# Patient Record
Sex: MALE | Race: BLACK OR AFRICAN AMERICAN | NOT HISPANIC OR LATINO | URBAN - METROPOLITAN AREA
[De-identification: names, ages, dates, MRNs, and addresses within clinical notes are randomized per-mention and may not be internally consistent; named-entity substitution may affect disease eponyms.]

---

## 2017-01-22 ENCOUNTER — EMERGENCY (EMERGENCY)
Facility: HOSPITAL | Age: 77
LOS: 1 days | Discharge: HOME | End: 2017-01-22
Attending: EMERGENCY MEDICINE | Admitting: EMERGENCY MEDICINE
Payer: MEDICARE

## 2017-01-22 VITALS
SYSTOLIC BLOOD PRESSURE: 150 MMHG | DIASTOLIC BLOOD PRESSURE: 70 MMHG | HEART RATE: 68 BPM | RESPIRATION RATE: 16 BRPM | OXYGEN SATURATION: 99 %

## 2017-01-22 VITALS
RESPIRATION RATE: 18 BRPM | DIASTOLIC BLOOD PRESSURE: 67 MMHG | WEIGHT: 217.82 LBS | TEMPERATURE: 98 F | OXYGEN SATURATION: 97 % | HEART RATE: 64 BPM | SYSTOLIC BLOOD PRESSURE: 146 MMHG

## 2017-01-22 DIAGNOSIS — I10 ESSENTIAL (PRIMARY) HYPERTENSION: ICD-10-CM

## 2017-01-22 DIAGNOSIS — E78.5 HYPERLIPIDEMIA, UNSPECIFIED: ICD-10-CM

## 2017-01-22 DIAGNOSIS — R07.89 OTHER CHEST PAIN: ICD-10-CM

## 2017-01-22 DIAGNOSIS — Z79.899 OTHER LONG TERM (CURRENT) DRUG THERAPY: ICD-10-CM

## 2017-01-22 DIAGNOSIS — R05 COUGH: ICD-10-CM

## 2017-01-22 DIAGNOSIS — E11.9 TYPE 2 DIABETES MELLITUS WITHOUT COMPLICATIONS: ICD-10-CM

## 2017-01-22 DIAGNOSIS — N39.0 URINARY TRACT INFECTION, SITE NOT SPECIFIED: ICD-10-CM

## 2017-01-22 DIAGNOSIS — Z79.2 LONG TERM (CURRENT) USE OF ANTIBIOTICS: ICD-10-CM

## 2017-01-22 LAB
ALBUMIN SERPL ELPH-MCNC: 3.2 G/DL — LOW (ref 3.4–5)
ALP SERPL-CCNC: 50 U/L — SIGNIFICANT CHANGE UP (ref 40–120)
ALT FLD-CCNC: 42 U/L — SIGNIFICANT CHANGE UP (ref 12–42)
ANION GAP SERPL CALC-SCNC: 8 MMOL/L — LOW (ref 9–16)
APPEARANCE UR: CLEAR — SIGNIFICANT CHANGE UP
APTT BLD: 36.3 SEC — SIGNIFICANT CHANGE UP (ref 27.5–37.4)
AST SERPL-CCNC: 43 U/L — HIGH (ref 15–37)
BASOPHILS NFR BLD AUTO: 0.2 % — SIGNIFICANT CHANGE UP (ref 0–2)
BILIRUB SERPL-MCNC: 0.4 MG/DL — SIGNIFICANT CHANGE UP (ref 0.2–1.2)
BILIRUB UR-MCNC: NEGATIVE — SIGNIFICANT CHANGE UP
BUN SERPL-MCNC: 13 MG/DL — SIGNIFICANT CHANGE UP (ref 7–23)
CALCIUM SERPL-MCNC: 9.3 MG/DL — SIGNIFICANT CHANGE UP (ref 8.5–10.5)
CHLORIDE SERPL-SCNC: 108 MMOL/L — SIGNIFICANT CHANGE UP (ref 96–108)
CK MB CFR SERPL CALC: <1 NG/ML — SIGNIFICANT CHANGE UP (ref 0.5–3.6)
CK SERPL-CCNC: 89 U/L — SIGNIFICANT CHANGE UP (ref 39–308)
CO2 SERPL-SCNC: 26 MMOL/L — SIGNIFICANT CHANGE UP (ref 22–31)
COLOR SPEC: YELLOW — SIGNIFICANT CHANGE UP
CREAT SERPL-MCNC: 1.02 MG/DL — SIGNIFICANT CHANGE UP (ref 0.5–1.3)
DIFF PNL FLD: NEGATIVE — SIGNIFICANT CHANGE UP
EOSINOPHIL NFR BLD AUTO: 2 % — SIGNIFICANT CHANGE UP (ref 0–6)
GLUCOSE SERPL-MCNC: 112 MG/DL — HIGH (ref 70–99)
GLUCOSE UR QL: NEGATIVE — SIGNIFICANT CHANGE UP
HCT VFR BLD CALC: 32.8 % — LOW (ref 39–50)
HGB BLD-MCNC: 10.9 G/DL — LOW (ref 13–17)
INR BLD: 1.13 — SIGNIFICANT CHANGE UP (ref 0.88–1.16)
KETONES UR-MCNC: (no result) MG/DL
LEUKOCYTE ESTERASE UR-ACNC: (no result)
LYMPHOCYTES # BLD AUTO: 12.1 % — LOW (ref 13–44)
MCHC RBC-ENTMCNC: 29.9 PG — SIGNIFICANT CHANGE UP (ref 27–34)
MCHC RBC-ENTMCNC: 33.2 G/DL — SIGNIFICANT CHANGE UP (ref 32–36)
MCV RBC AUTO: 90.1 FL — SIGNIFICANT CHANGE UP (ref 80–100)
MONOCYTES NFR BLD AUTO: 9.5 % — SIGNIFICANT CHANGE UP (ref 2–14)
NEUTROPHILS NFR BLD AUTO: 76.2 % — SIGNIFICANT CHANGE UP (ref 43–77)
NITRITE UR-MCNC: NEGATIVE — SIGNIFICANT CHANGE UP
PH UR: 6 — SIGNIFICANT CHANGE UP (ref 4–8)
PLATELET # BLD AUTO: 293 K/UL — SIGNIFICANT CHANGE UP (ref 150–400)
POTASSIUM SERPL-MCNC: 4.3 MMOL/L — SIGNIFICANT CHANGE UP (ref 3.5–5.3)
POTASSIUM SERPL-SCNC: 4.3 MMOL/L — SIGNIFICANT CHANGE UP (ref 3.5–5.3)
PROT SERPL-MCNC: 7 G/DL — SIGNIFICANT CHANGE UP (ref 6.4–8.2)
PROT UR-MCNC: 30 MG/DL
PROTHROM AB SERPL-ACNC: 12.6 SEC — SIGNIFICANT CHANGE UP (ref 10–13.1)
RBC # BLD: 3.64 M/UL — LOW (ref 4.2–5.8)
RBC # FLD: 14.1 % — SIGNIFICANT CHANGE UP (ref 10.3–16.9)
SODIUM SERPL-SCNC: 142 MMOL/L — SIGNIFICANT CHANGE UP (ref 135–145)
SP GR SPEC: 1.02 — SIGNIFICANT CHANGE UP (ref 1–1.03)
TROPONIN I SERPL-MCNC: <0.015 NG/ML — SIGNIFICANT CHANGE UP (ref 0.01–0.04)
UROBILINOGEN FLD QL: 0.2 E.U./DL — SIGNIFICANT CHANGE UP
WBC # BLD: 9.5 K/UL — SIGNIFICANT CHANGE UP (ref 3.8–10.5)
WBC # FLD AUTO: 9.5 K/UL — SIGNIFICANT CHANGE UP (ref 3.8–10.5)

## 2017-01-22 PROCEDURE — 81003 URINALYSIS AUTO W/O SCOPE: CPT

## 2017-01-22 PROCEDURE — 71020: CPT | Mod: 26

## 2017-01-22 PROCEDURE — 93010 ELECTROCARDIOGRAM REPORT: CPT

## 2017-01-22 PROCEDURE — 80053 COMPREHEN METABOLIC PANEL: CPT

## 2017-01-22 PROCEDURE — 93005 ELECTROCARDIOGRAM TRACING: CPT

## 2017-01-22 PROCEDURE — 82550 ASSAY OF CK (CPK): CPT

## 2017-01-22 PROCEDURE — 71046 X-RAY EXAM CHEST 2 VIEWS: CPT

## 2017-01-22 PROCEDURE — 85025 COMPLETE CBC W/AUTO DIFF WBC: CPT

## 2017-01-22 PROCEDURE — 81001 URINALYSIS AUTO W/SCOPE: CPT

## 2017-01-22 PROCEDURE — 99283 EMERGENCY DEPT VISIT LOW MDM: CPT | Mod: 25

## 2017-01-22 PROCEDURE — 84484 ASSAY OF TROPONIN QUANT: CPT

## 2017-01-22 PROCEDURE — 82553 CREATINE MB FRACTION: CPT

## 2017-01-22 PROCEDURE — 85730 THROMBOPLASTIN TIME PARTIAL: CPT

## 2017-01-22 PROCEDURE — 85610 PROTHROMBIN TIME: CPT

## 2017-01-22 PROCEDURE — 99285 EMERGENCY DEPT VISIT HI MDM: CPT | Mod: 25

## 2017-01-22 RX ORDER — CEPHALEXIN 500 MG
1 CAPSULE ORAL
Qty: 14 | Refills: 0
Start: 2017-01-22 | End: 2017-01-29

## 2017-01-22 RX ORDER — CEPHALEXIN 500 MG
500 CAPSULE ORAL ONCE
Qty: 0 | Refills: 0 | Status: COMPLETED | OUTPATIENT
Start: 2017-01-22 | End: 2017-01-22

## 2017-01-22 RX ADMIN — Medication 500 MILLIGRAM(S): at 17:10

## 2017-01-22 NOTE — ED PROVIDER NOTE - OBJECTIVE STATEMENT
history of htn, dm, prostate cancer, here with generalized weakness, discomfort (gas pain) in chest, chills today.  Had something similar a week ago but went away on own so he didn't see the doctor.  Also with dry cough for a few months.  No fever, shortness of breath, abdominal pain, vomiting, diarrhea.

## 2017-01-22 NOTE — ED ADULT NURSE NOTE - PMH
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

## 2017-01-22 NOTE — ED ADULT NURSE NOTE - OBJECTIVE STATEMENT
pt is a 75 y/o male c/o midsternal chest pain since this morning as well as increasing weakness and dizziness. PMH HTN, prostate CA. denies any prior tx, n/v/d, urinary symptoms, falls, LOC, headache. pt is a 77 y/o male c/o midsternal chest pain since this morning as well as increasing weakness and dizziness. PMH HTN, prostate CA, type II DM, hyperlipidemia. denies any prior tx, n/v/d, urinary symptoms, falls, LOC, headache, jaw pain, left arm pain. EKG done and MD reviewed, pt placed on cardiac monitor. no acute distress, pt speaking in full sentences, VS stable, will continue to monitor.

## 2017-01-22 NOTE — ED PROVIDER NOTE - MEDICAL DECISION MAKING DETAILS
concern for pneumonia, acs, viral process, uti.  labs suggestive of uti.  unlikely acs given mild intermittent discomfort for more than 6 hours and normal troponin.  cxr neg for infiltrate.  plan home with keflex, pmd followup

## 2017-01-24 ENCOUNTER — APPOINTMENT (OUTPATIENT)
Dept: UROLOGY | Facility: CLINIC | Age: 77
End: 2017-01-24

## 2017-01-24 VITALS
DIASTOLIC BLOOD PRESSURE: 65 MMHG | HEART RATE: 62 BPM | WEIGHT: 208 LBS | TEMPERATURE: 97.3 F | BODY MASS INDEX: 30.81 KG/M2 | HEIGHT: 69 IN | OXYGEN SATURATION: 97 % | SYSTOLIC BLOOD PRESSURE: 145 MMHG

## 2017-01-24 RX ORDER — LEUPROLIDE ACETATE 22.5 MG
22.5 KIT INTRAMUSCULAR
Qty: 1 | Refills: 0 | Status: COMPLETED | OUTPATIENT
Start: 2017-01-24

## 2017-01-24 RX ADMIN — LEUPROLIDE ACETATE 1 MG: KIT at 00:00

## 2017-01-30 LAB
APPEARANCE: CLEAR
BACTERIA UR CULT: NORMAL
BACTERIA: NEGATIVE
BILIRUBIN URINE: NEGATIVE
BLOOD URINE: NEGATIVE
COLOR: YELLOW
GLUCOSE QUALITATIVE U: NORMAL MG/DL
HYALINE CASTS: 3 /LPF
KETONES URINE: ABNORMAL
LEUKOCYTE ESTERASE URINE: NEGATIVE
MICROSCOPIC-UA: NORMAL
NITRITE URINE: NEGATIVE
PH URINE: 6
PROTEIN URINE: 30 MG/DL
PSA SERPL-MCNC: 4.6 NG/ML
RED BLOOD CELLS URINE: 2 /HPF
SPECIFIC GRAVITY URINE: 1.02
SQUAMOUS EPITHELIAL CELLS: 1 /HPF
UROBILINOGEN URINE: NORMAL MG/DL
WHITE BLOOD CELLS URINE: 2 /HPF

## 2017-04-04 ENCOUNTER — OUTPATIENT (OUTPATIENT)
Dept: OUTPATIENT SERVICES | Facility: HOSPITAL | Age: 77
LOS: 1 days | End: 2017-04-04
Payer: MEDICARE

## 2017-04-04 PROCEDURE — 74177 CT ABD & PELVIS W/CONTRAST: CPT | Mod: 26

## 2017-04-04 PROCEDURE — 71260 CT THORAX DX C+: CPT | Mod: 26

## 2017-04-04 PROCEDURE — 74177 CT ABD & PELVIS W/CONTRAST: CPT

## 2017-04-04 PROCEDURE — 71260 CT THORAX DX C+: CPT

## 2017-04-25 ENCOUNTER — APPOINTMENT (OUTPATIENT)
Dept: UROLOGY | Facility: CLINIC | Age: 77
End: 2017-04-25

## 2017-04-25 VITALS
BODY MASS INDEX: 30.81 KG/M2 | TEMPERATURE: 98.2 F | DIASTOLIC BLOOD PRESSURE: 74 MMHG | WEIGHT: 208 LBS | HEIGHT: 69 IN | SYSTOLIC BLOOD PRESSURE: 164 MMHG | HEART RATE: 58 BPM

## 2017-04-25 RX ORDER — LEUPROLIDE ACETATE 22.5 MG
22.5 KIT INTRAMUSCULAR
Qty: 1 | Refills: 0 | Status: COMPLETED | OUTPATIENT
Start: 2017-04-25

## 2017-04-25 RX ADMIN — LEUPROLIDE ACETATE 1 MG: KIT at 00:00

## 2017-07-25 ENCOUNTER — APPOINTMENT (OUTPATIENT)
Dept: UROLOGY | Facility: CLINIC | Age: 77
End: 2017-07-25

## 2017-07-25 VITALS
DIASTOLIC BLOOD PRESSURE: 75 MMHG | WEIGHT: 208 LBS | SYSTOLIC BLOOD PRESSURE: 157 MMHG | BODY MASS INDEX: 30.81 KG/M2 | HEIGHT: 69 IN | HEART RATE: 61 BPM | TEMPERATURE: 98.5 F

## 2017-07-25 RX ADMIN — LEUPROLIDE ACETATE 1 MG: KIT at 00:00

## 2017-09-23 ENCOUNTER — EMERGENCY (EMERGENCY)
Facility: HOSPITAL | Age: 77
LOS: 1 days | Discharge: HOME | End: 2017-09-23
Attending: EMERGENCY MEDICINE | Admitting: EMERGENCY MEDICINE
Payer: MEDICARE

## 2017-09-23 VITALS
TEMPERATURE: 99 F | OXYGEN SATURATION: 100 % | SYSTOLIC BLOOD PRESSURE: 118 MMHG | HEART RATE: 60 BPM | DIASTOLIC BLOOD PRESSURE: 66 MMHG | RESPIRATION RATE: 18 BRPM | WEIGHT: 222.45 LBS

## 2017-09-23 VITALS
TEMPERATURE: 99 F | HEART RATE: 60 BPM | DIASTOLIC BLOOD PRESSURE: 69 MMHG | SYSTOLIC BLOOD PRESSURE: 123 MMHG | OXYGEN SATURATION: 97 % | RESPIRATION RATE: 18 BRPM

## 2017-09-23 DIAGNOSIS — Z79.84 LONG TERM (CURRENT) USE OF ORAL HYPOGLYCEMIC DRUGS: ICD-10-CM

## 2017-09-23 DIAGNOSIS — I10 ESSENTIAL (PRIMARY) HYPERTENSION: ICD-10-CM

## 2017-09-23 DIAGNOSIS — Z79.2 LONG TERM (CURRENT) USE OF ANTIBIOTICS: ICD-10-CM

## 2017-09-23 DIAGNOSIS — E78.5 HYPERLIPIDEMIA, UNSPECIFIED: ICD-10-CM

## 2017-09-23 DIAGNOSIS — E11.9 TYPE 2 DIABETES MELLITUS WITHOUT COMPLICATIONS: ICD-10-CM

## 2017-09-23 DIAGNOSIS — Z79.899 OTHER LONG TERM (CURRENT) DRUG THERAPY: ICD-10-CM

## 2017-09-23 DIAGNOSIS — R06.02 SHORTNESS OF BREATH: ICD-10-CM

## 2017-09-23 DIAGNOSIS — J45.909 UNSPECIFIED ASTHMA, UNCOMPLICATED: ICD-10-CM

## 2017-09-23 DIAGNOSIS — Z79.52 LONG TERM (CURRENT) USE OF SYSTEMIC STEROIDS: ICD-10-CM

## 2017-09-23 DIAGNOSIS — R06.00 DYSPNEA, UNSPECIFIED: ICD-10-CM

## 2017-09-23 LAB
ALBUMIN SERPL ELPH-MCNC: 4 G/DL — SIGNIFICANT CHANGE UP (ref 3.3–5)
ALP SERPL-CCNC: 42 U/L — SIGNIFICANT CHANGE UP (ref 40–120)
ALT FLD-CCNC: 237 U/L — HIGH (ref 10–45)
ANION GAP SERPL CALC-SCNC: 17 MMOL/L — SIGNIFICANT CHANGE UP (ref 5–17)
APTT BLD: 33.5 SEC — SIGNIFICANT CHANGE UP (ref 27.5–37.4)
AST SERPL-CCNC: 181 U/L — HIGH (ref 10–40)
BASOPHILS NFR BLD AUTO: 0.3 % — SIGNIFICANT CHANGE UP (ref 0–2)
BILIRUB SERPL-MCNC: 0.2 MG/DL — SIGNIFICANT CHANGE UP (ref 0.2–1.2)
BUN SERPL-MCNC: 11 MG/DL — SIGNIFICANT CHANGE UP (ref 7–23)
CALCIUM SERPL-MCNC: 8.8 MG/DL — SIGNIFICANT CHANGE UP (ref 8.4–10.5)
CHLORIDE SERPL-SCNC: 97 MMOL/L — SIGNIFICANT CHANGE UP (ref 96–108)
CK MB CFR SERPL CALC: 2.8 NG/ML — SIGNIFICANT CHANGE UP (ref 0–6.7)
CK SERPL-CCNC: 185 U/L — SIGNIFICANT CHANGE UP (ref 30–200)
CO2 SERPL-SCNC: 22 MMOL/L — SIGNIFICANT CHANGE UP (ref 22–31)
CREAT SERPL-MCNC: 1.11 MG/DL — SIGNIFICANT CHANGE UP (ref 0.5–1.3)
EOSINOPHIL NFR BLD AUTO: 5 % — SIGNIFICANT CHANGE UP (ref 0–6)
GLUCOSE SERPL-MCNC: 98 MG/DL — SIGNIFICANT CHANGE UP (ref 70–99)
HCT VFR BLD CALC: 33.7 % — LOW (ref 39–50)
HGB BLD-MCNC: 11.5 G/DL — LOW (ref 13–17)
INR BLD: 1.08 — SIGNIFICANT CHANGE UP (ref 0.88–1.16)
LYMPHOCYTES # BLD AUTO: 22.1 % — SIGNIFICANT CHANGE UP (ref 13–44)
MCHC RBC-ENTMCNC: 30 PG — SIGNIFICANT CHANGE UP (ref 27–34)
MCHC RBC-ENTMCNC: 34.1 G/DL — SIGNIFICANT CHANGE UP (ref 32–36)
MCV RBC AUTO: 88 FL — SIGNIFICANT CHANGE UP (ref 80–100)
MONOCYTES NFR BLD AUTO: 12.2 % — SIGNIFICANT CHANGE UP (ref 2–14)
NEUTROPHILS NFR BLD AUTO: 60.4 % — SIGNIFICANT CHANGE UP (ref 43–77)
PLATELET # BLD AUTO: 253 K/UL — SIGNIFICANT CHANGE UP (ref 150–400)
POTASSIUM SERPL-MCNC: 3.7 MMOL/L — SIGNIFICANT CHANGE UP (ref 3.5–5.3)
POTASSIUM SERPL-SCNC: 3.7 MMOL/L — SIGNIFICANT CHANGE UP (ref 3.5–5.3)
PROT SERPL-MCNC: 6.8 G/DL — SIGNIFICANT CHANGE UP (ref 6–8.3)
PROTHROM AB SERPL-ACNC: 12 SEC — SIGNIFICANT CHANGE UP (ref 9.8–12.7)
RBC # BLD: 3.83 M/UL — LOW (ref 4.2–5.8)
RBC # FLD: 14.1 % — SIGNIFICANT CHANGE UP (ref 10.3–16.9)
SODIUM SERPL-SCNC: 136 MMOL/L — SIGNIFICANT CHANGE UP (ref 135–145)
TROPONIN T SERPL-MCNC: <0.01 NG/ML — SIGNIFICANT CHANGE UP (ref 0–0.01)
TROPONIN T SERPL-MCNC: <0.01 NG/ML — SIGNIFICANT CHANGE UP (ref 0–0.01)
WBC # BLD: 6.5 K/UL — SIGNIFICANT CHANGE UP (ref 3.8–10.5)
WBC # FLD AUTO: 6.5 K/UL — SIGNIFICANT CHANGE UP (ref 3.8–10.5)

## 2017-09-23 PROCEDURE — 93005 ELECTROCARDIOGRAM TRACING: CPT

## 2017-09-23 PROCEDURE — 84484 ASSAY OF TROPONIN QUANT: CPT

## 2017-09-23 PROCEDURE — 93010 ELECTROCARDIOGRAM REPORT: CPT

## 2017-09-23 PROCEDURE — 71045 X-RAY EXAM CHEST 1 VIEW: CPT

## 2017-09-23 PROCEDURE — 85610 PROTHROMBIN TIME: CPT

## 2017-09-23 PROCEDURE — 71275 CT ANGIOGRAPHY CHEST: CPT | Mod: 26

## 2017-09-23 PROCEDURE — 82550 ASSAY OF CK (CPK): CPT

## 2017-09-23 PROCEDURE — 80053 COMPREHEN METABOLIC PANEL: CPT

## 2017-09-23 PROCEDURE — 94640 AIRWAY INHALATION TREATMENT: CPT

## 2017-09-23 PROCEDURE — 85379 FIBRIN DEGRADATION QUANT: CPT

## 2017-09-23 PROCEDURE — 99285 EMERGENCY DEPT VISIT HI MDM: CPT | Mod: 25

## 2017-09-23 PROCEDURE — 82553 CREATINE MB FRACTION: CPT

## 2017-09-23 PROCEDURE — 85730 THROMBOPLASTIN TIME PARTIAL: CPT

## 2017-09-23 PROCEDURE — 83880 ASSAY OF NATRIURETIC PEPTIDE: CPT

## 2017-09-23 PROCEDURE — 71275 CT ANGIOGRAPHY CHEST: CPT

## 2017-09-23 PROCEDURE — 85025 COMPLETE CBC W/AUTO DIFF WBC: CPT

## 2017-09-23 PROCEDURE — 71010: CPT | Mod: 26

## 2017-09-23 RX ORDER — ALBUTEROL 90 UG/1
2 AEROSOL, METERED ORAL
Qty: 1 | Refills: 0
Start: 2017-09-23

## 2017-09-23 RX ORDER — IPRATROPIUM/ALBUTEROL SULFATE 18-103MCG
3 AEROSOL WITH ADAPTER (GRAM) INHALATION
Qty: 0 | Refills: 0 | Status: COMPLETED | OUTPATIENT
Start: 2017-09-23 | End: 2017-09-23

## 2017-09-23 RX ADMIN — Medication 40 MILLIGRAM(S): at 20:05

## 2017-09-23 RX ADMIN — Medication 3 MILLILITER(S): at 20:51

## 2017-09-23 RX ADMIN — Medication 3 MILLILITER(S): at 20:20

## 2017-09-23 RX ADMIN — Medication 3 MILLILITER(S): at 20:06

## 2017-09-23 NOTE — ED PROVIDER NOTE - MEDICAL DECISION MAKING DETAILS
Neg for main PE, rpt trop neg, less likely ACS given presentation- normal bnp/ekg no acute ischemic process. stopped prednisone- chronic on it for 3 yrs, 6 days ago, ever since symptoms started Ddx: PE, ACS, asthma exacerbation, new onset heart failure. Plan for labs, cxr and reeval.     Elev d-dimer slightly but CTA neg for main PE, rpt trop neg.  normal bnp/ekg no acute ischemic process. Pt ambulated in ED without dyspnea. Given albuterol w improved symptoms per pt. Symptoms possibly from worsening obstructive airway disease  after stoppage of prednisone use. Will give short course pred burst to treat as exacerbation, albuterol q4 hours, pt states he has upcoming pcp apt this week. strict return prec given.     Discussed with pt and family results of work up, strict return precautions, and need for follow up.  Pt expressed understanding and agrees with plan.

## 2017-09-23 NOTE — ED ADULT NURSE NOTE - OBJECTIVE STATEMENT
77 y/o male c/o intermittent midsternal chest pressure that started a few days ago, associated with SOB on exertion. NAD, VSS, EKG done, pt placed on continuous cardiac monitor. denies headache, dizziness, numbness/tingling, fever/chills, n/v/d, urinary symptoms.

## 2017-09-23 NOTE — ED PROVIDER NOTE - PHYSICAL EXAMINATION
CONSTITUTIONAL: Well appearing, well nourished, awake, alert and in no apparent distress.  HEENT: Head is atraumatic. Eyes clear bilaterally, normal EOMI. Airway patent.  CARDIAC: Normal rate, regular rhythm.  Heart sounds S1, S2.   RESPIRATORY: Breath sounds clear and equal bilaterally.  GASTROINTESTINAL: Abdomen soft, non-tender, no guarding.  MUSCULOSKELETAL: Spine appears normal, range of motion is not limited, no muscle or joint tenderness. no pedal edema, no JVD  NEUROLOGICAL: Alert and oriented, no focal deficits, no motor or sensory deficits.  SKIN: Skin normal color for race, warm, dry and intact. No evidence of rash.  PSYCHIATRIC: Alert and oriented to person, place, time/situation. normal mood and affect. no apparent risk to self or others. CONSTITUTIONAL: Well appearing, well nourished, awake, alert and in no apparent distress.  HEENT: Head is atraumatic. Eyes clear bilaterally, normal EOMI. Airway patent.   CARDIAC: Normal rate, regular rhythm.  Heart sounds S1, S2.   RESPIRATORY: very mild exp wheezing, no tachypnea, no labored breathing.  GASTROINTESTINAL: Abdomen soft, non-tender, no guarding.  MUSCULOSKELETAL: Spine appears normal, range of motion is not limited, no muscle or joint tenderness. no pedal edema, no JVD  NEUROLOGICAL: Alert and oriented, no focal deficits, no motor or sensory deficits.  SKIN: Skin normal color for race, warm, dry and intact. No evidence of rash.  PSYCHIATRIC: Alert and oriented to person, place, time/situation. normal mood and affect. no apparent risk to self or others.

## 2017-09-23 NOTE — ED PROVIDER NOTE - OBJECTIVE STATEMENT
77 yo PMHx prostate cancer s/p radical prostatectomy (on abiraterone and prednisone), HTN, DM2, glaucoma w complaints of shortness of breath for 5-6 worse with exertion, no orthopnea, cough, fever, pedal edema. No recent stress/echo. 75 yo PMHx prostate cancer s/p radical prostatectomy (on abiraterone and prednisone), HTN, DM2, glaucoma, asthma w complaints of shortness of breath for 5-6 worse with exertion, no orthopnea, cough, fever, pedal edema. No recent stress/echo. attributes it to stopping chronic prednisone use 6 days, had been on it for 3 years.

## 2017-09-23 NOTE — ED ADULT NURSE NOTE - CHPI ED SYMPTOMS NEG
no diaphoresis/no fever/no chills/no vomiting/no back pain/no cough/no syncope/no dizziness/no nausea

## 2017-09-23 NOTE — ED PROVIDER NOTE - CHPI ED SYMPTOMS NEG
no edema/no shortness of breath/no chest pain/no wheezing/no body aches/no headache/no cough/no chills/no fever/no hemoptysis/no diaphoresis

## 2017-09-23 NOTE — ED ADULT NURSE REASSESSMENT NOTE - NS ED NURSE REASSESS COMMENT FT1
lab results reviewed, pt ambulating to bathroom independently with steady gait, NAD. denies chest pain at this time.

## 2017-10-24 ENCOUNTER — APPOINTMENT (OUTPATIENT)
Dept: UROLOGY | Facility: CLINIC | Age: 77
End: 2017-10-24
Payer: MEDICARE

## 2017-10-24 VITALS
BODY MASS INDEX: 30.81 KG/M2 | HEART RATE: 65 BPM | DIASTOLIC BLOOD PRESSURE: 72 MMHG | SYSTOLIC BLOOD PRESSURE: 143 MMHG | WEIGHT: 208 LBS | HEIGHT: 69 IN

## 2017-10-24 PROCEDURE — 96402 CHEMO HORMON ANTINEOPL SQ/IM: CPT

## 2017-10-24 PROCEDURE — 99213 OFFICE O/P EST LOW 20 MIN: CPT | Mod: 25

## 2017-10-24 RX ORDER — LEUPROLIDE ACETATE 22.5 MG
22.5 KIT INTRAMUSCULAR
Qty: 1 | Refills: 0 | Status: COMPLETED | OUTPATIENT
Start: 2017-07-25

## 2017-10-30 LAB
APPEARANCE: ABNORMAL
BACTERIA UR CULT: ABNORMAL
BACTERIA: ABNORMAL
BILIRUBIN URINE: ABNORMAL
BLOOD URINE: ABNORMAL
COLOR: ABNORMAL
GLUCOSE QUALITATIVE U: NEGATIVE MG/DL
HYALINE CASTS: 3 /LPF
KETONES URINE: ABNORMAL
LEUKOCYTE ESTERASE URINE: ABNORMAL
MICROSCOPIC-UA: NORMAL
NITRITE URINE: NEGATIVE
PH URINE: 5
PROTEIN URINE: 100 MG/DL
RED BLOOD CELLS URINE: 7 /HPF
SPECIFIC GRAVITY URINE: 1.02
SQUAMOUS EPITHELIAL CELLS: 1 /HPF
UROBILINOGEN URINE: NEGATIVE MG/DL
WHITE BLOOD CELLS URINE: >720 /HPF

## 2017-12-07 ENCOUNTER — OUTPATIENT (OUTPATIENT)
Dept: OUTPATIENT SERVICES | Facility: HOSPITAL | Age: 77
LOS: 1 days | End: 2017-12-07
Payer: MEDICARE

## 2017-12-07 PROCEDURE — 82962 GLUCOSE BLOOD TEST: CPT

## 2017-12-07 PROCEDURE — 78815 PET IMAGE W/CT SKULL-THIGH: CPT | Mod: 26

## 2017-12-07 PROCEDURE — 78815 PET IMAGE W/CT SKULL-THIGH: CPT

## 2017-12-07 PROCEDURE — A9552: CPT

## 2017-12-13 ENCOUNTER — APPOINTMENT (OUTPATIENT)
Dept: PULMONOLOGY | Facility: CLINIC | Age: 77
End: 2017-12-13
Payer: MEDICARE

## 2017-12-13 PROCEDURE — 99214 OFFICE O/P EST MOD 30 MIN: CPT | Mod: 25

## 2017-12-15 ENCOUNTER — APPOINTMENT (OUTPATIENT)
Dept: PULMONOLOGY | Facility: CLINIC | Age: 77
End: 2017-12-15
Payer: MEDICARE

## 2017-12-15 PROCEDURE — 94727 GAS DIL/WSHOT DETER LNG VOL: CPT

## 2017-12-15 PROCEDURE — 94729 DIFFUSING CAPACITY: CPT

## 2017-12-15 PROCEDURE — 94060 EVALUATION OF WHEEZING: CPT

## 2017-12-29 ENCOUNTER — APPOINTMENT (OUTPATIENT)
Dept: RADIATION ONCOLOGY | Facility: CLINIC | Age: 77
End: 2017-12-29
Payer: MEDICARE

## 2017-12-29 VITALS
SYSTOLIC BLOOD PRESSURE: 140 MMHG | DIASTOLIC BLOOD PRESSURE: 80 MMHG | RESPIRATION RATE: 18 BRPM | WEIGHT: 203.3 LBS | BODY MASS INDEX: 30.02 KG/M2 | HEART RATE: 59 BPM

## 2017-12-29 PROCEDURE — 77263 THER RADIOLOGY TX PLNG CPLX: CPT

## 2017-12-29 PROCEDURE — 99205 OFFICE O/P NEW HI 60 MIN: CPT | Mod: 25

## 2017-12-29 RX ORDER — CIPROFLOXACIN HYDROCHLORIDE 500 MG/1
500 TABLET, FILM COATED ORAL
Qty: 14 | Refills: 0 | Status: DISCONTINUED | COMMUNITY
Start: 2017-10-30 | End: 2017-12-29

## 2017-12-29 RX ORDER — ACLIDINIUM BROMIDE 400 UG/1
400 POWDER, METERED RESPIRATORY (INHALATION)
Qty: 1 | Refills: 0 | Status: DISCONTINUED | COMMUNITY
Start: 2017-12-14

## 2017-12-29 RX ORDER — ALBUTEROL SULFATE 90 UG/1
108 (90 BASE) AEROSOL, METERED RESPIRATORY (INHALATION)
Qty: 8 | Refills: 0 | Status: ACTIVE | COMMUNITY
Start: 2017-09-23

## 2018-01-23 VITALS
SYSTOLIC BLOOD PRESSURE: 164 MMHG | OXYGEN SATURATION: 98 % | BODY MASS INDEX: 29.9 KG/M2 | WEIGHT: 202.44 LBS | HEART RATE: 58 BPM | DIASTOLIC BLOOD PRESSURE: 71 MMHG

## 2018-01-23 RX ORDER — ENZALUTAMIDE 40 MG/1
40 CAPSULE ORAL
Qty: 120 | Refills: 0 | Status: COMPLETED | COMMUNITY
Start: 2017-09-18 | End: 2018-01-23

## 2018-01-23 RX ORDER — PREDNISONE 5 MG/1
5 TABLET ORAL
Qty: 60 | Refills: 0 | Status: COMPLETED | COMMUNITY
Start: 2017-08-09 | End: 2018-01-23

## 2018-02-02 ENCOUNTER — APPOINTMENT (OUTPATIENT)
Dept: UROLOGY | Facility: CLINIC | Age: 78
End: 2018-02-02
Payer: MEDICARE

## 2018-02-02 VITALS
SYSTOLIC BLOOD PRESSURE: 179 MMHG | HEART RATE: 57 BPM | WEIGHT: 202 LBS | OXYGEN SATURATION: 96 % | TEMPERATURE: 97.5 F | DIASTOLIC BLOOD PRESSURE: 79 MMHG | BODY MASS INDEX: 29.92 KG/M2 | HEIGHT: 69 IN

## 2018-02-02 PROCEDURE — 99213 OFFICE O/P EST LOW 20 MIN: CPT | Mod: 25

## 2018-02-02 PROCEDURE — 96402 CHEMO HORMON ANTINEOPL SQ/IM: CPT

## 2018-02-02 RX ADMIN — LEUPROLIDE ACETATE 1 MG: KIT at 00:00

## 2018-02-20 ENCOUNTER — APPOINTMENT (OUTPATIENT)
Dept: RADIATION ONCOLOGY | Facility: CLINIC | Age: 78
End: 2018-02-20
Payer: MEDICARE

## 2018-02-20 VITALS
OXYGEN SATURATION: 98 % | BODY MASS INDEX: 28.16 KG/M2 | HEART RATE: 58 BPM | RESPIRATION RATE: 18 BRPM | WEIGHT: 190.7 LBS

## 2018-02-20 VITALS — DIASTOLIC BLOOD PRESSURE: 75 MMHG | SYSTOLIC BLOOD PRESSURE: 145 MMHG

## 2018-02-20 DIAGNOSIS — Z78.9 OTHER SPECIFIED HEALTH STATUS: ICD-10-CM

## 2018-02-20 PROCEDURE — 99024 POSTOP FOLLOW-UP VISIT: CPT

## 2018-02-20 RX ORDER — SIMVASTATIN 20 MG/1
20 TABLET, FILM COATED ORAL
Qty: 30 | Refills: 0 | Status: DISCONTINUED | COMMUNITY
Start: 2017-10-05 | End: 2018-02-20

## 2018-02-20 RX ORDER — INATAL ULTRA 2700; 120; 200; 90; 400; 30; 3; 3.4; 20; 20; 1; 12; 150; 25; 2; 5 [IU]/1; MG/1; MG/1; MG/1; [IU]/1; [IU]/1; MG/1; MG/1; MG/1; MG/1; MG/1; UG/1; UG/1; MG/1; MG/1; MG/1
TABLET, COATED ORAL
Refills: 0 | Status: DISCONTINUED | COMMUNITY
Start: 2017-12-29 | End: 2018-02-20

## 2018-05-03 ENCOUNTER — APPOINTMENT (OUTPATIENT)
Dept: RADIATION ONCOLOGY | Facility: CLINIC | Age: 78
End: 2018-05-03
Payer: MEDICARE

## 2018-05-03 VITALS — WEIGHT: 183.3 LBS | BODY MASS INDEX: 27.07 KG/M2

## 2018-05-03 PROCEDURE — 99214 OFFICE O/P EST MOD 30 MIN: CPT

## 2018-05-03 RX ORDER — CHOLECALCIFEROL (VITAMIN D3) 125 MCG
50 MCG TABLET ORAL
Refills: 0 | Status: DISCONTINUED | COMMUNITY
Start: 2017-12-29 | End: 2018-05-03

## 2018-05-08 ENCOUNTER — APPOINTMENT (OUTPATIENT)
Dept: UROLOGY | Facility: CLINIC | Age: 78
End: 2018-05-08
Payer: MEDICARE

## 2018-05-08 VITALS — SYSTOLIC BLOOD PRESSURE: 156 MMHG | HEART RATE: 53 BPM | DIASTOLIC BLOOD PRESSURE: 65 MMHG | TEMPERATURE: 98.4 F

## 2018-05-08 PROCEDURE — 99213 OFFICE O/P EST LOW 20 MIN: CPT | Mod: 25

## 2018-05-08 PROCEDURE — 96402 CHEMO HORMON ANTINEOPL SQ/IM: CPT

## 2018-05-08 RX ORDER — LEUPROLIDE ACETATE 22.5 MG
22.5 KIT INTRAMUSCULAR
Qty: 1 | Refills: 0 | Status: COMPLETED | OUTPATIENT
Start: 2018-05-08

## 2018-05-08 RX ORDER — LEUPROLIDE ACETATE 22.5 MG
22.5 KIT INTRAMUSCULAR
Qty: 1 | Refills: 0 | Status: COMPLETED | OUTPATIENT
Start: 2018-02-02

## 2018-05-08 RX ADMIN — LEUPROLIDE ACETATE 1 MG: KIT at 00:00

## 2018-08-14 ENCOUNTER — APPOINTMENT (OUTPATIENT)
Dept: UROLOGY | Facility: CLINIC | Age: 78
End: 2018-08-14
Payer: MEDICARE

## 2018-08-14 VITALS
SYSTOLIC BLOOD PRESSURE: 146 MMHG | HEIGHT: 69 IN | OXYGEN SATURATION: 98 % | BODY MASS INDEX: 26.51 KG/M2 | TEMPERATURE: 99.1 F | HEART RATE: 60 BPM | WEIGHT: 179 LBS | DIASTOLIC BLOOD PRESSURE: 71 MMHG

## 2018-08-14 PROCEDURE — 96402 CHEMO HORMON ANTINEOPL SQ/IM: CPT

## 2018-08-14 PROCEDURE — 99213 OFFICE O/P EST LOW 20 MIN: CPT | Mod: 25

## 2018-11-13 ENCOUNTER — APPOINTMENT (OUTPATIENT)
Dept: UROLOGY | Facility: CLINIC | Age: 78
End: 2018-11-13
Payer: MEDICARE

## 2018-11-13 ENCOUNTER — APPOINTMENT (OUTPATIENT)
Dept: RADIATION ONCOLOGY | Facility: CLINIC | Age: 78
End: 2018-11-13
Payer: MEDICARE

## 2018-11-13 VITALS
OXYGEN SATURATION: 97 % | SYSTOLIC BLOOD PRESSURE: 157 MMHG | HEIGHT: 69 IN | BODY MASS INDEX: 26.51 KG/M2 | WEIGHT: 179 LBS | HEART RATE: 56 BPM | TEMPERATURE: 97.7 F | DIASTOLIC BLOOD PRESSURE: 69 MMHG

## 2018-11-13 VITALS
HEART RATE: 52 BPM | BODY MASS INDEX: 28.06 KG/M2 | WEIGHT: 190 LBS | DIASTOLIC BLOOD PRESSURE: 64 MMHG | RESPIRATION RATE: 16 BRPM | SYSTOLIC BLOOD PRESSURE: 160 MMHG

## 2018-11-13 PROCEDURE — 99213 OFFICE O/P EST LOW 20 MIN: CPT | Mod: 25

## 2018-11-13 PROCEDURE — 99214 OFFICE O/P EST MOD 30 MIN: CPT

## 2018-11-13 PROCEDURE — 96402 CHEMO HORMON ANTINEOPL SQ/IM: CPT

## 2018-11-13 RX ORDER — LEUPROLIDE ACETATE 22.5 MG
22.5 KIT INTRAMUSCULAR
Qty: 1 | Refills: 0 | Status: COMPLETED | OUTPATIENT
Start: 2018-11-13

## 2018-11-13 RX ADMIN — LEUPROLIDE ACETATE 1 MG: KIT at 00:00

## 2018-11-13 NOTE — PHYSICAL EXAM
[General Appearance - Well Developed] : well developed [General Appearance - Alert] : alert [General Appearance - In No Acute Distress] : in no acute distress [Abdomen Soft] : soft [Skin Color & Pigmentation] : normal skin color and pigmentation [Oriented To Time, Place, And Person] : oriented to person, place, and time [Normal] : no focal deficits

## 2018-11-13 NOTE — REVIEW OF SYSTEMS
[Chest Pain] : no chest pain [Palpitations] : no palpitations [Cough] : no cough [Constipation] : no constipation [Skin Rash] : no skin rash [FreeTextEntry2] : decreased appetite, weight loss, fatigue [Proctitis: Grade 0] : Proctitis: Grade 0 [Rectal Pain: Grade 0] : Rectal Pain: Grade 0 [Fatigue: Grade 0] : Fatigue: Grade 0 [Urinary Incontinence: Grade 1 - Occasional (e.g., with coughing, sneezing, etc.), pads not indicated] : Urinary Incontinence: Grade 1 - Occasional (e.g., with coughing, sneezing, etc.), pads not indicated [Urinary Tract Pain: Grade 0] : Urinary Tract Pain: Grade 0 [Urinary Urgency: Grade 1 - Present] : Urinary Urgency: Grade 1 - Present [Urinary Frequency: Grade 1 - Present] : Urinary Frequency: Grade 1 - Present [IPSS Score (0-40): ___] : IPSS score: [unfilled] [EPIC-CP Score (0-60): ___] : EPIC-CP score: [unfilled] [Negative] : Constitutional [Abdominal Pain] : no abdominal pain

## 2018-11-13 NOTE — HISTORY OF PRESENT ILLNESS
[FreeTextEntry1] : Mr. Rossi completed SBRT radiation therapy for treatment of a para-aortic node to a total of 2700 cGy in 3 fractions from 1/23/18-1/30/18  heavily pretreated castrate resistant prostate cancer.\par \par 11/12/18- Follow up\par At his last visit he endorsed nocturia and urinary frequency as well as poor appetite and weight loss.\par His PET scan 4/27/18 indicated a partial response. He was seeing Dr. Fuller for PSA levels and was advised to follow up with Dr. Roa.\par \par PET/CT 8/31/18\par IMPRESSION: \par 1.Interval decrease in the size and intensity of the previously noted hypermetabolic retroperitoneal lymphadenopathy.\par 2.Mildly enlarged retroperitoneal lymph node located just above the level of the aortic bifurcation, which has increased in size but is not significantly hypermetabolic on the corresponding PET images. Attention to this lymph node on follow-up examinations would be helpful in further evaluation.\par 3.Otherwise, no abnormal hypermetabolic activity to suggest malignancy at this time.\par 4.Status post prostatectomy without significant interval change in the post-therapeutic appearance of the pelvis.\par 5.Liver cyst.\par 6.Probable bilateral renal cysts.\par \par PSA level 10/30/18-11.5 ng/ml. Dr. Fuller plans to order a new PET ASAP due to rising PSA.\par \par Today he states that he feels well with good energy level. He continues to urinary frequency and nocturia. Denies pain, hematuria or blood in stool.  According to patient and his daughter, he saw Dr. Roa this am and he recommended radium injections. Patient has not had bone scan. He also saw Dr. Fuller 2 weeks ago and a PET/CT was recommended.  AUA 11/ Epic 21.\par \par Follow up\par 5/3/18- Mr. Rossi presents today for follow up. He completed radiation therapy for treatment of a para-aortic node to a total of 2700 cGy in 3 fractions, from 1/23/18-1/30/18. He tolerated treatment well. Pre-radiation PSA was 9.2ng/ml.\par \par His last hormone injection was on 2/2/18. He is due for next lupron on 5/8/18. Most recent PSA was lower, patient and daughter do not recall level, we are awaiting report from Dr Fuller's office.\par \par He saw Dr. Fuller in March, and will see him again this month. He has been off of Xtandi and prednisone since January. \par He denies abdominal pain, bowel complaints. Notes stools three times daily. Still using one pad daily for urinary incontinence. Nocturia 3 times, urinating 7-8 times daily, which is not a change. He is fatigued. Has lost 7 pounds since his last visit with us. Does not have a good appetite but also thinks weight loss is due to prednisone. His main complaint is proximal muscle weakness of shoulders and thighs. He has started physical therapy.\par \par He had a PET scan on 4/27/18 which was done at [facility] and compared to 2014 imaging. The scan was not compared to Dec 2017 PET/CT which was done in the hospital. They noted the centrally necrotic ramy mass in the left retroperitoneum measuring 3.6 x 2.8cm with max SUV 15. On my review, the node is more cystic and necrotic indicating treatment response. \par \par Oncologic History\par Mr. Rossi presented initially for consideration for palliative radiation therapy for recurrent, metastatic prostate cancer on 12/29/17.  He is referred by Dr. Mae Fuller. \par \par In February 2016, the patient established care with Dr. Roa here at [facility]. At that time he had a diagnosis of stage IV prostate cancer, castrate resistant. \par \par Initial history is as follows: He underwent radical retropubic prostatectomy in 1994 with Dr. Aroldo Carvalho at [facility]. Post op course was complicated by strictures causing urinary retention, requiring daly catheter, and suprapubic catheter, as well as dilations of strictures. He does not have records regarding surgical pathology and Curlew Score. His PSA began to rise postoperatively, and was started on Lupron shortly after surgery.\par \par His PSA began to rise again in 2006. He underwent external beam radiation for pelvic lymphadenopathy in 2007 (records being obtained). Since then he has received multiple systemic therapies including taxotere, provenge, Jevtana, as well as zytiga. He has remained on Lupron therapy consistently from 1994 until now. 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 His PSA has continued to trend up throughout the last two years. \par \par At his visit on 10/24/17, it was noted that Mr. Rossi had an increased amount of bone pain, in addition to decreased energy. He had gross hematuria and diffuse body pain. Zytiga was discontinued and xtandi was started. \par \par He underwent PET scan on 12/7/17 which revealed a 4.3 cm left para-aortic mass representing a conglomeration of lymph nodes which has been increasing slowly since 2014. This most likely represents a ramy recurrence of the patient's prostate cancer, especially in the context of rising PSA.\par \par At the time of initial consult on 12/29/17, he denied pain, denies palpable masses in his abdomen. He noted fatigue. He had urinary incontinence, for which he wears depends. Denied urinary retention. Noted he intermittently passed clots through his urine. He had ongoing diarrhea related to Xtandi treatment.\par \par 2/20/18\par Mr. Rossi presents today for post treatment evaluation. He completed radiation therapy with Dr. Garcia for treatment of a para-aortic node for a total of 2700 cGy from 1/23/18-1/30/18. He tolerated treatment well. \par He saw Dr. Redmond for lupron injection on 2/2/18. He will see Dr. Fuller for follow up tomorrow. \par He notes joint pain today, ongoing since prior to radiation, 9/10 at worst, to bilateral knees and back. His appetite is decreased, and he has lost 12 pounds since he finished radiation. He is fatigued, worse over the last 6 weeks. Most of his symptoms coincide with prednisone and xtandi use.

## 2018-12-31 ENCOUNTER — CLINICAL ADVICE (OUTPATIENT)
Age: 78
End: 2018-12-31

## 2019-01-13 ENCOUNTER — FORM ENCOUNTER (OUTPATIENT)
Age: 79
End: 2019-01-13

## 2019-01-14 ENCOUNTER — OUTPATIENT (OUTPATIENT)
Dept: OUTPATIENT SERVICES | Facility: HOSPITAL | Age: 79
LOS: 1 days | End: 2019-01-14
Payer: MEDICARE

## 2019-01-14 DIAGNOSIS — C61 MALIGNANT NEOPLASM OF PROSTATE: ICD-10-CM

## 2019-01-14 DIAGNOSIS — J43.9 EMPHYSEMA, UNSPECIFIED: ICD-10-CM

## 2019-01-14 DIAGNOSIS — I51.7 CARDIOMEGALY: ICD-10-CM

## 2019-01-14 PROCEDURE — A9552: CPT

## 2019-01-14 PROCEDURE — 78815 PET IMAGE W/CT SKULL-THIGH: CPT | Mod: 26

## 2019-01-14 PROCEDURE — 78815 PET IMAGE W/CT SKULL-THIGH: CPT

## 2019-01-22 ENCOUNTER — RESULT REVIEW (OUTPATIENT)
Age: 79
End: 2019-01-22

## 2019-02-05 ENCOUNTER — APPOINTMENT (OUTPATIENT)
Dept: UROLOGY | Facility: CLINIC | Age: 79
End: 2019-02-05
Payer: MEDICARE

## 2019-02-05 VITALS — DIASTOLIC BLOOD PRESSURE: 78 MMHG | SYSTOLIC BLOOD PRESSURE: 154 MMHG | HEART RATE: 60 BPM | TEMPERATURE: 98.5 F

## 2019-02-05 PROCEDURE — 99214 OFFICE O/P EST MOD 30 MIN: CPT | Mod: 25

## 2019-02-05 PROCEDURE — 96402 CHEMO HORMON ANTINEOPL SQ/IM: CPT

## 2019-02-05 RX ORDER — LEUPROLIDE ACETATE 22.5 MG
22.5 KIT INTRAMUSCULAR
Qty: 1 | Refills: 0 | Status: COMPLETED | OUTPATIENT
Start: 2019-02-05

## 2019-02-05 RX ADMIN — LEUPROLIDE ACETATE 1 MG: KIT at 00:00

## 2019-02-06 NOTE — HISTORY OF PRESENT ILLNESS
[None] : None [FreeTextEntry1] : 78M hx metastatic CaP\par Rising PSA\par PSA now 18.4\par PSA 3 MON= 11.5\par PSA 4 MON= 9.2\par has now gone through:\par docetaxel, cabazitaxel, zytiga, xtandi, provenge\par following with Dr Fuller and Dr. Garcia\par no pain\par good energy\par no bone pain  \par PET/CT large retropreritoneal adneopathy

## 2019-02-06 NOTE — ASSESSMENT
[FreeTextEntry1] : Prostate cancer\par \par Overall feels well\par Lupron today\par discussed additonal options including limited role immunotherapy\par defer to med onc

## 2019-02-26 ENCOUNTER — OUTPATIENT (OUTPATIENT)
Dept: OUTPATIENT SERVICES | Facility: HOSPITAL | Age: 79
LOS: 1 days | End: 2019-02-26
Payer: MEDICARE

## 2019-02-26 ENCOUNTER — RESULT REVIEW (OUTPATIENT)
Age: 79
End: 2019-02-26

## 2019-02-26 ENCOUNTER — APPOINTMENT (OUTPATIENT)
Dept: INTERVENTIONAL RADIOLOGY/VASCULAR | Facility: HOSPITAL | Age: 79
End: 2019-02-26

## 2019-02-26 ENCOUNTER — APPOINTMENT (OUTPATIENT)
Dept: CT IMAGING | Facility: HOSPITAL | Age: 79
End: 2019-02-26

## 2019-02-26 PROCEDURE — 88173 CYTOPATH EVAL FNA REPORT: CPT

## 2019-02-26 PROCEDURE — 77012 CT SCAN FOR NEEDLE BIOPSY: CPT

## 2019-02-26 PROCEDURE — C1769: CPT

## 2019-02-26 PROCEDURE — 88305 TISSUE EXAM BY PATHOLOGIST: CPT

## 2019-02-26 PROCEDURE — 88341 IMHCHEM/IMCYTCHM EA ADD ANTB: CPT

## 2019-02-26 PROCEDURE — 49180 BIOPSY ABDOMINAL MASS: CPT

## 2019-02-26 PROCEDURE — 88342 IMHCHEM/IMCYTCHM 1ST ANTB: CPT

## 2019-02-26 PROCEDURE — 77012 CT SCAN FOR NEEDLE BIOPSY: CPT | Mod: 26

## 2019-02-27 LAB — NON-GYNECOLOGICAL CYTOLOGY STUDY: SIGNIFICANT CHANGE UP

## 2019-02-28 DIAGNOSIS — C61 MALIGNANT NEOPLASM OF PROSTATE: ICD-10-CM

## 2019-02-28 DIAGNOSIS — C77.2 SECONDARY AND UNSPECIFIED MALIGNANT NEOPLASM OF INTRA-ABDOMINAL LYMPH NODES: ICD-10-CM

## 2019-02-28 LAB — SURGICAL PATHOLOGY STUDY: SIGNIFICANT CHANGE UP

## 2019-05-09 ENCOUNTER — APPOINTMENT (OUTPATIENT)
Dept: UROLOGY | Facility: CLINIC | Age: 79
End: 2019-05-09
Payer: MEDICARE

## 2019-05-09 VITALS — SYSTOLIC BLOOD PRESSURE: 174 MMHG | DIASTOLIC BLOOD PRESSURE: 75 MMHG | HEART RATE: 50 BPM | TEMPERATURE: 97.5 F

## 2019-05-09 PROCEDURE — 99213 OFFICE O/P EST LOW 20 MIN: CPT | Mod: 25

## 2019-05-09 PROCEDURE — 96402 CHEMO HORMON ANTINEOPL SQ/IM: CPT

## 2019-05-09 NOTE — ASSESSMENT
[FreeTextEntry1] : Prostate cancer \par \par Overall feeling well\par PSA improvoing on xtandi\par Lupron today

## 2019-05-09 NOTE — HISTORY OF PRESENT ILLNESS
[None] : no symptoms [FreeTextEntry1] : 79 yo male with hx of secondary malignant neoplasm of para-aortic lymph node\par \par PSA MAR= 12.8\par PSA FEB= 18.4\par \par Lupron therapy + reattempt xtandi \par improved PSA ntoed\par feels well\par off of zytiga\par following with Dr Fuller and Dr. Garcia\par previously received chemo (taxotere 2006)\par s/p cabazitaxel 2017\par s/p provenge\par no pain\par good energy\par no bone pain

## 2019-07-18 ENCOUNTER — EMERGENCY (EMERGENCY)
Facility: HOSPITAL | Age: 79
LOS: 1 days | Discharge: HOME | End: 2019-07-18
Attending: EMERGENCY MEDICINE | Admitting: EMERGENCY MEDICINE
Payer: MEDICARE

## 2019-07-18 VITALS
TEMPERATURE: 99 F | OXYGEN SATURATION: 99 % | DIASTOLIC BLOOD PRESSURE: 84 MMHG | WEIGHT: 182.98 LBS | HEART RATE: 48 BPM | SYSTOLIC BLOOD PRESSURE: 178 MMHG | RESPIRATION RATE: 16 BRPM | HEIGHT: 69 IN

## 2019-07-18 VITALS
TEMPERATURE: 98 F | DIASTOLIC BLOOD PRESSURE: 82 MMHG | SYSTOLIC BLOOD PRESSURE: 168 MMHG | RESPIRATION RATE: 16 BRPM | HEART RATE: 60 BPM | OXYGEN SATURATION: 98 %

## 2019-07-18 LAB
ALBUMIN SERPL ELPH-MCNC: 4 G/DL — SIGNIFICANT CHANGE UP (ref 3.3–5)
ALP SERPL-CCNC: 52 U/L — SIGNIFICANT CHANGE UP (ref 40–120)
ALT FLD-CCNC: 15 U/L — SIGNIFICANT CHANGE UP (ref 10–45)
ANION GAP SERPL CALC-SCNC: 12 MMOL/L — SIGNIFICANT CHANGE UP (ref 5–17)
AST SERPL-CCNC: 22 U/L — SIGNIFICANT CHANGE UP (ref 10–40)
BASOPHILS # BLD AUTO: 0.04 K/UL — SIGNIFICANT CHANGE UP (ref 0–0.2)
BASOPHILS NFR BLD AUTO: 0.7 % — SIGNIFICANT CHANGE UP (ref 0–2)
BILIRUB SERPL-MCNC: 0.3 MG/DL — SIGNIFICANT CHANGE UP (ref 0.2–1.2)
BUN SERPL-MCNC: 20 MG/DL — SIGNIFICANT CHANGE UP (ref 7–23)
CALCIUM SERPL-MCNC: 9.8 MG/DL — SIGNIFICANT CHANGE UP (ref 8.4–10.5)
CHLORIDE SERPL-SCNC: 101 MMOL/L — SIGNIFICANT CHANGE UP (ref 96–108)
CO2 SERPL-SCNC: 27 MMOL/L — SIGNIFICANT CHANGE UP (ref 22–31)
CREAT SERPL-MCNC: 1.05 MG/DL — SIGNIFICANT CHANGE UP (ref 0.5–1.3)
EOSINOPHIL # BLD AUTO: 0.28 K/UL — SIGNIFICANT CHANGE UP (ref 0–0.5)
EOSINOPHIL NFR BLD AUTO: 4.6 % — SIGNIFICANT CHANGE UP (ref 0–6)
GLUCOSE SERPL-MCNC: 98 MG/DL — SIGNIFICANT CHANGE UP (ref 70–99)
HCT VFR BLD CALC: 35.7 % — LOW (ref 39–50)
HGB BLD-MCNC: 12.1 G/DL — LOW (ref 13–17)
IMM GRANULOCYTES NFR BLD AUTO: 0.7 % — SIGNIFICANT CHANGE UP (ref 0–1.5)
LYMPHOCYTES # BLD AUTO: 1.54 K/UL — SIGNIFICANT CHANGE UP (ref 1–3.3)
LYMPHOCYTES # BLD AUTO: 25.3 % — SIGNIFICANT CHANGE UP (ref 13–44)
MCHC RBC-ENTMCNC: 30.7 PG — SIGNIFICANT CHANGE UP (ref 27–34)
MCHC RBC-ENTMCNC: 33.9 GM/DL — SIGNIFICANT CHANGE UP (ref 32–36)
MCV RBC AUTO: 90.6 FL — SIGNIFICANT CHANGE UP (ref 80–100)
MONOCYTES # BLD AUTO: 0.79 K/UL — SIGNIFICANT CHANGE UP (ref 0–0.9)
MONOCYTES NFR BLD AUTO: 13 % — SIGNIFICANT CHANGE UP (ref 2–14)
NEUTROPHILS # BLD AUTO: 3.39 K/UL — SIGNIFICANT CHANGE UP (ref 1.8–7.4)
NEUTROPHILS NFR BLD AUTO: 55.7 % — SIGNIFICANT CHANGE UP (ref 43–77)
NRBC # BLD: 0 /100 WBCS — SIGNIFICANT CHANGE UP (ref 0–0)
PLATELET # BLD AUTO: 208 K/UL — SIGNIFICANT CHANGE UP (ref 150–400)
POTASSIUM SERPL-MCNC: 3.6 MMOL/L — SIGNIFICANT CHANGE UP (ref 3.5–5.3)
POTASSIUM SERPL-SCNC: 3.6 MMOL/L — SIGNIFICANT CHANGE UP (ref 3.5–5.3)
PROT SERPL-MCNC: 7.1 G/DL — SIGNIFICANT CHANGE UP (ref 6–8.3)
RBC # BLD: 3.94 M/UL — LOW (ref 4.2–5.8)
RBC # FLD: 13.9 % — SIGNIFICANT CHANGE UP (ref 10.3–14.5)
SODIUM SERPL-SCNC: 140 MMOL/L — SIGNIFICANT CHANGE UP (ref 135–145)
WBC # BLD: 6.08 K/UL — SIGNIFICANT CHANGE UP (ref 3.8–10.5)
WBC # FLD AUTO: 6.08 K/UL — SIGNIFICANT CHANGE UP (ref 3.8–10.5)

## 2019-07-18 PROCEDURE — 93005 ELECTROCARDIOGRAM TRACING: CPT

## 2019-07-18 PROCEDURE — 96374 THER/PROPH/DIAG INJ IV PUSH: CPT

## 2019-07-18 PROCEDURE — 81001 URINALYSIS AUTO W/SCOPE: CPT

## 2019-07-18 PROCEDURE — 87086 URINE CULTURE/COLONY COUNT: CPT

## 2019-07-18 PROCEDURE — 80053 COMPREHEN METABOLIC PANEL: CPT

## 2019-07-18 PROCEDURE — 87186 SC STD MICRODIL/AGAR DIL: CPT

## 2019-07-18 PROCEDURE — 99284 EMERGENCY DEPT VISIT MOD MDM: CPT

## 2019-07-18 PROCEDURE — 85025 COMPLETE CBC W/AUTO DIFF WBC: CPT

## 2019-07-18 PROCEDURE — 93010 ELECTROCARDIOGRAM REPORT: CPT

## 2019-07-18 PROCEDURE — 36415 COLL VENOUS BLD VENIPUNCTURE: CPT

## 2019-07-18 PROCEDURE — 99284 EMERGENCY DEPT VISIT MOD MDM: CPT | Mod: 25

## 2019-07-18 RX ORDER — CEFTRIAXONE 500 MG/1
1000 INJECTION, POWDER, FOR SOLUTION INTRAMUSCULAR; INTRAVENOUS ONCE
Refills: 0 | Status: COMPLETED | OUTPATIENT
Start: 2019-07-18 | End: 2019-07-18

## 2019-07-18 RX ORDER — CEFUROXIME AXETIL 250 MG
1 TABLET ORAL
Qty: 14 | Refills: 0
Start: 2019-07-18 | End: 2019-07-24

## 2019-07-18 RX ADMIN — CEFTRIAXONE 100 MILLIGRAM(S): 500 INJECTION, POWDER, FOR SOLUTION INTRAMUSCULAR; INTRAVENOUS at 15:49

## 2019-07-18 NOTE — ED ADULT TRIAGE NOTE - CHIEF COMPLAINT QUOTE
pt c/o urinary frequency & dysuria x several days, pt also c/o intermittent dizziness for several days.

## 2019-07-18 NOTE — ED ADULT NURSE REASSESSMENT NOTE - NS ED NURSE REASSESS COMMENT FT1
Patient a/oX 3, vital signs stable, no new symptom complaint .  Ceftriaxone IVPB completed, no adverse rxn.  Discharged to home in stable condition.

## 2019-07-18 NOTE — ED ADULT NURSE NOTE - OBJECTIVE STATEMENT
Patient c/o of dysuria, urinary frequency, no hematuria, states symptoms X days, no fevers, no nausea/vomitting.  Patient w/ Hx. of Prostate CA , HTN, DM .

## 2019-07-18 NOTE — ED PROVIDER NOTE - OBJECTIVE STATEMENT
78 year old male with history of prostate CA, HTN, HLD, DM presents to ED with concern for possible UTI.  Patient notes increased frequency of urination over the past several days and states "that's usually how my UTIs start."  He also notes symptoms correlate to initiation of new "water pill" started the other week.  He denies associated fever, chills, chest pain, shortness of breath, abdominal pain, nausea, emesis, back pain, changes to bowel movements, loss of bowel function, lower extremity pain/weakness or any additional acute complaints or concerns at this time.  Of note, patient was asked re dizziness in triage and noted he has intermittent dizziness, however states he is not currently feeling dizzy and he is not in ED for dizziness today.

## 2019-07-18 NOTE — ED ADULT NURSE REASSESSMENT NOTE - NS ED NURSE REASSESS COMMENT FT1
patient a/o X 3, c/o of dysuria and urinary frequency, no abdominal pain or nausea/vomitting, no hematuria, no  fevers.  Vital signs stable.  Left AC PIV #20 in place, all labs sent, no complications.  UA Urine culture sent to lab.  Ceftriaxone 1 gm IVPB ongoing, no advese rxn.  REsults and disposition pending.  Stable and comfortable.

## 2019-07-18 NOTE — ED PROVIDER NOTE - NSFOLLOWUPINSTRUCTIONS_ED_ALL_ED_FT
Please follow up with your urologist and primary physician in 1-2 days for re evaluation.  Please return to ER immediately should your symptoms worsen or if you have any concern prior to this recommended follow up.      URINARY TRACT INFECTION IN OLDER ADULTS - AfterCare(R) Instructions(ER/ED)     Urinary Tract Infection in Older Adults    WHAT YOU NEED TO KNOW:    A urinary tract infection (UTI) is caused by bacteria that get inside your urinary tract. Your urinary tract includes your kidneys, ureters, bladder, and urethra. Urine is made in your kidneys, and it flows from the ureters to the bladder. Urine leaves the bladder through the urethra. A UTI is more common in your lower urinary tract, which includes your bladder and urethra.     DISCHARGE INSTRUCTIONS:    Return to the emergency department if:     You are urinating very little or not at all.      You are vomiting.      You have a high fever with shaking chills.       You have side or back pain that gets worse.    Contact your healthcare provider if:     You have a fever.      You are a woman and you have increased white or yellow discharge from your vagina.      You do not feel better after 2 days of taking antibiotics.      You have questions or concerns about your condition or care.    Medicines:     Medicines help treat the bacterial infection or decrease pain and burning when you urinate. You may also need medicines to decrease the urge to urinate often. Your healthcare provider may recommend cranberry juice or cranberry supplements to help decrease your symptoms.      Take your medicine as directed. Contact your healthcare provider if you think your medicine is not helping or if you have side effects. Tell him or her if you are allergic to any medicine. Keep a list of the medicines, vitamins, and herbs you take. Include the amounts, and when and why you take them. Bring the list or the pill bottles to follow-up visits. Carry your medicine list with you in case of an emergency.    Self-care:     Urinate when you feel the urge. Do not hold your urine because bacteria can grow in the bladder if urine stays in the bladder too long. It may be helpful to urinate at least every 3 to 4 hours.       Drink liquids as directed. Liquids can help flush bacteria from your urinary tract. Ask how much liquid to drink each day and which liquids are best for you. You may need to drink more liquids than usual to help flush out the bacteria. Do not drink alcohol, caffeine, and citrus juices. These can irritate your bladder and increase your symptoms.      Apply heat on your abdomen for 20 to 30 minutes every 2 hours for as many days as directed. Heat helps decrease discomfort and pressure in your bladder.    Prevent a UTI:     Women should wipe front to back after urinating or having a bowel movement. This may prevent germs from getting into the urinary tract.       Urinate after you have sex to flush away bacteria that can enter your urinary tract during sex.       Wear cotton underwear and clothes that fit loose. Tight pants and nylon underwear can trap moisture and cause bacteria to grow.     Follow up with your healthcare provider as directed: Write down your questions so you remember to ask them during your visits.        © Copyright [employer] 2019 All illustrations and images included in CareNotes are the copyrighted property of [employer] or [employer]      back to top                      © Copyright [employer] 2019

## 2019-07-18 NOTE — ED PROVIDER NOTE - CLINICAL SUMMARY MEDICAL DECISION MAKING FREE TEXT BOX
Patient in ED w concern for UTI.  UA with evidence of infection, and patient given initial dose of abx via IV in ED.  Labs reviewed.  Patient well appearing without any additional acute complaints or concerns.  Will send oral abx to pharmacy and advise patient follow up with his PCP and urologist in 2-3 days for re evaluation to ensure symptoms are improving.  Patient is instructed to return to ED immediately should his symptoms worsen or if he has any concern prior to this recommended follow up.  Patient is aware of plan and verbalizes his understanding.  Will discharge home at this time.

## 2019-07-18 NOTE — ED PROVIDER NOTE - CARE PLAN
Principal Discharge DX:	Increased frequency of urination Principal Discharge DX:	Increased frequency of urination  Secondary Diagnosis:	Urinary tract infection without hematuria, site unspecified

## 2019-07-22 DIAGNOSIS — N39.0 URINARY TRACT INFECTION, SITE NOT SPECIFIED: ICD-10-CM

## 2019-07-22 DIAGNOSIS — Z79.899 OTHER LONG TERM (CURRENT) DRUG THERAPY: ICD-10-CM

## 2019-07-22 DIAGNOSIS — E11.9 TYPE 2 DIABETES MELLITUS WITHOUT COMPLICATIONS: ICD-10-CM

## 2019-07-22 DIAGNOSIS — E78.5 HYPERLIPIDEMIA, UNSPECIFIED: ICD-10-CM

## 2019-07-22 DIAGNOSIS — R35.0 FREQUENCY OF MICTURITION: ICD-10-CM

## 2019-07-22 DIAGNOSIS — Z79.84 LONG TERM (CURRENT) USE OF ORAL HYPOGLYCEMIC DRUGS: ICD-10-CM

## 2019-07-22 DIAGNOSIS — I10 ESSENTIAL (PRIMARY) HYPERTENSION: ICD-10-CM

## 2019-08-13 ENCOUNTER — APPOINTMENT (OUTPATIENT)
Dept: UROLOGY | Facility: CLINIC | Age: 79
End: 2019-08-13
Payer: MEDICARE

## 2019-08-13 ENCOUNTER — MED ADMIN CHARGE (OUTPATIENT)
Age: 79
End: 2019-08-13

## 2019-08-13 VITALS — HEART RATE: 62 BPM | SYSTOLIC BLOOD PRESSURE: 165 MMHG | DIASTOLIC BLOOD PRESSURE: 71 MMHG | TEMPERATURE: 98.5 F

## 2019-08-13 PROCEDURE — 99213 OFFICE O/P EST LOW 20 MIN: CPT | Mod: 25

## 2019-08-13 PROCEDURE — 96402 CHEMO HORMON ANTINEOPL SQ/IM: CPT

## 2019-08-13 RX ORDER — LEUPROLIDE ACETATE 22.5 MG
22.5 KIT INTRAMUSCULAR
Qty: 1 | Refills: 0 | Status: COMPLETED | OUTPATIENT
Start: 2019-08-13

## 2019-08-13 RX ADMIN — LEUPROLIDE ACETATE 1 MG: KIT at 00:00

## 2019-08-13 NOTE — ASSESSMENT
[FreeTextEntry1] : Prostate cancer\par Slow elevation of PSA\par Lupron today \par F/u Dr. Fuller\par \par Urinary urgency\par Radical retropubic prostatectomy in 1994\par Trial Myrbetriq\par \par F/u 3 months for Lupron

## 2019-08-13 NOTE — HISTORY OF PRESENT ILLNESS
[FreeTextEntry1] : 78 M with prostate cancer\par PSA JULY= 15.9\par PSA MAR= 12.8\par PSA FEB= 18.4\par \par Lupron + xtandi\par Past 2 UTI's  resolved\par Nocturia 4-5 x's \par + urgency \par Feels well \par Follwing with Dr. Fuller\par PET scan results pending. Will f/u with Dr. Garcia \par No bone pain\par No hot flashes  \par previously received chemo (taxotere 2006)\par s/p cabazitaxel 2017\par s/p provenge\par \par

## 2019-11-12 ENCOUNTER — APPOINTMENT (OUTPATIENT)
Dept: UROLOGY | Facility: CLINIC | Age: 79
End: 2019-11-12
Payer: MEDICARE

## 2019-11-12 ENCOUNTER — MED ADMIN CHARGE (OUTPATIENT)
Age: 79
End: 2019-11-12

## 2019-11-12 VITALS — HEART RATE: 71 BPM | DIASTOLIC BLOOD PRESSURE: 75 MMHG | SYSTOLIC BLOOD PRESSURE: 175 MMHG | TEMPERATURE: 97.8 F

## 2019-11-12 PROCEDURE — 96402 CHEMO HORMON ANTINEOPL SQ/IM: CPT

## 2019-11-12 PROCEDURE — 99213 OFFICE O/P EST LOW 20 MIN: CPT | Mod: 25

## 2019-11-12 RX ORDER — LEUPROLIDE ACETATE 22.5 MG
22.5 KIT INTRAMUSCULAR
Qty: 1 | Refills: 0 | Status: COMPLETED | OUTPATIENT
Start: 2019-11-12

## 2019-11-12 RX ADMIN — LEUPROLIDE ACETATE 0 MG: KIT at 00:00

## 2019-11-12 RX ADMIN — LEUPROLIDE ACETATE 1 MG: KIT at 00:00

## 2019-11-12 NOTE — PHYSICAL EXAM
[General Appearance - Well Nourished] : well nourished [General Appearance - In No Acute Distress] : no acute distress

## 2019-11-13 NOTE — HISTORY OF PRESENT ILLNESS
[FreeTextEntry1] : 78 M with prostate cancer\par PSA OCT= 14.7\par PSA JULY= 15.9\par PSA MAR= 12.8\par PSA FEB= 18.4\par \par Lupron only. Xtandi stopped. Restarted Chemo on Sept 17. (Every three weeks) \par reports back/spine pain\par due for med onc appt later today\par No current UTI\par Nocturia 4-5 x's \par Urgency decreased \par Feels well \par Follwing with Dr. Fuller\par PET scan results pending.\par No longer following Dr. Garcia. \par No hot flashes  \par previously received chemo (taxotere 2006)\par s/p cabazitaxel 2017\par s/p provenge\par \par  [Fever] : no fever [Weight Loss] : no recent ~M [unfilled] weight loss [Fatigue] : no fatigue

## 2019-11-13 NOTE — ASSESSMENT
[FreeTextEntry1] : 79 yo male with Hx of secondary malignant neoplasm of para-aortic lymph node on Lupron therapy.\par No longer on xtandi\par Started chemotherapy in September 2019\par PSA stable but increasing back pain\par will defer to med onc\par F/u 3 months for Lupron

## 2019-12-17 ENCOUNTER — EMERGENCY (EMERGENCY)
Facility: HOSPITAL | Age: 79
LOS: 1 days | Discharge: HOME | End: 2019-12-17
Attending: EMERGENCY MEDICINE | Admitting: EMERGENCY MEDICINE
Payer: MEDICARE

## 2019-12-17 VITALS
DIASTOLIC BLOOD PRESSURE: 76 MMHG | HEIGHT: 69 IN | SYSTOLIC BLOOD PRESSURE: 178 MMHG | OXYGEN SATURATION: 100 % | WEIGHT: 188.94 LBS | RESPIRATION RATE: 18 BRPM | TEMPERATURE: 97 F | HEART RATE: 77 BPM

## 2019-12-17 LAB
ALBUMIN SERPL ELPH-MCNC: 3.9 G/DL — SIGNIFICANT CHANGE UP (ref 3.3–5)
ALP SERPL-CCNC: 43 U/L — SIGNIFICANT CHANGE UP (ref 40–120)
ALT FLD-CCNC: 23 U/L — SIGNIFICANT CHANGE UP (ref 10–45)
ANION GAP SERPL CALC-SCNC: 11 MMOL/L — SIGNIFICANT CHANGE UP (ref 5–17)
ANISOCYTOSIS BLD QL: SLIGHT — SIGNIFICANT CHANGE UP
APPEARANCE UR: ABNORMAL
AST SERPL-CCNC: 27 U/L — SIGNIFICANT CHANGE UP (ref 10–40)
BACTERIA # UR AUTO: PRESENT /HPF
BASOPHILS # BLD AUTO: 0.07 K/UL — SIGNIFICANT CHANGE UP (ref 0–0.2)
BASOPHILS NFR BLD AUTO: 1.7 % — SIGNIFICANT CHANGE UP (ref 0–2)
BILIRUB SERPL-MCNC: 0.3 MG/DL — SIGNIFICANT CHANGE UP (ref 0.2–1.2)
BILIRUB UR-MCNC: NEGATIVE — SIGNIFICANT CHANGE UP
BUN SERPL-MCNC: 20 MG/DL — SIGNIFICANT CHANGE UP (ref 7–23)
CALCIUM SERPL-MCNC: 9.9 MG/DL — SIGNIFICANT CHANGE UP (ref 8.4–10.5)
CHLORIDE SERPL-SCNC: 102 MMOL/L — SIGNIFICANT CHANGE UP (ref 96–108)
CO2 SERPL-SCNC: 27 MMOL/L — SIGNIFICANT CHANGE UP (ref 22–31)
COLOR SPEC: YELLOW — SIGNIFICANT CHANGE UP
COMMENT - URINE: SIGNIFICANT CHANGE UP
CREAT SERPL-MCNC: 0.99 MG/DL — SIGNIFICANT CHANGE UP (ref 0.5–1.3)
DIFF PNL FLD: ABNORMAL
EOSINOPHIL # BLD AUTO: 0 K/UL — SIGNIFICANT CHANGE UP (ref 0–0.5)
EOSINOPHIL NFR BLD AUTO: 0 % — SIGNIFICANT CHANGE UP (ref 0–6)
EPI CELLS # UR: SIGNIFICANT CHANGE UP /HPF (ref 0–5)
GIANT PLATELETS BLD QL SMEAR: PRESENT — SIGNIFICANT CHANGE UP
GLUCOSE SERPL-MCNC: 100 MG/DL — HIGH (ref 70–99)
GLUCOSE UR QL: NEGATIVE — SIGNIFICANT CHANGE UP
HCT VFR BLD CALC: 32 % — LOW (ref 39–50)
HGB BLD-MCNC: 10.4 G/DL — LOW (ref 13–17)
KETONES UR-MCNC: NEGATIVE — SIGNIFICANT CHANGE UP
LEUKOCYTE ESTERASE UR-ACNC: NEGATIVE — SIGNIFICANT CHANGE UP
LYMPHOCYTES # BLD AUTO: 1.21 K/UL — SIGNIFICANT CHANGE UP (ref 1–3.3)
LYMPHOCYTES # BLD AUTO: 31.3 % — SIGNIFICANT CHANGE UP (ref 13–44)
MANUAL SMEAR VERIFICATION: SIGNIFICANT CHANGE UP
MCHC RBC-ENTMCNC: 31 PG — SIGNIFICANT CHANGE UP (ref 27–34)
MCHC RBC-ENTMCNC: 32.5 GM/DL — SIGNIFICANT CHANGE UP (ref 32–36)
MCV RBC AUTO: 95.5 FL — SIGNIFICANT CHANGE UP (ref 80–100)
MONOCYTES # BLD AUTO: 0.17 K/UL — SIGNIFICANT CHANGE UP (ref 0–0.9)
MONOCYTES NFR BLD AUTO: 4.4 % — SIGNIFICANT CHANGE UP (ref 2–14)
NEUTROPHILS # BLD AUTO: 2.32 K/UL — SIGNIFICANT CHANGE UP (ref 1.8–7.4)
NEUTROPHILS NFR BLD AUTO: 51.3 % — SIGNIFICANT CHANGE UP (ref 43–77)
NEUTS BAND # BLD: 8.7 % — HIGH (ref 0–8)
NITRITE UR-MCNC: NEGATIVE — SIGNIFICANT CHANGE UP
PH UR: 7 — SIGNIFICANT CHANGE UP (ref 5–8)
PLAT MORPH BLD: ABNORMAL
PLATELET # BLD AUTO: 183 K/UL — SIGNIFICANT CHANGE UP (ref 150–400)
POIKILOCYTOSIS BLD QL AUTO: SLIGHT — SIGNIFICANT CHANGE UP
POTASSIUM SERPL-MCNC: 3.2 MMOL/L — LOW (ref 3.5–5.3)
POTASSIUM SERPL-SCNC: 3.2 MMOL/L — LOW (ref 3.5–5.3)
PROT SERPL-MCNC: 6.3 G/DL — SIGNIFICANT CHANGE UP (ref 6–8.3)
PROT UR-MCNC: 100 MG/DL
RBC # BLD: 3.35 M/UL — LOW (ref 4.2–5.8)
RBC # FLD: 18.9 % — HIGH (ref 10.3–14.5)
RBC BLD AUTO: ABNORMAL
RBC CASTS # UR COMP ASSIST: ABNORMAL /HPF
SMUDGE CELLS # BLD: PRESENT — SIGNIFICANT CHANGE UP
SODIUM SERPL-SCNC: 140 MMOL/L — SIGNIFICANT CHANGE UP (ref 135–145)
SP GR SPEC: 1.02 — SIGNIFICANT CHANGE UP (ref 1–1.03)
TROPONIN T SERPL-MCNC: <0.01 NG/ML — SIGNIFICANT CHANGE UP (ref 0–0.01)
UROBILINOGEN FLD QL: 1 E.U./DL — SIGNIFICANT CHANGE UP
VARIANT LYMPHS # BLD: 2.6 % — SIGNIFICANT CHANGE UP (ref 0–6)
WBC # BLD: 3.86 K/UL — SIGNIFICANT CHANGE UP (ref 3.8–10.5)
WBC # FLD AUTO: 3.86 K/UL — SIGNIFICANT CHANGE UP (ref 3.8–10.5)
WBC UR QL: ABNORMAL /HPF

## 2019-12-17 PROCEDURE — 71045 X-RAY EXAM CHEST 1 VIEW: CPT | Mod: 26

## 2019-12-17 PROCEDURE — 99285 EMERGENCY DEPT VISIT HI MDM: CPT

## 2019-12-17 PROCEDURE — 93005 ELECTROCARDIOGRAM TRACING: CPT

## 2019-12-17 PROCEDURE — 93010 ELECTROCARDIOGRAM REPORT: CPT

## 2019-12-17 PROCEDURE — 72149 MRI LUMBAR SPINE W/DYE: CPT | Mod: 26

## 2019-12-17 PROCEDURE — 72149 MRI LUMBAR SPINE W/DYE: CPT

## 2019-12-17 PROCEDURE — 99284 EMERGENCY DEPT VISIT MOD MDM: CPT | Mod: 25

## 2019-12-17 PROCEDURE — A9585: CPT

## 2019-12-17 PROCEDURE — 71045 X-RAY EXAM CHEST 1 VIEW: CPT

## 2019-12-17 PROCEDURE — 71275 CT ANGIOGRAPHY CHEST: CPT | Mod: 26

## 2019-12-17 PROCEDURE — 71275 CT ANGIOGRAPHY CHEST: CPT

## 2019-12-17 PROCEDURE — 81001 URINALYSIS AUTO W/SCOPE: CPT

## 2019-12-17 PROCEDURE — 85025 COMPLETE CBC W/AUTO DIFF WBC: CPT

## 2019-12-17 PROCEDURE — 87086 URINE CULTURE/COLONY COUNT: CPT

## 2019-12-17 PROCEDURE — 36415 COLL VENOUS BLD VENIPUNCTURE: CPT

## 2019-12-17 PROCEDURE — 80053 COMPREHEN METABOLIC PANEL: CPT

## 2019-12-17 PROCEDURE — 84484 ASSAY OF TROPONIN QUANT: CPT

## 2019-12-17 PROCEDURE — 87186 SC STD MICRODIL/AGAR DIL: CPT

## 2019-12-17 RX ORDER — POTASSIUM CHLORIDE 20 MEQ
40 PACKET (EA) ORAL ONCE
Refills: 0 | Status: COMPLETED | OUTPATIENT
Start: 2019-12-17 | End: 2019-12-17

## 2019-12-17 RX ORDER — ACETAMINOPHEN 500 MG
650 TABLET ORAL ONCE
Refills: 0 | Status: COMPLETED | OUTPATIENT
Start: 2019-12-17 | End: 2019-12-17

## 2019-12-17 RX ADMIN — Medication 40 MILLIEQUIVALENT(S): at 18:23

## 2019-12-17 RX ADMIN — Medication 650 MILLIGRAM(S): at 18:23

## 2019-12-17 NOTE — ED ADULT TRIAGE NOTE - CHIEF COMPLAINT QUOTE
pt c/o urinary frequency, burning, shortness of breath. denies chest pain. currently undergoing chemotherapy for prostate CA, last chemo was last tuesday. denies hematuria or abdominal pain.

## 2019-12-17 NOTE — ED ADULT NURSE NOTE - CHPI ED NUR SYMPTOMS NEG
no blood in stool/no nausea/no abdominal distension/no fever/no vomiting/no chills/no hematuria/no diarrhea/no dysuria

## 2019-12-17 NOTE — ED PROVIDER NOTE - NEURO NEGATIVE STATEMENT, MLM
no loss of consciousness, no gait abnormality, no headache. + intermittent numbness and tingling in UE

## 2019-12-17 NOTE — ED PROVIDER NOTE - CLINICAL SUMMARY MEDICAL DECISION MAKING FREE TEXT BOX
80 yo M with metastatic prostate CA on chemotherapy presents to the ED with a 1-2 wk hx of exertional SOB and 5 days of dysuria. Urinary sx consistent with urinary tract infection, does not know if he is on cyclophosphamide as a part of his chemotherapy regimen. Unlikely pyelo as the patient is nontoxic appearing, afebrile and hemodynamically stable. SOB could be 2/2 generalized malaise and fatigue due to his metastatic cancer and chemotherapy, but given malignancy 1 point on Wells criteria. Patient is in no respiratory distress at this time.   - f/u UA   - f/u tropinins  - f/u cxr 79M pmh htn, hld, dm, metastatic prostate CA on chemotherapy p/w 1-2 wk hx of exertional SOB and 5 days increased frequency and ?overflow incontinence. No weakness in legs, no numbness. Exam w/o acute findings, no focal deficit. Gait baseline, no difficulty standing, not c/t/L spine ttp. Given hx, r/o cauda equina w/ MRI, CTA neg for PE, trop negative & ekg not acutely ischemic. UA w/o LE or nitrites and no leukocytosis, urine culture sent. Consider abx coverage though no acute UTI found. Cards felt not aflutter and no sig changes in EKG. MRI read, rpt trop & rpt ekg pending. 79M pmh htn, hld, dm, metastatic prostate CA on chemotherapy p/w 1-2 wk hx of exertional SOB and 5 days increased frequency and ?overflow incontinence. No weakness in legs, no numbness. Exam w/o acute findings, no focal deficit. Gait baseline, no difficulty standing, not c/t/L spine ttp. Given risk factors will  r/o cauda equina w/ MRI though low suspicion for acute injury given hx and exam, CTA neg for PE, trop negative & ekg not acutely ischemic. UA w/o LE or nitrites and no leukocytosis, urine culture sent. Consider abx coverage though no acute UTI found. Cards felt not aflutter and no sig changes in EKG. MRI read, rpt trop & rpt ekg pending.

## 2019-12-17 NOTE — ED PROVIDER NOTE - NSFOLLOWUPINSTRUCTIONS_ED_ALL_ED_FT
Please follow up with your doctor in  1-2 days.  Follow up with spine surgeon this week.  Follow up with your urologist this week.  Return to the Emergency Department if you have any new or worsening symptoms, or if you have any concerns.  =================================    VERTEBRAL COMPRESSION FRACTURE - AfterCare(R) Instructions(ER/ED)     Vertebral Compression Fracture    WHAT YOU NEED TO KNOW:    A vertebral compression fracture (VCF) is a collapse or breakdown in a bone in your spine. Compression fractures happen when there is too much pressure on the vertebra. VCFs most often occur in the thoracic (middle) and lumbar (lower) areas of your spine. Fractures may be mild to severe.Compression Fracture of Spine         DISCHARGE INSTRUCTIONS:    Call your local emergency number (911 in the [place]) if:     You feel lightheaded, short of breath, and have chest pain.      You cough up blood.      You suddenly cannot feel your legs.      You suddenly have trouble moving your arms or legs.    Return to the emergency department if:     Your arm or leg feels warm, tender, and painful. It may look swollen and red.      You have new problems urinating or having bowel movements.      You have severe pain in your back after falling, bending forward, sneezing, or coughing strongly.    Call your doctor or orthopedist if:     You cannot sleep or rest because of back pain.      You have pain or swelling in your back that is getting worse, or does not go away.      You have questions or concerns about your condition or care.    Medicines: You may need any of the following:     NSAIDs, such as ibuprofen, help decrease swelling, pain, and fever. This medicine is available with or without a doctor's order. NSAIDs can cause stomach bleeding or kidney problems in certain people. If you take blood thinner medicine, always ask if NSAIDs are safe for you. Always read the medicine label and follow directions. Do not give these medicines to children under 6 months of age without direction from your child's healthcare provider.      Acetaminophen decreases pain and fever. It is available without a doctor's order. Ask how much to take and how often to take it. Follow directions. Read the labels of all other medicines you are using to see if they also contain acetaminophen, or ask your doctor or pharmacist. Acetaminophen can cause liver damage if not taken correctly. Do not use more than 4 grams (4,000 milligrams) total of acetaminophen in one day.       Prescription pain medicine may be given. Ask your healthcare provider how to take this medicine safely. Some prescription pain medicines contain acetaminophen. Do not take other medicines that contain acetaminophen without talking to your healthcare provider. Too much acetaminophen may cause liver damage. Prescription pain medicine may cause constipation. Ask your healthcare provider how to prevent or treat constipation.       Bisphosphonates and calcitonin may be recommended to help your bones get stronger. They can decrease the pain of a VCF caused by osteoporosis, and decrease your risk for another fracture.      Take your medicine as directed. Contact your healthcare provider if you think your medicine is not helping or if you have side effects. Tell him or her if you are allergic to any medicine. Keep a list of the medicines, vitamins, and herbs you take. Include the amounts, and when and why you take them. Bring the list or the pill bottles to follow-up visits. Carry your medicine list with you in case of an emergency.    Heat and ice:     Apply ice on your back for 15 to 20 minutes every hour or as directed. Use an ice pack, or put crushed ice in a plastic bag. Cover it with a towel before you apply it. Ice helps prevent tissue damage and decreases swelling and pain.      Apply heat on your back for 20 to 30 minutes every 2 hours for as many days as directed. Heat helps decrease pain and muscle spasms.    Activity:     Avoid activities that may make the pain worse, such as picking up heavy objects. When the pain decreases, begin normal, slow movements as directed by your healthcare provider. Your healthcare provider may have you do weight-bearing exercises such as walking. You may also do non-weight-bearing exercises such as swimming and bicycling.      You may need to use a walker or cane. Ask your healthcare provider for more information about how to use a cane or a walker.      When you  objects, bend at the hips and knees. Never bend from the waist only. Use bent knees and your leg muscles as you lift the object. While you lift the object, keep it close to your chest. Try not to twist or lift anything above your waist.    Physical and occupational therapy: Your healthcare provider may recommend you start or continue one or both types of therapy. A physical therapist teaches you exercises to help improve movement and strength, and to decrease pain. An occupational therapist teaches you skills to help with your daily activities.    Manage pain during sleep:     Do not sleep on a waterbed. Waterbeds do not provide good back support.      Sleep on a firm mattress. You may also put a ½ to 1-inch piece of plywood between the mattress and box spring.      Sleep on your back with a pillow under your knees. This will decrease pressure on your back. You may also sleep on your side with 1 or both of your knees bent and a pillow between them. It may also be helpful to sleep on your stomach with a pillow under you at waist level.    Follow up with your doctor or orthopedist as directed: You may need to return for x-rays or other tests. Write down your questions so you remember to ask them during your visits.    ===================================  SHORTNESS OF BREATH - AfterCare(R) Instructions(ER/ED)     Shortness of Breath    WHAT YOU NEED TO KNOW:    Shortness of breath is a feeling that you cannot get enough air when you breathe in. You may have this feeling only during activity, or all the time. Your symptoms can range from mild to severe. Shortness of breath may be a sign of a serious health condition that needs immediate care.    DISCHARGE INSTRUCTIONS:    Return to the emergency department if:     Your signs and symptoms are the same or worse within 24 hours of treatment.       The skin over your ribs or on your neck sinks in when you breathe.       You feel confused or dizzy.    Contact your healthcare provider if:     You have new or worsening symptoms.      You have questions or concerns about your condition or care.    Medicines:     Medicines may be used to treat the cause of your symptoms. You may need medicine to treat a bacterial infection or reduce anxiety. Other medicines may be used to open your airway, reduce swelling, or remove extra fluid. If you have a heart condition, you may need medicine to help your heart beat more strongly or regularly.      Take your medicine as directed. Contact your healthcare provider if you think your medicine is not helping or if you have side effects. Tell him or her if you are allergic to any medicine. Keep a list of the medicines, vitamins, and herbs you take. Include the amounts, and when and why you take them. Bring the list or the pill bottles to follow-up visits. Carry your medicine list with you in case of an emergency.    Manage shortness of breath:     Create an action plan. You and your healthcare provider can work together to create a plan for how to handle shortness of breath. The plan can include daily activities, treatment changes, and what to do if you have severe breathing problems.      Lean forward on your elbows when you sit. This helps your lungs expand and may make it easier to breathe.      Use pursed-lip breathing any time you feel short of breath. Breathe in through your nose and then slowly breathe out through your mouth with your lips slightly puckered. It should take you twice as long to breathe out as it did to breathe in.Breathe in Breathe out           Do not smoke. Nicotine and other chemicals in cigarettes and cigars can cause lung damage and make shortness of breath worse. Ask your healthcare provider for information if you currently smoke and need help to quit. E-cigarettes or smokeless tobacco still contain nicotine. Talk to your healthcare provider before you use these products.      Reach or maintain a healthy weight. Your healthcare provider can help you create a safe weight loss plan if you are overweight.      Exercise as directed. Exercise can help your lungs work more easily. Exercise can also help you lose weight if needed. Try to get at least 30 minutes of exercise most days of the week.    Follow up with your healthcare provider or specialist as directed: Write down your questions so you remember to ask them during your visits.

## 2019-12-17 NOTE — ED ADULT NURSE NOTE - NSIMPLEMENTINTERV_GEN_ALL_ED
Implemented All Fall with Harm Risk Interventions:  [place] to call system. Call bell, personal items and telephone within reach. Instruct patient to call for assistance. Room bathroom lighting operational. Non-slip footwear when patient is off stretcher. Physically safe environment: no spills, clutter or unnecessary equipment. Stretcher in lowest position, wheels locked, appropriate side rails in place. Provide visual cue, wrist band, yellow gown, etc. Monitor gait and stability. Monitor for mental status changes and reorient to person, place, and time. Review medications for side effects contributing to fall risk. Reinforce activity limits and safety measures with patient and family. Provide visual clues: red socks.

## 2019-12-17 NOTE — ED PROVIDER NOTE - PRIOR EKG STATUS
Discussed w/ cards fellow Peter- agreed nonspec non ischemic findings/the EKG is unchanged from prior EKG

## 2019-12-17 NOTE — ED PROVIDER NOTE - NEUROLOGICAL, MLM
Alert and oriented, no focal deficits, no motor or sensory deficits. Alert and oriented, no focal deficits, no motor or sensory deficits. 5/5 BUE/BLE, SILT, neurovascularly intact.

## 2019-12-17 NOTE — ED PROVIDER NOTE - CARE PROVIDER_API CALL
Tom Storey)  Orthopaedic Surgery Surgery  [address] [place] [address]  Phone: [phone]  Fax: [phone]  Follow Up Time: 1-3 Days

## 2019-12-17 NOTE — ED PROVIDER NOTE - FAMILY HISTORY
Father  Still living? Unknown  Family history of malignant neoplasm of prostate, Age at diagnosis: Age Unknown     Sibling  Still living? Unknown  Family history of malignant neoplasm of prostate, Age at diagnosis: Age Unknown

## 2019-12-17 NOTE — ED PROVIDER NOTE - OBJECTIVE STATEMENT
78 yo M with metastatic prostate CA last chemotherapy 1 week ago who presents with a 1-2 week hx of exertional SOB and 5 day hx of dysuria.   Per patient, realized that he was becoming short of breath with walking, can walk up to 4 blocks before needing to take a rest, is still able to climb to stairs in his home. During this period has been experiencing fatigue and generalized weakness. Denies weight loss, CP, palpitations, dizziness, lightheadedness, or lower extremity edema.     Also reports a 5 day hx of burning with urination, increased urgency and feeling of incomplete emptying. During this time he has also experienced incontinence with movement, for example when rising from a seated position. Has not noticed any hematuria. Has experienced R paraspinal back pain since starting chemotherapy.

## 2019-12-17 NOTE — ED PROVIDER NOTE - PATIENT PORTAL LINK FT
You can access the FollowMyHealth Patient Portal offered by [facility] by registering at the following website: [email or web address]. By joining [facility]’s FollowMyHealth portal, you will also be able to view your health information using other applications (apps) compatible with our system.

## 2019-12-17 NOTE — ED PROVIDER NOTE - PROGRESS NOTE DETAILS
Kyle (rec'd signout from Dr Go) - patient examined; no SOB currentlly.  CTA neg for PE.  Repeat EKG and trop negative.  Patient's incontinence may be due to hx of urethral stricture, UTI; MRI reviewed, but only lumbar reviewed.  Benign-appeariing lumbar compression fx noted.  Sacral n root contact from disc noted.  No saddle anesthesia, no LE weakness, normal perianal sensation and rectal tone noted on my exam.  Will have neurosurg eval. Kyle (rec'd signout from Dr Go) - patient examined; no SOB currentlly.  CTA neg for PE.  Repeat EKG and trop negative.  Patient's incontinence may be due to hx of urethral stricture, UTI; MRI reviewed, but only lumbar reviewed.  Benign-appeariing lumbar compression fx noted.  Sacral n root contact from disc noted.  No saddle anesthesia, no LE weakness, normal perianal sensation and rectal tone noted on my exam.  Will have neurosurg/spine eval. d/w Ortho/spine who reviewed MRI with radiology; they are not concerned for cauda equina synd; will consult. Cleared by ortho.  DC to follow up with PCP and spine surgeon.

## 2019-12-17 NOTE — ED PROVIDER NOTE - CARDIAC, MLM
Normal rate, regular rhythm.  Heart sounds S1, S2.  No murmurs, rubs or gallops. No JVD, no LE edema, 2+ radial and DP pulses

## 2019-12-17 NOTE — ED ADULT NURSE NOTE - OBJECTIVE STATEMENT
Pt reports urinary frequency, burning for 3 days, and sob for "a couple weeks." Pt denies chest pain, dizziness, n/v, abd pain, chills, fever. Pt on chemo for prostate CA, reports last chemo on Tuesday.

## 2019-12-18 VITALS
RESPIRATION RATE: 18 BRPM | TEMPERATURE: 98 F | SYSTOLIC BLOOD PRESSURE: 165 MMHG | HEART RATE: 68 BPM | DIASTOLIC BLOOD PRESSURE: 90 MMHG | OXYGEN SATURATION: 98 %

## 2019-12-18 NOTE — CONSULT NOTE ADULT - ASSESSMENT
A/P: 79yMale who presents with lumbar vertebral compression fracture and urinary incontinence    - Low concern for cauda equina at this time given imaging however physical exam not completed as patient was discharged as per ED  - Consider continued workup for other etiologies of incontinence -- likely related to prostate CA  - Encourage outpatient follow up with Dr. Storey  - Discussed with Dr. Storey    Ortho Pager [phone]

## 2019-12-18 NOTE — ED ADULT NURSE REASSESSMENT NOTE - NS ED NURSE REASSESS COMMENT FT1
Pt returned from MRI. Pt with episode of incontinence, daughter reports pt did not realize he needed to urinate and pt urinated soaking the bed. Pt able to ambulate, cleaned and returned to SANJUANITA gooden aware. MRI results reviewed and discussed with family by SANJUANITA Wright, pt waiting nsurg eval. VSS. Will continue to monitor.

## 2019-12-18 NOTE — CONSULT NOTE ADULT - SUBJECTIVE AND OBJECTIVE BOX
Orthopaedic Surgery Consult Note    For Surgeon: Cordelia    HPI:  79yMale presents for evaluation of urinary incontinence. Patient has known history of metastatic prostate CA but recently developed urinary incontinence over a few days. Denies significant traumatic event. Patient additionally has been complaining of mild lumbar back pain. Pt is able to ambulate well and denies any LE neurologic symptoms. Patient is able to bear weight at this time. Denies other injuries at this time. Denies head trauma/LOC. Denies other symptoms at this time.      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  Other postprocedural status: S/P prostatectomy      Physical Exam:  Vital Signs Last 24 Hrs  T(C): 36.8 (18 Dec 2019 00:00), Max: 37.1 (17 Dec 2019 18:52)  T(F): 98.3 (18 Dec 2019 00:00), Max: 98.8 (17 Dec 2019 18:52)  HR: 67 (18 Dec 2019 00:00) (65 - 77)  BP: 119/64 (18 Dec 2019 00:00) (119/64 - 178/76)  BP(mean): --  RR: 17 (18 Dec 2019 00:00) (17 - 18)  SpO2: 98% (18 Dec 2019 00:00) (98% - 100%)    VSS  General: Well-appearing adult Male; No apparent distress; A&Ox3  Back: No obvious deformity or signs of trauma; No skin breaks/open wounds present; Midline TTP over the lumbar region; Non-tender elsewhere along spinal column  Lower Extremity Exam: Hip Flex/Knee Ext/TA/EHL/GS 5/5 and symmetric bilaterally  Sensation intact L2-S1 dermatomes bilaterally  Patella reflexes intact bilaterally  Straight leg raise intact  Heel-to-shin testing negative  Downgoing Babinski bilaterally  Negative lower extremity clonus bilaterally  Rectal tone WNL  Negative saddle anesthesia  Feet WWP; Palpable DP pulse; Cap Refill <2 sec  General Musculoskeletal: Non-tender elsewhere over four extremities including bilateral clavicles                          10.4   3.86  )-----------( 183      ( 17 Dec 2019 14:44 )             32.0     12-    140  |  102  |  20  ----------------------------<  100<H>  3.2<L>   |  27  |  0.99    Ca    9.9      17 Dec 2019 14:44    TPro  6.3  /  Alb  3.9  /  TBili  0.3  /  DBili  x   /  AST  27  /  ALT  23  /  AlkPhos  43  12-17      Urinalysis Basic - ( 17 Dec 2019 14:44 )    Color: Yellow / Appearance: SL Cloudy / S.020 / pH: x  Gluc: x / Ketone: NEGATIVE  / Bili: Negative / Urobili: 1.0 E.U./dL   Blood: x / Protein: 100 mg/dL / Nitrite: NEGATIVE   Leuk Esterase: NEGATIVE / RBC: 5-10 /HPF / WBC 5-10 /HPF   Sq Epi: x / Non Sq Epi: 0-5 /HPF / Bacteria: Present /HPF        Imaging:   L spine MRI reveals no evidence of cauda equina, mild anterolisthesis at L5-S1 w possible lateral stenosis surrounding S1 roots    A/P: 79yMale who presents with lumbar vertebral compression fracture and urinary incontinence    - No acute surgical intervention at this time  - Consider continued workup for other etiologies of incontinence -- likely related to prostate CA  - Very low concern for cauda equina at this time given imaging and physical exam  - WBAT  - Pain control as per primary team  - Appreciate PT consultation  - Ambulate as tolerated  - Outpatient follow up with Dr. Storey  - Discussed with Dr. Storey    Ortho Pager [phone] Orthopaedic Surgery Consult Note    For Surgeon: Cordelia    **Patient history obtained from collateral over the phone with ED provider**    HPI:  79yMale presents for evaluation of urinary incontinence. Patient has known history of metastatic prostate CA but recently developed urinary incontinence over a few days. Denies significant traumatic event. Patient additionally has been complaining of mild lumbar back pain. Pt is able to ambulate well and denies any LE neurologic symptoms. Patient is able to bear weight at this time. Denies other injuries at this time. Denies head trauma/LOC. Denies other symptoms at this time.      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  Other postprocedural status: S/P prostatectomy      Physical Exam:  Vital Signs Last 24 Hrs  T(C): 36.8 (18 Dec 2019 00:00), Max: 37.1 (17 Dec 2019 18:52)  T(F): 98.3 (18 Dec 2019 00:00), Max: 98.8 (17 Dec 2019 18:52)  HR: 67 (18 Dec 2019 00:00) (65 - 77)  BP: 119/64 (18 Dec 2019 00:00) (119/64 - 178/76)  BP(mean): --  RR: 17 (18 Dec 2019 00:00) (17 - 18)  SpO2: 98% (18 Dec 2019 00:00) (98% - 100%)    VSS  Patient unable to be examined as patient was discharged prior to exam                          10.4   3.86  )-----------( 183      ( 17 Dec 2019 14:44 )             32.0     12    140  |  102  |  20  ----------------------------<  100<H>  3.2<L>   |  27  |  0.99    Ca    9.9      17 Dec 2019 14:44    TPro  6.3  /  Alb  3.9  /  TBili  0.3  /  DBili  x   /  AST  27  /  ALT  23  /  AlkPhos  43  12-17      Urinalysis Basic - ( 17 Dec 2019 14:44 )    Color: Yellow / Appearance: SL Cloudy / S.020 / pH: x  Gluc: x / Ketone: NEGATIVE  / Bili: Negative / Urobili: 1.0 E.U./dL   Blood: x / Protein: 100 mg/dL / Nitrite: NEGATIVE   Leuk Esterase: NEGATIVE / RBC: 5-10 /HPF / WBC 5-10 /HPF   Sq Epi: x / Non Sq Epi: 0-5 /HPF / Bacteria: Present /HPF        Imaging:   L spine MRI reveals no evidence of cauda equina, mild anterolisthesis at L5-S1 w possible lateral stenosis surrounding S1 roots    Phone conversation had with emergency department given imaging findings that there would be low concern for cauda equina based on history from ED provider and imaging findings but that physical exam would be performed in addition in order to further clinically correlate. Patient was discharged from ED prior to exam by orthopedic resident.

## 2019-12-19 LAB
CULTURE RESULTS: SIGNIFICANT CHANGE UP
SPECIMEN SOURCE: SIGNIFICANT CHANGE UP

## 2019-12-20 LAB — CULTURE RESULTS: SIGNIFICANT CHANGE UP

## 2019-12-20 RX ORDER — AMOXICILLIN 250 MG/5ML
1 SUSPENSION, RECONSTITUTED, ORAL (ML) ORAL
Qty: 21 | Refills: 0
Start: 2019-12-20 | End: 2019-12-26

## 2019-12-21 LAB
-  AMPICILLIN: SIGNIFICANT CHANGE UP
-  CIPROFLOXACIN: SIGNIFICANT CHANGE UP
-  LEVOFLOXACIN: SIGNIFICANT CHANGE UP
-  NITROFURANTOIN: SIGNIFICANT CHANGE UP
-  TETRACYCLINE: SIGNIFICANT CHANGE UP
-  VANCOMYCIN: SIGNIFICANT CHANGE UP
CULTURE RESULTS: SIGNIFICANT CHANGE UP
METHOD TYPE: SIGNIFICANT CHANGE UP
ORGANISM # SPEC MICROSCOPIC CNT: SIGNIFICANT CHANGE UP
ORGANISM # SPEC MICROSCOPIC CNT: SIGNIFICANT CHANGE UP

## 2019-12-23 DIAGNOSIS — Z87.440 PERSONAL HISTORY OF URINARY (TRACT) INFECTIONS: ICD-10-CM

## 2019-12-24 DIAGNOSIS — R30.0 DYSURIA: ICD-10-CM

## 2019-12-24 DIAGNOSIS — R06.02 SHORTNESS OF BREATH: ICD-10-CM

## 2019-12-24 DIAGNOSIS — R53.83 OTHER FATIGUE: ICD-10-CM

## 2019-12-24 DIAGNOSIS — R53.1 WEAKNESS: ICD-10-CM

## 2019-12-24 DIAGNOSIS — F17.210 NICOTINE DEPENDENCE, CIGARETTES, UNCOMPLICATED: ICD-10-CM

## 2020-01-02 ENCOUNTER — APPOINTMENT (OUTPATIENT)
Dept: UROLOGY | Facility: CLINIC | Age: 80
End: 2020-01-02
Payer: MEDICARE

## 2020-01-02 VITALS — HEART RATE: 98 BPM | TEMPERATURE: 98.4 F | SYSTOLIC BLOOD PRESSURE: 178 MMHG | DIASTOLIC BLOOD PRESSURE: 79 MMHG

## 2020-01-02 PROCEDURE — 99213 OFFICE O/P EST LOW 20 MIN: CPT

## 2020-01-02 NOTE — PHYSICAL EXAM
[General Appearance - Well Developed] : well developed [General Appearance - Well Nourished] : well nourished [Normal Appearance] : normal appearance [Well Groomed] : well groomed [General Appearance - In No Acute Distress] : no acute distress [Abdomen Soft] : soft [Abdomen Tenderness] : non-tender [Edema] : no peripheral edema [Costovertebral Angle Tenderness] : no ~M costovertebral angle tenderness [Respiration, Rhythm And Depth] : normal respiratory rhythm and effort [] : no respiratory distress [Oriented To Time, Place, And Person] : oriented to person, place, and time [Exaggerated Use Of Accessory Muscles For Inspiration] : no accessory muscle use [Affect] : the affect was normal [Mood] : the mood was normal [Not Anxious] : not anxious [No Focal Deficits] : no focal deficits [FreeTextEntry1] : u

## 2020-01-02 NOTE — ASSESSMENT
[FreeTextEntry1] : UTI\par UA/UCx for test of cure\par \par Urinary urgency \par PVR= 0\par Restart Mybetriq\par Hx of Radical retropubic prostatectomy in 1994 \par \par Return precautions reviewed. Will go to ER immediately for fever, difficulty urinating or any concerning symptoms. \par F/u next month for Lupron

## 2020-01-02 NOTE — HISTORY OF PRESENT ILLNESS
[None] : None [FreeTextEntry1] : 79M with history of prostate cancer\par ER culture 12/21 w/ 10,000 CFU enterococcus faecalis\par Completed Cipro therapy\par Has not taken Myrbetriq for 3 weeks \par Notes mild improvement of dysuria\par Occasional urinary leaking \par Nocturia 4-5 x's \par No fever\par No hematuria \par No flank pain \par No weight loss\par No N/v

## 2020-01-12 ENCOUNTER — INPATIENT (INPATIENT)
Facility: HOSPITAL | Age: 80
LOS: 3 days | Discharge: HOME | DRG: 175 | End: 2020-01-16
Attending: INTERNAL MEDICINE | Admitting: INTERNAL MEDICINE
Payer: MEDICARE

## 2020-01-12 VITALS
HEART RATE: 110 BPM | HEIGHT: 69 IN | OXYGEN SATURATION: 100 % | TEMPERATURE: 98 F | RESPIRATION RATE: 20 BRPM | SYSTOLIC BLOOD PRESSURE: 152 MMHG | DIASTOLIC BLOOD PRESSURE: 78 MMHG | WEIGHT: 182.1 LBS

## 2020-01-12 DIAGNOSIS — B37.0 CANDIDAL STOMATITIS: ICD-10-CM

## 2020-01-12 DIAGNOSIS — I82.409 ACUTE EMBOLISM AND THROMBOSIS OF UNSPECIFIED DEEP VEINS OF UNSPECIFIED LOWER EXTREMITY: ICD-10-CM

## 2020-01-12 DIAGNOSIS — I26.99 OTHER PULMONARY EMBOLISM WITHOUT ACUTE COR PULMONALE: ICD-10-CM

## 2020-01-12 DIAGNOSIS — C61 MALIGNANT NEOPLASM OF PROSTATE: ICD-10-CM

## 2020-01-12 DIAGNOSIS — I80.229 PHLEBITIS AND THROMBOPHLEBITIS OF UNSPECIFIED POPLITEAL VEIN: ICD-10-CM

## 2020-01-12 DIAGNOSIS — E11.9 TYPE 2 DIABETES MELLITUS WITHOUT COMPLICATIONS: ICD-10-CM

## 2020-01-12 DIAGNOSIS — A41.9 SEPSIS, UNSPECIFIED ORGANISM: ICD-10-CM

## 2020-01-12 DIAGNOSIS — I10 ESSENTIAL (PRIMARY) HYPERTENSION: ICD-10-CM

## 2020-01-12 DIAGNOSIS — E78.5 HYPERLIPIDEMIA, UNSPECIFIED: ICD-10-CM

## 2020-01-12 DIAGNOSIS — R63.8 OTHER SYMPTOMS AND SIGNS CONCERNING FOOD AND FLUID INTAKE: ICD-10-CM

## 2020-01-12 DIAGNOSIS — Z91.89 OTHER SPECIFIED PERSONAL RISK FACTORS, NOT ELSEWHERE CLASSIFIED: ICD-10-CM

## 2020-01-12 LAB
ALBUMIN SERPL ELPH-MCNC: 4 G/DL — SIGNIFICANT CHANGE UP (ref 3.3–5)
ALP SERPL-CCNC: 36 U/L — LOW (ref 40–120)
ALT FLD-CCNC: 20 U/L — SIGNIFICANT CHANGE UP (ref 10–45)
ANION GAP SERPL CALC-SCNC: 13 MMOL/L — SIGNIFICANT CHANGE UP (ref 5–17)
ANION GAP SERPL CALC-SCNC: 17 MMOL/L — SIGNIFICANT CHANGE UP (ref 5–17)
APPEARANCE UR: ABNORMAL
APTT BLD: 27.6 SEC — SIGNIFICANT CHANGE UP (ref 27.5–36.3)
APTT BLD: 32.2 SEC — SIGNIFICANT CHANGE UP (ref 27.5–36.3)
AST SERPL-CCNC: 24 U/L — SIGNIFICANT CHANGE UP (ref 10–40)
BASOPHILS # BLD AUTO: 0.03 K/UL — SIGNIFICANT CHANGE UP (ref 0–0.2)
BASOPHILS NFR BLD AUTO: 1.8 % — SIGNIFICANT CHANGE UP (ref 0–2)
BILIRUB SERPL-MCNC: 0.3 MG/DL — SIGNIFICANT CHANGE UP (ref 0.2–1.2)
BILIRUB UR-MCNC: NEGATIVE — SIGNIFICANT CHANGE UP
BUN SERPL-MCNC: 22 MG/DL — SIGNIFICANT CHANGE UP (ref 7–23)
BUN SERPL-MCNC: 26 MG/DL — HIGH (ref 7–23)
CALCIUM SERPL-MCNC: 8.8 MG/DL — SIGNIFICANT CHANGE UP (ref 8.4–10.5)
CALCIUM SERPL-MCNC: 9.3 MG/DL — SIGNIFICANT CHANGE UP (ref 8.4–10.5)
CHLORIDE SERPL-SCNC: 100 MMOL/L — SIGNIFICANT CHANGE UP (ref 96–108)
CHLORIDE SERPL-SCNC: 104 MMOL/L — SIGNIFICANT CHANGE UP (ref 96–108)
CO2 SERPL-SCNC: 21 MMOL/L — LOW (ref 22–31)
CO2 SERPL-SCNC: 21 MMOL/L — LOW (ref 22–31)
COLOR SPEC: YELLOW — SIGNIFICANT CHANGE UP
CREAT SERPL-MCNC: 1.3 MG/DL — SIGNIFICANT CHANGE UP (ref 0.5–1.3)
CREAT SERPL-MCNC: 1.43 MG/DL — HIGH (ref 0.5–1.3)
DIFF PNL FLD: ABNORMAL
EOSINOPHIL # BLD AUTO: 0 K/UL — SIGNIFICANT CHANGE UP (ref 0–0.5)
EOSINOPHIL NFR BLD AUTO: 0 % — SIGNIFICANT CHANGE UP (ref 0–6)
GLUCOSE BLDC GLUCOMTR-MCNC: 131 MG/DL — HIGH (ref 70–99)
GLUCOSE SERPL-MCNC: 111 MG/DL — HIGH (ref 70–99)
GLUCOSE SERPL-MCNC: 126 MG/DL — HIGH (ref 70–99)
GLUCOSE UR QL: NEGATIVE — SIGNIFICANT CHANGE UP
HCT VFR BLD CALC: 32 % — LOW (ref 39–50)
HGB BLD-MCNC: 10.2 G/DL — LOW (ref 13–17)
INR BLD: 1.08 — SIGNIFICANT CHANGE UP (ref 0.88–1.16)
KETONES UR-MCNC: NEGATIVE — SIGNIFICANT CHANGE UP
LACTATE SERPL-SCNC: 1.2 MMOL/L — SIGNIFICANT CHANGE UP (ref 0.5–2)
LEUKOCYTE ESTERASE UR-ACNC: NEGATIVE — SIGNIFICANT CHANGE UP
LYMPHOCYTES # BLD AUTO: 0.67 K/UL — LOW (ref 1–3.3)
LYMPHOCYTES # BLD AUTO: 40.4 % — SIGNIFICANT CHANGE UP (ref 13–44)
MCHC RBC-ENTMCNC: 31.3 PG — SIGNIFICANT CHANGE UP (ref 27–34)
MCHC RBC-ENTMCNC: 31.9 GM/DL — LOW (ref 32–36)
MCV RBC AUTO: 98.2 FL — SIGNIFICANT CHANGE UP (ref 80–100)
MONOCYTES # BLD AUTO: 0.31 K/UL — SIGNIFICANT CHANGE UP (ref 0–0.9)
MONOCYTES NFR BLD AUTO: 18.3 % — HIGH (ref 2–14)
NEUTROPHILS # BLD AUTO: 0.6 K/UL — LOW (ref 1.8–7.4)
NEUTROPHILS NFR BLD AUTO: 31.2 % — LOW (ref 43–77)
NITRITE UR-MCNC: NEGATIVE — SIGNIFICANT CHANGE UP
NRBC # BLD: 8 /100 WBCS — HIGH (ref 0–0)
PH UR: 7 — SIGNIFICANT CHANGE UP (ref 5–8)
PLATELET # BLD AUTO: 160 K/UL — SIGNIFICANT CHANGE UP (ref 150–400)
POTASSIUM SERPL-MCNC: 3.5 MMOL/L — SIGNIFICANT CHANGE UP (ref 3.5–5.3)
POTASSIUM SERPL-MCNC: 4.3 MMOL/L — SIGNIFICANT CHANGE UP (ref 3.5–5.3)
POTASSIUM SERPL-SCNC: 3.5 MMOL/L — SIGNIFICANT CHANGE UP (ref 3.5–5.3)
POTASSIUM SERPL-SCNC: 4.3 MMOL/L — SIGNIFICANT CHANGE UP (ref 3.5–5.3)
PROT SERPL-MCNC: 6.4 G/DL — SIGNIFICANT CHANGE UP (ref 6–8.3)
PROT UR-MCNC: 100 MG/DL
PROTHROM AB SERPL-ACNC: 12.4 SEC — SIGNIFICANT CHANGE UP (ref 10–12.9)
RBC # BLD: 3.26 M/UL — LOW (ref 4.2–5.8)
RBC # FLD: 17.1 % — HIGH (ref 10.3–14.5)
SODIUM SERPL-SCNC: 138 MMOL/L — SIGNIFICANT CHANGE UP (ref 135–145)
SODIUM SERPL-SCNC: 138 MMOL/L — SIGNIFICANT CHANGE UP (ref 135–145)
SP GR SPEC: 1.01 — SIGNIFICANT CHANGE UP (ref 1–1.03)
TROPONIN T SERPL-MCNC: <0.01 NG/ML — SIGNIFICANT CHANGE UP (ref 0–0.01)
UROBILINOGEN FLD QL: 0.2 E.U./DL — SIGNIFICANT CHANGE UP
WBC # BLD: 1.67 K/UL — LOW (ref 3.8–10.5)
WBC # FLD AUTO: 1.67 K/UL — LOW (ref 3.8–10.5)

## 2020-01-12 PROCEDURE — 93010 ELECTROCARDIOGRAM REPORT: CPT

## 2020-01-12 PROCEDURE — 99285 EMERGENCY DEPT VISIT HI MDM: CPT

## 2020-01-12 PROCEDURE — 71046 X-RAY EXAM CHEST 2 VIEWS: CPT | Mod: 26

## 2020-01-12 PROCEDURE — 99223 1ST HOSP IP/OBS HIGH 75: CPT | Mod: GC

## 2020-01-12 PROCEDURE — 71275 CT ANGIOGRAPHY CHEST: CPT | Mod: 26

## 2020-01-12 PROCEDURE — 93970 EXTREMITY STUDY: CPT | Mod: 26

## 2020-01-12 RX ORDER — GLUCAGON INJECTION, SOLUTION 0.5 MG/.1ML
1 INJECTION, SOLUTION SUBCUTANEOUS ONCE
Refills: 0 | Status: DISCONTINUED | OUTPATIENT
Start: 2020-01-12 | End: 2020-01-16

## 2020-01-12 RX ORDER — DEXTROSE 50 % IN WATER 50 %
25 SYRINGE (ML) INTRAVENOUS ONCE
Refills: 0 | Status: DISCONTINUED | OUTPATIENT
Start: 2020-01-12 | End: 2020-01-16

## 2020-01-12 RX ORDER — SODIUM CHLORIDE 9 MG/ML
1000 INJECTION INTRAMUSCULAR; INTRAVENOUS; SUBCUTANEOUS ONCE
Refills: 0 | Status: COMPLETED | OUTPATIENT
Start: 2020-01-12 | End: 2020-01-12

## 2020-01-12 RX ORDER — ENOXAPARIN SODIUM 100 MG/ML
80 INJECTION SUBCUTANEOUS EVERY 12 HOURS
Refills: 0 | Status: DISCONTINUED | OUTPATIENT
Start: 2020-01-13 | End: 2020-01-13

## 2020-01-12 RX ORDER — SODIUM CHLORIDE 9 MG/ML
1000 INJECTION, SOLUTION INTRAVENOUS
Refills: 0 | Status: DISCONTINUED | OUTPATIENT
Start: 2020-01-12 | End: 2020-01-16

## 2020-01-12 RX ORDER — ENOXAPARIN SODIUM 100 MG/ML
80 INJECTION SUBCUTANEOUS ONCE
Refills: 0 | Status: COMPLETED | OUTPATIENT
Start: 2020-01-12 | End: 2020-01-12

## 2020-01-12 RX ORDER — NYSTATIN 500MM UNIT
500000 POWDER (EA) MISCELLANEOUS EVERY 6 HOURS
Refills: 0 | Status: DISCONTINUED | OUTPATIENT
Start: 2020-01-12 | End: 2020-01-16

## 2020-01-12 RX ORDER — INSULIN LISPRO 100/ML
VIAL (ML) SUBCUTANEOUS
Refills: 0 | Status: DISCONTINUED | OUTPATIENT
Start: 2020-01-12 | End: 2020-01-16

## 2020-01-12 RX ORDER — DEXTROSE 50 % IN WATER 50 %
12.5 SYRINGE (ML) INTRAVENOUS ONCE
Refills: 0 | Status: DISCONTINUED | OUTPATIENT
Start: 2020-01-12 | End: 2020-01-16

## 2020-01-12 RX ORDER — HEPARIN SODIUM 5000 [USP'U]/ML
INJECTION INTRAVENOUS; SUBCUTANEOUS
Qty: 25000 | Refills: 0 | Status: DISCONTINUED | OUTPATIENT
Start: 2020-01-12 | End: 2020-01-12

## 2020-01-12 RX ORDER — ONDANSETRON 8 MG/1
4 TABLET, FILM COATED ORAL EVERY 6 HOURS
Refills: 0 | Status: DISCONTINUED | OUTPATIENT
Start: 2020-01-12 | End: 2020-01-16

## 2020-01-12 RX ORDER — CEFTRIAXONE 500 MG/1
1000 INJECTION, POWDER, FOR SOLUTION INTRAMUSCULAR; INTRAVENOUS EVERY 24 HOURS
Refills: 0 | Status: DISCONTINUED | OUTPATIENT
Start: 2020-01-12 | End: 2020-01-14

## 2020-01-12 RX ORDER — DEXTROSE 50 % IN WATER 50 %
15 SYRINGE (ML) INTRAVENOUS ONCE
Refills: 0 | Status: DISCONTINUED | OUTPATIENT
Start: 2020-01-12 | End: 2020-01-16

## 2020-01-12 RX ADMIN — Medication 500000 UNIT(S): at 22:47

## 2020-01-12 RX ADMIN — ONDANSETRON 4 MILLIGRAM(S): 8 TABLET, FILM COATED ORAL at 23:59

## 2020-01-12 RX ADMIN — CEFTRIAXONE 100 MILLIGRAM(S): 500 INJECTION, POWDER, FOR SOLUTION INTRAMUSCULAR; INTRAVENOUS at 22:46

## 2020-01-12 RX ADMIN — ENOXAPARIN SODIUM 80 MILLIGRAM(S): 100 INJECTION SUBCUTANEOUS at 16:32

## 2020-01-12 RX ADMIN — SODIUM CHLORIDE 500 MILLILITER(S): 9 INJECTION INTRAMUSCULAR; INTRAVENOUS; SUBCUTANEOUS at 11:43

## 2020-01-12 NOTE — PATIENT PROFILE ADULT - NSPROMUTINFOINDIVIDFT_GEN_A_NUR
Patient/Family given For Your Well-Being Teaching Sheet:     Care After Anesthesia/Sedation __xx____   Oral Contraceptive Pills may be ineffective for the next 8 days following anesthesia due to interactions with medications you may have received    Pain Management Tips              __xx____  About Surgical Site Infections  ___xx___  Smoking and Second hand Smoke Information for Everyone xx_____  Same Day Surgery  Card           __xx____    Supplies: Sitz Bath    To contact physician's office for post-operative appointment in 2-4 weeks, Dr. Fuchs [phone].  Keep area clean and dry.    May use bacitracin ointment to anal area 1-2 times per day for 1 week.    May use gauze or cotton ball in the anal area to absorb any drainage or moisture.    Avoid constipation.  If no BM for 36 hours take a gentle laxative like Miralax.  Take a stool softener and Metamucil (or Konsyl) daily to prevent constipation and decrease pain with BMs.  Sitz baths 2-3 times per day for comfort as needed.  No heavy lifting (greater than 20 lbs) or vigorous exercise for 2 weeks.  May take Tylenol 650 mg every 6 hours as needed for pain.  May take Ibuprofen 600-800 mg every 6 hours as needed for pain.      To call Physician for any signs of wound infection: Fever greater than 101 degrees F, bleeding, separation of incision, pus like drainage, or excessive swelling.  To call Physician for difficulty breathing, vomiting, inability to tolerate oral intake.  To call Physician for any pain that is not controlled by pain medication or anything worrisome.  To avoid driving while taking pain medications or experiencing pain.   Continued explanation and reinforcement.

## 2020-01-12 NOTE — H&P ADULT - PROBLEM SELECTOR PLAN 1
P/w worsening JAIME and SOB x1mo. Pt was able to walk "miles" prior to 1mo ago, now gets SOB after 7-10steps. Patient saturating well on admission, however P/w worsening JAIME and SOB x1mo. Pt was able to walk "miles" prior to 1mo ago, now gets SOB after 7-10steps. Pt saturating 100% on RA on admission, not tachypneic, no signs of respiratory distress. Found to have PE on CTA chest, likely due to DVT 2/2 malignancy (LLE DVT on US Dopplers).  -low suspicion for R heart strain as troponin neg, BNP neg, EKG w/o sinus tach, no RBBB. Will f/u ECHO to r/o R heart strain  -S/p lovenox 80mg (full AC, 1mg/kg) in ED. Will continue lovenox 80mg QD for treatment of PE.  -Pt not requiring supplemental oxygen, will continue to monitor respiratory status P/w worsening JAIME and SOB x1mo. Pt was able to walk "miles" prior to 1mo ago, now gets SOB after 7-10steps. Pt saturating 100% on RA on admission, not tachypneic, no signs of respiratory distress. Found to have PE on CTA chest, likely due to DVT 2/2 malignancy (LLE DVT on US Dopplers).  -low suspicion for R heart strain as troponin neg, BNP neg, EKG w/o sinus tach, no RBBB. Will f/u ECHO to r/o R heart strain  -S/p lovenox 80mg (full AC, 1mg/kg) in ED. Will continue lovenox 80mg BID for treatment of PE.  -Pt not requiring supplemental oxygen, will continue to monitor respiratory status

## 2020-01-12 NOTE — ED ADULT NURSE NOTE - NSIMPLEMENTINTERV_GEN_ALL_ED
Implemented All Fall with Harm Risk Interventions:  [place] to call system. Call bell, personal items and telephone within reach. Instruct patient to call for assistance. Room bathroom lighting operational. Non-slip footwear when patient is off stretcher. Physically safe environment: no spills, clutter or unnecessary equipment. Stretcher in lowest position, wheels locked, appropriate side rails in place. Provide visual cue, wrist band, yellow gown, etc. Monitor gait and stability. Monitor for mental status changes and reorient to person, place, and time. Review medications for side effects contributing to fall risk. Reinforce activity limits and safety measures with patient and family. Provide visual clues: red socks.

## 2020-01-12 NOTE — H&P ADULT - NSHPPHYSICALEXAM_GEN_ALL_CORE
VITAL SIGNS:  T(C): 37 (01-12-20 @ 15:29), Max: 37 (01-12-20 @ 15:29)  HR: 93 (01-12-20 @ 15:29) (93 - 110)  BP: 138/66 (01-12-20 @ 15:29) (130/70 - 152/78)  RR: 18 (01-12-20 @ 15:29) (18 - 20)  SpO2: 100% (01-12-20 @ 15:29) (100% - 100%)    PHYSICAL EXAM:  Constitutional: WDWN, comfortable in bed; NAD  HEENT: NC/AT; arcus selinis; no nasal discharge; sparse white exudates in oropharynx, MMM, dentures, sutures on lower gum line with small amount of white exudates, 2cm laceration at inside of lower lip with white purulence that flushed with saline (clean based)  Neck: wide neck; no JVD, no cervical, post-auricular or supraclavicular lymphadenopathy  Respiratory: CTA B/L, no wheezes/rales/rhonchi, no diminished breath sounds, no increased work of breathing or accessory muscle use  Cardiac: +S1/S2, no murmurs/rubs/gallops, regular rhythm with 1-2 premature beats  Gastrointestinal: obese abdomen, soft, NT/ND; no rebound or guarding, no hepatosplenomegaly; +BSx4  Genitourinary: no suprapubic tenderness  Extremities: WWP, no clubbing or cyanosis; no peripheral edema b/l  Vascular: 2+ radial, femoral, DP/PT pulses B/L  Dermatologic: skin warm, dry and intact; no rashes, wounds, or scars  Lymphatic: no submandibular or cervical LAD  Psychiatric: affect and characteristics of appearance, verbalizations, behaviors are appropriate  Neurologic:     -Mental Status: AAOx3. Speech is fluent, no dysarthria. Follows commands. Attention/concentration intact.     -Cranial Nerves:          II: Visual fields are full to confrontation.          III, IV, VI: EOMI; PERRL b/l          V:  Facial sensation V1-V3 equal and intact           VII: Face is symmetric with normal eye closure and smile          VIII: Hearing is bilaterally intact to finger rub          IX, X: Uvula is midline and soft palate rises symmetrically          XI: Head turning and shoulder shrug are intact.          XII: Tongue protrudes midline     -Motor: Normal bulk and tone. Strength bilateral upper extremity 5/5, bilateral lower extremities 3/5.      -Sensation: Intact to light touch bilaterally. VITAL SIGNS:  T(C): 37 (01-12-20 @ 15:29), Max: 37 (01-12-20 @ 15:29)  HR: 93 (01-12-20 @ 15:29) (93 - 110)  BP: 138/66 (01-12-20 @ 15:29) (130/70 - 152/78)  RR: 18 (01-12-20 @ 15:29) (18 - 20)  SpO2: 100% (01-12-20 @ 15:29) (100% - 100%)    PHYSICAL EXAM:  Constitutional: WDWN, comfortable in bed; NAD  HEENT: NC/AT; arcus selinis; no nasal discharge; sparse white exudates in oropharynx, MMM, dentures, sutures on lower gum line with small amount of white exudates, 2cm laceration at inside of lower lip with white purulence that flushed with saline (clean based)  Neck: wide neck; no JVD, no cervical, post-auricular or supraclavicular lymphadenopathy  Respiratory: CTA B/L, no wheezes/rales/rhonchi, no diminished breath sounds, no increased work of breathing or accessory muscle use  Cardiac: +S1/S2, no murmurs/rubs/gallops, regular rhythm with 1-2 ectopic beats  Gastrointestinal: obese abdomen, soft, NT/ND; no rebound or guarding, no hepatosplenomegaly; +BSx4  Genitourinary: no suprapubic tenderness  Extremities: WWP, no clubbing or cyanosis; no peripheral edema b/l  Vascular: 2+ radial, femoral, DP/PT pulses B/L  Dermatologic: skin warm, dry and intact; no rashes, wounds, or scars  Lymphatic: no submandibular or cervical LAD  Psychiatric: affect and characteristics of appearance, verbalizations, behaviors are appropriate  Neurologic:     -Mental Status: AAOx3. Speech is fluent, no dysarthria. Follows commands. Attention/concentration intact.     -Cranial Nerves:          II: Visual fields are full to confrontation.          III, IV, VI: EOMI; PERRL b/l          V:  Facial sensation V1-V3 equal and intact           VII: Face is symmetric with normal eye closure and smile          VIII: Hearing is bilaterally intact to finger rub          IX, X: Uvula is midline and soft palate rises symmetrically          XI: Head turning and shoulder shrug are intact.          XII: Tongue protrudes midline     -Motor: Normal bulk and tone. Strength bilateral upper extremity 5/5, bilateral lower extremities 3/5.      -Sensation: Intact to light touch bilaterally.

## 2020-01-12 NOTE — H&P ADULT - ATTENDING COMMENTS
Patient seen and examined. Labs, imaging and vitals reviewed. Agree with above with additions.     Patient with SOB and JAIME for one month. Denies CP.   -Found to have DVT/PE, likely 2/2 to malignancy  -Treat with Lovenox  -Low suspicion for RH strain (CT without RV dilation, Trop/BNP negative, EKG without signs of RV strain)  -+/- Echo    Sepsis likely due to UTI  Tachy and with leukopenia. May be due to chemo and DVT/PE, but also with dysuria and +UA  -Ceftriaxone for UTI treatment (Based on old culture data, should be sensitive).  -F/U UCx    Thrush: Likely 2/2 to chemo    HTN: Hold meds in setting of sepsis.  HLD: Statin

## 2020-01-12 NOTE — H&P ADULT - PROBLEM SELECTOR PLAN 2
Meets SIRS criteria (leukopenia, tachycardia), however leukopenia likely due to chemotherapy and tachycardia likely due to PE. lactate negative.  -Today pt c/o dysuria, increased urinary urgency, and hematuria. Urinalysis positive for WBC, bacteria, RBCs (neg leuk esterase, neg nitrates).      -Start ceftriaxone 1g QD for treatment of UTI     -F/u urine cultures. If negative, will d/c ceftriaxone    #r/o UTI  C/o dysuria, urinary urgency, and hematuria. Of note, recent presentation to [facility] ED where he was found to have enterococcus faecalis UTI.  -Plan as above

## 2020-01-12 NOTE — ED ADULT NURSE NOTE - OBJECTIVE STATEMENT
Patient alert and oriented x 3 came c/o sob worse with movement for 2 months , was seen in the ER dec 17 for the same reason .  Pt. also c/o jaw pain , had teeth pulled 10 days ago . Pt. is on chemo last was 8 weeks ago . Denies any chest pain , palpitations nor dizziness . In stable condition , not in distress . All labs sent. Being seen and examined by Dr. Morales . Will continue to monitor .

## 2020-01-12 NOTE — H&P ADULT - PROBLEM SELECTOR PLAN 7
Known hx of DM2, on _____ at home. During hospitalization will manage w/ SSI.  Plan:    -SSI, monitor FSG Known hx of HLD, on simvastatin 20mg at home.  Plan:    -C/w home medication (simvastatin 20mg QD)

## 2020-01-12 NOTE — ED ADULT TRIAGE NOTE - CHIEF COMPLAINT QUOTE
Pt CO SOB x several weeks.  EKG in progress.  Pt states "I was seen here in December but I'm not getting better."  PT denies CP, N/V/D, Fevers.

## 2020-01-12 NOTE — H&P ADULT - ASSESSMENT
79M w/ PMH s/f stage IV prostate CA (s/p prostatectomy, on chemotherapy), who p/w progressive SOB and JAIEM x1mo associated w/ generalized fatigue in the setting of decreased PO intake, found to have segmental PE of RLL on CTA Chest and LLE DVT on US Dopplers likely 2/2 malignancy. Patient admitted to [facility] for management of PE and management of his other medical conditions.

## 2020-01-12 NOTE — H&P ADULT - NSICDXPASTMEDICALHX_GEN_ALL_CORE_FT
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

## 2020-01-12 NOTE — H&P ADULT - NSICDXFAMILYHX_GEN_ALL_CORE_FT
FAMILY HISTORY:  Father  Still living? Unknown  Family history of malignant neoplasm of prostate, Age at diagnosis: Age Unknown    Sibling  Still living? Unknown  Family history of malignant neoplasm of prostate, Age at diagnosis: Age Unknown

## 2020-01-12 NOTE — H&P ADULT - PROBLEM SELECTOR PLAN 8
F: none needed, encourage PO intake  E: replete to K>4, Mg>2  N: mechanical soft, DASH/TLC + consistent carb  Ppx: lovenox full AC    Code Status: FULL CODE    Dispo: [facility] Known hx of DM2, on _____ at home. During hospitalization will manage w/ SSI.  Plan:    -SSI, monitor FSG Known hx of DM2, on ?metformin at home. During hospitalization will manage w/ SSI. Will confirm dose with pharmacy in AM.  Plan:    -SSI, monitor FSG

## 2020-01-12 NOTE — H&P ADULT - PROBLEM SELECTOR PLAN 5
Known hx of HTN, on amlodipine-valsartan 10-320mg QD and hydrochlorothiazide 25mg QD at home. Reports home SBP ~150s.  -Will hold home anti-hypertensives at this time in the setting of sepsis. Will consider restarting in the AM if indicated. White exudates visualized in oropharynx, likely candidiasis as pt is immunocompromised due to chemotherapy  -nystatin swish and swallow

## 2020-01-12 NOTE — H&P ADULT - PROBLEM SELECTOR PLAN 6
Known hx of HLD, on simvastatin 20mg at home.  Plan:    -C/w home medication (simvastatin 20mg QD) Known hx of HTN, on amlodipine-valsartan 10-320mg QD and hydrochlorothiazide 25mg QD at home. Reports home SBP ~150s.  -Will hold home anti-hypertensives at this time in the setting of sepsis. Will consider restarting in the AM if indicated.

## 2020-01-12 NOTE — ED ADULT NURSE NOTE - CHPI ED NUR SYMPTOMS NEG
no diaphoresis/no chest pain/no cough/no body aches/no edema/no headache/no fever/no chills/no wheezing/no hemoptysis

## 2020-01-12 NOTE — H&P ADULT - PROBLEM SELECTOR PLAN 9
1) PCP Contacted on Admission: (Y/N) --> Name & Phone #:  2) Date of Contact with PCP: TBD  3) PCP Contacted at Discharge: TBD  4) Summary of Handoff Given to PCP: TBD   5) Post-Discharge Appointment Date: TBD F: none needed, encourage PO intake  E: replete to K>4, Mg>2  N: mechanical soft, DASH/TLC + consistent carb  Ppx: lovenox full AC    Code Status: FULL CODE    Dispo: [facility]

## 2020-01-12 NOTE — H&P ADULT - NSHPLABSRESULTS_GEN_ALL_CORE
LABS:                        10.2   1.67  )-----------( 160      ( 2020 10:30 )             32.0         138  |  104  |  22  ----------------------------<  111<H>  4.3   |  21<L>  |  1.30    Ca    8.8      2020 14:44    TPro  6.4  /  Alb  4.0  /  TBili  0.3  /  DBili  x   /  AST  24  /  ALT  20  /  AlkPhos  36<L>  12      PT/INR - ( 2020 10:30 )   PT: 12.4 sec;   INR: 1.08     PTT - ( 2020 10:30 )  PTT:27.6 sec    CARDIAC MARKERS ( 2020 10:30 )  x     / <0.01 ng/mL / x     / x     / x          Urinalysis Basic - ( 2020 14:08 )  Color: Yellow / Appearance: SL Cloudy / S.010 / pH: x  Gluc: x / Ketone: NEGATIVE  / Bili: Negative / Urobili: 0.2 E.U./dL   Blood: x / Protein: 100 mg/dL / Nitrite: NEGATIVE   Leuk Esterase: NEGATIVE / RBC: Many /HPF / WBC 5-10 /HPF   Sq Epi: x / Non Sq Epi: 0-5 /HPF / Bacteria: Present /HPF    LIVER FUNCTIONS - ( 2020 10:30 )  Alb: 4.0 g/dL / Pro: 6.4 g/dL / ALK PHOS: 36 U/L / ALT: 20 U/L / AST: 24 U/L / GGT: x           RADIOLOGY, EKG AND ADDITIONAL TESTS: Reviewed.  < from: CT Angio Chest PE Protocol w/ IV Cont (20 @ 11:49) >  FINDINGS: CT angiography ofthe chest was performed with the administration of intravenous contrast. Multiplanar and maximum intensity projection 3D reconstructions were performed in the sagittal and coronal planes. Comparison is made to prior studies dated 2019 and 2017.    Evaluation of the chest demonstrates the visualized gland is normal in appearance. There is normal heart size. There is left ventricular hypertrophy. Negative for intraluminal thrombus within the left atrial appendage. Negative for enlargement of main pulmonary artery. Evaluation of the pulmonary vasculature demonstrates solitary segmental pulmonary embolus within a branch supplying the posteromedial basal segment of the right lower lobe, new since 2019. There is no endobronchial lesion. Minimal paraseptal emphysematous change. Biapical pleural-parenchymal scarring. No pulmonary consolidation or mass. Evaluation of the upper abdomen is unremarkable aside from partially visualized cyst in the upper pole of the left kidney. Evaluation of the osseous structures no destructive osseous lesion.    IMPRESSION: Solitary segmental pulmonary embolus within branches supplying the medial basilar segment of the right lower lobe.  < end of copied text >

## 2020-01-12 NOTE — H&P ADULT - PROBLEM SELECTOR PLAN 4
Known hx of stage IV prostate cancer (s/p prostatectomy, on chemotherapy). Leukopenia on admission likely 2/2 chemotherapy.  -Follows with Dr. Fuller, next chemotherapy planned for next week.

## 2020-01-12 NOTE — ED PROVIDER NOTE - CLINICAL SUMMARY MEDICAL DECISION MAKING FREE TEXT BOX
80 yo M with metastatic prostate CA last chemotherapy 1 week ago w ongoing sob on exertion for one month, weakness, dysuria. no assoc f/c, leg swelling, chest pain, headache +dec appetite.  - chemo related weakness, ACS/PE, dehydration, other,  reassess. 78 yo M with metastatic prostate CA last chemotherapy 1 week ago w ongoing sob on exertion for one month, weakness, dysuria. no assoc f/c, leg swelling, chest pain, headache +dec appetite.  - chemo related weakness, ACS/PE, dehydration, other,  reassess.  given repeated visits for similar issues, will admit for further eval, obtain dopplers for further assessment of DVT in light of PE

## 2020-01-12 NOTE — H&P ADULT - PROBLEM SELECTOR PLAN 3
P/w SOB and JAIME, found to have PE on CTA chest. LE dopplers w/ DVT in LLE intramuscular calf vein. Etiology likely 2/2 malignancy.  -Pt on lovenox 80mg QD for full AC  -NO SCDS P/w SOB and JAIME, found to have PE on CTA chest. LE dopplers w/ DVT in LLE intramuscular calf vein. Etiology likely 2/2 malignancy.  -Pt on lovenox 80mg BID for full AC  -NO SCDS

## 2020-01-12 NOTE — H&P ADULT - HISTORY OF PRESENT ILLNESS
Mr. Rossi is an 79M w/ PMH s/f prostate CA (s/p prostatectomy, on chemotherapy), HTN, HLD, who p/w progressively worsening SOB x1mo. Patient endorses being able to "walk miles" prior to 1-2mo ago, and can now becomes SOB after walking 7-10 steps. SOB is associated with generalized weakness and muscle fatigue. Last Sunday he was walking in his house and fell, saying he "just gave out", denies LoC or hitting his head. Denies associated flushing, lightheadedness or dizziness. Denies chest pain, recent travel or sick contacts.  Of note, endorses decreased PO intake after having 4 teeth pulled on 1/2. He has had persistent pain since the procedure and is unable to chew food.   Additional ROS significant for intermittent palpitations at rest (not associated with SOB), dysuria, increased urinary frequency, intermittent bloody urine (1-2mo, most recently today), 1 episode of diarrhea (today, watery, "orange color"). He denies fevers, chills, headaches, vision changes, abdominal pain.    Of note, patient presented to ED in 1mo ago (12/17/2019) w/ back pain and urinary incontinence that progressed over several days. In ED was found to have lumbar vertebral compression fracture w/o cauda equina. Today pt denies urinary or bowel incontinence. Denies focal neurologic deficits, endorsing his weakness and general fatigue.    In the ED: VS T97.9, , /78, RR20 O2sat 100% on RA.  Labs s/f WBC 1.67, Hb10.2 (baseline ~10), BUN 26, Cr1.43 (0.99 in 12/17), negative troponin, BNP wnl, lactate wnl.  EKG: NSR,   CT Angio Chest w/ "Solitary segmental pulmonary embolus within branches supplying the medial basilar segment of the right lower lobe."  US LE Dopplers preliminary read showing DVT of L intramuscular calf vein Mr. Rossi is an 79M w/ PMH s/f prostate CA (s/p prostatectomy, on chemotherapy), HTN, HLD, who p/w progressively worsening SOB x1mo. Patient endorses being able to "walk miles" prior to 1-2mo ago, and can now becomes SOB after walking 7-10 steps. SOB is associated with generalized weakness and muscle fatigue. Last Sunday he was walking in his house and fell, saying he "just gave out", denies LoC or hitting his head. Denies associated flushing, lightheadedness or dizziness. Denies chest pain, recent travel or sick contacts.  Of note, endorses decreased PO intake after having 4 teeth pulled on 1/2. He has had persistent pain since the procedure and is unable to chew food.   Additional ROS significant for intermittent palpitations at rest (not associated with SOB), dysuria, increased urinary frequency, intermittent bloody urine (1-2mo, most recently today), 1 episode of diarrhea (today, watery, "orange color"). He denies fevers, chills, headaches, vision changes, abdominal pain.    Of note, patient presented to ED in 1mo ago (12/17/2019) w/ back pain and urinary incontinence that progressed over several days. In ED was found to have lumbar vertebral compression fracture w/o cauda equina. Today pt denies urinary or bowel incontinence. Denies focal neurologic deficits, endorsing his weakness and general fatigue.    In the ED: VS T97.9, , /78, RR20 O2sat 100% on RA.  Labs s/f WBC 1.67, Hb10.2 (baseline ~10), BUN 26, Cr1.43 (0.99 in 12/17), negative troponin, BNP wnl, lactate wnl.  EKG: NSR (borderline tachycardic, HR98), T wave inversions in I and aVL, L axis deviation  CT Angio Chest w/ "Solitary segmental pulmonary embolus within branches supplying the medial basilar segment of the right lower lobe."  US LE Dopplers preliminary read showing DVT of L intramuscular calf vein.

## 2020-01-12 NOTE — ED PROVIDER NOTE - OBJECTIVE STATEMENT
78 yo M with metastatic prostate CA last chemotherapy 1 week ago w ongoing sob on exertion for one month, weakness, dysuria. no assoc f/c, leg swelling, chest pain, headache +dec appetite.

## 2020-01-13 ENCOUNTER — RESULT REVIEW (OUTPATIENT)
Age: 80
End: 2020-01-13

## 2020-01-13 LAB
ANION GAP SERPL CALC-SCNC: 16 MMOL/L — SIGNIFICANT CHANGE UP (ref 5–17)
APTT BLD: 151.3 SEC — CRITICAL HIGH (ref 27.5–36.3)
BASOPHILS # BLD AUTO: 0.02 K/UL — SIGNIFICANT CHANGE UP (ref 0–0.2)
BASOPHILS NFR BLD AUTO: 1 % — SIGNIFICANT CHANGE UP (ref 0–2)
BUN SERPL-MCNC: 21 MG/DL — SIGNIFICANT CHANGE UP (ref 7–23)
CALCIUM SERPL-MCNC: 9.3 MG/DL — SIGNIFICANT CHANGE UP (ref 8.4–10.5)
CHLORIDE SERPL-SCNC: 97 MMOL/L — SIGNIFICANT CHANGE UP (ref 96–108)
CK MB CFR SERPL CALC: 2 NG/ML — SIGNIFICANT CHANGE UP (ref 0–6.7)
CK SERPL-CCNC: 60 U/L — SIGNIFICANT CHANGE UP (ref 30–200)
CO2 SERPL-SCNC: 23 MMOL/L — SIGNIFICANT CHANGE UP (ref 22–31)
CREAT SERPL-MCNC: 1.33 MG/DL — HIGH (ref 0.5–1.3)
EOSINOPHIL # BLD AUTO: 0 K/UL — SIGNIFICANT CHANGE UP (ref 0–0.5)
EOSINOPHIL NFR BLD AUTO: 0 % — SIGNIFICANT CHANGE UP (ref 0–6)
GLUCOSE BLDC GLUCOMTR-MCNC: 116 MG/DL — HIGH (ref 70–99)
GLUCOSE BLDC GLUCOMTR-MCNC: 120 MG/DL — HIGH (ref 70–99)
GLUCOSE BLDC GLUCOMTR-MCNC: 130 MG/DL — HIGH (ref 70–99)
GLUCOSE BLDC GLUCOMTR-MCNC: 150 MG/DL — HIGH (ref 70–99)
GLUCOSE SERPL-MCNC: 145 MG/DL — HIGH (ref 70–99)
HBA1C BLD-MCNC: 6.1 % — HIGH (ref 4–5.6)
HCT VFR BLD CALC: 29.4 % — LOW (ref 39–50)
HGB BLD-MCNC: 9.7 G/DL — LOW (ref 13–17)
IMM GRANULOCYTES NFR BLD AUTO: 1.6 % — HIGH (ref 0–1.5)
LYMPHOCYTES # BLD AUTO: 1.12 K/UL — SIGNIFICANT CHANGE UP (ref 1–3.3)
LYMPHOCYTES # BLD AUTO: 58.6 % — HIGH (ref 13–44)
MAGNESIUM SERPL-MCNC: 1.7 MG/DL — SIGNIFICANT CHANGE UP (ref 1.6–2.6)
MCHC RBC-ENTMCNC: 31.7 PG — SIGNIFICANT CHANGE UP (ref 27–34)
MCHC RBC-ENTMCNC: 33 GM/DL — SIGNIFICANT CHANGE UP (ref 32–36)
MCV RBC AUTO: 96.1 FL — SIGNIFICANT CHANGE UP (ref 80–100)
MONOCYTES # BLD AUTO: 0.32 K/UL — SIGNIFICANT CHANGE UP (ref 0–0.9)
MONOCYTES NFR BLD AUTO: 16.8 % — HIGH (ref 2–14)
NEUTROPHILS # BLD AUTO: 0.42 K/UL — LOW (ref 1.8–7.4)
NEUTROPHILS NFR BLD AUTO: 22 % — LOW (ref 43–77)
NRBC # BLD: 4 /100 WBCS — HIGH (ref 0–0)
PLATELET # BLD AUTO: 162 K/UL — SIGNIFICANT CHANGE UP (ref 150–400)
POTASSIUM SERPL-MCNC: SIGNIFICANT CHANGE UP MMOL/L (ref 3.5–5.3)
POTASSIUM SERPL-SCNC: SIGNIFICANT CHANGE UP MMOL/L (ref 3.5–5.3)
RBC # BLD: 3.06 M/UL — LOW (ref 4.2–5.8)
RBC # FLD: 17 % — HIGH (ref 10.3–14.5)
SODIUM SERPL-SCNC: 136 MMOL/L — SIGNIFICANT CHANGE UP (ref 135–145)
TROPONIN T SERPL-MCNC: <0.01 NG/ML — SIGNIFICANT CHANGE UP (ref 0–0.01)
WBC # BLD: 1.89 K/UL — LOW (ref 3.8–10.5)
WBC # FLD AUTO: 1.89 K/UL — LOW (ref 3.8–10.5)

## 2020-01-13 PROCEDURE — 93010 ELECTROCARDIOGRAM REPORT: CPT

## 2020-01-13 PROCEDURE — 99222 1ST HOSP IP/OBS MODERATE 55: CPT | Mod: GC,25

## 2020-01-13 PROCEDURE — 43239 EGD BIOPSY SINGLE/MULTIPLE: CPT

## 2020-01-13 PROCEDURE — 99223 1ST HOSP IP/OBS HIGH 75: CPT | Mod: 25

## 2020-01-13 PROCEDURE — 88305 TISSUE EXAM BY PATHOLOGIST: CPT | Mod: 26

## 2020-01-13 PROCEDURE — 99232 SBSQ HOSP IP/OBS MODERATE 35: CPT | Mod: GC

## 2020-01-13 PROCEDURE — 99233 SBSQ HOSP IP/OBS HIGH 50: CPT | Mod: GC

## 2020-01-13 RX ORDER — FINASTERIDE 5 MG/1
5 TABLET, FILM COATED ORAL DAILY
Refills: 0 | Status: DISCONTINUED | OUTPATIENT
Start: 2020-01-13 | End: 2020-01-16

## 2020-01-13 RX ORDER — PANTOPRAZOLE SODIUM 20 MG/1
40 TABLET, DELAYED RELEASE ORAL EVERY 12 HOURS
Refills: 0 | Status: DISCONTINUED | OUTPATIENT
Start: 2020-01-13 | End: 2020-01-13

## 2020-01-13 RX ORDER — ACETAMINOPHEN 500 MG
650 TABLET ORAL EVERY 6 HOURS
Refills: 0 | Status: DISCONTINUED | OUTPATIENT
Start: 2020-01-13 | End: 2020-01-16

## 2020-01-13 RX ORDER — VALSARTAN 80 MG/1
320 TABLET ORAL DAILY
Refills: 0 | Status: DISCONTINUED | OUTPATIENT
Start: 2020-01-13 | End: 2020-01-13

## 2020-01-13 RX ORDER — HEPARIN SODIUM 5000 [USP'U]/ML
1200 INJECTION INTRAVENOUS; SUBCUTANEOUS
Qty: 25000 | Refills: 0 | Status: DISCONTINUED | OUTPATIENT
Start: 2020-01-13 | End: 2020-01-14

## 2020-01-13 RX ORDER — SODIUM CHLORIDE 9 MG/ML
500 INJECTION INTRAMUSCULAR; INTRAVENOUS; SUBCUTANEOUS ONCE
Refills: 0 | Status: COMPLETED | OUTPATIENT
Start: 2020-01-13 | End: 2020-01-13

## 2020-01-13 RX ORDER — AMLODIPINE BESYLATE 2.5 MG/1
10 TABLET ORAL DAILY
Refills: 0 | Status: DISCONTINUED | OUTPATIENT
Start: 2020-01-13 | End: 2020-01-14

## 2020-01-13 RX ORDER — PANTOPRAZOLE SODIUM 20 MG/1
40 TABLET, DELAYED RELEASE ORAL
Refills: 0 | Status: DISCONTINUED | OUTPATIENT
Start: 2020-01-13 | End: 2020-01-16

## 2020-01-13 RX ORDER — HEPARIN SODIUM 5000 [USP'U]/ML
INJECTION INTRAVENOUS; SUBCUTANEOUS
Qty: 25000 | Refills: 0 | Status: DISCONTINUED | OUTPATIENT
Start: 2020-01-13 | End: 2020-01-13

## 2020-01-13 RX ADMIN — PANTOPRAZOLE SODIUM 40 MILLIGRAM(S): 20 TABLET, DELAYED RELEASE ORAL at 18:41

## 2020-01-13 RX ADMIN — Medication 500000 UNIT(S): at 05:44

## 2020-01-13 RX ADMIN — SODIUM CHLORIDE 1000 MILLILITER(S): 9 INJECTION INTRAMUSCULAR; INTRAVENOUS; SUBCUTANEOUS at 05:44

## 2020-01-13 RX ADMIN — Medication 500000 UNIT(S): at 23:06

## 2020-01-13 RX ADMIN — Medication 500000 UNIT(S): at 12:13

## 2020-01-13 RX ADMIN — FINASTERIDE 5 MILLIGRAM(S): 5 TABLET, FILM COATED ORAL at 16:37

## 2020-01-13 RX ADMIN — HEPARIN SODIUM 1500 UNIT(S)/HR: 5000 INJECTION INTRAVENOUS; SUBCUTANEOUS at 16:41

## 2020-01-13 RX ADMIN — PANTOPRAZOLE SODIUM 40 MILLIGRAM(S): 20 TABLET, DELAYED RELEASE ORAL at 01:27

## 2020-01-13 RX ADMIN — Medication 500000 UNIT(S): at 18:41

## 2020-01-13 RX ADMIN — PANTOPRAZOLE SODIUM 40 MILLIGRAM(S): 20 TABLET, DELAYED RELEASE ORAL at 12:13

## 2020-01-13 RX ADMIN — CEFTRIAXONE 100 MILLIGRAM(S): 500 INJECTION, POWDER, FOR SOLUTION INTRAMUSCULAR; INTRAVENOUS at 22:26

## 2020-01-13 RX ADMIN — AMLODIPINE BESYLATE 10 MILLIGRAM(S): 2.5 TABLET ORAL at 16:37

## 2020-01-13 NOTE — PROGRESS NOTE ADULT - PROBLEM SELECTOR PLAN 10
1) PCP Contacted on Admission: (Y/N) --> Name & Phone #:  2) Date of Contact with PCP: TBD  3) PCP Contacted at Discharge: TBD  4) Summary of Handoff Given to PCP: TBD   5) Post-Discharge Appointment Date: TBD 1) PCP Brennan  2) Date of Contact with PCP: attempted call 1/13  3) PCP Contacted at Discharge: TBD  4) Summary of Handoff Given to PCP: TBD  5) Post-Discharge Appointment Date and Location: TBD

## 2020-01-13 NOTE — PROGRESS NOTE ADULT - PROBLEM SELECTOR PLAN 8
Known hx of DM  -SSI, monitor FSG    #Urinary Incontinence Known hx of DM  -SSI, monitor FSG    #Urinary Incontinence  -holding home Myrbetriq 50mg qd

## 2020-01-13 NOTE — PROGRESS NOTE ADULT - SUBJECTIVE AND OBJECTIVE BOX
OVERNIGHT EVENTS: No acute events overnight reported by patient or staff. O/N patient w/ 2 episodes CG emesis    SUBJECTIVE / INTERVAL HPI: Patient seen and examined at bedside. 8/10 sharp, intermittent epigastric pain. +Dysuria (chronic). Patient denies headache, fever, chills, chest pain, SOB, Nausea/Vomiting, or numbness/tingling.      .  VITAL SIGNS:  T(C): 36.8 (20 @ 12:30), Max: 37.1 (20 @ 18:22)  T(F): 98.3 (20 @ 12:30), Max: 98.8 (20 @ 18:22)  HR: 88 (20 @ 12:30) (88 - 100)  BP: 131/72 (20 @ 12:30) (108/65 - 138/66)  BP(mean): --  RR: 20 (20 @ 12:30) (17 - 20)  SpO2: 95% (20 @ 12:30) (95% - 100%)  Wt(kg): --    PHYSICAL EXAM:    Constitutional: WDWN resting comfortably in bed; NAD  Head: NC/AT  Eyes: EOMI, anicteric sclera  ENT: no nasal discharge, poor dentition  Neck: supple  Respiratory: CTA b/l, intermittent abdominal muscle use  Cardiac: +S1/S2, regular rate  Gastrointestinal: soft, NT/ND; no rebound or guarding; +BS  Extremities: WWP, no peripheral edema, 2+ pulses Radial and DP  Musculoskeletal: NROM x4  Dermatologic: skin warm, dry  Neurologic: Alert and oriented, no focal deficits appreciated  Psychiatric: affect and characteristics of appearance, verbalizations, behaviors are appropriate        MEDICATIONS:  MEDICATIONS  (STANDING):  amLODIPine   Tablet 10 milliGRAM(s) Oral daily  cefTRIAXone   IVPB 1000 milliGRAM(s) IV Intermittent every 24 hours  dextrose 5%. 1000 milliLiter(s) (50 mL/Hr) IV Continuous <Continuous>  dextrose 50% Injectable 12.5 Gram(s) IV Push once  dextrose 50% Injectable 25 Gram(s) IV Push once  dextrose 50% Injectable 25 Gram(s) IV Push once  finasteride 5 milliGRAM(s) Oral daily  heparin  Infusion.  Unit(s)/Hr (15 mL/Hr) IV Continuous <Continuous>  insulin lispro (HumaLOG) corrective regimen sliding scale   SubCutaneous Before meals and at bedtime  nystatin    Suspension 024398 Unit(s) Oral every 6 hours  pantoprazole  Injectable 40 milliGRAM(s) IV Push every 12 hours    MEDICATIONS  (PRN):  acetaminophen   Tablet .. 650 milliGRAM(s) Oral every 6 hours PRN Mild Pain (1 - 3)  dextrose 40% Gel 15 Gram(s) Oral once PRN Blood Glucose LESS THAN 70 milliGRAM(s)/deciliter  glucagon  Injectable 1 milliGRAM(s) IntraMuscular once PRN Glucose LESS THAN 70 milligrams/deciliter  ondansetron    Tablet 4 milliGRAM(s) Oral every 6 hours PRN Nausea and/or Vomiting      ALLERGIES:  Allergies    No Known Allergies    Intolerances        Pertinent LABS, RADIOLOGY, MICROBIOLOGY, and ADDITIONAL TESTS reviewed:   .  LABS:                         9.7    1.89  )-----------( 162      ( 2020 01:08 )             29.4     0113    136  |  97  |  21  ----------------------------<  145<H>  SEE NOTE   |  23  |  1.33<H>    Ca    9.3      2020 01:56  Mg     1.7         TPro  6.4  /  Alb  4.0  /  TBili  0.3  /  DBili  x   /  AST  24  /  ALT  20  /  AlkPhos  36<L>  01-12    PT/INR - ( 2020 10:30 )   PT: 12.4 sec;   INR: 1.08          PTT - ( 2020 23:12 )  PTT:32.2 sec  Urinalysis Basic - ( 2020 14:08 )    Color: Yellow / Appearance: SL Cloudy / S.010 / pH: x  Gluc: x / Ketone: NEGATIVE  / Bili: Negative / Urobili: 0.2 E.U./dL   Blood: x / Protein: 100 mg/dL / Nitrite: NEGATIVE   Leuk Esterase: NEGATIVE / RBC: Many /HPF / WBC 5-10 /HPF   Sq Epi: x / Non Sq Epi: 0-5 /HPF / Bacteria: Present /HPF      CARDIAC MARKERS ( 2020 01:56 )  x     / <0.01 ng/mL / 60 U/L / x     / 2.0 ng/mL  CARDIAC MARKERS ( 2020 10:30 )  x     / <0.01 ng/mL / x     / x     / x                RADIOLOGY, EKG & ADDITIONAL TESTS:   No New Imaging

## 2020-01-13 NOTE — CONSULT NOTE ADULT - ASSESSMENT
79M w/ hx of HTN, HLD, DM, COPD, prostate CA s/p prostatectomy '94 and chemoradiation now on lupron p/w 1w of progressively worsening SOB found to have PE complicated by coffee-ground emesis after anticoagulation.    #Coffee Ground Emesis  Patient with reported coffee ground emesis in the evening after receiving therapeutic dose of lovenox for new PE at around 4:32 PM on 1/12/20.  Nausea improved with zofran.  No prior hx of NSAIDs use and denies prior GI bleeding event.  Hemodynamically stable with rectal exam unremarkable for overt GI bleeding.  Lab with minimal downtrend following 1.5L of NS by 0.5g/dL.  Patient now on PPI and off A/C with doppler notable for thrombus in left intramuscular vein.  Given temporal association and witnessed emesis, concern for UGIB and would plan for endoscopic evaluation.   -Keep NPO  -Trend CBC  -Maintain active T&S and large bore IV  -Transfusion goal for hgb >7g/dL  -Continue PPI 40 mg BID IV  -Tentative plan for EGD today    -Please notify GI team if patient develops overt GI bleeding or change in hemodynamics 79M w/ hx of HTN, HLD, DM, COPD, prostate CA s/p prostatectomy '94 and chemoradiation now on lupron p/w 1w of progressively worsening SOB found to have PE complicated by coffee-ground emesis after anticoagulation.    #Coffee Ground Emesis  Patient with reported coffee ground emesis in the evening after receiving therapeutic dose of lovenox for new PE at around 4:32 PM on 1/12/20.  Nausea improved with zofran.  No prior hx of NSAIDs use and denies prior GI bleeding event.  Hemodynamically stable with rectal exam unremarkable for overt GI bleeding.  Lab with minimal downtrend following 1.5L of NS by 0.5g/dL.  Patient now on PPI and off A/C with doppler notable for thrombus in left intramuscular vein.  Given temporal association and witnessed emesis, concern for UGIB and would plan for endoscopic evaluation.   -Keep NPO  -Trend CBC  -Maintain active T&S and large bore IV  -Transfusion goal for hgb >7g/dL  -Continue PPI 40 mg BID IV  -Tentative plan for EGD today    -Please notify GI team if patient develops overt GI bleeding or change in hemodynamics    Addendum:  Patient tolerated EGD.  EGD 1/13/20: Esophagitis, 2cm hiatal hernia, Gastritis, duodenitis, 2 small clean based ulcer in D2  -Continue PPI 40 mg BID PO for 6w or while on A/C  -Obtain serum H pylori Ab  -F/u pathology  -Would treat for h pylori infection if any of the above returns positive with triple therapy with consideration of drug interaction  -Moderate risk of bleeding on anticoagulation, no GI contraindication for AC    GI will sign off please reconsult as needed 79M w/ hx of HTN, HLD, DM, COPD, prostate CA s/p prostatectomy '94 and chemoradiation now on lupron p/w 1w of progressively worsening SOB found to have PE complicated by coffee-ground emesis after anticoagulation.    #Coffee Ground Emesis  Patient with reported coffee ground emesis in the evening after receiving therapeutic dose of lovenox for new PE at around 4:32 PM on 1/12/20.  Nausea improved with zofran.  No prior hx of NSAIDs use and denies prior GI bleeding event.  Hemodynamically stable with rectal exam unremarkable for overt GI bleeding.  Lab with minimal downtrend following 1.5L of NS by 0.5g/dL.  Patient now on PPI and off A/C with doppler notable for thrombus in left intramuscular vein.  Given temporal association and witnessed emesis, concern for UGIB and would plan for endoscopic evaluation.   -Keep NPO  -Trend CBC  -Maintain active T&S and large bore IV  -Transfusion goal for hgb >7g/dL  -Continue PPI 40 mg BID IV  -Tentative plan for EGD today    -Please notify GI team if patient develops overt GI bleeding or change in hemodynamics    Addendum:  Patient tolerated EGD.  EGD 1/13/20: Esophagitis, 2cm hiatal hernia, Gastritis, duodenitis, 2 small clean based ulcer in D2  -Continue PPI 40 mg BID PO for 8w and evaluate for role of continue PPI while on A/C  -Obtain serum H pylori Ab  -F/u pathology  -Would treat for h pylori infection if any of the above returns positive with triple therapy with consideration of drug interaction  -Moderate risk of bleeding on anticoagulation (extrapolating data from HAS-BLED, primarily validated for patients with Afib); however, no direct GI contraindication for AC    GI will sign off please reconsult as needed

## 2020-01-13 NOTE — PROGRESS NOTE ADULT - PROBLEM SELECTOR PLAN 3
2 episodes of coffee ground emesis. 2 episodes of coffee ground emesis. EGD 1/13/20: Esophagitis, 2cm hiatal hernia, Gastritis, duodenitis, 2 small clean based ulcer in D2  -Continue PPI 40 mg BID PO for 8w (End 3/9) and evaluate for role of continue PPI while on A/C  -f/u serum H pylori Ab-> Triple therapy pending biopsy or Ab results  -reconsult GI if reoccurs 2 episodes of coffee ground emesis. EGD 1/13/20: Esophagitis, 2cm hiatal hernia, Gastritis, duodenitis, 2 small clean based ulcer in D2  -Continue PPI 40 mg BID PO for 8w (End 3/9) and evaluate for role of continue PPI while on A/C  -f/u serum H pylori Ab-> Triple therapy pending biopsy or Ab results  -reconsult GI if reoccurs  -active t&S 1/13

## 2020-01-13 NOTE — PROGRESS NOTE ADULT - PROBLEM SELECTOR PLAN 4
Known hx of stage IV prostate cancer (s/p prostatectomy, on chemotherapy). Leukopenia on admission likely 2/2 chemotherapy. Last chemo 12/31, next Jevtana 1/17  -Follows with Dr. Fuller, next chemotherapy planned for next week  -f/u heme/onc rec  -c/w finasteride 5mg qd Known hx of stage IV prostate cancer (s/p prostatectomy, on chemotherapy). Leukopenia on admission likely 2/2 chemotherapy. Last chemo 12/31, next Jevtana 1/17  -Follows with Dr. Fuller, next chemotherapy planned for next week  -f/u heme/onc rec  -c/w finasteride 5mg qd  - (severe neutropenia)

## 2020-01-13 NOTE — PROGRESS NOTE ADULT - PROBLEM SELECTOR PLAN 6
-restart home amlodipine 10mg qd  -hold valsartan 320mg in setting of Cr  -HCTZ and Beta blocker d/c on previous admission

## 2020-01-13 NOTE — CONSULT NOTE ADULT - SUBJECTIVE AND OBJECTIVE BOX
ICU Consult Note    HPI: Patient is an 79 year old M w/ PMH prostate CA (on chemo, s/p prostatectomy), HTN, HLD, T2DM, who presented with SOB for the past month, was found to have segmental pulmonary embolus in the R lower lobe on CTA chest at [facility] ED. U/S LE demonstrated L intramuscular calf DVT. Labs notable on admission for leukopenia with neutropenia, Hb 10.2 (10.4 last month). Got 1L NS, started on ceftriaxone for UTI, was given Lovenox for PE treatment. In interim, patient noted to have two episodes of reported coffee-ground emesis, without melena or hematochezia. Of note, patient with EGD 3 years prior to admission, reported normal. He was started on protonix 40mg IV BID, and ICU team consulted in setting of PE with inability to anticoagulate. GI team consulted, plan currently for scope around noon. Patient also complaining of reflux-like substernal chest pain, EKG no acute ST/T changes and trops/CK/CKMB negative x2.       REVIEW OF SYSTEMS:    CONSTITUTIONAL: No weakness, fevers  EYES/ENT: No visual changes;  No vertigo  NECK: No pain or stiffness  RESPIRATORY: No cough, hemoptysis; No shortness of breath  CARDIOVASCULAR: Substernal chest pain as per HPI  GASTROINTESTINAL: Reported coffee ground emesis, no melena/hematochezia  GENITOURINARY: No dysuria, or hematuria  NEUROLOGICAL: No numbness or weakness  SKIN: No itching, rashes  All other review of systems is negative unless indicated above.    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  Other postprocedural status: S/P prostatectomy      FAMILY HISTORY:  Family history of malignant neoplasm of prostate (Father, Sibling): Father      SOCIAL HISTORY:      Home Medications:  abiraterone 250 mg oral tablet: 4 tab(s) orally once a day (23 Sep 2017 15:28)  bisoprolol 10 mg oral tablet: 1 tab(s) orally once a day (23 Sep 2017 15:28)  bisoprolol-hydrochlorothiazide 5 mg-6.25 mg oral tablet: 1 tab(s) orally once a day (23 Sep 2017 15:28)  Caltrate 600 + D oral tablet: 1 tab(s) orally once a day (23 Sep 2017 15:28)  Cipro 500 mg oral tablet: 1 tab(s) orally every 12 hours (23 Sep 2017 15:28)  dorzolamide 2% ophthalmic solution: 1 drop(s) to each affected eye 3 times a day (2016 06:06)  ferrous gluconate: 27 milligram(s) orally once a day (2016 06:06)  finasteride 5 mg oral tablet: 1 tab(s) orally once a day (2016 06:06)  Fish Oil: 100 milligram(s) orally once a day (2016 06:06)  Lumigan 0.01% ophthalmic solution: 1 drop(s) to each affected eye once a day (in the evening) (2016 06:06)  metFORMIN 500 mg oral tablet:  orally  (2016 06:06)  metoprolol tartrate 25 mg oral tablet: 1 tab(s) orally 2 times a day (2016 11:39)  multivitamin: 1 tab(s) orally once a day (2016 06:06)  predniSONE 5 mg oral tablet: 1 tab(s) orally 2 times a day (2016 06:06)  Prevacid 15 mg oral delayed release capsule: 1 cap(s) orally once a day (2016 06:06)  Vitamin B12 1000 mcg oral tablet: 1 tab(s) orally once a day (2016 06:06)  Vitamin D3 2000 intl units oral capsule: 1 cap(s) orally once a day (2016 06:06)      MEDICATIONS  (STANDING):  cefTRIAXone   IVPB 1000 milliGRAM(s) IV Intermittent every 24 hours  dextrose 5%. 1000 milliLiter(s) (50 mL/Hr) IV Continuous <Continuous>  dextrose 50% Injectable 12.5 Gram(s) IV Push once  dextrose 50% Injectable 25 Gram(s) IV Push once  dextrose 50% Injectable 25 Gram(s) IV Push once  insulin lispro (HumaLOG) corrective regimen sliding scale   SubCutaneous Before meals and at bedtime  nystatin    Suspension 310306 Unit(s) Oral every 6 hours  pantoprazole  Injectable 40 milliGRAM(s) IV Push every 12 hours    MEDICATIONS  (PRN):  dextrose 40% Gel 15 Gram(s) Oral once PRN Blood Glucose LESS THAN 70 milliGRAM(s)/deciliter  glucagon  Injectable 1 milliGRAM(s) IntraMuscular once PRN Glucose LESS THAN 70 milligrams/deciliter  ondansetron    Tablet 4 milliGRAM(s) Oral every 6 hours PRN Nausea and/or Vomiting      Allergies    No Known Allergies    Intolerances        PHYSICAL EXAM:    Constitutional: WDWN resting comfortably in bed; NAD  Head: NC/AT  Eyes: EOMI, anicteric sclera  ENT: no nasal discharge  Neck: supple; no JVD appreciated  Respiratory: CTAB; no W/R/R, no increased work of breathing  Cardiac: +S1/S2; regular rate  Gastrointestinal: soft, NT/ND; no rebound or guarding; +BSx4  Extremities: WWP, no cyanosis; no peripheral edema  Musculoskeletal: NROM x4  Vascular: 2+ radial pulses B/L  Dermatologic: skin warm, dry and intact  Neurologic: Alert and oriented, no focal deficits appreciated  Psychiatric: affect and characteristics of appearance, verbalizations, behaviors are appropriate          LABS:                        9.7    1.89  )-----------( 162      ( 2020 01:08 )             29.4         136  |  97  |  21  ----------------------------<  145<H>  SEE NOTE   |  23  |  1.33<H>    Ca    9.3      2020 01:56    TPro  6.4  /  Alb  4.0  /  TBili  0.3  /  DBili  x   /  AST  24  /  ALT  20  /  AlkPhos  36<L>  12    PT/INR - ( 2020 10:30 )   PT: 12.4 sec;   INR: 1.08          PTT - ( 2020 23:12 )  PTT:32.2 sec      Lactate, Blood: 1.2 mmol/L (20 @ 17:08)      CARDIAC MARKERS ( 2020 01:56 )  x     / <0.01 ng/mL / 60 U/L / x     / 2.0 ng/mL  CARDIAC MARKERS ( 2020 10:30 )  x     / <0.01 ng/mL / x     / x     / x            Urinalysis Basic - ( 2020 14:08 )    Color: Yellow / Appearance: SL Cloudy / S.010 / pH: x  Gluc: x / Ketone: NEGATIVE  / Bili: Negative / Urobili: 0.2 E.U./dL   Blood: x / Protein: 100 mg/dL / Nitrite: NEGATIVE   Leuk Esterase: NEGATIVE / RBC: Many /HPF / WBC 5-10 /HPF   Sq Epi: x / Non Sq Epi: 0-5 /HPF / Bacteria: Present /HPF            EKG: no acute ST/T changes, sinus with PAC    RADIOLOGY & ADDITIONAL TESTS:  CT chest as per above, PE (segmental)      Assessment: 79 year old M w/ PMH prostate CA (on chemo, s/p prostatectomy), HTN, HLD, T2DM admitted for PE, now with coffee ground emesis and likely GIB. GI team consulted, pending likely scope. ICU team consulted given inability to anticoagulate PE      Plan:  #PE  - Hold anticoagulation given likely GIB. Consider IVC filter in setting of known DVT  - Monitor cardiac and respiratory status  - Cardiac enzymes negative, BNP negative    #GIB  - Dual large bore IV access, active T&S, keep Hb > 7  - Monitor vital signs, check orthostatics  - Plan for scope per GI  - Hold anticoagulation as above    Dispo: ICU Consult Note    HPI: Patient is an 79 year old M w/ PMH prostate CA (on chemo, s/p prostatectomy), HTN, HLD, T2DM, who presented with SOB for the past month, was found to have segmental pulmonary embolus in the R lower lobe on CTA chest at [facility] ED. U/S LE demonstrated L intramuscular calf DVT. Labs notable on admission for leukopenia with neutropenia, Hb 10.2 (10.4 last month). Got 1L NS, started on ceftriaxone for UTI, was given Lovenox for PE treatment. In interim, patient noted to have two episodes of reported coffee-ground emesis, without melena or hematochezia. Of note, patient with EGD 3 years prior to admission, reported normal. He was started on protonix 40mg IV BID, and ICU team consulted in setting of PE with inability to anticoagulate. GI team consulted, plan currently for scope around noon. Patient also complaining of reflux-like substernal chest pain, EKG no acute ST/T changes and trops/CK/CKMB negative x2.       REVIEW OF SYSTEMS:    CONSTITUTIONAL: No weakness, fevers  EYES/ENT: No visual changes;  No vertigo  NECK: No pain or stiffness  RESPIRATORY: No cough, hemoptysis; No current shortness of breath  CARDIOVASCULAR: Substernal chest pain as per HPI  GASTROINTESTINAL: Reported coffee ground emesis, no melena/hematochezia  GENITOURINARY: No dysuria, or hematuria  NEUROLOGICAL: No numbness or weakness  SKIN: No itching, rashes  All other review of systems is negative unless indicated above.    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  Other postprocedural status: S/P prostatectomy      FAMILY HISTORY:  Family history of malignant neoplasm of prostate (Father, Sibling): Father      SOCIAL HISTORY:      Home Medications:  abiraterone 250 mg oral tablet: 4 tab(s) orally once a day (23 Sep 2017 15:28)  bisoprolol 10 mg oral tablet: 1 tab(s) orally once a day (23 Sep 2017 15:28)  bisoprolol-hydrochlorothiazide 5 mg-6.25 mg oral tablet: 1 tab(s) orally once a day (23 Sep 2017 15:28)  Caltrate 600 + D oral tablet: 1 tab(s) orally once a day (23 Sep 2017 15:28)  Cipro 500 mg oral tablet: 1 tab(s) orally every 12 hours (23 Sep 2017 15:28)  dorzolamide 2% ophthalmic solution: 1 drop(s) to each affected eye 3 times a day (2016 06:06)  ferrous gluconate: 27 milligram(s) orally once a day (2016 06:06)  finasteride 5 mg oral tablet: 1 tab(s) orally once a day (2016 06:06)  Fish Oil: 100 milligram(s) orally once a day (2016 06:06)  Lumigan 0.01% ophthalmic solution: 1 drop(s) to each affected eye once a day (in the evening) (2016 06:06)  metFORMIN 500 mg oral tablet:  orally  (2016 06:06)  metoprolol tartrate 25 mg oral tablet: 1 tab(s) orally 2 times a day (2016 11:39)  multivitamin: 1 tab(s) orally once a day (2016 06:06)  predniSONE 5 mg oral tablet: 1 tab(s) orally 2 times a day (2016 06:06)  Prevacid 15 mg oral delayed release capsule: 1 cap(s) orally once a day (2016 06:06)  Vitamin B12 1000 mcg oral tablet: 1 tab(s) orally once a day (2016 06:06)  Vitamin D3 2000 intl units oral capsule: 1 cap(s) orally once a day (2016 06:06)      MEDICATIONS  (STANDING):  cefTRIAXone   IVPB 1000 milliGRAM(s) IV Intermittent every 24 hours  dextrose 5%. 1000 milliLiter(s) (50 mL/Hr) IV Continuous <Continuous>  dextrose 50% Injectable 12.5 Gram(s) IV Push once  dextrose 50% Injectable 25 Gram(s) IV Push once  dextrose 50% Injectable 25 Gram(s) IV Push once  insulin lispro (HumaLOG) corrective regimen sliding scale   SubCutaneous Before meals and at bedtime  nystatin    Suspension 534718 Unit(s) Oral every 6 hours  pantoprazole  Injectable 40 milliGRAM(s) IV Push every 12 hours    MEDICATIONS  (PRN):  dextrose 40% Gel 15 Gram(s) Oral once PRN Blood Glucose LESS THAN 70 milliGRAM(s)/deciliter  glucagon  Injectable 1 milliGRAM(s) IntraMuscular once PRN Glucose LESS THAN 70 milligrams/deciliter  ondansetron    Tablet 4 milliGRAM(s) Oral every 6 hours PRN Nausea and/or Vomiting      Allergies    No Known Allergies    Intolerances        PHYSICAL EXAM:    Constitutional: WDWN M resting comfortably in bed; NAD  Head: NC/AT  Eyes: EOMI, anicteric sclera  ENT: no nasal discharge  Neck: supple; no JVD  Respiratory: CTAB; no W/R/R, no increased work of breathing  Cardiac: +S1/S2; regular rate  Gastrointestinal: soft, NT/ND; no rebound or guarding; +BS. Vertical incision, old  Extremities: WWP, no cyanosis  Musculoskeletal: NROM x4  Vascular: 2+ radial pulses B/L  Dermatologic: skin warm, dry and intact  Neurologic: Alert and oriented, no focal deficits appreciated  Psychiatric: affect and characteristics of appearance, verbalizations, behaviors are appropriate          LABS:                        9.7    1.89  )-----------( 162      ( 2020 01:08 )             29.4         136  |  97  |  21  ----------------------------<  145<H>  SEE NOTE   |  23  |  1.33<H>    Ca    9.3      2020 01:56    TPro  6.4  /  Alb  4.0  /  TBili  0.3  /  DBili  x   /  AST  24  /  ALT  20  /  AlkPhos  36<L>  12    PT/INR - ( 2020 10:30 )   PT: 12.4 sec;   INR: 1.08          PTT - ( 2020 23:12 )  PTT:32.2 sec      Lactate, Blood: 1.2 mmol/L (20 @ 17:08)      CARDIAC MARKERS ( 2020 01:56 )  x     / <0.01 ng/mL / 60 U/L / x     / 2.0 ng/mL  CARDIAC MARKERS ( 2020 10:30 )  x     / <0.01 ng/mL / x     / x     / x            Urinalysis Basic - ( 2020 14:08 )    Color: Yellow / Appearance: SL Cloudy / S.010 / pH: x  Gluc: x / Ketone: NEGATIVE  / Bili: Negative / Urobili: 0.2 E.U./dL   Blood: x / Protein: 100 mg/dL / Nitrite: NEGATIVE   Leuk Esterase: NEGATIVE / RBC: Many /HPF / WBC 5-10 /HPF   Sq Epi: x / Non Sq Epi: 0-5 /HPF / Bacteria: Present /HPF            EKG: no acute ST/T changes, sinus with PAC    RADIOLOGY & ADDITIONAL TESTS:  CT chest as per above, PE (segmental)      Assessment: 79 year old M w/ PMH prostate CA (on chemo, s/p prostatectomy), HTN, HLD, T2DM admitted for PE, now with coffee ground emesis and likely GIB. GI team consulted, pending likely scope. ICU team consulted given inability to anticoagulate PE      Plan:  #PE  - Hold anticoagulation given likely UGIB  - Monitor cardiac and respiratory status  - Cardiac enzymes negative, BNP negative  - Resume AC when cleared by GI    #GIB  - Dual large bore IV access, active T&S, keep Hb > 7. Currently stable  - Monitor vital signs, check orthostatics  - Plan for scope per GI  - Hold anticoagulation as above    Dispo: [facility]

## 2020-01-13 NOTE — PROGRESS NOTE ADULT - PROBLEM SELECTOR PLAN 1
Saturating 100% on RA. PE on CTA chest, likely due to DVT 2/2 malignancy (LLE DVT on US Dopplers).  -low suspicion for R heart strain as troponin neg, BNP neg, EKG w/o sinus tach, no RBBB  -f/u ECHO to r/o R heart strain  -S/p lovenox 80mg (full AC, 1mg/kg)-> hep ggt in setting of recent bleed (cleared by GI)  -scd on RLE    #DVT  P/w SOB and JAIME, found to have PE on CTA chest. LE dopplers w/ DVT in LLE intramuscular calf vein. Etiology likely 2/2 malignancy.  -Rx as per above Saturating 100% on RA. PE on CTA chest, likely due to DVT 2/2 malignancy (LLE DVT on US Dopplers).  -low suspicion for R heart strain as troponin neg, BNP neg, EKG w/o sinus tach, no RBBB  -f/u ECHO to r/o R heart strain  -S/p lovenox 80mg (full AC, 1mg/kg)-> hep ggt in setting of recent bleed (cleared by GI)  -f/u 10p PTT  -scd on RLE    #DVT  P/w SOB and JAIME, found to have PE on CTA chest. LE dopplers w/ DVT in LLE intramuscular calf vein. Etiology likely 2/2 malignancy.  -Rx as per above Saturating 100% on RA. PE on CTA chest, likely due to DVT 2/2 malignancy (LLE DVT on US Dopplers).  -low suspicion for R heart strain as troponin neg, BNP neg, EKG w/o sinus tach, no RBBB  -f/u ECHO to r/o R heart strain  -S/p lovenox 80mg (full AC, 1mg/kg)-> hep ggt in setting of recent bleed (cleared by GI)  -f/u 8p PTT  -scd on RLE    #DVT  P/w SOB and JAIME, found to have PE on CTA chest. LE dopplers w/ DVT in LLE intramuscular calf vein. Etiology likely 2/2 malignancy.  -Rx as per above

## 2020-01-13 NOTE — PROGRESS NOTE ADULT - PROBLEM SELECTOR PLAN 9
F: none  E: replete K <4, Mg <2  N: DASH diet  Last BM:  VTE: Hep ggt  GI PPx: Protonix 40mg IV BID  Sleep Aid:  Dispo:  Code: F: none  E: replete K <4, Mg <2  N: DASH diet  Last BM: 1/12  VTE: Hep ggt  GI PPx: Protonix 40mg IV BID  Sleep Aid: none  Dispo: [facility]  Code:

## 2020-01-13 NOTE — CHART NOTE - NSCHARTNOTEFT_GEN_A_CORE
Called to patient room at 12:45am following two episodes of cola-colored emesis. Patient noted to be nauseous, with feelings of indigestion and reflux immediately prior to onset of emesis. Immediate assessment of vitals: T 98.6F HR 92 /72 RR 18 SpO2 97% on RA. On exam, patient is AOx3, resting comfortably in bed, with abdominal exam benign; ROS neg for dizziness, lightheadedness, palpitations, SOB. Reporting BM minutes prior to emesis, normal according to patient and nurse at bedside; denying hx of melanotic or frankly bloody stools. Denying hx GIB, EGD; last colonoscopy 3 years prior to presentation, normal. Holding AM lovenox (last received 430pm 1/12), sent STAT CBC (results pending), ordered for protonix 40mg IV BID. Called to patient's room at 12:45am following two episodes of cola-colored emesis. Patient noted to be nauseous, with feelings of indigestion and reflux immediately prior to onset of emesis. Immediate assessment of vitals: T 98.6F HR 92 /72 RR 18 SpO2 97% on RA. On exam, patient is AOx3, resting comfortably in bed, with abdominal exam benign; ROS neg for dizziness, lightheadedness, palpitations, SOB. Reporting BM minutes prior to emesis, normal according to patient and nurse at bedside; denying hx of melanotic or frankly bloody stools, GIB, EGD; last colonoscopy 3 years prior to presentation, normal.     Holding AM lovenox (last received 430pm 1/12), STAT CBC with Hb 9.7 (previously 10.2), started protonix 40mg IV BID.     IV access as follows: 18g to LAC, 20g to R forearm.     Patient complaining of CP on re-evaluation at 130am; localized substernally, non-radiating, 9-10 in severity, likened to prior episodes of indigestion/gastric reflux but more severe. STAT EKG obtained, demonstrating sinus tach to 105 with new PACs as compared to prior; no evidence of ischemia. STAT T&S x 2, BMP, and cardiac enzymes pending. Called to patient's room at 12:45am, following two episodes of cola-colored emesis. Patient noted to be nauseous, with feelings of indigestion and reflux immediately prior to onset of emesis. Immediate assessment of vitals: T 98.6F HR 92 /72 RR 18 SpO2 97% on RA. On exam, patient is AOx3, resting comfortably in bed, with abdominal exam benign; ROS neg for dizziness, lightheadedness, palpitations, SOB. Reporting BM minutes prior to emesis, normal according to patient and nurse at bedside; denying hx of melanotic or frankly bloody stools, GIB, EGD; last colonoscopy 3 years prior to presentation, normal.     Holding AM lovenox (last received 4:30pm 1/12), STAT CBC with Hb 9.7 (previously 10.2), started protonix 40mg IV BID.     IV access as follows: 18g to LAC, 20g to R forearm.     Patient complaining of CP on re-evaluation at 130am; localized substernally, non-radiating, 9-10 in severity, likened to prior episodes of indigestion/gastric reflux but more severe. STAT EKG obtained, demonstrating sinus tach to 105 with new PACs as compared to prior; no evidence of ischemia. STAT T&S x 2, BMP, and cardiac enzymes pending. Called to patient's room at 12:45am, following two episodes of cola-colored emesis. Patient noted to be nauseous, with feelings of indigestion and reflux immediately prior to onset of emesis. Immediate assessment of vitals: T 98.6F HR 92 /72 RR 18 SpO2 97% on RA. On exam, patient is AOx3, resting comfortably in bed, with abdominal exam benign; ROS neg for dizziness, lightheadedness, palpitations, SOB. Reporting BM minutes prior to emesis, normal according to patient and nurse at bedside; denying hx of melanotic or frankly bloody stools, GIB, EGD; last colonoscopy 3 years prior to presentation, normal.     Holding AM lovenox (last received 4:30pm 1/12), STAT CBC with Hb 9.7 (previously 10.2), started protonix 40mg IV BID.     IV access as follows: 18g to LAC, 20g to R forearm.     Patient complaining of CP on re-evaluation at 130am; localized substernally, non-radiating, 9-10 in severity, likened to prior episodes of indigestion/gastric reflux but more severe. STAT EKG obtained, demonstrating sinus tach to 105 with new PACs as compared to prior; no evidence of ischemia. T&S x 2 ordered with AM labs, cardiac enzymes wnl. GI consulted; to possibly scope patient this afternoon; keeping NPO for now. ICU consulted, to evaluate patient this morning.

## 2020-01-13 NOTE — CONSULT NOTE ADULT - SUBJECTIVE AND OBJECTIVE BOX
HPI:  79M w/ hx of HTN, HLD, DM, COPD, prostate CA s/p prostatectomy '94 and chemoradiation now on lupron p/w 1w of progressively worsening SOB.  Patient with recent fall one week ago and was admitted to [facility] for 2d before discharge.  Since that admission, he has been experiencing worsening SOB with change in exercise tolerance w/ JAIME.  Given persistent symptoms, dtr brought patient to [facility] ED as he receives his chemotherapy here.  In the ED, he was found to have new PE and started on A/C when he developed acute chest pain with nausea and vomiting.  He described as a burning sensation of the chest with nausea and dark coffee ground emesis shortly after.  Patient denies hx of similar episode.  Denies fever, chill, abd pain, diarrhea, constipation, melena, or hematochezia.  Last colonoscopy around 2yr ago which he reported to be normal.  No hx of EGD.  Fam hx of prostate ca, no CRC.  Denies use of NSAIDS and only use tylenol occasionally.      Allergies    No Known Allergies    Intolerances      Home Medications:  abiraterone 250 mg oral tablet: 4 tab(s) orally once a day (23 Sep 2017 15:28)  bisoprolol 10 mg oral tablet: 1 tab(s) orally once a day (23 Sep 2017 15:28)  bisoprolol-hydrochlorothiazide 5 mg-6.25 mg oral tablet: 1 tab(s) orally once a day (23 Sep 2017 15:28)  Caltrate 600 + D oral tablet: 1 tab(s) orally once a day (23 Sep 2017 15:28)  Cipro 500 mg oral tablet: 1 tab(s) orally every 12 hours (23 Sep 2017 15:28)  dorzolamide 2% ophthalmic solution: 1 drop(s) to each affected eye 3 times a day (31 Jan 2016 06:06)  ferrous gluconate: 27 milligram(s) orally once a day (31 Jan 2016 06:06)  finasteride 5 mg oral tablet: 1 tab(s) orally once a day (31 Jan 2016 06:06)  Fish Oil: 100 milligram(s) orally once a day (31 Jan 2016 06:06)  Lumigan 0.01% ophthalmic solution: 1 drop(s) to each affected eye once a day (in the evening) (31 Jan 2016 06:06)  metFORMIN 500 mg oral tablet:  orally  (31 Jan 2016 06:06)  metoprolol tartrate 25 mg oral tablet: 1 tab(s) orally 2 times a day (01 Feb 2016 11:39)  multivitamin: 1 tab(s) orally once a day (31 Jan 2016 06:06)  predniSONE 5 mg oral tablet: 1 tab(s) orally 2 times a day (31 Jan 2016 06:06)  Prevacid 15 mg oral delayed release capsule: 1 cap(s) orally once a day (31 Jan 2016 06:06)  Vitamin B12 1000 mcg oral tablet: 1 tab(s) orally once a day (31 Jan 2016 06:06)  Vitamin D3 2000 intl units oral capsule: 1 cap(s) orally once a day (31 Jan 2016 06:06)    MEDICATIONS:  MEDICATIONS  (STANDING):  cefTRIAXone   IVPB 1000 milliGRAM(s) IV Intermittent every 24 hours  dextrose 5%. 1000 milliLiter(s) (50 mL/Hr) IV Continuous <Continuous>  dextrose 50% Injectable 12.5 Gram(s) IV Push once  dextrose 50% Injectable 25 Gram(s) IV Push once  dextrose 50% Injectable 25 Gram(s) IV Push once  insulin lispro (HumaLOG) corrective regimen sliding scale   SubCutaneous Before meals and at bedtime  nystatin    Suspension 243460 Unit(s) Oral every 6 hours  pantoprazole  Injectable 40 milliGRAM(s) IV Push every 12 hours    MEDICATIONS  (PRN):  dextrose 40% Gel 15 Gram(s) Oral once PRN Blood Glucose LESS THAN 70 milliGRAM(s)/deciliter  glucagon  Injectable 1 milliGRAM(s) IntraMuscular once PRN Glucose LESS THAN 70 milligrams/deciliter  ondansetron    Tablet 4 milliGRAM(s) Oral every 6 hours PRN Nausea and/or Vomiting    PAST MEDICAL & SURGICAL HISTORY:  Chemotherapy session for neoplasm  Type 2 diabetes mellitus: Diabetes  Essential hypertension: Hypertension  Glaucoma: Glaucoma  Hyperlipidemia: Hyperlipidemia  Malignant neoplasm of prostate: s/p total prostatectomy in 1994 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  Other postprocedural status: S/P prostatectomy    FAMILY HISTORY:  Family history of malignant neoplasm of prostate (Father, Sibling): Father    SOCIAL HISTORY:  Tobacco: [ ] Current, [ ] Former, [ ] Never; Pack Years:  Alcohol:  Illicit Drugs:    REVIEW OF SYSTEMS:  All other 10 review of systems is negative except as indicated in HPI    Vital Signs Last 24 Hrs  T(C): 36.7 (13 Jan 2020 05:52), Max: 37.1 (12 Jan 2020 18:22)  T(F): 98 (13 Jan 2020 05:52), Max: 98.8 (12 Jan 2020 18:22)  HR: 100 (13 Jan 2020 05:52) (92 - 110)  BP: 123/64 (13 Jan 2020 05:52) (108/65 - 152/78)  BP(mean): --  RR: 18 (13 Jan 2020 05:52) (17 - 20)  SpO2: 97% (13 Jan 2020 05:52) (97% - 100%)      PHYSICAL EXAM:    General: Well developed; well nourished; in no acute distress  Eyes: moist conjunctivae, sclerae anicteric  HENT: Moist mucous membranes, tongue midline  Neck: Trachea midline, supple  Lungs: Normal respiratory effort and no intercostal retractions  Cardiovascular: RRR, S1S2  Abdomen: Soft, non-tender non-distended; Normal bowel sounds; No rebound or guarding  Rectal Exam: no external hemorrhoid, yellow-green stool in rectal vault  Extremities: warm to touch, No clubbing, cyanosis or edema  Neurological: Alert and oriented x3, nonfocal exam  Skin: Warm and dry. No obvious rash    LABS:                        9.7    1.89  )-----------( 162      ( 13 Jan 2020 01:08 )             29.4     01-13    136  |  97  |  21  ----------------------------<  145<H>  SEE NOTE   |  23  |  1.33<H>    Ca    9.3      13 Jan 2020 01:56  Mg     1.7     01-13    TPro  6.4  /  Alb  4.0  /  TBili  0.3  /  DBili  x   /  AST  24  /  ALT  20  /  AlkPhos  36<L>  01-12        PT/INR - ( 12 Jan 2020 10:30 )   PT: 12.4 sec;   INR: 1.08          PTT - ( 12 Jan 2020 23:12 )  PTT:32.2 sec    RADIOLOGY & ADDITIONAL STUDIES: HPI:  79M w/ hx of HTN, HLD, DM, COPD, prostate CA s/p prostatectomy '94 and chemoradiation now on lupron p/w 1w of progressively worsening SOB.  Patient with recent fall one week ago and was admitted to [facility] for 2d before discharge.  Since that admission, he has been experiencing worsening SOB with change in exercise tolerance w/ JAIME.  Given persistent symptoms, dtr brought patient to [facility] ED as he receives his chemotherapy here.  In the ED, he was found to have new PE and started on A/C when he developed acute chest pain with nausea and vomiting.  He described as a burning sensation of the chest with nausea and dark coffee ground emesis shortly after.  Patient denies hx of similar episode.  Denies fever, chill, abd pain, diarrhea, constipation, melena, or hematochezia.  Last colonoscopy around 2yr ago which he reported to be normal.  No hx of EGD.  Fam hx of prostate ca, no CRC.  Denies use of NSAIDS and only use tylenol occasionally.  No prior hx of h pylori per patient and dtr at bedside    Allergies    No Known Allergies    Intolerances      Home Medications:  abiraterone 250 mg oral tablet: 4 tab(s) orally once a day (23 Sep 2017 15:28)  bisoprolol 10 mg oral tablet: 1 tab(s) orally once a day (23 Sep 2017 15:28)  bisoprolol-hydrochlorothiazide 5 mg-6.25 mg oral tablet: 1 tab(s) orally once a day (23 Sep 2017 15:28)  Caltrate 600 + D oral tablet: 1 tab(s) orally once a day (23 Sep 2017 15:28)  Cipro 500 mg oral tablet: 1 tab(s) orally every 12 hours (23 Sep 2017 15:28)  dorzolamide 2% ophthalmic solution: 1 drop(s) to each affected eye 3 times a day (31 Jan 2016 06:06)  ferrous gluconate: 27 milligram(s) orally once a day (31 Jan 2016 06:06)  finasteride 5 mg oral tablet: 1 tab(s) orally once a day (31 Jan 2016 06:06)  Fish Oil: 100 milligram(s) orally once a day (31 Jan 2016 06:06)  Lumigan 0.01% ophthalmic solution: 1 drop(s) to each affected eye once a day (in the evening) (31 Jan 2016 06:06)  metFORMIN 500 mg oral tablet:  orally  (31 Jan 2016 06:06)  metoprolol tartrate 25 mg oral tablet: 1 tab(s) orally 2 times a day (01 Feb 2016 11:39)  multivitamin: 1 tab(s) orally once a day (31 Jan 2016 06:06)  predniSONE 5 mg oral tablet: 1 tab(s) orally 2 times a day (31 Jan 2016 06:06)  Prevacid 15 mg oral delayed release capsule: 1 cap(s) orally once a day (31 Jan 2016 06:06)  Vitamin B12 1000 mcg oral tablet: 1 tab(s) orally once a day (31 Jan 2016 06:06)  Vitamin D3 2000 intl units oral capsule: 1 cap(s) orally once a day (31 Jan 2016 06:06)    MEDICATIONS:  MEDICATIONS  (STANDING):  cefTRIAXone   IVPB 1000 milliGRAM(s) IV Intermittent every 24 hours  dextrose 5%. 1000 milliLiter(s) (50 mL/Hr) IV Continuous <Continuous>  dextrose 50% Injectable 12.5 Gram(s) IV Push once  dextrose 50% Injectable 25 Gram(s) IV Push once  dextrose 50% Injectable 25 Gram(s) IV Push once  insulin lispro (HumaLOG) corrective regimen sliding scale   SubCutaneous Before meals and at bedtime  nystatin    Suspension [id number] Unit(s) Oral every 6 hours  pantoprazole  Injectable 40 milliGRAM(s) IV Push every 12 hours    MEDICATIONS  (PRN):  dextrose 40% Gel 15 Gram(s) Oral once PRN Blood Glucose LESS THAN 70 milliGRAM(s)/deciliter  glucagon  Injectable 1 milliGRAM(s) IntraMuscular once PRN Glucose LESS THAN 70 milligrams/deciliter  ondansetron    Tablet 4 milliGRAM(s) Oral every 6 hours PRN Nausea and/or Vomiting    PAST MEDICAL & SURGICAL HISTORY:  Chemotherapy session for neoplasm  Type 2 diabetes mellitus: Diabetes  Essential hypertension: Hypertension  Glaucoma: Glaucoma  Hyperlipidemia: Hyperlipidemia  Malignant neoplasm of prostate: s/p total prostatectomy in 1994 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  Other postprocedural status: S/P prostatectomy    FAMILY HISTORY:  Family history of malignant neoplasm of prostate (Father, Sibling): Father    SOCIAL HISTORY:  Tobacco: [ ] Current, [ ] Former, [ ] Never; Pack Years:  Alcohol:  Illicit Drugs:    REVIEW OF SYSTEMS:  All other 10 review of systems is negative except as indicated in HPI    Vital Signs Last 24 Hrs  T(C): 36.7 (13 Jan 2020 05:52), Max: 37.1 (12 Jan 2020 18:22)  T(F): 98 (13 Jan 2020 05:52), Max: 98.8 (12 Jan 2020 18:22)  HR: 100 (13 Jan 2020 05:52) (92 - 110)  BP: 123/64 (13 Jan 2020 05:52) (108/65 - 152/78)  BP(mean): --  RR: 18 (13 Jan 2020 05:52) (17 - 20)  SpO2: 97% (13 Jan 2020 05:52) (97% - 100%)      PHYSICAL EXAM:    General: Well developed; well nourished; in no acute distress  Eyes: moist conjunctivae, sclerae anicteric  HENT: Moist mucous membranes, tongue midline  Neck: Trachea midline, supple  Lungs: Normal respiratory effort and no intercostal retractions  Cardiovascular: RRR, S1S2  Abdomen: Soft, non-tender non-distended; Normal bowel sounds; No rebound or guarding  Rectal Exam: no external hemorrhoid, yellow-green stool in rectal vault  Extremities: warm to touch, No clubbing, cyanosis or edema  Neurological: Alert and oriented x3, nonfocal exam  Skin: Warm and dry. No obvious rash    LABS:                        9.7    1.89  )-----------( 162      ( 13 Jan 2020 01:08 )             29.4     01-13    136  |  97  |  21  ----------------------------<  145<H>  SEE NOTE   |  23  |  1.33<H>    Ca    9.3      13 Jan 2020 01:56  Mg     1.7     01-13    TPro  6.4  /  Alb  4.0  /  TBili  0.3  /  DBili  x   /  AST  24  /  ALT  20  /  AlkPhos  36<L>  01-12        PT/INR - ( 12 Jan 2020 10:30 )   PT: 12.4 sec;   INR: 1.08          PTT - ( 12 Jan 2020 23:12 )  PTT:32.2 sec    RADIOLOGY & ADDITIONAL STUDIES:

## 2020-01-13 NOTE — PROGRESS NOTE ADULT - PROBLEM SELECTOR PLAN 2
Meets SIRS criteria (leukopenia, tachycardia) on admission, however leukopenia likely due to chemotherapy and tachycardia likely due to PE. lactate negative. w/ symptomatic dysuria and intermittent hematuria  -Today pt c/o dysuria, increased urinary urgency, and hematuria. Urinalysis positive for WBC, bacteria, RBCs (neg leuk esterase, neg nitrates).   -c/w ceftriaxone 1g QD for treatment of UTI (End Date 1/16)  -F/u urine cultures. If negative, will d/c ceftriaxone

## 2020-01-13 NOTE — PROGRESS NOTE ADULT - ASSESSMENT
79M w/ stage IV prostate CA (s/p prostatectomy, on chemotherapy), who p/w progressive SOB and JAMIE x1mo associated w/ generalized fatigue in the setting of decreased PO intake, found to have segmental PE of RLL on CTA Chest and LLE DVT on US Dopplers likely 2/2 malignancy

## 2020-01-14 DIAGNOSIS — I48.91 UNSPECIFIED ATRIAL FIBRILLATION: ICD-10-CM

## 2020-01-14 LAB
ANION GAP SERPL CALC-SCNC: 11 MMOL/L — SIGNIFICANT CHANGE UP (ref 5–17)
ANION GAP SERPL CALC-SCNC: 13 MMOL/L — SIGNIFICANT CHANGE UP (ref 5–17)
APPEARANCE UR: ABNORMAL
APTT BLD: 32.2 SEC — SIGNIFICANT CHANGE UP (ref 27.5–36.3)
BILIRUB UR-MCNC: NEGATIVE — SIGNIFICANT CHANGE UP
BUN SERPL-MCNC: 14 MG/DL — SIGNIFICANT CHANGE UP (ref 7–23)
BUN SERPL-MCNC: 16 MG/DL — SIGNIFICANT CHANGE UP (ref 7–23)
CALCIUM SERPL-MCNC: 8.2 MG/DL — LOW (ref 8.4–10.5)
CALCIUM SERPL-MCNC: 8.3 MG/DL — LOW (ref 8.4–10.5)
CHLORIDE SERPL-SCNC: 100 MMOL/L — SIGNIFICANT CHANGE UP (ref 96–108)
CHLORIDE SERPL-SCNC: 99 MMOL/L — SIGNIFICANT CHANGE UP (ref 96–108)
CO2 SERPL-SCNC: 19 MMOL/L — LOW (ref 22–31)
CO2 SERPL-SCNC: 22 MMOL/L — SIGNIFICANT CHANGE UP (ref 22–31)
COLOR SPEC: ABNORMAL
CREAT SERPL-MCNC: 1.27 MG/DL — SIGNIFICANT CHANGE UP (ref 0.5–1.3)
CREAT SERPL-MCNC: 1.3 MG/DL — SIGNIFICANT CHANGE UP (ref 0.5–1.3)
CULTURE RESULTS: SIGNIFICANT CHANGE UP
DIFF PNL FLD: ABNORMAL
GLUCOSE BLDC GLUCOMTR-MCNC: 104 MG/DL — HIGH (ref 70–99)
GLUCOSE BLDC GLUCOMTR-MCNC: 107 MG/DL — HIGH (ref 70–99)
GLUCOSE BLDC GLUCOMTR-MCNC: 119 MG/DL — HIGH (ref 70–99)
GLUCOSE BLDC GLUCOMTR-MCNC: 125 MG/DL — HIGH (ref 70–99)
GLUCOSE BLDC GLUCOMTR-MCNC: 144 MG/DL — HIGH (ref 70–99)
GLUCOSE SERPL-MCNC: 110 MG/DL — HIGH (ref 70–99)
GLUCOSE SERPL-MCNC: 118 MG/DL — HIGH (ref 70–99)
GLUCOSE UR QL: NEGATIVE — SIGNIFICANT CHANGE UP
H PYLORI AB SER-ACNC: <5 UNITS — SIGNIFICANT CHANGE UP
HCT VFR BLD CALC: 26.8 % — LOW (ref 39–50)
HCT VFR BLD CALC: 27.2 % — LOW (ref 39–50)
HGB BLD-MCNC: 8.6 G/DL — LOW (ref 13–17)
HGB BLD-MCNC: 9 G/DL — LOW (ref 13–17)
KETONES UR-MCNC: NEGATIVE — SIGNIFICANT CHANGE UP
LACTATE SERPL-SCNC: 0.8 MMOL/L — SIGNIFICANT CHANGE UP (ref 0.5–2)
LEUKOCYTE ESTERASE UR-ACNC: NEGATIVE — SIGNIFICANT CHANGE UP
MAGNESIUM SERPL-MCNC: 1.5 MG/DL — LOW (ref 1.6–2.6)
MAGNESIUM SERPL-MCNC: 2 MG/DL — SIGNIFICANT CHANGE UP (ref 1.6–2.6)
MCHC RBC-ENTMCNC: 31.2 PG — SIGNIFICANT CHANGE UP (ref 27–34)
MCHC RBC-ENTMCNC: 31.6 GM/DL — LOW (ref 32–36)
MCHC RBC-ENTMCNC: 31.8 PG — SIGNIFICANT CHANGE UP (ref 27–34)
MCHC RBC-ENTMCNC: 33.6 GM/DL — SIGNIFICANT CHANGE UP (ref 32–36)
MCV RBC AUTO: 94.7 FL — SIGNIFICANT CHANGE UP (ref 80–100)
MCV RBC AUTO: 98.6 FL — SIGNIFICANT CHANGE UP (ref 80–100)
NITRITE UR-MCNC: NEGATIVE — SIGNIFICANT CHANGE UP
NRBC # BLD: 3 /100 WBCS — HIGH (ref 0–0)
NRBC # BLD: 3 /100 WBCS — HIGH (ref 0–0)
PH UR: 6.5 — SIGNIFICANT CHANGE UP (ref 5–8)
PLATELET # BLD AUTO: 151 K/UL — SIGNIFICANT CHANGE UP (ref 150–400)
PLATELET # BLD AUTO: 159 K/UL — SIGNIFICANT CHANGE UP (ref 150–400)
POTASSIUM SERPL-MCNC: 3.3 MMOL/L — LOW (ref 3.5–5.3)
POTASSIUM SERPL-MCNC: 4 MMOL/L — SIGNIFICANT CHANGE UP (ref 3.5–5.3)
POTASSIUM SERPL-SCNC: 3.3 MMOL/L — LOW (ref 3.5–5.3)
POTASSIUM SERPL-SCNC: 4 MMOL/L — SIGNIFICANT CHANGE UP (ref 3.5–5.3)
PROT UR-MCNC: 100 MG/DL
RBC # BLD: 2.76 M/UL — LOW (ref 4.2–5.8)
RBC # BLD: 2.83 M/UL — LOW (ref 4.2–5.8)
RBC # FLD: 16.6 % — HIGH (ref 10.3–14.5)
RBC # FLD: 17 % — HIGH (ref 10.3–14.5)
SODIUM SERPL-SCNC: 131 MMOL/L — LOW (ref 135–145)
SODIUM SERPL-SCNC: 133 MMOL/L — LOW (ref 135–145)
SP GR SPEC: 1.02 — SIGNIFICANT CHANGE UP (ref 1–1.03)
SPECIMEN SOURCE: SIGNIFICANT CHANGE UP
SURGICAL PATHOLOGY STUDY: SIGNIFICANT CHANGE UP
TROPONIN T SERPL-MCNC: <0.01 NG/ML — SIGNIFICANT CHANGE UP (ref 0–0.01)
TSH SERPL-MCNC: 1.62 UIU/ML — SIGNIFICANT CHANGE UP (ref 0.35–4.94)
UROBILINOGEN FLD QL: 0.2 E.U./DL — SIGNIFICANT CHANGE UP
WBC # BLD: 2.3 K/UL — LOW (ref 3.8–10.5)
WBC # BLD: 2.44 K/UL — LOW (ref 3.8–10.5)
WBC # FLD AUTO: 2.3 K/UL — LOW (ref 3.8–10.5)
WBC # FLD AUTO: 2.44 K/UL — LOW (ref 3.8–10.5)

## 2020-01-14 PROCEDURE — 71045 X-RAY EXAM CHEST 1 VIEW: CPT | Mod: 26

## 2020-01-14 PROCEDURE — 99233 SBSQ HOSP IP/OBS HIGH 50: CPT | Mod: GC

## 2020-01-14 PROCEDURE — 93010 ELECTROCARDIOGRAM REPORT: CPT

## 2020-01-14 PROCEDURE — 93010 ELECTROCARDIOGRAM REPORT: CPT | Mod: 77

## 2020-01-14 PROCEDURE — 93010 ELECTROCARDIOGRAM REPORT: CPT | Mod: 76,77

## 2020-01-14 RX ORDER — ENOXAPARIN SODIUM 100 MG/ML
80 INJECTION SUBCUTANEOUS EVERY 12 HOURS
Refills: 0 | Status: DISCONTINUED | OUTPATIENT
Start: 2020-01-14 | End: 2020-01-14

## 2020-01-14 RX ORDER — LATANOPROST 0.05 MG/ML
1 SOLUTION/ DROPS OPHTHALMIC; TOPICAL AT BEDTIME
Refills: 0 | Status: DISCONTINUED | OUTPATIENT
Start: 2020-01-14 | End: 2020-01-16

## 2020-01-14 RX ORDER — METOPROLOL TARTRATE 50 MG
5 TABLET ORAL ONCE
Refills: 0 | Status: COMPLETED | OUTPATIENT
Start: 2020-01-14 | End: 2020-01-14

## 2020-01-14 RX ORDER — METOPROLOL TARTRATE 50 MG
12.5 TABLET ORAL EVERY 12 HOURS
Refills: 0 | Status: DISCONTINUED | OUTPATIENT
Start: 2020-01-14 | End: 2020-01-16

## 2020-01-14 RX ORDER — POTASSIUM CHLORIDE 20 MEQ
20 PACKET (EA) ORAL
Refills: 0 | Status: DISCONTINUED | OUTPATIENT
Start: 2020-01-14 | End: 2020-01-14

## 2020-01-14 RX ORDER — SODIUM CHLORIDE 9 MG/ML
1000 INJECTION, SOLUTION INTRAVENOUS ONCE
Refills: 0 | Status: COMPLETED | OUTPATIENT
Start: 2020-01-14 | End: 2020-01-14

## 2020-01-14 RX ORDER — ADENOSINE 3 MG/ML
6 INJECTION INTRAVENOUS ONCE
Refills: 0 | Status: COMPLETED | OUTPATIENT
Start: 2020-01-14 | End: 2020-01-14

## 2020-01-14 RX ORDER — DORZOLAMIDE HYDROCHLORIDE 20 MG/ML
1 SOLUTION/ DROPS OPHTHALMIC THREE TIMES A DAY
Refills: 0 | Status: DISCONTINUED | OUTPATIENT
Start: 2020-01-14 | End: 2020-01-16

## 2020-01-14 RX ORDER — DEXAMETHASONE 0.5 MG/5ML
2 ELIXIR ORAL EVERY 24 HOURS
Refills: 0 | Status: DISCONTINUED | OUTPATIENT
Start: 2020-01-14 | End: 2020-01-16

## 2020-01-14 RX ORDER — ENOXAPARIN SODIUM 100 MG/ML
80 INJECTION SUBCUTANEOUS EVERY 12 HOURS
Refills: 0 | Status: DISCONTINUED | OUTPATIENT
Start: 2020-01-14 | End: 2020-01-16

## 2020-01-14 RX ORDER — ADENOSINE 3 MG/ML
12 INJECTION INTRAVENOUS ONCE
Refills: 0 | Status: COMPLETED | OUTPATIENT
Start: 2020-01-14 | End: 2020-01-14

## 2020-01-14 RX ORDER — METOPROLOL TARTRATE 50 MG
5 TABLET ORAL EVERY 6 HOURS
Refills: 0 | Status: DISCONTINUED | OUTPATIENT
Start: 2020-01-14 | End: 2020-01-14

## 2020-01-14 RX ORDER — SODIUM CHLORIDE 9 MG/ML
1000 INJECTION INTRAMUSCULAR; INTRAVENOUS; SUBCUTANEOUS ONCE
Refills: 0 | Status: COMPLETED | OUTPATIENT
Start: 2020-01-14 | End: 2020-01-14

## 2020-01-14 RX ORDER — SODIUM CHLORIDE 9 MG/ML
1000 INJECTION INTRAMUSCULAR; INTRAVENOUS; SUBCUTANEOUS ONCE
Refills: 0 | Status: DISCONTINUED | OUTPATIENT
Start: 2020-01-14 | End: 2020-01-14

## 2020-01-14 RX ORDER — AMLODIPINE BESYLATE 2.5 MG/1
5 TABLET ORAL EVERY 24 HOURS
Refills: 0 | Status: DISCONTINUED | OUTPATIENT
Start: 2020-01-15 | End: 2020-01-16

## 2020-01-14 RX ORDER — ACETAMINOPHEN 500 MG
650 TABLET ORAL EVERY 6 HOURS
Refills: 0 | Status: DISCONTINUED | OUTPATIENT
Start: 2020-01-14 | End: 2020-01-16

## 2020-01-14 RX ORDER — MAGNESIUM SULFATE 500 MG/ML
2 VIAL (ML) INJECTION ONCE
Refills: 0 | Status: COMPLETED | OUTPATIENT
Start: 2020-01-14 | End: 2020-01-14

## 2020-01-14 RX ORDER — METOPROLOL TARTRATE 50 MG
12.5 TABLET ORAL ONCE
Refills: 0 | Status: COMPLETED | OUTPATIENT
Start: 2020-01-14 | End: 2020-01-14

## 2020-01-14 RX ORDER — POTASSIUM CHLORIDE 20 MEQ
40 PACKET (EA) ORAL EVERY 4 HOURS
Refills: 0 | Status: COMPLETED | OUTPATIENT
Start: 2020-01-14 | End: 2020-01-14

## 2020-01-14 RX ORDER — CEFTRIAXONE 500 MG/1
1000 INJECTION, POWDER, FOR SOLUTION INTRAMUSCULAR; INTRAVENOUS EVERY 24 HOURS
Refills: 0 | Status: DISCONTINUED | OUTPATIENT
Start: 2020-01-14 | End: 2020-01-14

## 2020-01-14 RX ADMIN — Medication 2 MILLIGRAM(S): at 15:21

## 2020-01-14 RX ADMIN — ADENOSINE 12 MILLIGRAM(S): 3 INJECTION INTRAVENOUS at 01:14

## 2020-01-14 RX ADMIN — DORZOLAMIDE HYDROCHLORIDE 1 DROP(S): 20 SOLUTION/ DROPS OPHTHALMIC at 21:32

## 2020-01-14 RX ADMIN — Medication 5 MILLIGRAM(S): at 01:15

## 2020-01-14 RX ADMIN — Medication 500000 UNIT(S): at 17:39

## 2020-01-14 RX ADMIN — Medication 500000 UNIT(S): at 23:59

## 2020-01-14 RX ADMIN — DORZOLAMIDE HYDROCHLORIDE 1 DROP(S): 20 SOLUTION/ DROPS OPHTHALMIC at 15:22

## 2020-01-14 RX ADMIN — PANTOPRAZOLE SODIUM 40 MILLIGRAM(S): 20 TABLET, DELAYED RELEASE ORAL at 17:53

## 2020-01-14 RX ADMIN — Medication 650 MILLIGRAM(S): at 03:13

## 2020-01-14 RX ADMIN — LATANOPROST 1 DROP(S): 0.05 SOLUTION/ DROPS OPHTHALMIC; TOPICAL at 21:32

## 2020-01-14 RX ADMIN — SODIUM CHLORIDE 1000 MILLILITER(S): 9 INJECTION, SOLUTION INTRAVENOUS at 00:50

## 2020-01-14 RX ADMIN — Medication 12.5 MILLIGRAM(S): at 21:31

## 2020-01-14 RX ADMIN — Medication 50 GRAM(S): at 03:24

## 2020-01-14 RX ADMIN — Medication 40 MILLIEQUIVALENT(S): at 03:23

## 2020-01-14 RX ADMIN — Medication 650 MILLIGRAM(S): at 02:00

## 2020-01-14 RX ADMIN — Medication 40 MILLIEQUIVALENT(S): at 06:42

## 2020-01-14 RX ADMIN — ADENOSINE 6 MILLIGRAM(S): 3 INJECTION INTRAVENOUS at 01:04

## 2020-01-14 RX ADMIN — PANTOPRAZOLE SODIUM 40 MILLIGRAM(S): 20 TABLET, DELAYED RELEASE ORAL at 06:42

## 2020-01-14 RX ADMIN — Medication 12.5 MILLIGRAM(S): at 10:06

## 2020-01-14 RX ADMIN — Medication 12.5 MILLIGRAM(S): at 01:23

## 2020-01-14 RX ADMIN — FINASTERIDE 5 MILLIGRAM(S): 5 TABLET, FILM COATED ORAL at 13:08

## 2020-01-14 RX ADMIN — ENOXAPARIN SODIUM 80 MILLIGRAM(S): 100 INJECTION SUBCUTANEOUS at 17:52

## 2020-01-14 RX ADMIN — Medication 500000 UNIT(S): at 06:41

## 2020-01-14 RX ADMIN — Medication 500000 UNIT(S): at 13:08

## 2020-01-14 NOTE — PROGRESS NOTE ADULT - SUBJECTIVE AND OBJECTIVE BOX
OVERNIGHT EVENTS:    SUBJECTIVE:    Vital Signs Last 12 Hrs  T(F): 97.9 (20 @ 09:58), Max: 98.8 (20 @ 04:00)  HR: 76 (20 @ 08:55) (70 - 94)  BP: 135/57 (20 @ 08:55) (95/58 - 135/57)  BP(mean): 82 (20 @ 08:55) (71 - 82)  RR: 20 (20 @ 08:55) (18 - 20)  SpO2: 100% (20 @ 08:55) (97% - 100%)  I&O's Summary    2020 07:01  -  2020 07:00  --------------------------------------------------------  IN: 0 mL / OUT: 200 mL / NET: -200 mL        PHYSICAL EXAM:  Constitutional: NAD, comfortable in bed.  HEENT: NC/AT, PERRLA, EOMI, no conjunctival pallor or scleral icterus, MMM  Neck: Supple, no JVD  Respiratory: Normal rate, rhythm, depth, effort. CTAB. No w/r/r.   Cardiovascular: RRR, normal S1 and S2, no m/r/g.   Gastrointestinal: +BS, soft NTND, no guarding or rebound tenderness, no palpable masses   Extremities: wwp; no cyanosis, clubbing or edema.   Vascular: Pulses equal and strong throughout.   Neurological: AAOx3, no CN deficits, strength and sensation intact throughout.   Skin: No gross skin abnormalities or rashes        LABS:                        8.6    2.30  )-----------( 151      ( 2020 07:20 )             27.2         133<L>  |  100  |  14  ----------------------------<  110<H>  4.0   |  22  |  1.30    Ca    8.2<L>      2020 07:20  Mg     2.0           PTT - ( 2020 07:20 )  PTT:32.2 sec  Urinalysis Basic - ( 2020 04:29 )    Color: Red / Appearance: SL Cloudy / S.020 / pH: x  Gluc: x / Ketone: NEGATIVE  / Bili: Negative / Urobili: 0.2 E.U./dL   Blood: x / Protein: 100 mg/dL / Nitrite: NEGATIVE   Leuk Esterase: NEGATIVE / RBC: Many /HPF / WBC 5-10 /HPF   Sq Epi: x / Non Sq Epi: 0-5 /HPF / Bacteria: Present /HPF        RADIOLOGY & ADDITIONAL TESTS:    MEDICATIONS  (STANDING):  amLODIPine   Tablet 10 milliGRAM(s) Oral daily  cefTRIAXone   IVPB 1000 milliGRAM(s) IV Intermittent every 24 hours  dextrose 5%. 1000 milliLiter(s) (50 mL/Hr) IV Continuous <Continuous>  dextrose 50% Injectable 12.5 Gram(s) IV Push once  dextrose 50% Injectable 25 Gram(s) IV Push once  dextrose 50% Injectable 25 Gram(s) IV Push once  finasteride 5 milliGRAM(s) Oral daily  insulin lispro (HumaLOG) corrective regimen sliding scale   SubCutaneous Before meals and at bedtime  metoprolol tartrate 12.5 milliGRAM(s) Oral every 12 hours  nystatin    Suspension 319400 Unit(s) Oral every 6 hours  pantoprazole    Tablet 40 milliGRAM(s) Oral two times a day    MEDICATIONS  (PRN):  acetaminophen   Tablet .. 650 milliGRAM(s) Oral every 6 hours PRN Temp greater or equal to 38C (100.4F)  acetaminophen   Tablet .. 650 milliGRAM(s) Oral every 6 hours PRN Mild Pain (1 - 3)  dextrose 40% Gel 15 Gram(s) Oral once PRN Blood Glucose LESS THAN 70 milliGRAM(s)/deciliter  glucagon  Injectable 1 milliGRAM(s) IntraMuscular once PRN Glucose LESS THAN 70 milligrams/deciliter  ondansetron    Tablet 4 milliGRAM(s) Oral every 6 hours PRN Nausea and/or Vomiting OVERNIGHT EVENTS: Rapid on floors, tachycardic to 170s, with 100.8F, /65. SVTs, given adenosine 6mg then 12mg; found to be in afib rvr. Given 5mg IV lopressor, started on 12.5mg lopressor BID.     Interval hx: pt endorses hx of "passing out" sometimes for up to one hour. Has had episode of hematuria in the past a long time ago, unsure how it resolved. Pt reports he was on steroids for 4 years while getting chemo, says he should be on 2mg/day of dexamethasone.    SUBJECTIVE:    Vital Signs Last 12 Hrs  T(F): 97.9 (20 @ 09:58), Max: 98.8 (20 @ 04:00)  HR: 76 (20 @ 08:55) (70 - 94)  BP: 135/57 (20 @ 08:55) (95/58 - 135/57)  BP(mean): 82 (20 @ 08:55) (71 - 82)  RR: 20 (20 @ 08:55) (18 - 20)  SpO2: 100% (20 @ 08:55) (97% - 100%)  I&O's Summary    2020 07:01  -  2020 07:00  --------------------------------------------------------  IN: 0 mL / OUT: 200 mL / NET: -200 mL        PHYSICAL EXAM:  Constitutional: NAD, comfortable in bed.  HEENT: NC/AT, PERRLA, EOMI, no conjunctival pallor or scleral icterus, MMM  Neck: Supple, no JVD  Respiratory: Normal rate, rhythm, depth, effort. CTAB. No w/r/r.   Cardiovascular: RRR, normal S1 and S2, no m/r/g.   Gastrointestinal: +BS, soft NTND, no guarding or rebound tenderness, no palpable masses   Extremities: wwp; no cyanosis, clubbing or edema.   Vascular: Pulses equal and strong throughout.   Neurological: AAOx3, no CN deficits, strength and sensation intact throughout.   Skin: No gross skin abnormalities or rashes        LABS:                        8.6    2.30  )-----------( 151      ( 2020 07:20 )             27.2     01-14    133<L>  |  100  |  14  ----------------------------<  110<H>  4.0   |  22  |  1.30    Ca    8.2<L>      2020 07:20  Mg     2.0     01-14      PTT - ( 2020 07:20 )  PTT:32.2 sec  Urinalysis Basic - ( 2020 04:29 )    Color: Red / Appearance: SL Cloudy / S.020 / pH: x  Gluc: x / Ketone: NEGATIVE  / Bili: Negative / Urobili: 0.2 E.U./dL   Blood: x / Protein: 100 mg/dL / Nitrite: NEGATIVE   Leuk Esterase: NEGATIVE / RBC: Many /HPF / WBC 5-10 /HPF   Sq Epi: x / Non Sq Epi: 0-5 /HPF / Bacteria: Present /HPF        RADIOLOGY & ADDITIONAL TESTS:    MEDICATIONS  (STANDING):  amLODIPine   Tablet 10 milliGRAM(s) Oral daily  cefTRIAXone   IVPB 1000 milliGRAM(s) IV Intermittent every 24 hours  dextrose 5%. 1000 milliLiter(s) (50 mL/Hr) IV Continuous <Continuous>  dextrose 50% Injectable 12.5 Gram(s) IV Push once  dextrose 50% Injectable 25 Gram(s) IV Push once  dextrose 50% Injectable 25 Gram(s) IV Push once  finasteride 5 milliGRAM(s) Oral daily  insulin lispro (HumaLOG) corrective regimen sliding scale   SubCutaneous Before meals and at bedtime  metoprolol tartrate 12.5 milliGRAM(s) Oral every 12 hours  nystatin    Suspension 903526 Unit(s) Oral every 6 hours  pantoprazole    Tablet 40 milliGRAM(s) Oral two times a day    MEDICATIONS  (PRN):  acetaminophen   Tablet .. 650 milliGRAM(s) Oral every 6 hours PRN Temp greater or equal to 38C (100.4F)  acetaminophen   Tablet .. 650 milliGRAM(s) Oral every 6 hours PRN Mild Pain (1 - 3)  dextrose 40% Gel 15 Gram(s) Oral once PRN Blood Glucose LESS THAN 70 milliGRAM(s)/deciliter  glucagon  Injectable 1 milliGRAM(s) IntraMuscular once PRN Glucose LESS THAN 70 milligrams/deciliter  ondansetron    Tablet 4 milliGRAM(s) Oral every 6 hours PRN Nausea and/or Vomiting OVERNIGHT EVENTS: Rapid on floors, tachycardic to 170s, with 100.8F, /65. SVTs, given adenosine 6mg then 12mg; found to be in afib rvr. Given 5mg IV lopressor, started on 12.5mg lopressor BID.     Interval hx: pt endorses hx of "passing out" sometimes for up to one hour. Has had episode of hematuria in the past a long time ago, unsure how it resolved. Also reported getting ampicillin and cipro for UTI in the past few weeks.    SUBJECTIVE: Pt seen and examined at bedside. Says he still feels occasionion burning in his bladder at the end of urination, says it feels "raw". Still having dark red urine.   Denies chest pain, pleuritic chest pain, sob, fevers, chills, nausea, vomiting. No dizziness, palpiations, or lightheadedness. No abdominal pain or diarrhea. No change in appetite    Vital Signs Last 12 Hrs  T(F): 97.9 (20 @ 09:58), Max: 98.8 (20 @ 04:00)  HR: 76 (20 @ 08:55) (70 - 94)  BP: 135/57 (20 @ 08:55) (95/58 - 135/57)  BP(mean): 82 (20 @ 08:55) (71 - 82)  RR: 20 (20 @ 08:55) (18 - 20)  SpO2: 100% (20 @ 08:55) (97% - 100%)  I&O's Summary    2020 07:01  -  2020 07:00  --------------------------------------------------------  IN: 0 mL / OUT: 200 mL / NET: -200 mL        PHYSICAL EXAM:  General: WDWN, resting in bed; NAD, on NC  HEENT: EOMI, anicteric sclera; MMM  Neck: supple, no JVD, trachea midline  Cardiovascular: +S1/S2, tachycardiac, irregular, no rmg  Respiratory: CTA B/L; no W/R/R  Gastrointestinal: soft, NT/ND; +BSx4  Extremities: WWP; no edema, clubbing or cyanosis  Vascular: 2+ radial, DP/PT pulses B/L  Neurological: AAOx3; no focal deficits      LABS:                        8.6    2.30  )-----------( 151      ( 2020 07:20 )             27.2     01-14    133<L>  |  100  |  14  ----------------------------<  110<H>  4.0   |  22  |  1.30    Ca    8.2<L>      2020 07:20  Mg     2.0     01-14      PTT - ( 2020 07:20 )  PTT:32.2 sec  Urinalysis Basic - ( 2020 04:29 )    Color: Red / Appearance: SL Cloudy / S.020 / pH: x  Gluc: x / Ketone: NEGATIVE  / Bili: Negative / Urobili: 0.2 E.U./dL   Blood: x / Protein: 100 mg/dL / Nitrite: NEGATIVE   Leuk Esterase: NEGATIVE / RBC: Many /HPF / WBC 5-10 /HPF   Sq Epi: x / Non Sq Epi: 0-5 /HPF / Bacteria: Present /HPF        RADIOLOGY & ADDITIONAL TESTS:    MEDICATIONS  (STANDING):  amLODIPine   Tablet 10 milliGRAM(s) Oral daily  cefTRIAXone   IVPB 1000 milliGRAM(s) IV Intermittent every 24 hours  dextrose 5%. 1000 milliLiter(s) (50 mL/Hr) IV Continuous <Continuous>  dextrose 50% Injectable 12.5 Gram(s) IV Push once  dextrose 50% Injectable 25 Gram(s) IV Push once  dextrose 50% Injectable 25 Gram(s) IV Push once  finasteride 5 milliGRAM(s) Oral daily  insulin lispro (HumaLOG) corrective regimen sliding scale   SubCutaneous Before meals and at bedtime  metoprolol tartrate 12.5 milliGRAM(s) Oral every 12 hours  nystatin    Suspension 438315 Unit(s) Oral every 6 hours  pantoprazole    Tablet 40 milliGRAM(s) Oral two times a day    MEDICATIONS  (PRN):  acetaminophen   Tablet .. 650 milliGRAM(s) Oral every 6 hours PRN Temp greater or equal to 38C (100.4F)  acetaminophen   Tablet .. 650 milliGRAM(s) Oral every 6 hours PRN Mild Pain (1 - 3)  dextrose 40% Gel 15 Gram(s) Oral once PRN Blood Glucose LESS THAN 70 milliGRAM(s)/deciliter  glucagon  Injectable 1 milliGRAM(s) IntraMuscular once PRN Glucose LESS THAN 70 milligrams/deciliter  ondansetron    Tablet 4 milliGRAM(s) Oral every 6 hours PRN Nausea and/or Vomiting

## 2020-01-14 NOTE — CONSULT NOTE ADULT - CONSULT REASON
new onset afib
Cardiac arrhythmia
Coffee Ground Emesis
Coffee ground emesis, PE
Prostate Ca  GIB/PE
Rehab evaluation
hematuria

## 2020-01-14 NOTE — PROGRESS NOTE ADULT - PROBLEM SELECTOR PLAN 2
- Pt complaining of chest pain and difficulty breathing w/ HR in 170s. EKG showing SVT vs AF w/ rvr - s/p adenosine and lopressor. Repeat ekg showing afib w/ rvr. Possibly 2/2 PE vs electrolyte derangement (K and Mg) vs beta blocker withdrawal (Home bisoprolol 2.5 mg was held on admission)  - c/w with lopressor 12.5 mg q12h  - f/u echo  - replete K and Mg  - daily EKGs    #Hematuria  - heparin gtt held in setting of new hematuria. Pt being treated with heparin for acute PE. Now with new onset atrial fibrillation. Hb stable at 9.0. Chadvasc score 5. Consider restarting heparin gtt depending on risks and benefits of a/c  -f/u AM PTT Rapid called last night (1/14), pt complaining of chest pain and difficulty breathing w/ HR in 170s. EKG showing SVT vs AF w/ rvr - s/p adenosine and lopressor. Repeat ekg showing afib w/ rvr. Possibly 2/2 PE  - c/w with lopressor 12.5 mg q12h  - f/u echo  - replete K and Mg prn  - daily EKGs  - Cardiology consulted, f/u recs  #Hematuria - pt still having dark red urine. Reports bladder burning occasionally although clean catch UA with negative LE and nitrites  - urology consulted, f/u recs  - heparin gtt held in setting of new hematuria. Restarting lovenox for PE treatment on 1/14. Hb stable at 9.0. Chadvasc score 5

## 2020-01-14 NOTE — CONSULT NOTE ADULT - SUBJECTIVE AND OBJECTIVE BOX
Date of Admission:    CHIEF COMPLAINT:    HISTORY OF PRESENT ILLNESS: 79M w/ PMH s/f prostate CA (s/p prostatectomy, on chemotherapy), HTN, HLD, who p/w progressively worsening SOB x1mo. Patient endorses being able to "walk miles" prior to 1-2mo ago, and can now becomes SOB after walking 7-10 steps. SOB is associated with generalized weakness and muscle fatigue. Last Sunday he was walking in his house and fell, saying he "just gave out", denies LoC or hitting his head. Denies associated flushing, lightheadedness or dizziness. Denies chest pain, recent travel or sick contacts.  Of note, endorses decreased PO intake after having 4 teeth pulled on 1/2. He has had persistent pain since the procedure and is unable to chew food.   Additional ROS significant for intermittent palpitations at rest (not associated with SOB), dysuria, increased urinary frequency, intermittent bloody urine (1-2mo, most recently today), 1 episode of diarrhea (today, watery, "orange color"). He denies fevers, chills, headaches, vision changes, abdominal pain.    Allergies    No Known Allergies    Intolerances    	    MEDICATIONS:  enoxaparin Injectable 80 milliGRAM(s) SubCutaneous every 12 hours  metoprolol tartrate 12.5 milliGRAM(s) Oral every 12 hours    nystatin    Suspension 824389 Unit(s) Oral every 6 hours      acetaminophen   Tablet .. 650 milliGRAM(s) Oral every 6 hours PRN  acetaminophen   Tablet .. 650 milliGRAM(s) Oral every 6 hours PRN  ondansetron    Tablet 4 milliGRAM(s) Oral every 6 hours PRN    pantoprazole    Tablet 40 milliGRAM(s) Oral two times a day    dexAMETHasone     Tablet 2 milliGRAM(s) Oral every 24 hours  dextrose 40% Gel 15 Gram(s) Oral once PRN  dextrose 50% Injectable 12.5 Gram(s) IV Push once  dextrose 50% Injectable 25 Gram(s) IV Push once  dextrose 50% Injectable 25 Gram(s) IV Push once  finasteride 5 milliGRAM(s) Oral daily  glucagon  Injectable 1 milliGRAM(s) IntraMuscular once PRN  insulin lispro (HumaLOG) corrective regimen sliding scale   SubCutaneous Before meals and at bedtime    dextrose 5%. 1000 milliLiter(s) IV Continuous <Continuous>  dorzolamide 2% Ophthalmic Solution 1 Drop(s) Both EYES three times a day  latanoprost 0.005% Ophthalmic Solution 1 Drop(s) Both EYES at bedtime      PAST MEDICAL & SURGICAL HISTORY:  Chemotherapy session for neoplasm  Type 2 diabetes mellitus: Diabetes  Essential hypertension: Hypertension  Glaucoma: Glaucoma  Hyperlipidemia: Hyperlipidemia  Malignant neoplasm of prostate: s/p total prostatectomy in 1994 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  Other postprocedural status: S/P prostatectomy      FAMILY HISTORY:  Family history of malignant neoplasm of prostate (Father, Sibling): Father      SOCIAL HISTORY:    [ ] Non-smoker  [ ] Smoker  [ ] Alcohol      REVIEW OF SYSTEMS:  General: no fatigue/malaise, weight loss/gain.  Skin: no rashes.  Ophthalmologic: no blurred vision, no loss of vision. 	  ENT: no sore throat, rhinorrhea, sinus congestion.  Respiratory: no SOB, cough or wheeze.  Gastrointestinal:  no N/V/D, no melena/hematemesis/hematochezia.  Genitourinary: no dysuria/hesitancy or hematuria.  Musculoskeletal: no myalgias or arthralgias.  Neurological: no changes in vision or hearing, no lightheadedness/dizziness, no syncope/near syncope	  Psychiatric: no unusual stress/anxiety.   Hematology/Lymphatics: no unusual bleeding, bruising and no lymphadenopathy.  Endocrine: no unusual thirst.   All others negative except as stated above and in HPI.    PHYSICAL EXAM:  T(C): 36.4 (01-14-20 @ 13:46), Max: 37.6 (01-13-20 @ 21:18)  HR: 76 (01-14-20 @ 08:55) (70 - 94)  BP: 135/57 (01-14-20 @ 08:55) (95/58 - 135/57)  RR: 20 (01-14-20 @ 08:55) (18 - 20)  SpO2: 100% (01-14-20 @ 08:55) (96% - 100%)  Wt(kg): --  I&O's Summary    13 Jan 2020 07:01  -  14 Jan 2020 07:00  --------------------------------------------------------  IN: 0 mL / OUT: 200 mL / NET: -200 mL        Appearance: Normal	  HEENT:   Normal oral mucosa, PERRL, EOMI	  Lymphatic: No lymphadenopathy  Cardiovascular: Normal S1 S2, No JVD, No murmurs, No edema  Respiratory: Lungs clear to auscultation	  Psychiatry: A & O x 3, Mood & affect appropriate  Gastrointestinal:  Soft, Non-tender, + BS	  Skin: No rashes, No ecchymoses, No cyanosis	  Neurologic: Non-focal  Extremities: Normal range of motion, No clubbing, cyanosis or edema  Vascular: Peripheral pulses palpable 2+ bilaterally        LABS:	 	    CBC Full  -  ( 14 Jan 2020 07:20 )  WBC Count : 2.30 K/uL  Hemoglobin : 8.6 g/dL  Hematocrit : 27.2 %  Platelet Count - Automated : 151 K/uL  Mean Cell Volume : 98.6 fl  Mean Cell Hemoglobin : 31.2 pg  Mean Cell Hemoglobin Concentration : 31.6 gm/dL  Auto Neutrophil # : x  Auto Lymphocyte # : x  Auto Monocyte # : x  Auto Eosinophil # : x  Auto Basophil # : x  Auto Neutrophil % : x  Auto Lymphocyte % : x  Auto Monocyte % : x  Auto Eosinophil % : x  Auto Basophil % : x    01-14    133<L>  |  100  |  14  ----------------------------<  110<H>  4.0   |  22  |  1.30  01-14    131<L>  |  99  |  16  ----------------------------<  118<H>  3.3<L>   |  19<L>  |  1.27    Ca    8.2<L>      14 Jan 2020 07:20  Ca    8.3<L>      14 Jan 2020 00:58  Mg     2.0     01-14  Mg     1.5     01-14        proBNP:   Lipid Profile:   HgA1c:   TSH: Thyroid Stimulating Hormone, Serum: 1.616 uIU/mL (01-14 @ 07:20)        CARDIAC MARKERS:            TELEMETRY: 	    ECG:  	  RADIOLOGY:  OTHER: 	    PREVIOUS DIAGNOSTIC TESTING:    [ ] Echocardiogram:  [ ]  Catheterization:  [ ] Stress Test:  	  	  ASSESSMENT/PLAN: Date of Admission: 1/12/20     CHIEF COMPLAINT: SOB    HISTORY OF PRESENT ILLNESS/HOSPITAL COURSE: 79M w/ PMH s/f stage IV prostate CA (s/p prostatectomy, on chemotherapy), who p/w progressive SOB and JAIME x1mo associated w/ generalized fatigue in the setting of decreased PO intake, found to have segmental PE of RLL on CTA Chest and LLE DVT on US Dopplers likely 2/2 malignancy. Pt was initially started on lovenox for treatment of PE. Pt had episode of coffee ground emesis x2. Lovenox was held with ICU and GI was consulted at that time. Pt scoped with EGD 1/13/20: Esophagitis, Gastritis, duodenitis, 2 small clean based ulcer in D2. Continued on protonix per GI recs and there was no direct contraindication for a/c. Pt was then started on heparin gtt which was held in setting of hematuria. Rapid response was called w/ vitals T 100.8 /65 HR 170s R 18 99% on 2L O2 with complaints of chest pain and difficulty breathing. Initial EKG was concerning for SVT vs new onset atrial fibrillation. Patient was given IV Adenosine 6mg >12mg revealing underlying atrial fibrillation. Patient subsequently given 1L LR bolus with lopressor 5mg IV and Lopressor 12.5mg PO x1 with improvement of HR to 110>120s. Tachycardia likely in the setting beta blocker withdrawal (Bisoprolol 2.5 mg at home held on admission) vs PE, now with new onset atrial fibrillation. ICU consult called and pt to be transferred to  for further management.     Allergies    No Known Allergies    Intolerances    	    MEDICATIONS:  enoxaparin Injectable 80 milliGRAM(s) SubCutaneous every 12 hours  metoprolol tartrate 12.5 milliGRAM(s) Oral every 12 hours    nystatin    Suspension [id number] Unit(s) Oral every 6 hours      acetaminophen   Tablet .. 650 milliGRAM(s) Oral every 6 hours PRN  acetaminophen   Tablet .. 650 milliGRAM(s) Oral every 6 hours PRN  ondansetron    Tablet 4 milliGRAM(s) Oral every 6 hours PRN    pantoprazole    Tablet 40 milliGRAM(s) Oral two times a day    dexAMETHasone     Tablet 2 milliGRAM(s) Oral every 24 hours  dextrose 40% Gel 15 Gram(s) Oral once PRN  dextrose 50% Injectable 12.5 Gram(s) IV Push once  dextrose 50% Injectable 25 Gram(s) IV Push once  dextrose 50% Injectable 25 Gram(s) IV Push once  finasteride 5 milliGRAM(s) Oral daily  glucagon  Injectable 1 milliGRAM(s) IntraMuscular once PRN  insulin lispro (HumaLOG) corrective regimen sliding scale   SubCutaneous Before meals and at bedtime    dextrose 5%. 1000 milliLiter(s) IV Continuous <Continuous>  dorzolamide 2% Ophthalmic Solution 1 Drop(s) Both EYES three times a day  latanoprost 0.005% Ophthalmic Solution 1 Drop(s) Both EYES at bedtime      PAST MEDICAL & SURGICAL HISTORY:  Chemotherapy session for neoplasm  Type 2 diabetes mellitus: Diabetes  Essential hypertension: Hypertension  Glaucoma: Glaucoma  Hyperlipidemia: Hyperlipidemia  Malignant neoplasm of prostate: s/p total prostatectomy in 1994 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  Other postprocedural status: S/P prostatectomy      FAMILY HISTORY:  Family history of malignant neoplasm of prostate (Father, Sibling): Father      SOCIAL HISTORY:    [x] Non-smoker: former smoker 1pack/week for many years; quit 1994   [ ] Smoker  [x] Alcohol: former excessive alcohol user, 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  Skin: no rashes.  Ophthalmologic: no blurred vision, no loss of vision. 	  ENT: no sore throat, rhinorrhea, sinus congestion.  Respiratory: no SOB, cough or wheeze.  Gastrointestinal:  no N/V/D, no melena/hematemesis/hematochezia.  Genitourinary: no dysuria/hesitancy or hematuria.  Musculoskeletal: no myalgias or arthralgias.  Neurological: no changes in vision or hearing, no lightheadedness/dizziness, no syncope/near syncope	  Psychiatric: no unusual stress/anxiety.   Hematology/Lymphatics: no unusual bleeding, bruising and no lymphadenopathy.  Endocrine: no unusual thirst.   All others negative except as stated above and in HPI.    PHYSICAL EXAM:  T(C): 36.4 (01-14-20 @ 13:46), Max: 37.6 (01-13-20 @ 21:18)  HR: 76 (01-14-20 @ 08:55) (70 - 94)  BP: 135/57 (01-14-20 @ 08:55) (95/58 - 135/57)  RR: 20 (01-14-20 @ 08:55) (18 - 20)  SpO2: 100% (01-14-20 @ 08:55) (96% - 100%)  Wt(kg): --  I&O's Summary    13 Jan 2020 07:01  -  14 Jan 2020 07:00  --------------------------------------------------------  IN: 0 mL / OUT: 200 mL / NET: -200 mL        Appearance: pleasant well appearing elderly male sitting up in chair, NAD 	  HEENT:   Normal oral mucosa, PERRL, EOMI; no cervical or submandibular lymphadenopathy; no JVD  Cardiovascular: Normal S1 S2, No JVD, No murmurs, No edema  Respiratory: Lungs clear to auscultation	  Gastrointestinal:  Soft, Non-tender, + BS		  Extremities: Normal range of motion, No clubbing, cyanosis or edema; cold to touch, normal capillary refill   Vascular: Peripheral pulses palpable 2+ bilaterally  Skin: No rashes, No ecchymoses, No cyanosis        LABS:	 	    CBC Full  -  ( 14 Jan 2020 07:20 )  WBC Count : 2.30 K/uL  Hemoglobin : 8.6 g/dL  Hematocrit : 27.2 %  Platelet Count - Automated : 151 K/uL  Mean Cell Volume : 98.6 fl  Mean Cell Hemoglobin : 31.2 pg  Mean Cell Hemoglobin Concentration : 31.6 gm/dL  Auto Neutrophil # : x  Auto Lymphocyte # : x  Auto Monocyte # : x  Auto Eosinophil # : x  Auto Basophil # : x  Auto Neutrophil % : x  Auto Lymphocyte % : x  Auto Monocyte % : x  Auto Eosinophil % : x  Auto Basophil % : x    01-14    133<L>  |  100  |  14  ----------------------------<  110<H>  4.0   |  22  |  1.30  01-14    131<L>  |  99  |  16  ----------------------------<  118<H>  3.3<L>   |  19<L>  |  1.27    Ca    8.2<L>      14 Jan 2020 07:20  Ca    8.3<L>      14 Jan 2020 00:58  Mg     2.0     01-14  Mg     1.5     01-14        proBNP:   Lipid Profile:   HgA1c:   TSH: Thyroid Stimulating Hormone, Serum: 1.616 uIU/mL (01-14 @ 07:20)        CARDIAC MARKERS:            TELEMETRY: 	    ECG:  	  RADIOLOGY:  OTHER: 	    PREVIOUS DIAGNOSTIC TESTING:    [ ] Echocardiogram:  [ ]  Catheterization:  [ ] Stress Test:  	  	  ASSESSMENT/PLAN: Date of Admission: 1/12/20     CHIEF COMPLAINT: SOB    HISTORY OF PRESENT ILLNESS/HOSPITAL COURSE: 79M w/ PMH s/f stage IV prostate CA (s/p prostatectomy, on chemotherapy), who p/w progressive SOB and JAIME x1mo associated w/ generalized fatigue in the setting of decreased PO intake, found to have segmental PE of RLL on CTA Chest and LLE DVT on US Dopplers likely 2/2 malignancy. Pt was initially started on lovenox for treatment of PE. Pt had episode of coffee ground emesis x2. Lovenox was held with ICU and GI was consulted at that time. Pt scoped with EGD 1/13/20: Esophagitis, Gastritis, duodenitis, 2 small clean based ulcer in D2. Continued on protonix per GI recs and there was no direct contraindication for a/c. Pt was then started on heparin gtt which was held in setting of hematuria. Rapid response was called w/ vitals T 100.8 /65 HR 170s R 18 99% on 2L O2 with complaints of chest pain and difficulty breathing. Initial EKG was concerning for SVT vs new onset atrial fibrillation. Patient was given IV Adenosine 6mg >12mg revealing underlying atrial fibrillation. Patient subsequently given 1L LR bolus with lopressor 5mg IV and Lopressor 12.5mg PO x1 with improvement of HR to 110>120s. Tachycardia likely in the setting beta blocker withdrawal (Bisoprolol 2.5 mg at home held on admission) vs PE, now with new onset atrial fibrillation. ICU consult called and pt to be transferred to  for further management.     SUBJECTIVE: Patient seen and examined at bedside. Daughter bedside providing history. Patient states he is doing well - denies chest pain, abdominal pain, nausea, vomiting. Only compliant is that he remains cold at all times. ROS otherwise negative.     Allergies: No Known Allergies    HOME MEDICATIONS:  Amlodipine-valsartan 10-320mg QD  Bisoprolol 5mg QD  Finasteride 5mg QD  Hydrochlorothiazide 25mg QD  Lansoprazole 15mg QD  Enzalutamide 40mg QD  Simvastatin 20mg QD    	  MEDICATIONS:  enoxaparin Injectable 80 milliGRAM(s) SubCutaneous every 12 hours  metoprolol tartrate 12.5 milliGRAM(s) Oral every 12 hours  nystatin    Suspension [id number] Unit(s) Oral every 6 hours  acetaminophen   Tablet .. 650 milliGRAM(s) Oral every 6 hours PRN  acetaminophen   Tablet .. 650 milliGRAM(s) Oral every 6 hours PRN  ondansetron    Tablet 4 milliGRAM(s) Oral every 6 hours PRN  pantoprazole    Tablet 40 milliGRAM(s) Oral two times a day  dexAMETHasone     Tablet 2 milliGRAM(s) Oral every 24 hours  dextrose 40% Gel 15 Gram(s) Oral once PRN  dextrose 50% Injectable 12.5 Gram(s) IV Push once  dextrose 50% Injectable 25 Gram(s) IV Push once  dextrose 50% Injectable 25 Gram(s) IV Push once  finasteride 5 milliGRAM(s) Oral daily  glucagon  Injectable 1 milliGRAM(s) IntraMuscular once PRN  insulin lispro (HumaLOG) corrective regimen sliding scale   SubCutaneous Before meals and at bedtime  dextrose 5%. 1000 milliLiter(s) IV Continuous <Continuous>  dorzolamide 2% Ophthalmic Solution 1 Drop(s) Both EYES three times a day  latanoprost 0.005% Ophthalmic Solution 1 Drop(s) Both EYES at bedtime      PAST MEDICAL & SURGICAL HISTORY:  Chemotherapy session for neoplasm  Type 2 diabetes mellitus: Diabetes  Essential hypertension: Hypertension  Glaucoma: Glaucoma  Hyperlipidemia: Hyperlipidemia  Malignant neoplasm of prostate: s/p total prostatectomy in 1994 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  Other postprocedural status: S/P prostatectomy      FAMILY HISTORY:  Family history of malignant neoplasm of prostate (Father, Sibling): Father      SOCIAL HISTORY:    [x] Non-smoker: former smoker 1pack/week for many years; quit 1994   [ ] Smoker  [x] Alcohol: former excessive alcohol user, 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  Skin: no rashes.  Ophthalmologic: no blurred vision, no loss of vision. 	  ENT: no sore throat, rhinorrhea, sinus congestion.  Respiratory: no SOB, cough or wheeze.  Gastrointestinal:  no N/V/D, no melena/hematemesis/hematochezia.  Genitourinary: no dysuria/hesitancy or hematuria.  Musculoskeletal: no myalgias or arthralgias.  Neurological: no changes in vision or hearing, no lightheadedness/dizziness, no syncope/near syncope	  Psychiatric: no unusual stress/anxiety.   Hematology/Lymphatics: no unusual bleeding, bruising and no lymphadenopathy.  Endocrine: no unusual thirst.   All others negative except as stated above and in HPI.    PHYSICAL EXAM:  T(C): 36.4 (01-14-20 @ 13:46), Max: 37.6 (01-13-20 @ 21:18)  HR: 76 (01-14-20 @ 08:55) (70 - 94)  BP: 135/57 (01-14-20 @ 08:55) (95/58 - 135/57)  RR: 20 (01-14-20 @ 08:55) (18 - 20)  SpO2: 100% (01-14-20 @ 08:55) (96% - 100%)  Wt(kg): --  I&O's Summary    13 Jan 2020 07:01  -  14 Jan 2020 07:00  --------------------------------------------------------  IN: 0 mL / OUT: 200 mL / NET: -200 mL        Appearance: pleasant well appearing elderly male sitting up in chair, NAD 	  HEENT:   Normal oral mucosa, PERRL, EOMI; no cervical or submandibular lymphadenopathy; no JVD  Cardiovascular: Normal S1 S2, No JVD, No murmurs, No edema  Respiratory: Lungs clear to auscultation bilaterally on 2L NC  Gastrointestinal:  Soft, Non-tender, + BS		  Extremities: Normal range of motion, No clubbing, cyanosis or edema; cold to touch, normal capillary refill   Vascular: Peripheral pulses palpable 2+ bilaterally  Skin: No rashes, No ecchymoses, No cyanosis        LABS:	 	    CBC Full  -  ( 14 Jan 2020 07:20 )  WBC Count : 2.30 K/uL  Hemoglobin : 8.6 g/dL  Hematocrit : 27.2 %  Platelet Count - Automated : 151 K/uL  Mean Cell Volume : 98.6 fl  Mean Cell Hemoglobin : 31.2 pg  Mean Cell Hemoglobin Concentration : 31.6 gm/dL  Auto Neutrophil # : x  Auto Lymphocyte # : x  Auto Monocyte # : x  Auto Eosinophil # : x  Auto Basophil # : x  Auto Neutrophil % : x  Auto Lymphocyte % : x  Auto Monocyte % : x  Auto Eosinophil % : x  Auto Basophil % : x    01-14    133<L>  |  100  |  14  ----------------------------<  110<H>  4.0   |  22  |  1.30  01-14    131<L>  |  99  |  16  ----------------------------<  118<H>  3.3<L>   |  19<L>  |  1.27    Ca    8.2<L>      14 Jan 2020 07:20  Ca    8.3<L>      14 Jan 2020 00:58  Mg     2.0     01-14  Mg     1.5     01-14        proBNP:   Lipid Profile:   HgA1c:   TSH: Thyroid Stimulating Hormone, Serum: 1.616 uIU/mL (01-14 @ 07:20)        CARDIAC MARKERS:            TELEMETRY: 	    ECG:  	  RADIOLOGY:  OTHER: 	    PREVIOUS DIAGNOSTIC TESTING:    [ ] Echocardiogram:  [ ]  Catheterization:  [ ] Stress Test:

## 2020-01-14 NOTE — PROGRESS NOTE ADULT - ASSESSMENT
79M w/ stage IV prostate CA (s/p prostatectomy, on chemotherapy), who p/w progressive SOB and JAIME x1mo associated w/ generalized fatigue in the setting of decreased PO intake, found to have segmental PE of RLL on CTA Chest and LLE DVT on US Dopplers likely 2/2 malignancy. Now with new onset atrial fibrillation. Transferred from [facility] to  for further management.

## 2020-01-14 NOTE — PROGRESS NOTE ADULT - PROBLEM SELECTOR PLAN 2
- Pt complaining of chest pain and difficulty breathing w/ HR in 170s. EKG showing SVT vs AF w/ rvr - s/p adenosine and lopressor. Repeat ekg showing afib w/ rvr. Possibly 2/2 PE vs electrolyte derangement (K and Mg) vs beta blocker withdrawal (Home bisoprolol 2.5 mg was held on admission)  - c/w with lopressor 12.5 mg q12h  - f/u echo  - replete K and Mg  - daily EKGs    #Hematuria  - heparin gtt held in setting of new hematuria. Pt being treated with heparin for acute PE. Now with new onset atrial fibrillation. Hb stable at 9.0. Chadvasc score 5. Consider restarting heparin gtt depending on risks and benefits of a/c  -f/u AM PTT

## 2020-01-14 NOTE — CONSULT NOTE ADULT - SUBJECTIVE AND OBJECTIVE BOX
Patient is a 79y old  Male who presents with a chief complaint of SOB (2020 04:54)       HPI:  Mr. Rossi is an 79M w/ PMH s/f prostate CA (s/p prostatectomy, on chemotherapy), HTN, HLD, who p/w progressively worsening SOB x1mo. Patient endorses being able to "walk miles" prior to 1-2mo ago, and can now becomes SOB after walking 7-10 steps. SOB is associated with generalized weakness and muscle fatigue. Last  he was walking in his house and fell, saying he "just gave out", denies LoC or hitting his head. Denies associated flushing, lightheadedness or dizziness. Denies chest pain, recent travel or sick contacts.  Of note, endorses decreased PO intake after having 4 teeth pulled on . He has had persistent pain since the procedure and is unable to chew food.   Additional ROS significant for intermittent palpitations at rest (not associated with SOB), dysuria, increased urinary frequency, intermittent bloody urine (1-2mo, most recently today), 1 episode of diarrhea (today, watery, "orange color"). He denies fevers, chills, headaches, vision changes, abdominal pain.    Of note, patient presented to ED in 1mo ago (2019) w/ back pain and urinary incontinence that progressed over several days. In ED was found to have lumbar vertebral compression fracture w/o cauda equina. Today pt denies urinary or bowel incontinence. Denies focal neurologic deficits, endorsing his weakness and general fatigue.    In the ED: VS T97.9, , /78, RR20 O2sat 100% on RA.  Labs s/f WBC 1.67, Hb10.2 (baseline ~10), BUN 26, Cr1.43 (0.99 in ), negative troponin, BNP wnl, lactate wnl.  EKG: NSR (borderline tachycardic, HR98), T wave inversions in I and aVL, L axis deviation  CT Angio Chest w/ "Solitary segmental pulmonary embolus within branches supplying the medial basilar segment of the right lower lobe."  US LE Dopplers preliminary read showing DVT of L intramuscular calf vein. (2020 17:21)      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  Other postprocedural status: S/P prostatectomy      MEDICATIONS  (STANDING):  amLODIPine   Tablet 10 milliGRAM(s) Oral daily  cefTRIAXone   IVPB 1000 milliGRAM(s) IV Intermittent every 24 hours  dextrose 5%. 1000 milliLiter(s) (50 mL/Hr) IV Continuous <Continuous>  dextrose 50% Injectable 12.5 Gram(s) IV Push once  dextrose 50% Injectable 25 Gram(s) IV Push once  dextrose 50% Injectable 25 Gram(s) IV Push once  finasteride 5 milliGRAM(s) Oral daily  insulin lispro (HumaLOG) corrective regimen sliding scale   SubCutaneous Before meals and at bedtime  metoprolol tartrate 12.5 milliGRAM(s) Oral every 12 hours  nystatin    Suspension 122360 Unit(s) Oral every 6 hours  pantoprazole    Tablet 40 milliGRAM(s) Oral two times a day    MEDICATIONS  (PRN):  acetaminophen   Tablet .. 650 milliGRAM(s) Oral every 6 hours PRN Temp greater or equal to 38C (100.4F)  acetaminophen   Tablet .. 650 milliGRAM(s) Oral every 6 hours PRN Mild Pain (1 - 3)  dextrose 40% Gel 15 Gram(s) Oral once PRN Blood Glucose LESS THAN 70 milliGRAM(s)/deciliter  glucagon  Injectable 1 milliGRAM(s) IntraMuscular once PRN Glucose LESS THAN 70 milligrams/deciliter  ondansetron    Tablet 4 milliGRAM(s) Oral every 6 hours PRN Nausea and/or Vomiting    FAMILY HISTORY:  Family history of malignant neoplasm of prostate (Father, Sibling): Father      CBC Full  -  ( 2020 07:20 )  WBC Count : 2.30 K/uL  RBC Count : 2.76 M/uL  Hemoglobin : 8.6 g/dL  Hematocrit : 27.2 %  Platelet Count - Automated : 151 K/uL  Mean Cell Volume : 98.6 fl  Mean Cell Hemoglobin : 31.2 pg  Mean Cell Hemoglobin Concentration : 31.6 gm/dL  Auto Neutrophil # : x  Auto Lymphocyte # : x  Auto Monocyte # : x  Auto Eosinophil # : x  Auto Basophil # : x  Auto Neutrophil % : x  Auto Lymphocyte % : x  Auto Monocyte % : x  Auto Eosinophil % : x  Auto Basophil % : x      01-14    133<L>  |  100  |  14  ----------------------------<  110<H>  4.0   |  22  |  1.30    Ca    8.2<L>      2020 07:20  Mg     2.0         TPro  6.4  /  Alb  4.0  /  TBili  0.3  /  DBili  x   /  AST  24  /  ALT  20  /  AlkPhos  36<L>        Urinalysis Basic - ( 2020 04:29 )    Color: Red / Appearance: SL Cloudy / S.020 / pH: x  Gluc: x / Ketone: NEGATIVE  / Bili: Negative / Urobili: 0.2 E.U./dL   Blood: x / Protein: 100 mg/dL / Nitrite: NEGATIVE   Leuk Esterase: NEGATIVE / RBC: Many /HPF / WBC 5-10 /HPF   Sq Epi: x / Non Sq Epi: 0-5 /HPF / Bacteria: Present /HPF          Radiology:    < from: CT Angio Chest PE Protocol w/ IV Cont (20 @ 11:49) >    EXAM:  CT ANGIO CHEST PE PROTOCOL IC                          PROCEDURE DATE:  2020          INTERPRETATION:  CLINICAL INDICATION: 79-year-old with shortness of breath; for detection of pulmonary embolism.          FINDINGS: CT angiography ofthe chest was performed with the administration of intravenous contrast. Multiplanar and maximum intensity projection 3D reconstructions were performed in the sagittal and coronal planes. Comparison is made to prior studies dated 2019 and 2017.    Evaluation of the chest demonstrates the visualized gland is normal in appearance. There is normal heart size. There is left ventricular hypertrophy. Negative for intraluminal thrombus within the left atrial appendage. Negative for enlargement of main pulmonary artery. Evaluation of the pulmonary vasculature demonstrates solitary segmental pulmonary embolus within a branch supplying the posteromedial basal segment of the right lower lobe, new since 2019. There is no endobronchial lesion. Minimal paraseptal emphysematous change. Biapical pleural-parenchymal scarring. No pulmonary consolidation or mass. Evaluation of the upper abdomen is unremarkable aside from partially visualized cyst in the upper pole of the left kidney. Evaluation of the osseous structures no destructive osseous lesion.          IMPRESSION:    Solitary segmental pulmonary embolus within branches supplying the medial basilar segment of the right lower lobe.            Vital Signs Last 24 Hrs  T(C): 36.2 (2020 04:19), Max: 37.6 (2020 21:18)  T(F): 97.2 (2020 04:19), Max: 99.6 (2020 21:18)  HR: 70 (2020 06:40) (70 - 94)  BP: 109/53 (2020 06:40) (95/58 - 131/72)  BP(mean): 74 (2020 06:40) (71 - 74)  RR: 20 (2020 06:40) (18 - 20)  SpO2: 100% (2020 06:40) (95% - 100%)    REVIEW OF SYSTEMS:    CONSTITUTIONAL: fatigue  EYES: No eye pain, visual disturbances, or discharge  ENMT:  No difficulty hearing, tinnitus, vertigo; No sinus or throat pain  NECK: No pain or stiffness  BREASTS: No pain, masses, or nipple discharge  RESPIRATORY: shortness of breath  CARDIOVASCULAR: No chest pain, palpitations, dizziness, or leg swelling  GASTROINTESTINAL: No abdominal or epigastric pain. No nausea, vomiting, or hematemesis; No diarrhea or constipation. No melena or hematochezia.  GENITOURINARY: No dysuria, frequency, hematuria, or incontinence  NEUROLOGICAL: No headaches, memory loss, loss of strength, numbness, or tremors  SKIN: No itching, burning, rashes, or lesions   LYMPH NODES: No enlarged glands  ENDOCRINE: No heat or cold intolerance; No hair loss  MUSCULOSKELETAL: No joint pain or swelling; No muscle, back, or extremity pain  PSYCHIATRIC: No depression, anxiety, mood swings, or difficulty sleeping  HEME/LYMPH: No easy bruising, or bleeding gums  ALLERGY AND IMMUNOLOGIC: No hives or eczema  VASCULAR: no swelling, erythema        Physical Exam: 80 yo AA gentleman lying in semi Becerra's position, c/o feeling tired    Head: normocephalic    Eyes: PERRLA, EOMI, no nystagmus, sclera anicteric    ENT: nasal discharge, uvula midline, no oropharyngeal erythema/exudate    Neck: supple, negative JVD, negative carotid bruits, no thyromegaly    Chest: CTA bilaterally, neg wheeze/ rhonchi/ rales/ crackles/ egophany    Cardiovascular: regular rate and rhythm, neg murmurs/rubs/gallops    Abdomen: soft, non distended, non tender, negative rebound/guarding, normal bowel sounds, neg hepatosplenomegaly    Extremities: WWP, neg cyanosis/clubbing/edema, negative calf tenderness to palpation, negative Bren's sign    :     Neurologic Exam:    Alert and oriented to person, place, date/year, speech fluent w/o dysarthria, recent and remote memory intact, repetition intact, comprehension intact    Cranial Nerves:     II:                       pupils equal, round and reactive to light, visual fields intact   III/ IV/VI:            extraocular movements intact, neg nystagmus, neg ptosis  V:                       facial sensation intact, V1-3 normal  VII:                     face symmetric, no droop, normal eye closure and smile  VIII:                    hearing intact to finger rub bilaterally  IX/ X:                 soft palate rise symmetrical  XI:                      head turning, shoulder shrug normal  XII:                     tongue midline    Motor Exam:    Upper Extremities:     RIght:   no focal weakness               negative drift    Left :   no focal weakness               negative drift    Lower Extremities:                 Right:  no focal weakness                 Left:     no focal weakness                   Sensory:    intact to LT/PP in all UE/LE dermatomes    DTR:            = biceps/     triceps/     brachioradialis                      = patella/   medial hamstring/ankle                      neg clonus                      neg Babinski                          Gait:  not tested        PM&R Impression:    1) deconditioned  2) no focal weakness        Recommendations:    1) Physical therapy focusing on therapeutic exercises, bed mobility/transfer out of bed evaluation, progressive ambulation with assistive devices prn.    2) Disposition Plan/Recs:  pending functional progress

## 2020-01-14 NOTE — PHYSICAL THERAPY INITIAL EVALUATION ADULT - PLANNED THERAPY INTERVENTIONS, PT EVAL
transfer training/bed mobility training/gait training/strengthening/neuromuscular re-education/postural re-education/balance training

## 2020-01-14 NOTE — CONSULT NOTE ADULT - SUBJECTIVE AND OBJECTIVE BOX
Hematology Oncology Consult Note (Dr. Fuller)    79M w/ PMH s/f prostate CA (s/p prostatectomy, on chemotherapy), HTN, HLD, who p/w progressively worsening SOB x1mo. Patient endorses being able to "walk miles" prior to 1-2mo ago, and can now becomes SOB after walking 7-10 steps. SOB is associated with generalized weakness and muscle fatigue. Last  he was walking in his house and fell, saying he "just gave out", denies LoC or hitting his head. Denies associated flushing, lightheadedness or dizziness. Denies chest pain, recent travel or sick contacts.  Of note, endorses decreased PO intake after having 4 teeth pulled on . He has had persistent pain since the procedure and is unable to chew food.     Patient noted to have two episodes of reported coffee-ground emesis, without melena or hematochezia. ICU team was consulted in setting of PE with inability to anticoagulate. GI team consulted, plan currently for scope around noon. In interim since previous evaluation, received EGD with GI team, demonstrating gastric ulcer and patient cleared for AC. This evening, noted to have HR 170s, rapid response called. Found to be febrile to 100.8, 111/65, R18 sat 99% RA. Cardiology fellow at bedside during rapid response, reports SVT and patient given 6mg adenosine, then 12mg adenosine demonstrating underlying AFib. 1L LR and 5mg IV lopressor, 12.5mg PO lopressor given, tachycardia ameliorated to 110s. ICU consult activated given tachycardia.       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  Other postprocedural status: S/P prostatectomy      Allergies: NKA       Medications:  enoxaparin Injectable 80 milliGRAM(s) SubCutaneous every 12 hours  MEDICATIONS  (STANDING):  dexAMETHasone     Tablet 2 milliGRAM(s) Oral every 24 hours  dextrose 5%. 1000 milliLiter(s) (50 mL/Hr) IV Continuous <Continuous>  dextrose 50% Injectable 12.5 Gram(s) IV Push once  dextrose 50% Injectable 25 Gram(s) IV Push once  dextrose 50% Injectable 25 Gram(s) IV Push once  dorzolamide 2% Ophthalmic Solution 1 Drop(s) Both EYES three times a day  enoxaparin Injectable 80 milliGRAM(s) SubCutaneous every 12 hours  finasteride 5 milliGRAM(s) Oral daily  insulin lispro (HumaLOG) corrective regimen sliding scale   SubCutaneous Before meals and at bedtime  latanoprost 0.005% Ophthalmic Solution 1 Drop(s) Both EYES at bedtime  metoprolol tartrate 12.5 milliGRAM(s) Oral every 12 hours  nystatin    Suspension 532030 Unit(s) Oral every 6 hours  pantoprazole    Tablet 40 milliGRAM(s) Oral two times a day    MEDICATIONS  (PRN):  acetaminophen   Tablet .. 650 milliGRAM(s) Oral every 6 hours PRN Temp greater or equal to 38C (100.4F)  acetaminophen   Tablet .. 650 milliGRAM(s) Oral every 6 hours PRN Mild Pain (1 - 3)  dextrose 40% Gel 15 Gram(s) Oral once PRN Blood Glucose LESS THAN 70 milliGRAM(s)/deciliter  glucagon  Injectable 1 milliGRAM(s) IntraMuscular once PRN Glucose LESS THAN 70 milligrams/deciliter  ondansetron    Tablet 4 milliGRAM(s) Oral every 6 hours PRN Nausea and/or Vomiting      FAMILY HISTORY:  Family history of malignant neoplasm of prostate (Father, Sibling): Father      PHYSICAL EXAM:    T(F): 97.5 (20 @ 13:46), Max: 99.6 (20 @ 21:18)  HR: 70 (20 @ 13:20) (70 - 94)  BP: 107/57 (20 @ 13:20) (95/58 - 135/57)  RR: 20 (20 @ 13:20) (18 - 20)  SpO2: 100% (20 @ 13:20) (96% - 100%)  Wt(kg): --    Daily     Daily     GEN: Resting   HEENT: AT/NC, EOMI  NECK: supple  CVS: +S1s2, irregularly irregular   RESP: CTA  GI: soft  EXT: no c/c/e  NEURO: aaox3   Labs:                          8.6    2.30  )-----------( 151      ( 2020 07:20 )             27.2     CBC Full  -  ( 2020 07:20 )  WBC Count : 2.30 K/uL  RBC Count : 2.76 M/uL  Hemoglobin : 8.6 g/dL  Hematocrit : 27.2 %  Platelet Count - Automated : 151 K/uL  Mean Cell Volume : 98.6 fl  Mean Cell Hemoglobin : 31.2 pg  Mean Cell Hemoglobin Concentration : 31.6 gm/dL  Auto Neutrophil # : x  Auto Lymphocyte # : x  Auto Monocyte # : x  Auto Eosinophil # : x  Auto Basophil # : x  Auto Neutrophil % : x  Auto Lymphocyte % : x  Auto Monocyte % : x  Auto Eosinophil % : x  Auto Basophil % : x    PTT - ( 2020 07:20 )  PTT:32.2 sec        133<L>  |  100  |  14  ----------------------------<  110<H>  4.0   |  22  |  1.30    Ca    8.2<L>      2020 07:20  Mg     2.0             Urinalysis Basic - ( 2020 04:29 )    Color: Red / Appearance: SL Cloudy / S.020 / pH: x  Gluc: x / Ketone: NEGATIVE  / Bili: Negative / Urobili: 0.2 E.U./dL   Blood: x / Protein: 100 mg/dL / Nitrite: NEGATIVE   Leuk Esterase: NEGATIVE / RBC: Many /HPF / WBC 5-10 /HPF   Sq Epi: x / Non Sq Epi: 0-5 /HPF / Bacteria: Present /HPF      < from: US Duplex Venous Lower Ext Complete, Bilateral (20 @ 16:18) >  PROCEDURE DATE:  2020          INTERPRETATION:    VENOUS DUPLEX DOPPLER OF BOTH LOWER EXTREMITIES dated 2020 4:18 PM    INDICATION: Pulmonary embolism seen on CT. Dyspnea. Evaluate for DVT. No other history.    TECHNIQUE: Duplex Doppler evaluation including gray-scale ultrasound imaging, color flow Doppler imaging, and Doppler spectral analysis of the veins of both lower extremities was performed.     COMPARISON: None    FINDINGS:    Thigh veins: The common femoral, femoral, popliteal, proximal greater saphenous, and proximal deep femoral veins are patent and free of thrombus bilaterally. The veins are normally compressible and have normal phasic flow.    Calf veins: The paired peroneal and posterior tibial calf veins are patent bilaterally. Thrombus is seen within the left intramuscular calf vein. The right intramuscular calf veins are patent and free of thrombus.      IMPRESSION:  Thrombus is noted within a leftintramuscular calf vein.    The findings above were discussed with Dr. Morejon on 2020 at 5:20 PM.    < end of copied text >    < from: CT Angio Chest PE Protocol w/ IV Cont (20 @ 11:49) >  INTERPRETATION:  CLINICAL INDICATION: 79-year-old with shortness of breath; for detection of pulmonary embolism.          FINDINGS: CT angiography ofthe chest was performed with the administration of intravenous contrast. Multiplanar and maximum intensity projection 3D reconstructions were performed in the sagittal and coronal planes. Comparison is made to prior studies dated 2019 and 2017.    Evaluation of the chest demonstrates the visualized gland is normal in appearance. There is normal heart size. There is left ventricular hypertrophy. Negative for intraluminal thrombus within the left atrial appendage. Negative for enlargement of main pulmonary artery. Evaluation of the pulmonary vasculature demonstrates solitary segmental pulmonary embolus within a branch supplying the posteromedial basal segment of the right lower lobe, new since 2019. There is no endobronchial lesion. Minimal paraseptal emphysematous change. Biapical pleural-parenchymal scarring. No pulmonary consolidation or mass. Evaluation of the upper abdomen is unremarkable aside from partially visualized cyst in the upper pole of the left kidney. Evaluation of the osseous structures no destructive osseous lesion.          IMPRESSION:    Solitary segmental pulmonary embolus within branches supplying the medial basilar segment of the right lower lobe.    < end of copied text >

## 2020-01-14 NOTE — CONSULT NOTE ADULT - SUBJECTIVE AND OBJECTIVE BOX
ICU Consult Note    HPI:  HPI: Patient is an 79 year old M w/ PMH prostate CA (on chemo, s/p prostatectomy), HTN, HLD, T2DM, who presented with SOB for the past month, was found to have segmental pulmonary embolus in the R lower lobe on CTA chest at [facility] ED. U/S LE demonstrated L intramuscular calf DVT. Labs notable on admission for leukopenia with neutropenia, Hb 10.2 (10.4 last month). Got 1L NS, started on ceftriaxone for UTI, was given Lovenox for PE treatment. Yesterday, patient noted to have two episodes of reported coffee-ground emesis, without melena or hematochezia. ICU team was consulted in setting of PE with inability to anticoagulate. GI team consulted, plan currently for scope around noon. In interim since previous evaluation, received EGD with GI team, demonstrating gastric ulcer and patient cleared for AC. This evening, noted to have HR 170s, rapid response called. Found to be febrile to 100.8, 111/65, R18 sat 99% RA. Cardiology fellow at bedside during rapid response, reports SVT and patient given 6mg adenosine, then 12mg adenosine demonstrating underlying AFib. 1L LR and 5mg IV lopressor, 12.5mg PO lopressor given, tachycardia ameliorated to 110s. ICU consult activated given tachycardia.       REVIEW OF SYSTEMS:  As per HPI. Denies current chest pain, SOB, abdominal pain, hematemesis, coffee ground emesis, melena, hematochezia, diarrhea. States that he had chest tightness during the episode of tachycardia with resolution following medication administration. States that this chest tightness was different to his usual GERD symptoms.     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  Other postprocedural status: S/P prostatectomy      FAMILY HISTORY:  Family history of malignant neoplasm of prostate (Father, Sibling): Father      SOCIAL HISTORY:      Home Medications:  abiraterone 250 mg oral tablet: 4 tab(s) orally once a day (23 Sep 2017 15:28)  bisoprolol 10 mg oral tablet: 1 tab(s) orally once a day (23 Sep 2017 15:28)  bisoprolol-hydrochlorothiazide 5 mg-6.25 mg oral tablet: 1 tab(s) orally once a day (23 Sep 2017 15:28)  Caltrate 600 + D oral tablet: 1 tab(s) orally once a day (23 Sep 2017 15:28)  Cipro 500 mg oral tablet: 1 tab(s) orally every 12 hours (23 Sep 2017 15:28)  dorzolamide 2% ophthalmic solution: 1 drop(s) to each affected eye 3 times a day (2016 06:06)  ferrous gluconate: 27 milligram(s) orally once a day (2016 06:06)  finasteride 5 mg oral tablet: 1 tab(s) orally once a day (2016 06:06)  Fish Oil: 100 milligram(s) orally once a day (2016 06:06)  Lumigan 0.01% ophthalmic solution: 1 drop(s) to each affected eye once a day (in the evening) (2016 06:06)  metFORMIN 500 mg oral tablet:  orally  (2016 06:06)  metoprolol tartrate 25 mg oral tablet: 1 tab(s) orally 2 times a day (2016 11:39)  multivitamin: 1 tab(s) orally once a day (2016 06:06)  predniSONE 5 mg oral tablet: 1 tab(s) orally 2 times a day (2016 06:06)  Prevacid 15 mg oral delayed release capsule: 1 cap(s) orally once a day (2016 06:06)  Vitamin B12 1000 mcg oral tablet: 1 tab(s) orally once a day (2016 06:06)  Vitamin D3 2000 intl units oral capsule: 1 cap(s) orally once a day (2016 06:06)      MEDICATIONS  (STANDING):  amLODIPine   Tablet 10 milliGRAM(s) Oral daily  cefTRIAXone   IVPB 1000 milliGRAM(s) IV Intermittent every 24 hours  dextrose 5%. 1000 milliLiter(s) (50 mL/Hr) IV Continuous <Continuous>  dextrose 50% Injectable 12.5 Gram(s) IV Push once  dextrose 50% Injectable 25 Gram(s) IV Push once  dextrose 50% Injectable 25 Gram(s) IV Push once  finasteride 5 milliGRAM(s) Oral daily  insulin lispro (HumaLOG) corrective regimen sliding scale   SubCutaneous Before meals and at bedtime  lactated ringers Bolus 1000 milliLiter(s) IV Bolus once  metoprolol tartrate Injectable 5 milliGRAM(s) IV Push every 6 hours  nystatin    Suspension 665332 Unit(s) Oral every 6 hours  pantoprazole    Tablet 40 milliGRAM(s) Oral two times a day  potassium chloride    Tablet ER 40 milliEquivalent(s) Oral every 4 hours  potassium chloride  20 mEq/100 mL IVPB 20 milliEquivalent(s) IV Intermittent every 2 hours    MEDICATIONS  (PRN):  acetaminophen   Tablet .. 650 milliGRAM(s) Oral every 6 hours PRN Temp greater or equal to 38C (100.4F)  acetaminophen   Tablet .. 650 milliGRAM(s) Oral every 6 hours PRN Mild Pain (1 - 3)  dextrose 40% Gel 15 Gram(s) Oral once PRN Blood Glucose LESS THAN 70 milliGRAM(s)/deciliter  glucagon  Injectable 1 milliGRAM(s) IntraMuscular once PRN Glucose LESS THAN 70 milligrams/deciliter  ondansetron    Tablet 4 milliGRAM(s) Oral every 6 hours PRN Nausea and/or Vomiting      Allergies    No Known Allergies    Intolerances        PHYSICAL EXAM:    Constitutional: WDWN M. NAD, resting comfortably in bed  Head: NC/AT  Eyes: EOMI, anicteric sclera  ENT: no nasal discharge  Neck: supple; no JVD  Respiratory: CTAB; no W/R/R,  Cardiac: +S1/S2; tachycardic, irregular rhythm  Gastrointestinal: soft, NT/ND; no rebound or guarding; +BS. Vertical incision, old  Extremities: WWP, no cyanosis  Musculoskeletal: NROM x4  Vascular: 2+ radial pulses B/L  Dermatologic: skin warm, dry and intact  Neurologic: Alert and oriented, no focal deficits appreciated  Psychiatric: affect and characteristics of appearance, verbalizations, behaviors are appropriate          LABS:                        9.0    2.44  )-----------( 159      ( 2020 00:58 )             26.8     01-14    131<L>  |  99  |  16  ----------------------------<  118<H>  3.3<L>   |  19<L>  |  1.27    Ca    8.3<L>      2020 00:58  Mg     1.5     01-14    TPro  6.4  /  Alb  4.0  /  TBili  0.3  /  DBili  x   /  AST  24  /  ALT  20  /  AlkPhos  36<L>  01-12    PT/INR - ( 2020 10:30 )   PT: 12.4 sec;   INR: 1.08          PTT - ( 2020 20:32 )  PTT:151.3 sec      Lactate, Blood: 0.8 mmol/L (20 @ 00:57)      CARDIAC MARKERS ( 2020 00:58 )  x     / <0.01 ng/mL / x     / x     / x      CARDIAC MARKERS ( 2020 01:56 )  x     / <0.01 ng/mL / 60 U/L / x     / 2.0 ng/mL  CARDIAC MARKERS ( 2020 10:30 )  x     / <0.01 ng/mL / x     / x     / x            Urinalysis Basic - ( 2020 14:08 )    Color: Yellow / Appearance: SL Cloudy / S.010 / pH: x  Gluc: x / Ketone: NEGATIVE  / Bili: Negative / Urobili: 0.2 E.U./dL   Blood: x / Protein: 100 mg/dL / Nitrite: NEGATIVE   Leuk Esterase: NEGATIVE / RBC: Many /HPF / WBC 5-10 /HPF   Sq Epi: x / Non Sq Epi: 0-5 /HPF / Bacteria: Present /HPF          Culture - Blood (collected 2020 03:10)  Source: .Blood Blood  Preliminary Report (2020 16:02):    No growth at 12 hours    Culture - Blood (collected 2020 03:10)  Source: .Blood Blood  Preliminary Report (2020 16:02):    No growth at 12 hours    Culture - Urine (collected 2020 19:27)  Source: .Urine None  Preliminary Report (2020 09:49):    Culture in progress        EKG: AFib RVR to 160s    RADIOLOGY & ADDITIONAL TESTS:  CXR pending      Assessment:  79 year old M w/ PMH prostate CA (on chemo, s/p prostatectomy), HTN, HLD, T2DM admitted for PE, now with coffee ground emesis and likely GIB. GI team consulted, found to have ulcer, resumed AC. Tonight with tachyarrhythmia, SVT--> AFib RVR. ICU consulted for tachyarrhythmia    Plan:  #AFib RVR  - C/w metoprolol, treat hyperthermia  - Obtain blood cultures, f/u result and fever workup UA  - Replete hypokalemia, hypoMg  - Check EKG should chest tightness return, and in AM  - C/w antibiotic coverage, CXR no acute infiltrate    #Duodenal ulcer  - C/w AC as per GI not contraindicated  - PPI as per GI, f/u pathology  - Hb stable at 9.0. F/u CBC if further change in vitals/hemodynamic status or clinical suspicion of bleed. Less likely at present given Hb stable and no signs of bleed    #PE  - AC as above  - Monitor heart rate, telemetry  - Respiratory status stable, continue to monitor    Disposition: 7 lachman for monitoring in setting of tachyarrhythmia

## 2020-01-14 NOTE — PROGRESS NOTE ADULT - PROBLEM SELECTOR PLAN 4
2 episodes of coffee ground emesis. EGD 1/13/20: Esophagitis, 2cm hiatal hernia, Gastritis, duodenitis, 2 small clean based ulcer in D2  -Continue PPI 40 mg BID PO for 8w (End 3/9) and evaluate for role of continue PPI while on A/C  -f/u serum H pylori Ab-> Triple therapy pending biopsy or Ab results  -reconsult GI if reoccurs  -active t&S 1/13  - no contraindication to a/c per GI

## 2020-01-14 NOTE — PROGRESS NOTE ADULT - PROBLEM SELECTOR PLAN 8
Known hx of HLD, on simvastatin 20mg at home.  -C/w home medication (simvastatin 20mg QD)    #Type 2 DM  - Known hx of DM  -SSI, monitor FSG    #Urinary Incontinence  -holding home Myrbetriq 50mg qd

## 2020-01-14 NOTE — PROGRESS NOTE ADULT - PROBLEM SELECTOR PLAN 7
-restart home amlodipine 10mg qd  -hold valsartan in setting of hypotension and elevated Cr  -HCTZ and Beta blocker d/c on previous admission

## 2020-01-14 NOTE — PROGRESS NOTE ADULT - PROBLEM SELECTOR PLAN 2
- Pt complaining of chest pain and difficulty breathing w/ HR in 170s. EKG showing SVT vs AF w/ rvr - s/p adenosine and lopressor. Repeat ekg showing afib w/ rvr. Possibly 2/2 PE vs electrolyte derangement (K and Mg) vs beta blocker withdrawal (Home bisoprolol 2.5 mg was held on admission)  - c/w with IV lopressor 5 mg q6h  - f/u echo  - replete K and Mg  - daily EKGs    #Hematuria  - heparin gtt held in setting of new hematuria. Pt being treated with heparin for acute PE. Now with new onset atrial fibrillation. Hb stable at 9.0. Chadvasc score 5. Consider restarting heparin gtt depending on risks and benefits of a/c

## 2020-01-14 NOTE — PHYSICAL THERAPY INITIAL EVALUATION ADULT - CRITERIA FOR SKILLED THERAPEUTIC INTERVENTIONS
anticipated equipment needs at discharge/anticipated discharge recommendation/risk reduction/prevention/therapy frequency/rehab potential/functional limitations in following categories/impairments found

## 2020-01-14 NOTE — PHYSICAL THERAPY INITIAL EVALUATION ADULT - PERTINENT HX OF CURRENT PROBLEM, REHAB EVAL
79M w/ stage IV prostate CA (s/p prostatectomy, on chemotherapy), who p/w progressive SOB and JAIME x1mo associated w/ generalized fatigue in the setting of decreased PO intake, found to have segmental PE of RLL on CTA Chest and LLE DVT on US Dopplers likely 2/2 malignancy. Now with new onset atrial fibrillation.

## 2020-01-14 NOTE — PROGRESS NOTE ADULT - PROBLEM SELECTOR PLAN 3
Met SIRS criteria (leukopenia, tachycardia) on admission, however leukopenia likely due to chemotherapy and tachycardia likely due to PE. lactate negative. Patient with symptomatic dysuria and intermittent hematuria  -initial urinalysis positive for WBC, bacteria, RBCs (neg leuk esterase, neg nitrates).   -c/w ceftriaxone 1g QD for treatment of UTI (End Date 1/16)  -pt with fever of 100.8 during rapid response  -f/u repeat U/A and Bcx  -F/u urine cultures. If negative, will d/c ceftriaxone Met SIRS criteria (leukopenia, tachycardia) on admission, however leukopenia likely due to chemotherapy and tachycardia likely due to PE. lactate negative. Patient with symptomatic dysuria and intermittent hematuria but UA with only 5-10 wbcs and negative LE and nitrites.   -dc ceftriaxone 1g QD  -f/u Bcx  -urine cultures collected on 1/12 contaminated

## 2020-01-14 NOTE — PROGRESS NOTE ADULT - PROBLEM SELECTOR PLAN 1
Saturating 100% on RA. PE on CTA chest, likely due to DVT 2/2 malignancy (LLE DVT on US Dopplers).  -low suspicion for R heart strain as troponin neg, BNP neg, EKG at the time w/o sinus tach, no RBBB  -f/u ECHO to r/o R heart strain  - hep ggt held due to hematuria, no contraindication for a/c per GI  - consider restarting heparin gtt after assessing risk and benefits of a/c  - f/u AM PTT  #DVT  P/w SOB and JAIME, found to have PE on CTA chest. LE dopplers w/ DVT in LLE intramuscular calf vein. Etiology likely 2/2 malignancy.  -Intramuscular calf vein thrombus does not require treatment PE on CTA chest, likely due to DVT 2/2 malignancy (LLE DVT on US Dopplers).  -low suspicion for R heart strain as troponin neg, BNP neg  -f/u ECHO to r/o R heart strain  - hep ggt held due to hematuria, no contraindication for a/c per GI, restarting lovenox 1mg/kg bid    #DVT  P/w SOB and JAIME, found to have PE on CTA chest. LE dopplers w/ DVT in LLE intramuscular calf vein. Etiology likely 2/2 malignancy.  -Intramuscular calf vein thrombus does not require treatment  - treat PE as above

## 2020-01-14 NOTE — PROGRESS NOTE ADULT - PROBLEM SELECTOR PLAN 3
Met SIRS criteria (leukopenia, tachycardia) on admission, however leukopenia likely due to chemotherapy and tachycardia likely due to PE. lactate negative. w/ symptomatic dysuria and intermittent hematuria  -initial urinalysis positive for WBC, bacteria, RBCs (neg leuk esterase, neg nitrates).   -c/w ceftriaxone 1g QD for treatment of UTI (End Date 1/16)  -pt with fever of 100.8 during rapid response  -f/u repeat U/A and Bcx  -F/u urine cultures. If negative, will d/c ceftriaxone Met SIRS criteria (leukopenia, tachycardia) on admission, however leukopenia likely due to chemotherapy and tachycardia likely due to PE. lactate negative. Patient with symptomatic dysuria and intermittent hematuria  -initial urinalysis positive for WBC, bacteria, RBCs (neg leuk esterase, neg nitrates).   -c/w ceftriaxone 1g QD for treatment of UTI (End Date 1/16)  -pt with fever of 100.8 during rapid response  -f/u repeat U/A and Bcx  -F/u urine cultures. If negative, will d/c ceftriaxone

## 2020-01-14 NOTE — PROGRESS NOTE ADULT - PROBLEM SELECTOR PLAN 10
1) PCP Brennan  2) Date of Contact with PCP: attempted call 1/13  3) PCP Contacted at Discharge: TBD  4) Summary of Handoff Given to PCP: TBD  5) Post-Discharge Appointment Date and Location: TBD

## 2020-01-14 NOTE — CHART NOTE - NSCHARTNOTEFT_GEN_A_CORE
~1230 am Rapid response called for HR 170s.    On bedside evaluation patient was noted to be mentating well AAO x3.   VS Rectal T 100.8 /65 P72 R18 99% on RA.  Physical exam:  CVS: tachycardic, S1/2 no murmurs  Resp: CTAB no wheezing  Abd: non tender BS+, no guarding  Ext: 2+ pulses without edema    #Tachycardia to 170's  Initial EKG: concerning for SVT vs new onset atrial fibrillation. Patient was given IV Adenosine 6mg >12mg revealing underlying atrial fibrillation. Patient subsequently given 1L LR bolus with lopressor 5mg IV and 12mg PO with improvement of HR to 110>120s with improvement in symptoms of SOB and chest pressure    - Cardiology fellow at bedside assisting with Rapid response  - Lopressor 5mg IV q6hrs with hold parameters HR<60 SBP <90  - Holding Heparin in the setting of hematuria  - Echocardiogram in AM  - Daily EKG  - ICU consulted to determine need for higher level of care.    Plan discussed with Cardio fellow and Attending Dr Georges at bedside. ~1230 am Rapid response called for HR 170s.    On bedside evaluation patient was noted to be mentating well AAO x3.   VS Rectal T 100.8 /65 P72 R18 99% on RA.  Physical exam:  CVS: tachycardic, S1/2 no murmurs  Resp: CTAB no wheezing  Abd: non tender BS+, no guarding  Ext: 2+ pulses without edema    #Tachycardia to 170's  Initial EKG: concerning for SVT vs new onset atrial fibrillation. Patient was given IV Adenosine 6mg >12mg revealing underlying atrial fibrillation. Patient subsequently given 1L LR bolus with lopressor 5mg IV and 12mg PO with improvement of HR to 110>120s with improvement in symptoms of SOB and chest pressure. Tachycardia likely in the setting of occult infection vs beta blocker withdrawal (Bisoprolol 2.5 mg at home held on admission)    - Cardiology fellow at bedside assisting with Rapid response  - Lopressor 5mg IV q6hrs with hold parameters HR<60 SBP <90  - Holding Heparin in the setting of hematuria  - Echocardiogram in AM  - Daily EKG  - ICU consulted to determine need for higher level of care.    #SIRS 2/4 (tachycardia and T 100.8)  - Patient given Tylenol PO x1  - f/u UA, CXR and Blood cultures  - Monitor Uout    Plan discussed with Cardio fellow and Attending Dr Georges at bedside. ~1230 am Rapid response called for HR 170s.    On bedside evaluation patient was noted to be mentating well AAO x3.   VS Rectal T 100.8 /65 P72 R18 99% on RA.  Physical exam:  CVS: tachycardic, S1/2 no murmurs  Resp: CTAB no wheezing  Abd: non tender BS+, no guarding  Ext: 2+ pulses without edema    #Tachycardia to 170's  Initial EKG: concerning for SVT vs new onset atrial fibrillation. Patient was given IV Adenosine 6mg >12mg revealing underlying atrial fibrillation. Patient subsequently given 1L LR bolus with lopressor 5mg IV and Lopressor 12.5mg PO x1 with improvement of HR to 110>120s. Tachycardia likely in the setting beta blocker withdrawal (Bisoprolol 2.5 mg at home held on admission), now with new onset atrial fibrillation     - Cardiology fellow at bedside assisting with Rapid response  - Lopressor 5mg IV q6hrs with hold parameters HR<60 SBP <90  - Holding Heparin in the setting of hematuria  - Echocardiogram  - Daily EKGs  - ICU consulted to determine need for higher level of care    #SIRS 3/4 (tachycardia and T 100.8 leukopenia) in the setting of underlying PE. On Ceftriaxone 1g QD without identifiable source.   - Patient given Tylenol PO x1  - f/u Repeat UA, CXR and Blood cultures  - Monitor Uout  - s/p 1L LR    Plan discussed with Cardio fellow and Attending Dr Georges at bedside. ~1230 am Rapid response called for HR 170s.    On bedside evaluation patient was noted to be mentating well AAO x3.   VS Rectal T 100.8 /65 P72 R18 99% on RA.  Physical exam:  CVS: tachycardic, S1/2 no murmurs  Resp: CTAB no wheezing  Abd: non tender BS+, no guarding  Ext: 2+ pulses without edema    #Tachycardia to 170's  Initial EKG: concerning for SVT vs new onset atrial fibrillation. Patient was given IV Adenosine 6mg >12mg revealing underlying atrial fibrillation. Patient subsequently given 1L LR bolus with lopressor 5mg IV and Lopressor 12.5mg PO x1 with improvement of HR to 110>120s. New onset atrial fibrillation w/ RVR likely in the setting beta blocker withdrawal (Bisoprolol 2.5 mg at home held on admission) vs electrolyte derangements (Mg 1.5. and K- 3.3), vs PE    - Cardiology fellow at bedside assisting with Rapid response  - Lopressor 5mg IV q6hrs with hold parameters HR<60 SBP <90  - Holding Heparin in the setting of hematuria (CHADsVASc 5)  - Echocardiogram  - Daily EKGs  - ICU consulted to determine need for higher level of care    #SIRS 3/4 (tachycardia and T 100.8 leukopenia) in the setting of underlying PE. On Ceftriaxone 1g QD without identifiable source. Considering PE as likely etiology.   - Patient given Tylenol PO x1  - f/u Repeat UA, CXR and Blood cultures  - Monitor Uout  - s/p 1L LR    Plan discussed with Cardio fellow and Attending Dr Georges at bedside.

## 2020-01-14 NOTE — PROGRESS NOTE ADULT - PROBLEM SELECTOR PLAN 8
Known hx of HLD, on simvastatin 20mg at home.  Plan:    -C/w home medication (simvastatin 20mg QD)    #Type 2 DM  - Known hx of DM  -SSI, monitor FSG    #Urinary Incontinence  -holding home Myrbetriq 50mg qd

## 2020-01-14 NOTE — PHYSICAL THERAPY INITIAL EVALUATION ADULT - GENERAL OBSERVATIONS, REHAB EVAL
Pt found semi supine in bed in NAD with daughter at bedside, +telemetry, +2LO2 removed per MD, +hep lock, agreeable to PT Eval and cleared by MD team and RN Marla.

## 2020-01-14 NOTE — CONSULT NOTE ADULT - ASSESSMENT
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       #Advanced CRPC (Heavily treated)   #PE/DVT  #GIB/Hematuria/Borderline Macrocytic Anemia/Leukopenia     -s/p cytotoxic chemotherapy on 12/31 (Cabazitaxel), along with Pred daily (remains on PPI), last PSA as outpatient 22.9 (2 weeks ago), consider repeat PSA and testosterone, hold any cytotoxic therapy as inpatient  -s/p EGD found to have esophagitis, gastritis, duodenitis, 2 small clean based ulcers in D2, path submitted f/u for h.pylori, moderate risk per extrapolated hasbled, however per Gi no direct contraindication for AC  -pt known to Dr. Roa, urology following, aware of hematuria recommend US to r/o clot in bladder   -per primary team patient rechallenged w/ Lovenox, also per cardiology recommend d/c on Eliquis as tolerated given Afib and thrombosis history/malignancy risk   -obtain retic count, B12, folate, iron panel w/ tibc + ferritin   -leukopenia uptrending (likely 2/2 systemic therapy), obtain CBC w/ diff to f/u ANC, no fevers at this time   -dvt ppx on AC       d/w Dr. Fuller  outpt follow up w/ [facility]. 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       #Advanced CRPC (Heavily treated)   #PE/DVT  #GIB/Hematuria/Borderline Macrocytic Anemia/Leukopenia     -s/p cytotoxic chemotherapy on 12/31 (Cabazitaxel), along with Pred daily (remains on PPI), last PSA as outpatient 22.9 (2 weeks ago), consider repeat PSA and testosterone, hold any cytotoxic therapy as inpatient  -s/p EGD found to have esophagitis, gastritis, duodenitis, 2 small clean based ulcers in D2, path submitted f/u for h.pylori, moderate risk per extrapolated hasbled, however per Gi no direct contraindication for AC  -pt known to Dr. Roa, urology following, aware of hematuria recommend US to r/o clot in bladder   -per primary team patient rechallenged w/ Lovenox, also per cardiology recommend d/c on Eliquis as tolerated given Afib and thrombosis history/malignancy risk, will need to consider risk/benefit of IR/Vascular IVC filter placement if recurrent bleed while inpatient   -obtain retic count, B12, folate, iron panel w/ tibc + ferritin   -leukopenia uptrending (likely 2/2 systemic therapy), obtain CBC w/ diff to f/u ANC, no fevers at this time   -dvt ppx on AC       d/w Dr. Fuller  outpt follow up w/ Dr. Fuller at [facility]

## 2020-01-14 NOTE — PROGRESS NOTE ADULT - ASSESSMENT
79M w/ stage IV prostate CA (s/p prostatectomy, on chemotherapy), who p/w progressive SOB and JAIME x1mo associated w/ generalized fatigue in the setting of decreased PO intake, found to have segmental PE of RLL on CTA Chest and LLE DVT on US Dopplers likely 2/2 malignancy. Now with new onset atrial fibrillation. Transferred from [facility] to  for further management.

## 2020-01-14 NOTE — PROGRESS NOTE ADULT - ASSESSMENT
79M w/ stage IV prostate CA (s/p prostatectomy, on chemotherapy), who p/w progressive SOB and JAIME x1mo associated w/ generalized fatigue in the setting of decreased PO intake, found to have segmental PE of RLL on CTA Chest and LLE DVT on US Dopplers likely 2/2 malignancy. Now with new onset atrial fibrillation. Transferred from [facility] to  for further management.

## 2020-01-14 NOTE — PROGRESS NOTE ADULT - PROBLEM SELECTOR PLAN 10
1) PCP Brennan Germain [phone]  2) Date of Contact with PCP: attempted call 1/13  3) PCP Contacted at Discharge: TBD  4) Summary of Handoff Given to PCP: TBD  5) Post-Discharge Appointment Date and Location: TBD

## 2020-01-14 NOTE — PROGRESS NOTE ADULT - PROBLEM SELECTOR PLAN 9
F: none  E: replete K <4, Mg <2  N: DASH diet  Last BM: 1/12  VTE: Hep ggt  GI PPx: Protonix 40mg IV BID  Sleep Aid: none  Dispo: [facility]  Code:

## 2020-01-14 NOTE — PROGRESS NOTE ADULT - PROBLEM SELECTOR PLAN 1
Saturating 100% on RA. PE on CTA chest, likely due to DVT 2/2 malignancy (LLE DVT on US Dopplers).  -low suspicion for R heart strain as troponin neg, BNP neg, EKG at the time w/o sinus tach, no RBBB  -f/u ECHO to r/o R heart strain  - hep ggt held due to hematuria, no contraindication for a/c per GI  - consider restarting heparin gtt    #DVT  P/w SOB and JAIEM, found to have PE on CTA chest. LE dopplers w/ DVT in LLE intramuscular calf vein. Etiology likely 2/2 malignancy.  -Rx as per above Saturating 100% on RA. PE on CTA chest, likely due to DVT 2/2 malignancy (LLE DVT on US Dopplers).  -low suspicion for R heart strain as troponin neg, BNP neg, EKG at the time w/o sinus tach, no RBBB  -f/u ECHO to r/o R heart strain  - hep ggt held due to hematuria, no contraindication for a/c per GI  - consider restarting heparin gtt after assessing risk and benefits of a/c  - f/u AM PTT  #DVT  P/w SOB and JAIME, found to have PE on CTA chest. LE dopplers w/ DVT in LLE intramuscular calf vein. Etiology likely 2/2 malignancy.  -Intramuscular calf vein thrombus does not require treatment

## 2020-01-14 NOTE — PROGRESS NOTE ADULT - SUBJECTIVE AND OBJECTIVE BOX
Transfer note from [facility] to     Hospital course:   79M w/ PMH s/f stage IV prostate CA (s/p prostatectomy, on chemotherapy), who p/w progressive SOB and JAIME x1mo associated w/ generalized fatigue in the setting of decreased PO intake, found to have segmental PE of RLL on CTA Chest and LLE DVT on US Dopplers likely 2/2 malignancy. Pt was initially started on lovenox for treatment of PE. Pt had episode of coffee ground emesis x2. Lovenox was held with ICU and GI was consulted at that time. Pt scoped with EGD 20: Esophagitis, Gastritis, duodenitis, 2 small clean based ulcer in D2. Continued on protonix per GI recs and there was no direct contraindication for a/c. Pt was then started on heparin gtt which was held in setting of hematuria. Rapid response was called w/ vitals T 100.8 /65 HR 170s R 18 99% on 2L O2 with complaints of chest pain and difficulty breathing. Initial EKG was concerning for SVT vs new onset atrial fibrillation. Patient was given IV Adenosine 6mg >12mg revealing underlying atrial fibrillation. Patient subsequently given 1L LR bolus with lopressor 5mg IV and Lopressor 12.5mg PO x1 with improvement of HR to 110>120s. Tachycardia likely in the setting beta blocker withdrawal (Bisoprolol 2.5 mg at home held on admission), now with new onset atrial fibrillation. ICU consult called and pt to be transferred to  for further management.     SUBJECTIVE / INTERVAL HPI: Patient seen and examined at bedside. Pt was complaining of chest pressure and difficulty breathing.     VITAL SIGNS:  Vital Signs Last 24 Hrs  T(C): 37.6 (2020 21:18), Max: 37.6 (2020 21:18)  T(F): 99.6 (2020 21:18), Max: 99.6 (2020 21:18)  HR: 73 (2020 21:18) (73 - 100)  BP: 104/53 (2020 21:18) (104/53 - 131/72)  BP(mean): --  RR: 18 (2020 21:18) (17 - 20)  SpO2: 96% (2020 21:18) (95% - 98%)    PHYSICAL EXAM:    General: WDWN, lying in bed appears anxious  HEENT: NC/AT; PERRL, anicteric sclera; MMM  Neck: supple  Cardiovascular: +S1/S2, tachycardiac, irregular  Respiratory: CTA B/L; no W/R/R  Gastrointestinal: soft, NT/ND; +BSx4  Extremities: WWP; no edema, clubbing or cyanosis  Vascular: 2+ radial, DP/PT pulses B/L  Neurological: AAOx3; no focal deficits    MEDICATIONS:  MEDICATIONS  (STANDING):  amLODIPine   Tablet 10 milliGRAM(s) Oral daily  cefTRIAXone   IVPB 1000 milliGRAM(s) IV Intermittent every 24 hours  dextrose 5%. 1000 milliLiter(s) (50 mL/Hr) IV Continuous <Continuous>  dextrose 50% Injectable 12.5 Gram(s) IV Push once  dextrose 50% Injectable 25 Gram(s) IV Push once  dextrose 50% Injectable 25 Gram(s) IV Push once  finasteride 5 milliGRAM(s) Oral daily  insulin lispro (HumaLOG) corrective regimen sliding scale   SubCutaneous Before meals and at bedtime  lactated ringers Bolus 1000 milliLiter(s) IV Bolus once  metoprolol tartrate Injectable 5 milliGRAM(s) IV Push every 6 hours  nystatin    Suspension 005377 Unit(s) Oral every 6 hours  pantoprazole    Tablet 40 milliGRAM(s) Oral two times a day  potassium chloride    Tablet ER 40 milliEquivalent(s) Oral every 4 hours  potassium chloride  20 mEq/100 mL IVPB 20 milliEquivalent(s) IV Intermittent every 2 hours    MEDICATIONS  (PRN):  acetaminophen   Tablet .. 650 milliGRAM(s) Oral every 6 hours PRN Temp greater or equal to 38C (100.4F)  acetaminophen   Tablet .. 650 milliGRAM(s) Oral every 6 hours PRN Mild Pain (1 - 3)  dextrose 40% Gel 15 Gram(s) Oral once PRN Blood Glucose LESS THAN 70 milliGRAM(s)/deciliter  glucagon  Injectable 1 milliGRAM(s) IntraMuscular once PRN Glucose LESS THAN 70 milligrams/deciliter  ondansetron    Tablet 4 milliGRAM(s) Oral every 6 hours PRN Nausea and/or Vomiting      ALLERGIES:  Allergies    No Known Allergies    Intolerances        LABS:                        9.0    2.44  )-----------( 159      ( 2020 00:58 )             26.8     01-14    131<L>  |  99  |  16  ----------------------------<  118<H>  3.3<L>   |  19<L>  |  1.27    Ca    8.3<L>      2020 00:58  Mg     1.5         TPro  6.4  /  Alb  4.0  /  TBili  0.3  /  DBili  x   /  AST  24  /  ALT  20  /  AlkPhos  36<L>      PT/INR - ( 2020 10:30 )   PT: 12.4 sec;   INR: 1.08          PTT - ( 2020 20:32 )  PTT:151.3 sec  Urinalysis Basic - ( 2020 14:08 )    Color: Yellow / Appearance: SL Cloudy / S.010 / pH: x  Gluc: x / Ketone: NEGATIVE  / Bili: Negative / Urobili: 0.2 E.U./dL   Blood: x / Protein: 100 mg/dL / Nitrite: NEGATIVE   Leuk Esterase: NEGATIVE / RBC: Many /HPF / WBC 5-10 /HPF   Sq Epi: x / Non Sq Epi: 0-5 /HPF / Bacteria: Present /HPF      CAPILLARY BLOOD GLUCOSE      POCT Blood Glucose.: 125 mg/dL (2020 00:42)      RADIOLOGY & ADDITIONAL TESTS: Reviewed. Transfer note from [facility] to     Hospital course:   79M w/ PMH s/f stage IV prostate CA (s/p prostatectomy, on chemotherapy), who p/w progressive SOB and JAIME x1mo associated w/ generalized fatigue in the setting of decreased PO intake, found to have segmental PE of RLL on CTA Chest and LLE DVT on US Dopplers likely 2/2 malignancy. Pt was initially started on lovenox for treatment of PE. Pt had episode of coffee ground emesis x2. Lovenox was held with ICU and GI was consulted at that time. Pt scoped with EGD 20: Esophagitis, Gastritis, duodenitis, 2 small clean based ulcer in D2. Continued on protonix per GI recs and there was no direct contraindication for a/c. Pt was then started on heparin gtt which was held in setting of hematuria. Rapid response was called w/ vitals T 100.8 /65 HR 170s R 18 99% on 2L O2 with complaints of chest pain and difficulty breathing. Initial EKG was concerning for SVT vs new onset atrial fibrillation. Patient was given IV Adenosine 6mg >12mg revealing underlying atrial fibrillation. Patient subsequently given 1L LR bolus with lopressor 5mg IV and Lopressor 12.5mg PO x1 with improvement of HR to 110>120s. Tachycardia likely in the setting beta blocker withdrawal (Bisoprolol 2.5 mg at home held on admission) vs PE, now with new onset atrial fibrillation. ICU consult called and pt to be transferred to  for further management.     SUBJECTIVE / INTERVAL HPI: Patient seen and examined at bedside. Pt was complaining of chest pressure and difficulty breathing.     VITAL SIGNS:  Vital Signs Last 24 Hrs  T(C): 37.6 (2020 21:18), Max: 37.6 (2020 21:18)  T(F): 99.6 (2020 21:18), Max: 99.6 (2020 21:18)  HR: 73 (2020 21:18) (73 - 100)  BP: 104/53 (2020 21:18) (104/53 - 131/72)  BP(mean): --  RR: 18 (2020 21:18) (17 - 20)  SpO2: 96% (2020 21:18) (95% - 98%)    PHYSICAL EXAM:    General: WDWN, lying in bed appears anxious  HEENT: NC/AT; PERRL, anicteric sclera; MMM  Neck: supple  Cardiovascular: +S1/S2, tachycardiac, irregular  Respiratory: CTA B/L; no W/R/R  Gastrointestinal: soft, NT/ND; +BSx4  Extremities: WWP; no edema, clubbing or cyanosis  Vascular: 2+ radial, DP/PT pulses B/L  Neurological: AAOx3; no focal deficits    MEDICATIONS:  MEDICATIONS  (STANDING):  amLODIPine   Tablet 10 milliGRAM(s) Oral daily  cefTRIAXone   IVPB 1000 milliGRAM(s) IV Intermittent every 24 hours  dextrose 5%. 1000 milliLiter(s) (50 mL/Hr) IV Continuous <Continuous>  dextrose 50% Injectable 12.5 Gram(s) IV Push once  dextrose 50% Injectable 25 Gram(s) IV Push once  dextrose 50% Injectable 25 Gram(s) IV Push once  finasteride 5 milliGRAM(s) Oral daily  insulin lispro (HumaLOG) corrective regimen sliding scale   SubCutaneous Before meals and at bedtime  lactated ringers Bolus 1000 milliLiter(s) IV Bolus once  metoprolol tartrate Injectable 5 milliGRAM(s) IV Push every 6 hours  nystatin    Suspension 777394 Unit(s) Oral every 6 hours  pantoprazole    Tablet 40 milliGRAM(s) Oral two times a day  potassium chloride    Tablet ER 40 milliEquivalent(s) Oral every 4 hours  potassium chloride  20 mEq/100 mL IVPB 20 milliEquivalent(s) IV Intermittent every 2 hours    MEDICATIONS  (PRN):  acetaminophen   Tablet .. 650 milliGRAM(s) Oral every 6 hours PRN Temp greater or equal to 38C (100.4F)  acetaminophen   Tablet .. 650 milliGRAM(s) Oral every 6 hours PRN Mild Pain (1 - 3)  dextrose 40% Gel 15 Gram(s) Oral once PRN Blood Glucose LESS THAN 70 milliGRAM(s)/deciliter  glucagon  Injectable 1 milliGRAM(s) IntraMuscular once PRN Glucose LESS THAN 70 milligrams/deciliter  ondansetron    Tablet 4 milliGRAM(s) Oral every 6 hours PRN Nausea and/or Vomiting      ALLERGIES:  Allergies    No Known Allergies    Intolerances        LABS:                        9.0    2.44  )-----------( 159      ( 2020 00:58 )             26.8     01-14    131<L>  |  99  |  16  ----------------------------<  118<H>  3.3<L>   |  19<L>  |  1.27    Ca    8.3<L>      2020 00:58  Mg     1.5         TPro  6.4  /  Alb  4.0  /  TBili  0.3  /  DBili  x   /  AST  24  /  ALT  20  /  AlkPhos  36<L>      PT/INR - ( 2020 10:30 )   PT: 12.4 sec;   INR: 1.08          PTT - ( 2020 20:32 )  PTT:151.3 sec  Urinalysis Basic - ( 2020 14:08 )    Color: Yellow / Appearance: SL Cloudy / S.010 / pH: x  Gluc: x / Ketone: NEGATIVE  / Bili: Negative / Urobili: 0.2 E.U./dL   Blood: x / Protein: 100 mg/dL / Nitrite: NEGATIVE   Leuk Esterase: NEGATIVE / RBC: Many /HPF / WBC 5-10 /HPF   Sq Epi: x / Non Sq Epi: 0-5 /HPF / Bacteria: Present /HPF      CAPILLARY BLOOD GLUCOSE      POCT Blood Glucose.: 125 mg/dL (2020 00:42)      RADIOLOGY & ADDITIONAL TESTS: Reviewed.

## 2020-01-14 NOTE — CONSULT NOTE ADULT - ASSESSMENT
per Internal Medicine    79M w/ stage IV prostate CA (s/p prostatectomy, on chemotherapy), who p/w progressive SOB and JAIME x1mo associated w/ generalized fatigue in the setting of decreased PO intake, found to have segmental PE of RLL on CTA Chest and LLE DVT on US Dopplers likely 2/2 malignancy. Now with new onset atrial fibrillation. Transferred from [facility] to  for further management.     Problem/Plan - 1:  ·  Problem: Pulmonary embolus.  Plan: Saturating 100% on RA. PE on CTA chest, likely due to DVT 2/2 malignancy (LLE DVT on US Dopplers).  -low suspicion for R heart strain as troponin neg, BNP neg, EKG at the time w/o sinus tach, no RBBB  -f/u ECHO to r/o R heart strain  - hep ggt held due to hematuria, no contraindication for a/c per GI  - consider restarting heparin gtt after assessing risk and benefits of a/c  - f/u AM PTT  #DVT  P/w SOB and JAIME, found to have PE on CTA chest. LE dopplers w/ DVT in LLE intramuscular calf vein. Etiology likely 2/2 malignancy.  -Intramuscular calf vein thrombus does not require treatment.     Problem/Plan - 2:  ·  Problem: New onset a-fib.  Plan: - Pt complaining of chest pain and difficulty breathing w/ HR in 170s. EKG showing SVT vs AF w/ rvr - s/p adenosine and lopressor. Repeat ekg showing afib w/ rvr. Possibly 2/2 PE vs electrolyte derangement (K and Mg) vs beta blocker withdrawal (Home bisoprolol 2.5 mg was held on admission)  - c/w with lopressor 12.5 mg q12h  - f/u echo  - replete K and Mg  - daily EKGs    #Hematuria  - heparin gtt held in setting of new hematuria. Pt being treated with heparin for acute PE. Now with new onset atrial fibrillation. Hb stable at 9.0. Chadvasc score 5. Consider restarting heparin gtt depending on risks and benefits of a/c  -f/u AM PTT.     Problem/Plan - 3:  ·  Problem: Sepsis without acute organ dysfunction, due to unspecified organism.  Plan: Met SIRS criteria (leukopenia, tachycardia) on admission, however leukopenia likely due to chemotherapy and tachycardia likely due to PE. lactate negative. Patient with symptomatic dysuria and intermittent hematuria  -initial urinalysis positive for WBC, bacteria, RBCs (neg leuk esterase, neg nitrates).   -c/w ceftriaxone 1g QD for treatment of UTI (End Date 1/16)  -pt with fever of 100.8 during rapid response  -f/u repeat U/A and Bcx  -F/u urine cultures. If negative, will d/c ceftriaxone.     Problem/Plan - 4:  ·  Problem: R/O H pylori ulcer.  Plan: 2 episodes of coffee ground emesis. EGD 1/13/20: Esophagitis, 2cm hiatal hernia, Gastritis, duodenitis, 2 small clean based ulcer in D2  -Continue PPI 40 mg BID PO for 8w (End 3/9) and evaluate for role of continue PPI while on A/C  -f/u serum H pylori Ab-> Triple therapy pending biopsy or Ab results  -reconsult GI if reoccurs  -active T&S 1/13  - no contraindication to a/c per GI.     Problem/Plan - 5:  ·  Problem: Prostate cancer.  Plan: Known hx of stage IV prostate cancer (s/p prostatectomy, on chemotherapy). Leukopenia on admission likely 2/2 chemotherapy. Last chemo 12/31, next Jevtana 1/17  -Follows with Dr. Fuller, next chemotherapy planned for next week  -f/u heme/onc rec  -c/w finasteride 5mg qd  - (severe neutropenia).     Problem/Plan - 6:  Problem: Oral candidiasis. Plan: White exudates visualized in oropharynx, likely candidiasis as pt is immunocompromised due to chemotherapy  -nystatin swish and swallow q6 for 2 weeks (End 1/25).    Problem/Plan - 7:  ·  Problem: Essential hypertension.  Plan: -restart home amlodipine 10mg qd  -hold valsartan in setting of hypotension and elevated Cr  -HCTZ and Beta blocker d/c on previous admission.     Problem/Plan - 8:  ·  Problem: Hyperlipidemia.  Plan: Known hx of HLD, on simvastatin 20mg at home.  -C/w home medication (simvastatin 20mg QD)    #Type 2 DM  - Known hx of DM  -SSI, monitor FSG    #Urinary Incontinence  -holding home Myrbetriq 50mg qd.     Problem/Plan - 9:  ·  Problem: Nutrition, metabolism, and development symptoms.  Plan: F: none  E: replete K <4, Mg <2  N: DASH diet  Last BM: 1/12  VTE: Hep ggt  GI PPx: Protonix 40mg IV BID  Sleep Aid: none  Dispo: [facility]  Code:

## 2020-01-14 NOTE — PROGRESS NOTE ADULT - PROBLEM SELECTOR PLAN 9
F: none  E: replete K <4, Mg <2  N: DASH diet  Last BM: 1/12  VTE: Hep ggt  GI PPx: Protonix 40mg IV BID  Sleep Aid: none  Dispo: [facility]  Code:

## 2020-01-14 NOTE — PHYSICAL THERAPY INITIAL EVALUATION ADULT - ADDITIONAL COMMENTS
Pt reports living alone in a  with ~12 [place] with b/l HRs and ~12 more steps to bed/bath on 2nd floor with 1 HR. Pt reports being completely independent with all ADL's and mobility without use of an assisitive device, however owns a cane. Pt reports he is currently still working. Pt's daughter reports children check up on pt regularly everyday.

## 2020-01-14 NOTE — PROGRESS NOTE ADULT - SUBJECTIVE AND OBJECTIVE BOX
Transfer Acceptance Note from [facility] to     Hospital Course:   79M w/ PMH s/f stage IV prostate CA (s/p prostatectomy, on chemotherapy), who p/w progressive SOB and JAIME x1mo associated w/ generalized fatigue in the setting of decreased PO intake, found to have segmental PE of RLL on CTA Chest and LLE DVT on US Dopplers likely 2/2 malignancy. Pt was initially started on lovenox for treatment of PE. Pt had episode of coffee ground emesis x2. Lovenox was held with ICU and GI was consulted at that time. Pt scoped with EGD 20: Esophagitis, Gastritis, duodenitis, 2 small clean based ulcer in D2. Continued on protonix per GI recs and there was no direct contraindication for a/c. Pt was then started on heparin gtt which was held in setting of hematuria. Rapid response was called w/ vitals T 100.8 /65 HR 170s R 18 99% on 2L O2 with complaints of chest pain and difficulty breathing. Initial EKG was concerning for SVT vs new onset atrial fibrillation. Patient was given IV Adenosine 6mg >12mg revealing underlying atrial fibrillation. Patient subsequently given 1L LR bolus with lopressor 5mg IV and Lopressor 12.5mg PO x1 with improvement of HR to 110>120s. Tachycardia likely in the setting beta blocker withdrawal (Bisoprolol 2.5 mg at home held on admission) vs PE, now with new onset atrial fibrillation. ICU consult called and pt to be transferred to  for further management.     SUBJECTIVE / INTERVAL HPI: Patient seen and examined at bedside this AM. Patient states that he has leg pain that he has been having for months but denies any other complaints. He denies chest pain, palpitations, shortness of breath.     VITAL SIGNS:  Vital Signs Last 24 Hrs  T(C): 36.2 (2020 04:19), Max: 37.6 (2020 21:18)  T(F): 97.2 (2020 04:19), Max: 99.6 (2020 21:18)  HR: 72 (2020 04:08) (72 - 100)  BP: 100/53 (2020 04:08) (95/58 - 131/72)  BP(mean): 71 (2020 04:08) (71 - 71)  RR: 20 (2020 04:08) (18 - 20)  SpO2: 100% (2020 04:08) (95% - 100%)    PHYSICAL EXAM:    General: WDWN, lying in bed appears anxious  HEENT: NC/AT; PERRL, anicteric sclera; MMM  Neck: supple  Cardiovascular: +S1/S2, tachycardiac, irregular  Respiratory: CTA B/L; no W/R/R  Gastrointestinal: soft, NT/ND; +BSx4  Extremities: WWP; no edema, clubbing or cyanosis  Vascular: 2+ radial, DP/PT pulses B/L  Neurological: AAOx3; no focal deficits    MEDICATIONS:  MEDICATIONS  (STANDING):  amLODIPine   Tablet 10 milliGRAM(s) Oral daily  cefTRIAXone   IVPB 1000 milliGRAM(s) IV Intermittent every 24 hours  dextrose 5%. 1000 milliLiter(s) (50 mL/Hr) IV Continuous <Continuous>  dextrose 50% Injectable 12.5 Gram(s) IV Push once  dextrose 50% Injectable 25 Gram(s) IV Push once  dextrose 50% Injectable 25 Gram(s) IV Push once  finasteride 5 milliGRAM(s) Oral daily  insulin lispro (HumaLOG) corrective regimen sliding scale   SubCutaneous Before meals and at bedtime  metoprolol tartrate 12.5 milliGRAM(s) Oral every 12 hours  nystatin    Suspension 357170 Unit(s) Oral every 6 hours  pantoprazole    Tablet 40 milliGRAM(s) Oral two times a day  potassium chloride    Tablet ER 40 milliEquivalent(s) Oral every 4 hours    MEDICATIONS  (PRN):  acetaminophen   Tablet .. 650 milliGRAM(s) Oral every 6 hours PRN Temp greater or equal to 38C (100.4F)  acetaminophen   Tablet .. 650 milliGRAM(s) Oral every 6 hours PRN Mild Pain (1 - 3)  dextrose 40% Gel 15 Gram(s) Oral once PRN Blood Glucose LESS THAN 70 milliGRAM(s)/deciliter  glucagon  Injectable 1 milliGRAM(s) IntraMuscular once PRN Glucose LESS THAN 70 milligrams/deciliter  ondansetron    Tablet 4 milliGRAM(s) Oral every 6 hours PRN Nausea and/or Vomiting        ALLERGIES:  Allergies    No Known Allergies    Intolerances        LABS:                        9.0    2.44  )-----------( 159      ( 2020 00:58 )             26.8     01-14    131<L>  |  99  |  16  ----------------------------<  118<H>  3.3<L>   |  19<L>  |  1.27    Ca    8.3<L>      2020 00:58  Mg     1.5         TPro  6.4  /  Alb  4.0  /  TBili  0.3  /  DBili  x   /  AST  24  /  ALT  20  /  AlkPhos  36<L>      PT/INR - ( 2020 10:30 )   PT: 12.4 sec;   INR: 1.08          PTT - ( 2020 20:32 )  PTT:151.3 sec  Urinalysis Basic - ( 2020 14:08 )    Color: Yellow / Appearance: SL Cloudy / S.010 / pH: x  Gluc: x / Ketone: NEGATIVE  / Bili: Negative / Urobili: 0.2 E.U./dL   Blood: x / Protein: 100 mg/dL / Nitrite: NEGATIVE   Leuk Esterase: NEGATIVE / RBC: Many /HPF / WBC 5-10 /HPF   Sq Epi: x / Non Sq Epi: 0-5 /HPF / Bacteria: Present /HPF      CAPILLARY BLOOD GLUCOSE      POCT Blood Glucose.: 125 mg/dL (2020 00:42)      RADIOLOGY & ADDITIONAL TESTS: Reviewed.  < from: CT Angio Chest PE Protocol w/ IV Cont (20 @ 11:49) >  IMPRESSION:  Solitary segmental pulmonary embolus within branches supplying the medial basilar segment of the right lower lobe.  < end of copied text >  < from: US Duplex Venous Lower Ext Complete, Bilateral (20 @ 16:18) >  IMPRESSION:  Thrombus is noted within a left intramuscular calf vein.    < end of copied text > Transfer Acceptance Note from [facility] to     Hospital Course:   79M w/ PMH s/f stage IV prostate CA (s/p prostatectomy, on chemotherapy), who p/w progressive SOB and JAIME x1mo associated w/ generalized fatigue in the setting of decreased PO intake, found to have segmental PE of RLL on CTA Chest and LLE DVT on US Dopplers likely 2/2 malignancy. Pt was initially started on lovenox for treatment of PE. Pt had episode of coffee ground emesis x2. Lovenox was held with ICU and GI was consulted at that time. Pt scoped with EGD 20: Esophagitis, Gastritis, duodenitis, 2 small clean based ulcer in D2. Continued on protonix per GI recs and there was no direct contraindication for a/c. Pt was then started on heparin gtt which was held in setting of hematuria. Rapid response was called w/ vitals T 100.8 /65 HR 170s R 18 99% on 2L O2 with complaints of chest pain and difficulty breathing. Initial EKG was concerning for SVT vs new onset atrial fibrillation. Patient was given IV Adenosine 6mg >12mg revealing underlying atrial fibrillation. Patient subsequently given 1L LR bolus with lopressor 5mg IV and Lopressor 12.5mg PO x1 with improvement of HR to 110>120s. Tachycardia likely in the setting beta blocker withdrawal (Bisoprolol 2.5 mg at home held on admission) vs PE, now with new onset atrial fibrillation. ICU consult called and pt to be transferred to  for further management.     SUBJECTIVE / INTERVAL HPI: Patient seen and examined at bedside this AM. Patient states that he has leg pain that he has been having for months but denies any other complaints. He denies chest pain, palpitations, shortness of breath.     VITAL SIGNS:  Vital Signs Last 24 Hrs  T(C): 36.2 (2020 04:19), Max: 37.6 (2020 21:18)  T(F): 97.2 (2020 04:19), Max: 99.6 (2020 21:18)  HR: 72 (2020 04:08) (72 - 100)  BP: 100/53 (2020 04:08) (95/58 - 131/72)  BP(mean): 71 (2020 04:08) (71 - 71)  RR: 20 (2020 04:08) (18 - 20)  SpO2: 100% (2020 04:08) (95% - 100%)    PHYSICAL EXAM:    General: WDWN, resting in bed; NAD  HEENT: PERRL, EOMI, anicteric sclera; MMM  Neck: supple  Cardiovascular: +S1/S2, tachycardiac, irregular  Respiratory: CTA B/L; no W/R/R  Gastrointestinal: soft, NT/ND; +BSx4  Extremities: WWP; no edema, clubbing or cyanosis  Vascular: 2+ radial, DP/PT pulses B/L  Neurological: AAOx3; no focal deficits    MEDICATIONS:  MEDICATIONS  (STANDING):  amLODIPine   Tablet 10 milliGRAM(s) Oral daily  cefTRIAXone   IVPB 1000 milliGRAM(s) IV Intermittent every 24 hours  dextrose 5%. 1000 milliLiter(s) (50 mL/Hr) IV Continuous <Continuous>  dextrose 50% Injectable 12.5 Gram(s) IV Push once  dextrose 50% Injectable 25 Gram(s) IV Push once  dextrose 50% Injectable 25 Gram(s) IV Push once  finasteride 5 milliGRAM(s) Oral daily  insulin lispro (HumaLOG) corrective regimen sliding scale   SubCutaneous Before meals and at bedtime  metoprolol tartrate 12.5 milliGRAM(s) Oral every 12 hours  nystatin    Suspension 455491 Unit(s) Oral every 6 hours  pantoprazole    Tablet 40 milliGRAM(s) Oral two times a day  potassium chloride    Tablet ER 40 milliEquivalent(s) Oral every 4 hours    MEDICATIONS  (PRN):  acetaminophen   Tablet .. 650 milliGRAM(s) Oral every 6 hours PRN Temp greater or equal to 38C (100.4F)  acetaminophen   Tablet .. 650 milliGRAM(s) Oral every 6 hours PRN Mild Pain (1 - 3)  dextrose 40% Gel 15 Gram(s) Oral once PRN Blood Glucose LESS THAN 70 milliGRAM(s)/deciliter  glucagon  Injectable 1 milliGRAM(s) IntraMuscular once PRN Glucose LESS THAN 70 milligrams/deciliter  ondansetron    Tablet 4 milliGRAM(s) Oral every 6 hours PRN Nausea and/or Vomiting        ALLERGIES:  Allergies    No Known Allergies    Intolerances        LABS:                        9.0    2.44  )-----------( 159      ( 2020 00:58 )             26.8     01-14    131<L>  |  99  |  16  ----------------------------<  118<H>  3.3<L>   |  19<L>  |  1.27    Ca    8.3<L>      2020 00:58  Mg     1.5         TPro  6.4  /  Alb  4.0  /  TBili  0.3  /  DBili  x   /  AST  24  /  ALT  20  /  AlkPhos  36<L>      PT/INR - ( 2020 10:30 )   PT: 12.4 sec;   INR: 1.08          PTT - ( 2020 20:32 )  PTT:151.3 sec  Urinalysis Basic - ( 2020 14:08 )    Color: Yellow / Appearance: SL Cloudy / S.010 / pH: x  Gluc: x / Ketone: NEGATIVE  / Bili: Negative / Urobili: 0.2 E.U./dL   Blood: x / Protein: 100 mg/dL / Nitrite: NEGATIVE   Leuk Esterase: NEGATIVE / RBC: Many /HPF / WBC 5-10 /HPF   Sq Epi: x / Non Sq Epi: 0-5 /HPF / Bacteria: Present /HPF      CAPILLARY BLOOD GLUCOSE      POCT Blood Glucose.: 125 mg/dL (2020 00:42)      RADIOLOGY & ADDITIONAL TESTS: Reviewed.  < from: CT Angio Chest PE Protocol w/ IV Cont (20 @ 11:49) >  IMPRESSION:  Solitary segmental pulmonary embolus within branches supplying the medial basilar segment of the right lower lobe.  < end of copied text >  < from: US Duplex Venous Lower Ext Complete, Bilateral (20 @ 16:18) >  IMPRESSION:  Thrombus is noted within a left intramuscular calf vein.    < end of copied text > Transfer Acceptance Note from [facility] to     Hospital Course:   79M w/ PMH s/f stage IV prostate CA (s/p prostatectomy, on chemotherapy), who p/w progressive SOB and JAIME x1mo associated w/ generalized fatigue in the setting of decreased PO intake, found to have segmental PE of RLL on CTA Chest and LLE DVT on US Dopplers likely 2/2 malignancy. Pt was initially started on lovenox for treatment of PE. Pt had episode of coffee ground emesis x2. Lovenox was held with ICU and GI was consulted at that time. Pt scoped with EGD 20: Esophagitis, Gastritis, duodenitis, 2 small clean based ulcer in D2. Continued on protonix per GI recs and there was no direct contraindication for a/c. Pt was then started on heparin gtt which was held in setting of hematuria. Rapid response was called w/ vitals T 100.8 /65 HR 170s R 18 99% on 2L O2 with complaints of chest pain and difficulty breathing. Initial EKG was concerning for SVT vs new onset atrial fibrillation. Patient was given IV Adenosine 6mg >12mg revealing underlying atrial fibrillation. Patient subsequently given 1L LR bolus with lopressor 5mg IV and Lopressor 12.5mg PO x1 with improvement of HR to 110>120s. Tachycardia likely in the setting beta blocker withdrawal (Bisoprolol 2.5 mg at home held on admission) vs PE, now with new onset atrial fibrillation. ICU consult called and pt to be transferred to  for further management.     SUBJECTIVE / INTERVAL HPI: Patient seen and examined at bedside this AM. Patient was complaining of chest pressure and difficulty breathing and was transferred to .    VITAL SIGNS:  Vital Signs Last 24 Hrs  T(C): 36.2 (2020 04:19), Max: 37.6 (2020 21:18)  T(F): 97.2 (2020 04:19), Max: 99.6 (2020 21:18)  HR: 72 (2020 04:08) (72 - 100)  BP: 100/53 (2020 04:08) (95/58 - 131/72)  BP(mean): 71 (2020 04:08) (71 - 71)  RR: 20 (2020 04:08) (18 - 20)  SpO2: 100% (2020 04:08) (95% - 100%)    PHYSICAL EXAM:    General: WDWN, resting in bed; NAD  HEENT: PERRL, EOMI, anicteric sclera; MMM  Neck: supple  Cardiovascular: +S1/S2, tachycardiac, irregular  Respiratory: CTA B/L; no W/R/R  Gastrointestinal: soft, NT/ND; +BSx4  Extremities: WWP; no edema, clubbing or cyanosis  Vascular: 2+ radial, DP/PT pulses B/L  Neurological: AAOx3; no focal deficits    MEDICATIONS:  MEDICATIONS  (STANDING):  amLODIPine   Tablet 10 milliGRAM(s) Oral daily  cefTRIAXone   IVPB 1000 milliGRAM(s) IV Intermittent every 24 hours  dextrose 5%. 1000 milliLiter(s) (50 mL/Hr) IV Continuous <Continuous>  dextrose 50% Injectable 12.5 Gram(s) IV Push once  dextrose 50% Injectable 25 Gram(s) IV Push once  dextrose 50% Injectable 25 Gram(s) IV Push once  finasteride 5 milliGRAM(s) Oral daily  insulin lispro (HumaLOG) corrective regimen sliding scale   SubCutaneous Before meals and at bedtime  metoprolol tartrate 12.5 milliGRAM(s) Oral every 12 hours  nystatin    Suspension 499151 Unit(s) Oral every 6 hours  pantoprazole    Tablet 40 milliGRAM(s) Oral two times a day  potassium chloride    Tablet ER 40 milliEquivalent(s) Oral every 4 hours    MEDICATIONS  (PRN):  acetaminophen   Tablet .. 650 milliGRAM(s) Oral every 6 hours PRN Temp greater or equal to 38C (100.4F)  acetaminophen   Tablet .. 650 milliGRAM(s) Oral every 6 hours PRN Mild Pain (1 - 3)  dextrose 40% Gel 15 Gram(s) Oral once PRN Blood Glucose LESS THAN 70 milliGRAM(s)/deciliter  glucagon  Injectable 1 milliGRAM(s) IntraMuscular once PRN Glucose LESS THAN 70 milligrams/deciliter  ondansetron    Tablet 4 milliGRAM(s) Oral every 6 hours PRN Nausea and/or Vomiting        ALLERGIES:  Allergies    No Known Allergies    Intolerances        LABS:                        9.0    2.44  )-----------( 159      ( 2020 00:58 )             26.8     01-14    131<L>  |  99  |  16  ----------------------------<  118<H>  3.3<L>   |  19<L>  |  1.27    Ca    8.3<L>      2020 00:58  Mg     1.5         TPro  6.4  /  Alb  4.0  /  TBili  0.3  /  DBili  x   /  AST  24  /  ALT  20  /  AlkPhos  36<L>      PT/INR - ( 2020 10:30 )   PT: 12.4 sec;   INR: 1.08          PTT - ( 2020 20:32 )  PTT:151.3 sec  Urinalysis Basic - ( 2020 14:08 )    Color: Yellow / Appearance: SL Cloudy / S.010 / pH: x  Gluc: x / Ketone: NEGATIVE  / Bili: Negative / Urobili: 0.2 E.U./dL   Blood: x / Protein: 100 mg/dL / Nitrite: NEGATIVE   Leuk Esterase: NEGATIVE / RBC: Many /HPF / WBC 5-10 /HPF   Sq Epi: x / Non Sq Epi: 0-5 /HPF / Bacteria: Present /HPF      CAPILLARY BLOOD GLUCOSE      POCT Blood Glucose.: 125 mg/dL (2020 00:42)      RADIOLOGY & ADDITIONAL TESTS: Reviewed.  < from: CT Angio Chest PE Protocol w/ IV Cont (20 @ 11:49) >  IMPRESSION:  Solitary segmental pulmonary embolus within branches supplying the medial basilar segment of the right lower lobe.  < end of copied text >  < from: US Duplex Venous Lower Ext Complete, Bilateral (20 @ 16:18) >  IMPRESSION:  Thrombus is noted within a left intramuscular calf vein.    < end of copied text > Transfer Acceptance Note from [facility] to     Hospital Course:   79M w/ PMH s/f stage IV prostate CA (s/p prostatectomy, on chemotherapy), who p/w progressive SOB and JAIME x1mo associated w/ generalized fatigue in the setting of decreased PO intake, found to have segmental PE of RLL on CTA Chest and LLE DVT on US Dopplers likely 2/2 malignancy. Pt was initially started on lovenox for treatment of PE. Pt had episode of coffee ground emesis x2. Lovenox was held with ICU and GI was consulted at that time. Pt scoped with EGD 20: Esophagitis, Gastritis, duodenitis, 2 small clean based ulcer in D2. Continued on protonix per GI recs and there was no direct contraindication for a/c. Pt was then started on heparin gtt which was held in setting of hematuria. Rapid response was called w/ vitals T 100.8 /65 HR 170s R 18 99% on 2L O2 with complaints of chest pain and difficulty breathing. Initial EKG was concerning for SVT vs new onset atrial fibrillation. Patient was given IV Adenosine 6mg >12mg revealing underlying atrial fibrillation. Patient subsequently given 1L LR bolus with lopressor 5mg IV and Lopressor 12.5mg PO x1 with improvement of HR to 110>120s. Tachycardia likely in the setting beta blocker withdrawal (Bisoprolol 2.5 mg at home held on admission) vs PE, now with new onset atrial fibrillation. ICU consult called and pt to be transferred to  for further management.     SUBJECTIVE / INTERVAL HPI: Patient seen and examined at bedside this AM. Patient was complaining of chest pressure and difficulty breathing and was transferred to , but is currently denying chest pain, palpitations, and shortness of breath on 2L NC.    VITAL SIGNS:  Vital Signs Last 24 Hrs  T(C): 36.2 (2020 04:19), Max: 37.6 (2020 21:18)  T(F): 97.2 (2020 04:19), Max: 99.6 (2020 21:18)  HR: 72 (2020 04:08) (72 - 100)  BP: 100/53 (2020 04:08) (95/58 - 131/72)  BP(mean): 71 (2020 04:08) (71 - 71)  RR: 20 (2020 04:08) (18 - 20)  SpO2: 100% (2020 04:08) (95% - 100%)    PHYSICAL EXAM:    General: WDWN, resting in bed; NAD  HEENT: PERRL, EOMI, anicteric sclera; MMM  Neck: supple  Cardiovascular: +S1/S2, tachycardiac, irregular  Respiratory: CTA B/L; no W/R/R  Gastrointestinal: soft, NT/ND; +BSx4  Extremities: WWP; no edema, clubbing or cyanosis  Vascular: 2+ radial, DP/PT pulses B/L  Neurological: AAOx3; no focal deficits    MEDICATIONS:  MEDICATIONS  (STANDING):  amLODIPine   Tablet 10 milliGRAM(s) Oral daily  cefTRIAXone   IVPB 1000 milliGRAM(s) IV Intermittent every 24 hours  dextrose 5%. 1000 milliLiter(s) (50 mL/Hr) IV Continuous <Continuous>  dextrose 50% Injectable 12.5 Gram(s) IV Push once  dextrose 50% Injectable 25 Gram(s) IV Push once  dextrose 50% Injectable 25 Gram(s) IV Push once  finasteride 5 milliGRAM(s) Oral daily  insulin lispro (HumaLOG) corrective regimen sliding scale   SubCutaneous Before meals and at bedtime  metoprolol tartrate 12.5 milliGRAM(s) Oral every 12 hours  nystatin    Suspension 377446 Unit(s) Oral every 6 hours  pantoprazole    Tablet 40 milliGRAM(s) Oral two times a day  potassium chloride    Tablet ER 40 milliEquivalent(s) Oral every 4 hours    MEDICATIONS  (PRN):  acetaminophen   Tablet .. 650 milliGRAM(s) Oral every 6 hours PRN Temp greater or equal to 38C (100.4F)  acetaminophen   Tablet .. 650 milliGRAM(s) Oral every 6 hours PRN Mild Pain (1 - 3)  dextrose 40% Gel 15 Gram(s) Oral once PRN Blood Glucose LESS THAN 70 milliGRAM(s)/deciliter  glucagon  Injectable 1 milliGRAM(s) IntraMuscular once PRN Glucose LESS THAN 70 milligrams/deciliter  ondansetron    Tablet 4 milliGRAM(s) Oral every 6 hours PRN Nausea and/or Vomiting        ALLERGIES:  Allergies    No Known Allergies    Intolerances        LABS:                        9.0    2.44  )-----------( 159      ( 2020 00:58 )             26.8     01-14    131<L>  |  99  |  16  ----------------------------<  118<H>  3.3<L>   |  19<L>  |  1.27    Ca    8.3<L>      2020 00:58  Mg     1.5         TPro  6.4  /  Alb  4.0  /  TBili  0.3  /  DBili  x   /  AST  24  /  ALT  20  /  AlkPhos  36<L>      PT/INR - ( 2020 10:30 )   PT: 12.4 sec;   INR: 1.08          PTT - ( 2020 20:32 )  PTT:151.3 sec  Urinalysis Basic - ( 2020 14:08 )    Color: Yellow / Appearance: SL Cloudy / S.010 / pH: x  Gluc: x / Ketone: NEGATIVE  / Bili: Negative / Urobili: 0.2 E.U./dL   Blood: x / Protein: 100 mg/dL / Nitrite: NEGATIVE   Leuk Esterase: NEGATIVE / RBC: Many /HPF / WBC 5-10 /HPF   Sq Epi: x / Non Sq Epi: 0-5 /HPF / Bacteria: Present /HPF      CAPILLARY BLOOD GLUCOSE      POCT Blood Glucose.: 125 mg/dL (2020 00:42)      RADIOLOGY & ADDITIONAL TESTS: Reviewed.  < from: CT Angio Chest PE Protocol w/ IV Cont (20 @ 11:49) >  IMPRESSION:  Solitary segmental pulmonary embolus within branches supplying the medial basilar segment of the right lower lobe.  < end of copied text >  < from: US Duplex Venous Lower Ext Complete, Bilateral (20 @ 16:18) >  IMPRESSION:  Thrombus is noted within a left intramuscular calf vein.    < end of copied text >

## 2020-01-14 NOTE — CONSULT NOTE ADULT - SUBJECTIVE AND OBJECTIVE BOX
CONSULT NOTE:      HPI:  79M w/ PMH HTN, HLD, and prostate CA (s/p prostatectomy/chemo 1994), patient developed strictures which he underwent multiple procedures to correct in 1995, patient maintained an uneventful urologic course until 2007 when he was admitted to Health system with gross hematuria with clot retention and was treated with jacek irrigation of clot and CBI. Patient is admitted to Medical service for PE. Called to see pateint fro hematuria, [facility] hematuria has been intermittent for about a month now, he was seen by his out patient urologist Dr Roa prior to this admission and treated with amox then cipro for UTI. She states he was having some dysuria, increased urinary frequency, but it resolved now. Patient states he belives he is emptying his bladder    Vital Signs Last 24 Hrs  T(C): 36.4 (14 Jan 2020 13:46), Max: 37.6 (13 Jan 2020 21:18)  T(F): 97.5 (14 Jan 2020 13:46), Max: 99.6 (13 Jan 2020 21:18)  HR: 70 (14 Jan 2020 13:20) (70 - 94)  BP: 107/57 (14 Jan 2020 13:20) (95/58 - 135/57)  BP(mean): 76 (14 Jan 2020 13:20) (71 - 82)  RR: 20 (14 Jan 2020 13:20) (18 - 20)  SpO2: 100% (14 Jan 2020 13:20) (96% - 100%)  I&O's Summary    13 Jan 2020 07:01  -  14 Jan 2020 07:00  --------------------------------------------------------  IN: 0 mL / OUT: 200 mL / NET: -200 mL        PE:  Gen: NAD  Abd: soft, nd, nt  : voiding clear red urine without clots      LABS:                        8.6    2.30  )-----------( 151      ( 14 Jan 2020 07:20 )             27.2     01-14    133<L>  |  100  |  14  ----------------------------<  110<H>  4.0   |  22  |  1.30    Ca    8.2<L>      14 Jan 2020 07:20  Mg     2.0     01-14      PTT - ( 14 Jan 2020 07:20 )  PTT:32.2 sec  Cultures  Culture Results:   No growth at 1 day. (01-13 @ 03:10)  Culture Results:   No growth at 1 day. (01-13 @ 03:10)  Culture Results:   Specimen appears CONTAMINATED. Lab suggests repeat clean catch specimen. (01-12 @ 19:27)      A/P 78 yo m hematuria  1) patient with known prostate recurrence  2) would rec bedside pvr rule out retention  3) would get official US rule out clot in the bladder  4) will cont to follow

## 2020-01-14 NOTE — PROGRESS NOTE ADULT - PROBLEM SELECTOR PLAN 10
1) PCP Brennan Germain [phone]  2) Date of Contact with PCP: attempted call 1/13  3) PCP Contacted at Discharge: TBD  4) Summary of Handoff Given to PCP: TBD  5) Post-Discharge Appointment Date and Location: TBD

## 2020-01-14 NOTE — PROGRESS NOTE ADULT - PROBLEM SELECTOR PLAN 6
White exudates visualized in oropharynx, likely candidiasis as pt is immunocompromised due to chemotherapy  -nystatin swish and swallow q6 for 2 weeks (End 1/25)

## 2020-01-14 NOTE — PROGRESS NOTE ADULT - PROBLEM SELECTOR PLAN 7
-restart home amlodipine 10mg qd  -hold valsartan in setting of hypotension and elevated Cr  -HCTZ and Beta blocker d/c on previous admission SBPs ~100 during day on 1/13-1/14  -decrease amlodipine to 5mg from 10.  -hold valsartan in setting of hypotension and elevated Cr  -HCTZ and Beta blocker d/c'd on previous admission

## 2020-01-14 NOTE — PROGRESS NOTE ADULT - PROBLEM SELECTOR PLAN 3
Met SIRS criteria (leukopenia, tachycardia) on admission, however leukopenia likely due to chemotherapy and tachycardia likely due to PE. lactate negative. w/ symptomatic dysuria and intermittent hematuria  -initial urinalysis positive for WBC, bacteria, RBCs (neg leuk esterase, neg nitrates).   -c/w ceftriaxone 1g QD for treatment of UTI (End Date 1/16)  -pt with fever of 100.8 during rapid response  -f/u repeat U/A and Bcx  -F/u urine cultures. If negative, will d/c ceftriaxone

## 2020-01-14 NOTE — CONSULT NOTE ADULT - ASSESSMENT
79M w/ stage IV prostate CA (s/p prostatectomy, on chemotherapy), who p/w progressive SOB and JAIME x1mo associated w/ generalized fatigue in the setting of decreased PO intake, found to have segmental PE of RLL on CTA Chest and LLE DVT on US Dopplers likely 2/2 malignancy, EGD demonstrating ulcer. Cardiology consulted new onset atrial fibrillation.     #Atrial Fibrillation  Patient s/p RR for afib w/RVR 170s managed with adenosine and lopressor. EKG now demonstrating persistent atrial fibrillation (HR 70s). CHADsVASc 5 (74, HTN, DM). Hemodynamically patient euvolemic, with cold extremities.  Previous echocardiogram (8/2019), daughter to obtain results.   - afib likely 2/2 PE  - well controlled with lopressor 12.5, c/w current management  - daily EKGs  - continue telemetry monitoring  - Pt would like to establish care with new cardiologist - please schedule outpatient follow up with Dr. Sánchez prior to d/c      Cardiology to follow. 79M w/ stage IV prostate CA (s/p prostatectomy, on chemotherapy), who p/w progressive SOB and JAIME x1mo associated w/ generalized fatigue in the setting of decreased PO intake, found to have segmental PE of RLL on CTA Chest and LLE DVT on US Dopplers likely 2/2 malignancy, EGD demonstrating ulcer. Cardiology consulted new onset atrial fibrillation.     #Atrial Fibrillation  Patient s/p RR for afib w/RVR 170s managed with adenosine and lopressor. EKG now demonstrating persistent atrial fibrillation (HR 70s). CHADsVASc 5 (74, HTN, DM). Hemodynamically patient euvolemic, with cold extremities.  Previous echocardiogram (8/2019), daughter to obtain results.   - afib likely 2/2 PE  - obtain echocardiogram   - currently hep gtt held in setting of possible hematuria, recommend restarting or transition to Eliquis in setting of afib and PE, patient high risk   - well controlled with lopressor 12.5, c/w current management  - daily EKGs  - continue telemetry monitoring  - Pt would like to establish care with new cardiologist - please schedule outpatient follow up with Dr. Sánchez prior to d/c      Cardiology to follow.   Recommendations are not final until attested by attending physician. 79M w/ stage IV prostate CA (s/p prostatectomy, on chemotherapy), who p/w progressive SOB and JAIME x1mo associated w/ generalized fatigue in the setting of decreased PO intake, found to have segmental PE of RLL on CTA Chest and LLE DVT on US Dopplers likely 2/2 malignancy. Hospital course c/b coffee ground emesis, now resolved with EGD demonstrating duodenal ulcers and gastritis. Cardiology consulted for new onset atrial fibrillation.     #Atrial Fibrillation  Patient s/p rapid response for afib w/RVR to 170s managed with adenosine (6mg, then 12mg) and lopressor (5mg IV push). EKG now demonstrating persistent atrial fibrillation (HR 70s). CHADsVASc 5 (74, HTN, DM). Previous echocardiogram (8/15/2019; Dr. Estrella Gloria), unable to obtain results. Per daughter, no notable findings. Hemodynamically patient euvolemic, with cold extremities. Atrial fibrillation likely 2/2 underlying malignancy vs PE vs electrolyte derangements. TSH WNL.    - obtain echocardiogram   - c/w lovenox 80mg BID for full anticoagulation in setting of PE and afib   - well controlled with lopressor 12.5mg BID, c/w current management  - replete electrolytes PRN for K>4, Mg >2   - daily EKGs  - continue telemetry monitoring  - Pt would like to establish care with new cardiologist - please schedule outpatient follow up with Dr. Sánchez within 1-2 weeks upon d/c    Cardiology to follow.   Recommendations are not final until attested by attending physician. 79M w/ stage IV prostate CA (s/p prostatectomy, on chemotherapy), who p/w progressive SOB and JAIME x1mo associated w/ generalized fatigue in the setting of decreased PO intake, found to have segmental PE of RLL on CTA Chest and LLE DVT on US Dopplers likely 2/2 malignancy. Hospital course c/b coffee ground emesis, now resolved with EGD demonstrating duodenal ulcers and gastritis. Cardiology consulted for new onset atrial fibrillation.     #Atrial Fibrillation  Patient s/p rapid response for afib w/RVR to 170s managed with adenosine (6mg, then 12mg) and lopressor (5mg IV push). EKG now demonstrating NSR with PVCs. CHADsVASc 4 (74, HTN). Previous echocardiogram (8/15/2019; Dr. Estrella Gloria), unable to obtain results. Per daughter, no notable findings. Hemodynamically patient euvolemic, with cold extremities. Atrial fibrillation likely 2/2 underlying malignancy vs PE vs electrolyte derangements. TSH WNL.   - obtain echocardiogram   - c/w lovenox 80mg BID for full anticoagulation in setting of PE and malignancy, can transition to Eliquis prior to discharge  - Please obtain HgA1c (per pt DM controlled by diet alone)  - well controlled with lopressor 12.5mg BID, c/w current management  - replete electrolytes PRN for K>4, Mg >2   - daily EKGs  - continue telemetry monitoring  - Pt would like to establish care with new cardiologist - please schedule outpatient follow up with Dr. Sánchez within 1-2 weeks upon d/c    Cardiology to follow.   Recommendations are not final until attested by attending physician. 79M w/ stage IV prostate CA (s/p prostatectomy, on chemotherapy), who p/w progressive SOB and JAIME x1mo associated w/ generalized fatigue in the setting of decreased PO intake, found to have segmental PE of RLL on CTA Chest and LLE DVT on US Dopplers likely 2/2 malignancy. Hospital course c/b coffee ground emesis, now resolved with EGD demonstrating duodenal ulcers and gastritis. Cardiology consulted for new onset atrial fibrillation.     #Atrial Fibrillation  Patient s/p rapid response for afib w/RVR to 170s managed with adenosine (6mg, then 12mg) and lopressor (5mg IV push). EKG now demonstrating NSR with PVCs. CHADsVASc 4 (74, HTN). Previous echocardiogram (8/15/2019; Dr. Estrella Gloria), unable to obtain results. Per daughter, no notable findings. Hemodynamically patient euvolemic, with cold extremities. Atrial fibrillation likely 2/2 underlying malignancy vs PE vs electrolyte derangements. TSH WNL.   - obtain echocardiogram   - c/w lovenox 80mg BID for full anticoagulation in setting of PE and malignancy, can transition to Eliquis from cardiac prospective prior to discharge vs. alternative anticoagulation given history of malignancy and DVT/PE   - Please obtain HgA1c (per pt DM controlled by diet alone)  - well controlled with lopressor 12.5mg BID, c/w current management  - replete electrolytes PRN for K>4, Mg >2   - daily EKGs  - continue telemetry monitoring  - Pt would like to establish care with new cardiologist - please schedule outpatient follow up with Dr. Sánchez within 1-2 weeks upon d/c    Cardiology to follow.   Recommendations are not final until attested by attending physician.

## 2020-01-14 NOTE — PROGRESS NOTE ADULT - PROBLEM SELECTOR PLAN 9
F: none  E: replete K <4, Mg <2  N: DASH diet  Last BM: 1/12  VTE: Hep ggt  GI PPx: Protonix 40mg IV BID  Sleep Aid: none  Dispo: [facility]  Code:

## 2020-01-15 LAB
ANION GAP SERPL CALC-SCNC: 12 MMOL/L — SIGNIFICANT CHANGE UP (ref 5–17)
BUN SERPL-MCNC: 12 MG/DL — SIGNIFICANT CHANGE UP (ref 7–23)
CALCIUM SERPL-MCNC: 8.9 MG/DL — SIGNIFICANT CHANGE UP (ref 8.4–10.5)
CHLORIDE SERPL-SCNC: 103 MMOL/L — SIGNIFICANT CHANGE UP (ref 96–108)
CO2 SERPL-SCNC: 19 MMOL/L — LOW (ref 22–31)
CREAT SERPL-MCNC: 0.98 MG/DL — SIGNIFICANT CHANGE UP (ref 0.5–1.3)
FERRITIN SERPL-MCNC: 3272 NG/ML — HIGH (ref 30–400)
FOLATE SERPL-MCNC: 18 NG/ML — SIGNIFICANT CHANGE UP
GLUCOSE BLDC GLUCOMTR-MCNC: 111 MG/DL — HIGH (ref 70–99)
GLUCOSE BLDC GLUCOMTR-MCNC: 114 MG/DL — HIGH (ref 70–99)
GLUCOSE BLDC GLUCOMTR-MCNC: 125 MG/DL — HIGH (ref 70–99)
GLUCOSE BLDC GLUCOMTR-MCNC: 157 MG/DL — HIGH (ref 70–99)
GLUCOSE SERPL-MCNC: 122 MG/DL — HIGH (ref 70–99)
HCT VFR BLD CALC: 27 % — LOW (ref 39–50)
HGB BLD-MCNC: 8.7 G/DL — LOW (ref 13–17)
IRON SATN MFR SERPL: 40 % — SIGNIFICANT CHANGE UP (ref 16–55)
IRON SATN MFR SERPL: 63 UG/DL — SIGNIFICANT CHANGE UP (ref 45–165)
MAGNESIUM SERPL-MCNC: 1.9 MG/DL — SIGNIFICANT CHANGE UP (ref 1.6–2.6)
MCHC RBC-ENTMCNC: 31.6 PG — SIGNIFICANT CHANGE UP (ref 27–34)
MCHC RBC-ENTMCNC: 32.2 GM/DL — SIGNIFICANT CHANGE UP (ref 32–36)
MCV RBC AUTO: 98.2 FL — SIGNIFICANT CHANGE UP (ref 80–100)
NRBC # BLD: 1 /100 WBCS — HIGH (ref 0–0)
PHOSPHATE SERPL-MCNC: 2.2 MG/DL — LOW (ref 2.5–4.5)
PLATELET # BLD AUTO: 161 K/UL — SIGNIFICANT CHANGE UP (ref 150–400)
POTASSIUM SERPL-MCNC: 4.9 MMOL/L — SIGNIFICANT CHANGE UP (ref 3.5–5.3)
POTASSIUM SERPL-SCNC: 4.9 MMOL/L — SIGNIFICANT CHANGE UP (ref 3.5–5.3)
RBC # BLD: 2.75 M/UL — LOW (ref 4.2–5.8)
RBC # BLD: 2.75 M/UL — LOW (ref 4.2–5.8)
RBC # FLD: 16.9 % — HIGH (ref 10.3–14.5)
RETICS #: 42.6 K/UL — SIGNIFICANT CHANGE UP (ref 25–125)
RETICS/RBC NFR: 1.6 % — SIGNIFICANT CHANGE UP (ref 0.5–2.5)
SODIUM SERPL-SCNC: 134 MMOL/L — LOW (ref 135–145)
TIBC SERPL-MCNC: 156 UG/DL — LOW (ref 220–430)
UIBC SERPL-MCNC: 93 UG/DL — LOW (ref 110–370)
VIT B12 SERPL-MCNC: 1451 PG/ML — HIGH (ref 232–1245)
WBC # BLD: 2.74 K/UL — LOW (ref 3.8–10.5)
WBC # FLD AUTO: 2.74 K/UL — LOW (ref 3.8–10.5)

## 2020-01-15 PROCEDURE — 99233 SBSQ HOSP IP/OBS HIGH 50: CPT | Mod: GC

## 2020-01-15 PROCEDURE — 93306 TTE W/DOPPLER COMPLETE: CPT | Mod: 26

## 2020-01-15 PROCEDURE — 93010 ELECTROCARDIOGRAM REPORT: CPT

## 2020-01-15 RX ORDER — BISOPROLOL FUMARATE 10 MG/1
1 TABLET, FILM COATED ORAL
Qty: 0 | Refills: 0 | DISCHARGE

## 2020-01-15 RX ADMIN — Medication 12.5 MILLIGRAM(S): at 10:25

## 2020-01-15 RX ADMIN — DORZOLAMIDE HYDROCHLORIDE 1 DROP(S): 20 SOLUTION/ DROPS OPHTHALMIC at 05:14

## 2020-01-15 RX ADMIN — DORZOLAMIDE HYDROCHLORIDE 1 DROP(S): 20 SOLUTION/ DROPS OPHTHALMIC at 14:40

## 2020-01-15 RX ADMIN — ENOXAPARIN SODIUM 80 MILLIGRAM(S): 100 INJECTION SUBCUTANEOUS at 17:31

## 2020-01-15 RX ADMIN — LATANOPROST 1 DROP(S): 0.05 SOLUTION/ DROPS OPHTHALMIC; TOPICAL at 21:22

## 2020-01-15 RX ADMIN — Medication 12.5 MILLIGRAM(S): at 21:21

## 2020-01-15 RX ADMIN — AMLODIPINE BESYLATE 5 MILLIGRAM(S): 2.5 TABLET ORAL at 10:25

## 2020-01-15 RX ADMIN — ENOXAPARIN SODIUM 80 MILLIGRAM(S): 100 INJECTION SUBCUTANEOUS at 05:14

## 2020-01-15 RX ADMIN — Medication 500000 UNIT(S): at 23:53

## 2020-01-15 RX ADMIN — Medication 500000 UNIT(S): at 12:51

## 2020-01-15 RX ADMIN — Medication 500000 UNIT(S): at 05:14

## 2020-01-15 RX ADMIN — PANTOPRAZOLE SODIUM 40 MILLIGRAM(S): 20 TABLET, DELAYED RELEASE ORAL at 05:14

## 2020-01-15 RX ADMIN — Medication 62.5 MILLIMOLE(S): at 12:51

## 2020-01-15 RX ADMIN — DORZOLAMIDE HYDROCHLORIDE 1 DROP(S): 20 SOLUTION/ DROPS OPHTHALMIC at 21:22

## 2020-01-15 RX ADMIN — PANTOPRAZOLE SODIUM 40 MILLIGRAM(S): 20 TABLET, DELAYED RELEASE ORAL at 17:30

## 2020-01-15 RX ADMIN — Medication 500000 UNIT(S): at 17:30

## 2020-01-15 RX ADMIN — Medication 2: at 22:36

## 2020-01-15 RX ADMIN — FINASTERIDE 5 MILLIGRAM(S): 5 TABLET, FILM COATED ORAL at 12:51

## 2020-01-15 RX ADMIN — Medication 2 MILLIGRAM(S): at 14:39

## 2020-01-15 NOTE — PROGRESS NOTE ADULT - PROBLEM SELECTOR PLAN 5
Known hx of stage IV prostate cancer (s/p prostatectomy, on chemotherapy). Leukopenia on admission likely 2/2 chemotherapy. Last chemo 12/31, next Jevtana 1/17  -Follows with Dr. Fuller, next chemotherapy planned for next week  -f/u heme/onc rec  -c/w finasteride 5mg qd  - (severe neutropenia)
Known hx of stage IV prostate cancer (s/p prostatectomy, on chemotherapy). Leukopenia on admission likely 2/2 chemotherapy. Last chemo 12/31, next Jevtana 1/17  -Follows with Dr. Fuller, next chemotherapy planned for next week  -f/u heme/onc rec  -c/w finasteride 5mg qd  - (severe neutropenia)
White exudates visualized in oropharynx, likely candidiasis as pt is immunocompromised due to chemotherapy  -nystatin swish and swallow q6 for 2 weeks (End 1/25)
Known hx of stage IV prostate cancer (s/p prostatectomy, on chemotherapy). Leukopenia on admission likely 2/2 chemotherapy. Last chemo 12/31, next Jevtana 1/17  -Follows with Dr. Fuller, next chemotherapy planned for next week  -f/u heme/onc rec  -c/w finasteride 5mg qd  - (severe neutropenia)

## 2020-01-15 NOTE — PROGRESS NOTE ADULT - PROBLEM SELECTOR PLAN 6
SBPs ~100 during day on 1/13-1/14. SBPs ~140s after amlodipine decreased to 5.  -cw amlodipine 5mg (decreased from 10).  - Possibly restart valsartan now that JERI resolved and BPs in 140s SBPs ~100 during day on 1/13-1/14. SBPs ~140s after amlodipine decreased to 5.  -cw amlodipine 5mg (decreased from 10).  - Hold losartan 320mg in setting of recent bleeding and large dose SBPs ~100 during day on 1/13-1/14. SBPs ~140s after amlodipine decreased to 5.  -cw amlodipine 5mg (decreased from 10).  - Hold losartan 320mg in setting of recent bleeding and stable BP without  - Uptitrate BP meds as tolerated SBPs ~100 during day on 1/13-1/14. SBPs ~140s after amlodipine decreased to 5.  -cw amlodipine 5mg (decreased from 10).  - Hold Valsartan 320mg in setting of recent bleeding and stable BP without  - Uptitrate BP meds as tolerated

## 2020-01-15 NOTE — PROGRESS NOTE ADULT - PROBLEM SELECTOR PLAN 6
SBPs ~100 during day on 1/13-1/14. SBPs ~140s after amlodipine decreased to 5.  -cw amlodipine 5mg (decreased from 10).  -hold valsartan in setting of hypotension and elevated Cr  -HCTZ and Beta blocker d/c'd on previous admission

## 2020-01-15 NOTE — PROGRESS NOTE ADULT - PROBLEM SELECTOR PLAN 8
F: none  E: replete K <4, Mg <2  N: DASH diet  Last BM: 1/12  VTE: Hep ggt  GI PPx: Protonix 40mg IV BID  Sleep Aid: none  Dispo: [facility]  Code:

## 2020-01-15 NOTE — PROGRESS NOTE ADULT - SUBJECTIVE AND OBJECTIVE BOX
CARDIOLOGY CONSULT FOLLOW UP NOTE     OVERNIGHT EVENTS: No acute events overnight. No notable events on telemetry. Afebrile. VSS. HD stable. UOP: Net negative 700cc.     SUBJECTIVE / INTERVAL HPI: Patient seen and examined at bedside this AM; daughter bedside. Patient with compliant of hematuria. Denies chest pain, SOB, palpations, or abdominal pain. ROS otherwise negative.      VITAL SIGNS:  Vital Signs Last 24 Hrs  T(C): 36.5 (15 Bridger 2020 10:19), Max: 36.8 (15 Rbidger 2020 01:26)  T(F): 97.7 (15 Bridger 2020 10:19), Max: 98.3 (15 Bridger 2020 01:26)  HR: 86 (15 Bridger 2020 08:59) (70 - 92)  BP: 147/65 (15 Bridger 2020 08:59) (107/57 - 154/67)  BP(mean): 94 (15 Bridger 2020 08:59) (76 - 97)  RR: 18 (15 Bridger 2020 08:59) (16 - 20)  SpO2: 97% (15 Bridger 2020 08:59) (97% - 100%)    PHYSICAL EXAM:    General: pleasant well appearing  male resting comfortably in chair, NAD  HEENT: NC/AT; PERRL, anicteric sclera; MMM  Neck: supple; no JVD; no cervical or submandibular lymphadenopathy    Cardiovascular: +S1/S2; RRR; no murmurs appreciated   Respiratory: CTA B/L; no W/R/R  Gastrointestinal: soft, NT/ND; +BSx4  Extremities: WWP; no edema, clubbing or cyanosis  Vascular: 2+ radial, DP/PT pulses B/L  Neurological: AAOx3; no focal deficits    MEDICATIONS:  MEDICATIONS  (STANDING):  amLODIPine   Tablet 5 milliGRAM(s) Oral every 24 hours  dexAMETHasone     Tablet 2 milliGRAM(s) Oral every 24 hours  dextrose 5%. 1000 milliLiter(s) (50 mL/Hr) IV Continuous <Continuous>  dextrose 50% Injectable 12.5 Gram(s) IV Push once  dextrose 50% Injectable 25 Gram(s) IV Push once  dextrose 50% Injectable 25 Gram(s) IV Push once  dorzolamide 2% Ophthalmic Solution 1 Drop(s) Both EYES three times a day  enoxaparin Injectable 80 milliGRAM(s) SubCutaneous every 12 hours  finasteride 5 milliGRAM(s) Oral daily  insulin lispro (HumaLOG) corrective regimen sliding scale   SubCutaneous Before meals and at bedtime  latanoprost 0.005% Ophthalmic Solution 1 Drop(s) Both EYES at bedtime  metoprolol tartrate 12.5 milliGRAM(s) Oral every 12 hours  nystatin    Suspension 258899 Unit(s) Oral every 6 hours  pantoprazole    Tablet 40 milliGRAM(s) Oral two times a day    MEDICATIONS  (PRN):  acetaminophen   Tablet .. 650 milliGRAM(s) Oral every 6 hours PRN Temp greater or equal to 38C (100.4F)  acetaminophen   Tablet .. 650 milliGRAM(s) Oral every 6 hours PRN Mild Pain (1 - 3)  dextrose 40% Gel 15 Gram(s) Oral once PRN Blood Glucose LESS THAN 70 milliGRAM(s)/deciliter  glucagon  Injectable 1 milliGRAM(s) IntraMuscular once PRN Glucose LESS THAN 70 milligrams/deciliter  ondansetron    Tablet 4 milliGRAM(s) Oral every 6 hours PRN Nausea and/or Vomiting      ALLERGIES:  Allergies    No Known Allergies    Intolerances        LABS:                        8.7    2.74  )-----------( 161      ( 15 Bridger 2020 08:21 )             27.0     01-15    134<L>  |  103  |  12  ----------------------------<  122<H>  4.9   |  19<L>  |  0.98    Ca    8.9      15 Bridger 2020 08:21  Phos  2.2     01-15  Mg     1.9     01-15      PTT - ( 2020 07:20 )  PTT:32.2 sec  Urinalysis Basic - ( 2020 04:29 )    Color: Red / Appearance: SL Cloudy / S.020 / pH: x  Gluc: x / Ketone: NEGATIVE  / Bili: Negative / Urobili: 0.2 E.U./dL   Blood: x / Protein: 100 mg/dL / Nitrite: NEGATIVE   Leuk Esterase: NEGATIVE / RBC: Many /HPF / WBC 5-10 /HPF   Sq Epi: x / Non Sq Epi: 0-5 /HPF / Bacteria: Present /HPF      CAPILLARY BLOOD GLUCOSE      POCT Blood Glucose.: 125 mg/dL (15 Bridger 2020 06:21)      RADIOLOGY & ADDITIONAL TESTS: Reviewed.    ASSESSMENT:    PLAN:

## 2020-01-15 NOTE — PROGRESS NOTE ADULT - ASSESSMENT
79M w/ stage IV prostate CA (s/p prostatectomy, on chemotherapy), who p/w progressive SOB and JAIME x1mo associated w/ generalized fatigue in the setting of decreased PO intake, found to have segmental PE of RLL on CTA Chest and LLE DVT on US Dopplers likely 2/2 malignancy. Now with new onset atrial fibrillation. Transferred from [facility] to  for further management.

## 2020-01-15 NOTE — PROGRESS NOTE ADULT - PROBLEM SELECTOR PLAN 1
PE on CTA chest, likely due to DVT 2/2 malignancy (LLE DVT on US Dopplers).  -low suspicion for R heart strain as troponin neg, BNP neg  -f/u ECHO to r/o R heart strain  - hep ggt held due to hematuria, no contraindication for a/c per GI, continuing with lovenox 1mg/kg bid    #DVT  P/w SOB and JAIME, found to have PE on CTA chest. LE dopplers w/ DVT in LLE intramuscular calf vein. Etiology likely 2/2 malignancy.  -Intramuscular calf vein thrombus does not require treatment  - treat PE as above

## 2020-01-15 NOTE — PROGRESS NOTE ADULT - PROBLEM SELECTOR PLAN 4
Known hx of stage IV prostate cancer (s/p prostatectomy, on chemotherapy). Leukopenia on admission likely 2/2 chemotherapy. Last chemo 12/31, next Jevtana 1/17  -Follows with Dr. Fuller, next chemotherapy planned for next week  -f/u heme/onc rec  -c/w finasteride 5mg qd  - 2mg dexamethasone daily, while on chemo  - (severe neutropenia) Known hx of stage IV prostate cancer (s/p prostatectomy, on chemotherapy). Leukopenia on admission likely 2/2 chemotherapy. Last chemo 12/31, next Jevtana 1/17  -Follows with Dr. Fuller, next chemotherapy planned for next week  -f/u heme/onc rec  -c/w finasteride 5mg qd  - 2mg dexamethasone daily, while on chemo  - (severe neutropenia)  - Uro recommending bladder U/S to evaluate for clot Known hx of stage IV prostate cancer (s/p prostatectomy, on chemotherapy). Leukopenia on admission likely 2/2 chemotherapy. Last chemo 12/31, next Jevtana 1/17  -Follows with Dr. Fuller, next chemotherapy planned for next week  -f/u heme/onc rec  -c/w finasteride 5mg qd

## 2020-01-15 NOTE — PROGRESS NOTE ADULT - PROBLEM SELECTOR PLAN 8
F: none  E: replete K <4, Mg <2  N: DASH diet  Last BM: 1/12  VTE: Hep ggt  GI PPx: Protonix 40mg IV BID  Sleep Aid: none  Dispo: [facility]  Code:

## 2020-01-15 NOTE — PROGRESS NOTE ADULT - PROBLEM SELECTOR PLAN 9
1) PCP Brennan Germain [phone]  2) Date of Contact with PCP: attempted call 1/13  3) PCP Contacted at Discharge: TBD  4) Summary of Handoff Given to PCP: TBD  5) Post-Discharge Appointment Date and Location: TBD

## 2020-01-15 NOTE — PROGRESS NOTE ADULT - SUBJECTIVE AND OBJECTIVE BOX
OVERNIGHT EVENTS: MICKEY, urine was yellow overnihgt. In AM, had a lightly colored red urine in toilet, likely few drops that were diluted in toilet bowl, with small normal BM.     SUBJECTIVE: Pt seen and examined at bedside, feeling well with no concerns or questions. Says burning sensation in bladder has resolved for now. Denies cp, sob, abd pain, palpitations, fcnv    Vital Signs Last 12 Hrs  T(F): 98.1 (01-15-20 @ 13:44), Max: 98.1 (01-15-20 @ 13:44)  HR: 72 (01-15-20 @ 14:43) (72 - 94)  BP: 141/63 (01-15-20 @ 14:43) (135/63 - 147/65)  BP(mean): 90 (01-15-20 @ 14:43) (90 - 99)  RR: 18 (01-15-20 @ 14:43) (16 - 18)  SpO2: 95% (01-15-20 @ 14:43) (95% - 100%)  I&O's Summary    2020 07:01  -  15 Bridger 2020 07:00  --------------------------------------------------------  IN: 0 mL / OUT: 500 mL / NET: -500 mL        PHYSICAL EXAM:  General: WDWN, resting in bed; NAD, on room air, speaking in full sentences, calm cooperative, pleasant  HEENT: EOMI, anicteric sclera; MMM  Neck: supple, no JVD, trachea midline  Cardiovascular: +S1/S2, tachycardiac, irregular, no rmg  Respiratory: CTA B/L; no W/R/R  Gastrointestinal: soft, NT/ND; +BSx4  Extremities: WWP; no edema, clubbing or cyanosis  Vascular: 2+ radial, DP/PT pulses B/L  Neurological: AAOx3; no focal deficits        LABS:                        8.7    2.74  )-----------( 161      ( 15 Bridger 2020 08:21 )             27.0     01-15    134<L>  |  103  |  12  ----------------------------<  122<H>  4.9   |  19<L>  |  0.98    Ca    8.9      15 Bridger 2020 08:21  Phos  2.2     01-15  Mg     1.9     01-15      PTT - ( 2020 07:20 )  PTT:32.2 sec  Urinalysis Basic - ( 2020 04:29 )    Color: Red / Appearance: SL Cloudy / S.020 / pH: x  Gluc: x / Ketone: NEGATIVE  / Bili: Negative / Urobili: 0.2 E.U./dL   Blood: x / Protein: 100 mg/dL / Nitrite: NEGATIVE   Leuk Esterase: NEGATIVE / RBC: Many /HPF / WBC 5-10 /HPF   Sq Epi: x / Non Sq Epi: 0-5 /HPF / Bacteria: Present /HPF        RADIOLOGY & ADDITIONAL TESTS:    MEDICATIONS  (STANDING):  amLODIPine   Tablet 5 milliGRAM(s) Oral every 24 hours  dexAMETHasone     Tablet 2 milliGRAM(s) Oral every 24 hours  dextrose 5%. 1000 milliLiter(s) (50 mL/Hr) IV Continuous <Continuous>  dextrose 50% Injectable 12.5 Gram(s) IV Push once  dextrose 50% Injectable 25 Gram(s) IV Push once  dextrose 50% Injectable 25 Gram(s) IV Push once  dorzolamide 2% Ophthalmic Solution 1 Drop(s) Both EYES three times a day  enoxaparin Injectable 80 milliGRAM(s) SubCutaneous every 12 hours  finasteride 5 milliGRAM(s) Oral daily  insulin lispro (HumaLOG) corrective regimen sliding scale   SubCutaneous Before meals and at bedtime  latanoprost 0.005% Ophthalmic Solution 1 Drop(s) Both EYES at bedtime  metoprolol tartrate 12.5 milliGRAM(s) Oral every 12 hours  nystatin    Suspension 855413 Unit(s) Oral every 6 hours  pantoprazole    Tablet 40 milliGRAM(s) Oral two times a day    MEDICATIONS  (PRN):  acetaminophen   Tablet .. 650 milliGRAM(s) Oral every 6 hours PRN Temp greater or equal to 38C (100.4F)  acetaminophen   Tablet .. 650 milliGRAM(s) Oral every 6 hours PRN Mild Pain (1 - 3)  dextrose 40% Gel 15 Gram(s) Oral once PRN Blood Glucose LESS THAN 70 milliGRAM(s)/deciliter  glucagon  Injectable 1 milliGRAM(s) IntraMuscular once PRN Glucose LESS THAN 70 milligrams/deciliter  ondansetron    Tablet 4 milliGRAM(s) Oral every 6 hours PRN Nausea and/or Vomiting HOSPITAL COURSE:  79M w/ PMH s/f stage IV prostate CA (s/p prostatectomy, on chemotherapy), who p/w progressive SOB and JAIME x1mo associated w/ generalized fatigue in the setting of decreased PO intake, found to have segmental PE of RLL on CTA Chest and LLE DVT on US Dopplers likely 2/2 malignancy. Pt was initially started on lovenox for treatment of PE. Pt had episode of coffee ground emesis x2. Lovenox was held in ICU and GI was consulted at that time. Pt scoped with EGD 20: Esophagitis, Gastritis, duodenitis, 2 small clean based ulcer in D2. Continued on protonix per GI recs and there was no direct contraindication for a/c. Pt was then started on heparin gtt which was held in setting of hematuria wiht PTTs ~150s. In early AM  rapid response was called w/ vitals T 100.8 /65 HR 170s R 18 99% on 2L O2 with complaints of chest pain and difficulty breathing. Initial EKG was concerning for SVT vs new onset atrial fibrillation. Patient was given IV Adenosine 6mg >12mg revealing underlying atrial fibrillation. Patient subsequently given 1L LR bolus with lopressor 5mg IV and Lopressor 12.5mg PO x1 with improvement of HR to 110>120s. Tachycardia and low grade fever possibly 2/2 worsening of PE. ICU consult called and pt transferred to  where lopressor was continued at 12.5mg BID and patient continued to be in sinus rhythm (with occasional PVCs) on daily ekgs. Pt's hematuria cleared and was making yellow urine until 1/15AM where he had light red urine in toilet. Urology consulted for hematuria and pt pending bladder scan to r/o clots in bladder given intermittent hematuria. Pt HD stable and being stepped down to [facility].    OVERNIGHT EVENTS: MICKEY, urine was yellow overnihgt. In AM, had a lightly colored red urine in toilet, likely few drops that were diluted in toilet bowl, with small normal BM.     SUBJECTIVE: Pt seen and examined at bedside, feeling well with no concerns or questions. Says burning sensation in bladder has resolved for now. Denies cp, sob, abd pain, palpitations, fcnv    Vital Signs Last 12 Hrs  T(F): 98.1 (01-15-20 @ 13:44), Max: 98.1 (01-15-20 @ 13:44)  HR: 72 (01-15-20 @ 14:43) (72 - 94)  BP: 141/63 (01-15-20 @ 14:43) (135/63 - 147/65)  BP(mean): 90 (01-15-20 @ 14:43) (90 - 99)  RR: 18 (01-15-20 @ 14:43) (16 - 18)  SpO2: 95% (01-15-20 @ 14:43) (95% - 100%)  I&O's Summary    2020 07:01  -  15 Bridger 2020 07:00  --------------------------------------------------------  IN: 0 mL / OUT: 500 mL / NET: -500 mL        PHYSICAL EXAM:  General: WDWN, resting in bed; NAD, on room air, speaking in full sentences, calm cooperative, pleasant  HEENT: EOMI, anicteric sclera; MMM  Neck: supple, no JVD, trachea midline  Cardiovascular: +S1/S2, tachycardiac, irregular, no rmg  Respiratory: CTA B/L; no W/R/R  Gastrointestinal: soft, NT/ND; +BSx4  Extremities: WWP; no edema, clubbing or cyanosis  Vascular: 2+ radial, DP/PT pulses B/L  Neurological: AAOx3; no focal deficits        LABS:                        8.7    2.74  )-----------( 161      ( 15 Bridger 2020 08:21 )             27.0     -15    134<L>  |  103  |  12  ----------------------------<  122<H>  4.9   |  19<L>  |  0.98    Ca    8.9      15 Bridger 2020 08:21  Phos  2.2     -15  Mg     1.9     -15      PTT - ( 2020 07:20 )  PTT:32.2 sec  Urinalysis Basic - ( 2020 04:29 )    Color: Red / Appearance: SL Cloudy / S.020 / pH: x  Gluc: x / Ketone: NEGATIVE  / Bili: Negative / Urobili: 0.2 E.U./dL   Blood: x / Protein: 100 mg/dL / Nitrite: NEGATIVE   Leuk Esterase: NEGATIVE / RBC: Many /HPF / WBC 5-10 /HPF   Sq Epi: x / Non Sq Epi: 0-5 /HPF / Bacteria: Present /HPF        RADIOLOGY & ADDITIONAL TESTS:    MEDICATIONS  (STANDING):  amLODIPine   Tablet 5 milliGRAM(s) Oral every 24 hours  dexAMETHasone     Tablet 2 milliGRAM(s) Oral every 24 hours  dextrose 5%. 1000 milliLiter(s) (50 mL/Hr) IV Continuous <Continuous>  dextrose 50% Injectable 12.5 Gram(s) IV Push once  dextrose 50% Injectable 25 Gram(s) IV Push once  dextrose 50% Injectable 25 Gram(s) IV Push once  dorzolamide 2% Ophthalmic Solution 1 Drop(s) Both EYES three times a day  enoxaparin Injectable 80 milliGRAM(s) SubCutaneous every 12 hours  finasteride 5 milliGRAM(s) Oral daily  insulin lispro (HumaLOG) corrective regimen sliding scale   SubCutaneous Before meals and at bedtime  latanoprost 0.005% Ophthalmic Solution 1 Drop(s) Both EYES at bedtime  metoprolol tartrate 12.5 milliGRAM(s) Oral every 12 hours  nystatin    Suspension 033533 Unit(s) Oral every 6 hours  pantoprazole    Tablet 40 milliGRAM(s) Oral two times a day    MEDICATIONS  (PRN):  acetaminophen   Tablet .. 650 milliGRAM(s) Oral every 6 hours PRN Temp greater or equal to 38C (100.4F)  acetaminophen   Tablet .. 650 milliGRAM(s) Oral every 6 hours PRN Mild Pain (1 - 3)  dextrose 40% Gel 15 Gram(s) Oral once PRN Blood Glucose LESS THAN 70 milliGRAM(s)/deciliter  glucagon  Injectable 1 milliGRAM(s) IntraMuscular once PRN Glucose LESS THAN 70 milligrams/deciliter  ondansetron    Tablet 4 milliGRAM(s) Oral every 6 hours PRN Nausea and/or Vomiting

## 2020-01-15 NOTE — PROGRESS NOTE ADULT - SUBJECTIVE AND OBJECTIVE BOX
Physical Medicine and Rehabilitation Progress Note:    Patient is a 79y old  Male who presents with a chief complaint of SOB (15 Bridger 2020 14:56)      HPI:  Mr. Rossi is an 79M w/ PMH s/f prostate CA (s/p prostatectomy, on chemotherapy), HTN, HLD, who p/w progressively worsening SOB x1mo. Patient endorses being able to "walk miles" prior to 1-2mo ago, and can now becomes SOB after walking 7-10 steps. SOB is associated with generalized weakness and muscle fatigue. Last Sunday he was walking in his house and fell, saying he "just gave out", denies LoC or hitting his head. Denies associated flushing, lightheadedness or dizziness. Denies chest pain, recent travel or sick contacts.  Of note, endorses decreased PO intake after having 4 teeth pulled on 1/2. He has had persistent pain since the procedure and is unable to chew food.   Additional ROS significant for intermittent palpitations at rest (not associated with SOB), dysuria, increased urinary frequency, intermittent bloody urine (1-2mo, most recently today), 1 episode of diarrhea (today, watery, "orange color"). He denies fevers, chills, headaches, vision changes, abdominal pain.    Of note, patient presented to ED in 1mo ago (12/17/2019) w/ back pain and urinary incontinence that progressed over several days. In ED was found to have lumbar vertebral compression fracture w/o cauda equina. Today pt denies urinary or bowel incontinence. Denies focal neurologic deficits, endorsing his weakness and general fatigue.    In the ED: VS T97.9, , /78, RR20 O2sat 100% on RA.  Labs s/f WBC 1.67, Hb10.2 (baseline ~10), BUN 26, Cr1.43 (0.99 in 12/17), negative troponin, BNP wnl, lactate wnl.  EKG: NSR (borderline tachycardic, HR98), T wave inversions in I and aVL, L axis deviation  CT Angio Chest w/ "Solitary segmental pulmonary embolus within branches supplying the medial basilar segment of the right lower lobe."  US LE Dopplers preliminary read showing DVT of L intramuscular calf vein. (12 Jan 2020 17:21)                            8.7    2.74  )-----------( 161      ( 15 Bridger 2020 08:21 )             27.0       01-15    134<L>  |  103  |  12  ----------------------------<  122<H>  4.9   |  19<L>  |  0.98    Ca    8.9      15 Bridger 2020 08:21  Phos  2.2     01-15  Mg     1.9     01-15      Vital Signs Last 24 Hrs  T(C): 36.7 (15 Bridger 2020 13:44), Max: 36.8 (15 Bridger 2020 01:26)  T(F): 98.1 (15 Bridger 2020 13:44), Max: 98.3 (15 Bridger 2020 01:26)  HR: 72 (15 Bridger 2020 14:43) (70 - 94)  BP: 141/63 (15 Bridger 2020 14:43) (116/59 - 154/67)  BP(mean): 90 (15 Bridger 2020 14:43) (81 - 99)  RR: 18 (15 Bridger 2020 14:43) (16 - 19)  SpO2: 95% (15 Bridger 2020 14:43) (95% - 100%)    MEDICATIONS  (STANDING):  amLODIPine   Tablet 5 milliGRAM(s) Oral every 24 hours  dexAMETHasone     Tablet 2 milliGRAM(s) Oral every 24 hours  dextrose 5%. 1000 milliLiter(s) (50 mL/Hr) IV Continuous <Continuous>  dextrose 50% Injectable 12.5 Gram(s) IV Push once  dextrose 50% Injectable 25 Gram(s) IV Push once  dextrose 50% Injectable 25 Gram(s) IV Push once  dorzolamide 2% Ophthalmic Solution 1 Drop(s) Both EYES three times a day  enoxaparin Injectable 80 milliGRAM(s) SubCutaneous every 12 hours  finasteride 5 milliGRAM(s) Oral daily  insulin lispro (HumaLOG) corrective regimen sliding scale   SubCutaneous Before meals and at bedtime  latanoprost 0.005% Ophthalmic Solution 1 Drop(s) Both EYES at bedtime  metoprolol tartrate 12.5 milliGRAM(s) Oral every 12 hours  nystatin    Suspension 850547 Unit(s) Oral every 6 hours  pantoprazole    Tablet 40 milliGRAM(s) Oral two times a day    MEDICATIONS  (PRN):  acetaminophen   Tablet .. 650 milliGRAM(s) Oral every 6 hours PRN Temp greater or equal to 38C (100.4F)  acetaminophen   Tablet .. 650 milliGRAM(s) Oral every 6 hours PRN Mild Pain (1 - 3)  dextrose 40% Gel 15 Gram(s) Oral once PRN Blood Glucose LESS THAN 70 milliGRAM(s)/deciliter  glucagon  Injectable 1 milliGRAM(s) IntraMuscular once PRN Glucose LESS THAN 70 milligrams/deciliter  ondansetron    Tablet 4 milliGRAM(s) Oral every 6 hours PRN Nausea and/or Vomiting    Currently Undergoing Physical Therapy at bedside.    Functional Status Assessment:    Previous Level of Function:     · Ambulation Skills	independent	  · Transfer Skills	independent	  · ADL Skills	independent	  · Work/Leisure Activity	independent	  · Additional Comments	Pt reports living alone in a PH with ~12 [place] with b/l HRs and ~12 more steps to bed/bath on 2nd floor with 1 HR. Pt reports being completely independent with all ADL's and mobility without use of an assisitive device, however owns a cane. Pt reports he is currently still working. Pt's daughter reports children check up on pt regularly everyday.	    Cognitive Status Examination:   · Orientation	oriented to person, place, time and situation	  · Level of Consciousness	alert	  · Follows Commands and Answers Questions	100% of the time	  · Personal Safety and Judgment	intact	    Range of Motion Exam:   · Active Range of Motion Examination	bilateral upper extremity Active ROM was WFL (within functional limits); bilateral  lower extremity Active ROM was WFL (within functional limits)	    Manual Muscle Testing:   · Manual Muscle Testing Results	grossly >3/5 t/o b/l UE's/LE's based on functional mobility	    Bed Mobility: Scooting/Bridging:     · Level of [place]	independent	    Bed Mobility: Sit to Supine:     · Level of [place]	independent	    Bed Mobility: Supine to Sit:     · Level of [place]	independent	    Transfer: Sit to Stand:     · Level of [place]	supervision	  · Physical Assist/Nonphysical Assist	nonverbal cues (demo/gestures); verbal cues; supervision	  · Weight-Bearing Restrictions	weight-bearing as tolerated	  · Assistive Device	none	    Transfer: Stand to Sit:     · Level of [place]	supervision	  · Physical Assist/Nonphysical Assist	nonverbal cues (demo/gestures); verbal cues; supervision	  · Weight-Bearing Restrictions	weight-bearing as tolerated	  · Assistive Device	none	    Sit/Stand Transfer Safety Analysis:     · Transfer Safety Concerns Noted	losing balance; decreased proprioception; decreased sequencing ability	  · Impairments Contributing to Impaired Transfers	impaired balance; impaired coordination; impaired postural control; decreased strength	    Gait Skills:     · Level of [place]	supervision; contact guard	  · Physical Assist/Nonphysical Assist	1 person assist; nonverbal cues (demo/gestures); verbal cues	  · Weight-Bearing Restrictions	weight-bearing as tolerated	  · Assistive Device	none	  · Gait Distance	150 feet	    Gait Analysis:     · Gait Pattern Used	2-point gait	  · Gait Deviations Noted	decreased step length; increased stride width	  · Impairments Contributing to Gait Deviations	impaired balance; decreased strength; impaired coordination	    Stair Negotiation:     · Level of [place]	supervision	  · Physical Assist/Nonphysical Assist	nonverbal cues (demo/gestures); verbal cues; supervision	  · Weight-Bearing Restrictions	weight-bearing as tolerated	  · Assistive Device	right rail up; left rail down	  · Stair Pattern	step to step	  · Number of Stairs	12	    Balance Skills Assessment:     · Sitting Balance: Static	normal balance	  · Sitting Balance: Dynamic	normal balance	  · Sit-to-Stand Balance	good balance	  · Standing Balance: Static	good balance	  · Standing Balance: Dynamic	good balance	  · Systems Impairment Contributing to Balance Disturbance	musculoskeletal	  · Identified Impairments Contributing to Balance Disturbance	decreased strength	    Clinical Impressions:   · Criteria for Skilled Therapeutic Interventions	impairments found; functional limitations in following categories; risk reduction/prevention; rehab potential; therapy frequency; anticipated discharge recommendation; anticipated equipment needs at discharge	  · Impairments Found (describe specific impairments)	aerobic capacity/endurance; gait, locomotion, and balance; ventilation and respiration/gas exchange; muscle strength	  · Functional Limitations in Following Categories (describe specific limitations)	self-care; home management; work; community/leisure	  · Risk Reduction/Prevention (Describe Specific Areas of risk reduction/prevention)	risk factors	  · Risk Areas	fall	  · Rehab Potential	good, to achieve stated therapy goals	  · Therapy Frequency	2-3x/week	  · Anticipated Equipment Needs at Discharge	pt instructed to use straight cane upon D/C to home for balance/safety	        PM&R Impression: as above    Current Disposition Plan Recommendations: s/c home with no post discharge rehab needs

## 2020-01-15 NOTE — PROGRESS NOTE ADULT - PROBLEM SELECTOR PLAN 3
2 episodes of coffee ground emesis. EGD 1/13/20: Esophagitis, 2cm hiatal hernia, Gastritis, duodenitis, 2 small clean based ulcer in D2  -Continue PPI 40 mg BID PO for 8w (End 3/9)   -f/u serum H pylori Ab-> Triple therapy pending biopsy or Ab results  -reconsult GI if reoccurs  -active T&S 1/13  - no contraindication to a/c per GI    #JERI  - Resolved, back at baseline of .99 2 episodes of coffee ground emesis. EGD 1/13/20: Esophagitis, 2cm hiatal hernia, Gastritis, duodenitis, 2 small clean based ulcer in D2. H.pylori negative   -Continue PPI 40 mg BID PO for 8w (End 3/9)   -reconsult GI if reoccurs  -active T&S 1/13  - no contraindication to a/c per GI    #JERI  - Resolved, back at baseline of .99 2 episodes of coffee ground emesis. EGD 1/13/20: Esophagitis, 2cm hiatal hernia, Gastritis, duodenitis, 2 small clean based ulcer in D2. H.pylori negative   -Continue PPI 40 mg BID PO for 8w (End 3/9) as pt on AC  -reconsult GI if reoccurs  -active T&S 1/13  - no contraindication to a/c per GI    # Hematuria, in setting of prostate cancer  - Urology on board, rec bladder US to look for clots  - PVRs to r/o retention

## 2020-01-15 NOTE — PROGRESS NOTE ADULT - ASSESSMENT
79M w/ stage IV prostate CA (s/p prostatectomy, on chemotherapy), who p/w progressive SOB and JAIME x1mo associated w/ generalized fatigue in the setting of decreased PO intake, found to have segmental PE of RLL on CTA Chest and LLE DVT on US Dopplers likely 2/2 malignancy. Hospital course c/b coffee ground emesis, now resolved with EGD demonstrating duodenal ulcers and gastritis. Cardiology consulted for new onset atrial fibrillation.     #Atrial Fibrillation  Patient s/p rapid response for afib w/RVR to 170s managed with adenosine (6mg, then 12mg) and lopressor (5mg IV push). EKG now demonstrating NSR with PVCs. CHADsVASc 5 (74, HTN, DM). Previous echocardiogram (8/15/2019; Dr. Estrella Gloria), unable to obtain results. Per daughter, no notable findings. Hemodynamically patient euvolemic, with cold extremities. Atrial fibrillation likely 2/2 underlying malignancy vs PE vs electrolyte derangements. TSH WNL. HgA1c 6.1.   - obtain echocardiogram   - c/w lovenox 80mg BID for full anticoagulation in setting of PE and malignancy, can transition to Eliquis from cardiac prospective prior to discharge vs. alternative anticoagulation given history of malignancy and DVT/PE   - well controlled with lopressor 12.5mg BID, c/w current management  - replete electrolytes PRN for K>4, Mg >2   - daily EKGs  - continue telemetry monitoring  - Pt would like to establish care with new cardiologist - please schedule outpatient follow up with Dr. Sánchez within 1-2 weeks upon d/c    Cardiology to follow.    Recommendations are not final until attested by attending physician. 79M w/ stage IV prostate CA (s/p prostatectomy, on chemotherapy), who p/w progressive SOB and JAIME x1mo associated w/ generalized fatigue in the setting of decreased PO intake, found to have segmental PE of RLL on CTA Chest and LLE DVT on US Dopplers likely 2/2 malignancy. Hospital course c/b coffee ground emesis, now resolved with EGD demonstrating duodenal ulcers and gastritis. Cardiology consulted for new onset atrial fibrillation.     #Atrial Fibrillation  Patient s/p rapid response for afib w/RVR to 170s managed with adenosine (6mg, then 12mg) and lopressor (5mg IV push). EKG now demonstrating NSR with PVCs. CHADsVASc 5 (74, HTN, DM). Previous echocardiogram (8/15/2019; Dr. Estrella Gloria), unable to obtain results. Per daughter, no notable findings. Hemodynamically patient euvolemic, with cold extremities. Atrial fibrillation likely 2/2 underlying malignancy vs PE vs electrolyte derangements. TSH WNL. HgA1c 6.1.   - obtain echocardiogram   - c/w lovenox 80mg BID for full anticoagulation in setting of PE and malignancy, can transition to Eliquis from cardiac perspective prior to discharge vs. alternative anticoagulation given history of malignancy and DVT/PE   - well controlled with lopressor 12.5mg BID, c/w current management  - replete electrolytes PRN for K>4, Mg >2   - daily EKGs  - continue telemetry monitoring  - Pt would like to establish care with new cardiologist - please schedule outpatient follow up with Dr. Charli Sánchez within 1-2 weeks upon d/c    Cardiology to follow.    Recommendations are not final until attested by attending physician.

## 2020-01-15 NOTE — PROGRESS NOTE ADULT - PROBLEM SELECTOR PROBLEM 6
Essential hypertension
Oral candidiasis

## 2020-01-15 NOTE — PROGRESS NOTE ADULT - SUBJECTIVE AND OBJECTIVE BOX
BROOKLYNN WOODS  79y  Male    Patient is a 79y old  Male who presents with a chief complaint of SOB (15 Bridger 2020 15:20)  HOSPITAL COURSE:  79M w/ PMH s/f stage IV prostate CA (s/p prostatectomy, on chemotherapy), who p/w progressive SOB and JAIME x1mo associated w/ generalized fatigue in the setting of decreased PO intake, found to have segmental PE of RLL on CTA Chest and LLE DVT on US Dopplers likely 2/2 malignancy. Pt was initially started on lovenox for treatment of PE. Pt had episode of coffee ground emesis x2. Lovenox was held in ICU and GI was consulted at that time. Pt scoped with EGD 20: Esophagitis, Gastritis, duodenitis, 2 small clean based ulcer in D2. Continued on protonix per GI recs and there was no direct contraindication for a/c. Pt was then started on heparin gtt which was held in setting of hematuria wiht PTTs ~150s. In early AM  rapid response was called w/ vitals T 100.8 /65 HR 170s R 18 99% on 2L O2 with complaints of chest pain and difficulty breathing. Initial EKG was concerning for SVT vs new onset atrial fibrillation. Patient was given IV Adenosine 6mg >12mg revealing underlying atrial fibrillation. Patient subsequently given 1L LR bolus with lopressor 5mg IV and Lopressor 12.5mg PO x1 with improvement of HR to 110>120s. Tachycardia and low grade fever possibly 2/2 worsening of PE. ICU consult called and pt transferred to 7L where lopressor was continued at 12.5mg BID and patient continued to be in sinus rhythm (with occasional PVCs) on daily ekgs. Pt's hematuria cleared and was making yellow urine until 1/15AM where he had light red urine in toilet. Urology consulted for hematuria and pt pending bladder scan to r/o clots in bladder given intermittent hematuria. Pt HD stable and being stepped down to [facility].      INTERVAL HPI/OVERNIGHT EVENTS: Patient is now not c/o any CP, sob, palpitations, abdominal pain, N/V, diarrhea, hematochezia, dysuria, urinary frequency, lightheadedness, or dizziness.       T(C): 37.1 (01-15-20 @ 16:35), Max: 37.1 (01-15-20 @ 16:35)  HR: 80 (01-15-20 @ 17:30) (72 - 94)  BP: 149/70 (01-15-20 @ 17:30) (130/68 - 149/70)  RR: 18 (01-15-20 @ 17:30) (16 - 19)  SpO2: 92% (01-15-20 @ 17:30) (92% - 100%)  Wt(kg): --Vital Signs Last 24 Hrs  T(C): 37.1 (15 Bridger 2020 16:35), Max: 37.1 (15 Bridger 2020 16:35)  T(F): 98.7 (15 Bridger 2020 16:35), Max: 98.7 (15 Bridger 2020 16:35)  HR: 80 (15 Bridger 2020 17:30) (72 - 94)  BP: 149/70 (15 Bridger 2020 17:30) (130/68 - 149/70)  BP(mean): 101 (15 Bridger 2020 17:30) (88 - 101)  RR: 18 (15 Bridger 2020 17:30) (16 - 19)  SpO2: 92% (15 Bridger 2020 17:30) (92% - 100%)    PHYSICAL EXAM:  GENERAL: NAD  HEAD:  Atraumatic, Normocephalic  EYES: Conjunctiva and sclera clear  ENMT: Moist mucous membranes  NECK: Supple, No JVD  NERVOUS SYSTEM:  Alert & Oriented X3, Good concentration  CHEST/LUNG: Clear to auscultation bilaterally; No rales, rhonchi, wheezing, or rubs  HEART: Regular rate and rhythm; No murmurs, rubs, or gallops  ABDOMEN: Soft, Nontender, Nondistended, surgical scar from radical prostatectomy; Bowel sounds present  EXTREMITIES: WWP, No clubbing, cyanosis, or edema  Vascular: 2+ peripheral pulses x4      Consultant(s) Notes Reviewed:  [x ] YES  [ ] NO  Care Discussed with Consultants/Other Providers [ x] YES  [ ] NO    LABS:                        8.7    2.74  )-----------( 161      ( 15 Bridger 2020 08:21 )             27.0     -15    134<L>  |  103  |  12  ----------------------------<  122<H>  4.9   |  19<L>  |  0.98    Ca    8.9      15 Bridger 2020 08:21  Phos  2.2     01-15  Mg     1.9     01-15      Iron 63, TIBC 156, %Sat 40, Ferritin 3272/ 01-15-20 @ 08:21    PTT - ( 2020 07:20 )  PTT:32.2 sec  Urinalysis Basic - ( 2020 04:29 )    Color: Red / Appearance: SL Cloudy / S.020 / pH: x  Gluc: x / Ketone: NEGATIVE  / Bili: Negative / Urobili: 0.2 E.U./dL   Blood: x / Protein: 100 mg/dL / Nitrite: NEGATIVE   Leuk Esterase: NEGATIVE / RBC: Many /HPF / WBC 5-10 /HPF   Sq Epi: x / Non Sq Epi: 0-5 /HPF / Bacteria: Present /HPF      CAPILLARY BLOOD GLUCOSE      POCT Blood Glucose.: 114 mg/dL (15 Bridger 2020 17:19)  POCT Blood Glucose.: 111 mg/dL (15 Bridger 2020 11:44)  POCT Blood Glucose.: 125 mg/dL (15 Bridger 2020 06:21)  POCT Blood Glucose.: 144 mg/dL (2020 21:40)        Urinalysis Basic - ( 2020 04:29 )    Color: Red / Appearance: SL Cloudy / S.020 / pH: x  Gluc: x / Ketone: NEGATIVE  / Bili: Negative / Urobili: 0.2 E.U./dL   Blood: x / Protein: 100 mg/dL / Nitrite: NEGATIVE   Leuk Esterase: NEGATIVE / RBC: Many /HPF / WBC 5-10 /HPF   Sq Epi: x / Non Sq Epi: 0-5 /HPF / Bacteria: Present /HPF        RADIOLOGY & ADDITIONAL TESTS:    Imaging Personally Reviewed:  [ ] YES  [ ] NO    HEALTH ISSUES - PROBLEM Dx:  New onset a-fib: New onset a-fib  Oral candidiasis: Oral candidiasis  Thrombophlebitis of popliteal vein  Pulmonary thromboembolism  Transition of care performed with sharing of clinical summary: Transition of care performed with sharing of clinical summary  Nutrition, metabolism, and development symptoms: Nutrition, metabolism, and development symptoms  Type 2 diabetes mellitus: Type 2 diabetes mellitus  Hyperlipidemia: Hyperlipidemia  Essential hypertension: Essential hypertension  Prostate cancer: Prostate cancer  DVT (deep venous thrombosis): DVT (deep venous thrombosis)  Sepsis without acute organ dysfunction, due to unspecified organism: Sepsis without acute organ dysfunction, due to unspecified organism  Pulmonary embolus: Pulmonary embolus

## 2020-01-15 NOTE — PROGRESS NOTE ADULT - PROBLEM SELECTOR PLAN 2
Rapid called (1/14), pt complaining of chest pain and difficulty breathing w/ HR in 170s. EKG showing SVT vs AF w/ rvr - s/p adenosine and lopressor. Repeat ekg showing afib w/ rvr. Possibly 2/2 PE. While on 7La pt has been in sinus and on lopressor 12.5mg bid. Pt likely has been going in and out of afib in past, reports hx of "passing out" at times.  - c/w with lopressor 12.5 mg q12h  - f/u echo  - replete K and Mg prn  - daily EKGs  - Cardiology consulted, f/u recs  - f/u with Cards o/p. If no cardiologist, can follow with Dr. Charli Sánchez within 1-2 weeks of d/c    #Hematuria - pt still having red urine occasionally. Reports bladder burning occasionally although clean catch UA with negative LE and nitrites  - urology consulted, f/u recs  - heparin gtt held in setting of new hematuria. Restarting lovenox for PE treatment on 1/14. Hb stable at 9.0. Chadvasc score 5

## 2020-01-15 NOTE — PROGRESS NOTE ADULT - PROBLEM SELECTOR PROBLEM 2
New onset a-fib
Sepsis without acute organ dysfunction, due to unspecified organism
New onset a-fib

## 2020-01-15 NOTE — PROGRESS NOTE ADULT - PROBLEM SELECTOR PLAN 2
Rapid called (1/14), pt complaining of chest pain and difficulty breathing w/ HR in 170s. EKG showing SVT vs AF w/ rvr - s/p adenosine and lopressor. Repeat ekg showing afib w/ rvr. Possibly 2/2 PE. While on 7La pt has been in sinus and on lopressor 12.5mg bid. Pt likely has been going in and out of afib in past, reports hx of "passing out" at times.  - c/w with lopressor 12.5 mg q12h  - replete K and Mg prn  - daily EKGs  - Cardiology consulted, f/u recs  - f/u with Cards o/p. If no cardiologist, can follow with Dr. Charli Sánchez within 1-2 weeks of d/c    #Hematuria - pt still having red urine occasionally. Reports bladder burning occasionally although clean catch UA with negative LE and nitrites  - urology consulted, f/u recs  - heparin gtt held in setting of new hematuria. Restarting lovenox for PE treatment on 1/14. Hb stable at 9.0. Chadvasc score 5 Rapid called (1/14), pt complaining of chest pain and difficulty breathing w/ HR in 170s. EKG showing SVT vs AF w/ rvr - s/p adenosine and lopressor. Repeat ekg showing afib w/ rvr. Possibly 2/2 PE. While on 7La pt has been in sinus and on lopressor 12.5mg bid. Pt likely has been going in and out of afib in past, reports hx of "passing out" at times.  - c/w with lopressor 12.5 mg q12h  - Keep K>4, Mg>2  - daily EKGs  - Cardiology consulted, f/u recs  - f/u with Cards o/p. If no cardiologist, can follow with Dr. Charli Sánchez within 1-2 weeks of d/c    #Hematuria - pt still having red urine occasionally. Reports bladder burning occasionally although clean catch UA with negative LE and nitrites  - urology consulted, f/u recs  - Restarting lovenox for PE treatment on 1/14. Hb stable at 9.0. Chadvasc score 5

## 2020-01-15 NOTE — PROGRESS NOTE ADULT - PROBLEM SELECTOR PLAN 4
Known hx of stage IV prostate cancer (s/p prostatectomy, on chemotherapy). Leukopenia on admission likely 2/2 chemotherapy. Last chemo 12/31, next Jevtana 1/17  -Follows with Dr. Fuller, next chemotherapy planned for next week  -f/u heme/onc rec  -c/w finasteride 5mg qd  - 2mg dexamethasone daily, while on chemo  - (severe neutropenia)

## 2020-01-15 NOTE — PROGRESS NOTE ADULT - ASSESSMENT
per Internal Medicine    79M w/ stage IV prostate CA (s/p prostatectomy, on chemotherapy), who p/w progressive SOB and JAIME x1mo associated w/ generalized fatigue in the setting of decreased PO intake, found to have segmental PE of RLL on CTA Chest and LLE DVT on US Dopplers likely 2/2 malignancy. Now with new onset atrial fibrillation. Transferred from [facility] to  for further management.     Problem/Plan - 1:  ·  Problem: Pulmonary embolus.  Plan: PE on CTA chest, likely due to DVT 2/2 malignancy (LLE DVT on US Dopplers).  -low suspicion for R heart strain as troponin neg, BNP neg  -f/u ECHO to r/o R heart strain  - hep ggt held due to hematuria, no contraindication for a/c per GI, continuing with lovenox 1mg/kg bid    #DVT  P/w SOB and JAIME, found to have PE on CTA chest. LE dopplers w/ DVT in LLE intramuscular calf vein. Etiology likely 2/2 malignancy.  -Intramuscular calf vein thrombus does not require treatment  - treat PE as above.     Problem/Plan - 2:  ·  Problem: New onset a-fib.  Plan: Rapid called (1/14), pt complaining of chest pain and difficulty breathing w/ HR in 170s. EKG showing SVT vs AF w/ rvr - s/p adenosine and lopressor. Repeat ekg showing afib w/ rvr. Possibly 2/2 PE. While on 7La pt has been in sinus and on lopressor 12.5mg bid. Pt likely has been going in and out of afib in past, reports hx of "passing out" at times.  - c/w with lopressor 12.5 mg q12h  - f/u echo  - replete K and Mg prn  - daily EKGs  - Cardiology consulted, f/u recs  - f/u with Cards o/p. If no cardiologist, can follow with Dr. Charli Sánchez within 1-2 weeks of d/c    #Hematuria - pt still having red urine occasionally. Reports bladder burning occasionally although clean catch UA with negative LE and nitrites  - urology consulted, f/u recs  - heparin gtt held in setting of new hematuria. Restarting lovenox for PE treatment on 1/14. Hb stable at 9.0. Chadvasc score 5.     Problem/Plan - 3:  ·  Problem: R/O H pylori ulcer.  Plan: 2 episodes of coffee ground emesis. EGD 1/13/20: Esophagitis, 2cm hiatal hernia, Gastritis, duodenitis, 2 small clean based ulcer in D2  -Continue PPI 40 mg BID PO for 8w (End 3/9) and evaluate for role of continue PPI while on A/C  -f/u serum H pylori Ab-> Triple therapy pending biopsy or Ab results  -reconsult GI if reoccurs  -active T&S 1/13  - no contraindication to a/c per GI.     Problem/Plan - 4:  ·  Problem: Prostate cancer.  Plan: Known hx of stage IV prostate cancer (s/p prostatectomy, on chemotherapy). Leukopenia on admission likely 2/2 chemotherapy. Last chemo 12/31, next Jevtana 1/17  -Follows with Dr. Fuller, next chemotherapy planned for next week  -f/u heme/onc rec  -c/w finasteride 5mg qd  - 2mg dexamethasone daily, while on chemo  - (severe neutropenia).     Problem/Plan - 5:  ·  Problem: Oral candidiasis.  Plan: White exudates visualized in oropharynx, likely candidiasis as pt is immunocompromised due to chemotherapy  -nystatin swish and swallow q6 for 2 weeks (End 1/25).     Problem/Plan - 6:  Problem: Essential hypertension. Plan: SBPs ~100 during day on 1/13-1/14. SBPs ~140s after amlodipine decreased to 5.  -cw amlodipine 5mg (decreased from 10).  -hold valsartan in setting of hypotension and elevated Cr  -HCTZ and Beta blocker d/c'd on previous admission.    Problem/Plan - 7:  ·  Problem: Hyperlipidemia.  Plan: Known hx of HLD, on simvastatin 20mg at home.  -C/w home medication (simvastatin 20mg QD)    #Type 2 DM  - Known hx of DM  -SSI, monitor FSG    #Urinary Incontinence  -holding home Myrbetriq 50mg qd.     Problem/Plan - 8:  ·  Problem: Nutrition, metabolism, and development symptoms.  Plan: F: none  E: replete K <4, Mg <2  N: DASH diet  Last BM: 1/12  VTE: Hep ggt  GI PPx: Protonix 40mg IV BID  Sleep Aid: none  Dispo: [facility]  Code:

## 2020-01-15 NOTE — PROGRESS NOTE ADULT - ASSESSMENT
79M w/ stage IV prostate CA (s/p prostatectomy, on chemotherapy), who p/w progressive SOB and JAIME x1mo associated w/ generalized fatigue in the setting of decreased PO intake, found to have segmental PE of RLL on CTA Chest and LLE DVT on US Dopplers likely 2/2 malignancy. With new onset atrial fibrillation.

## 2020-01-15 NOTE — PROGRESS NOTE ADULT - PROBLEM SELECTOR PROBLEM 7
Essential hypertension
Essential hypertension
Hyperlipidemia
Essential hypertension

## 2020-01-15 NOTE — PROGRESS NOTE ADULT - PROBLEM SELECTOR PLAN 9
1) PCP Brennan Germain [phone]  2) Date of Contact with PCP: attempted call 1/13  3) PCP Contacted at Discharge: TBD  4) Summary of Handoff Given to PCP: TBD  5) Post-Discharge Appointment Date and Location: TBD

## 2020-01-15 NOTE — PROGRESS NOTE ADULT - PROBLEM SELECTOR PROBLEM 4
Prostate cancer
R/O H pylori ulcer

## 2020-01-15 NOTE — PROGRESS NOTE ADULT - PROBLEM SELECTOR PROBLEM 9
Nutrition, metabolism, and development symptoms
Transition of care performed with sharing of clinical summary
Transition of care performed with sharing of clinical summary
Nutrition, metabolism, and development symptoms

## 2020-01-16 ENCOUNTER — TRANSCRIPTION ENCOUNTER (OUTPATIENT)
Age: 80
End: 2020-01-16

## 2020-01-16 VITALS
TEMPERATURE: 97 F | OXYGEN SATURATION: 100 % | DIASTOLIC BLOOD PRESSURE: 71 MMHG | RESPIRATION RATE: 18 BRPM | HEART RATE: 67 BPM | SYSTOLIC BLOOD PRESSURE: 154 MMHG

## 2020-01-16 DIAGNOSIS — K92.0 HEMATEMESIS: ICD-10-CM

## 2020-01-16 LAB
ANION GAP SERPL CALC-SCNC: 15 MMOL/L — SIGNIFICANT CHANGE UP (ref 5–17)
APPEARANCE UR: CLEAR — SIGNIFICANT CHANGE UP
BASOPHILS # BLD AUTO: 0 K/UL — SIGNIFICANT CHANGE UP (ref 0–0.2)
BASOPHILS NFR BLD AUTO: 0 % — SIGNIFICANT CHANGE UP (ref 0–2)
BILIRUB UR-MCNC: NEGATIVE — SIGNIFICANT CHANGE UP
BUN SERPL-MCNC: 12 MG/DL — SIGNIFICANT CHANGE UP (ref 7–23)
CALCIUM SERPL-MCNC: 9.5 MG/DL — SIGNIFICANT CHANGE UP (ref 8.4–10.5)
CHLORIDE SERPL-SCNC: 102 MMOL/L — SIGNIFICANT CHANGE UP (ref 96–108)
CO2 SERPL-SCNC: 19 MMOL/L — LOW (ref 22–31)
COLOR SPEC: SIGNIFICANT CHANGE UP
CREAT SERPL-MCNC: 1.1 MG/DL — SIGNIFICANT CHANGE UP (ref 0.5–1.3)
DIFF PNL FLD: ABNORMAL
EOSINOPHIL # BLD AUTO: 0 K/UL — SIGNIFICANT CHANGE UP (ref 0–0.5)
EOSINOPHIL NFR BLD AUTO: 0 % — SIGNIFICANT CHANGE UP (ref 0–6)
GLUCOSE BLDC GLUCOMTR-MCNC: 100 MG/DL — HIGH (ref 70–99)
GLUCOSE BLDC GLUCOMTR-MCNC: 101 MG/DL — HIGH (ref 70–99)
GLUCOSE SERPL-MCNC: 125 MG/DL — HIGH (ref 70–99)
GLUCOSE UR QL: NEGATIVE — SIGNIFICANT CHANGE UP
HCT VFR BLD CALC: 29 % — LOW (ref 39–50)
HGB BLD-MCNC: 9.3 G/DL — LOW (ref 13–17)
KETONES UR-MCNC: NEGATIVE — SIGNIFICANT CHANGE UP
LEUKOCYTE ESTERASE UR-ACNC: NEGATIVE — SIGNIFICANT CHANGE UP
LYMPHOCYTES # BLD AUTO: 1.26 K/UL — SIGNIFICANT CHANGE UP (ref 1–3.3)
LYMPHOCYTES # BLD AUTO: 21.1 % — SIGNIFICANT CHANGE UP (ref 13–44)
MAGNESIUM SERPL-MCNC: 1.8 MG/DL — SIGNIFICANT CHANGE UP (ref 1.6–2.6)
MCHC RBC-ENTMCNC: 31.2 PG — SIGNIFICANT CHANGE UP (ref 27–34)
MCHC RBC-ENTMCNC: 32.1 GM/DL — SIGNIFICANT CHANGE UP (ref 32–36)
MCV RBC AUTO: 97.3 FL — SIGNIFICANT CHANGE UP (ref 80–100)
MONOCYTES # BLD AUTO: 0.68 K/UL — SIGNIFICANT CHANGE UP (ref 0–0.9)
MONOCYTES NFR BLD AUTO: 11.4 % — SIGNIFICANT CHANGE UP (ref 2–14)
NEUTROPHILS # BLD AUTO: 3.87 K/UL — SIGNIFICANT CHANGE UP (ref 1.8–7.4)
NEUTROPHILS NFR BLD AUTO: 61.4 % — SIGNIFICANT CHANGE UP (ref 43–77)
NITRITE UR-MCNC: NEGATIVE — SIGNIFICANT CHANGE UP
NRBC # BLD: SIGNIFICANT CHANGE UP /100 WBCS (ref 0–0)
PH UR: 7 — SIGNIFICANT CHANGE UP (ref 5–8)
PLATELET # BLD AUTO: 254 K/UL — SIGNIFICANT CHANGE UP (ref 150–400)
POTASSIUM SERPL-MCNC: 4.8 MMOL/L — SIGNIFICANT CHANGE UP (ref 3.5–5.3)
POTASSIUM SERPL-SCNC: 4.8 MMOL/L — SIGNIFICANT CHANGE UP (ref 3.5–5.3)
PROT UR-MCNC: ABNORMAL MG/DL
RBC # BLD: 2.98 M/UL — LOW (ref 4.2–5.8)
RBC # FLD: 17 % — HIGH (ref 10.3–14.5)
SODIUM SERPL-SCNC: 136 MMOL/L — SIGNIFICANT CHANGE UP (ref 135–145)
SP GR SPEC: 1.01 — SIGNIFICANT CHANGE UP (ref 1–1.03)
UROBILINOGEN FLD QL: 0.2 E.U./DL — SIGNIFICANT CHANGE UP
WBC # BLD: 5.96 K/UL — SIGNIFICANT CHANGE UP (ref 3.8–10.5)
WBC # FLD AUTO: 5.96 K/UL — SIGNIFICANT CHANGE UP (ref 3.8–10.5)

## 2020-01-16 PROCEDURE — 83735 ASSAY OF MAGNESIUM: CPT

## 2020-01-16 PROCEDURE — 83605 ASSAY OF LACTIC ACID: CPT

## 2020-01-16 PROCEDURE — 86850 RBC ANTIBODY SCREEN: CPT

## 2020-01-16 PROCEDURE — 82962 GLUCOSE BLOOD TEST: CPT

## 2020-01-16 PROCEDURE — 84484 ASSAY OF TROPONIN QUANT: CPT

## 2020-01-16 PROCEDURE — 71045 X-RAY EXAM CHEST 1 VIEW: CPT

## 2020-01-16 PROCEDURE — 83550 IRON BINDING TEST: CPT

## 2020-01-16 PROCEDURE — 85025 COMPLETE CBC W/AUTO DIFF WBC: CPT

## 2020-01-16 PROCEDURE — 80048 BASIC METABOLIC PNL TOTAL CA: CPT

## 2020-01-16 PROCEDURE — 82728 ASSAY OF FERRITIN: CPT

## 2020-01-16 PROCEDURE — 83036 HEMOGLOBIN GLYCOSYLATED A1C: CPT

## 2020-01-16 PROCEDURE — 84100 ASSAY OF PHOSPHORUS: CPT

## 2020-01-16 PROCEDURE — 99285 EMERGENCY DEPT VISIT HI MDM: CPT | Mod: 25

## 2020-01-16 PROCEDURE — 88305 TISSUE EXAM BY PATHOLOGIST: CPT

## 2020-01-16 PROCEDURE — 82550 ASSAY OF CK (CPK): CPT

## 2020-01-16 PROCEDURE — 93970 EXTREMITY STUDY: CPT

## 2020-01-16 PROCEDURE — 82607 VITAMIN B-12: CPT

## 2020-01-16 PROCEDURE — 82553 CREATINE MB FRACTION: CPT

## 2020-01-16 PROCEDURE — 71046 X-RAY EXAM CHEST 2 VIEWS: CPT

## 2020-01-16 PROCEDURE — 86901 BLOOD TYPING SEROLOGIC RH(D): CPT

## 2020-01-16 PROCEDURE — 83540 ASSAY OF IRON: CPT

## 2020-01-16 PROCEDURE — 99239 HOSP IP/OBS DSCHRG MGMT >30: CPT

## 2020-01-16 PROCEDURE — 97161 PT EVAL LOW COMPLEX 20 MIN: CPT

## 2020-01-16 PROCEDURE — 83880 ASSAY OF NATRIURETIC PEPTIDE: CPT

## 2020-01-16 PROCEDURE — 99232 SBSQ HOSP IP/OBS MODERATE 35: CPT | Mod: GC

## 2020-01-16 PROCEDURE — 93005 ELECTROCARDIOGRAM TRACING: CPT

## 2020-01-16 PROCEDURE — 36415 COLL VENOUS BLD VENIPUNCTURE: CPT

## 2020-01-16 PROCEDURE — 84443 ASSAY THYROID STIM HORMONE: CPT

## 2020-01-16 PROCEDURE — 71275 CT ANGIOGRAPHY CHEST: CPT

## 2020-01-16 PROCEDURE — 80053 COMPREHEN METABOLIC PANEL: CPT

## 2020-01-16 PROCEDURE — 85730 THROMBOPLASTIN TIME PARTIAL: CPT

## 2020-01-16 PROCEDURE — 93306 TTE W/DOPPLER COMPLETE: CPT

## 2020-01-16 PROCEDURE — 87086 URINE CULTURE/COLONY COUNT: CPT

## 2020-01-16 PROCEDURE — 82746 ASSAY OF FOLIC ACID SERUM: CPT

## 2020-01-16 PROCEDURE — 86677 HELICOBACTER PYLORI ANTIBODY: CPT

## 2020-01-16 PROCEDURE — 81001 URINALYSIS AUTO W/SCOPE: CPT

## 2020-01-16 PROCEDURE — 85610 PROTHROMBIN TIME: CPT

## 2020-01-16 PROCEDURE — 85027 COMPLETE CBC AUTOMATED: CPT

## 2020-01-16 PROCEDURE — 76770 US EXAM ABDO BACK WALL COMP: CPT

## 2020-01-16 PROCEDURE — 76770 US EXAM ABDO BACK WALL COMP: CPT | Mod: 26

## 2020-01-16 PROCEDURE — 87040 BLOOD CULTURE FOR BACTERIA: CPT

## 2020-01-16 PROCEDURE — 85045 AUTOMATED RETICULOCYTE COUNT: CPT

## 2020-01-16 RX ORDER — MAGNESIUM SULFATE 500 MG/ML
2 VIAL (ML) INJECTION ONCE
Refills: 0 | Status: COMPLETED | OUTPATIENT
Start: 2020-01-16 | End: 2020-01-16

## 2020-01-16 RX ORDER — METOPROLOL TARTRATE 50 MG
1 TABLET ORAL
Qty: 30 | Refills: 0
Start: 2020-01-16 | End: 2020-02-14

## 2020-01-16 RX ORDER — BISOPROLOL FUMARATE 10 MG/1
1 TABLET, FILM COATED ORAL
Qty: 0 | Refills: 0 | DISCHARGE

## 2020-01-16 RX ORDER — AMLODIPINE BESYLATE 2.5 MG/1
1 TABLET ORAL
Qty: 0 | Refills: 0 | DISCHARGE
Start: 2020-01-16

## 2020-01-16 RX ORDER — PANTOPRAZOLE SODIUM 20 MG/1
1 TABLET, DELAYED RELEASE ORAL
Qty: 120 | Refills: 0
Start: 2020-01-16 | End: 2020-03-15

## 2020-01-16 RX ORDER — APIXABAN 2.5 MG/1
1 TABLET, FILM COATED ORAL
Qty: 60 | Refills: 0
Start: 2020-01-16 | End: 2020-02-14

## 2020-01-16 RX ADMIN — Medication 500000 UNIT(S): at 12:18

## 2020-01-16 RX ADMIN — Medication 2 MILLIGRAM(S): at 12:18

## 2020-01-16 RX ADMIN — AMLODIPINE BESYLATE 5 MILLIGRAM(S): 2.5 TABLET ORAL at 08:42

## 2020-01-16 RX ADMIN — Medication 50 GRAM(S): at 08:43

## 2020-01-16 RX ADMIN — FINASTERIDE 5 MILLIGRAM(S): 5 TABLET, FILM COATED ORAL at 12:18

## 2020-01-16 RX ADMIN — DORZOLAMIDE HYDROCHLORIDE 1 DROP(S): 20 SOLUTION/ DROPS OPHTHALMIC at 07:00

## 2020-01-16 RX ADMIN — PANTOPRAZOLE SODIUM 40 MILLIGRAM(S): 20 TABLET, DELAYED RELEASE ORAL at 07:00

## 2020-01-16 RX ADMIN — Medication 500000 UNIT(S): at 07:00

## 2020-01-16 RX ADMIN — Medication 12.5 MILLIGRAM(S): at 08:42

## 2020-01-16 RX ADMIN — DORZOLAMIDE HYDROCHLORIDE 1 DROP(S): 20 SOLUTION/ DROPS OPHTHALMIC at 12:18

## 2020-01-16 RX ADMIN — ENOXAPARIN SODIUM 80 MILLIGRAM(S): 100 INJECTION SUBCUTANEOUS at 07:00

## 2020-01-16 NOTE — DISCHARGE NOTE PROVIDER - CARE PROVIDERS DIRECT ADDRESSES
[email or web address]

## 2020-01-16 NOTE — DISCHARGE NOTE PROVIDER - NSDCFUADDAPPT_GEN_ALL_CORE_FT
Please follow-up with the Urology appointment that was made for you on 1/30/2020 at 140pm.    Please follow up with the cardiology office that was provided in this paperwork within 2 weeks of discharge.

## 2020-01-16 NOTE — PROGRESS NOTE ADULT - SUBJECTIVE AND OBJECTIVE BOX
Seen and examined bedside. No acute events overnight. Denies dysuria, frequency, urgency. Urine color improved, now clear pink/light red. No SPT. PVR <25cc. Would send repeat UCx as last was contaminated. Kidney/bladder sono today. Discussed with team.     Vital Signs Last 24 Hrs  T(C): 36.4 (16 Jan 2020 05:44), Max: 37.1 (15 Bridger 2020 16:35)  T(F): 97.6 (16 Jan 2020 05:44), Max: 98.7 (15 Bridger 2020 16:35)  HR: 76 (16 Jan 2020 05:44) (65 - 94)  BP: 152/87 (16 Jan 2020 05:44) (138/63 - 152/87)  BP(mean): 90 (15 Bridger 2020 20:15) (90 - 101)  RR: 18 (16 Jan 2020 05:44) (17 - 18)  SpO2: 98% (16 Jan 2020 05:44) (92% - 98%)                          9.3    5.96  )-----------( 254      ( 16 Jan 2020 06:03 )             29.0   16 Jan 2020 06:03    136    |  102    |  12     ----------------------------<  125    4.8     |  19     |  1.10     Ca    9.5        16 Jan 2020 06:03  Phos  2.2       15 Bridger 2020 08:21  Mg     1.8       16 Jan 2020 06:03

## 2020-01-16 NOTE — DISCHARGE NOTE NURSING/CASE MANAGEMENT/SOCIAL WORK - PATIENT PORTAL LINK FT
You can access the FollowMyHealth Patient Portal offered by [facility] by registering at the following website: [email or web address]. By joining [facility]’s FollowMyHealth portal, you will also be able to view your health information using other applications (apps) compatible with our system.

## 2020-01-16 NOTE — DISCHARGE NOTE PROVIDER - HOSPITAL COURSE
Patient is 80 yo M with past medical history of stage IV prostate CA (s/p prostatectomy, on chemotherapy)    Presented with shortness of breath and dyspnea on exertion, found to have a LLE DVT and PE likely in the setting of malignancy     Problem List/Main Diagnoses (system-based):     PULM    #PE     - Patient found to have a PE    - ECHO showed no right heart strain and trops and BMP were negative    - Patient started on anticoagulation that was c/b hematemesis and hematuria         VASC:    #DVT    - Treated w/ anti-coagulation as above         GI:    #Hematemesis     - Anti-coagulation was held and a endoscopy was done finding Esophagitis, Gastritis, duodenitis, 2 small clean based ulcer in D2    - Protonix 40mg BID started        Inpatient treatment course: 79M w/ PMH s/f stage IV prostate CA (s/p prostatectomy, on chemotherapy), who p/w progressive SOB and JAIME x1mo associated w/ generalized fatigue in the setting of decreased PO intake, found to have segmental PE of RLL on CTA Chest and LLE DVT on US Dopplers likely 2/2 malignancy. Pt was initially started on lovenox for treatment of PE. Pt had episode of coffee ground emesis x2. Lovenox was held in ICU and GI was consulted at that time. Pt scoped with EGD 1/13/20: Esophagitis, Gastritis, duodenitis, 2 small clean based ulcer in D2. Continued on protonix per GI recs and there was no direct contraindication for a/c. Pt was then started on heparin gtt which was held in setting of hematuria wiht PTTs ~150s. In early AM 1/14 rapid response was called w/ vitals T 100.8 /65 HR 170s R 18 99% on 2L O2 with complaints of chest pain and difficulty breathing. Initial EKG was concerning for SVT vs new onset atrial fibrillation. Patient was given IV Adenosine 6mg >12mg revealing underlying atrial fibrillation. Patient subsequently given 1L LR bolus with lopressor 5mg IV and Lopressor 12.5mg PO x1 with improvement of HR to 110>120s. Tachycardia and low grade fever possibly 2/2 worsening of PE. ICU consult called and pt transferred to  where lopressor was continued at 12.5mg BID and patient continued to be in sinus rhythm (with occasional PVCs) on daily ekgs. Pt's hematuria cleared and was making yellow urine until 1/15AM where he had light red urine in toilet. Urology consulted for hematuria and pt pending bladder scan to r/o clots in bladder given intermittent hematuria. Pt HD stable and being stepped down to RMF.    New medications:     Labs to be followed outpatient:     Exam to be followed outpatient: Patient is 80 yo M with past medical history of stage IV prostate CA (s/p prostatectomy, on chemotherapy)    Presented with shortness of breath and dyspnea on exertion, found to have a LLE DVT and PE likely in the setting of malignancy     Problem List/Main Diagnoses (system-based):     PULM    #PE     - Patient found to have a PE    - ECHO showed no right heart strain and trops and BMP were negative    - Patient started on anticoagulation that was c/b hematemesis and hematuria         VASC:    #DVT    - Treated w/ anti-coagulation as above         GI:    #Hematemesis     - Anti-coagulation was held and a endoscopy was done finding Esophagitis, Gastritis, duodenitis, 2 small clean based ulcer in D2    - Protonix 40mg BID started        Inpatient treatment course: 79M w/ PMH s/f stage IV prostate CA (s/p prostatectomy, on chemotherapy), who p/w progressive SOB and JAIME x1mo associated w/ generalized fatigue in the setting of decreased PO intake, found to have segmental PE of RLL on CTA Chest and LLE DVT on US Dopplers likely 2/2 malignancy. Pt was initially started on lovenox for treatment of PE. Pt had episode of coffee ground emesis x2. Lovenox was held in ICU and GI was consulted at that time. Pt scoped with EGD 1/13/20: Esophagitis, Gastritis, duodenitis, 2 small clean based ulcer in D2. Continued on protonix per GI recs and there was no direct contraindication for a/c. Pt was then started on heparin gtt which was held in setting of hematuria wiht PTTs ~150s. In early AM 1/14 rapid response was called w/ vitals T 100.8 /65 HR 170s R 18 99% on 2L O2 with complaints of chest pain and difficulty breathing. Initial EKG was concerning for SVT vs new onset atrial fibrillation. Patient was given IV Adenosine 6mg >12mg revealing underlying atrial fibrillation. Patient subsequently given 1L LR bolus with lopressor 5mg IV and Lopressor 12.5mg PO x1 with improvement of HR to 110>120s. Tachycardia and low grade fever possibly 2/2 worsening of PE. ICU consult called and pt transferred to  where lopressor was continued at 12.5mg BID and patient continued to be in sinus rhythm (with occasional PVCs) on daily ekgs. Pt's hematuria cleared and was making yellow urine until 1/15AM where he had light red urine in toilet. Urology consulted for hematuria and pt pending bladder scan to r/o clots in bladder given intermittent hematuria. Pt HD stable and being stepped down to RMF. The patient's heart rate stayed regular with a regular rhythm. Patient had a UA before discharge that was negative for infection. Patient will followup with cardiologists and urologists      New medications: ToprolXL 25mg QD, Eliquis 10mg twice a day (1/17-1/23) then 5mg twice a day from there on out. Protonix 40mg twice a day for 2 months    Labs to be followed outpatient: None	    Exam to be followed outpatient: None

## 2020-01-16 NOTE — DISCHARGE NOTE PROVIDER - PROVIDER TOKENS
PROVIDER:[TOKEN:[[id number]],SCHEDULEDAPPT:[01/30/2020],SCHEDULEDAPPTTIME:[01:40 PM]],PROVIDER:[TOKEN:[[id number]],FOLLOWUP:[2 weeks]] PROVIDER:[TOKEN:[[id number]],SCHEDULEDAPPT:[01/30/2020],SCHEDULEDAPPTTIME:[01:40 PM]],PROVIDER:[TOKEN:[[id number]],FOLLOWUP:[2 weeks]]

## 2020-01-16 NOTE — DISCHARGE NOTE PROVIDER - NSDCMRMEDTOKEN_GEN_ALL_CORE_FT
abiraterone 250 mg oral tablet: 4 tab(s) orally once a day  amlodipine-valsartan 10 mg-320 mg oral tablet: 1 tab(s) orally once a day  bisoprolol 5 mg oral tablet: 1 tab(s) orally once a day  Caltrate 600 + D oral tablet: 1 tab(s) orally once a day  dexamethasone 2 mg oral tablet: 1 tab(s) orally once a day  dorzolamide 2% ophthalmic solution: 1 drop(s) to each affected eye 3 times a day  ferrous gluconate: 27 milligram(s) orally once a day  finasteride 5 mg oral tablet: 1 tab(s) orally once a day  Fish Oil: 100 milligram(s) orally once a day  hydroCHLOROthiazide 25 mg oral tablet: 1 tab(s) orally once a day  Lumigan 0.01% ophthalmic solution: 1 drop(s) to each affected eye once a day (in the evening)  multivitamin: 1 tab(s) orally once a day  Prevacid 15 mg oral delayed release capsule: 1 cap(s) orally once a day  Proventil HFA 90 mcg/inh inhalation aerosol: 2 puff(s) inhaled every 4 hours   simvastatin 20 mg oral tablet: 1 tab(s) orally once a day (at bedtime)  Vitamin B12 1000 mcg oral tablet: 1 tab(s) orally once a day  Vitamin D3 2000 intl units oral capsule: 1 cap(s) orally once a day abiraterone 250 mg oral tablet: 4 tab(s) orally once a day  amLODIPine 5 mg oral tablet: 1 tab(s) orally every 24 hours  amlodipine-valsartan 10 mg-320 mg oral tablet: 1 tab(s) orally once a day  Caltrate 600 + D oral tablet: 1 tab(s) orally once a day  dexamethasone 2 mg oral tablet: 1 tab(s) orally once a day  dorzolamide 2% ophthalmic solution: 1 drop(s) to each affected eye 3 times a day  Eliquis Starter Pack for Treatment of DVT and PE 5 mg oral tablet: 1 tab(s) orally 2 times a day   ferrous gluconate: 27 milligram(s) orally once a day  finasteride 5 mg oral tablet: 1 tab(s) orally once a day  Fish Oil: 100 milligram(s) orally once a day  hydroCHLOROthiazide 25 mg oral tablet: 1 tab(s) orally once a day  Lumigan 0.01% ophthalmic solution: 1 drop(s) to each affected eye once a day (in the evening)  multivitamin: 1 tab(s) orally once a day  pantoprazole 40 mg oral delayed release tablet: 1 tab(s) orally 2 times a day  Proventil HFA 90 mcg/inh inhalation aerosol: 2 puff(s) inhaled every 4 hours   simvastatin 20 mg oral tablet: 1 tab(s) orally once a day (at bedtime)  Toprol-XL 25 mg oral tablet, extended release: 1 tab(s) orally once a day   Vitamin B12 1000 mcg oral tablet: 1 tab(s) orally once a day  Vitamin D3 2000 intl units oral capsule: 1 cap(s) orally once a day

## 2020-01-16 NOTE — PROGRESS NOTE ADULT - SUBJECTIVE AND OBJECTIVE BOX
Heme/Onc Progress Note (Dr. Fuller)    Interval History: Pt seen and examined. Remained afebrile overnight. Hematuria noted, Hg remains stable >9, remains on therapeutic AC w/ Lovenox.  Planned for US retroperitoneal .     Allergies    No Known Allergies    Intolerances    Medications:  MEDICATIONS  (STANDING):  amLODIPine   Tablet 5 milliGRAM(s) Oral every 24 hours  dexAMETHasone     Tablet 2 milliGRAM(s) Oral every 24 hours  dextrose 5%. 1000 milliLiter(s) (50 mL/Hr) IV Continuous <Continuous>  dextrose 50% Injectable 12.5 Gram(s) IV Push once  dextrose 50% Injectable 25 Gram(s) IV Push once  dextrose 50% Injectable 25 Gram(s) IV Push once  dorzolamide 2% Ophthalmic Solution 1 Drop(s) Both EYES three times a day  enoxaparin Injectable 80 milliGRAM(s) SubCutaneous every 12 hours  finasteride 5 milliGRAM(s) Oral daily  insulin lispro (HumaLOG) corrective regimen sliding scale   SubCutaneous Before meals and at bedtime  latanoprost 0.005% Ophthalmic Solution 1 Drop(s) Both EYES at bedtime  metoprolol tartrate 12.5 milliGRAM(s) Oral every 12 hours  nystatin    Suspension [id number] Unit(s) Oral every 6 hours  pantoprazole    Tablet 40 milliGRAM(s) Oral two times a day    MEDICATIONS  (PRN):  acetaminophen   Tablet .. 650 milliGRAM(s) Oral every 6 hours PRN Temp greater or equal to 38C (100.4F)  acetaminophen   Tablet .. 650 milliGRAM(s) Oral every 6 hours PRN Mild Pain (1 - 3)  dextrose 40% Gel 15 Gram(s) Oral once PRN Blood Glucose LESS THAN 70 milliGRAM(s)/deciliter  glucagon  Injectable 1 milliGRAM(s) IntraMuscular once PRN Glucose LESS THAN 70 milligrams/deciliter  ondansetron    Tablet 4 milliGRAM(s) Oral every 6 hours PRN Nausea and/or Vomiting    enoxaparin Injectable 80 milliGRAM(s) SubCutaneous every 12 hours      PHYSICAL EXAM:    T(F): 97.6 (01-16-20 @ 05:44), Max: 98.7 (01-15-20 @ 16:35)  HR: 76 (01-16-20 @ 05:44) (65 - 80)  BP: 152/87 (01-16-20 @ 05:44) (138/63 - 152/87)  RR: 18 (01-16-20 @ 05:44) (17 - 18)  SpO2: 98% (01-16-20 @ 05:44) (92% - 98%)  Wt(kg): --    Daily     Daily     GEN: Resting   HEENT: AT/NC, EOMI  NECK: supple  CVS: +S1s2, irregularly irregular   RESP: CTA  GI: soft  EXT: no c/c/e  NEURO: aaox3   Labs:      Labs:                          9.3    5.96  )-----------( 254      ( 16 Jan 2020 06:03 )             29.0     CBC Full  -  ( 16 Jan 2020 06:03 )  WBC Count : 5.96 K/uL  RBC Count : 2.98 M/uL  Hemoglobin : 9.3 g/dL  Hematocrit : 29.0 %  Platelet Count - Automated : 254 K/uL  Mean Cell Volume : 97.3 fl  Mean Cell Hemoglobin : 31.2 pg  Mean Cell Hemoglobin Concentration : 32.1 gm/dL  Auto Neutrophil # : 3.87 K/uL  Auto Lymphocyte # : 1.26 K/uL  Auto Monocyte # : 0.68 K/uL  Auto Eosinophil # : 0.00 K/uL  Auto Basophil # : 0.00 K/uL  Auto Neutrophil % : 61.4 %  Auto Lymphocyte % : 21.1 %  Auto Monocyte % : 11.4 %  Auto Eosinophil % : 0.0 %  Auto Basophil % : 0.0 %        01-16    136  |  102  |  12  ----------------------------<  125<H>  4.8   |  19<L>  |  1.10    Ca    9.5      16 Jan 2020 06:03  Phos  2.2     01-15  Mg     1.8     01-16          < from: US Duplex Venous Lower Ext Complete, Bilateral (01.12.20 @ 16:18) >  PROCEDURE DATE:  01/12/2020          INTERPRETATION:    VENOUS DUPLEX DOPPLER OF BOTH LOWER EXTREMITIES dated 1/12/2020 4:18 PM    INDICATION: Pulmonary embolism seen on CT. Dyspnea. Evaluate for DVT. No other history.    TECHNIQUE: Duplex Doppler evaluation including gray-scale ultrasound imaging, color flow Doppler imaging, and Doppler spectral analysis of the veins of both lower extremities was performed.     COMPARISON: None    FINDINGS:    Thigh veins: The common femoral, femoral, popliteal, proximal greater saphenous, and proximal deep femoral veins are patent and free of thrombus bilaterally. The veins are normally compressible and have normal phasic flow.    Calf veins: The paired peroneal and posterior tibial calf veins are patent bilaterally. Thrombus is seen within the left intramuscular calf vein. The right intramuscular calf veins are patent and free of thrombus.      IMPRESSION:  Thrombus is noted within a leftintramuscular calf vein.    The findings above were discussed with Dr. Morejon on 1/12/2020 at 5:20 PM.    < end of copied text >    < from: CT Angio Chest PE Protocol w/ IV Cont (01.12.20 @ 11:49) >  INTERPRETATION:  CLINICAL INDICATION: 79-year-old with shortness of breath; for detection of pulmonary embolism.          FINDINGS: CT angiography ofthe chest was performed with the administration of intravenous contrast. Multiplanar and maximum intensity projection 3D reconstructions were performed in the sagittal and coronal planes. Comparison is made to prior studies dated 12/17/2019 and 9/23/2017.    Evaluation of the chest demonstrates the visualized gland is normal in appearance. There is normal heart size. There is left ventricular hypertrophy. Negative for intraluminal thrombus within the left atrial appendage. Negative for enlargement of main pulmonary artery. Evaluation of the pulmonary vasculature demonstrates solitary segmental pulmonary embolus within a branch supplying the posteromedial basal segment of the right lower lobe, new since 12/2019. There is no endobronchial lesion. Minimal paraseptal emphysematous change. Biapical pleural-parenchymal scarring. No pulmonary consolidation or mass. Evaluation of the upper abdomen is unremarkable aside from partially visualized cyst in the upper pole of the left kidney. Evaluation of the osseous structures no destructive osseous lesion.    IMPRESSION:    Solitary segmental pulmonary embolus within branches supplying the medial basilar segment of the right lower lobe.    < end of copied text >

## 2020-01-16 NOTE — CHART NOTE - NSCHARTNOTEFT_GEN_A_CORE
Called patient to discuss pathology and H pylori ab, which were both unremarkable.  Discussed the significance of testing and result to patient.  All questions answered.  Patient doing well and will continue to follow up with his PMD.      Surgical Pathology Report (01.13.20 @ 13:00)    Surgical Pathology Report:   ACCESSION No:  [id number]    BROOKLYNN WOODS        Surgical Final Report          Final Diagnosis    1.  Stomach, biopsy:  - Gastric mucosa with no significant pathologic change.    Verified by: Long Cano MD  (Electronic Signature)  Reported on: 01/14/20 10:16 [facility], [address] [place] [address]  [id number]  _________________________________________________________________    Clinical History  Coffee ground emesis.    Specimen(s) Submitted    1 Stomach evaluate forh.pylori    Gross Description  The specimen is received in formalin, labeled with the patient's  identification and "Stomach evaluate for h.pylori." It consists  of two irregular fragments of tan-pink soft tissue measuring 0.2  cm and 0.3 cm in greatest dimension.  The specimen is entirely  submitted in one cassette, 1A.    DAKOTA Garcia([facility])01/13/20 17:48

## 2020-01-16 NOTE — DISCHARGE NOTE PROVIDER - NSDCFUSCHEDAPPT_GEN_ALL_CORE_FT
BROOKLYNN WOODS ; 02/11/2020 ; [facility] [address] [place] [address] BROOKLYNN WOODS ; 02/11/2020 ; [facility] [address] [place] [address] BROOKLYNN WOODS ; 01/30/2020 ; [facility] [address] [place] [address]

## 2020-01-16 NOTE — PROGRESS NOTE ADULT - ASSESSMENT
79M w/ stage IV prostate CA (s/p prostatectomy, on chemotherapy), who p/w progressive SOB and JAIME x1mo associated w/ generalized fatigue in the setting of decreased PO intake, found to have segmental PE of RLL on CTA Chest and LLE DVT on US Dopplers likely 2/2 malignancy. Hospital course c/b coffee ground emesis, now resolved with EGD demonstrating duodenal ulcers and gastritis. Cardiology consulted for new onset atrial fibrillation.     #Atrial Fibrillation  Patient s/p rapid response for afib w/RVR to 170s managed with adenosine (6mg, then 12mg) and lopressor (5mg IV push). EKG now demonstrating NSR with PVCs. CHADsVASc 5 (74, HTN, DM). Previous echocardiogram (8/15/2019; Dr. Estrella Gloria), unable to obtain results. Per daughter, no notable findings. Hemodynamically patient euvolemic, with cold extremities. Atrial fibrillation likely 2/2 underlying malignancy vs PE vs electrolyte derangements. TSH WNL. HgA1c 6.1.   Echo with normal EF 65-70%; notable for severely dilated left atrium and aortic sclerosis without significant stenosis.     - c/w lovenox 80mg BID for full anticoagulation in setting of PE and malignancy, can transition to Eliquis from cardiac perspective prior to discharge vs. alternative anticoagulation given history of malignancy and DVT/PE (pt would prefer to be on a NOAC)   - well controlled with lopressor 12.5mg BID, c/w current management  - Patient remains hypertensive - consider restarting home anti-hypertensive agents (Amlodipine-valsartan 10-320mg QD; Hydrochlorothiazide 25mg QD)   - replete electrolytes PRN for K>4, Mg >2   - daily EKGs  - continue telemetry monitoring  - Pt would like to establish care with new cardiologist - please schedule outpatient follow up with Dr. Charli Sánchez within 1-2 weeks upon d/c    Cardiology to sign off.    Recommendations are not final until attested by attending physician. 79M w/ stage IV prostate CA (s/p prostatectomy, on chemotherapy), who p/w progressive SOB and JAIME x1mo associated w/ generalized fatigue in the setting of decreased PO intake, found to have segmental PE of RLL on CTA Chest and LLE DVT on US Dopplers likely 2/2 malignancy. Hospital course c/b coffee ground emesis, now resolved with EGD demonstrating duodenal ulcers and gastritis. Cardiology consulted for new onset atrial fibrillation.     #Atrial Fibrillation  Patient s/p rapid response for afib w/RVR to 170s managed with adenosine (6mg, then 12mg) and lopressor (5mg IV push). EKG now demonstrating NSR with PVCs. CHADsVASc 5 (74, HTN, DM). Previous echocardiogram (8/15/2019; Dr. Estrella Gloria), unable to obtain results. Per daughter, no notable findings. Hemodynamically patient euvolemic, with cold extremities. Atrial fibrillation likely 2/2 underlying malignancy vs PE vs electrolyte derangements. TSH WNL. HgA1c 6.1.   Echo with normal EF 65-70%; notable for severely dilated left atrium and aortic sclerosis without significant stenosis.     - c/w lovenox 80mg BID for full anticoagulation in setting of PE and malignancy, can transition to Eliquis from cardiac perspective prior to discharge vs. alternative anticoagulation given history of malignancy and DVT/PE (pt would prefer to be on a NOAC)   - discharge on lopressor 25mg BID   - Patient remains hypertensive - consider restarting home anti-hypertensive agents (Amlodipine-valsartan 10-320mg QD; Hydrochlorothiazide 25mg QD)   - replete electrolytes PRN for K>4, Mg >2   - daily EKGs  - continue telemetry monitoring  - Pt would like to establish care with new cardiologist - please schedule outpatient follow up with Dr. Charli Sánchez within 1-2 weeks upon d/c    Cardiology to sign off.  Reconsult as needed.   Recommendations are not final until attested by attending physician. 79M w/ stage IV prostate CA (s/p prostatectomy, on chemotherapy), who p/w progressive SOB and JAIME x1mo associated w/ generalized fatigue in the setting of decreased PO intake, found to have segmental PE of RLL on CTA Chest and LLE DVT on US Dopplers likely 2/2 malignancy. Hospital course c/b coffee ground emesis, now resolved with EGD demonstrating duodenal ulcers and gastritis. Cardiology consulted for new onset atrial fibrillation.     #Atrial Fibrillation  Patient s/p rapid response for afib w/RVR to 170s managed with adenosine (6mg, then 12mg) and lopressor (5mg IV push). EKG now demonstrating NSR with PVCs. CHADsVASc 5 (74, HTN, DM). Previous echocardiogram (8/15/2019; Dr. Estrella Gloria), unable to obtain results. Per daughter, no notable findings. Hemodynamically patient euvolemic, with cold extremities. Atrial fibrillation likely 2/2 underlying malignancy vs PE vs electrolyte derangements. TSH WNL. HgA1c 6.1.   Echo with normal EF 65-70%; notable for severely dilated left atrium and aortic sclerosis without significant stenosis.     - c/w lovenox 80mg BID for full anticoagulation in setting of PE and malignancy, can transition to Eliquis from cardiac perspective prior to discharge vs. alternative anticoagulation given history of malignancy and DVT/PE (pt would prefer to be on a NOAC)   - discharge on lopressor 25mg BID   - Patient remains hypertensive - consider restarting home anti-hypertensive agents (Amlodipine-valsartan 10-320mg QD; Hydrochlorothiazide 25mg QD)   - replete electrolytes PRN for K>4, Mg >2   - daily EKGs  - Pt would like to establish care with new cardiologist - please schedule outpatient follow up with Dr. Charli Sánchez within 1-2 weeks upon d/c    Cardiology to sign off, please reconsult with any questions or concerns.   Recommendations are not final until attested by attending physician. 79M w/ stage IV prostate CA (s/p prostatectomy, on chemotherapy), who p/w progressive SOB and JAIME x1mo associated w/ generalized fatigue in the setting of decreased PO intake, found to have segmental PE of RLL on CTA Chest and LLE DVT on US Dopplers likely 2/2 malignancy. Hospital course c/b coffee ground emesis, now resolved with EGD demonstrating duodenal ulcers and gastritis. Cardiology consulted for new onset atrial fibrillation.     #Atrial Fibrillation  Patient s/p rapid response for afib w/RVR to 170s managed with adenosine (6mg, then 12mg) and lopressor (5mg IV push). EKG now demonstrating NSR with PVCs. CHADsVASc 5 (74, HTN, DM). Previous echocardiogram (8/15/2019; Dr. Estrella Gloria), unable to obtain results. Per daughter, no notable findings. Hemodynamically patient euvolemic, with cold extremities. Atrial fibrillation likely 2/2 underlying malignancy vs PE vs electrolyte derangements. TSH WNL. HgA1c 6.1.   Echo with normal EF 65-70%; notable for severely dilated left atrium and aortic sclerosis without significant stenosis.     - c/w lovenox 80mg BID for full anticoagulation in setting of PE and malignancy, can transition to Eliquis from cardiac perspective prior to discharge vs. alternative anticoagulation given history of malignancy and DVT/PE (pt would prefer to be on a NOAC)   - initiate Toprol XL 25mg BID (for d/c or 1/17 AM)   - Patient remains hypertensive - consider restarting home anti-hypertensive agents (Amlodipine-valsartan 10-320mg QD; Hydrochlorothiazide 25mg QD)   - replete electrolytes PRN for K>4, Mg >2   - daily EKGs  - Pt would like to establish care with new cardiologist - please schedule outpatient follow up with Dr. Charli Sánchez within 1-2 weeks upon d/c    Cardiology to sign off, please reconsult with any questions or concerns.   Recommendations are not final until attested by attending physician. 79M w/ stage IV prostate CA (s/p prostatectomy, on chemotherapy), who p/w progressive SOB and JAIME x1mo associated w/ generalized fatigue in the setting of decreased PO intake, found to have segmental PE of RLL on CTA Chest and LLE DVT on US Dopplers likely 2/2 malignancy. Hospital course c/b coffee ground emesis, now resolved with EGD demonstrating duodenal ulcers and gastritis. Cardiology consulted for new onset atrial fibrillation.     #Atrial Fibrillation  Patient s/p rapid response for afib w/RVR to 170s managed with adenosine (6mg, then 12mg) and lopressor (5mg IV push). EKG now demonstrating NSR with PVCs. CHADsVASc 5 (74, HTN, DM). Previous echocardiogram (8/15/2019; Dr. Estrella Gloria), unable to obtain results. Per daughter, no notable findings. Hemodynamically patient euvolemic, with cold extremities. Atrial fibrillation likely 2/2 underlying malignancy vs PE vs electrolyte derangements. TSH WNL. HgA1c 6.1.   Echo with normal EF 65-70%; notable for severely dilated left atrium and aortic sclerosis without significant stenosis.     - c/w lovenox 80mg BID for full anticoagulation in setting of PE and malignancy, can transition to Eliquis from cardiac perspective prior to discharge vs. alternative anticoagulation given history of malignancy and DVT/PE (pt would prefer to be on a NOAC)   - initiate Toprol XL 25mg QD (for d/c or 1/17 AM)   - Patient remains hypertensive - consider restarting home anti-hypertensive agents (Amlodipine-valsartan 10-320mg QD; Hydrochlorothiazide 25mg QD)   - replete electrolytes PRN for K>4, Mg >2   - daily EKGs  - Pt would like to establish care with new cardiologist - please schedule outpatient follow up with Dr. Charli Sánchez within 1-2 weeks upon d/c    Cardiology to sign off, please reconsult with any questions or concerns.   Recommendations are not final until attested by attending physician.

## 2020-01-16 NOTE — PROGRESS NOTE ADULT - ATTENDING COMMENTS
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
79M w/ Stage IV prostate CA a/ PE an DVT, newly dx'd Artrial fibrillation  - recs as above  - cont AC  - Echo w/ preserved EF and LA dimension  - now in NSR  - f/u with cardiology as outpt
Patient also with hematuria, have asked  to F/U, he has been followed by Dr. Gary in the past.
Events noted, EGD done today with: esophagitis, 2cm hiatal hernia, Gastritis, duodenitis, 2 small clean based ulcer, c/w PPI BID  No definite e/o GIB but coffee ground emesis likely due to GIB  Will resume AC-IV heparin w/out bolus and monitor H/H closely, if pt remains stable anticipate dc home tmrw on LMWH  Oncology to see patient while in-hx.  F/up TTE  C/w IV ceftriaxone for likely UTI, f/up UCx. Sepsis-POA possibly due to UTI.  C/w nystatin for oral thrush.  PT eval pending.  Anticipate dc home tomorrow, d/w daughter at bedside.  Rest as above

## 2020-01-16 NOTE — DISCHARGE NOTE PROVIDER - NSDCCPCAREPLAN_GEN_ALL_CORE_FT
PRINCIPAL DISCHARGE DIAGNOSIS  Diagnosis: Pulmonary embolus  Assessment and Plan of Treatment: Pulmonary embolism (or "PE") is a blockage in 1 or more of the blood vessels that supply blood to the lungs. Most often these blockages are caused by blood clots that form elsewhere and then travel to the lungs. In rare cases, blockages can also be caused by air bubbles, tiny globs of fat, or pieces of tumor that travel to the lungs.  Why are blood clots dangerous?  If a blood clot forms or gets stuck inside a blood vessel, it can clog the vessel and keep blood from getting where it needs to go. When that happens in the lungs, the lungs can get damaged. Having blocked arteries in the lung can also make it hard to breathe and can even lead to death. Most blood clots in the lungs actually form in the legs or pelvic area (where the legs connect to the body) and then travel to the lungs. Anyone who has had a blood clot in the lungs or who is at risk of a clot should know the symptoms of clots in these areas.   You were placed on a medication called Eliquis that can help prevent further clots. You will take 10mg twice a day for 7 days (1/17-1/24) then 5mg twice a day from there on out. If you experience severe shortness of breath at rest or exertion please come back into the hospital.      SECONDARY DISCHARGE DIAGNOSES  Diagnosis: Afib  Assessment and Plan of Treatment: Atrial fibrillation is the most common heart rhythm problem. The condition puts you at risk of stroke, heart attack, and other problems. Another term for atrial fibrillation is "A-fib."  In people with A-fib, the electrical signals that control the heartbeat are abnormal. The top 2 chambers of the heart stop pumping blood as strongly as normal. When this happens, the blood can start to form clots. These clots can travel to the brain through the blood vessels, and cause strokes.  In some people, A-fib never goes away. In others, A-fib can come and go, even with treatment. If you had A-fib in the past, but have a normal heart rhythm now, ask your doctor what you can do to keep A-fib from coming back. You were put on a medication called ToprolXL 25mg once a day that will help control your atria fibrillation.    Diagnosis: DVT, lower extremity  Assessment and Plan of Treatment: Deep vein thrombosis is the medical term for blood clots in the deep veins of the leg. Deep vein thrombosis, or "DVT" for short, can be dangerous.  If a blood clot forms inside a blood vessel, it can clog the vessel and keep blood from getting where it needs to go. When that happens to 1 of the veins deep within the leg, blood can back up and cause swelling and pain.  Another problem with blood clots in veins is that they can travel to other parts of the body and clog blood vessels there. Blood clots that form in the legs, for example, can end up blocking blood vessels in the lungs. This can make it hard to breathe and sometimes, when they are large, can lead to death. When blood clots travel to the lungs doctors call it "pulmonary embolism" or "PE." You will be treated for this the same way you are being treated for the pulmonary embolism.

## 2020-01-16 NOTE — PROGRESS NOTE ADULT - SUBJECTIVE AND OBJECTIVE BOX
CARDIOLOGY CONSULT FOLLOW UP NOTE    OVERNIGHT EVENTS: No acute events overnight. Afebrile. VSS. HD stable. UOP net neg 1100cc.     SUBJECTIVE / INTERVAL HPI: Patient seen and examined at bedside this AM. Patient states he is doing well. Denies CP, SOB, dizziness or palpitations. Still with compliant of hematuria.     VITAL SIGNS:  Vital Signs Last 24 Hrs  T(C): 36.4 (16 Jan 2020 05:44), Max: 37.1 (15 Bridger 2020 16:35)  T(F): 97.6 (16 Jan 2020 05:44), Max: 98.7 (15 Bridger 2020 16:35)  HR: 76 (16 Jan 2020 05:44) (65 - 80)  BP: 152/87 (16 Jan 2020 05:44) (138/63 - 152/87)  BP(mean): 90 (15 Bridger 2020 20:15) (90 - 101)  RR: 18 (16 Jan 2020 05:44) (17 - 18)  SpO2: 98% (16 Jan 2020 05:44) (92% - 98%)    PHYSICAL EXAM:    General: pleasant well appearing elderly male resting comfortably in bed; NAD   HEENT: NC/AT; PERRL, anicteric sclera; MMM  Neck: supple; no JVD; no cervical or submandibular lymphadenopathy    Cardiovascular: +S1/S2; RRR; no murmurs appreciated   Respiratory: CTA B/L; no W/R/R  Gastrointestinal: soft, NT/ND; +BSx4  Extremities: WWP; no edema  Vascular: 2+ radial, DP/PT pulses B/L        MEDICATIONS:  MEDICATIONS  (STANDING):  amLODIPine   Tablet 5 milliGRAM(s) Oral every 24 hours  dexAMETHasone     Tablet 2 milliGRAM(s) Oral every 24 hours  dextrose 5%. 1000 milliLiter(s) (50 mL/Hr) IV Continuous <Continuous>  dextrose 50% Injectable 12.5 Gram(s) IV Push once  dextrose 50% Injectable 25 Gram(s) IV Push once  dextrose 50% Injectable 25 Gram(s) IV Push once  dorzolamide 2% Ophthalmic Solution 1 Drop(s) Both EYES three times a day  enoxaparin Injectable 80 milliGRAM(s) SubCutaneous every 12 hours  finasteride 5 milliGRAM(s) Oral daily  insulin lispro (HumaLOG) corrective regimen sliding scale   SubCutaneous Before meals and at bedtime  latanoprost 0.005% Ophthalmic Solution 1 Drop(s) Both EYES at bedtime  metoprolol tartrate 12.5 milliGRAM(s) Oral every 12 hours  nystatin    Suspension 532455 Unit(s) Oral every 6 hours  pantoprazole    Tablet 40 milliGRAM(s) Oral two times a day    MEDICATIONS  (PRN):  acetaminophen   Tablet .. 650 milliGRAM(s) Oral every 6 hours PRN Temp greater or equal to 38C (100.4F)  acetaminophen   Tablet .. 650 milliGRAM(s) Oral every 6 hours PRN Mild Pain (1 - 3)  dextrose 40% Gel 15 Gram(s) Oral once PRN Blood Glucose LESS THAN 70 milliGRAM(s)/deciliter  glucagon  Injectable 1 milliGRAM(s) IntraMuscular once PRN Glucose LESS THAN 70 milligrams/deciliter  ondansetron    Tablet 4 milliGRAM(s) Oral every 6 hours PRN Nausea and/or Vomiting      ALLERGIES:  Allergies    No Known Allergies    Intolerances        LABS:                        9.3    5.96  )-----------( 254      ( 16 Jan 2020 06:03 )             29.0     01-16    136  |  102  |  12  ----------------------------<  125<H>  4.8   |  19<L>  |  1.10    Ca    9.5      16 Jan 2020 06:03  Phos  2.2     01-15  Mg     1.8     01-16          CAPILLARY BLOOD GLUCOSE      POCT Blood Glucose.: 101 mg/dL (16 Jan 2020 08:29)      RADIOLOGY & ADDITIONAL TESTS: Reviewed.    ASSESSMENT:    PLAN:

## 2020-01-16 NOTE — PROGRESS NOTE ADULT - ASSESSMENT
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       #Advanced CRPC (Heavily treated)   #PE/DVT  #GIB/Hematuria/Borderline Macrocytic Anemia    -s/p cytotoxic chemotherapy on 12/31 (Cabazitaxel), along with Pred daily (remains on PPI), last PSA as outpatient 22.9 (2 weeks ago), consider repeat PSA and testosterone, hold any cytotoxic therapy as inpatient  -s/p EGD found to have esophagitis, gastritis, duodenitis, 2 small clean based ulcers in D2, path submitted f/u for h.pylori, moderate risk per extrapolated hasbled, however per Gi no direct contraindication for AC  -pt known to Dr. Roa, urology following, aware of hematuria recommend US retroperitoneal as well as repeat UCx  -per primary team patient rechallenged w/ Lovenox, also per cardiology recommend d/c on Eliquis as tolerated given Afib and thrombosis history/malignancy risk, will need to consider risk/benefit of IR/Vascular IVC filter placement if recurrent bleed while inpatient, Hg remains stable at this time  -B12/folate supplemented, iron panel c/w anemia of chronic disease   -leukopenia - normalized likely secondary to cytotoxic chemotherapy   -dvt ppx on AC       d/w Dr. Fuller  outpt follow up w/ Dr. Fuller at [facility]

## 2020-01-16 NOTE — DISCHARGE NOTE PROVIDER - CARE PROVIDER_API CALL
Abbie Roa)  Urology  [address], [place] [address]  Phone: [phone]  Fax: [phone]  Scheduled Appointment: 01/30/2020 01:40 PM    Torito Sánchez (SALAS)  Cardiovascular Disease  [address] [address]  Phone: [phone]  Fax: [phone]  Follow Up Time: 2 weeks Abbie Roa)  Urology  [address], [place] [address]  Phone: [phone]  Fax: [phone]  Scheduled Appointment: 01/30/2020 01:40 PM    Charli Sánchez)  Cardiology  [address]   Phone: [phone]  Fax: [phone]  Follow Up Time: 2 weeks

## 2020-01-18 LAB
CULTURE RESULTS: SIGNIFICANT CHANGE UP
CULTURE RESULTS: SIGNIFICANT CHANGE UP
SPECIMEN SOURCE: SIGNIFICANT CHANGE UP
SPECIMEN SOURCE: SIGNIFICANT CHANGE UP

## 2020-01-19 LAB
CULTURE RESULTS: SIGNIFICANT CHANGE UP
SPECIMEN SOURCE: SIGNIFICANT CHANGE UP

## 2020-01-22 DIAGNOSIS — E78.5 HYPERLIPIDEMIA, UNSPECIFIED: ICD-10-CM

## 2020-01-22 DIAGNOSIS — I10 ESSENTIAL (PRIMARY) HYPERTENSION: ICD-10-CM

## 2020-01-22 DIAGNOSIS — Z80.42 FAMILY HISTORY OF MALIGNANT NEOPLASM OF PROSTATE: ICD-10-CM

## 2020-01-22 DIAGNOSIS — K29.71 GASTRITIS, UNSPECIFIED, WITH BLEEDING: ICD-10-CM

## 2020-01-22 DIAGNOSIS — R31.9 HEMATURIA, UNSPECIFIED: ICD-10-CM

## 2020-01-22 DIAGNOSIS — K26.4 CHRONIC OR UNSPECIFIED DUODENAL ULCER WITH HEMORRHAGE: ICD-10-CM

## 2020-01-22 DIAGNOSIS — R65.10 SYSTEMIC INFLAMMATORY RESPONSE SYNDROME (SIRS) OF NON-INFECTIOUS ORIGIN WITHOUT ACUTE ORGAN DYSFUNCTION: ICD-10-CM

## 2020-01-22 DIAGNOSIS — H40.9 UNSPECIFIED GLAUCOMA: ICD-10-CM

## 2020-01-22 DIAGNOSIS — Z79.84 LONG TERM (CURRENT) USE OF ORAL HYPOGLYCEMIC DRUGS: ICD-10-CM

## 2020-01-22 DIAGNOSIS — I48.0 PAROXYSMAL ATRIAL FIBRILLATION: ICD-10-CM

## 2020-01-22 DIAGNOSIS — Z79.52 LONG TERM (CURRENT) USE OF SYSTEMIC STEROIDS: ICD-10-CM

## 2020-01-22 DIAGNOSIS — K44.9 DIAPHRAGMATIC HERNIA WITHOUT OBSTRUCTION OR GANGRENE: ICD-10-CM

## 2020-01-22 DIAGNOSIS — R32 UNSPECIFIED URINARY INCONTINENCE: ICD-10-CM

## 2020-01-22 DIAGNOSIS — B37.0 CANDIDAL STOMATITIS: ICD-10-CM

## 2020-01-22 DIAGNOSIS — C77.2 SECONDARY AND UNSPECIFIED MALIGNANT NEOPLASM OF INTRA-ABDOMINAL LYMPH NODES: ICD-10-CM

## 2020-01-22 DIAGNOSIS — C77.1 SECONDARY AND UNSPECIFIED MALIGNANT NEOPLASM OF INTRATHORACIC LYMPH NODES: ICD-10-CM

## 2020-01-22 DIAGNOSIS — Z98.890 OTHER SPECIFIED POSTPROCEDURAL STATES: ICD-10-CM

## 2020-01-22 DIAGNOSIS — K20.9 ESOPHAGITIS, UNSPECIFIED: ICD-10-CM

## 2020-01-22 DIAGNOSIS — I26.99 OTHER PULMONARY EMBOLISM WITHOUT ACUTE COR PULMONALE: ICD-10-CM

## 2020-01-22 DIAGNOSIS — I82.462 ACUTE EMBOLISM AND THROMBOSIS OF LEFT CALF MUSCULAR VEIN: ICD-10-CM

## 2020-01-22 DIAGNOSIS — K29.81 DUODENITIS WITH BLEEDING: ICD-10-CM

## 2020-01-22 DIAGNOSIS — E66.9 OBESITY, UNSPECIFIED: ICD-10-CM

## 2020-01-22 DIAGNOSIS — E11.9 TYPE 2 DIABETES MELLITUS WITHOUT COMPLICATIONS: ICD-10-CM

## 2020-01-22 DIAGNOSIS — C61 MALIGNANT NEOPLASM OF PROSTATE: ICD-10-CM

## 2020-01-24 ENCOUNTER — APPOINTMENT (OUTPATIENT)
Dept: HEART AND VASCULAR | Facility: CLINIC | Age: 80
End: 2020-01-24
Payer: MEDICARE

## 2020-01-24 ENCOUNTER — NON-APPOINTMENT (OUTPATIENT)
Age: 80
End: 2020-01-24

## 2020-01-24 VITALS
HEIGHT: 69 IN | SYSTOLIC BLOOD PRESSURE: 110 MMHG | DIASTOLIC BLOOD PRESSURE: 76 MMHG | BODY MASS INDEX: 27.99 KG/M2 | HEART RATE: 98 BPM | WEIGHT: 189 LBS

## 2020-01-24 DIAGNOSIS — Z87.09 PERSONAL HISTORY OF OTHER DISEASES OF THE RESPIRATORY SYSTEM: ICD-10-CM

## 2020-01-24 DIAGNOSIS — Z87.891 PERSONAL HISTORY OF NICOTINE DEPENDENCE: ICD-10-CM

## 2020-01-24 DIAGNOSIS — Z80.42 FAMILY HISTORY OF MALIGNANT NEOPLASM OF PROSTATE: ICD-10-CM

## 2020-01-24 PROCEDURE — 99204 OFFICE O/P NEW MOD 45 MIN: CPT

## 2020-01-24 PROCEDURE — 93000 ELECTROCARDIOGRAM COMPLETE: CPT

## 2020-01-24 RX ORDER — AMLODIPINE AND VALSARTAN 10; 320 MG/1; MG/1
10-320 TABLET, FILM COATED ORAL
Qty: 90 | Refills: 0 | Status: DISCONTINUED | COMMUNITY
Start: 2017-04-04 | End: 2020-01-24

## 2020-01-24 RX ORDER — BISOPROLOL FUMARATE AND HYDROCHLOROTHIAZIDE 2.5; 6.25 MG/1; MG/1
2.5-6.25 TABLET, FILM COATED ORAL
Qty: 90 | Refills: 0 | Status: DISCONTINUED | COMMUNITY
Start: 2017-10-05 | End: 2020-01-24

## 2020-01-24 NOTE — ASSESSMENT
[FreeTextEntry1] : 1. PAF\par - now in NSR\par - CHADsVasc score 5\par - cont NOAC\par - increase Toprol to 50 qd for better rate control\par \par 2. HTN\par - now controlled, manually checked 3 times\par - inc BB as above, decrese ARB to 160 qd\par - continue current meds for now\par \par 3. PE/DVT\par - to f/u w/ Dr. Fuller\par - cont NOAC\par \par 4. HLD\par - cont statin\par - ed done\par \par RTC 3 months, earlier if sx

## 2020-01-24 NOTE — REASON FOR VISIT
[Follow-Up - From Hospitalization] : follow-up of a recent hospitalization for [Atrial Fibrillation] : atrial fibrillation [Admitted for Heart Failure] : patient was not admitted for heart failure

## 2020-01-24 NOTE — PHYSICAL EXAM
[General Appearance - Well Developed] : well developed [Normal Appearance] : normal appearance [Well Groomed] : well groomed [General Appearance - Well Nourished] : well nourished [No Deformities] : no deformities [General Appearance - In No Acute Distress] : no acute distress [Normal Conjunctiva] : the conjunctiva exhibited no abnormalities [Eyelids - No Xanthelasma] : the eyelids demonstrated no xanthelasmas [Normal Oral Mucosa] : normal oral mucosa [No Oral Pallor] : no oral pallor [No Oral Cyanosis] : no oral cyanosis [Normal Jugular Venous A Waves Present] : normal jugular venous A waves present [Normal Jugular Venous V Waves Present] : normal jugular venous V waves present [No Jugular Venous Darby A Waves] : no jugular venous darby A waves [Respiration, Rhythm And Depth] : normal respiratory rhythm and effort [Exaggerated Use Of Accessory Muscles For Inspiration] : no accessory muscle use [Auscultation Breath Sounds / Voice Sounds] : lungs were clear to auscultation bilaterally [Heart Rate And Rhythm] : heart rate and rhythm were normal [Heart Sounds] : normal S1 and S2 [Murmurs] : no murmurs present [Abdomen Soft] : soft [Abdomen Tenderness] : non-tender [Abdomen Mass (___ Cm)] : no abdominal mass palpated [Abnormal Walk] : normal gait [Gait - Sufficient For Exercise Testing] : the gait was sufficient for exercise testing [Nail Clubbing] : no clubbing of the fingernails [Cyanosis, Localized] : no localized cyanosis [Petechial Hemorrhages (___cm)] : no petechial hemorrhages [Skin Color & Pigmentation] : normal skin color and pigmentation [] : no rash [No Venous Stasis] : no venous stasis [Skin Lesions] : no skin lesions [No Skin Ulcers] : no skin ulcer [No Xanthoma] : no  xanthoma was observed [Oriented To Time, Place, And Person] : oriented to person, place, and time [Affect] : the affect was normal [No Anxiety] : not feeling anxious [Mood] : the mood was normal

## 2020-01-24 NOTE — HISTORY OF PRESENT ILLNESS
[FreeTextEntry1] : 79M w/ stage IV prostate CA (s/p prostatectomy, on chemotherapy), who presented to [facility] 1 week ago with progressive SOB and JAIME x1mo associated w/ generalized fatigue in the setting\par of decreased PO intake, found to have segmental PE of RLL on CTA Chest and LLE\par DVT on US Dopplers likely 2/2 malignancy. Hospital course c/b coffee ground\par emesis,  resolved with EGD demonstrating duodenal ulcers and gastritis. Cardiology consulted for new onset atrial fibrillation during hospitalization, started on BB and instructed to continue NOAC. Now presents for initial visit after hospitalization. Still with residual SOB and palpitations. No c/o increased bleeding on meds. \par In-patient TTE c/w preserved EF and LA volume. \par \par 1/24/20 ECG: NSR at 86 bpm, LAD, LAFB, PVCs, non-specific STTWC

## 2020-01-27 ENCOUNTER — MOBILE ON CALL (OUTPATIENT)
Age: 80
End: 2020-01-27

## 2020-01-30 ENCOUNTER — APPOINTMENT (OUTPATIENT)
Dept: UROLOGY | Facility: CLINIC | Age: 80
End: 2020-01-30
Payer: MEDICARE

## 2020-01-30 VITALS — HEART RATE: 105 BPM | TEMPERATURE: 97.7 F | DIASTOLIC BLOOD PRESSURE: 64 MMHG | SYSTOLIC BLOOD PRESSURE: 106 MMHG

## 2020-01-30 PROCEDURE — 96402 CHEMO HORMON ANTINEOPL SQ/IM: CPT

## 2020-01-30 PROCEDURE — 99214 OFFICE O/P EST MOD 30 MIN: CPT | Mod: 25

## 2020-01-30 PROCEDURE — 52000 CYSTOURETHROSCOPY: CPT

## 2020-01-30 NOTE — HISTORY OF PRESENT ILLNESS
[None] : None [FreeTextEntry1] : 79M with history of prostate cancer\par gross hematuria while in hospital\par now with new PE\par ER culture 12/21 w/ 10,000 CFU enterococcus faecalis\par Completed Cipro therapy\par Has not taken Myrbetriq for 3 weeks \par Notes mild improvement of dysuria\par Occasional urinary leaking \par Nocturia 4-5 x's \par No fever\par No hematuria \par No flank pain \par No weight loss\par No N/v\par \par January 30, 2020:\par Here today for Gross hematuria \par ER: 1/12:  Right pulmonary embolism, left  DVT. Was on lovenox on discharge. Now on eliquis due projectile emesis. Endoscopy w. esophagitis, gastritis, duodenitis and .  Ceftriaxone 1 Gm x's 2 days. Negative bld and Ucx. \par Reports gross hematuria \par No dysuria \par + Urgency + frequency + urge/stress incontinence. No changes with myrbetriq\par Stopped chemotherapy due to PE\par Lupron therapy only currently\par Last PSA= 22 \par Occasional low back pain\par Low energy levels\par No hot flashes \par JAIME \par Due to see oncology 2/4. Recommend cystoscopy.\par US: No hydronephrosis, no calculi, left renal cyst 6.4 cm \par \par

## 2020-01-30 NOTE — PHYSICAL EXAM
[General Appearance - Well Developed] : well developed [General Appearance - Well Nourished] : well nourished [Normal Appearance] : normal appearance [General Appearance - In No Acute Distress] : no acute distress [Well Groomed] : well groomed [Abdomen Soft] : soft [Abdomen Tenderness] : non-tender [Edema] : no peripheral edema [Costovertebral Angle Tenderness] : no ~M costovertebral angle tenderness [Respiration, Rhythm And Depth] : normal respiratory rhythm and effort [] : no respiratory distress [Oriented To Time, Place, And Person] : oriented to person, place, and time [Exaggerated Use Of Accessory Muscles For Inspiration] : no accessory muscle use [Affect] : the affect was normal [Mood] : the mood was normal [Not Anxious] : not anxious [No Focal Deficits] : no focal deficits [Normal Station and Gait] : the gait and station were normal for the patient's age

## 2020-01-30 NOTE — ASSESSMENT
[FreeTextEntry1] : Prostate Cancer\par -Lupron today\par -F/u medical Onc next week. \par \par Gross Hematuria\par - Cystoscopy today\par - Radiation changes noted but cannot definitively rule out CIS. appears benign. given anticoagulation, recent PE and overall medical condition will hold off on biopsy for now\par - Repeat Cystoscopy 6 weeks\par \par \par

## 2020-03-05 ENCOUNTER — APPOINTMENT (OUTPATIENT)
Dept: UROLOGY | Facility: CLINIC | Age: 80
End: 2020-03-05
Payer: MEDICARE

## 2020-03-05 PROCEDURE — 99213 OFFICE O/P EST LOW 20 MIN: CPT

## 2020-03-05 NOTE — ASSESSMENT
[FreeTextEntry1] : L2 fracture\par -F/u Dr. Garcia for radiation/chemotherapy\par \par Prostate cancer\par -Lupron in 8 weeks \par \par Gross Hematuria\par -Discussed with Dr. Roa. Will hold repeat cystoscopy at this time.

## 2020-03-05 NOTE — HISTORY OF PRESENT ILLNESS
[None] : None [FreeTextEntry1] : 79M with history of prostate cancer\par ER culture 12/21 w/ 10,000 CFU enterococcus faecalis\par Completed Cipro therapy\par Has not taken Myrbetriq for 3 weeks \par Notes mild improvement of dysuria\par Occasional urinary leaking \par Nocturia 4-5 x's \par No fever\par No hematuria \par No flank pain \par No weight loss\par No N/v\par \par January 30, 2020:\par Here today for Gross hematuria \par ER: 1/12:  Right pulmonary embolism, left  DVT. Was on lovenox on discharge. Now on eliquis due projectile emesis. Endoscopy w. esophagitis, gastritis, duodenitis and .  Ceftriaxone 1 Gm x's 2 days. Negative bld and Ucx. \par Reports gross hematuria \par No dysuria \par + Urgency + frequency + urge/stress incontinence. No changes with myrbetriq\par Stopped chemotherapy due to PE\par Lupron therapy only currently\par Last PSA= 22 \par Occasional low back pain\par Low energy levels\par No hot flashes \par JAIME \par Due to see oncology 2/4. Recommend cystoscopy.\par US: No hydronephrosis, no calculi, left renal cyst 6.4 cm \par \par 3/5/2020:\par \par 79M with history of prostate cancer\par Stopped Myrbetriq\par No dysuria\par Occasional urinary leaking \par Nocturia 4-5 x's \par No fever,No hematuria, No flank pain, No N/v\par + Frequency, occasional urgency. 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 No clear tumor.  \par MRI: moderate L2 fracture. Will f/u with Dr. Garcia for radiation/chemo. \par \par  [Dysuria] : no dysuria [Hematuria - Gross] : no gross hematuria [Bladder Spasm] : no bladder spasm [Flank Pain] : no flank pain [Edema] : ~T edema was not present [Weight Loss] : no recent ~M [unfilled] weight loss [Fever] : no fever [Fatigue] : no fatigue [Nausea] : no nausea [Anorexia] : no anorexia

## 2020-03-06 ENCOUNTER — MED ADMIN CHARGE (OUTPATIENT)
Age: 80
End: 2020-03-06

## 2020-03-30 ENCOUNTER — APPOINTMENT (OUTPATIENT)
Dept: RADIATION ONCOLOGY | Facility: CLINIC | Age: 80
End: 2020-03-30
Payer: MEDICARE

## 2020-03-30 VITALS
DIASTOLIC BLOOD PRESSURE: 69 MMHG | SYSTOLIC BLOOD PRESSURE: 109 MMHG | OXYGEN SATURATION: 99 % | RESPIRATION RATE: 16 BRPM | HEART RATE: 80 BPM

## 2020-03-30 PROCEDURE — 99214 OFFICE O/P EST MOD 30 MIN: CPT | Mod: 25

## 2020-03-30 RX ORDER — CIPROFLOXACIN HYDROCHLORIDE 500 MG/1
500 TABLET, FILM COATED ORAL TWICE DAILY
Qty: 14 | Refills: 0 | Status: DISCONTINUED | COMMUNITY
Start: 2019-12-23 | End: 2020-03-30

## 2020-03-30 RX ORDER — LANSOPRAZOLE 15 MG/1
15 CAPSULE, DELAYED RELEASE ORAL
Qty: 90 | Refills: 0 | Status: DISCONTINUED | COMMUNITY
Start: 2017-08-01 | End: 2020-03-30

## 2020-03-30 RX ORDER — MULTIVITAMIN WITH IRON
300 TABLET ORAL
Refills: 0 | Status: DISCONTINUED | COMMUNITY
Start: 2017-12-29 | End: 2020-03-30

## 2020-03-30 RX ORDER — BIMATOPROST 0.1 MG/ML
0.01 SOLUTION/ DROPS OPHTHALMIC
Qty: 5 | Refills: 0 | Status: DISCONTINUED | COMMUNITY
Start: 2017-05-02 | End: 2020-03-30

## 2020-03-30 RX ORDER — TIOTROPIUM BROMIDE 18 UG/1
18 CAPSULE ORAL; RESPIRATORY (INHALATION)
Refills: 0 | Status: DISCONTINUED | COMMUNITY
Start: 2017-12-29 | End: 2020-03-30

## 2020-03-30 NOTE — ASSESSMENT
[Work-up necessary to assess local, regional or metastatic recurrence] : Work-up necessary to assess local, regional or metastatic recurrence [FreeTextEntry1] : Rising PSA after initial decline.

## 2020-03-30 NOTE — VITALS
[Least Pain Intensity: 0/10] : 0/10 [70: Cares for self; unalbe to carry on normal activity or do active work.] : 70: Cares for self; unable to carry on normal activity or do active work. [ECOG Performance Status: 2 - Ambulatory and capable of all self care but unable to carry out any work activities] : Performance Status: 2 - Ambulatory and capable of all self care but unable to carry out any work activities. Up and about more than 50% of waking hours [Maximal Pain Intensity: 6/10] : 6/10 [Pain Location: ___] : Pain Location: [unfilled] [NoTreatment Scheduled] : no treatment scheduled

## 2020-03-30 NOTE — HISTORY OF PRESENT ILLNESS
[FreeTextEntry1] : Mr. Rossi is an 79 year-old male with a long history of heavily pretreated castrate resistant prostate cancer since 1993.  He presented in 2017 with an enlarging para-aortic lymph node in the setting of rising PSA.  He completed SBRT 27 Gy to the para-aortic node on 1/30/18.  He is currently on q 3 week Gevtana and Lupron.  He is followed by Dr. Fuller and Dr. Roa.  He underwent MRI of the lumbar spine in December 2019, which demonstrated an L2 compression fracture with enhancement of the entire vertebral body extending into the epidural space and right and left iliopsoas muscle.\par \par MRI lumbar spine with and without contrast 12/17/19 - IMPRESSION: Mild to moderate compression fracture of L2 vertebral body with generalized marrow edema. Postcontrast enhancement demonstrates enhancement of the entire vertebral body with enhancement extending into the epidural space and medial aspects of the right and left iliopsoas muscle. Findings or felt to be related to hyperemia from a benign compression fracture. Given that the patient has a history of prostate cancer a nuclear medicine bone scintigraphy can be obtained for confirmation. Other findings as above. \par \par Of note, he was admitted to [facility] in January for dyspnea and was found to have a PE, atrial fibrillation, duodenal ulcers and gastritis.  He is on Eliquis, followed by cardiologist Dr. Lee.    \par \par 2/26/2020 MRI Lumbar spine no contrast\par IMPRESSION:\par -Moderate fracture involving the L2 vertebral body with associated sclerosis and edema, most consistent with metastatic disease. No retropulsion of osseous material into the anterior epidural space and no additional destructive osseous mass lesions identified.\par -Multilevel degenerative lumbar changes, most severe at L4/L5, where there is encroachment on the L5 nerve roots, and at L5/S1, where there is encroachment on the S1 nerve roots as well as the exiting L5 nerve roots.\par -Paraaortic, retroperitoneal lymphadenopathy.\par \par 2/20/2020 Nuclear Medicine Whole Body Bone Scan\par IMPRESSION:\par 1. Suspicious of bone metastasis to the lumbar spine and sacrum. \par \par 2/20/2020 CT C/A/P \par IMPRESSION: \par 1. Status post radical prostatectomy. Stable right paraesophageal and retroperitoneal lymphadenopathy.\par 2. 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  He reports intermittent right lower back pain, rated 6/10, does not require pain medication.  He denies lower extremity weakness and paresthesias.  He notes stable exertional dyspnea, frequency and nocturia.  He denies other complaints to include fevers, chills, chest pain, palpitations.  \par __________________________________________________________\par Oncologic History\par Mr. Rossi presented initially for consideration for palliative radiation therapy for recurrent, metastatic prostate cancer on 12/29/17.  He is referred by Dr. Mae Fuller. \par \par In February 2016, the patient established care with Dr. Roa here at [facility]. At that time he had a diagnosis of stage IV prostate cancer, castrate resistant. \par \par Initial history is as follows: He underwent radical retropubic prostatectomy in 1994 with Dr. Aroldo Carvalho at [facility]. Post op course was complicated by strictures causing urinary retention, requiring daly catheter, and suprapubic catheter, as well as dilations of strictures. He does not have records regarding surgical pathology and Rene Score. His PSA began to rise postoperatively, and was started on Lupron shortly after surgery.\par \par His PSA began to rise again in 2006. He underwent external beam radiation for pelvic lymphadenopathy in 2007 (records being obtained). Since then he has received multiple systemic therapies including taxotere, provenge, Jevtana, as well as zytiga. He has remained on Lupron therapy consistently from 1994 until now. 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 His PSA has continued to trend up throughout the last two years. \par \par At his visit on 10/24/17, it was noted that Mr. Rossi had an increased amount of bone pain, in addition to decreased energy. He had gross hematuria and diffuse body pain. Zytiga was discontinued and xtandi was started. \par \par He underwent PET scan on 12/7/17 which revealed a 4.3 cm left para-aortic mass representing a conglomeration of lymph nodes which has been increasing slowly since 2014. This most likely represents a ramy recurrence of the patient's prostate cancer, especially in the context of rising PSA.\par \par At the time of initial consult on 12/29/17, he denied pain, denies palpable masses in his abdomen. He noted fatigue. He had urinary incontinence, for which he wears depends. Denied urinary retention. Noted he intermittently passed clots through his urine. He had ongoing diarrhea related to Xtandi treatment.\par \par 2/20/18\par Mr. Rossi presents today for post treatment evaluation. He completed radiation therapy with Dr. Garcia for treatment of a para-aortic node for a total of 2700 cGy from 1/23/18-1/30/18. He tolerated treatment well. \par He saw Dr. Redmond for lupron injection on 2/2/18. He will see Dr. Fuller for follow up tomorrow. \par He notes joint pain today, ongoing since prior to radiation, 9/10 at worst, to bilateral knees and back. His appetite is decreased, and he has lost 12 pounds since he finished radiation. He is fatigued, worse over the last 6 weeks. Most of his symptoms coincide with prednisone and xtandi use.

## 2020-03-30 NOTE — REASON FOR VISIT
[Re-evaluation] : re-evaluation for [Bone Metastasis] : bone metastasis [Prostate Cancer] : prostate cancer [Other: ___] : [unfilled] [Family Member] : family member

## 2020-03-30 NOTE — REVIEW OF SYSTEMS
[IPSS Score (0-40): ___] : IPSS score: [unfilled] [EPIC-CP Score (0-60): ___] : EPIC-CP score: [unfilled] [Proctitis: Grade 0] : Proctitis: Grade 0 [Rectal Pain: Grade 0] : Rectal Pain: Grade 0 [Fatigue: Grade 0] : Fatigue: Grade 0 [Urinary Incontinence: Grade 1 - Occasional (e.g., with coughing, sneezing, etc.), pads not indicated] : Urinary Incontinence: Grade 1 - Occasional (e.g., with coughing, sneezing, etc.), pads not indicated [Urinary Tract Pain: Grade 0] : Urinary Tract Pain: Grade 0 [Urinary Urgency: Grade 1 - Present] : Urinary Urgency: Grade 1 - Present [Urinary Frequency: Grade 1 - Present] : Urinary Frequency: Grade 1 - Present [SOB on Exertion] : shortness of breath during exertion [Nocturia] : nocturia [Urinary Frequency] : urinary frequency [Negative] : Psychiatric [FreeTextEntry9] : right low back pain

## 2020-03-30 NOTE — PHYSICAL EXAM
[General Appearance - Well Developed] : well developed [General Appearance - Alert] : alert [General Appearance - In No Acute Distress] : in no acute distress [Skin Color & Pigmentation] : normal skin color and pigmentation [Oriented To Time, Place, And Person] : oriented to person, place, and time [Heart Sounds] : normal S1 and S2 [Normal] : no joint swelling, no clubbing or cyanosis of the fingernails and muscle strength and tone were normal

## 2020-04-27 ENCOUNTER — RX RENEWAL (OUTPATIENT)
Age: 80
End: 2020-04-27

## 2020-05-26 ENCOUNTER — APPOINTMENT (OUTPATIENT)
Dept: UROLOGY | Facility: CLINIC | Age: 80
End: 2020-05-26

## 2020-05-26 NOTE — ED PROVIDER NOTE - NEUROLOGICAL, MLM
1.5 mg into the skin once a week Yes Idania Cárdenas MD   pramipexole (MIRAPEX) 0.5 MG tablet TAKE ONE TO TWO TABLETS BY MOUTH EVERY EVENING Yes Idania Cárdenas MD   MIRENA, 52 MG, IUD 52 mg 1 Dose by Intrauterine route once Yes Historical Provider, MD   Multiple Vitamins-Minerals (BARIATRIC FUSION) CHEW Take 4 tablets by mouth daily  Yes Historical Provider, MD   b complex vitamins capsule Take 1 capsule by mouth daily Yes Historical Provider, MD   fluticasone (FLONASE) 50 MCG/ACT nasal spray PLACE TWO SPRAYS IN EACH NOSTRIL ONCE DAILY Yes Idania Cárdenas MD   aspirin 81 MG EC tablet Take 81 mg by mouth daily. Yes Historical Provider, MD   Lisdexamfetamine Dimesylate (VYVANSE) 60 MG CAPS Take 60 mg by mouth every morning for 30 days. Idania Cárdenas MD   clonazePAM (KLONOPIN) 0.5 MG tablet Take 1 tablet by mouth nightly as needed for Anxiety for up to 30 days. Idania Cárdenas MD   Blood Glucose Monitoring Suppl [facility] BLOOD GLUCOSE METER) w/Device KIT 1 kit by Does not apply route daily  Idania Cárdenas MD   blood glucose test strips (ASCENSIA AUTODISC VI;ONE TOUCH ULTRA TEST VI) strip 1 each by Does not apply route daily  Idania Cárdenas MD   Lisdexamfetamine Dimesylate (VYVANSE) 60 MG CAPS Take by mouth. Historical Provider, MD   ELINEST 0.3-30 MG-MCG per tablet Take 1 tablet by mouth daily  Historical Provider, MD   tranexamic acid (LYSTEDA) 650 MG TABS tablet Take 2 tablets by mouth 3 times daily for 5 days  Idania Cárdenas MD   blood glucose test strips (ASCENSIA AUTODISC VI;ONE TOUCH ULTRA TEST VI) strip 1 each by In Vitro route daily As needed.   Idania Cárdenas MD   Blood Glucose Monitoring Suppl (ONETOUCH VERIO FLEX SYSTEM) w/Device KIT 1 Device by Does not apply route daily  Idania Cárdenas MD   SURE COMFORT LANCETS 30G MISC 1 Units by Does not apply route 3 times daily  Patient not taking: Reported on 3/12/2020  Idania Cárdenas MD   Insulin Pen Needle (B-D UF III MINI
Alert and oriented, no focal deficits, no motor or sensory deficits.

## 2020-05-28 ENCOUNTER — APPOINTMENT (OUTPATIENT)
Dept: UROLOGY | Facility: CLINIC | Age: 80
End: 2020-05-28
Payer: MEDICARE

## 2020-05-28 ENCOUNTER — MED ADMIN CHARGE (OUTPATIENT)
Age: 80
End: 2020-05-28

## 2020-05-28 VITALS — TEMPERATURE: 97.9 F | SYSTOLIC BLOOD PRESSURE: 145 MMHG | HEART RATE: 84 BPM | DIASTOLIC BLOOD PRESSURE: 81 MMHG

## 2020-05-28 PROCEDURE — 96402 CHEMO HORMON ANTINEOPL SQ/IM: CPT

## 2020-05-28 RX ORDER — LEUPROLIDE ACETATE 22.5 MG
22.5 KIT INTRAMUSCULAR
Qty: 1 | Refills: 0 | Status: COMPLETED | OUTPATIENT
Start: 2020-05-28

## 2020-05-28 RX ADMIN — LEUPROLIDE ACETATE 0 MG: KIT at 00:00

## 2020-05-29 ENCOUNTER — APPOINTMENT (OUTPATIENT)
Dept: INTERVENTIONAL RADIOLOGY/VASCULAR | Facility: HOSPITAL | Age: 80
End: 2020-05-29
Payer: MEDICARE

## 2020-05-29 PROCEDURE — 99214 OFFICE O/P EST MOD 30 MIN: CPT | Mod: 95

## 2020-05-29 NOTE — HISTORY OF PRESENT ILLNESS
[Home] : at home, [unfilled] , at the time of the visit. [Medical Office: ([facility] [place])___] : at the medical office located in  [Verbal consent obtained from patient] : the patient, [unfilled] [FreeTextEntry1] : 79 year old man with history of prostate cancer, who had an MRI lumbar spine in December 2019, that showed a compression fx with metastasis in the L2 vertebral body. Had repeat MRI on 2/26/20, showing moderate fx of the L2 VB with associated metastatic disease. Patient states he has vague back pain which comes and goes. At its worst, the pain is 8/10. He does not take any medications for the pain. The pain goes to the left and right sides of his back and fluctuates. Currently, the patient is not in any pain. Denies other associated symptoms.

## 2020-05-29 NOTE — ASSESSMENT
[FreeTextEntry1] : 79M with metastatic prostate cancer, who presents with bone metastasis and moderate compression fracture of the L2 vertebral body, causing occasional episodes of pain. Kyphoplasty and RFA of the L2 vertebral body was discussed with the patient in detail. Risks and benefits were also discussed. We will discuss the case further with referring radiation oncologist, Dr. Garcia. Additionally, the patient will call us back when he has the next episode of back pain so we can discuss the type and severity of pain he is having and try and localize it. All questions were answered in detail.

## 2020-05-29 NOTE — DATA REVIEWED
[FreeTextEntry1] : 2/26/2020 MRI lumbar spine without contrast\par IMPRESSION: \par -Moderate fracture involving the L2 vertebral body with associated sclerosis and edema, most consistent with metastatic disease. No retropulsion of osseous material into the anterior epidural space and no additional destructive osseous mass lesions identified. \par -Multilevel degenerative lumbar changes, most severe at L4/L5, where there is encroachment on the nerve roots, and at L5/S1, where there is encroachment on the S1 nerve roots as well as the exiting nerve roots. \par -Paraaortic, retroperitoneal lymphadenopathy.

## 2020-06-12 ENCOUNTER — TRANSCRIPTION ENCOUNTER (OUTPATIENT)
Age: 80
End: 2020-06-12

## 2020-06-12 ENCOUNTER — APPOINTMENT (OUTPATIENT)
Dept: HEART AND VASCULAR | Facility: CLINIC | Age: 80
End: 2020-06-12
Payer: MEDICARE

## 2020-06-12 VITALS
BODY MASS INDEX: 27.1 KG/M2 | WEIGHT: 182.98 LBS | OXYGEN SATURATION: 97 % | HEART RATE: 97 BPM | SYSTOLIC BLOOD PRESSURE: 130 MMHG | TEMPERATURE: 98.2 F | DIASTOLIC BLOOD PRESSURE: 80 MMHG | HEIGHT: 69 IN

## 2020-06-12 PROCEDURE — 93000 ELECTROCARDIOGRAM COMPLETE: CPT

## 2020-06-12 PROCEDURE — 99213 OFFICE O/P EST LOW 20 MIN: CPT

## 2020-06-12 NOTE — HISTORY OF PRESENT ILLNESS
[FreeTextEntry1] : 79M w/ stage IV prostate CA (s/p prostatectomy, on chemotherapy), who presented to [facility] 1/2020 with progressive SOB and JAIME x1mo associated w/ generalized fatigue in the setting\par of decreased PO intake, found to have segmental PE of RLL on CTA Chest and LLE\par DVT on US Dopplers likely 2/2 malignancy. Hospital course c/b coffee ground\par emesis,  resolved with EGD demonstrating duodenal ulcers and gastritis. Cardiology consulted for new onset atrial fibrillation during hospitalization, started on BB and instructed to continue NOAC. Now presents for 6 month f/u. Still with residual SOB. No c/o increased bleeding on meds. \par In-patient TTE 1/2020 c/w preserved EF and LA volume. \par \par \par 6/12/20 ECG: NSR at 79 bpm, LAD, IVCD, LAFB, non-specific STTWC \par 1/24/20 ECG: NSR at 86 bpm, LAD, LAFB, PVCs, non-specific STTWC

## 2020-06-12 NOTE — ASSESSMENT
[FreeTextEntry1] : 1. PAF\par - now in NSR\par - CHADsVasc score 5\par - cont NOAC\par -cont Toprol 50 qd for better rate control\par - repeat TTE\par \par 2. HTN\par - now controlled\par - cont BB and ARB\par - continue current meds for now\par \par 3. PE/DVT\par - cont to f/u w/ Dr. Fuller\par - cont NOAC\par - repeat TTE \par \par 4. HLD\par - cont statin\par - ed done\par \par RTC 6 months, earlier if sx or if abnl TTE

## 2020-06-12 NOTE — PHYSICAL EXAM
[General Appearance - Well Developed] : well developed [Normal Appearance] : normal appearance [Well Groomed] : well groomed [No Deformities] : no deformities [General Appearance - Well Nourished] : well nourished [General Appearance - In No Acute Distress] : no acute distress [Eyelids - No Xanthelasma] : the eyelids demonstrated no xanthelasmas [Normal Oral Mucosa] : normal oral mucosa [Normal Conjunctiva] : the conjunctiva exhibited no abnormalities [No Oral Pallor] : no oral pallor [No Oral Cyanosis] : no oral cyanosis [Normal Jugular Venous V Waves Present] : normal jugular venous V waves present [No Jugular Venous Adrby A Waves] : no jugular venous darby A waves [Normal Jugular Venous A Waves Present] : normal jugular venous A waves present [Respiration, Rhythm And Depth] : normal respiratory rhythm and effort [Exaggerated Use Of Accessory Muscles For Inspiration] : no accessory muscle use [Auscultation Breath Sounds / Voice Sounds] : lungs were clear to auscultation bilaterally [Heart Sounds] : normal S1 and S2 [Heart Rate And Rhythm] : heart rate and rhythm were normal [Murmurs] : no murmurs present [Abdomen Soft] : soft [Abdomen Tenderness] : non-tender [Abnormal Walk] : normal gait [Abdomen Mass (___ Cm)] : no abdominal mass palpated [Gait - Sufficient For Exercise Testing] : the gait was sufficient for exercise testing [Cyanosis, Localized] : no localized cyanosis [Nail Clubbing] : no clubbing of the fingernails [Skin Color & Pigmentation] : normal skin color and pigmentation [Petechial Hemorrhages (___cm)] : no petechial hemorrhages [] : no rash [No Venous Stasis] : no venous stasis [Skin Lesions] : no skin lesions [No Skin Ulcers] : no skin ulcer [Oriented To Time, Place, And Person] : oriented to person, place, and time [No Xanthoma] : no  xanthoma was observed [Mood] : the mood was normal [Affect] : the affect was normal [No Anxiety] : not feeling anxious

## 2020-06-30 ENCOUNTER — APPOINTMENT (OUTPATIENT)
Dept: HEART AND VASCULAR | Facility: CLINIC | Age: 80
End: 2020-06-30
Payer: MEDICARE

## 2020-06-30 PROCEDURE — 93306 TTE W/DOPPLER COMPLETE: CPT

## 2020-07-28 ENCOUNTER — APPOINTMENT (OUTPATIENT)
Dept: INTERVENTIONAL RADIOLOGY/VASCULAR | Facility: HOSPITAL | Age: 80
End: 2020-07-28
Payer: MEDICARE

## 2020-07-28 VITALS
HEART RATE: 86 BPM | OXYGEN SATURATION: 93 % | SYSTOLIC BLOOD PRESSURE: 108 MMHG | RESPIRATION RATE: 16 BRPM | DIASTOLIC BLOOD PRESSURE: 56 MMHG

## 2020-07-28 DIAGNOSIS — S32.020A WEDGE COMPRESSION FRACTURE OF SECOND LUMBAR VERTEBRA, INITIAL ENCOUNTER FOR CLOSED FRACTURE: ICD-10-CM

## 2020-07-28 PROCEDURE — 99215 OFFICE O/P EST HI 40 MIN: CPT

## 2020-07-28 NOTE — DATA REVIEWED
[FreeTextEntry1] : MRI L-spine (6/24/20): Severe fracture deformity involving the L2 vertebral body which has progressed when compared to the MRI of the lumbar spine performed on 2/26/2020 however there is no significant retropulsion of osseous material into the anterior epidural space. There are edematous changes as well as mild enhancement involving the fracture.

## 2020-07-28 NOTE — ASSESSMENT
[FreeTextEntry1] : 79 year old man with metastatic prostate cancer presents for repeat consultation regarding kyphoplasty for progressive compression fx of L2. However, due to lack of significant pain or other symptomatology, the procedure is not currently indicated. The risks and benefits of the procedure and the details of the procedure itself were explained to the patient and to his daughter. He was encouraged to reach out to us if any symptoms or significant pain develop and we can schedule him for the procedure at that time. All questions were answered.

## 2020-07-28 NOTE — PHYSICAL EXAM
[Alert] : alert [No Acute Distress] : no acute distress [No Neck Mass] : no neck mass was observed [No Respiratory Distress] : no respiratory distress [Normal Rate and Effort] : normal respiratory rhythm and effort [Normal Rate] : heart rate was normal  [Regular Rhythm] : with a regular rhythm [Not Tender] : non-tender [Oriented x3] : oriented to person, place, and time [de-identified] : no tenderness to palpation lumbar spine at multiple levels

## 2020-07-28 NOTE — HISTORY OF PRESENT ILLNESS
[FreeTextEntry1] : 79M with h/o metastatic prostate cancer with met to the L2 vertebral body, presents for repeat consultation regarding L2 kyphoplasty. In the interim since his last consultation, the patient had another MRI which showed progression of compression of the L2 vertebral body. He had also been admitted to an outside hospital after a fall earlier this month and treated for a hand fracture and injury to one of his kidneys. During the hospitalization he received blood transfusion for a hemoglobin of 6.8. In addition, the patient's chemotherapy was stopped due to his inability to tolerate it. When prompted, the patient denies significant pain in his lower back and states he has occasional discomfort in the lower back radiating to the left when he sits for a prolonged amount of time or does certain activities.

## 2020-08-06 ENCOUNTER — APPOINTMENT (OUTPATIENT)
Dept: ORTHOPEDIC SURGERY | Facility: CLINIC | Age: 80
End: 2020-08-06
Payer: MEDICARE

## 2020-08-06 ENCOUNTER — RESULT REVIEW (OUTPATIENT)
Age: 80
End: 2020-08-06

## 2020-08-06 ENCOUNTER — OUTPATIENT (OUTPATIENT)
Dept: OUTPATIENT SERVICES | Facility: HOSPITAL | Age: 80
LOS: 1 days | End: 2020-08-06
Payer: MEDICARE

## 2020-08-06 DIAGNOSIS — S62.308A UNSPECIFIED FRACTURE OF OTHER METACARPAL BONE, INITIAL ENCOUNTER FOR CLOSED FRACTURE: ICD-10-CM

## 2020-08-06 PROCEDURE — 73110 X-RAY EXAM OF WRIST: CPT

## 2020-08-06 PROCEDURE — 73110 X-RAY EXAM OF WRIST: CPT | Mod: 26,50

## 2020-08-06 PROCEDURE — 99202 OFFICE O/P NEW SF 15 MIN: CPT

## 2020-08-10 PROBLEM — S62.308A CLOSED FRACTURE OF FIFTH METACARPAL BONE: Status: ACTIVE | Noted: 2020-08-10

## 2020-08-10 NOTE — HISTORY OF PRESENT ILLNESS
[de-identified] : 79 year old male presents with bilateral wrist and hand pain.  His right hand pain is the worse.  Activity makes the pain worse. OTC meds help.

## 2020-08-10 NOTE — PHYSICAL EXAM
[de-identified] : MSK:\par Right hand\par skin intact\par TTP over 5th metacarpal neck\par SILT m/r/u\par +motor EPL/FPL/EDC/FDP/IO\par WWP distally, palpable radial pulse [de-identified] : Bilateral wrist radiographs: right 5th metacarpal neck fracture

## 2020-08-10 NOTE — ASSESSMENT
[FreeTextEntry1] : 79 year old male with right metacarpal neck fracture\par -splint\par -f/u in 4-6 weeks\par -OTC pain meds prn pain

## 2020-08-27 ENCOUNTER — APPOINTMENT (OUTPATIENT)
Dept: UROLOGY | Facility: CLINIC | Age: 80
End: 2020-08-27
Payer: MEDICARE

## 2020-08-27 VITALS
SYSTOLIC BLOOD PRESSURE: 120 MMHG | OXYGEN SATURATION: 98 % | DIASTOLIC BLOOD PRESSURE: 72 MMHG | TEMPERATURE: 97.2 F | HEART RATE: 86 BPM

## 2020-08-27 PROCEDURE — 96402 CHEMO HORMON ANTINEOPL SQ/IM: CPT

## 2020-08-27 PROCEDURE — 99212 OFFICE O/P EST SF 10 MIN: CPT | Mod: 25

## 2020-08-27 NOTE — ASSESSMENT
[FreeTextEntry1] : prostate cancer\par last PSA 4/2020 11\par following with Dr Fuller\par lupron 4 month today\par f/u 4 motnhs\par

## 2020-08-27 NOTE — HISTORY OF PRESENT ILLNESS
[FreeTextEntry1] : compelted chemo\par now with neuropathy and anxiety\par reports PSA well controlled\par no pain\par feels weak\par here for lupron\par

## 2020-08-31 ENCOUNTER — TRANSCRIPTION ENCOUNTER (OUTPATIENT)
Age: 80
End: 2020-08-31

## 2020-12-11 ENCOUNTER — APPOINTMENT (OUTPATIENT)
Dept: HEART AND VASCULAR | Facility: CLINIC | Age: 80
End: 2020-12-11
Payer: MEDICARE

## 2020-12-11 ENCOUNTER — NON-APPOINTMENT (OUTPATIENT)
Age: 80
End: 2020-12-11

## 2020-12-11 VITALS
BODY MASS INDEX: 26.96 KG/M2 | OXYGEN SATURATION: 96 % | SYSTOLIC BLOOD PRESSURE: 128 MMHG | HEART RATE: 88 BPM | HEIGHT: 69 IN | TEMPERATURE: 97.6 F | DIASTOLIC BLOOD PRESSURE: 68 MMHG | WEIGHT: 182 LBS

## 2020-12-11 DIAGNOSIS — Z86.39 PERSONAL HISTORY OF OTHER ENDOCRINE, NUTRITIONAL AND METABOLIC DISEASE: ICD-10-CM

## 2020-12-11 DIAGNOSIS — E78.00 PURE HYPERCHOLESTEROLEMIA, UNSPECIFIED: ICD-10-CM

## 2020-12-11 PROCEDURE — 99214 OFFICE O/P EST MOD 30 MIN: CPT

## 2020-12-11 PROCEDURE — 93000 ELECTROCARDIOGRAM COMPLETE: CPT

## 2020-12-11 PROCEDURE — 99072 ADDL SUPL MATRL&STAF TM PHE: CPT

## 2020-12-11 NOTE — ASSESSMENT
[FreeTextEntry1] : 1. PAF\par - now in NSR\par - CHADsVasc score 5\par - cont NOAC\par -cont Toprol 50 qd for rate control\par - 6/30/20 TTE c/w nl LV /RV EF. \par - repeat TTE 6/2021\par \par 2. HTN\par - now controlled\par - cont BB and ARB\par - continue current meds for now\par \par 3. PE/DVT\par - cont to f/u w/ Dr. Fuller\par - cont NOAC\par - 6/30/20 TTE c/w PASP of 39 mmHg, nl RV size and fxn\par \par 4. HLD\par - cont statin\par - repat labs\par - ed done\par \par RTC 6 months, earlier if sx

## 2020-12-11 NOTE — PHYSICAL EXAM
[General Appearance - Well Developed] : well developed [Normal Appearance] : normal appearance [Well Groomed] : well groomed [General Appearance - Well Nourished] : well nourished [No Deformities] : no deformities [General Appearance - In No Acute Distress] : no acute distress [Normal Conjunctiva] : the conjunctiva exhibited no abnormalities [Eyelids - No Xanthelasma] : the eyelids demonstrated no xanthelasmas [Normal Oral Mucosa] : normal oral mucosa [No Oral Pallor] : no oral pallor [No Oral Cyanosis] : no oral cyanosis [Normal Jugular Venous A Waves Present] : normal jugular venous A waves present [Normal Jugular Venous V Waves Present] : normal jugular venous V waves present [No Jugular Venous Darby A Waves] : no jugular venous darby A waves [Respiration, Rhythm And Depth] : normal respiratory rhythm and effort [Exaggerated Use Of Accessory Muscles For Inspiration] : no accessory muscle use [Auscultation Breath Sounds / Voice Sounds] : lungs were clear to auscultation bilaterally [Heart Rate And Rhythm] : heart rate and rhythm were normal [Heart Sounds] : normal S1 and S2 [Murmurs] : no murmurs present [Abdomen Soft] : soft [Abdomen Tenderness] : non-tender [Abdomen Mass (___ Cm)] : no abdominal mass palpated [Abnormal Walk] : normal gait [Gait - Sufficient For Exercise Testing] : the gait was sufficient for exercise testing [Nail Clubbing] : no clubbing of the fingernails [Cyanosis, Localized] : no localized cyanosis [Petechial Hemorrhages (___cm)] : no petechial hemorrhages [Skin Color & Pigmentation] : normal skin color and pigmentation [] : no rash [No Venous Stasis] : no venous stasis [Skin Lesions] : no skin lesions [No Skin Ulcers] : no skin ulcer [No Xanthoma] : no  xanthoma was observed [Oriented To Time, Place, And Person] : oriented to person, place, and time [Affect] : the affect was normal [Mood] : the mood was normal [No Anxiety] : not feeling anxious

## 2020-12-11 NOTE — HISTORY OF PRESENT ILLNESS
[FreeTextEntry1] : 79M w/ stage IV prostate CA (s/p prostatectomy, on chemotherapy), who presented to [facility] 1 week ago with progressive SOB and JAIME x1mo associated w/ generalized fatigue in the setting\par of decreased PO intake, found to have segmental PE of RLL on CTA Chest and LLE\par DVT on US Dopplers likely 2/2 malignancy. Hospital course c/b coffee ground\par emesis,  resolved with EGD demonstrating duodenal ulcers and gastritis. Cardiology consulted for new onset atrial fibrillation during hospitalization, started on BB and instructed to continue NOAC. Now returns for 6 month f/u. Denies residual SOB, + dyspnea on inclines. No c/o abnormal bleeding on meds. \par In-patient TTE c/w preserved EF and LA volume. 6/30/20 TTE in office a/w preserved EF as well. \par \par 12/11/20 ECG: NSR at 86 bpm, LAFB, NS STTWC (unchanged from previous)\par 1/24/20 ECG: NSR at 86 bpm, LAD, LAFB, PVCs, non-specific STTWC

## 2020-12-11 NOTE — REVIEW OF SYSTEMS
[Dyspnea on exertion] : dyspnea during exertion [Negative] : Heme/Lymph [Shortness Of Breath] : no shortness of breath

## 2020-12-21 PROBLEM — Z87.440 HISTORY OF URINARY TRACT INFECTION: Status: RESOLVED | Noted: 2019-12-23 | Resolved: 2020-12-21

## 2020-12-22 ENCOUNTER — APPOINTMENT (OUTPATIENT)
Dept: UROLOGY | Facility: CLINIC | Age: 80
End: 2020-12-22
Payer: MEDICARE

## 2020-12-22 VITALS — SYSTOLIC BLOOD PRESSURE: 134 MMHG | TEMPERATURE: 97.1 F | HEART RATE: 83 BPM | DIASTOLIC BLOOD PRESSURE: 78 MMHG

## 2020-12-22 PROCEDURE — 99072 ADDL SUPL MATRL&STAF TM PHE: CPT

## 2020-12-22 PROCEDURE — 99213 OFFICE O/P EST LOW 20 MIN: CPT | Mod: 25

## 2020-12-22 PROCEDURE — 96402 CHEMO HORMON ANTINEOPL SQ/IM: CPT

## 2020-12-22 RX ADMIN — LEUPROLIDE ACETATE MG: KIT SUBCUTANEOUS at 00:00

## 2020-12-22 NOTE — HISTORY OF PRESENT ILLNESS
[FreeTextEntry1] : 79M with history of prostate cancer\par Completed Cipro therapy\par Has not taken Myrbetriq for 3 weeks \par Notes mild improvement of dysuria\par Occasional urinary leaking \par Nocturia 4-5 x's \par No fever\par No hematuria \par No flank pain \par No weight loss\par No N/v\par \par January 30, 2020:\par Here today for Gross hematuria \par ER: 1/12:  Right pulmonary embolism, left  DVT. Was on lovenox on discharge. Now on eliquis due projectile emesis. Endoscopy w. esophagitis, gastritis, duodenitis and .  Ceftriaxone 1 Gm x's 2 days. Negative bld and Ucx. \par Reports gross hematuria \par No dysuria \par + Urgency + frequency + urge/stress incontinence. No changes with myrbetriq\par Stopped chemotherapy due to PE\par Lupron therapy only currently\par Last PSA= 22 \par Occasional low back pain\par Low energy levels\par No hot flashes \par JAIME \par Due to see oncology 2/4. Recommend cystoscopy.\par US: No hydronephrosis, no calculi, left renal cyst 6.4 cm \par \par 3/5/2020:\par \par 79M with history of prostate cancer\par Stopped Myrbetriq\par No dysuria\par Occasional urinary leaking \par Nocturia 4-5 x's \par No fever,No hematuria, No flank pain, No N/v\par + Frequency, occasional urgency. 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 No clear tumor.  \par MRI: moderate L2 fracture. Will f/u with Dr. Garcia for radiation/chemo. \par \par \par 12/22/2020: \par \par 80M with hx of prostate cancer\par Not on Myrbetriq\par Nocturia 4-5 x's, + frequency, occasional urgency, no hematuria, no dysuria. Comfortable with symptoms and does not wish to use medication. \par Feeling well. No bone pain. Fair energy levels \par Chemo stopped in July 2020  (1st chemo in 2006)\par OCT 2020 PSA= 19.1\par DEC 2020 PSA = 18.1\par  [Weight Loss] : no recent ~M [unfilled] weight loss [Fever] : no fever [Fatigue] : no fatigue [Nausea] : no nausea

## 2020-12-22 NOTE — ASSESSMENT
[FreeTextEntry1] : Prostate cancer\par -Eligard (22.5 mg)  today \par -Will f/u if symptoms become bothersome\par PSA stable\par generally happy\par will monitor\par F/U 3 months

## 2020-12-22 NOTE — PHYSICAL EXAM
[General Appearance - Well Developed] : well developed [General Appearance - Well Nourished] : well nourished [Normal Appearance] : normal appearance [Well Groomed] : well groomed [General Appearance - In No Acute Distress] : no acute distress [Abdomen Soft] : soft [Abdomen Tenderness] : non-tender [Costovertebral Angle Tenderness] : no ~M costovertebral angle tenderness [] : no respiratory distress [Respiration, Rhythm And Depth] : normal respiratory rhythm and effort [Exaggerated Use Of Accessory Muscles For Inspiration] : no accessory muscle use [Oriented To Time, Place, And Person] : oriented to person, place, and time [Affect] : the affect was normal [Mood] : the mood was normal [Not Anxious] : not anxious [Normal Station and Gait] : the gait and station were normal for the patient's age [No Focal Deficits] : no focal deficits [FreeTextEntry1] : generalized weakness

## 2021-01-01 NOTE — CONSULT NOTE ADULT - CONSULT REQUESTED DATE/TIME
-- DO NOT REPLY / DO NOT REPLY ALL --  -- Message is from the [facility]--    General Patient Message      Reason for Call: Pts father would like to rescheduled 1/14/22 Please call   [phone]       Alternative phone number:N/A      Did the caller agree that this message can wait until the office reopens on Monday (or after the holiday)? YES - The Message Can Wait      Send a message to the provider's clinical support pool.     Turnaround time given to caller:   \"This message will be sent to [state Provider's name]. The clinical team will fulfill your request as soon as they review your message when the office opens on Monday (or after the holiday).\"      
14-Jan-2020 14:03
13-Jan-2020 04:16
13-Jan-2020 08:15
14-Jan-2020
14-Jan-2020 03:26
14-Jan-2020 05:30
14-Jan-2020 15:28

## 2021-01-05 LAB
ALBUMIN SERPL ELPH-MCNC: 4.2 G/DL
ALP BLD-CCNC: 36 U/L
ALT SERPL-CCNC: 17 U/L
ANION GAP SERPL CALC-SCNC: 12 MMOL/L
AST SERPL-CCNC: 19 U/L
BASOPHILS # BLD AUTO: 0.03 K/UL
BASOPHILS NFR BLD AUTO: 0.3 %
BILIRUB SERPL-MCNC: 0.2 MG/DL
BUN SERPL-MCNC: 25 MG/DL
CALCIUM SERPL-MCNC: 9.1 MG/DL
CHLORIDE SERPL-SCNC: 104 MMOL/L
CHOLEST SERPL-MCNC: 213 MG/DL
CO2 SERPL-SCNC: 22 MMOL/L
CREAT SERPL-MCNC: 1.16 MG/DL
CRP SERPL HS-MCNC: 6.41 MG/L
DEPRECATED D DIMER PPP IA-ACNC: 174 NG/ML DDU
EOSINOPHIL # BLD AUTO: 0.02 K/UL
EOSINOPHIL NFR BLD AUTO: 0.2 %
ESTIMATED AVERAGE GLUCOSE: 140 MG/DL
GLUCOSE SERPL-MCNC: 135 MG/DL
HBA1C MFR BLD HPLC: 6.5 %
HCT VFR BLD CALC: 33.5 %
HDLC SERPL-MCNC: 60 MG/DL
HGB BLD-MCNC: 10.5 G/DL
IMM GRANULOCYTES NFR BLD AUTO: 1.1 %
LDLC SERPL CALC-MCNC: 128 MG/DL
LYMPHOCYTES # BLD AUTO: 0.93 K/UL
LYMPHOCYTES NFR BLD AUTO: 8.6 %
MAN DIFF?: NORMAL
MCHC RBC-ENTMCNC: 29.2 PG
MCHC RBC-ENTMCNC: 31.3 GM/DL
MCV RBC AUTO: 93.3 FL
MONOCYTES # BLD AUTO: 0.43 K/UL
MONOCYTES NFR BLD AUTO: 4 %
NEUTROPHILS # BLD AUTO: 9.34 K/UL
NEUTROPHILS NFR BLD AUTO: 85.8 %
NONHDLC SERPL-MCNC: 153 MG/DL
PLATELET # BLD AUTO: 227 K/UL
POTASSIUM SERPL-SCNC: 4.1 MMOL/L
PROT SERPL-MCNC: 6.2 G/DL
RBC # BLD: 3.59 M/UL
RBC # FLD: 17 %
SODIUM SERPL-SCNC: 138 MMOL/L
TRIGL SERPL-MCNC: 123 MG/DL
TSH SERPL-ACNC: 2.04 UIU/ML
WBC # FLD AUTO: 10.87 K/UL

## 2021-01-05 RX ORDER — SIMVASTATIN 20 MG/1
20 TABLET, FILM COATED ORAL
Refills: 0 | Status: DISCONTINUED | COMMUNITY
End: 2021-01-05

## 2021-03-06 ENCOUNTER — EMERGENCY (EMERGENCY)
Facility: HOSPITAL | Age: 81
LOS: 1 days | Discharge: HOME | End: 2021-03-06
Attending: EMERGENCY MEDICINE | Admitting: EMERGENCY MEDICINE
Payer: MEDICARE

## 2021-03-06 VITALS
DIASTOLIC BLOOD PRESSURE: 65 MMHG | HEART RATE: 95 BPM | HEIGHT: 69 IN | WEIGHT: 186.07 LBS | TEMPERATURE: 99 F | OXYGEN SATURATION: 98 % | RESPIRATION RATE: 18 BRPM | SYSTOLIC BLOOD PRESSURE: 119 MMHG

## 2021-03-06 VITALS
OXYGEN SATURATION: 98 % | SYSTOLIC BLOOD PRESSURE: 128 MMHG | TEMPERATURE: 98 F | HEART RATE: 60 BPM | DIASTOLIC BLOOD PRESSURE: 76 MMHG | RESPIRATION RATE: 18 BRPM

## 2021-03-06 DIAGNOSIS — I10 ESSENTIAL (PRIMARY) HYPERTENSION: ICD-10-CM

## 2021-03-06 DIAGNOSIS — R22.0 LOCALIZED SWELLING, MASS AND LUMP, HEAD: ICD-10-CM

## 2021-03-06 DIAGNOSIS — E78.5 HYPERLIPIDEMIA, UNSPECIFIED: ICD-10-CM

## 2021-03-06 DIAGNOSIS — E11.9 TYPE 2 DIABETES MELLITUS WITHOUT COMPLICATIONS: ICD-10-CM

## 2021-03-06 DIAGNOSIS — Z79.899 OTHER LONG TERM (CURRENT) DRUG THERAPY: ICD-10-CM

## 2021-03-06 DIAGNOSIS — Z79.51 LONG TERM (CURRENT) USE OF INHALED STEROIDS: ICD-10-CM

## 2021-03-06 DIAGNOSIS — H92.09 OTALGIA, UNSPECIFIED EAR: ICD-10-CM

## 2021-03-06 LAB
ALBUMIN SERPL ELPH-MCNC: 3.7 G/DL — SIGNIFICANT CHANGE UP (ref 3.3–5)
ALP SERPL-CCNC: 28 U/L — LOW (ref 40–120)
ALT FLD-CCNC: 13 U/L — SIGNIFICANT CHANGE UP (ref 10–45)
ANION GAP SERPL CALC-SCNC: 13 MMOL/L — SIGNIFICANT CHANGE UP (ref 5–17)
APTT BLD: 34.9 SEC — SIGNIFICANT CHANGE UP (ref 27.5–35.5)
AST SERPL-CCNC: 20 U/L — SIGNIFICANT CHANGE UP (ref 10–40)
BASOPHILS # BLD AUTO: 0.04 K/UL — SIGNIFICANT CHANGE UP (ref 0–0.2)
BASOPHILS NFR BLD AUTO: 0.3 % — SIGNIFICANT CHANGE UP (ref 0–2)
BILIRUB SERPL-MCNC: 0.2 MG/DL — SIGNIFICANT CHANGE UP (ref 0.2–1.2)
BUN SERPL-MCNC: 27 MG/DL — HIGH (ref 7–23)
CALCIUM SERPL-MCNC: 9.8 MG/DL — SIGNIFICANT CHANGE UP (ref 8.4–10.5)
CHLORIDE SERPL-SCNC: 101 MMOL/L — SIGNIFICANT CHANGE UP (ref 96–108)
CO2 SERPL-SCNC: 24 MMOL/L — SIGNIFICANT CHANGE UP (ref 22–31)
CREAT SERPL-MCNC: 1.55 MG/DL — HIGH (ref 0.5–1.3)
EOSINOPHIL # BLD AUTO: 0.08 K/UL — SIGNIFICANT CHANGE UP (ref 0–0.5)
EOSINOPHIL NFR BLD AUTO: 0.7 % — SIGNIFICANT CHANGE UP (ref 0–6)
GLUCOSE SERPL-MCNC: 144 MG/DL — HIGH (ref 70–99)
HCT VFR BLD CALC: 28.8 % — LOW (ref 39–50)
HGB BLD-MCNC: 9.1 G/DL — LOW (ref 13–17)
IMM GRANULOCYTES NFR BLD AUTO: 0.7 % — SIGNIFICANT CHANGE UP (ref 0–1.5)
INR BLD: 1.61 — HIGH (ref 0.88–1.16)
LYMPHOCYTES # BLD AUTO: 1.1 K/UL — SIGNIFICANT CHANGE UP (ref 1–3.3)
LYMPHOCYTES # BLD AUTO: 9 % — LOW (ref 13–44)
MCHC RBC-ENTMCNC: 28.8 PG — SIGNIFICANT CHANGE UP (ref 27–34)
MCHC RBC-ENTMCNC: 31.6 GM/DL — LOW (ref 32–36)
MCV RBC AUTO: 91.1 FL — SIGNIFICANT CHANGE UP (ref 80–100)
MONOCYTES # BLD AUTO: 0.85 K/UL — SIGNIFICANT CHANGE UP (ref 0–0.9)
MONOCYTES NFR BLD AUTO: 7 % — SIGNIFICANT CHANGE UP (ref 2–14)
NEUTROPHILS # BLD AUTO: 10.07 K/UL — HIGH (ref 1.8–7.4)
NEUTROPHILS NFR BLD AUTO: 82.3 % — HIGH (ref 43–77)
NRBC # BLD: 0 /100 WBCS — SIGNIFICANT CHANGE UP (ref 0–0)
PLATELET # BLD AUTO: 214 K/UL — SIGNIFICANT CHANGE UP (ref 150–400)
POTASSIUM SERPL-MCNC: 4.3 MMOL/L — SIGNIFICANT CHANGE UP (ref 3.5–5.3)
POTASSIUM SERPL-SCNC: 4.3 MMOL/L — SIGNIFICANT CHANGE UP (ref 3.5–5.3)
PROT SERPL-MCNC: 6.2 G/DL — SIGNIFICANT CHANGE UP (ref 6–8.3)
PROTHROM AB SERPL-ACNC: 18.9 SEC — HIGH (ref 10.6–13.6)
RBC # BLD: 3.16 M/UL — LOW (ref 4.2–5.8)
RBC # FLD: 16.3 % — HIGH (ref 10.3–14.5)
SODIUM SERPL-SCNC: 138 MMOL/L — SIGNIFICANT CHANGE UP (ref 135–145)
WBC # BLD: 12.22 K/UL — HIGH (ref 3.8–10.5)
WBC # FLD AUTO: 12.22 K/UL — HIGH (ref 3.8–10.5)

## 2021-03-06 PROCEDURE — 85025 COMPLETE CBC W/AUTO DIFF WBC: CPT

## 2021-03-06 PROCEDURE — 96374 THER/PROPH/DIAG INJ IV PUSH: CPT

## 2021-03-06 PROCEDURE — 99284 EMERGENCY DEPT VISIT MOD MDM: CPT | Mod: 25

## 2021-03-06 PROCEDURE — 85730 THROMBOPLASTIN TIME PARTIAL: CPT

## 2021-03-06 PROCEDURE — 80053 COMPREHEN METABOLIC PANEL: CPT

## 2021-03-06 PROCEDURE — 85610 PROTHROMBIN TIME: CPT

## 2021-03-06 PROCEDURE — 99285 EMERGENCY DEPT VISIT HI MDM: CPT

## 2021-03-06 PROCEDURE — 36415 COLL VENOUS BLD VENIPUNCTURE: CPT

## 2021-03-06 RX ORDER — ACETAMINOPHEN WITH CODEINE 300MG-30MG
1 TABLET ORAL
Qty: 12 | Refills: 0
Start: 2021-03-06 | End: 2021-03-08

## 2021-03-06 RX ORDER — ACETAMINOPHEN WITH CODEINE 300MG-30MG
1 TABLET ORAL ONCE
Refills: 0 | Status: DISCONTINUED | OUTPATIENT
Start: 2021-03-06 | End: 2021-03-06

## 2021-03-06 RX ORDER — AMPICILLIN SODIUM AND SULBACTAM SODIUM 250; 125 MG/ML; MG/ML
3 INJECTION, POWDER, FOR SUSPENSION INTRAMUSCULAR; INTRAVENOUS ONCE
Refills: 0 | Status: COMPLETED | OUTPATIENT
Start: 2021-03-06 | End: 2021-03-06

## 2021-03-06 RX ADMIN — AMPICILLIN SODIUM AND SULBACTAM SODIUM 200 GRAM(S): 250; 125 INJECTION, POWDER, FOR SUSPENSION INTRAMUSCULAR; INTRAVENOUS at 09:52

## 2021-03-06 RX ADMIN — Medication 1 TABLET(S): at 08:41

## 2021-03-06 NOTE — ED PROVIDER NOTE - OBJECTIVE STATEMENT
79 y/o male with hx of prostate ca, a fib, dvt/pe in past on eloquis, HTN, DM c/o right facial pain x 4 days. pt states constant sharp pain to right face and progressively worsening. no fever or chills. no toothache or recent dental work. no visual changes. no sore throat. + earache. no d/c or hearing. no neck or back pain. no ha. no further complaints.

## 2021-03-06 NOTE — ED PROVIDER NOTE - PATIENT PORTAL LINK FT
You can access the FollowMyHealth Patient Portal offered by [facility] by registering at the following website: [email or web address]. By joining [employer]’s FollowMyHealth portal, you will also be able to view your health information using other applications (apps) compatible with our system.

## 2021-03-06 NOTE — ED PROVIDER NOTE - ENMT, MLM
Airway patent, Nasal mucosa clear. Mouth with normal mucosa. Throat has no vesicles, no oropharyngeal exudates and uvula is midline. no tooth tenderness. + poor dentition. no gum swelling. mild right facial swelling. no erythema. no bruising. non tender. TM's pearly gray and intact b/l. no canal d/c or exudate. Airway patent, Nasal mucosa clear. Mouth with normal mucosa. Throat has no vesicles, no oropharyngeal exudates and uvula is midline. no tooth tenderness. + poor dentition. no gum swelling. mild redness to posterior right upper gum after denture removed. mild right facial swelling. no erythema. no bruising. non tender. TM's pearly gray and intact b/l. no canal d/c or exudate.

## 2021-03-06 NOTE — ED PROVIDER NOTE - ATTENDING CONTRIBUTION TO CARE
lower dentures removed, small area of tenderness at rt lower gumline but no fluctuance, small ecchymosis, possible pain from ecchymosis vs small underlying abscess  PLAN: initial course of antibiotics,  emergent return instructions were discussed with patient

## 2021-03-06 NOTE — ED ADULT NURSE NOTE - OBJECTIVE STATEMENT
pt to ED for R facial pain for 4-5 days. neuro is intact, denies vision changes, n/v/d, dizziness, numbness, tingling, fever/chills/cough, recent dental work, photophobia, sick contact.

## 2021-03-06 NOTE — ED PROVIDER NOTE - CONSTITUTIONAL NEGATIVE STATEMENT, MLM
This is a recent snapshot of the patient's [facility] medical record.  For current drug dose and complete information and questions, call [phone] or In Basket pool, [facility] ([id number])  CSN Number:  [id number]     no fever and no chills.

## 2021-03-06 NOTE — ED ADULT NURSE NOTE - CHIEF COMPLAINT QUOTE
Pt presents w/ right facial headache for 4 days. Mild swelling present to right cheek per daughter. Denies hx of injury, denies falls, denies dizziness/photophobia/nausea. On eliquis. Pain partially relieved by aleve last night.

## 2021-03-06 NOTE — ED ADULT TRIAGE NOTE - CHIEF COMPLAINT QUOTE
Pt presents w/ right facial headache for 4 days. Mild swelling present to right cheek per daughter. Denies hx of injury, denies falls, denies dizziness/photophobia/nausea. On eliquis. Pt presents w/ right facial headache for 4 days. Mild swelling present to right cheek per daughter. Denies hx of injury, denies falls, denies dizziness/photophobia/nausea. On eliquis. Pain partially relieved by aleve last night.

## 2021-03-06 NOTE — ED PROVIDER NOTE - NSFOLLOWUPINSTRUCTIONS_ED_ALL_ED_FT
Follow up with your dentist in 2-3 days. return immediately for fever, worsening swelling or any other concerning symptoms. Follow up with your physician for further evaluation of your kidney function.                  .      Dental Abscess    WHAT YOU NEED TO KNOW:    A dental abscess is a collection of pus in or around a tooth. A dental abscess is caused by bacteria. The bacteria can enter the tooth when the enamel (outer part of the tooth) is damaged by tooth decay. Bacteria can also enter the tooth through a chip in the tooth or a cut in the gum. Food particles that are stuck between the teeth for a long time may also lead to an abscess.     Dental Abscess         DISCHARGE INSTRUCTIONS:    Return to the emergency department if:   •You have severe pain in your tooth or jaw.      •You have trouble breathing because of pain or swelling.      Call your doctor if:   •Your symptoms get worse, even after treatment.      •Your mouth is bleeding.      •You cannot eat or drink because of pain or swelling.      •Your abscess returns.      •You have an injury that causes a crack in your tooth.      •You have questions or concerns about your condition or care.      Medicines: You may need any of the following:   •Antibiotics help treat a bacterial infection.       •NSAIDs, such as ibuprofen, help decrease swelling, pain, and fever. This medicine is available with or without a doctor's order. NSAIDs can cause stomach bleeding or kidney problems in certain people. If you take blood thinner medicine, always ask your healthcare provider if NSAIDs are safe for you. Always read the medicine label and follow directions.      •Acetaminophen decreases pain and fever. It is available without a doctor's order. Ask how much to take and how often to take it. Follow directions. Read the labels of all other medicines you are using to see if they also contain acetaminophen, or ask your doctor or pharmacist. Acetaminophen can cause liver damage if not taken correctly. Do not use more than 4 grams (4,000 milligrams) total of acetaminophen in one day.       •Prescription pain medicine may be given. Ask your healthcare provider how to take this medicine safely. Some prescription pain medicines contain acetaminophen. Do not take other medicines that contain acetaminophen without talking to your healthcare provider. Too much acetaminophen may cause liver damage. Prescription pain medicine may cause constipation. Ask your healthcare provider how to prevent or treat constipation.       •Take your medicine as directed. Contact your healthcare provider if you think your medicine is not helping or if you have side effects. Tell him of her if you are allergic to any medicine. Keep a list of the medicines, vitamins, and herbs you take. Include the amounts, and when and why you take them. Bring the list or the pill bottles to follow-up visits. Carry your medicine list with you in case of an emergency.      Self-care:   •Rinse your mouth every 2 hours with salt water. This will help keep the area clean.       •Gently brush your teeth twice a day with a soft tooth brush. This will help keep the area clean.       •Eat soft foods as directed. Soft foods may cause less pain. Examples include applesauce, yogurt, and cooked pasta. Ask your healthcare provider how long to follow this instruction.       •Apply a warm compress to your tooth or gum. Use a cotton ball or gauze soaked in warm water. Remove the compress in 10 minutes or when it becomes cool. Repeat 3 times a day.       Prevent another abscess:   •Brush your teeth at least 2 times a day with fluoride toothpaste.      •Use dental floss at least once a day to clean between your teeth.      •Rinse your mouth with water or mouthwash after meals and snacks. Chew sugarless gum.      •Avoid sugary and starchy food that can stick between your teeth. Limit drinks high in sugar, such as soda or fruit juice.      •See your dentist every 6 months for dental cleanings and oral exams.      Follow up with your doctor or dentist in 24 hours, or as directed: Your healthcare provider will need to check your teeth and gums. Write down your questions so you remember to ask them during your visits.        © Copyright [employer] 2021           back to top                          © Copyright [employer] 2021

## 2021-03-06 NOTE — ED PROVIDER NOTE - CLINICAL SUMMARY MEDICAL DECISION MAKING FREE TEXT BOX
facial swelling and redness to gum area.  a febrile. no evidence of abscess on exam. labs noted. creatinine elevated. risks and benefits of ct scan d/w pt and daughter. will hold on ct at this time and treat with antibiotics. f/u with dentist. return precautions d/w pt.

## 2021-03-25 ENCOUNTER — APPOINTMENT (OUTPATIENT)
Dept: UROLOGY | Facility: CLINIC | Age: 81
End: 2021-03-25
Payer: MEDICARE

## 2021-03-25 VITALS — HEART RATE: 86 BPM | SYSTOLIC BLOOD PRESSURE: 122 MMHG | TEMPERATURE: 98.6 F | DIASTOLIC BLOOD PRESSURE: 69 MMHG

## 2021-03-25 PROCEDURE — 99213 OFFICE O/P EST LOW 20 MIN: CPT | Mod: 25

## 2021-03-25 PROCEDURE — 99072 ADDL SUPL MATRL&STAF TM PHE: CPT

## 2021-03-25 PROCEDURE — 96402 CHEMO HORMON ANTINEOPL SQ/IM: CPT

## 2021-03-25 NOTE — HISTORY OF PRESENT ILLNESS
[FreeTextEntry1] : returns for follow up\par PSA now 33\par off of chemo\par on lupron and xtandi\par voiding complaints are stable\par no pain\par generally comfortable \par initial chemo 2006/2007 (taxotere)\par 2013 jevtana\par

## 2021-03-25 NOTE — ASSESSMENT
[FreeTextEntry1] : prostate cancer\par slow rise in PSA\par follows with futuri\par lupron today\par f/u 3 motnhs

## 2021-03-28 ENCOUNTER — INPATIENT (INPATIENT)
Facility: HOSPITAL | Age: 81
LOS: 2 days | Discharge: HOME | DRG: 247 | End: 2021-03-31
Attending: INTERNAL MEDICINE | Admitting: INTERNAL MEDICINE
Payer: MEDICARE

## 2021-03-28 VITALS
HEART RATE: 80 BPM | HEIGHT: 69 IN | SYSTOLIC BLOOD PRESSURE: 125 MMHG | DIASTOLIC BLOOD PRESSURE: 60 MMHG | TEMPERATURE: 98 F | WEIGHT: 182.1 LBS | OXYGEN SATURATION: 98 % | RESPIRATION RATE: 18 BRPM

## 2021-03-28 DIAGNOSIS — I26.99 OTHER PULMONARY EMBOLISM WITHOUT ACUTE COR PULMONALE: ICD-10-CM

## 2021-03-28 LAB
ALBUMIN SERPL ELPH-MCNC: 3.6 G/DL — SIGNIFICANT CHANGE UP (ref 3.3–5)
ALP SERPL-CCNC: 32 U/L — LOW (ref 40–120)
ALT FLD-CCNC: 26 U/L — SIGNIFICANT CHANGE UP (ref 10–45)
ANION GAP SERPL CALC-SCNC: 15 MMOL/L — SIGNIFICANT CHANGE UP (ref 5–17)
APTT BLD: 33.2 SEC — SIGNIFICANT CHANGE UP (ref 27.5–35.5)
AST SERPL-CCNC: 28 U/L — SIGNIFICANT CHANGE UP (ref 10–40)
BASOPHILS # BLD AUTO: 0.01 K/UL — SIGNIFICANT CHANGE UP (ref 0–0.2)
BASOPHILS NFR BLD AUTO: 0.1 % — SIGNIFICANT CHANGE UP (ref 0–2)
BILIRUB SERPL-MCNC: 0.2 MG/DL — SIGNIFICANT CHANGE UP (ref 0.2–1.2)
BUN SERPL-MCNC: 24 MG/DL — HIGH (ref 7–23)
CALCIUM SERPL-MCNC: 9 MG/DL — SIGNIFICANT CHANGE UP (ref 8.4–10.5)
CHLORIDE SERPL-SCNC: 99 MMOL/L — SIGNIFICANT CHANGE UP (ref 96–108)
CO2 SERPL-SCNC: 22 MMOL/L — SIGNIFICANT CHANGE UP (ref 22–31)
CREAT SERPL-MCNC: 1.57 MG/DL — HIGH (ref 0.5–1.3)
EOSINOPHIL # BLD AUTO: 0.09 K/UL — SIGNIFICANT CHANGE UP (ref 0–0.5)
EOSINOPHIL NFR BLD AUTO: 1 % — SIGNIFICANT CHANGE UP (ref 0–6)
GLUCOSE BLDC GLUCOMTR-MCNC: 167 MG/DL — HIGH (ref 70–99)
GLUCOSE SERPL-MCNC: 130 MG/DL — HIGH (ref 70–99)
HCT VFR BLD CALC: 25.1 % — LOW (ref 39–50)
HGB BLD-MCNC: 8.1 G/DL — LOW (ref 13–17)
IMM GRANULOCYTES NFR BLD AUTO: 0.7 % — SIGNIFICANT CHANGE UP (ref 0–1.5)
INR BLD: 1.38 — HIGH (ref 0.88–1.16)
LYMPHOCYTES # BLD AUTO: 1.38 K/UL — SIGNIFICANT CHANGE UP (ref 1–3.3)
LYMPHOCYTES # BLD AUTO: 15.8 % — SIGNIFICANT CHANGE UP (ref 13–44)
MAGNESIUM SERPL-MCNC: 1.5 MG/DL — LOW (ref 1.6–2.6)
MCHC RBC-ENTMCNC: 28.5 PG — SIGNIFICANT CHANGE UP (ref 27–34)
MCHC RBC-ENTMCNC: 32.3 GM/DL — SIGNIFICANT CHANGE UP (ref 32–36)
MCV RBC AUTO: 88.4 FL — SIGNIFICANT CHANGE UP (ref 80–100)
MONOCYTES # BLD AUTO: 0.91 K/UL — HIGH (ref 0–0.9)
MONOCYTES NFR BLD AUTO: 10.4 % — SIGNIFICANT CHANGE UP (ref 2–14)
NEUTROPHILS # BLD AUTO: 6.31 K/UL — SIGNIFICANT CHANGE UP (ref 1.8–7.4)
NEUTROPHILS NFR BLD AUTO: 72 % — SIGNIFICANT CHANGE UP (ref 43–77)
NRBC # BLD: 0 /100 WBCS — SIGNIFICANT CHANGE UP (ref 0–0)
NT-PROBNP SERPL-SCNC: 563 PG/ML — HIGH (ref 0–300)
PLATELET # BLD AUTO: 224 K/UL — SIGNIFICANT CHANGE UP (ref 150–400)
POTASSIUM SERPL-MCNC: 3.8 MMOL/L — SIGNIFICANT CHANGE UP (ref 3.5–5.3)
POTASSIUM SERPL-SCNC: 3.8 MMOL/L — SIGNIFICANT CHANGE UP (ref 3.5–5.3)
PROT SERPL-MCNC: 5.8 G/DL — LOW (ref 6–8.3)
PROTHROM AB SERPL-ACNC: 16.3 SEC — HIGH (ref 10.6–13.6)
RBC # BLD: 2.84 M/UL — LOW (ref 4.2–5.8)
RBC # FLD: 16.1 % — HIGH (ref 10.3–14.5)
SARS-COV-2 RNA SPEC QL NAA+PROBE: SIGNIFICANT CHANGE UP
SODIUM SERPL-SCNC: 136 MMOL/L — SIGNIFICANT CHANGE UP (ref 135–145)
TROPONIN T SERPL-MCNC: 0.01 NG/ML — SIGNIFICANT CHANGE UP (ref 0–0.01)
WBC # BLD: 8.76 K/UL — SIGNIFICANT CHANGE UP (ref 3.8–10.5)
WBC # FLD AUTO: 8.76 K/UL — SIGNIFICANT CHANGE UP (ref 3.8–10.5)

## 2021-03-28 PROCEDURE — 99223 1ST HOSP IP/OBS HIGH 75: CPT

## 2021-03-28 PROCEDURE — 71275 CT ANGIOGRAPHY CHEST: CPT | Mod: 26,ME

## 2021-03-28 PROCEDURE — 70491 CT SOFT TISSUE NECK W/DYE: CPT | Mod: 26,ME

## 2021-03-28 PROCEDURE — 99285 EMERGENCY DEPT VISIT HI MDM: CPT | Mod: 25

## 2021-03-28 PROCEDURE — 93010 ELECTROCARDIOGRAM REPORT: CPT

## 2021-03-28 PROCEDURE — 71045 X-RAY EXAM CHEST 1 VIEW: CPT | Mod: 26

## 2021-03-28 PROCEDURE — G1004: CPT

## 2021-03-28 RX ORDER — AMLODIPINE BESYLATE 2.5 MG/1
10 TABLET ORAL DAILY
Refills: 0 | Status: DISCONTINUED | OUTPATIENT
Start: 2021-03-28 | End: 2021-03-31

## 2021-03-28 RX ORDER — HEPARIN SODIUM 5000 [USP'U]/ML
3000 INJECTION INTRAVENOUS; SUBCUTANEOUS EVERY 6 HOURS
Refills: 0 | Status: DISCONTINUED | OUTPATIENT
Start: 2021-03-28 | End: 2021-03-30

## 2021-03-28 RX ORDER — HEPARIN SODIUM 5000 [USP'U]/ML
6500 INJECTION INTRAVENOUS; SUBCUTANEOUS EVERY 6 HOURS
Refills: 0 | Status: DISCONTINUED | OUTPATIENT
Start: 2021-03-28 | End: 2021-03-30

## 2021-03-28 RX ORDER — GLUCAGON INJECTION, SOLUTION 0.5 MG/.1ML
1 INJECTION, SOLUTION SUBCUTANEOUS ONCE
Refills: 0 | Status: DISCONTINUED | OUTPATIENT
Start: 2021-03-28 | End: 2021-03-31

## 2021-03-28 RX ORDER — DEXTROSE 50 % IN WATER 50 %
15 SYRINGE (ML) INTRAVENOUS ONCE
Refills: 0 | Status: DISCONTINUED | OUTPATIENT
Start: 2021-03-28 | End: 2021-03-31

## 2021-03-28 RX ORDER — SIMVASTATIN 20 MG/1
20 TABLET, FILM COATED ORAL AT BEDTIME
Refills: 0 | Status: DISCONTINUED | OUTPATIENT
Start: 2021-03-28 | End: 2021-03-31

## 2021-03-28 RX ORDER — IPRATROPIUM BROMIDE 0.2 MG/ML
500 SOLUTION, NON-ORAL INHALATION EVERY 6 HOURS
Refills: 0 | Status: DISCONTINUED | OUTPATIENT
Start: 2021-03-28 | End: 2021-03-31

## 2021-03-28 RX ORDER — DEXTROSE 50 % IN WATER 50 %
25 SYRINGE (ML) INTRAVENOUS ONCE
Refills: 0 | Status: DISCONTINUED | OUTPATIENT
Start: 2021-03-28 | End: 2021-03-31

## 2021-03-28 RX ORDER — HEPARIN SODIUM 5000 [USP'U]/ML
INJECTION INTRAVENOUS; SUBCUTANEOUS
Qty: 25000 | Refills: 0 | Status: DISCONTINUED | OUTPATIENT
Start: 2021-03-28 | End: 2021-03-30

## 2021-03-28 RX ORDER — SODIUM CHLORIDE 9 MG/ML
1000 INJECTION, SOLUTION INTRAVENOUS
Refills: 0 | Status: DISCONTINUED | OUTPATIENT
Start: 2021-03-28 | End: 2021-03-31

## 2021-03-28 RX ORDER — DEXAMETHASONE 0.5 MG/5ML
2 ELIXIR ORAL
Refills: 0 | Status: DISCONTINUED | OUTPATIENT
Start: 2021-03-28 | End: 2021-03-31

## 2021-03-28 RX ORDER — ASPIRIN/CALCIUM CARB/MAGNESIUM 324 MG
325 TABLET ORAL ONCE
Refills: 0 | Status: COMPLETED | OUTPATIENT
Start: 2021-03-28 | End: 2021-03-28

## 2021-03-28 RX ORDER — PREGABALIN 225 MG/1
1000 CAPSULE ORAL DAILY
Refills: 0 | Status: DISCONTINUED | OUTPATIENT
Start: 2021-03-28 | End: 2021-03-31

## 2021-03-28 RX ORDER — INSULIN LISPRO 100/ML
VIAL (ML) SUBCUTANEOUS
Refills: 0 | Status: DISCONTINUED | OUTPATIENT
Start: 2021-03-28 | End: 2021-03-31

## 2021-03-28 RX ORDER — FERROUS SULFATE 325(65) MG
325 TABLET ORAL DAILY
Refills: 0 | Status: DISCONTINUED | OUTPATIENT
Start: 2021-03-28 | End: 2021-03-31

## 2021-03-28 RX ORDER — LATANOPROST 0.05 MG/ML
1 SOLUTION/ DROPS OPHTHALMIC; TOPICAL AT BEDTIME
Refills: 0 | Status: DISCONTINUED | OUTPATIENT
Start: 2021-03-28 | End: 2021-03-31

## 2021-03-28 RX ORDER — FINASTERIDE 5 MG/1
5 TABLET, FILM COATED ORAL DAILY
Refills: 0 | Status: DISCONTINUED | OUTPATIENT
Start: 2021-03-28 | End: 2021-03-31

## 2021-03-28 RX ORDER — METOPROLOL TARTRATE 50 MG
50 TABLET ORAL DAILY
Refills: 0 | Status: DISCONTINUED | OUTPATIENT
Start: 2021-03-28 | End: 2021-03-31

## 2021-03-28 RX ORDER — MAGNESIUM SULFATE 500 MG/ML
2 VIAL (ML) INJECTION ONCE
Refills: 0 | Status: COMPLETED | OUTPATIENT
Start: 2021-03-28 | End: 2021-03-28

## 2021-03-28 RX ORDER — DEXTROSE 50 % IN WATER 50 %
12.5 SYRINGE (ML) INTRAVENOUS ONCE
Refills: 0 | Status: DISCONTINUED | OUTPATIENT
Start: 2021-03-28 | End: 2021-03-31

## 2021-03-28 RX ORDER — DORZOLAMIDE HYDROCHLORIDE 20 MG/ML
1 SOLUTION/ DROPS OPHTHALMIC THREE TIMES A DAY
Refills: 0 | Status: DISCONTINUED | OUTPATIENT
Start: 2021-03-28 | End: 2021-03-31

## 2021-03-28 RX ADMIN — Medication 2: at 22:21

## 2021-03-28 RX ADMIN — Medication 50 GRAM(S): at 21:48

## 2021-03-28 NOTE — ED ADULT NURSE REASSESSMENT NOTE - NS ED NURSE REASSESS COMMENT FT1
Pt A&O x 4, voices no complaints. FS noted 167, 2u Insulin coverage given as per SS. Report called to 5 [facility], given to BRITTON Burrows. Pt waiting transport to floor with transporter and RN, on monitor.

## 2021-03-28 NOTE — PATIENT PROFILE ADULT - NSPROMEDSADMININFO_GEN_A_NUR
no concerns Epidermal Autograft Text: The defect edges were debeveled with a #15 scalpel blade.  Given the location of the defect, shape of the defect and the proximity to free margins an epidermal autograft was deemed most appropriate.  Using a sterile surgical marker, the primary defect shape was transferred to the donor site. The epidermal graft was then harvested.  The skin graft was then placed in the primary defect and oriented appropriately.

## 2021-03-28 NOTE — H&P ADULT - PROBLEM SELECTOR PLAN 2
Hx of PE/DVT 1/2020 was placed on Eliquis 5mg PO bid; however, changed by pt.'s Oncologist 2/2 to falls.  Changed to Eliquis 2.5mg PO bid.  CTA of chest performed 3/28/21 reveals Chronic PE RLL segmental with respiratory motion artifacts limits evaluation of b/l LL segmental and subsegmental of pulmonary arteries.  Pt. now on Heparin gtt no bolus.  Pt. last took Eliquis 2.5mg 3/28/21 AM

## 2021-03-28 NOTE — H&P ADULT - NSICDXPASTMEDICALHX_GEN_ALL_CORE_FT
PAST MEDICAL HISTORY:  Atrial fibrillation on Eliquis    Chemotherapy session for neoplasm Prostate Cancer    DVT (deep venous thrombosis) 1/2020 on Eliquis    Essential hypertension Hypertension    Glaucoma Glaucoma    Hyperlipidemia Hyperlipidemia    Malignant neoplasm of prostate s/p total prostatectomy in 1994 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    Pulmonary embolism 1/2020    Type 2 diabetes mellitus Diabetes

## 2021-03-28 NOTE — H&P ADULT - NSHPOUTPATIENTPROVIDERS_GEN_ALL_CORE
Cardiologist, Dr. Charli Sánchez [phone]; Oncologist, Dr. Fuller [phone]; [phone]; Urologist, Dr. Roa [phone]  [facility] Proxy Daughter Aziza [phone]

## 2021-03-28 NOTE — ED ADULT NURSE NOTE - PMH
Chemotherapy session for neoplasm    DVT (deep venous thrombosis)    Essential hypertension  Hypertension  Glaucoma  Glaucoma  Hyperlipidemia  Hyperlipidemia  Malignant neoplasm of prostate  s/p total prostatectomy in 1994 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  Pulmonary embolism    Type 2 diabetes mellitus  Diabetes

## 2021-03-28 NOTE — ED PROVIDER NOTE - OBJECTIVE STATEMENT
history of prostate ca, a fib, dvt/pe on eliquis, HTN, DM, here with chest pain and shortness of breath for past week. Notes mostly with exertion, better with rest. Assocaited with generalized weakness/fatigue, decreased appetite. Reports taking meds as prescribed including blood thinner. No fever/chills, cough, abdominal pain. Has dark stool but unchanged from baseline and says he is on iron. Covid vaccine x2 done. Notes he can only walk less than a block without having to stop due to symptoms, and even light things like brushing teeth make him tired. Reports chest pain feels like tightness in middle of chest, not currently present. history of prostate ca, a fib, dvt/pe on eliquis, HTN, DM, here with chest pain and shortness of breath for past week. Notes mostly with exertion, better with rest. Associated with generalized weakness/fatigue, decreased appetite. Reports taking meds as prescribed including blood thinner. No fever/chills, cough, abdominal pain. Has dark stool but unchanged from baseline and says he is on iron. Covid vaccine x2 done. Notes he can only walk less than a block without having to stop due to symptoms, and even light things like brushing teeth make him tired. Reports chest pain feels like tightness in middle of chest, not currently present. Also feels something funny in his throat for the past 3 weeks. Reports it feels swollen/ some difficulty swallowing.

## 2021-03-28 NOTE — H&P ADULT - PROBLEM SELECTOR PLAN 6
Monitor FS; not on medication   Pt. is on Steroid taper Dexamethasone 2mg PO every 3 days. Placed pt. on med dose Insulin Lispro Sliding scale.

## 2021-03-28 NOTE — H&P ADULT - NSHPLABSRESULTS_GEN_ALL_CORE
8.1    8.76  )-----------( 224      ( 28 Mar 2021 17:21 )             25.1       03-28    136  |  99  |  24<H>  ----------------------------<  130<H>  3.8   |  22  |  1.57<H>    Ca    9.0      28 Mar 2021 17:21  Mg     1.5     03-28    TPro  5.8<L>  /  Alb  3.6  /  TBili  0.2  /  DBili  x   /  AST  28  /  ALT  26  /  AlkPhos  32<L>  03-28      PT/INR - ( 28 Mar 2021 17:21 )   PT: 16.3 sec;   INR: 1.38          PTT - ( 28 Mar 2021 17:21 )  PTT:33.2 sec    CARDIAC MARKERS ( 28 Mar 2021 17:21 )  x     / 0.01 ng/mL / x     / x     / x                EKG: NSR@72bpm with PVC.

## 2021-03-28 NOTE — H&P ADULT - HISTORY OF PRESENT ILLNESS
A&OX3 Full Code    81 y/o male with PMHx of HTN, HLD, DM2, Glaucoma, hx Prostate Cancer s/p Prostatectomy 94', Atrial Fibrillation (on Eliquis; currently SR), and  PE/DVT 1/20 (on Eliquis) presents to [facility] ER this evening, 3/28/21, complaining of generalized weakness, decrease appetite, intermittent, exertional chest tightness assocated with JAIME (when ambulating one block) X 1 week.  Pt. reports symptoms mostly with exertion and better at rest.     In speaking with patient, pt. states A&OX3 Full Code    79 y/o male with PMHx of HTN, HLD, DM2, Glaucoma, hx Prostate Cancer s/p Prostatectomy 94', Atrial Fibrillation (on Eliquis 2.5 mg PO bid last dose 3/28/21 AM; currently SR), JERI (Creatinine 1.5; no prior hx of CKD) and  PE/DVT 1/20 (on Eliquis 2.5mg; pt was switched from 5mg 2/2 to falls) presents to [facility] ER this evening, 3/28/21, complaining of generalized weakness, decrease appetite, intermittent, exertional chest tightness assocated with JAIME (when ambulating one block) X 1 week.  Pt. reports symptoms mostly with exertion and better at rest.     In speaking with patient, pt. states he has been experiencing worsening generalized weakness, intermittent, exertional midsternal chest pressure with JAIME when ambulating one block for one week which resolves with rest.  He also is complaining of neck "tightness" without difficulty swallowing  Pt. admits to generalized weakness for about a year in which he has fallen X2 with out LOC because his legs were weak.  His Oncologist changed his Eliquis 5mg PO bid (tx for PE in RLL) to Eliquis 2.5mg PO bid 2/2 to falls.  Pt. was admitted to Medicine floor 1/2020 with similar symptoms in which he was diagnosed with PE/DVT and started on Eliquis.  He denies cardiac hx or recent cardiac workup.  Pt. denies sick contact, fever, chills, HA, dizziness, diaphoresis, abdominal discomfort, and n/v/d.    In ER ECG reveals NSR@72bpm with PVC, Troponin neg X1, , H/H 8.1/25.1, WBC normal, BUN/Creatinine 24/1.57 (no hx of CKD prior 0.98), Mg 1.5 (replenished with Magnesium Sulfate 2g IV X1), CTA of chest with contrast reveals respiratory motion artifacts limits evaluation of b/l LL segmental and subsegmental pulmonary arteries; Chronic RLL segmental PE seen.   CT of neck soft tissues (pt. complaining of neck tighness) reveals no acute pathology.    In light of patient's risk factors and Angina (Class lll) symptoms pt. is admitted to [facility] for telemetry, ECG, serial CE, Echocardiogram and NPO for further cardiac workup to rule out ACS.    F/U b/l LE Venous US, CTA of chest reveals Chronic PE RLL, pt. started on Heparin gtt for further workup. A&OX3 Full Code    81 y/o male with PMHx of HTN, HLD, DM2, Glaucoma, hx Prostate Cancer s/p Prostatectomy 94 ( currently on Chemotherapy, Lupron [id number] and Xgeva once a month.)', Atrial Fibrillation (on Eliquis 2.5 mg PO bid last dose 3/28/21 AM; currently SR), JERI (Creatinine 1.5; no prior hx of CKD) and  PE/DVT 1/20 (on Eliquis 2.5mg; pt was switched from 5mg 2/2 to falls) presents to [facility] ER this evening, 3/28/21, complaining of generalized weakness, decrease appetite, intermittent, exertional chest tightness assocated with JAIME (when ambulating one block) X 1 week.  Pt. reports symptoms mostly with exertion and better at rest.     In speaking with patient, pt. states he has been experiencing worsening generalized weakness, intermittent, exertional midsternal chest pressure with JAIME when ambulating one block for one week which resolves with rest.  He also is complaining of neck "tightness" without difficulty swallowing  Pt. admits to generalized weakness for about a year in which he has fallen X2 with out LOC because his legs were weak.  His Oncologist changed his Eliquis 5mg PO bid (tx for PE in RLL) to Eliquis 2.5mg PO bid 2/2 to falls.  Pt. was admitted to Medicine floor 1/2020 with similar symptoms in which he was diagnosed with PE/DVT and started on Eliquis.  He denies cardiac hx or recent cardiac workup.  Pt. denies sick contact, fever, chills, HA, dizziness, diaphoresis, abdominal discomfort, and n/v/d.    In ER ECG reveals NSR@72bpm with PVC, Troponin neg X1, , H/H 8.1/25.1, WBC normal, BUN/Creatinine 24/1.57 (no hx of CKD prior 0.98), Mg 1.5 (replenished with Magnesium Sulfate 2g IV X1), CTA of chest with contrast reveals respiratory motion artifacts limits evaluation of b/l LL segmental and subsegmental pulmonary arteries; Chronic RLL segmental PE seen.   CT of neck soft tissues (pt. complaining of neck tighness) reveals no acute pathology.    In light of patient's risk factors and Angina (Class lll) symptoms pt. is admitted to [facility] for telemetry, ECG, serial CE, Echocardiogram and NPO for further cardiac workup to rule out ACS.    F/U b/l LE Venous US, CTA of chest reveals Chronic PE RLL, pt. started on Heparin gtt for further workup.

## 2021-03-28 NOTE — H&P ADULT - NSHPSOCIALHISTORY_GEN_ALL_CORE
Pt. lives alone, daughter involved in father's care.  Pt. denies Tobacco, EtOH and illicit drug use.

## 2021-03-28 NOTE — ED PROVIDER NOTE - ENMT, MLM
Airway patent, Nasal mucosa clear. Mouth with normal mucosa. posterior pharynx wnl without edema/ erythema.

## 2021-03-28 NOTE — H&P ADULT - PROBLEM SELECTOR PLAN 4
Monitor BP  Continue Metoprolol XL 50mg PO daily and Amlodipine 10mg PO daily.  Holding pt.'s HCTZ 25mg PO daily and Valsartan 160mg PO daily 2/2 to JERI Creatinine 1.5. Pt. does not have hx of CKD

## 2021-03-28 NOTE — H&P ADULT - NSHPREVIEWOFSYSTEMS_GEN_ALL_CORE
GENERAL, CONSTITUTIONAL : Generalized fatigue loss of appetite,denies  fever, chills  EYES, VISION: denies changes in vision   EARS, NOSE, THROAT: denies hearing loss  HEART, CARDIOVASCULAR: admits to chest tightness , hx Afib denies palpitations,   RESPIRATORY: Denies cough, admits to JAIME, denies wheezing, PND, orthopnea  GASTROINTESTINAL: Denies abdominal pain, heartburn, bloody stool, admits to dark tarry stool 2/2 to possible Iron pills.  GENITOURINARY: Denies frequent urination, urgency  MUSCULOSKELETAL admits to some musculoskeletal pain, knee joint pain denies swelling, restricted motion, .   SKIN & INTEGUMENTARY Denies rashes, sores, blisters, blisters, growths.  NEUROLOGICAL: Denies numbness or tingling sensations, sensation loss, burning.   PSYCHIATRIC: Denies nervousness, anxiety, depression  ENDOCRINE Denies heat or cold intolerance, excessive thirst  HEMATOLOGIC/LYMPHATIC: Hx Anemia;  Denies abnormal bleeding, bleeding of any kind GENERAL, CONSTITUTIONAL : Generalized fatigue loss of appetite,denies  fever, chills  EYES, VISION: denies changes in vision   EARS, NOSE, THROAT: denies hearing loss  HEART, CARDIOVASCULAR: admits to chest tightness , hx Afib denies palpitations,   RESPIRATORY: Denies cough, admits to JAIME when ambulating one block, denies wheezing, PND, orthopnea  GASTROINTESTINAL: Denies abdominal pain, heartburn, bloody stool, admits to dark tarry stool 2/2 to possible Iron pills.  GENITOURINARY: Admits to frequent urination, urgency  MUSCULOSKELETAL admits to some musculoskeletal pain, knee joint pain denies swelling, restricted motion, .   SKIN & INTEGUMENTARY Denies rashes, sores, blisters, blisters, growths.  NEUROLOGICAL: Denies numbness or tingling sensations, sensation loss, burning.   PSYCHIATRIC: Denies nervousness, anxiety, depression  ENDOCRINE Denies heat or cold intolerance, excessive thirst  HEMATOLOGIC/LYMPHATIC: Hx Anemia;  Denies abnormal bleeding, bleeding of any kind

## 2021-03-28 NOTE — ED ADULT NURSE NOTE - OBJECTIVE STATEMENT
Patient is an 81yo M, arrived to ED via walk-in, AAOx3, in NAD, VSS, complaining of increasing JAIME for one week.  Patient denies any CP, SOB at rest, dizziness, N/V/D, fevers, chills, cough or any other complaints at this time.  PIV placed, labs drawn and sent, EKG done. Patient is an 81yo M, arrived to ED via walk-in, AAOx3, in NAD, VSS, complaining of increasing JAIME for one week.  Patient denies any CP, SOB at rest, ROSA ELENA, dizziness, N/V/D, fevers, chills, cough or any other complaints at this time.  PIV placed, labs drawn and sent, EKG done.

## 2021-03-28 NOTE — ED ADULT TRIAGE NOTE - CHIEF COMPLAINT QUOTE
x 1 week of SOB. + dyspnea with exertion. speaking in clear appropriate, sentences airway patent.  denies fevers, chills, cough, diarrhea, CP, n/v, abdominal pain.

## 2021-03-28 NOTE — ED PROVIDER NOTE - CLINICAL SUMMARY MEDICAL DECISION MAKING FREE TEXT BOX
unstable angina symptoms x 1 week with chest tightness/sob with exertion. history of pe but reports compliant with anticoagulation. has been vaccinated against covid19. labs/ecg/cxr/cta chest ordered. cxr neg for pneumonia, pneumothorax, widened mediastinum to suggest dissection.  initial troponin neg. cta chest with reported chronic, no acute pe. ct neck done given reported fullness in neck, difficulty swallowing to eval mass and neg. asa given. mg low, replaced. admit to cardiology for further management

## 2021-03-28 NOTE — H&P ADULT - PROBLEM SELECTOR PROBLEM 5
Patient is currently out of [facility].  She just recently moved to [place] and has not established care.  Mom is wondering of Dr. Esqueda will authorize a refill until she is able to establish care with a provider in [place].    Hyperlipidemia

## 2021-03-28 NOTE — H&P ADULT - ASSESSMENT
79 y/o male with PMHx of HTN, HLD, DM2, Glaucoma, hx Prostate Cancer s/p Prostatectomy 94', Atrial Fibrillation (on Eliquis 2.5 mg PO bid last dose 3/28/21 AM; currently SR), JERI (Creatinine 1.5; no prior hx of CKD) and  PE/DVT 1/20 (on Eliquis 2.5mg; pt was switched from 5mg 2/2 to falls) presents to [facility] ER this evening, 3/28/21, complaining of generalized weakness, decrease appetite, intermittent, exertional chest tightness assocated with JAIME (when ambulating one block) X 1 week.  Pt. reports symptoms mostly with exertion and better at rest.    79 y/o male with PMHx of HTN, HLD, DM2, Glaucoma, hx Prostate Cancer s/p Prostatectomy 94'(currently on Chemo, Lupron [id number] and Xgeva once a month), Atrial Fibrillation (on Eliquis 2.5 mg PO bid last dose 3/28/21 AM; currently SR), JERI (Creatinine 1.5; no prior hx of CKD) and  PE/DVT 1/20 (on Eliquis 2.5mg; pt was switched from 5mg 2/2 to falls) presents to [facility] ER this evening, 3/28/21, complaining of generalized weakness, decrease appetite, intermittent, exertional chest tightness assocated with JAIME (when ambulating one block) X 1 week.  Pt. reports symptoms mostly with exertion and better at rest.

## 2021-03-28 NOTE — H&P ADULT - PROBLEM SELECTOR PLAN 7
hx Prostate Cancer s/p Prostatectomy 1994; currently on Chemo and followed by Oncologist affiliated with [facility], Dr. Fuller.  Lupron  Depot 11.5mg Inj every 3 months; and Xgeva 120mg/1.7 mL Sol. inject every 30 days.  On Dexamethasone 2mg PO every three days taper.

## 2021-03-28 NOTE — H&P ADULT - REASON FOR ADMISSION
Generalized weakness, intermittent, exertional chest tightness associated with SOB X1 week. Generalized weakness, intermittent, exertional midsternal chest tightness associated with JAIME (ambulating one block) X1 week.

## 2021-03-28 NOTE — H&P ADULT - PROBLEM SELECTOR PLAN 1
Telemetry, ECG, serial CE, Echocardiogram and NPO for further cardiac workup.  Pt. denies cardiac hx or recent workup.  Trop neg X1.

## 2021-03-28 NOTE — H&P ADULT - NSHPPHYSICALEXAM_GEN_ALL_CORE
T(C): 36.4 (03-28-21 @ 20:00), Max: 36.6 (03-28-21 @ 16:36)  HR: 75 (03-28-21 @ 20:00) (75 - 80)  BP: 123/70 (03-28-21 @ 20:00) (123/70 - 125/60)  RR: 18 (03-28-21 @ 20:00) (18 - 18)  SpO2: 98% (03-28-21 @ 20:00) (98% - 98%)  Wt(kg): --    Appearance: Normal	  HEENT:   Normal oral mucosa, PERRL, EOMI	  Neck: Supple, - JVD; No Carotid Bruit and 2+ pulses B/L  Cardiovascular: Normal S1 S2, No JVD, No murmurs  Respiratory: Lungs clear to auscultation//No Rales, Rhonchi, Wheezing	  Gastrointestinal:  Soft, Non-tender, + BS	  Skin: No rashes, No ecchymoses, No cyanosis  Extremities: Normal range of motion, No clubbing, cyanosis or edema  Vascular: Femoral pulses 2+ b/l without bruit, DP 1+ b/l, PT 1+ b/l  Neurologic: Non-focal  Psychiatry: A & O x 3, Mood & affect appropriate T(C): 36.4 (03-28-21 @ 20:00), Max: 36.6 (03-28-21 @ 16:36)  HR: 75 (03-28-21 @ 20:00) (75 - 80)  BP: 123/70 (03-28-21 @ 20:00) (123/70 - 125/60)  RR: 18 (03-28-21 @ 20:00) (18 - 18)  SpO2: 98% (03-28-21 @ 20:00) (98% - 98%)  Wt(kg): --    Appearance: Normal	  HEENT:   Normal oral mucosa, PERRL, EOMI	  Neck: Supple, - JVD; No Carotid Bruit and 2+ pulses B/L  Cardiovascular: Normal S1 S2, No JVD, No murmurs  Respiratory: Lungs clear to auscultation//No Rales, Rhonchi, Wheezing	  Gastrointestinal:  Soft, Non-tender, + BS	  Skin: No rashes, No ecchymoses, No cyanosis  Extremities: Normal range of motion, No clubbing, cyanosis or edema  Vascular: Femoral pulses 2+ b/l without bruit, R DP 1+/ L DP +2 b/l, PT 1+ b/l  Neurologic: Non-focal  Psychiatry: A & O x 3, Mood & affect appropriate

## 2021-03-29 DIAGNOSIS — C61 MALIGNANT NEOPLASM OF PROSTATE: ICD-10-CM

## 2021-03-29 DIAGNOSIS — E11.9 TYPE 2 DIABETES MELLITUS WITHOUT COMPLICATIONS: ICD-10-CM

## 2021-03-29 DIAGNOSIS — I48.91 UNSPECIFIED ATRIAL FIBRILLATION: ICD-10-CM

## 2021-03-29 DIAGNOSIS — I82.409 ACUTE EMBOLISM AND THROMBOSIS OF UNSPECIFIED DEEP VEINS OF UNSPECIFIED LOWER EXTREMITY: ICD-10-CM

## 2021-03-29 DIAGNOSIS — E78.5 HYPERLIPIDEMIA, UNSPECIFIED: ICD-10-CM

## 2021-03-29 DIAGNOSIS — Z29.9 ENCOUNTER FOR PROPHYLACTIC MEASURES, UNSPECIFIED: ICD-10-CM

## 2021-03-29 DIAGNOSIS — I26.99 OTHER PULMONARY EMBOLISM WITHOUT ACUTE COR PULMONALE: ICD-10-CM

## 2021-03-29 DIAGNOSIS — I10 ESSENTIAL (PRIMARY) HYPERTENSION: ICD-10-CM

## 2021-03-29 DIAGNOSIS — I20.9 ANGINA PECTORIS, UNSPECIFIED: ICD-10-CM

## 2021-03-29 LAB
A1C WITH ESTIMATED AVERAGE GLUCOSE RESULT: 6.1 % — HIGH (ref 4–5.6)
ALBUMIN SERPL ELPH-MCNC: 3.2 G/DL — LOW (ref 3.3–5)
ALP SERPL-CCNC: 30 U/L — LOW (ref 40–120)
ALT FLD-CCNC: 23 U/L — SIGNIFICANT CHANGE UP (ref 10–45)
ANION GAP SERPL CALC-SCNC: 15 MMOL/L — SIGNIFICANT CHANGE UP (ref 5–17)
APTT BLD: >200 SEC — CRITICAL HIGH (ref 27.5–35.5)
APTT BLD: >200 SEC — CRITICAL HIGH (ref 27.5–35.5)
AST SERPL-CCNC: 24 U/L — SIGNIFICANT CHANGE UP (ref 10–40)
BASOPHILS # BLD AUTO: 0.02 K/UL — SIGNIFICANT CHANGE UP (ref 0–0.2)
BASOPHILS NFR BLD AUTO: 0.2 % — SIGNIFICANT CHANGE UP (ref 0–2)
BILIRUB DIRECT SERPL-MCNC: 0.2 MG/DL — SIGNIFICANT CHANGE UP (ref 0–0.2)
BILIRUB INDIRECT FLD-MCNC: SIGNIFICANT CHANGE UP (ref 0.2–1)
BILIRUB SERPL-MCNC: <0.2 MG/DL — SIGNIFICANT CHANGE UP (ref 0.2–1.2)
BUN SERPL-MCNC: 25 MG/DL — HIGH (ref 7–23)
CALCIUM SERPL-MCNC: 8.9 MG/DL — SIGNIFICANT CHANGE UP (ref 8.4–10.5)
CHLORIDE SERPL-SCNC: 102 MMOL/L — SIGNIFICANT CHANGE UP (ref 96–108)
CHOLEST SERPL-MCNC: 120 MG/DL — SIGNIFICANT CHANGE UP
CK MB CFR SERPL CALC: 1.5 NG/ML — SIGNIFICANT CHANGE UP (ref 0–6.7)
CK MB CFR SERPL CALC: 1.7 NG/ML — SIGNIFICANT CHANGE UP (ref 0–6.7)
CK SERPL-CCNC: 38 U/L — SIGNIFICANT CHANGE UP (ref 30–200)
CK SERPL-CCNC: 43 U/L — SIGNIFICANT CHANGE UP (ref 30–200)
CO2 SERPL-SCNC: 22 MMOL/L — SIGNIFICANT CHANGE UP (ref 22–31)
COVID-19 SPIKE DOMAIN AB INTERP: POSITIVE
COVID-19 SPIKE DOMAIN ANTIBODY RESULT: 1.58 U/ML — HIGH
CREAT SERPL-MCNC: 1.34 MG/DL — HIGH (ref 0.5–1.3)
EOSINOPHIL # BLD AUTO: 0.14 K/UL — SIGNIFICANT CHANGE UP (ref 0–0.5)
EOSINOPHIL NFR BLD AUTO: 1.5 % — SIGNIFICANT CHANGE UP (ref 0–6)
ESTIMATED AVERAGE GLUCOSE: 128 MG/DL — HIGH (ref 68–114)
GLUCOSE BLDC GLUCOMTR-MCNC: 107 MG/DL — HIGH (ref 70–99)
GLUCOSE BLDC GLUCOMTR-MCNC: 113 MG/DL — HIGH (ref 70–99)
GLUCOSE BLDC GLUCOMTR-MCNC: 116 MG/DL — HIGH (ref 70–99)
GLUCOSE BLDC GLUCOMTR-MCNC: 148 MG/DL — HIGH (ref 70–99)
GLUCOSE SERPL-MCNC: 102 MG/DL — HIGH (ref 70–99)
HCT VFR BLD CALC: 25.2 % — LOW (ref 39–50)
HDLC SERPL-MCNC: 43 MG/DL — SIGNIFICANT CHANGE UP
HGB BLD-MCNC: 8.2 G/DL — LOW (ref 13–17)
IMM GRANULOCYTES NFR BLD AUTO: 0.9 % — SIGNIFICANT CHANGE UP (ref 0–1.5)
INR BLD: 1.18 — HIGH (ref 0.88–1.16)
LIPID PNL WITH DIRECT LDL SERPL: 54 MG/DL — SIGNIFICANT CHANGE UP
LYMPHOCYTES # BLD AUTO: 1.71 K/UL — SIGNIFICANT CHANGE UP (ref 1–3.3)
LYMPHOCYTES # BLD AUTO: 18.5 % — SIGNIFICANT CHANGE UP (ref 13–44)
MAGNESIUM SERPL-MCNC: 1.9 MG/DL — SIGNIFICANT CHANGE UP (ref 1.6–2.6)
MCHC RBC-ENTMCNC: 28.6 PG — SIGNIFICANT CHANGE UP (ref 27–34)
MCHC RBC-ENTMCNC: 32.5 GM/DL — SIGNIFICANT CHANGE UP (ref 32–36)
MCV RBC AUTO: 87.8 FL — SIGNIFICANT CHANGE UP (ref 80–100)
MONOCYTES # BLD AUTO: 1 K/UL — HIGH (ref 0–0.9)
MONOCYTES NFR BLD AUTO: 10.8 % — SIGNIFICANT CHANGE UP (ref 2–14)
NEUTROPHILS # BLD AUTO: 6.28 K/UL — SIGNIFICANT CHANGE UP (ref 1.8–7.4)
NEUTROPHILS NFR BLD AUTO: 68.1 % — SIGNIFICANT CHANGE UP (ref 43–77)
NON HDL CHOLESTEROL: 77 MG/DL — SIGNIFICANT CHANGE UP
NRBC # BLD: 0 /100 WBCS — SIGNIFICANT CHANGE UP (ref 0–0)
PLATELET # BLD AUTO: 228 K/UL — SIGNIFICANT CHANGE UP (ref 150–400)
POTASSIUM SERPL-MCNC: 3.1 MMOL/L — LOW (ref 3.5–5.3)
POTASSIUM SERPL-SCNC: 3.1 MMOL/L — LOW (ref 3.5–5.3)
PROT SERPL-MCNC: 5.5 G/DL — LOW (ref 6–8.3)
PROTHROM AB SERPL-ACNC: 14.1 SEC — HIGH (ref 10.6–13.6)
RBC # BLD: 2.87 M/UL — LOW (ref 4.2–5.8)
RBC # FLD: 16 % — HIGH (ref 10.3–14.5)
SARS-COV-2 IGG+IGM SERPL QL IA: 1.58 U/ML — HIGH
SARS-COV-2 IGG+IGM SERPL QL IA: POSITIVE
SODIUM SERPL-SCNC: 139 MMOL/L — SIGNIFICANT CHANGE UP (ref 135–145)
TRIGL SERPL-MCNC: 116 MG/DL — SIGNIFICANT CHANGE UP
TROPONIN T SERPL-MCNC: 0.01 NG/ML — SIGNIFICANT CHANGE UP (ref 0–0.01)
TROPONIN T SERPL-MCNC: 0.02 NG/ML — HIGH (ref 0–0.01)
TSH SERPL-MCNC: 1.62 UIU/ML — SIGNIFICANT CHANGE UP (ref 0.35–4.94)
WBC # BLD: 9.23 K/UL — SIGNIFICANT CHANGE UP (ref 3.8–10.5)
WBC # FLD AUTO: 9.23 K/UL — SIGNIFICANT CHANGE UP (ref 3.8–10.5)

## 2021-03-29 PROCEDURE — 99232 SBSQ HOSP IP/OBS MODERATE 35: CPT

## 2021-03-29 PROCEDURE — 93306 TTE W/DOPPLER COMPLETE: CPT | Mod: 26

## 2021-03-29 RX ORDER — MAGNESIUM OXIDE 400 MG ORAL TABLET 241.3 MG
400 TABLET ORAL ONCE
Refills: 0 | Status: COMPLETED | OUTPATIENT
Start: 2021-03-29 | End: 2021-03-29

## 2021-03-29 RX ORDER — SIMVASTATIN 20 MG/1
1 TABLET, FILM COATED ORAL
Qty: 0 | Refills: 0 | DISCHARGE

## 2021-03-29 RX ORDER — POTASSIUM CHLORIDE 20 MEQ
40 PACKET (EA) ORAL EVERY 4 HOURS
Refills: 0 | Status: COMPLETED | OUTPATIENT
Start: 2021-03-29 | End: 2021-03-29

## 2021-03-29 RX ORDER — DEXAMETHASONE 0.5 MG/5ML
1 ELIXIR ORAL
Qty: 0 | Refills: 0 | DISCHARGE

## 2021-03-29 RX ORDER — AMLODIPINE AND VALSARTAN 5; 320 MG/1; MG/1
1 TABLET, FILM COATED ORAL
Qty: 0 | Refills: 0 | DISCHARGE

## 2021-03-29 RX ADMIN — LATANOPROST 1 DROP(S): 0.05 SOLUTION/ DROPS OPHTHALMIC; TOPICAL at 22:28

## 2021-03-29 RX ADMIN — AMLODIPINE BESYLATE 10 MILLIGRAM(S): 2.5 TABLET ORAL at 06:43

## 2021-03-29 RX ADMIN — PREGABALIN 1000 MICROGRAM(S): 225 CAPSULE ORAL at 14:43

## 2021-03-29 RX ADMIN — Medication 40 MILLIEQUIVALENT(S): at 19:50

## 2021-03-29 RX ADMIN — DORZOLAMIDE HYDROCHLORIDE 1 DROP(S): 20 SOLUTION/ DROPS OPHTHALMIC at 14:45

## 2021-03-29 RX ADMIN — DORZOLAMIDE HYDROCHLORIDE 1 DROP(S): 20 SOLUTION/ DROPS OPHTHALMIC at 22:28

## 2021-03-29 RX ADMIN — SIMVASTATIN 20 MILLIGRAM(S): 20 TABLET, FILM COATED ORAL at 22:28

## 2021-03-29 RX ADMIN — FINASTERIDE 5 MILLIGRAM(S): 5 TABLET, FILM COATED ORAL at 14:43

## 2021-03-29 RX ADMIN — Medication 325 MILLIGRAM(S): at 14:43

## 2021-03-29 RX ADMIN — HEPARIN SODIUM 1500 UNIT(S)/HR: 5000 INJECTION INTRAVENOUS; SUBCUTANEOUS at 00:20

## 2021-03-29 RX ADMIN — HEPARIN SODIUM 0 UNIT(S)/HR: 5000 INJECTION INTRAVENOUS; SUBCUTANEOUS at 06:55

## 2021-03-29 RX ADMIN — Medication 50 MILLIGRAM(S): at 06:43

## 2021-03-29 RX ADMIN — MAGNESIUM OXIDE 400 MG ORAL TABLET 400 MILLIGRAM(S): 241.3 TABLET ORAL at 08:14

## 2021-03-29 RX ADMIN — HEPARIN SODIUM 1200 UNIT(S)/HR: 5000 INJECTION INTRAVENOUS; SUBCUTANEOUS at 08:04

## 2021-03-29 RX ADMIN — HEPARIN SODIUM 0 UNIT(S)/HR: 5000 INJECTION INTRAVENOUS; SUBCUTANEOUS at 15:32

## 2021-03-29 RX ADMIN — Medication 40 MILLIEQUIVALENT(S): at 14:45

## 2021-03-29 RX ADMIN — DORZOLAMIDE HYDROCHLORIDE 1 DROP(S): 20 SOLUTION/ DROPS OPHTHALMIC at 06:43

## 2021-03-29 NOTE — PROGRESS NOTE ADULT - SUBJECTIVE AND OBJECTIVE BOX
Interventional Cardiology PA Adult Progress Note    C.C.: Chest tightness     Subjective Assessment: Patient seen and examined at bedside this morning and denied any complaints on exam; however, patient stated he has been laying bed and only ever experienced chest tightness and SOB w/ exertion.     ROS Negative except as per Subjective and HPI  	  MEDICATIONS:  amLODIPine   Tablet 10 milliGRAM(s) Oral daily  metoprolol succinate ER 50 milliGRAM(s) Oral daily  ipratropium    for Nebulization 500 MICROGram(s) Nebulizer every 6 hours PRN  dexAMETHasone     Tablet 2 milliGRAM(s) Oral <User Schedule>  dextrose 40% Gel 15 Gram(s) Oral once  dextrose 50% Injectable 25 Gram(s) IV Push once  dextrose 50% Injectable 12.5 Gram(s) IV Push once  dextrose 50% Injectable 25 Gram(s) IV Push once  finasteride 5 milliGRAM(s) Oral daily  glucagon  Injectable 1 milliGRAM(s) IntraMuscular once  insulin lispro (ADMELOG) corrective regimen sliding scale   SubCutaneous Before meals and at bedtime  simvastatin 20 milliGRAM(s) Oral at bedtime  cyanocobalamin 1000 MICROGram(s) Oral daily  dextrose 5%. 1000 milliLiter(s) IV Continuous <Continuous>  dextrose 5%. 1000 milliLiter(s) IV Continuous <Continuous>  dorzolamide 2% Ophthalmic Solution 1 Drop(s) Both EYES three times a day  ferrous    sulfate 325 milliGRAM(s) Oral daily  heparin   Injectable 6500 Unit(s) IV Push every 6 hours PRN  heparin   Injectable 3000 Unit(s) IV Push every 6 hours PRN  heparin  Infusion.  Unit(s)/Hr IV Continuous <Continuous>  latanoprost 0.005% Ophthalmic Solution 1 Drop(s) Both EYES at bedtime  potassium chloride    Tablet ER 40 milliEquivalent(s) Oral every 4 hours      PHYSICAL EXAM:  TELEMETRY: No overnight events.   T(C): 36.7 (03-29-21 @ 13:39), Max: 37.1 (03-29-21 @ 05:10)  HR: 65 (03-29-21 @ 13:35) (63 - 80)  BP: 132/64 (03-29-21 @ 13:35) (117/57 - 139/67)  RR: 15 (03-29-21 @ 13:35) (15 - 24)  SpO2: 100% (03-29-21 @ 13:35) (97% - 100%)  Wt(kg): --  I&O's Summary    28 Mar 2021 07:01  -  29 Mar 2021 07:00  --------------------------------------------------------  IN: 0 mL / OUT: 820 mL / NET: -820 mL    29 Mar 2021 07:01  -  29 Mar 2021 15:45  --------------------------------------------------------  IN: 75 mL / OUT: 650 mL / NET: -575 mL      Height (cm): 154.7 (03-28 @ 23:20)  Weight (kg): 83.7 (03-28 @ 23:20)  BMI (kg/m2): 35 (03-28 @ 23:20)  BSA (m2): 1.82 (03-28 @ 23:20)                                         Appearance: NAD 	  HEENT: Normal oral mucosa, PERRL, EOMI	  Neck: Supple, - JVD  Cardiovascular: Normal S1/S2, No JVD, No murmurs   Respiratory: Lungs clear to auscultation, No Rales, Rhonchi, Wheezing	  Gastrointestinal:  Soft, Non-tender  Skin: No rashes, No ecchymoses, No cyanosis  Extremities: Normal range of motion, No clubbing, cyanosis or edema  Vascular: Peripheral pulses palpable 2+ bilaterally  Neurologic: Non-focal  Psychiatry: A & O x 3, Mood & affect appropriate    	    ECG:  	  RADIOLOGY:   DIAGNOSTIC TESTING:  [X] Echocardiogram: PENDING   [ ] Catheterization:  [ ] Stress Test:    [ ] KALLIE   	    LABS:	 	               8.2    9.23  )-----------( 228      ( 29 Mar 2021 06:11 )             25.2     03-29    139  |  102  |  25<H>  ----------------------------<  102<H>  3.1<L>   |  22  |  1.34<H>    Ca    8.9      29 Mar 2021 06:11  Mg     1.9     03-29    TPro  5.5<L>  /  Alb  3.2<L>  /  TBili  <0.2  /  DBili  0.2  /  AST  24  /  ALT  23  /  AlkPhos  30<L>  03-29    proBNP: Serum Pro-Brain Natriuretic Peptide: 563 pg/mL (03-28 @ 17:21)      TSH: Thyroid Stimulating Hormone, Serum: 1.620 uIU/mL (03-29 @ 06:11)    PT/INR - ( 29 Mar 2021 06:11 )   PT: 14.1 sec;   INR: 1.18          PTT - ( 29 Mar 2021 13:55 )  PTT:>200.0 sec     Interventional Cardiology PA Adult Progress Note    C.C.: Chest tightness     Subjective Assessment: Patient seen and examined at bedside this morning and denied any complaints on exam; however, patient stated he has been laying bed and only ever experienced chest tightness and SOB w/ exertion.     ROS Negative except as per Subjective and HPI  	  MEDICATIONS:  amLODIPine   Tablet 10 milliGRAM(s) Oral daily  metoprolol succinate ER 50 milliGRAM(s) Oral daily  ipratropium    for Nebulization 500 MICROGram(s) Nebulizer every 6 hours PRN  dexAMETHasone     Tablet 2 milliGRAM(s) Oral <User Schedule>  dextrose 40% Gel 15 Gram(s) Oral once  dextrose 50% Injectable 25 Gram(s) IV Push once  dextrose 50% Injectable 12.5 Gram(s) IV Push once  dextrose 50% Injectable 25 Gram(s) IV Push once  finasteride 5 milliGRAM(s) Oral daily  glucagon  Injectable 1 milliGRAM(s) IntraMuscular once  insulin lispro (ADMELOG) corrective regimen sliding scale   SubCutaneous Before meals and at bedtime  simvastatin 20 milliGRAM(s) Oral at bedtime  cyanocobalamin 1000 MICROGram(s) Oral daily  dextrose 5%. 1000 milliLiter(s) IV Continuous <Continuous>  dextrose 5%. 1000 milliLiter(s) IV Continuous <Continuous>  dorzolamide 2% Ophthalmic Solution 1 Drop(s) Both EYES three times a day  ferrous    sulfate 325 milliGRAM(s) Oral daily  heparin   Injectable 6500 Unit(s) IV Push every 6 hours PRN  heparin   Injectable 3000 Unit(s) IV Push every 6 hours PRN  heparin  Infusion.  Unit(s)/Hr IV Continuous <Continuous>  latanoprost 0.005% Ophthalmic Solution 1 Drop(s) Both EYES at bedtime  potassium chloride    Tablet ER 40 milliEquivalent(s) Oral every 4 hours      PHYSICAL EXAM:  TELEMETRY: No overnight events.   T(C): 36.7 (03-29-21 @ 13:39), Max: 37.1 (03-29-21 @ 05:10)  HR: 65 (03-29-21 @ 13:35) (63 - 80)  BP: 132/64 (03-29-21 @ 13:35) (117/57 - 139/67)  RR: 15 (03-29-21 @ 13:35) (15 - 24)  SpO2: 100% (03-29-21 @ 13:35) (97% - 100%)  Wt(kg): --  I&O's Summary    28 Mar 2021 07:01  -  29 Mar 2021 07:00  --------------------------------------------------------  IN: 0 mL / OUT: 820 mL / NET: -820 mL    29 Mar 2021 07:01  -  29 Mar 2021 15:45  --------------------------------------------------------  IN: 75 mL / OUT: 650 mL / NET: -575 mL      Height (cm): 154.7 (03-28 @ 23:20)  Weight (kg): 83.7 (03-28 @ 23:20)  BMI (kg/m2): 35 (03-28 @ 23:20)  BSA (m2): 1.82 (03-28 @ 23:20)                                         Appearance: NAD 	  HEENT: Normal oral mucosa, PERRL, EOMI	  Neck: Supple, - JVD  Cardiovascular: Normal S1/S2, No JVD, No murmurs   Respiratory: Lungs clear to auscultation, No Rales, Rhonchi, Wheezing	  Gastrointestinal:  Soft, Non-tender  Skin: No rashes, No ecchymoses, No cyanosis  Extremities: Normal range of motion, No clubbing, cyanosis or edema  Vascular: Peripheral pulses palpable 2+ bilaterally  Neurologic: Non-focal  Psychiatry: A & O x 3, Mood & affect appropriate    	    ECG:  	  RADIOLOGY:   DIAGNOSTIC TESTING:  [X] Echocardiogram: normal LV/RV size and function w/ EF 70-75%, mild TR, no valvular disease, no pericardial effusion (no change since 01/2020)  [ ] Catheterization:  [ ] Stress Test:    [ ] KALLIE   	    LABS:	 	               8.2    9.23  )-----------( 228      ( 29 Mar 2021 06:11 )             25.2     03-29    139  |  102  |  25<H>  ----------------------------<  102<H>  3.1<L>   |  22  |  1.34<H>    Ca    8.9      29 Mar 2021 06:11  Mg     1.9     03-29    TPro  5.5<L>  /  Alb  3.2<L>  /  TBili  <0.2  /  DBili  0.2  /  AST  24  /  ALT  23  /  AlkPhos  30<L>  03-29    proBNP: Serum Pro-Brain Natriuretic Peptide: 563 pg/mL (03-28 @ 17:21)      TSH: Thyroid Stimulating Hormone, Serum: 1.620 uIU/mL (03-29 @ 06:11)    PT/INR - ( 29 Mar 2021 06:11 )   PT: 14.1 sec;   INR: 1.18          PTT - ( 29 Mar 2021 13:55 )  PTT:>200.0 sec

## 2021-03-29 NOTE — PROGRESS NOTE ADULT - PROBLEM SELECTOR PLAN 1
Reported intermittent, exertional chest tightness w/ associated JAIME when ambulating one block x 1 week  - EKG NSR w/ PVC's, no acute ischemic changes   - trops 0.01 > 0.02 > 0.01  - Echo (03/29): _______  - decision to be made about CCTA vs cardiac cath pending conversation w/ outpatient Oncologist Reported intermittent, exertional chest tightness w/ associated JAIME when ambulating one block x 1 week  - EKG NSR w/ PVC's, no acute ischemic changes   - trops 0.01 > 0.02 > 0.01  - Echo (03/29): normal LV/RV size and function w/ EF 70-75%, mild TR, no valvular disease, no pericardial effusion (no change since 01/2020)  - decision to be made about CCTA vs cardiac cath, outpatient Oncologist stated no contraindication to cardiac cath procedure from a prostate cancer perspective

## 2021-03-29 NOTE — PROGRESS NOTE ADULT - PROBLEM SELECTOR PLAN 7
S/p prostatectomy (1994) and currently on chemo as an outpatient, followed by Dr. Fuller  - awaiting call back to discuss ischemic eval charisman S/p prostatectomy (1994) and currently on Lupron/Xgeva as an outpatient, followed by Dr. Fuller  - stated no contraindication to cardiac cath from prostate cancer perspective

## 2021-03-29 NOTE — PROGRESS NOTE ADULT - ASSESSMENT
79 y/o male w/ PMHx HTN, HLD. type II DM, glaucoma, prostate cancer (s/p prostatectomy 1994, currently on Lupron q 3 months and Xgeva once a month), A-Fib (on Eliquis 2.5 BID at home due to fall risk), and history of PE/DVT (reportedly in 01/2020, on Eliquis 2.5 mg BID at home due to falls) who was admitted to cardiology/telemetry w/ complaints of generalized weakness, decreased appetite, and intermitent, exertional chest tightness associated w/ JAIME w/ ambulation of one block for one week. Echocardiogram showed ______. Decision for CCTA vs cardiac cath to be made pending conversation w/ patient's outpatient oncology provider (calls placed to office twice and awaiting call back at this time).      79 y/o male w/ PMHx HTN, HLD. type II DM, glaucoma, prostate cancer (s/p prostatectomy 1994, currently on Lupron q 3 months and Xgeva once a month), A-Fib (on Eliquis 2.5 BID at home due to fall risk), and history of PE/DVT (reportedly in 01/2020, on Eliquis 2.5 mg BID at home due to falls) who was admitted to cardiology/telemetry w/ complaints of generalized weakness, decreased appetite, and intermitent, exertional chest tightness associated w/ JAIME w/ ambulation of one block for one week. Echocardiogram showed normal LV/RV size and function w/ EF 70-75%, mild TR, no valvular disease, no pericardial effusion (no change since 01/2020). Decision for CCTA vs cardiac cath to be made pending conversation w/ patient's outpatient oncology provider (calls placed to office twice and awaiting call back at this time).      81 y/o male w/ PMHx HTN, HLD. type II DM, glaucoma, prostate cancer (s/p prostatectomy 1994, currently on Lupron q 3 months and Xgeva once a month), A-Fib (on Eliquis 2.5 BID at home due to fall risk), and history of PE/DVT (reportedly in 01/2020, on Eliquis 2.5 mg BID at home due to falls) who was admitted to cardiology/telemetry w/ complaints of generalized weakness, decreased appetite, and intermitent, exertional chest tightness associated w/ JAIME w/ ambulation of one block for one week. Echocardiogram showed normal LV/RV size and function w/ EF 70-75%, mild TR, no valvular disease, no pericardial effusion (no change since 01/2020). Decision for CCTA vs cardiac cath to be made, outpatient Oncologist stated no contraindication to cardiac cath from a prostate cancer perspective.

## 2021-03-29 NOTE — PROGRESS NOTE ADULT - ATTENDING COMMENTS
Discussed with pt's onc, on no active chemo, just maintenance therapy.   Echo normal.  CCTA cor tomorrow.

## 2021-03-30 LAB
ANION GAP SERPL CALC-SCNC: 10 MMOL/L — SIGNIFICANT CHANGE UP (ref 5–17)
APTT BLD: 130.2 SEC — CRITICAL HIGH (ref 27.5–35.5)
APTT BLD: 181.6 SEC — CRITICAL HIGH (ref 27.5–35.5)
APTT BLD: 187.3 SEC — CRITICAL HIGH (ref 27.5–35.5)
BUN SERPL-MCNC: 17 MG/DL — SIGNIFICANT CHANGE UP (ref 7–23)
CALCIUM SERPL-MCNC: 8.9 MG/DL — SIGNIFICANT CHANGE UP (ref 8.4–10.5)
CHLORIDE SERPL-SCNC: 106 MMOL/L — SIGNIFICANT CHANGE UP (ref 96–108)
CO2 SERPL-SCNC: 25 MMOL/L — SIGNIFICANT CHANGE UP (ref 22–31)
CREAT SERPL-MCNC: 1.27 MG/DL — SIGNIFICANT CHANGE UP (ref 0.5–1.3)
GLUCOSE BLDC GLUCOMTR-MCNC: 101 MG/DL — HIGH (ref 70–99)
GLUCOSE BLDC GLUCOMTR-MCNC: 110 MG/DL — HIGH (ref 70–99)
GLUCOSE BLDC GLUCOMTR-MCNC: 139 MG/DL — HIGH (ref 70–99)
GLUCOSE BLDC GLUCOMTR-MCNC: 95 MG/DL — SIGNIFICANT CHANGE UP (ref 70–99)
GLUCOSE SERPL-MCNC: 112 MG/DL — HIGH (ref 70–99)
HCT VFR BLD CALC: 28.3 % — LOW (ref 39–50)
HGB BLD-MCNC: 8.8 G/DL — LOW (ref 13–17)
INR BLD: 1.21 — HIGH (ref 0.88–1.16)
MAGNESIUM SERPL-MCNC: 1.5 MG/DL — LOW (ref 1.6–2.6)
MCHC RBC-ENTMCNC: 28 PG — SIGNIFICANT CHANGE UP (ref 27–34)
MCHC RBC-ENTMCNC: 31.1 GM/DL — LOW (ref 32–36)
MCV RBC AUTO: 90.1 FL — SIGNIFICANT CHANGE UP (ref 80–100)
NRBC # BLD: 0 /100 WBCS — SIGNIFICANT CHANGE UP (ref 0–0)
PLATELET # BLD AUTO: 222 K/UL — SIGNIFICANT CHANGE UP (ref 150–400)
POTASSIUM SERPL-MCNC: 4.3 MMOL/L — SIGNIFICANT CHANGE UP (ref 3.5–5.3)
POTASSIUM SERPL-SCNC: 4.3 MMOL/L — SIGNIFICANT CHANGE UP (ref 3.5–5.3)
PROTHROM AB SERPL-ACNC: 14.4 SEC — HIGH (ref 10.6–13.6)
RBC # BLD: 3.14 M/UL — LOW (ref 4.2–5.8)
RBC # FLD: 16 % — HIGH (ref 10.3–14.5)
SODIUM SERPL-SCNC: 141 MMOL/L — SIGNIFICANT CHANGE UP (ref 135–145)
WBC # BLD: 7.4 K/UL — SIGNIFICANT CHANGE UP (ref 3.8–10.5)
WBC # FLD AUTO: 7.4 K/UL — SIGNIFICANT CHANGE UP (ref 3.8–10.5)

## 2021-03-30 PROCEDURE — 93970 EXTREMITY STUDY: CPT | Mod: 26

## 2021-03-30 PROCEDURE — 93454 CORONARY ARTERY ANGIO S&I: CPT | Mod: 26,59

## 2021-03-30 PROCEDURE — 99232 SBSQ HOSP IP/OBS MODERATE 35: CPT

## 2021-03-30 PROCEDURE — 99152 MOD SED SAME PHYS/QHP 5/>YRS: CPT

## 2021-03-30 PROCEDURE — 92928 PRQ TCAT PLMT NTRAC ST 1 LES: CPT | Mod: LC

## 2021-03-30 RX ORDER — CLOPIDOGREL BISULFATE 75 MG/1
600 TABLET, FILM COATED ORAL ONCE
Refills: 0 | Status: COMPLETED | OUTPATIENT
Start: 2021-03-30 | End: 2021-03-30

## 2021-03-30 RX ORDER — SODIUM CHLORIDE 9 MG/ML
500 INJECTION INTRAMUSCULAR; INTRAVENOUS; SUBCUTANEOUS
Refills: 0 | Status: COMPLETED | OUTPATIENT
Start: 2021-03-30 | End: 2021-03-30

## 2021-03-30 RX ORDER — CLOPIDOGREL BISULFATE 75 MG/1
75 TABLET, FILM COATED ORAL DAILY
Refills: 0 | Status: DISCONTINUED | OUTPATIENT
Start: 2021-03-31 | End: 2021-03-31

## 2021-03-30 RX ORDER — ASPIRIN/CALCIUM CARB/MAGNESIUM 324 MG
325 TABLET ORAL ONCE
Refills: 0 | Status: COMPLETED | OUTPATIENT
Start: 2021-03-30 | End: 2021-03-30

## 2021-03-30 RX ORDER — HEPARIN SODIUM 5000 [USP'U]/ML
700 INJECTION INTRAVENOUS; SUBCUTANEOUS
Qty: 25000 | Refills: 0 | Status: DISCONTINUED | OUTPATIENT
Start: 2021-03-30 | End: 2021-03-31

## 2021-03-30 RX ORDER — ASPIRIN/CALCIUM CARB/MAGNESIUM 324 MG
81 TABLET ORAL DAILY
Refills: 0 | Status: DISCONTINUED | OUTPATIENT
Start: 2021-03-31 | End: 2021-03-31

## 2021-03-30 RX ORDER — SODIUM CHLORIDE 9 MG/ML
500 INJECTION INTRAMUSCULAR; INTRAVENOUS; SUBCUTANEOUS
Refills: 0 | Status: DISCONTINUED | OUTPATIENT
Start: 2021-03-30 | End: 2021-03-31

## 2021-03-30 RX ADMIN — SODIUM CHLORIDE 75 MILLILITER(S): 9 INJECTION INTRAMUSCULAR; INTRAVENOUS; SUBCUTANEOUS at 14:50

## 2021-03-30 RX ADMIN — HEPARIN SODIUM 700 UNIT(S)/HR: 5000 INJECTION INTRAVENOUS; SUBCUTANEOUS at 04:21

## 2021-03-30 RX ADMIN — LATANOPROST 1 DROP(S): 0.05 SOLUTION/ DROPS OPHTHALMIC; TOPICAL at 21:53

## 2021-03-30 RX ADMIN — DORZOLAMIDE HYDROCHLORIDE 1 DROP(S): 20 SOLUTION/ DROPS OPHTHALMIC at 14:49

## 2021-03-30 RX ADMIN — PREGABALIN 1000 MICROGRAM(S): 225 CAPSULE ORAL at 12:18

## 2021-03-30 RX ADMIN — Medication 50 MILLIGRAM(S): at 05:43

## 2021-03-30 RX ADMIN — SODIUM CHLORIDE 75 MILLILITER(S): 9 INJECTION INTRAMUSCULAR; INTRAVENOUS; SUBCUTANEOUS at 20:30

## 2021-03-30 RX ADMIN — SIMVASTATIN 20 MILLIGRAM(S): 20 TABLET, FILM COATED ORAL at 21:53

## 2021-03-30 RX ADMIN — DORZOLAMIDE HYDROCHLORIDE 1 DROP(S): 20 SOLUTION/ DROPS OPHTHALMIC at 21:53

## 2021-03-30 RX ADMIN — FINASTERIDE 5 MILLIGRAM(S): 5 TABLET, FILM COATED ORAL at 12:18

## 2021-03-30 RX ADMIN — AMLODIPINE BESYLATE 10 MILLIGRAM(S): 2.5 TABLET ORAL at 05:43

## 2021-03-30 RX ADMIN — Medication 325 MILLIGRAM(S): at 16:23

## 2021-03-30 RX ADMIN — HEPARIN SODIUM 0 UNIT(S)/HR: 5000 INJECTION INTRAVENOUS; SUBCUTANEOUS at 11:48

## 2021-03-30 RX ADMIN — DORZOLAMIDE HYDROCHLORIDE 1 DROP(S): 20 SOLUTION/ DROPS OPHTHALMIC at 05:43

## 2021-03-30 RX ADMIN — CLOPIDOGREL BISULFATE 600 MILLIGRAM(S): 75 TABLET, FILM COATED ORAL at 16:23

## 2021-03-30 RX ADMIN — Medication 325 MILLIGRAM(S): at 12:18

## 2021-03-30 NOTE — PROGRESS NOTE ADULT - PROBLEM SELECTOR PLAN 3
See plan as above   - LE Duplex negative for DVT but right paired peroneal veins not well visualized.
See plan as above   - LE Duplex ordered, f/u results

## 2021-03-30 NOTE — PROGRESS NOTE ADULT - PROBLEM SELECTOR PLAN 2
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

## 2021-03-30 NOTE — PROGRESS NOTE ADULT - PROBLEM SELECTOR PLAN 9
Cr 1.5 on admission, Cr 1.34 on 03/29, Cr 1.2 on 03/30, f/u AM Cr  - avoid nephrotoxic agents  - monitor daily Cr
Cr 1.5 on admission, Cr 1.34 on 03/29  - avoid nephrotoxic agents  - monitor daily Cr

## 2021-03-30 NOTE — PROGRESS NOTE ADULT - REASON FOR ADMISSION
Generalized weakness, intermittent, exertional midsternal chest tightness associated with JAIME (ambulating one block) X1 week.
Generalized weakness, intermittent, exertional midsternal chest tightness associated with JAIME (ambulating one block) X1 week.

## 2021-03-30 NOTE — PROGRESS NOTE ADULT - SUBJECTIVE AND OBJECTIVE BOX
Interventional Cardiology PA Adult Progress Note    Subjective Assessment:  	  MEDICATIONS:  amLODIPine   Tablet 10 milliGRAM(s) Oral daily  metoprolol succinate ER 50 milliGRAM(s) Oral daily      ipratropium    for Nebulization 500 MICROGram(s) Nebulizer every 6 hours PRN        dexAMETHasone     Tablet 2 milliGRAM(s) Oral <User Schedule>  dextrose 40% Gel 15 Gram(s) Oral once  dextrose 50% Injectable 25 Gram(s) IV Push once  dextrose 50% Injectable 12.5 Gram(s) IV Push once  dextrose 50% Injectable 25 Gram(s) IV Push once  finasteride 5 milliGRAM(s) Oral daily  glucagon  Injectable 1 milliGRAM(s) IntraMuscular once  insulin lispro (ADMELOG) corrective regimen sliding scale   SubCutaneous Before meals and at bedtime  simvastatin 20 milliGRAM(s) Oral at bedtime    cyanocobalamin 1000 MICROGram(s) Oral daily  dextrose 5%. 1000 milliLiter(s) IV Continuous <Continuous>  dextrose 5%. 1000 milliLiter(s) IV Continuous <Continuous>  dorzolamide 2% Ophthalmic Solution 1 Drop(s) Both EYES three times a day  ferrous    sulfate 325 milliGRAM(s) Oral daily  heparin  Infusion. 700 Unit(s)/Hr IV Continuous <Continuous>  latanoprost 0.005% Ophthalmic Solution 1 Drop(s) Both EYES at bedtime      	    [PHYSICAL EXAM:  TELEMETRY:  T(C): 37.1 (03-30-21 @ 14:14), Max: 37.1 (03-30-21 @ 14:14)  HR: 75 (03-30-21 @ 11:57) (63 - 75)  BP: 122/61 (03-30-21 @ 11:57) (115/59 - 124/59)  RR: 18 (03-30-21 @ 11:57) (18 - 19)  SpO2: 97% (03-30-21 @ 11:57) (96% - 100%)  Wt(kg): --  I&O's Summary    29 Mar 2021 07:01  -  30 Mar 2021 07:00  --------------------------------------------------------  IN: 135 mL / OUT: 650 mL / NET: -515 mL    30 Mar 2021 07:01  -  30 Mar 2021 17:39  --------------------------------------------------------  IN: 14 mL / OUT: 1550 mL / NET: -1536 mL        Kim:  Central/PICC/Mid Line:                                         Appearance: Normal	  HEENT:   Normal oral mucosa, PERRL, EOMI	  Neck: Supple,   Cardiovascular: Normal S1 S2, No JVD, No murmurs,   Respiratory: Lungs clear to auscultation b/l, No Rales, Rhonchi, Wheezing	  Gastrointestinal:  Soft, Non-tender, + BS	  Skin: No rashes, No ecchymoses, No cyanosis  Extremities: Normal range of motion, No clubbing, cyanosis or edema.   Vascular: Peripheral pulses palpable 2+ bilaterally + 2 DPs b/l, +1 PTs b/l  Neurologic: Non-focal  Psychiatry: A & O x 3, Mood & affect appropriate    LABS:                          8.8    7.40  )-----------( 222      ( 30 Mar 2021 05:56 )             28.3     03-30    141  |  106  |  17  ----------------------------<  112<H>  4.3   |  25  |  1.27    Ca    8.9      30 Mar 2021 05:56  Mg     1.5     03-30    TPro  5.5<L>  /  Alb  3.2<L>  /  TBili  <0.2  /  DBili  0.2  /  AST  24  /  ALT  23  /  AlkPhos  30<L>  03-29  PT/INR - ( 30 Mar 2021 05:56 )   PT: 14.4 sec;   INR: 1.21          PTT - ( 30 Mar 2021 10:08 )  PTT:130.2 sec    ASSESSMENT/PLAN: 	     Interventional Cardiology PA Adult Progress Note    CC: Chest pain  Subjective Assessment:    Currently asymptomatic    ROS otherwise negative except as stated in HPI and subjective  MEDICATIONS:  amLODIPine   Tablet 10 milliGRAM(s) Oral daily  metoprolol succinate ER 50 milliGRAM(s) Oral daily      ipratropium    for Nebulization 500 MICROGram(s) Nebulizer every 6 hours PRN        dexAMETHasone     Tablet 2 milliGRAM(s) Oral <User Schedule>  dextrose 40% Gel 15 Gram(s) Oral once  dextrose 50% Injectable 25 Gram(s) IV Push once  dextrose 50% Injectable 12.5 Gram(s) IV Push once  dextrose 50% Injectable 25 Gram(s) IV Push once  finasteride 5 milliGRAM(s) Oral daily  glucagon  Injectable 1 milliGRAM(s) IntraMuscular once  insulin lispro (ADMELOG) corrective regimen sliding scale   SubCutaneous Before meals and at bedtime  simvastatin 20 milliGRAM(s) Oral at bedtime    cyanocobalamin 1000 MICROGram(s) Oral daily  dextrose 5%. 1000 milliLiter(s) IV Continuous <Continuous>  dextrose 5%. 1000 milliLiter(s) IV Continuous <Continuous>  dorzolamide 2% Ophthalmic Solution 1 Drop(s) Both EYES three times a day  ferrous    sulfate 325 milliGRAM(s) Oral daily  heparin  Infusion. 700 Unit(s)/Hr IV Continuous <Continuous>  latanoprost 0.005% Ophthalmic Solution 1 Drop(s) Both EYES at bedtime      	    [PHYSICAL EXAM:  TELEMETRY: intermittent short bursts of atrial fibrillation, lasting a few seconds, otherwise NSR  T(C): 37.1 (03-30-21 @ 14:14), Max: 37.1 (03-30-21 @ 14:14)  HR: 75 (03-30-21 @ 11:57) (63 - 75)  BP: 122/61 (03-30-21 @ 11:57) (115/59 - 124/59)  RR: 18 (03-30-21 @ 11:57) (18 - 19)  SpO2: 97% (03-30-21 @ 11:57) (96% - 100%)  Wt(kg): --  I&O's Summary    29 Mar 2021 07:01  -  30 Mar 2021 07:00  --------------------------------------------------------  IN: 135 mL / OUT: 650 mL / NET: -515 mL    30 Mar 2021 07:01  -  30 Mar 2021 17:39  --------------------------------------------------------  IN: 14 mL / OUT: 1550 mL / NET: -1536 mL        Kim: none  Central/PICC/Mid Line: none                                         Appearance: Normal	  HEENT:   Normal oral mucosa, PERRL, EOMI	  Neck: Supple,   Cardiovascular: Normal S1 S2, No JVD, No murmurs,   Respiratory: Lungs clear to auscultation b/l, No Rales, Rhonchi, Wheezing	  Gastrointestinal:  Soft, Non-tender, + BS	  Skin: No rashes, No ecchymoses, No cyanosis  Extremities: Normal range of motion, No clubbing, cyanosis or edema.   Vascular: Peripheral pulses palpable 2+ bilaterally + 2 DPs b/l, +1 PTs b/l  Neurologic: Non-focal  Psychiatry: A & O x 3, Mood & affect appropriate    LABS:                          8.8    7.40  )-----------( 222      ( 30 Mar 2021 05:56 )             28.3     03-30    141  |  106  |  17  ----------------------------<  112<H>  4.3   |  25  |  1.27    Ca    8.9      30 Mar 2021 05:56  Mg     1.5     03-30    TPro  5.5<L>  /  Alb  3.2<L>  /  TBili  <0.2  /  DBili  0.2  /  AST  24  /  ALT  23  /  AlkPhos  30<L>  03-29  PT/INR - ( 30 Mar 2021 05:56 )   PT: 14.4 sec;   INR: 1.21          PTT - ( 30 Mar 2021 10:08 )  PTT:130.2 sec    ASSESSMENT/PLAN:

## 2021-03-30 NOTE — PROGRESS NOTE ADULT - PROBLEM SELECTOR PLAN 6
A1c 6.1  - not on home diabetes medications   - on Steroid taper dose at home (Sunday/Wednesday/Saturday)  - ordered for FS and moderate ISS
A1c 6.1  - not on home diabetes medications   - on Steroid taper dose at home (Sunday/Wednesday/Saturday)  - ordered for FS and moderate ISS

## 2021-03-30 NOTE — PROGRESS NOTE ADULT - PROBLEM SELECTOR PLAN 7
S/p prostatectomy (1994) and currently on Lupron/Xgeva as an outpatient, followed by Dr. Fuller  - stated no contraindication to cardiac cath from prostate cancer perspective

## 2021-03-30 NOTE — PROGRESS NOTE ADULT - PROBLEM SELECTOR PLAN 4
SBP ranging 110's to 130's   - home meds: Toprol XL 50 mg daily, Amlodipine 10 mg daily, HCTZ 25 mg daily, Valsartan 160 mg daily   - continuing Toprol XL 50 mg daily and Amlodipine 10 mg daily   - holding HCTZ and Valsartan in setting of JERI and in anticipation of dye load for ischemic eval. Pt was normotensive SBPs 120 on 03/30/21, may not need to restart Valsartan and HCTZ
SBP ranging 110's to 130's   - home meds: Toprol XL 50 mg daily, Amlodipine 10 mg daily, HCTZ 25 mg daily, Valsartan 160 mg daily   - continuing Toprol XL 50 mg daily and Amlodipine 10 mg daily   - holding HCTZ and Valsartan in setting of JERI and in anticipation of dye load for ischemic eval

## 2021-03-30 NOTE — PROGRESS NOTE ADULT - PROBLEM SELECTOR PLAN 8
Currently in SR   - on Eliquis at home   - ordered for Heparin gtt
Currently in SR   - on Eliquis at home   - ordered for Heparin gtt

## 2021-03-30 NOTE — PROGRESS NOTE ADULT - PROBLEM SELECTOR PLAN 10
VTE PPX: Heparin gtt   Dispo: pending ischemic eval and renal function post procedure.    Case d/w Dr. Guardado
VTE PPX: Heparin gtt   Dispo: pending ischemic eval     Case d/w Dr. Guardado

## 2021-03-30 NOTE — PROGRESS NOTE ADULT - PROBLEM SELECTOR PLAN 5
Cholesterol 120, Triglycerides 116, HDL 43, LDL 54   - continue Simvastatin 20 mg daily
Cholesterol 120, Triglycerides 116, HDL 43, LDL 54   - continue Simvastatin 20 mg daily

## 2021-03-30 NOTE — PROGRESS NOTE ADULT - ASSESSMENT
81 y/o male w/ PMHx HTN, HLD. type II DM, glaucoma, prostate cancer (s/p prostatectomy 1994, currently on Lupron q 3 months and Xgeva once a month), A-Fib (on Eliquis 2.5 BID at home due to fall risk), and history of PE/DVT (reportedly in 01/2020, on Eliquis 2.5 mg BID at home due to falls) who was admitted to cardiology/telemetry w/ complaints of generalized weakness, decreased appetite, and intermitent, exertional chest tightness associated w/ JAIME w/ ambulation of one block for one week. Echocardiogram showed normal LV/RV size and function w/ EF 70-75%, mild TR, no valvular disease, no pericardial effusion (no change since 01/2020). Plan for cardiac catheterization on 03/30/21 as high suspicion for CAD and desire to minimize contrast dye load to avoid contrast induced nephropathy given JERI on admission.

## 2021-03-30 NOTE — PROGRESS NOTE ADULT - PROBLEM SELECTOR PLAN 1
Reported intermittent, exertional chest tightness w/ associated JAIME when ambulating one block x 1 week  - EKG NSR w/ PVC's, no acute ischemic changes   - trops 0.01 > 0.02 > 0.01  - Echo (03/29): normal LV/RV size and function w/ EF 70-75%, mild TR, no valvular disease, no pericardial effusion (no change since 01/2020)  - pt planned and consented for cardiac catheterization. Outpatient Oncologist stated no contraindication to cardiac cath procedure from a prostate cancer perspective

## 2021-03-31 ENCOUNTER — TRANSCRIPTION ENCOUNTER (OUTPATIENT)
Age: 81
End: 2021-03-31

## 2021-03-31 VITALS
HEART RATE: 82 BPM | RESPIRATION RATE: 20 BRPM | DIASTOLIC BLOOD PRESSURE: 67 MMHG | OXYGEN SATURATION: 97 % | SYSTOLIC BLOOD PRESSURE: 144 MMHG

## 2021-03-31 LAB
ALBUMIN SERPL ELPH-MCNC: 3.1 G/DL — LOW (ref 3.3–5)
ALP SERPL-CCNC: 28 U/L — LOW (ref 40–120)
ALT FLD-CCNC: 25 U/L — SIGNIFICANT CHANGE UP (ref 10–45)
ANION GAP SERPL CALC-SCNC: 10 MMOL/L — SIGNIFICANT CHANGE UP (ref 5–17)
AST SERPL-CCNC: 28 U/L — SIGNIFICANT CHANGE UP (ref 10–40)
BILIRUB SERPL-MCNC: 0.2 MG/DL — SIGNIFICANT CHANGE UP (ref 0.2–1.2)
BUN SERPL-MCNC: 12 MG/DL — SIGNIFICANT CHANGE UP (ref 7–23)
CALCIUM SERPL-MCNC: 8.5 MG/DL — SIGNIFICANT CHANGE UP (ref 8.4–10.5)
CHLORIDE SERPL-SCNC: 107 MMOL/L — SIGNIFICANT CHANGE UP (ref 96–108)
CO2 SERPL-SCNC: 24 MMOL/L — SIGNIFICANT CHANGE UP (ref 22–31)
CREAT SERPL-MCNC: 1.19 MG/DL — SIGNIFICANT CHANGE UP (ref 0.5–1.3)
GLUCOSE BLDC GLUCOMTR-MCNC: 95 MG/DL — SIGNIFICANT CHANGE UP (ref 70–99)
GLUCOSE BLDC GLUCOMTR-MCNC: 96 MG/DL — SIGNIFICANT CHANGE UP (ref 70–99)
GLUCOSE SERPL-MCNC: 96 MG/DL — SIGNIFICANT CHANGE UP (ref 70–99)
HCT VFR BLD CALC: 28.6 % — LOW (ref 39–50)
HGB BLD-MCNC: 8.9 G/DL — LOW (ref 13–17)
MAGNESIUM SERPL-MCNC: 1.4 MG/DL — LOW (ref 1.6–2.6)
MCHC RBC-ENTMCNC: 28.3 PG — SIGNIFICANT CHANGE UP (ref 27–34)
MCHC RBC-ENTMCNC: 31.1 GM/DL — LOW (ref 32–36)
MCV RBC AUTO: 90.8 FL — SIGNIFICANT CHANGE UP (ref 80–100)
NRBC # BLD: 0 /100 WBCS — SIGNIFICANT CHANGE UP (ref 0–0)
PLATELET # BLD AUTO: 219 K/UL — SIGNIFICANT CHANGE UP (ref 150–400)
POTASSIUM SERPL-MCNC: 4.1 MMOL/L — SIGNIFICANT CHANGE UP (ref 3.5–5.3)
POTASSIUM SERPL-SCNC: 4.1 MMOL/L — SIGNIFICANT CHANGE UP (ref 3.5–5.3)
PROT SERPL-MCNC: 5.5 G/DL — LOW (ref 6–8.3)
RBC # BLD: 3.15 M/UL — LOW (ref 4.2–5.8)
RBC # FLD: 16.5 % — HIGH (ref 10.3–14.5)
SODIUM SERPL-SCNC: 141 MMOL/L — SIGNIFICANT CHANGE UP (ref 135–145)
WBC # BLD: 6.48 K/UL — SIGNIFICANT CHANGE UP (ref 3.8–10.5)
WBC # FLD AUTO: 6.48 K/UL — SIGNIFICANT CHANGE UP (ref 3.8–10.5)

## 2021-03-31 PROCEDURE — 36415 COLL VENOUS BLD VENIPUNCTURE: CPT

## 2021-03-31 PROCEDURE — C1874: CPT

## 2021-03-31 PROCEDURE — 84484 ASSAY OF TROPONIN QUANT: CPT

## 2021-03-31 PROCEDURE — C1887: CPT

## 2021-03-31 PROCEDURE — 85027 COMPLETE CBC AUTOMATED: CPT

## 2021-03-31 PROCEDURE — 71275 CT ANGIOGRAPHY CHEST: CPT

## 2021-03-31 PROCEDURE — 93005 ELECTROCARDIOGRAM TRACING: CPT

## 2021-03-31 PROCEDURE — 82553 CREATINE MB FRACTION: CPT

## 2021-03-31 PROCEDURE — 80053 COMPREHEN METABOLIC PANEL: CPT

## 2021-03-31 PROCEDURE — 83036 HEMOGLOBIN GLYCOSYLATED A1C: CPT

## 2021-03-31 PROCEDURE — U0005: CPT

## 2021-03-31 PROCEDURE — C1894: CPT

## 2021-03-31 PROCEDURE — 70491 CT SOFT TISSUE NECK W/DYE: CPT

## 2021-03-31 PROCEDURE — C1769: CPT

## 2021-03-31 PROCEDURE — 93306 TTE W/DOPPLER COMPLETE: CPT

## 2021-03-31 PROCEDURE — 93970 EXTREMITY STUDY: CPT

## 2021-03-31 PROCEDURE — 97161 PT EVAL LOW COMPLEX 20 MIN: CPT

## 2021-03-31 PROCEDURE — 82248 BILIRUBIN DIRECT: CPT

## 2021-03-31 PROCEDURE — 85730 THROMBOPLASTIN TIME PARTIAL: CPT

## 2021-03-31 PROCEDURE — 99232 SBSQ HOSP IP/OBS MODERATE 35: CPT

## 2021-03-31 PROCEDURE — 85610 PROTHROMBIN TIME: CPT

## 2021-03-31 PROCEDURE — 80061 LIPID PANEL: CPT

## 2021-03-31 PROCEDURE — U0003: CPT

## 2021-03-31 PROCEDURE — 71045 X-RAY EXAM CHEST 1 VIEW: CPT

## 2021-03-31 PROCEDURE — 99285 EMERGENCY DEPT VISIT HI MDM: CPT | Mod: 25

## 2021-03-31 PROCEDURE — 84443 ASSAY THYROID STIM HORMONE: CPT

## 2021-03-31 PROCEDURE — 82962 GLUCOSE BLOOD TEST: CPT

## 2021-03-31 PROCEDURE — 85025 COMPLETE CBC W/AUTO DIFF WBC: CPT

## 2021-03-31 PROCEDURE — 83735 ASSAY OF MAGNESIUM: CPT

## 2021-03-31 PROCEDURE — 86769 SARS-COV-2 COVID-19 ANTIBODY: CPT

## 2021-03-31 PROCEDURE — 82550 ASSAY OF CK (CPK): CPT

## 2021-03-31 PROCEDURE — 80048 BASIC METABOLIC PNL TOTAL CA: CPT

## 2021-03-31 PROCEDURE — 83880 ASSAY OF NATRIURETIC PEPTIDE: CPT

## 2021-03-31 RX ORDER — ATORVASTATIN CALCIUM 80 MG/1
1 TABLET, FILM COATED ORAL
Qty: 30 | Refills: 3
Start: 2021-03-31 | End: 2021-07-28

## 2021-03-31 RX ORDER — METOPROLOL TARTRATE 50 MG
1 TABLET ORAL
Qty: 0 | Refills: 0 | DISCHARGE
Start: 2021-03-31

## 2021-03-31 RX ORDER — METOPROLOL TARTRATE 50 MG
1 TABLET ORAL
Qty: 0 | Refills: 0 | DISCHARGE

## 2021-03-31 RX ORDER — FERROUS SULFATE 325(65) MG
1 TABLET ORAL
Qty: 0 | Refills: 0 | DISCHARGE

## 2021-03-31 RX ORDER — VALSARTAN 80 MG/1
1 TABLET ORAL
Qty: 0 | Refills: 0 | DISCHARGE

## 2021-03-31 RX ORDER — CLOPIDOGREL BISULFATE 75 MG/1
1 TABLET, FILM COATED ORAL
Qty: 30 | Refills: 11
Start: 2021-03-31 | End: 2022-03-25

## 2021-03-31 RX ORDER — LISINOPRIL 2.5 MG/1
1 TABLET ORAL
Qty: 30 | Refills: 3
Start: 2021-03-31 | End: 2021-07-28

## 2021-03-31 RX ORDER — IPRATROPIUM BROMIDE 0.2 MG/ML
2.5 SOLUTION, NON-ORAL INHALATION
Qty: 0 | Refills: 0 | DISCHARGE
Start: 2021-03-31

## 2021-03-31 RX ORDER — FERROUS SULFATE 325(65) MG
1 TABLET ORAL
Qty: 0 | Refills: 0 | DISCHARGE
Start: 2021-03-31

## 2021-03-31 RX ORDER — SIMVASTATIN 20 MG/1
1 TABLET, FILM COATED ORAL
Qty: 0 | Refills: 0 | DISCHARGE

## 2021-03-31 RX ORDER — ASPIRIN/CALCIUM CARB/MAGNESIUM 324 MG
1 TABLET ORAL
Qty: 7 | Refills: 0
Start: 2021-03-31 | End: 2021-04-06

## 2021-03-31 RX ADMIN — DORZOLAMIDE HYDROCHLORIDE 1 DROP(S): 20 SOLUTION/ DROPS OPHTHALMIC at 06:20

## 2021-03-31 RX ADMIN — Medication 50 MILLIGRAM(S): at 06:16

## 2021-03-31 RX ADMIN — Medication 2 MILLIGRAM(S): at 10:13

## 2021-03-31 RX ADMIN — AMLODIPINE BESYLATE 10 MILLIGRAM(S): 2.5 TABLET ORAL at 06:16

## 2021-03-31 RX ADMIN — CLOPIDOGREL BISULFATE 75 MILLIGRAM(S): 75 TABLET, FILM COATED ORAL at 11:46

## 2021-03-31 RX ADMIN — FINASTERIDE 5 MILLIGRAM(S): 5 TABLET, FILM COATED ORAL at 11:46

## 2021-03-31 RX ADMIN — PREGABALIN 1000 MICROGRAM(S): 225 CAPSULE ORAL at 11:47

## 2021-03-31 RX ADMIN — Medication 81 MILLIGRAM(S): at 11:46

## 2021-03-31 RX ADMIN — Medication 325 MILLIGRAM(S): at 11:46

## 2021-03-31 NOTE — DISCHARGE NOTE PROVIDER - NSDCCPCAREPLAN_GEN_ALL_CORE_FT
PRINCIPAL DISCHARGE DIAGNOSIS  Diagnosis: Unstable angina  Assessment and Plan of Treatment: You had chest pain and had a cardiac catheterization procedure and received a DRUG ELUTING STENT to your 1ST OBTUSE MARGINAL coronary artery. It is VERY IMPORTANT that you take ASPIRIN 81mg daily and PLAVIX (CLOPIDOGREL) 75mg daily EVERY SINGLE DAY to avoid blood clots forming in your stents that could give you a heart attack. RIGHT WRIST WOUND CARE: do not lift anything heavier than 5 pounds for 5 days      SECONDARY DISCHARGE DIAGNOSES  Diagnosis: Pulmonary embolism  Assessment and Plan of Treatment:      PRINCIPAL DISCHARGE DIAGNOSIS  Diagnosis: Unstable angina  Assessment and Plan of Treatment: You had chest pain and had a cardiac catheterization procedure and received a DRUG ELUTING STENT to your 1ST OBTUSE MARGINAL coronary artery. It is VERY IMPORTANT that you take ASPIRIN 81mg daily and PLAVIX (CLOPIDOGREL) 75mg daily EVERY SINGLE DAY to avoid blood clots forming in your stents that could give you a heart attack. RIGHT WRIST WOUND CARE: do not lift anything heavier than 5 pounds for 5 days. Observe for signs of bleeding including bleeding/sudden swelling to your right wrist site or numbness/tingling/pain/coolness to your right hand/fingers/forearm/wrist. If you see these symptoms call your doctor and go to the nearest ER immediately. Follow up with your cardiologist in 1-2 weeks.      SECONDARY DISCHARGE DIAGNOSES  Diagnosis: Pulmonary embolism  Assessment and Plan of Treatment: You still have a chronic blood clot in your lung but not have any blood clots in your legs. Continue taking ELIQUIS (APIXABAN) 2.5mg twice a day as blood thinner.    Diagnosis: HLD (hyperlipidemia)  Assessment and Plan of Treatment: STOP taking SIMVASTATIN 20mg daily at bedtime. START taking ATORVASTATIN 40mg daily at bedtime for cholesterol control.    Diagnosis: HTN (hypertension)  Assessment and Plan of Treatment: STOP TAKING VALSARTAN 160mg daily and HYDROCHLOROTHIAZIDE 25mg daily. These blood pressure medications negatively affect your kidney function. START taking LISINOPRIL 2.5mg daily. CONTINUE taking METOPROLOL SUCCINATE ER 50mg daily and AMLODIPINE 10mg daily for blood pressure control.     PRINCIPAL DISCHARGE DIAGNOSIS  Diagnosis: Unstable angina  Assessment and Plan of Treatment: You had chest pain and had a cardiac catheterization procedure and received a DRUG ELUTING STENT to your 1ST OBTUSE MARGINAL coronary artery. It is VERY IMPORTANT that you take ASPIRIN 81mg daily FOR ONE WEEK. START taking PLAVIX (CLOPIDOGREL) 75mg daily EVERY SINGLE DAY AND DO NOT STOP TAKING to avoid blood clots forming in your stents that could give you a heart attack. RIGHT WRIST WOUND CARE: do not lift anything heavier than 5 pounds for 5 days. Observe for signs of bleeding including bleeding/sudden swelling to your right wrist site or numbness/tingling/pain/coolness to your right hand/fingers/forearm/wrist. If you see these symptoms call your doctor and go to the nearest ER immediately. Follow up with your cardiologist in 1-2 weeks.      SECONDARY DISCHARGE DIAGNOSES  Diagnosis: Pulmonary embolism  Assessment and Plan of Treatment: You still have a chronic blood clot in your lung but not have any blood clots in your legs. Continue taking ELIQUIS (APIXABAN) 2.5mg twice a day as blood thinner.    Diagnosis: HLD (hyperlipidemia)  Assessment and Plan of Treatment: STOP taking SIMVASTATIN 20mg daily at bedtime. START taking ATORVASTATIN 40mg daily at bedtime for cholesterol control.    Diagnosis: HTN (hypertension)  Assessment and Plan of Treatment: STOP TAKING VALSARTAN 160mg daily and HYDROCHLOROTHIAZIDE 25mg daily. These blood pressure medications negatively affect your kidney function. START taking LISINOPRIL 2.5mg daily. CONTINUE taking METOPROLOL SUCCINATE ER 50mg daily and AMLODIPINE 10mg daily for blood pressure control.

## 2021-03-31 NOTE — DISCHARGE NOTE PROVIDER - CARE PROVIDER_API CALL
Sharifa Guardado)  Cardiology; Internal Medicine; Interventional Cardiology  [address] [place] [id number]  Phone: [phone]  Fax: [phone]  Follow Up Time:     Charli Sánchez)  Cardiology  [address]   Phone: [phone]  Fax: [phone]  Follow Up Time:

## 2021-03-31 NOTE — DISCHARGE NOTE PROVIDER - HOSPITAL COURSE
81 y/o male w/ PMHx HTN, HLD. type II DM, glaucoma, prostate cancer (s/p prostatectomy 1994, currently on Lupron q 3 months and Xgeva once a month), A-Fib (on Eliquis 2.5 BID at home due to fall risk), and history of PE/DVT (reportedly in 01/2020, on Eliquis 2.5 mg BID at home due to falls) who was admitted to cardiology/telemetry w/ complaints of generalized weakness, decreased appetite, and intermitent, exertional chest tightness associated w/ JAIME w/ ambulation of one block for one week. Echocardiogram showed normal LV/RV size and function w/ EF 70-75%, mild TR, no valvular disease, no pericardial effusion (no change since 01/2020). Plan for cardiac catheterization on 03/30/21 as high suspicion for CAD and desire to minimize contrast dye load to avoid contrast induced nephropathy given JERI on admission.    79 y/o male w/ PMHx HTN, HLD. type II DM, glaucoma, prostate cancer (s/p prostatectomy 1994, currently on Lupron q 3 months and Xgeva once a month), A-Fib (on Eliquis 2.5 BID at home due to fall risk), and history of PE/DVT (reportedly in 01/2020, on Eliquis 2.5 mg BID at home due to falls) who was admitted to cardiology/telemetry w/ complaints of generalized weakness, decreased appetite, and intermitent, exertional chest tightness associated w/ JAIME w/ ambulation of one block for one week. Echocardiogram showed normal LV/RV size and function w/ EF 70-75%, mild TR, no valvular disease, no pericardial effusion (no change since 01/2020). Plan for cardiac catheterization on 03/30/21 as high suspicion for CAD and desire to minimize contrast dye load to avoid contrast induced nephropathy given JERI on admission.     Note was signed after patient was discharged from the hospital.  I was physically present for the key portions of the evaluation and management (E/M) service. I agree with the above discharge documentation which I have reviewed and edited where appropriate. I I have reviewed vitals, labs, medications, cardiac studies and remaining additional imaging. Patient is hemodynamically stable and appropriate for discharge home on the above prescribed medications,

## 2021-03-31 NOTE — PHYSICAL THERAPY INITIAL EVALUATION ADULT - ADDITIONAL COMMENTS
Pt lives in house with 8-10 [place]. Pt states typically living alone however daughter temporarily staying with pt. Pt owns canes and RWs, typically does not use. Reports previous courses of OP PT during chemo, endorses peripheral neuropathy post chemo. H/o 2 falls.

## 2021-03-31 NOTE — DISCHARGE NOTE NURSING/CASE MANAGEMENT/SOCIAL WORK - PATIENT PORTAL LINK FT
You can access the FollowMyHealth Patient Portal offered by [facility] by registering at the following website: [email or web address]. By joining [employer]’s FollowMyHealth portal, you will also be able to view your health information using other applications (apps) compatible with our system.

## 2021-03-31 NOTE — DISCHARGE NOTE PROVIDER - NSDCMRMEDTOKEN_GEN_ALL_CORE_FT
amLODIPine 10 mg oral tablet: 1 tab(s) orally once a day  apixaban 2.5 mg oral tablet: 1 tab(s) orally 2 times a day  Caltrate 600 + D oral tablet: 1 tab(s) orally 2 times a day  Centrum Silver Men&#x27;s oral tablet: 1 tab(s) orally once a day  cyanocobalamin 1000 mcg oral tablet: 1 tab(s) orally once a day  dexamethasone acetate: 2 milligram(s) orally every 3 days  dorzolamide 2% ophthalmic solution: 1 drop(s) to each affected eye 3 times a day  ferrous sulfate 324 mg (65 mg elemental iron) oral delayed release tablet: 1 tab(s) orally once a day  finasteride 5 mg oral tablet: 1 tab(s) orally once a day  hydroCHLOROthiazide 25 mg oral tablet: 1 tab(s) orally once a day  latanoprost 0.005% ophthalmic solution: 1 drop(s) to each affected eye once a day (in the evening)  Lupron Depot: 11.5 milligram(s) intramuscular every 3 months  metoprolol succinate 50 mg oral tablet, extended release: 1 tab(s) orally once a day  pantoprazole 40 mg oral delayed release tablet: 1 tab(s) orally once a day  physical therapy: Physical therapy one time order prior to discharge to assess needs. Dx frequent falls and pt to be discharged on Aspirin 81mg daily and Plavix 75mg daily and Eliquis 2.5mg BID. Need to assess duration based on PT assessment  potassium chloride 10 mEq oral tablet, extended release: 1 tab(s) orally once a day  simvastatin 20 mg oral tablet: 1 tab(s) orally once a day (at bedtime)  valsartan 160 mg oral tablet: 1 tab(s) orally once a day  Xgeva 120 mg/1.7 mL subcutaneous solution: 1.7 milliliter(s) subcutaneous once a month   amLODIPine 10 mg oral tablet: 1 tab(s) orally once a day  apixaban 2.5 mg oral tablet: 1 tab(s) orally 2 times a day  aspirin 81 mg oral delayed release tablet: 1 tab(s) orally once a day  atorvastatin 40 mg oral tablet: 1 tab(s) orally once a day   Caltrate 600 + D oral tablet: 1 tab(s) orally 2 times a day  Centrum Silver Men&#x27;s oral tablet: 1 tab(s) orally once a day  clopidogrel 75 mg oral tablet: 1 tab(s) orally once a day  cyanocobalamin 1000 mcg oral tablet: 1 tab(s) orally once a day  dexamethasone acetate: 2 milligram(s) orally every 3 days  dorzolamide 2% ophthalmic solution: 1 drop(s) to each affected eye 3 times a day  ferrous sulfate 325 mg (65 mg elemental iron) oral tablet: 1 tab(s) orally once a day  finasteride 5 mg oral tablet: 1 tab(s) orally once a day  ipratropium 500 mcg/2.5 mL inhalation solution: 2.5 milliliter(s) inhaled every 6 hours, As needed, Shortness of Breath and/or Wheezing  latanoprost 0.005% ophthalmic solution: 1 drop(s) to each affected eye once a day (in the evening)  Lupron Depot: 11.5 milligram(s) intramuscular every 3 months  metoprolol succinate 50 mg oral tablet, extended release: 1 tab(s) orally once a day  pantoprazole 40 mg oral delayed release tablet: 1 tab(s) orally once a day  potassium chloride 10 mEq oral tablet, extended release: 1 tab(s) orally once a day  Xgeva 120 mg/1.7 mL subcutaneous solution: 1.7 milliliter(s) subcutaneous once a month   amLODIPine 10 mg oral tablet: 1 tab(s) orally once a day  apixaban 2.5 mg oral tablet: 1 tab(s) orally 2 times a day  aspirin 81 mg oral delayed release tablet: 1 tab(s) orally once a day  atorvastatin 40 mg oral tablet: 1 tab(s) orally once a day   Caltrate 600 + D oral tablet: 1 tab(s) orally 2 times a day  Centrum Silver Men&#x27;s oral tablet: 1 tab(s) orally once a day  clopidogrel 75 mg oral tablet: 1 tab(s) orally once a day  cyanocobalamin 1000 mcg oral tablet: 1 tab(s) orally once a day  dexamethasone acetate: 2 milligram(s) orally every 3 days  dorzolamide 2% ophthalmic solution: 1 drop(s) to each affected eye 3 times a day  ferrous sulfate 325 mg (65 mg elemental iron) oral tablet: 1 tab(s) orally once a day  finasteride 5 mg oral tablet: 1 tab(s) orally once a day  ipratropium 500 mcg/2.5 mL inhalation solution: 2.5 milliliter(s) inhaled every 6 hours, As needed, Shortness of Breath and/or Wheezing  latanoprost 0.005% ophthalmic solution: 1 drop(s) to each affected eye once a day (in the evening)  lisinopril 2.5 mg oral tablet: 1 tab(s) orally once a day   Lupron Depot: 11.5 milligram(s) intramuscular every 3 months  metoprolol succinate 50 mg oral tablet, extended release: 1 tab(s) orally once a day  pantoprazole 40 mg oral delayed release tablet: 1 tab(s) orally once a day  potassium chloride 10 mEq oral tablet, extended release: 1 tab(s) orally once a day  Xgeva 120 mg/1.7 mL subcutaneous solution: 1.7 milliliter(s) subcutaneous once a month

## 2021-03-31 NOTE — DISCHARGE NOTE PROVIDER - NSDCFUSCHEDAPPT_GEN_ALL_CORE_FT
[place] ; 06/11/2021 ; [facility] [address] [facility] ; [facility] ; [facility] [address] [facility] ; [facility] ; [facility] [address] [place], [facility] E ; 06/11/2021 ; [facility] [address] [facility] E ; 06/11/2021 ; [facility] [address] [facility] E ; 06/25/2021 ; [facility] [address] [facility] E ; 07/09/2021 ; [facility] [address]

## 2021-03-31 NOTE — PHYSICAL THERAPY INITIAL EVALUATION ADULT - IMPAIRMENTS CONTRIBUTING TO GAIT DEVIATIONS, PT EVAL
impaired balance/impaired postural control/decreased sensation/impaired sensory feedback/decreased strength

## 2021-03-31 NOTE — DISCHARGE NOTE PROVIDER - REASON FOR ADMISSION
Generalized weakness, intermittent, exertional midsternal chest tightness associated with JAIME (ambulating one block) X1 week.

## 2021-03-31 NOTE — PHYSICAL THERAPY INITIAL EVALUATION ADULT - PERTINENT HX OF CURRENT PROBLEM, REHAB EVAL
79 y/o male w/ PMHx HTN, HLD. type II DM, glaucoma, prostate cancer (s/p prostatectomy 1994, currently on Lupron q 3 months and Xgeva once a month), A-Fib (on Eliquis 2.5 BID at home due to fall risk), and history of PE/DVT (reportedly in 01/2020, on Eliquis 2.5 mg BID at home due to falls) who was admitted to cardiology/telemetry w/ complaints of generalized weakness, decreased appetite, and intermitent, exertional chest tightness associated w/ JAIME

## 2021-03-31 NOTE — DISCHARGE NOTE NURSING/CASE MANAGEMENT/SOCIAL WORK - MODE OF TRANSPORTATION
Ambulatory [Appropriately responsive] : appropriately responsive [Alert] : alert [No Acute Distress] : no acute distress [No Lymphadenopathy] : no lymphadenopathy [Regular Rate Rhythm] : regular rate rhythm [No Murmurs] : no murmurs [Clear to Auscultation B/L] : clear to auscultation bilaterally [Soft] : soft [Non-tender] : non-tender [Non-distended] : non-distended [No HSM] : No HSM [No Lesions] : no lesions [No Mass] : no mass [Oriented x3] : oriented x3 [Examination Of The Breasts] : a normal appearance [No Masses] : no breast masses were palpable [Labia Majora] : normal [Labia Minora] : normal [Rectocele] : a rectocele [Uterine Prolapse] : uterine prolapse [Normal] : normal [Uterine Adnexae] : normal [External Hemorrhoid] : external hemorrhoid [[email or web address]] : negative Hemoccult

## 2021-04-07 PROBLEM — I82.409 ACUTE EMBOLISM AND THROMBOSIS OF UNSPECIFIED DEEP VEINS OF UNSPECIFIED LOWER EXTREMITY: Chronic | Status: ACTIVE | Noted: 2021-03-28

## 2021-04-07 PROBLEM — I48.91 UNSPECIFIED ATRIAL FIBRILLATION: Chronic | Status: ACTIVE | Noted: 2021-03-28

## 2021-04-07 PROBLEM — I26.99 OTHER PULMONARY EMBOLISM WITHOUT ACUTE COR PULMONALE: Chronic | Status: ACTIVE | Noted: 2021-03-28

## 2021-04-08 DIAGNOSIS — I49.3 VENTRICULAR PREMATURE DEPOLARIZATION: ICD-10-CM

## 2021-04-08 DIAGNOSIS — Z79.01 LONG TERM (CURRENT) USE OF ANTICOAGULANTS: ICD-10-CM

## 2021-04-08 DIAGNOSIS — I25.110 ATHEROSCLEROTIC HEART DISEASE OF NATIVE CORONARY ARTERY WITH UNSTABLE ANGINA PECTORIS: ICD-10-CM

## 2021-04-08 DIAGNOSIS — Z86.718 PERSONAL HISTORY OF OTHER VENOUS THROMBOSIS AND EMBOLISM: ICD-10-CM

## 2021-04-08 DIAGNOSIS — I27.82 CHRONIC PULMONARY EMBOLISM: ICD-10-CM

## 2021-04-08 DIAGNOSIS — I48.91 UNSPECIFIED ATRIAL FIBRILLATION: ICD-10-CM

## 2021-04-08 DIAGNOSIS — N18.9 CHRONIC KIDNEY DISEASE, UNSPECIFIED: ICD-10-CM

## 2021-04-08 DIAGNOSIS — E11.22 TYPE 2 DIABETES MELLITUS WITH DIABETIC CHRONIC KIDNEY DISEASE: ICD-10-CM

## 2021-04-08 DIAGNOSIS — C61 MALIGNANT NEOPLASM OF PROSTATE: ICD-10-CM

## 2021-04-08 DIAGNOSIS — Z79.899 OTHER LONG TERM (CURRENT) DRUG THERAPY: ICD-10-CM

## 2021-04-08 DIAGNOSIS — I20.9 ANGINA PECTORIS, UNSPECIFIED: ICD-10-CM

## 2021-04-08 DIAGNOSIS — H40.9 UNSPECIFIED GLAUCOMA: ICD-10-CM

## 2021-04-08 DIAGNOSIS — E78.5 HYPERLIPIDEMIA, UNSPECIFIED: ICD-10-CM

## 2021-04-08 DIAGNOSIS — I12.9 HYPERTENSIVE CHRONIC KIDNEY DISEASE WITH STAGE 1 THROUGH STAGE 4 CHRONIC KIDNEY DISEASE, OR UNSPECIFIED CHRONIC KIDNEY DISEASE: ICD-10-CM

## 2021-04-09 ENCOUNTER — APPOINTMENT (OUTPATIENT)
Dept: HEART AND VASCULAR | Facility: CLINIC | Age: 81
End: 2021-04-09
Payer: MEDICARE

## 2021-04-09 ENCOUNTER — NON-APPOINTMENT (OUTPATIENT)
Age: 81
End: 2021-04-09

## 2021-04-09 VITALS
HEART RATE: 90 BPM | DIASTOLIC BLOOD PRESSURE: 70 MMHG | TEMPERATURE: 98.6 F | SYSTOLIC BLOOD PRESSURE: 140 MMHG | OXYGEN SATURATION: 98 % | BODY MASS INDEX: 27.1 KG/M2 | HEIGHT: 69 IN | WEIGHT: 182.98 LBS

## 2021-04-09 PROCEDURE — 99072 ADDL SUPL MATRL&STAF TM PHE: CPT

## 2021-04-09 PROCEDURE — 93000 ELECTROCARDIOGRAM COMPLETE: CPT

## 2021-04-09 PROCEDURE — 99214 OFFICE O/P EST MOD 30 MIN: CPT

## 2021-04-15 ENCOUNTER — INPATIENT (INPATIENT)
Facility: HOSPITAL | Age: 81
LOS: 0 days | Discharge: HOME | DRG: 812 | End: 2021-04-16
Attending: HOSPITALIST | Admitting: HOSPITALIST
Payer: MEDICARE

## 2021-04-15 VITALS
OXYGEN SATURATION: 98 % | RESPIRATION RATE: 18 BRPM | WEIGHT: 186.07 LBS | SYSTOLIC BLOOD PRESSURE: 131 MMHG | TEMPERATURE: 98 F | HEIGHT: 60.9 IN | HEART RATE: 73 BPM | DIASTOLIC BLOOD PRESSURE: 77 MMHG

## 2021-04-15 DIAGNOSIS — I25.10 ATHEROSCLEROTIC HEART DISEASE OF NATIVE CORONARY ARTERY WITHOUT ANGINA PECTORIS: ICD-10-CM

## 2021-04-15 DIAGNOSIS — R20.0 ANESTHESIA OF SKIN: ICD-10-CM

## 2021-04-15 DIAGNOSIS — E78.5 HYPERLIPIDEMIA, UNSPECIFIED: ICD-10-CM

## 2021-04-15 DIAGNOSIS — I10 ESSENTIAL (PRIMARY) HYPERTENSION: ICD-10-CM

## 2021-04-15 DIAGNOSIS — E11.9 TYPE 2 DIABETES MELLITUS WITHOUT COMPLICATIONS: ICD-10-CM

## 2021-04-15 DIAGNOSIS — C61 MALIGNANT NEOPLASM OF PROSTATE: ICD-10-CM

## 2021-04-15 DIAGNOSIS — I48.0 PAROXYSMAL ATRIAL FIBRILLATION: ICD-10-CM

## 2021-04-15 DIAGNOSIS — D64.9 ANEMIA, UNSPECIFIED: ICD-10-CM

## 2021-04-15 LAB
A1C WITH ESTIMATED AVERAGE GLUCOSE RESULT: 6.1 % — HIGH (ref 4–5.6)
ALBUMIN SERPL ELPH-MCNC: 3.7 G/DL — SIGNIFICANT CHANGE UP (ref 3.3–5)
ALP SERPL-CCNC: 33 U/L — LOW (ref 40–120)
ALT FLD-CCNC: 17 U/L — SIGNIFICANT CHANGE UP (ref 10–45)
ANION GAP SERPL CALC-SCNC: 12 MMOL/L — SIGNIFICANT CHANGE UP (ref 5–17)
APPEARANCE UR: CLEAR — SIGNIFICANT CHANGE UP
APTT BLD: 39 SEC — HIGH (ref 27.5–35.5)
AST SERPL-CCNC: 26 U/L — SIGNIFICANT CHANGE UP (ref 10–40)
BASE EXCESS BLDV CALC-SCNC: -4.7 MMOL/L — SIGNIFICANT CHANGE UP
BASOPHILS # BLD AUTO: 0.05 K/UL — SIGNIFICANT CHANGE UP (ref 0–0.2)
BASOPHILS NFR BLD AUTO: 0.5 % — SIGNIFICANT CHANGE UP (ref 0–2)
BILIRUB SERPL-MCNC: 0.2 MG/DL — SIGNIFICANT CHANGE UP (ref 0.2–1.2)
BILIRUB UR-MCNC: NEGATIVE — SIGNIFICANT CHANGE UP
BLD GP AB SCN SERPL QL: NEGATIVE — SIGNIFICANT CHANGE UP
BUN SERPL-MCNC: 16 MG/DL — SIGNIFICANT CHANGE UP (ref 7–23)
CALCIUM SERPL-MCNC: 8.5 MG/DL — SIGNIFICANT CHANGE UP (ref 8.4–10.5)
CHLORIDE SERPL-SCNC: 109 MMOL/L — HIGH (ref 96–108)
CHOLEST SERPL-MCNC: 126 MG/DL — SIGNIFICANT CHANGE UP
CO2 SERPL-SCNC: 18 MMOL/L — LOW (ref 22–31)
COLOR SPEC: YELLOW — SIGNIFICANT CHANGE UP
CREAT SERPL-MCNC: 1.2 MG/DL — SIGNIFICANT CHANGE UP (ref 0.5–1.3)
DIFF PNL FLD: NEGATIVE — SIGNIFICANT CHANGE UP
EOSINOPHIL # BLD AUTO: 0.22 K/UL — SIGNIFICANT CHANGE UP (ref 0–0.5)
EOSINOPHIL NFR BLD AUTO: 2.2 % — SIGNIFICANT CHANGE UP (ref 0–6)
ESTIMATED AVERAGE GLUCOSE: 128 MG/DL — HIGH (ref 68–114)
FERRITIN SERPL-MCNC: 415 NG/ML — HIGH (ref 30–400)
GAS PNL BLDV: SIGNIFICANT CHANGE UP
GLUCOSE SERPL-MCNC: 91 MG/DL — SIGNIFICANT CHANGE UP (ref 70–99)
GLUCOSE UR QL: NEGATIVE — SIGNIFICANT CHANGE UP
HCO3 BLDV-SCNC: 21 MMOL/L — SIGNIFICANT CHANGE UP (ref 20–27)
HCT VFR BLD CALC: 23.4 % — LOW (ref 39–50)
HDLC SERPL-MCNC: 40 MG/DL — LOW
HGB BLD-MCNC: 7.2 G/DL — LOW (ref 13–17)
IMM GRANULOCYTES NFR BLD AUTO: 1.2 % — SIGNIFICANT CHANGE UP (ref 0–1.5)
INR BLD: 1.38 — HIGH (ref 0.88–1.16)
IRON SATN MFR SERPL: 17 % — SIGNIFICANT CHANGE UP (ref 16–55)
IRON SATN MFR SERPL: 35 UG/DL — LOW (ref 45–165)
KETONES UR-MCNC: NEGATIVE — SIGNIFICANT CHANGE UP
LEUKOCYTE ESTERASE UR-ACNC: NEGATIVE — SIGNIFICANT CHANGE UP
LIPID PNL WITH DIRECT LDL SERPL: 41 MG/DL — SIGNIFICANT CHANGE UP
LYMPHOCYTES # BLD AUTO: 2.06 K/UL — SIGNIFICANT CHANGE UP (ref 1–3.3)
LYMPHOCYTES # BLD AUTO: 21.1 % — SIGNIFICANT CHANGE UP (ref 13–44)
MCHC RBC-ENTMCNC: 28 PG — SIGNIFICANT CHANGE UP (ref 27–34)
MCHC RBC-ENTMCNC: 30.8 GM/DL — LOW (ref 32–36)
MCV RBC AUTO: 91.1 FL — SIGNIFICANT CHANGE UP (ref 80–100)
MONOCYTES # BLD AUTO: 0.94 K/UL — HIGH (ref 0–0.9)
MONOCYTES NFR BLD AUTO: 9.6 % — SIGNIFICANT CHANGE UP (ref 2–14)
NEUTROPHILS # BLD AUTO: 6.39 K/UL — SIGNIFICANT CHANGE UP (ref 1.8–7.4)
NEUTROPHILS NFR BLD AUTO: 65.4 % — SIGNIFICANT CHANGE UP (ref 43–77)
NITRITE UR-MCNC: NEGATIVE — SIGNIFICANT CHANGE UP
NON HDL CHOLESTEROL: 86 MG/DL — SIGNIFICANT CHANGE UP
NRBC # BLD: 0 /100 WBCS — SIGNIFICANT CHANGE UP (ref 0–0)
OB PNL STL: POSITIVE
PCO2 BLDV: 42 MMHG — SIGNIFICANT CHANGE UP (ref 42–55)
PH BLDV: 7.32 — SIGNIFICANT CHANGE UP (ref 7.32–7.43)
PH UR: 6.5 — SIGNIFICANT CHANGE UP (ref 5–8)
PLATELET # BLD AUTO: 301 K/UL — SIGNIFICANT CHANGE UP (ref 150–400)
PO2 BLDV: 20 MMHG — SIGNIFICANT CHANGE UP
POTASSIUM SERPL-MCNC: 4.2 MMOL/L — SIGNIFICANT CHANGE UP (ref 3.5–5.3)
POTASSIUM SERPL-SCNC: 4.2 MMOL/L — SIGNIFICANT CHANGE UP (ref 3.5–5.3)
PROT SERPL-MCNC: 6.2 G/DL — SIGNIFICANT CHANGE UP (ref 6–8.3)
PROT UR-MCNC: NEGATIVE MG/DL — SIGNIFICANT CHANGE UP
PROTHROM AB SERPL-ACNC: 16.3 SEC — HIGH (ref 10.6–13.6)
RBC # BLD: 2.57 M/UL — LOW (ref 4.2–5.8)
RBC # FLD: 17.9 % — HIGH (ref 10.3–14.5)
RH IG SCN BLD-IMP: POSITIVE — SIGNIFICANT CHANGE UP
RH IG SCN BLD-IMP: POSITIVE — SIGNIFICANT CHANGE UP
SAO2 % BLDV: 24 % — SIGNIFICANT CHANGE UP
SARS-COV-2 RNA SPEC QL NAA+PROBE: SIGNIFICANT CHANGE UP
SODIUM SERPL-SCNC: 139 MMOL/L — SIGNIFICANT CHANGE UP (ref 135–145)
SP GR SPEC: <=1.005 — SIGNIFICANT CHANGE UP (ref 1–1.03)
TIBC SERPL-MCNC: 205 UG/DL — LOW (ref 220–430)
TRIGL SERPL-MCNC: 224 MG/DL — HIGH
TROPONIN T SERPL-MCNC: 0.01 NG/ML — SIGNIFICANT CHANGE UP (ref 0–0.01)
TSH SERPL-MCNC: 5.28 UIU/ML — HIGH (ref 0.35–4.94)
UIBC SERPL-MCNC: 170 UG/DL — SIGNIFICANT CHANGE UP (ref 110–370)
UROBILINOGEN FLD QL: 0.2 E.U./DL — SIGNIFICANT CHANGE UP
WBC # BLD: 9.78 K/UL — SIGNIFICANT CHANGE UP (ref 3.8–10.5)
WBC # FLD AUTO: 9.78 K/UL — SIGNIFICANT CHANGE UP (ref 3.8–10.5)

## 2021-04-15 PROCEDURE — 70498 CT ANGIOGRAPHY NECK: CPT | Mod: 26,MA

## 2021-04-15 PROCEDURE — 70496 CT ANGIOGRAPHY HEAD: CPT | Mod: 26,MA

## 2021-04-15 PROCEDURE — 0042T: CPT

## 2021-04-15 PROCEDURE — 99223 1ST HOSP IP/OBS HIGH 75: CPT

## 2021-04-15 PROCEDURE — 71045 X-RAY EXAM CHEST 1 VIEW: CPT | Mod: 26

## 2021-04-15 PROCEDURE — 99285 EMERGENCY DEPT VISIT HI MDM: CPT

## 2021-04-15 PROCEDURE — 70450 CT HEAD/BRAIN W/O DYE: CPT | Mod: 26,59,MA

## 2021-04-15 RX ORDER — DEXTROSE 50 % IN WATER 50 %
25 SYRINGE (ML) INTRAVENOUS ONCE
Refills: 0 | Status: DISCONTINUED | OUTPATIENT
Start: 2021-04-15 | End: 2021-04-16

## 2021-04-15 RX ORDER — DORZOLAMIDE HYDROCHLORIDE 20 MG/ML
1 SOLUTION/ DROPS OPHTHALMIC THREE TIMES A DAY
Refills: 0 | Status: DISCONTINUED | OUTPATIENT
Start: 2021-04-15 | End: 2021-04-16

## 2021-04-15 RX ORDER — LATANOPROST 0.05 MG/ML
1 SOLUTION/ DROPS OPHTHALMIC; TOPICAL AT BEDTIME
Refills: 0 | Status: DISCONTINUED | OUTPATIENT
Start: 2021-04-15 | End: 2021-04-16

## 2021-04-15 RX ORDER — FINASTERIDE 5 MG/1
5 TABLET, FILM COATED ORAL DAILY
Refills: 0 | Status: DISCONTINUED | OUTPATIENT
Start: 2021-04-15 | End: 2021-04-16

## 2021-04-15 RX ORDER — INSULIN LISPRO 100/ML
VIAL (ML) SUBCUTANEOUS
Refills: 0 | Status: DISCONTINUED | OUTPATIENT
Start: 2021-04-15 | End: 2021-04-16

## 2021-04-15 RX ORDER — GLUCAGON INJECTION, SOLUTION 0.5 MG/.1ML
1 INJECTION, SOLUTION SUBCUTANEOUS ONCE
Refills: 0 | Status: DISCONTINUED | OUTPATIENT
Start: 2021-04-15 | End: 2021-04-16

## 2021-04-15 RX ORDER — TRAMADOL HYDROCHLORIDE 50 MG/1
25 TABLET ORAL ONCE
Refills: 0 | Status: DISCONTINUED | OUTPATIENT
Start: 2021-04-15 | End: 2021-04-15

## 2021-04-15 RX ORDER — FERROUS SULFATE 325(65) MG
325 TABLET ORAL DAILY
Refills: 0 | Status: DISCONTINUED | OUTPATIENT
Start: 2021-04-15 | End: 2021-04-16

## 2021-04-15 RX ORDER — METOPROLOL TARTRATE 50 MG
50 TABLET ORAL DAILY
Refills: 0 | Status: DISCONTINUED | OUTPATIENT
Start: 2021-04-16 | End: 2021-04-16

## 2021-04-15 RX ORDER — DEXAMETHASONE 0.5 MG/5ML
2 ELIXIR ORAL
Qty: 0 | Refills: 0 | DISCHARGE

## 2021-04-15 RX ORDER — SODIUM CHLORIDE 9 MG/ML
1000 INJECTION, SOLUTION INTRAVENOUS
Refills: 0 | Status: DISCONTINUED | OUTPATIENT
Start: 2021-04-15 | End: 2021-04-16

## 2021-04-15 RX ORDER — DEXTROSE 50 % IN WATER 50 %
15 SYRINGE (ML) INTRAVENOUS ONCE
Refills: 0 | Status: DISCONTINUED | OUTPATIENT
Start: 2021-04-15 | End: 2021-04-16

## 2021-04-15 RX ORDER — LISINOPRIL 2.5 MG/1
2.5 TABLET ORAL DAILY
Refills: 0 | Status: DISCONTINUED | OUTPATIENT
Start: 2021-04-16 | End: 2021-04-16

## 2021-04-15 RX ORDER — DEXTROSE 50 % IN WATER 50 %
12.5 SYRINGE (ML) INTRAVENOUS ONCE
Refills: 0 | Status: DISCONTINUED | OUTPATIENT
Start: 2021-04-15 | End: 2021-04-16

## 2021-04-15 RX ORDER — PREGABALIN 225 MG/1
1000 CAPSULE ORAL DAILY
Refills: 0 | Status: DISCONTINUED | OUTPATIENT
Start: 2021-04-15 | End: 2021-04-16

## 2021-04-15 RX ORDER — ASPIRIN/CALCIUM CARB/MAGNESIUM 324 MG
81 TABLET ORAL DAILY
Refills: 0 | Status: DISCONTINUED | OUTPATIENT
Start: 2021-04-16 | End: 2021-04-16

## 2021-04-15 RX ORDER — AMLODIPINE BESYLATE 2.5 MG/1
10 TABLET ORAL DAILY
Refills: 0 | Status: DISCONTINUED | OUTPATIENT
Start: 2021-04-15 | End: 2021-04-16

## 2021-04-15 RX ORDER — PANTOPRAZOLE SODIUM 20 MG/1
40 TABLET, DELAYED RELEASE ORAL
Refills: 0 | Status: DISCONTINUED | OUTPATIENT
Start: 2021-04-15 | End: 2021-04-16

## 2021-04-15 RX ORDER — ACETAMINOPHEN 500 MG
650 TABLET ORAL EVERY 6 HOURS
Refills: 0 | Status: DISCONTINUED | OUTPATIENT
Start: 2021-04-15 | End: 2021-04-16

## 2021-04-15 RX ORDER — MULTIVIT-MIN/FERROUS GLUCONATE 9 MG/15 ML
1 LIQUID (ML) ORAL DAILY
Refills: 0 | Status: DISCONTINUED | OUTPATIENT
Start: 2021-04-15 | End: 2021-04-16

## 2021-04-15 RX ORDER — ATORVASTATIN CALCIUM 80 MG/1
40 TABLET, FILM COATED ORAL AT BEDTIME
Refills: 0 | Status: DISCONTINUED | OUTPATIENT
Start: 2021-04-15 | End: 2021-04-16

## 2021-04-15 RX ADMIN — TRAMADOL HYDROCHLORIDE 25 MILLIGRAM(S): 50 TABLET ORAL at 21:40

## 2021-04-15 RX ADMIN — TRAMADOL HYDROCHLORIDE 25 MILLIGRAM(S): 50 TABLET ORAL at 21:10

## 2021-04-15 NOTE — ED ADULT NURSE NOTE - OBJECTIVE STATEMENT
pt pt 79 y/o male here c/o intermittent headache, dizziness,  facial pain/ numbness since 2300 last night. When pt evaluated by MD Schaffer, noted to have a left sided facial droop, stroke code called and protocol initiated. pt denies blurred vision, slurred speech.

## 2021-04-15 NOTE — H&P ADULT - PROBLEM SELECTOR PLAN 3
s/p cardiac cath 3/30/21 with Dr Samano: IRASEMA OM1, LAD luminal, RCA luminal, R radial access  -recommended triple therapy ASA/Plavix/Eliquis x 1 week and then d/c plavix.   -c/w aspirin 81 mg, holding home Eliquis in setting of anemia requiring tx  -c/w atorvastatin 40 mg QHS s/p cardiac cath 3/30/21 with Dr Samano: IRASEMA OM1, LAD luminal, RCA luminal, R radial access  -recommended triple therapy ASA/Plavix/Eliquis x 1 week and then d/c plavix (as per cath lab note).   -c/w aspirin 81 mg, holding home Eliquis in setting of anemia requiring tx  -c/w atorvastatin 40 mg QHS

## 2021-04-15 NOTE — CONSULT NOTE ADULT - ASSESSMENT
81 yo M with hx of prostate Ca, HTN, HLD, DM 2, glaucoma, prostate cancer (s/p prostatectomy 1994, on Lupron and Xgeva), afib (on eliquis 2.5mg BID due to fall risk), PE/DVT, CAD s/p stent (3/2021) presents with right facial numbness that started 4/14 11:15PM that continued with intermittent episodes the next day. On exam, patient noted to also have a mild left facial droop that resolved within 15-20 minutes. CTH with multiple basal ganglia and thalamic lacunar infarcts of uncertain duration. CTA neck with no intracranial stenosis or occlusion. CTA head with atherosclerotic calcification of carotid and vertebral arteries with no significant stenosis. Upon chart review, patient was discharged after cardiac stent 3/2021 on ASA x 1 week and plavix daily. Patient says he is currently only taking aspirin and eliquis.     - recommend switching blood thinners/AC to plavix 75 mg daily and xarelto 15mg daily. His current dose of eliquis 2.5mg BID does not offer full anticoagulation benefit for stroke prevention.   - recommend cardiology consult for their input on antiplatelets/AC as pt with recent stent placement  - stroke prevention education (good adherence to medication, strict BP, cholesterol and glucose control)  - strongly recommend outpt cardiology follow-up within the next 7 days   - stroke neurology will sign off. At this time, no further stroke neurology work up needed. Please call stroke neurology with any questions

## 2021-04-15 NOTE — H&P ADULT - NSHPLABSRESULTS_GEN_ALL_CORE
7.2    9.78  )-----------( 301      ( 15 Apr 2021 18:50 )             23.4       04-15    139  |  109<H>  |  16  ----------------------------<  91  4.2   |  18<L>  |  1.20    Ca    8.5      15 Apr 2021 18:50    TPro  6.2  /  Alb  3.7  /  TBili  0.2  /  DBili  x   /  AST  26  /  ALT  17  /  AlkPhos  33<L>  04-15      PT/INR - ( 15 Apr 2021 18:50 )   PT: 16.3 sec;   INR: 1.38          PTT - ( 15 Apr 2021 18:50 )  PTT:39.0 sec    CARDIAC MARKERS ( 15 Apr 2021 18:50 )  x     / 0.01 ng/mL / x     / x     / x            Urinalysis Basic - ( 15 Apr 2021 20:39 )    Color: Yellow / Appearance: Clear / SG: <=1.005 / pH: x  Gluc: x / Ketone: NEGATIVE  / Bili: Negative / Urobili: 0.2 E.U./dL   Blood: x / Protein: NEGATIVE mg/dL / Nitrite: NEGATIVE   Leuk Esterase: NEGATIVE / RBC: x / WBC x   Sq Epi: x / Non Sq Epi: x / Bacteria: x        EKG:

## 2021-04-15 NOTE — H&P ADULT - ASSESSMENT
81 y/o M PMHx  prostate ca on chemo, HTN, HLD, DM II, anemia of chronic disease/DOMITILA (last tx 2 yrs ago, Hgb 8-9 on prior admissions [id number]), PE/DVT 1/2020, Afib in Eliquis 2.5 mg BID (last dose 4/15 AM, reduced dose 2/2 fall risk with last fall 3/2021), CAD (s/p IRASEMA x 1 OM 3/30/21, triple therapy ASA/Plavix/Eliquis x 1 week then d/c plavix) presents to the ED 4/15/21 c/o intermittent R sided face numbness described as a "novocaine" sensation lasting for 30 minutes  x 1 day.  Stroke team consulted. mild left facial droop that resolved within 15-20 minutes. CTH with multiple basal ganglia and thalamic lacunar infarcts of uncertain duration. CTA neck with no intracranial stenosis or occlusion. CTA head with atherosclerotic calcification of carotid and vertebral arteries with no significant stenosis.  Labs significant for Hgb 7.2, trop negative, fecal occult POSITIVE. Pt admitted to Cardiac tele for anemia workup, currently ordered for 1 Unit PRBC's.

## 2021-04-15 NOTE — ED PROVIDER NOTE - CHPI ED SYMPTOMS NEG
no fever Denies  dental pain, difficulty speaking, changes in sensation in face, Hx COVID19, Hx DM./no fever

## 2021-04-15 NOTE — ED PROVIDER NOTE - OBJECTIVE STATEMENT
79 y/o M PMHx plate on his R face, prostate ca on chemo, HTN and HLD presents to the ED c/o R face numbness described as a "novocaine" sensation since yesterday night. Pt reports it is intermittent and lasts on average of 30minutes. Pt reports he had an abscess on his R face after a dental procedure and says sx feel similar but not exactly the same. Denies numbness during previous abscess. Denies difficulty speaking, changes in sensation in face, Hx COVID19, Hx DM. Pt is vaccinated for COVID. 81 y/o M PMHx plate on his R face, prostate ca on chemo, HTN and HLD presents to the ED c/o R face numbness described as a "novocaine" sensation since yesterday night. Pt reports it is intermittent and lasts on average of 30minutes. Pt reports he had an abscess on his R face after a dental procedure and says sx feel similar but not exactly the same. Denies numbness during previous abscess. Denies  dental pain, difficulty speaking, changes in sensation in face, Hx COVID19, Hx DM. Pt is vaccinated for COVID.

## 2021-04-15 NOTE — H&P ADULT - PROBLEM SELECTOR PLAN 2
Intermittent R sided facial numbness x 1 day  -stroke team consulted and observed mild left facial droop that resolved within 15-20 minutes. Currently no neurological deficits  -CTH with multiple basal ganglia and thalamic lacunar infarcts of uncertain duration. CTA neck with no intracranial stenosis or occlusion. CTA head with atherosclerotic calcification of carotid and vertebral arteries with no significant stenosis.    -Stroke team recommend switching blood thinners/AC to plavix 75 mg daily and xarelto 15mg daily.  Eliquis 2.5mg BID does not offer full anticoagulation benefit for stroke prevention.   -stroke neurology signed off. No further stroke neurology work up   -discuss resuming of A/C with Dr Samano and GI given current anemia requiring  1 Unit PRBC

## 2021-04-15 NOTE — H&P ADULT - HISTORY OF PRESENT ILLNESS
81 y/o M PMHx  prostate ca on chemo, HTN, HLD, PE/DVT 1/2020,  Afib in Eliquis 2.5 mg BID (reduced dose 2/2 fall risk),  CAD (s/p IRASEMA x 1 OM 3/30/21 and recommended for triple therapy ASA/Plavix/Eliquis x 1 week) presents to the ED 4/15/21  c/o intermittent R face numbness described as a "novocaine" sensation lasting for 30 minutes  x 1 day. Pt reports he had an abscess on his R face after a dental procedure and says sx feel similar but not exactly the same. Denies numbness during previous abscess. Denies  dental pain, difficulty speaking, changes in sensation in face, Hx COVID19. Pt is vaccinated for COVID.    Stroke team consulted. CTH with multiple basal ganglia and thalamic lacunar infarcts of uncertain duration. CTA neck with no intracranial stenosis or occlusion. CTA head with atherosclerotic calcification of carotid and vertebral arteries with no significant stenosis.  Labs significant for Hgb 7.2, trop negative, fecal occult POSITIVE,    81 y/o M PMHx  prostate ca on chemo, HTN, HLD, anemia of chronic disease (last tx 2 yrs ago, Hgb 8-9 on prior admissions [id number]), PE/DVT 1/2020, Afib in Eliquis 2.5 mg BID (reduced dose 2/2 fall risk),  CAD (s/p IRASEMA x 1 OM 3/30/21 and recommended for triple therapy ASA/Plavix/Eliquis x 1 week) presents to the ED 4/15/21  c/o intermittent R face numbness described as a "novocaine" sensation lasting for 30 minutes  x 1 day. Pt reports he had an abscess on his R face after a dental procedure and says sx feel similar but not exactly the same.   Denies  dental pain, difficulty speaking, changes in sensation in face, Hx COVID19. Pt is vaccinated for COVID.    In ED T 98.2, HR 73, /77, RR 18, O2 98% RA. Labs significant for Hgb 7.2, trop negative, fecal occult POSITIVE, COVID PCR pending.   EKG: NSR @ 72 BPM   Stroke team consulted. mild left facial droop that resolved within 15-20 minutes. CTH with multiple basal ganglia and thalamic lacunar infarcts of uncertain duration. CTA neck with no intracranial stenosis or occlusion. CTA head with atherosclerotic calcification of carotid and vertebral arteries with no significant stenosis.  Stroke team recommend switching blood thinners/AC to plavix 75 mg daily and xarelto 15mg daily.  Eliquis 2.5mg BID does not offer full anticoagulation benefit for stroke prevention.   Pt admitted to Cardiac tele for anemia workup, currently ordered for 1 Unit PRBC's.     81 y/o M PMHx  prostate ca on chemo, HTN, HLD, anemia of chronic disease (last tx 2 yrs ago, Hgb 8-9 on prior admissions [id number]), PE/DVT 1/2020, Afib in Eliquis 2.5 mg BID (reduced dose 2/2 fall risk, last fall 3/2021), CAD (s/p IRASEMA x 1 OM 3/30/21, triple therapy ASA/Plavix/Eliquis x 1 week then d/c ASA) presents to the ED 4/15/21 c/o intermittent R sided face numbness described as a "novocaine" sensation lasting for 30 minutes  x 1 day. Pt reports he had an abscess on his R face after a dental procedure (1 month ago s/p abx x 1 week) and says sx feel similar but not exactly the same.  Pt does   Denies  dental pain, difficulty speaking, blurred vision, dizziness, syncope, CP,  Hx COVID19. Pt is vaccinated for COVID.    In ED T 98.2, HR 73, /77, RR 18, O2 98% RA. Labs significant for Hgb 7.2, trop negative, fecal occult POSITIVE, COVID PCR pending.   EKG: NSR @ 72 BPM   Stroke team consulted. mild left facial droop that resolved within 15-20 minutes. CTH with multiple basal ganglia and thalamic lacunar infarcts of uncertain duration. CTA neck with no intracranial stenosis or occlusion. CTA head with atherosclerotic calcification of carotid and vertebral arteries with no significant stenosis.  Stroke team recommend switching blood thinners/AC to plavix 75 mg daily and xarelto 15mg daily.  Eliquis 2.5mg BID does not offer full anticoagulation benefit for stroke prevention.   Pt admitted to Cardiac tele for anemia workup, currently ordered for 1 Unit PRBC's.     81 y/o M PMHx  prostate ca on chemo, HTN, HLD, anemia of chronic disease/DOMITILA (last tx 2 yrs ago, Hgb 8-9 on prior admissions [id number]), PE/DVT 1/2020, Afib in Eliquis 2.5 mg BID (last dose 4/15 AM, reduced dose 2/2 fall risk with last fall 3/2021), CAD (s/p IRASEMA x 1 OM 3/30/21, triple therapy ASA/Plavix/Eliquis x 1 week then d/c ASA but pt's family reports stopping plavix and NOT aspirin) presents to the ED 4/15/21 c/o intermittent R sided face numbness described as a "novocaine" sensation lasting for 30 minutes  x 1 day. Pt reports he had an abscess on his R face after a dental procedure (1 month ago s/p Augmentin x 1 week) and says sx feel similar but not exactly the same.  Pt does report JAIME with walking 1-2 blocks for the past year.  Pt reports dark stool from iron supplementation.   Denies  dental pain, difficulty speaking, blurred vision, dizziness, syncope, CP, orthopnea, PND, LE edema, fever, chills, N/V/D, sick contact, travel, BRBPR, hematuria dysuria, URI or COVID symptoms or prior infection.  Pt is vaccinated for COVID.  In ED T 98.2, HR 73, /77, RR 18, O2 98% RA. Labs significant for Hgb 7.2, trop negative, fecal occult POSITIVE, COVID PCR pending.   EKG: NSR @ 72 BPM   Stroke team consulted. mild left facial droop that resolved within 15-20 minutes. CTH with multiple basal ganglia and thalamic lacunar infarcts of uncertain duration. CTA neck with no intracranial stenosis or occlusion. CTA head with atherosclerotic calcification of carotid and vertebral arteries with no significant stenosis.  Stroke team recommend switching blood thinners/AC to plavix 75 mg daily and xarelto 15mg daily.  Eliquis 2.5mg BID does not offer full anticoagulation benefit for stroke prevention.   Pt admitted to Cardiac tele for anemia workup, currently ordered for 1 Unit PRBC's.     79 y/o M PMHx  prostate ca on chemo, HTN, HLD, anemia of chronic disease/DOMITILA (last tx 2 yrs ago, Hgb 8-9 on prior admissions [id number]), PE/DVT 1/2020, Afib in Eliquis 2.5 mg BID (last dose 4/15 AM, reduced dose 2/2 fall risk with last fall 3/2021), CAD (s/p IRASEMA x 1 OM 3/30/21, triple therapy ASA/Plavix/Eliquis x 1 week then d/c plavix) presents to the ED 4/15/21 c/o intermittent R sided face numbness described as a "novocaine" sensation lasting for 30 minutes  x 1 day. Pt reports he had an abscess on his R face after a dental procedure (1 month ago s/p Augmentin x 1 week) and says sx feel similar but not exactly the same.  Pt does report JAIME with walking 1-2 blocks for the past year.  Pt reports dark stool from iron supplementation.   Denies  dental pain, difficulty speaking, blurred vision, dizziness, syncope, CP, orthopnea, PND, LE edema, fever, chills, N/V/D, sick contact, travel, BRBPR, hematuria dysuria, URI or COVID symptoms or prior infection.  Pt is vaccinated for COVID.  In ED T 98.2, HR 73, /77, RR 18, O2 98% RA. Labs significant for Hgb 7.2, trop negative, fecal occult POSITIVE, COVID PCR pending.   EKG: NSR @ 72 BPM   Stroke team consulted. mild left facial droop that resolved within 15-20 minutes. CTH with multiple basal ganglia and thalamic lacunar infarcts of uncertain duration. CTA neck with no intracranial stenosis or occlusion. CTA head with atherosclerotic calcification of carotid and vertebral arteries with no significant stenosis.  Stroke team recommend switching blood thinners/AC to plavix 75 mg daily and xarelto 15mg daily.  Eliquis 2.5mg BID does not offer full anticoagulation benefit for stroke prevention.   Pt admitted to Cardiac tele for anemia workup, currently ordered for 1 Unit PRBC's.     81 y/o M PMHx  prostate ca on chemo, HTN, HLD, anemia of chronic disease/DOMITILA (last tx 2 yrs ago, Hgb 8-9 on prior admissions [id number]), PE/DVT 1/2020, Afib in Eliquis 2.5 mg BID (last dose 4/15 AM, reduced dose 2/2 fall risk with last fall 3/2021), CAD (s/p IRASEMA x 1 OM 3/30/21, triple therapy ASA/Plavix/Eliquis x 1 week then d/c plavix) presents to the ED 4/15/21 c/o intermittent R sided face numbness described as a "novocaine" sensation lasting for 30 minutes  x 1 day. Pt reports he had an abscess on his R face after a dental procedure (1 month ago s/p Augmentin x 1 week) and says sx feel similar but not exactly the same.  Pt does report JAIME with walking 1-2 blocks for the past year.  Pt reports dark stool from iron supplementation.   Denies  dental pain, difficulty speaking, blurred vision, dizziness, syncope, CP, orthopnea, PND, LE edema, fever, chills, N/V/D, sick contact, travel, BRBPR, hematuria dysuria, URI or COVID symptoms or prior infection.  Pt is vaccinated for COVID.  In ED T 98.2, HR 73, /77, RR 18, O2 98% RA. Labs significant for Hgb 7.2, trop negative, fecal occult POSITIVE, COVID PCR pending. EKG: NSR @ 72 BPM   Stroke team consulted. mild left facial droop that resolved within 15-20 minutes. CTH with multiple basal ganglia and thalamic lacunar infarcts of uncertain duration. CTA neck with no intracranial stenosis or occlusion. CTA head with atherosclerotic calcification of carotid and vertebral arteries with no significant stenosis.  Stroke team recommend switching blood thinners/AC to plavix 75 mg daily and xarelto 15mg daily.  Eliquis 2.5mg BID does not offer full anticoagulation benefit for stroke prevention.   Pt admitted to Cardiac tele for anemia workup, currently ordered for 1 Unit PRBC's.     81 y/o M PMHx  prostate ca on chemo, HTN, HLD, DM II, anemia of chronic disease/DOMITILA (last tx 2 yrs ago, Hgb 8-9 on prior admissions [id number]), PE/DVT 1/2020, Afib in Eliquis 2.5 mg BID (last dose 4/15 AM, reduced dose 2/2 fall risk with last fall 3/2021), CAD (s/p IRASEMA x 1 OM 3/30/21, triple therapy ASA/Plavix/Eliquis x 1 week then d/c plavix) presents to the ED 4/15/21 c/o intermittent R sided face numbness described as a "novocaine" sensation lasting for 30 minutes  x 1 day. Pt reports he had an abscess on his R face after a dental procedure (1 month ago s/p Augmentin x 1 week) and says sx feel similar but not exactly the same.  Pt does report JAIME with walking 1-2 blocks for the past year.  Pt reports dark stool from iron supplementation.   Denies  dental pain, difficulty speaking, blurred vision, dizziness, syncope, CP, orthopnea, PND, LE edema, fever, chills, N/V/D, sick contact, travel, BRBPR, hematuria dysuria, URI or COVID symptoms or prior infection.  Pt is vaccinated for COVID.  In ED T 98.2, HR 73, /77, RR 18, O2 98% RA. Labs significant for Hgb 7.2, trop negative, fecal occult POSITIVE, COVID PCR pending. EKG: NSR @ 72 BPM   Stroke team consulted. mild left facial droop that resolved within 15-20 minutes. CTH with multiple basal ganglia and thalamic lacunar infarcts of uncertain duration. CTA neck with no intracranial stenosis or occlusion. CTA head with atherosclerotic calcification of carotid and vertebral arteries with no significant stenosis.  Stroke team recommend switching blood thinners/AC to plavix 75 mg daily and xarelto 15mg daily.  Eliquis 2.5mg BID does not offer full anticoagulation benefit for stroke prevention.   Pt admitted to Cardiac tele for anemia workup, currently ordered for 1 Unit PRBC's.

## 2021-04-15 NOTE — CONSULT NOTE ADULT - SUBJECTIVE AND OBJECTIVE BOX
**STROKE CODE CONSULT NOTE**    Last known well time/Time of onset of symptoms: 4/14/20 11:15PM    HPI: 81 yo M with hx of prostate Ca, HTN, HLD, DM 2, glaucoma, prostate cancer (s/p prostatectomy 1994, on Lupron and Xgeva), afib (on eliquis 2.5mg BID due to fall risk), PE/DVT, CAD s/p stent (3/2021) presents with right facial numbness that started 4/14 night. Patient thought nothing of it and went to bed. The next day, patient continued to have intermittent episodes of right facial numbness which prompted ED visit.     Neurology consulted for concern of stroke. Patient seen and examined. Patient currently still has intermittent episodes of facial numbness. He describes the symptoms as facial pain and numbness that he describes as getting a novacaine injection. Numbness extends from ear down to the jaw. Patient denies any changes in vision, extremity weakness, HA, CP, SOB. During time of exam, patient noted to have left facial droop which patient was not aware of. Patient does endorse some left sided droop when shown a photo of himself. CTH with multiple basal ganglia and thalamic lacunar infarcts of uncertain duration. CTA neck with no intracranial stenosis or occlusion. CTA head with atherosclerotic calcification of carotid and vertebral arteries with no significant stenosis.  Once CTH was completed and patient transported back to the ED, left facial droop had resolved. Daughter at bedside also confirmed that patient's face at rest and activation was at baseline, no asymmetry.       T(C): 36.8 (04-15-21 @ 17:29), Max: 36.8 (04-15-21 @ 17:29)  HR: 73 (04-15-21 @ 17:29) (73 - 73)  BP: 131/77 (04-15-21 @ 17:29) (131/77 - 131/77)  RR: 18 (04-15-21 @ 17:29) (18 - 18)  SpO2: 98% (04-15-21 @ 17:29) (98% - 98%)    PAST MEDICAL & SURGICAL HISTORY:  Hyperlipidemia  Hyperlipidemia    Malignant neoplasm of prostate  s/p total prostatectomy in 1994 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    Glaucoma  Glaucoma    Type 2 diabetes mellitus  Diabetes    Essential hypertension  Hypertension    Chemotherapy session for neoplasm  Prostate Cancer    Pulmonary embolism  1/2020    DVT (deep venous thrombosis)  1/2020 on Eliquis    Atrial fibrillation  on Eliquis    Other postprocedural status  S/P prostatectomy        FAMILY HISTORY:  Family history of malignant neoplasm of prostate (Father, Sibling)  Father        SOCIAL HISTORY:    ROS:   Constitutional: No fever, weight loss or fatigue  Eyes: No eye pain, visual disturbances, or discharge  Respiratory: No cough, wheezing, chills or hemoptysis  Cardiovascular: No chest pain, palpitations, shortness of breath, dizziness or leg swelling  Gastrointestinal: No abdominal pain. No nausea, vomiting or hematemesis; No diarrhea or constipation.   Neurological: As per HPI      MEDICATIONS  (STANDING):    MEDICATIONS  (PRN):    Allergies    No Known Allergies    Intolerances      Vital Signs Last 24 Hrs  T(C): 36.8 (15 Apr 2021 17:29), Max: 36.8 (15 Apr 2021 17:29)  T(F): 98.2 (15 Apr 2021 17:29), Max: 98.2 (15 Apr 2021 17:29)  HR: 73 (15 Apr 2021 17:29) (73 - 73)  BP: 131/77 (15 Apr 2021 17:29) (131/77 - 131/77)  BP(mean): --  RR: 18 (15 Apr 2021 17:29) (18 - 18)  SpO2: 98% (15 Apr 2021 17:29) (98% - 98%)    Physical exam:  Constitutional: No acute distress, conversant  Eyes: Anicteric sclerae, moist conjunctivae, see below for CNs  Neck: trachea midline  Pulmonary: No increased work of breathing. No use of accessory muscles  Extremities: no edema    Neurologic:  -Mental status: Awake, alert, oriented to person, place, and time. Speech is fluent with intact naming, repetition, and comprehension, no dysarthria. Recent and remote memory intact. Follows commands. Attention/concentration intact. Fund of knowledge appropriate.  -Cranial nerves:   II: Visual fields are full to confrontation.  III, IV, VI: Extraocular movements are intact without nystagmus. Pupils equally round and reactive to light  V:  Facial sensation V1-V3 equal and intact   VII: Mild left facial droop that has resolved within 15-20 minutes  VIII: Hearing grossly intact bilaterally  IX, X: Uvula is midline and soft palate rises symmetrically  XII: Tongue protrudes midline  Motor: Normal bulk and tone. No pronator drift. Strength bilateral upper extremity 5/5, bilateral lower extremities 5/5.  Sensation: Intact to light touch bilaterally. No neglect or extinction on double simultaneous testing.  Coordination: No dysmetria on finger-to-nose     NIHSS: 1    Fingerstick Blood Glucose: CAPILLARY BLOOD GLUCOSE  94 (15 Apr 2021 18:34)      POCT Blood Glucose.: 94 mg/dL (15 Apr 2021 18:14)    LABS:                        7.2    9.78  )-----------( 301      ( 15 Apr 2021 18:50 )             23.4     04-15    139  |  109<H>  |  16  ----------------------------<  91  4.2   |  18<L>  |  1.20    Ca    8.5      15 Apr 2021 18:50    TPro  6.2  /  Alb  3.7  /  TBili  0.2  /  DBili  x   /  AST  26  /  ALT  17  /  AlkPhos  33<L>  04-15    PT/INR - ( 15 Apr 2021 18:50 )   PT: 16.3 sec;   INR: 1.38          PTT - ( 15 Apr 2021 18:50 )  PTT:39.0 sec  CARDIAC MARKERS ( 15 Apr 2021 18:50 )  x     / 0.01 ng/mL / x     / x     / x              RADIOLOGY & ADDITIONAL STUDIES:  < from: CT Brain Stroke Protocol (04.15.21 @ 18:49) >  IMPRESSION:    There is extensive hypodensity in the cerebral white matter as well as multiple basal ganglia and thalamic lacunar infarcts of uncertain duration. No intracranial hemorrhage or apparent site of acute territorial infarction.    < from: CT Angio Neck w/ IV Cont (04.15.21 @ 18:50) >  IMPRESSION:  No intracranial stenosis or occlusion.      < from: CT Angio Neck w/ IV Cont (04.15.21 @ 18:50) >  IMPRESSION:  Atherosclerotic calcification of the carotid and vertebral arteries without significant stenosis.      -----------------------------------------------------------------------------------------------------------------  IV-tPA (Y/N):    N                           Reason IV-tPA not given: out of tpa window

## 2021-04-15 NOTE — ED ADULT NURSE NOTE - RESPIRATION RHYTHM, QM
PHYSICIAN NEXT STEPS:  Call the Patient    CHIEF COMPLAINT:  Chief Complaint/Protocol Used: Shoulder Injury  Onset: This afternoon around 2 pm       ASSESSMENT:  ? Onset: This afternoon around 2 pm   ? Normal True  ? Mechanism: Coleen Reynoso on her way to  her garbage and fell on the concrete ground   ? Onset: This afternoon around 2 pm   ? Appearance Of Injury: Swelling on the right shoulder   ? Size: Swelling on the shoulder   ? Pain: 8/10  -------------------------------------------------------    DISPOSITION:  Disposition Recommendation: Go to ED Now  Questions that led to disposition:  ? Can't move injured shoulder at all  Patient Directed To: Unspecified  Patient Intended Action: Send a message to my doctor      CALL NOTES:  04/15/2021 at 6:53 PM by Cristal Sykes  ? Declined to go to ED. Dr. Ruth Cuellar was paged to the patient via perfect serve. DISPOSITION OVERRIDE/PROVIDER CONSULT:  Disposition Override: N/A  Override Source: Unspecified  Consulted with PCP: No  Consulted with On-Call Physician: No    CALLER CONTACT INFO:  Name: Xin De La Garza (Self)  Phone 1: [phone] (Mobile)  Phone 2: [phone] (Home Phone) - Preferred      ENCOUNTER STARTED:  04/15/21 06:35:59 PM  ENCOUNTER ASSIGNED TO/CLOSED BY:  Cristal Sykes @ 04/15/21 06:53:53 PM      -------------------------------------------------------    CARE ADVICE given per Shoulder Injury guideline. GO TO ED NOW: You need to be seen in the Emergency Department. Go to the ER at ___________ [address] [facility] [address] now. [address] carefully.; FIRST AID ADVICE FOR SUSPECTED FRACTURE OR DISLOCATION OF THE SHOULDER:    * Use a sling to support the arm. Make the sling with a triangular piece of cloth. * Or, at the very least. the patient can support the injured arm with the other hand or a pillow.; DRIVING: Another adult should drive.       UNDERSTANDS CARE ADVICE: Yes    AGREES WITH CARE ADVICE: Yes    WILL FOLLOW CARE ADVICE: No    ------------------------------------------------------- regular

## 2021-04-15 NOTE — ED PROVIDER NOTE - PHYSICAL EXAMINATION
VITAL SIGNS: I have reviewed nursing notes and confirm.  CONSTITUTIONAL: Well-developed; well-nourished; in no acute distress.   SKIN:  warm and dry, no acute rash.   HEAD:  normocephalic, atraumatic.  EYES: EOM intact; conjunctiva and sclera clear.  ENT: No nasal discharge; airway clear.   NECK: Supple; non tender.  CARD: S1, S2 normal; Regular rate and rhythm.   RESP:  Clear to auscultation b/l, no wheezes, rales or rhonchi.  ABD: Normal bowel sounds; soft; non-distended; non-tender; no guarding/ rebound.  EXT: Normal ROM. No clubbing, cyanosis or edema. 2+ pulses to b/l ue/le.  NEURO: Alert, oriented, grossly unremarkable  PSYCH: Cooperative, mood and affect appropriate.

## 2021-04-15 NOTE — H&P ADULT - NSHPSOCIALHISTORY_GEN_ALL_CORE
Pt. lives alone, ambulates independently, daughter Aziza involved in father's care.  Pt. denies Tobacco, EtOH and illicit drug use.

## 2021-04-15 NOTE — ED ADULT NURSE REASSESSMENT NOTE - NS ED NURSE REASSESS COMMENT FT1
Patient to receive blood transfusion. Labs reviewed. Patient consent in chart. Patient educated on possible signs of blood transfusion and informed to notify staff if patient were to develop any severe respiratory distress, flank pain, or any other major concerns. Patient to receive VS per policy and procedure. Blood product checked with second RN Roe. Will continue to monitor with frequent VS and for possible transfusion reactions.
Received patient in stretcher. AOX4. VSS.  W complaints of R facial pain and numbness, pain medications pending. Patient oriented to ED area. Plan of care discussed and verbalized understanding. All needs attended. Pt on cardiac monitor. Hourly rounding in progress. Fall risk precautions maintained.

## 2021-04-15 NOTE — ED ADULT NURSE NOTE - ED STAT RN HANDOFF DETAILS
Patient stable for transfer. Transfer and plan of care discussed with patient and family member present at bedside and verbalized understanding. No acute distress noted. Safety precautions maintained. Belongings secured with patient.

## 2021-04-15 NOTE — ED ADULT NURSE NOTE - PMH
Atrial fibrillation  on Eliquis  Chemotherapy session for neoplasm  Prostate Cancer  DVT (deep venous thrombosis)  1/2020 on Eliquis  Essential hypertension  Hypertension  Glaucoma  Glaucoma  Hyperlipidemia  Hyperlipidemia  Malignant neoplasm of prostate  s/p total prostatectomy in 1994 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  Pulmonary embolism  1/2020  Type 2 diabetes mellitus  Diabetes

## 2021-04-15 NOTE — ED ADULT TRIAGE NOTE - CHIEF COMPLAINT QUOTE
Patient presents complaining of right sided tooth and jaw pain with numbness at times.  Patient reports he had a dental infection last month and it feels the same denies fevers.

## 2021-04-15 NOTE — H&P ADULT - PROBLEM SELECTOR PLAN 8
Last admission, A1c 6.1  - not on home diabetes medications   - FS/ISS. Last admission, A1c 6.1  - not on home diabetes medications   - FS/ISS.    DVT F: Oral intake  E: Replete electrolytes as needed for K<4 and Mg<2  N: DASH Diet    DVT PPX:  Held in setting of anemia requiring 1 Unit PRBC  Dispo: pending workup A1c 6.1  - not on home diabetes medications   - FS/ISS    DVT F: Oral intake  E: Replete electrolytes as needed for K<4 and Mg<2  N: DASH Diet    DVT PPX:  Held in setting of anemia requiring 1 Unit PRBC, SCD's ordered   Dispo: pending workup

## 2021-04-15 NOTE — H&P ADULT - PROBLEM SELECTOR PLAN 1
Baseline Hgb 8-9, Hgb 7.2 with +fecal occult   -ordered for 1 U PRBC, repeat CBC 4 hrs post tx (5 AM Labs)  -EGD @ [facility] 1/13/20: Esophagitis, 2cm hiatal hernia, Gastritis, duodenitis, 2 small clean based ulcer in D2.   -Last colonoscopy ~3yrs ago which he reported polyp removal  -Total Iron 35, TIBC 205, Ferritin 415, f/u B12 and folate   -c/w home iron supplementation   -Consult GI in am   -holding Eliquis until cleared for A/C  -c/w PPI Baseline Hgb 8-9, Hgb 7.2 with +fecal occult   -ordered for 1 U PRBC, repeat CBC 4 hrs post tx (scheduled for AM Labs)  -EGD @ [facility] 1/13/20: Esophagitis, 2cm hiatal hernia, Gastritis, duodenitis, 2 small clean based ulcer in D2.   -Last colonoscopy ~3yrs ago which he reported polyp removal  -Total Iron 35, TIBC 205, Ferritin 415, f/u B12 and folate   -c/w home iron supplementation   -Consult GI in am   -holding Eliquis until cleared for A/C  -c/w PPI

## 2021-04-15 NOTE — H&P ADULT - NSHPOUTPATIENTPROVIDERS_GEN_ALL_CORE
Dr Sánchez Cardiologist, Dr. Charli Sánchez  Oncologist, Dr. Fuller [phone]; [phone]  Urologist, Dr. Roa [phone]  Health Care Proxy Daughter Aziza [phone]

## 2021-04-16 ENCOUNTER — TRANSCRIPTION ENCOUNTER (OUTPATIENT)
Age: 81
End: 2021-04-16

## 2021-04-16 VITALS
SYSTOLIC BLOOD PRESSURE: 133 MMHG | DIASTOLIC BLOOD PRESSURE: 61 MMHG | OXYGEN SATURATION: 100 % | HEART RATE: 66 BPM | RESPIRATION RATE: 25 BRPM

## 2021-04-16 LAB
ANION GAP SERPL CALC-SCNC: 12 MMOL/L — SIGNIFICANT CHANGE UP (ref 5–17)
BUN SERPL-MCNC: 13 MG/DL — SIGNIFICANT CHANGE UP (ref 7–23)
CALCIUM SERPL-MCNC: 8.5 MG/DL — SIGNIFICANT CHANGE UP (ref 8.4–10.5)
CHLORIDE SERPL-SCNC: 109 MMOL/L — HIGH (ref 96–108)
CO2 SERPL-SCNC: 20 MMOL/L — LOW (ref 22–31)
COVID-19 SPIKE DOMAIN AB INTERP: POSITIVE
COVID-19 SPIKE DOMAIN ANTIBODY RESULT: 1.18 U/ML — HIGH
CREAT SERPL-MCNC: 1.15 MG/DL — SIGNIFICANT CHANGE UP (ref 0.5–1.3)
GLUCOSE BLDC GLUCOMTR-MCNC: 81 MG/DL — SIGNIFICANT CHANGE UP (ref 70–99)
GLUCOSE BLDC GLUCOMTR-MCNC: 83 MG/DL — SIGNIFICANT CHANGE UP (ref 70–99)
GLUCOSE BLDC GLUCOMTR-MCNC: 90 MG/DL — SIGNIFICANT CHANGE UP (ref 70–99)
GLUCOSE SERPL-MCNC: 89 MG/DL — SIGNIFICANT CHANGE UP (ref 70–99)
HCT VFR BLD CALC: 29.2 % — LOW (ref 39–50)
HGB BLD-MCNC: 9.2 G/DL — LOW (ref 13–17)
MAGNESIUM SERPL-MCNC: 1.4 MG/DL — LOW (ref 1.6–2.6)
MCHC RBC-ENTMCNC: 28.8 PG — SIGNIFICANT CHANGE UP (ref 27–34)
MCHC RBC-ENTMCNC: 31.5 GM/DL — LOW (ref 32–36)
MCV RBC AUTO: 91.5 FL — SIGNIFICANT CHANGE UP (ref 80–100)
NRBC # BLD: 0 /100 WBCS — SIGNIFICANT CHANGE UP (ref 0–0)
PLATELET # BLD AUTO: 299 K/UL — SIGNIFICANT CHANGE UP (ref 150–400)
POTASSIUM SERPL-MCNC: 4 MMOL/L — SIGNIFICANT CHANGE UP (ref 3.5–5.3)
POTASSIUM SERPL-SCNC: 4 MMOL/L — SIGNIFICANT CHANGE UP (ref 3.5–5.3)
RBC # BLD: 3.19 M/UL — LOW (ref 4.2–5.8)
RBC # FLD: 16.7 % — HIGH (ref 10.3–14.5)
SARS-COV-2 IGG+IGM SERPL QL IA: 1.18 U/ML — HIGH
SARS-COV-2 IGG+IGM SERPL QL IA: POSITIVE
SODIUM SERPL-SCNC: 141 MMOL/L — SIGNIFICANT CHANGE UP (ref 135–145)
T3FREE SERPL-MCNC: 2.84 PG/ML — SIGNIFICANT CHANGE UP (ref 1.8–4.6)
T4 AB SER-ACNC: 7.52 UG/DL — SIGNIFICANT CHANGE UP (ref 3.17–11.72)
TROPONIN T SERPL-MCNC: 0.01 NG/ML — SIGNIFICANT CHANGE UP (ref 0–0.01)
WBC # BLD: 9.33 K/UL — SIGNIFICANT CHANGE UP (ref 3.8–10.5)
WBC # FLD AUTO: 9.33 K/UL — SIGNIFICANT CHANGE UP (ref 3.8–10.5)

## 2021-04-16 RX ORDER — MAGNESIUM SULFATE 500 MG/ML
2 VIAL (ML) INJECTION ONCE
Refills: 0 | Status: COMPLETED | OUTPATIENT
Start: 2021-04-16 | End: 2021-04-16

## 2021-04-16 RX ADMIN — Medication 50 GRAM(S): at 10:04

## 2021-04-16 RX ADMIN — Medication 325 MILLIGRAM(S): at 12:55

## 2021-04-16 RX ADMIN — AMLODIPINE BESYLATE 10 MILLIGRAM(S): 2.5 TABLET ORAL at 07:01

## 2021-04-16 RX ADMIN — DORZOLAMIDE HYDROCHLORIDE 1 DROP(S): 20 SOLUTION/ DROPS OPHTHALMIC at 09:54

## 2021-04-16 RX ADMIN — LISINOPRIL 2.5 MILLIGRAM(S): 2.5 TABLET ORAL at 07:00

## 2021-04-16 RX ADMIN — Medication 650 MILLIGRAM(S): at 04:14

## 2021-04-16 RX ADMIN — Medication 50 MILLIGRAM(S): at 07:00

## 2021-04-16 RX ADMIN — FINASTERIDE 5 MILLIGRAM(S): 5 TABLET, FILM COATED ORAL at 12:56

## 2021-04-16 RX ADMIN — Medication 1 TABLET(S): at 12:55

## 2021-04-16 RX ADMIN — Medication 81 MILLIGRAM(S): at 12:55

## 2021-04-16 RX ADMIN — PREGABALIN 1000 MICROGRAM(S): 225 CAPSULE ORAL at 12:55

## 2021-04-16 RX ADMIN — LATANOPROST 1 DROP(S): 0.05 SOLUTION/ DROPS OPHTHALMIC; TOPICAL at 02:01

## 2021-04-16 RX ADMIN — PANTOPRAZOLE SODIUM 40 MILLIGRAM(S): 20 TABLET, DELAYED RELEASE ORAL at 07:00

## 2021-04-16 NOTE — DISCHARGE NOTE PROVIDER - NSDCMRMEDTOKEN_GEN_ALL_CORE_FT
amLODIPine 10 mg oral tablet: 1 tab(s) orally once a day  apixaban 2.5 mg oral tablet: 1 tab(s) orally 2 times a day  aspirin 81 mg oral delayed release tablet: 1 tab(s) orally once a day  atorvastatin 40 mg oral tablet: 1 tab(s) orally once a day   Caltrate 600 + D oral tablet: 1 tab(s) orally 2 times a day  Centrum Silver Men&#x27;s oral tablet: 1 tab(s) orally once a day  cyanocobalamin 1000 mcg oral tablet: 1 tab(s) orally once a day  dorzolamide 2% ophthalmic solution: 1 drop(s) to each affected eye 3 times a day  ferrous sulfate 325 mg (65 mg elemental iron) oral tablet: 1 tab(s) orally once a day  finasteride 5 mg oral tablet: 1 tab(s) orally once a day  ipratropium 500 mcg/2.5 mL inhalation solution: 2.5 milliliter(s) inhaled every 6 hours, As needed, Shortness of Breath and/or Wheezing  latanoprost 0.005% ophthalmic solution: 1 drop(s) to each affected eye once a day (in the evening)  lisinopril 2.5 mg oral tablet: 1 tab(s) orally once a day   Lupron Depot: 11.5 milligram(s) intramuscular every 3 months  metoprolol succinate 50 mg oral tablet, extended release: 1 tab(s) orally once a day  pantoprazole 40 mg oral delayed release tablet: 1 tab(s) orally once a day  potassium chloride 10 mEq oral tablet, extended release: 1 tab(s) orally once a day  Xgeva 120 mg/1.7 mL subcutaneous solution: 1.7 milliliter(s) subcutaneous once a month

## 2021-04-16 NOTE — DISCHARGE NOTE PROVIDER - HOSPITAL COURSE
81 y/o male w/ PMHx  Prostate cancer (on Lupron/Xgeva as outpatient, follows up w/ Dr. Fuller), HTN, HLD, type II DM, anemia of chronic disease/DOMITILA (last transfused 2 years ago, Hgb 8-9 on prior admissions [id number]), PE/DVT (in 01/2020), A-Fib (on Eliquis 2.5 mg BID, last dose 4/15 AM, reduced dose 2/2 fall risk with last fall 03/2021), CAD (s/p IRASEMA x 1 OM 03/30/2021, triple therapy ASA/Plavix/Eliquis x 1 week then d/c Plavix as per Interventional Cardiologist's note) who presented to [facility] ED on 04/15/2021 c/o intermittent right-sided face numbness described as a "novocaine sensation" lasting for 30 minutes  x 1 day. Patient reported he had an abscess on his right face after a dental procedure about 1 month ago and completed a 1 week course of Augmentin. Patient reported that his current symptoms felt similar but not exactly the same as before.  Patient also reported JAIME w/ ambulation of 1-2 blocks for the past year.  Patient also reported dark stool from iron supplementation. Patient denied  dental pain, difficulty speaking, blurred vision, dizziness, syncope, chest pain, orthopnea, PND, LE edema, fever, chills, nausea/vomiting/diarrhea, sick contact, travel, BRBPR, hematuria dysuria, URI or COVID symptoms or prior infection.  Patien reported being vaccinated for COVID-19. In ED, VS were stable and labs were significant for Hgb 7.2, fecal occult positive, trop negative, and COVID PCR negative. EKG showed NSR at 72 bpm. Stroke team was initially consulted in ED due to mild left facial droop that resolved within 15-20 minutes. CT Head showed multiple basal ganglia and thalamic lacunar infarcts of uncertain duration. CTA Neck showed no intracranial stenosis or occlusion. CTA Head showed atherosclerotic calcification of carotid and vertebral arteries w/ no significant stenosis. Stroke team recommended switching the patient's medication regimen from Aspirin 81 mg daily and Eliquis 2.5 mg BID to Plavix 75 mg daily and Xarelto 15 mg daily due to the reduced dose of Eliquis not providing full anticoagulation benefit for stroke prevention. Stroke team signed off and patient was admitted to cardiology/telemetry for anemia workup and received 1 unit of PRBC's overnight.   Repeat CBC on 04/16/2021 AM showed Hgb improved to 9.2. GI was contacted about patient's care, stated patient being fecal occult positive was likely due to PO iron supplementation, and stated they would recommend outpatient follow up for repeat EGD/Colonoscopy and denied they would likely recommend any additional inpatient workup at this time. As per conversation w/ patient's outpatient cardiologist, Dr. Sánchez, patient is okay to be discharged home back on his home medications of Aspirin 81 mg and Eliquis 2.5 mg BID w/ a plan to follow up in one week for repeat blood work to be done. Per conversation w/ patient on 04/16/2021 AM, facial numbness improved/resolved after he removed his dentures and patient was instructed to follow up as an outpatient w/ his dentist to ensure he is wearing properly fitted dentures. Patient was seen and examined at bedside on 04/16/2021 AM and reported he was feeling better and denied any active complaints on exam. No significant events were noted overnight on telemetry. Labs remained stable. Patient has now been medically cleared for discharge as per Dr. Mcfarland. Patient has been given appropriate discharge instructions including medication regimen and follow up.     VS Stable  Gen: NAD, A&O x3  Cards: RRR, clear S1 and S2 without murmur  Pulm: CTA B/L without w/r/r  Abd: soft, NT  Ext: no LE edema or ulcerations B/L

## 2021-04-16 NOTE — DISCHARGE NOTE PROVIDER - NSDCFUSCHEDAPPT_GEN_ALL_CORE_FT
[place] E ; 06/11/2021 ; [facility] HeartVasc [address] [facility] E ; 06/11/2021 ; [facility] HeartVasc [address] [facility] E ; 06/25/2021 ; [facility] Urology [address] [facility] E ; 07/09/2021 ; [facility] Heart[facility]asc [address]

## 2021-04-16 NOTE — DISCHARGE NOTE PROVIDER - CARE PROVIDER_API CALL
Charli Sánchez)  Cardiology  [address]   Phone: [phone]  Fax: [phone]  Follow Up Time: 1 week    Alphonso Leigh)  Gastroenterology; Internal Medicine  [address] [place] [address]  Phone: [phone]  Fax: [phone]  Follow Up Time: Routine

## 2021-04-16 NOTE — DISCHARGE NOTE PROVIDER - NSDCCPCAREPLAN_GEN_ALL_CORE_FT
PRINCIPAL DISCHARGE DIAGNOSIS  Diagnosis: Iron deficiency anemia  Assessment and Plan of Treatment: Upon presentation to the hospital, your hemoglobin was found to be decreased and you required a transfusion with one unit of packed red blood cells. Your repeat hemoglobin is now stable and the GI team stated they would recommend the you follow up as an outpatient for a possible repeat EGD/Colonoscopy and would not recommend any further inpatient workup. As per your outpatient cardiologist, you may continue his home medications of Aspirin 81 mg daily and Eliquis 2.5 mg BID as well as his home iron supplementation with a plan to follow up in one week for repeat blood work.      SECONDARY DISCHARGE DIAGNOSES  Diagnosis: Facial numbness  Assessment and Plan of Treatment: You endorsed some facial numbness when you first presented to the hospital and underwent a stroke workup which was negative. Upon further questioning, you stated that the facial numbness improved after you removed your dentures. You should follow up with your outpatient dentist to ensure that you have properly fitted dentures and for further management.    Diagnosis: Hypertension  Assessment and Plan of Treatment: You have a history of being diagnosed with high blood pressure and should cotinue your home medications as they were previously prescribed.    Diagnosis: Hyperlipidemia  Assessment and Plan of Treatment: A lipid panel was performed during your admission and showed your cholesterol is 126, triglycerides 224, HDL is 40, and LDL is 41. Please continue your home cholesterol medication as it was previously prescribed to you.

## 2021-04-16 NOTE — DISCHARGE NOTE PROVIDER - NSDCFUADDAPPT_GEN_ALL_CORE_FT
Please ensure that you follow up with your outpatient physician in ONE WEEK for repeat labs to be performed.

## 2021-04-16 NOTE — DISCHARGE NOTE PROVIDER - CARE PROVIDERS DIRECT ADDRESSES
,[email or web address]

## 2021-04-16 NOTE — DISCHARGE NOTE PROVIDER - PROVIDER TOKENS
PROVIDER:[TOKEN:[[id number]],FOLLOWUP:[1 week]],PROVIDER:[TOKEN:[[id number]],FOLLOWUP:[Routine]]

## 2021-04-16 NOTE — DISCHARGE NOTE NURSING/CASE MANAGEMENT/SOCIAL WORK - PATIENT PORTAL LINK FT
You can access the FollowMyHealth Patient Portal offered by [facility] by registering at the following website: [email or web address]. By joining [employer]’s FollowMyHealth portal, you will also be able to view your health information using other applications (apps) compatible with our system.

## 2021-04-18 LAB
FOLATE SERPL-MCNC: 6.3 NG/ML — SIGNIFICANT CHANGE UP
VIT B12 SERPL-MCNC: 339 PG/ML — SIGNIFICANT CHANGE UP (ref 232–1245)

## 2021-04-21 DIAGNOSIS — Z20.822 CONTACT WITH AND (SUSPECTED) EXPOSURE TO COVID-19: ICD-10-CM

## 2021-04-21 DIAGNOSIS — E11.9 TYPE 2 DIABETES MELLITUS WITHOUT COMPLICATIONS: ICD-10-CM

## 2021-04-21 DIAGNOSIS — Z92.21 PERSONAL HISTORY OF ANTINEOPLASTIC CHEMOTHERAPY: ICD-10-CM

## 2021-04-21 DIAGNOSIS — I10 ESSENTIAL (PRIMARY) HYPERTENSION: ICD-10-CM

## 2021-04-21 DIAGNOSIS — D50.9 IRON DEFICIENCY ANEMIA, UNSPECIFIED: ICD-10-CM

## 2021-04-21 DIAGNOSIS — E78.5 HYPERLIPIDEMIA, UNSPECIFIED: ICD-10-CM

## 2021-04-21 DIAGNOSIS — Z79.01 LONG TERM (CURRENT) USE OF ANTICOAGULANTS: ICD-10-CM

## 2021-04-21 DIAGNOSIS — Z86.718 PERSONAL HISTORY OF OTHER VENOUS THROMBOSIS AND EMBOLISM: ICD-10-CM

## 2021-04-21 DIAGNOSIS — H40.9 UNSPECIFIED GLAUCOMA: ICD-10-CM

## 2021-04-21 DIAGNOSIS — I48.91 UNSPECIFIED ATRIAL FIBRILLATION: ICD-10-CM

## 2021-04-21 DIAGNOSIS — I25.10 ATHEROSCLEROTIC HEART DISEASE OF NATIVE CORONARY ARTERY WITHOUT ANGINA PECTORIS: ICD-10-CM

## 2021-04-21 DIAGNOSIS — Z85.46 PERSONAL HISTORY OF MALIGNANT NEOPLASM OF PROSTATE: ICD-10-CM

## 2021-04-21 DIAGNOSIS — Z86.711 PERSONAL HISTORY OF PULMONARY EMBOLISM: ICD-10-CM

## 2021-04-21 DIAGNOSIS — R29.810 FACIAL WEAKNESS: ICD-10-CM

## 2021-04-21 DIAGNOSIS — Z95.5 PRESENCE OF CORONARY ANGIOPLASTY IMPLANT AND GRAFT: ICD-10-CM

## 2021-04-23 ENCOUNTER — OUTPATIENT (OUTPATIENT)
Dept: OUTPATIENT SERVICES | Facility: HOSPITAL | Age: 81
LOS: 1 days | End: 2021-04-23
Payer: MEDICARE

## 2021-04-23 ENCOUNTER — APPOINTMENT (OUTPATIENT)
Dept: HEART AND VASCULAR | Facility: CLINIC | Age: 81
End: 2021-04-23
Payer: MEDICARE

## 2021-04-23 ENCOUNTER — APPOINTMENT (OUTPATIENT)
Age: 81
End: 2021-04-23

## 2021-04-23 VITALS
DIASTOLIC BLOOD PRESSURE: 64 MMHG | RESPIRATION RATE: 16 BRPM | TEMPERATURE: 98 F | HEART RATE: 76 BPM | WEIGHT: 186.95 LBS | HEIGHT: 69 IN | SYSTOLIC BLOOD PRESSURE: 132 MMHG | OXYGEN SATURATION: 98 %

## 2021-04-23 VITALS
WEIGHT: 179.99 LBS | DIASTOLIC BLOOD PRESSURE: 88 MMHG | OXYGEN SATURATION: 97 % | HEIGHT: 69 IN | BODY MASS INDEX: 26.66 KG/M2 | TEMPERATURE: 98 F | HEART RATE: 64 BPM | SYSTOLIC BLOOD PRESSURE: 136 MMHG

## 2021-04-23 DIAGNOSIS — D64.9 ANEMIA, UNSPECIFIED: ICD-10-CM

## 2021-04-23 PROCEDURE — 99072 ADDL SUPL MATRL&STAF TM PHE: CPT

## 2021-04-23 PROCEDURE — 83550 IRON BINDING TEST: CPT

## 2021-04-23 PROCEDURE — 86923 COMPATIBILITY TEST ELECTRIC: CPT

## 2021-04-23 PROCEDURE — 81003 URINALYSIS AUTO W/O SCOPE: CPT

## 2021-04-23 PROCEDURE — 93005 ELECTROCARDIOGRAM TRACING: CPT

## 2021-04-23 PROCEDURE — P9016: CPT

## 2021-04-23 PROCEDURE — U0003: CPT

## 2021-04-23 PROCEDURE — 84484 ASSAY OF TROPONIN QUANT: CPT

## 2021-04-23 PROCEDURE — 99215 OFFICE O/P EST HI 40 MIN: CPT

## 2021-04-23 PROCEDURE — 82728 ASSAY OF FERRITIN: CPT

## 2021-04-23 PROCEDURE — 36415 COLL VENOUS BLD VENIPUNCTURE: CPT

## 2021-04-23 PROCEDURE — 71045 X-RAY EXAM CHEST 1 VIEW: CPT

## 2021-04-23 PROCEDURE — 80061 LIPID PANEL: CPT

## 2021-04-23 PROCEDURE — 84481 FREE ASSAY (FT-3): CPT

## 2021-04-23 PROCEDURE — 86769 SARS-COV-2 COVID-19 ANTIBODY: CPT

## 2021-04-23 PROCEDURE — 83540 ASSAY OF IRON: CPT

## 2021-04-23 PROCEDURE — 86850 RBC ANTIBODY SCREEN: CPT

## 2021-04-23 PROCEDURE — 82962 GLUCOSE BLOOD TEST: CPT

## 2021-04-23 PROCEDURE — 82272 OCCULT BLD FECES 1-3 TESTS: CPT

## 2021-04-23 PROCEDURE — 84436 ASSAY OF TOTAL THYROXINE: CPT

## 2021-04-23 PROCEDURE — 86901 BLOOD TYPING SEROLOGIC RH(D): CPT

## 2021-04-23 PROCEDURE — 99285 EMERGENCY DEPT VISIT HI MDM: CPT | Mod: 25

## 2021-04-23 PROCEDURE — 36430 TRANSFUSION BLD/BLD COMPNT: CPT

## 2021-04-23 PROCEDURE — 83735 ASSAY OF MAGNESIUM: CPT

## 2021-04-23 PROCEDURE — 82746 ASSAY OF FOLIC ACID SERUM: CPT

## 2021-04-23 PROCEDURE — 86900 BLOOD TYPING SEROLOGIC ABO: CPT

## 2021-04-23 PROCEDURE — 84443 ASSAY THYROID STIM HORMONE: CPT

## 2021-04-23 PROCEDURE — 80048 BASIC METABOLIC PNL TOTAL CA: CPT

## 2021-04-23 PROCEDURE — 70498 CT ANGIOGRAPHY NECK: CPT

## 2021-04-23 PROCEDURE — 70450 CT HEAD/BRAIN W/O DYE: CPT

## 2021-04-23 PROCEDURE — P9040: CPT

## 2021-04-23 PROCEDURE — 0042T: CPT

## 2021-04-23 PROCEDURE — 80053 COMPREHEN METABOLIC PANEL: CPT

## 2021-04-23 PROCEDURE — 83036 HEMOGLOBIN GLYCOSYLATED A1C: CPT

## 2021-04-23 PROCEDURE — U0005: CPT

## 2021-04-23 PROCEDURE — 85730 THROMBOPLASTIN TIME PARTIAL: CPT

## 2021-04-23 PROCEDURE — 82607 VITAMIN B-12: CPT

## 2021-04-23 PROCEDURE — 85025 COMPLETE CBC W/AUTO DIFF WBC: CPT

## 2021-04-23 PROCEDURE — 82803 BLOOD GASES ANY COMBINATION: CPT

## 2021-04-23 PROCEDURE — 85610 PROTHROMBIN TIME: CPT

## 2021-04-23 PROCEDURE — 85027 COMPLETE CBC AUTOMATED: CPT

## 2021-04-23 PROCEDURE — 70496 CT ANGIOGRAPHY HEAD: CPT

## 2021-04-23 RX ORDER — DIPHENHYDRAMINE HCL 50 MG
25 CAPSULE ORAL ONCE
Refills: 0 | Status: COMPLETED | OUTPATIENT
Start: 2021-04-23 | End: 2021-04-23

## 2021-04-23 RX ORDER — ACETAMINOPHEN 500 MG
650 TABLET ORAL ONCE
Refills: 0 | Status: COMPLETED | OUTPATIENT
Start: 2021-04-23 | End: 2021-04-23

## 2021-04-23 RX ADMIN — Medication 25 MILLIGRAM(S): at 10:35

## 2021-04-23 RX ADMIN — Medication 650 MILLIGRAM(S): at 10:35

## 2021-04-23 RX ADMIN — Medication 650 MILLIGRAM(S): at 11:06

## 2021-04-27 NOTE — HISTORY OF PRESENT ILLNESS
[FreeTextEntry1] : 80M w/ stage IV prostate CA (s/p prostatectomy, on chemotherapy), who presented to [facility]  with progressive SOB and JAIME x1mo associated w/ generalized fatigue in the setting\par of decreased PO intake, found to have segmental PE of RLL on CTA Chest and LLE\par DVT on US Dopplers likely 2/2 malignancy. Hospital course c/b coffee ground\par emesis,  resolved with EGD demonstrating duodenal ulcers and gastritis. Cardiology consulted for new onset atrial fibrillation during hospitalization, started on BB and instructed to continue NOAC. Now returns after 4 months after recetnly being admitted to [facility] 3/28/21 w/ angina and JERI. S/p [facility] w/ IRASEMA-OM1. Instructred to start triple tx for 1 week, then discontinue plavix. No c/o abnl bleeding. States anginal sx have completely resolved. \par \par \par \par 4/9/21 ECG: NSR at 78 bpm, LAFB \par 12/11/20 ECG: NSR at 86 bpm, LAFB, NS STTWC (unchanged from previous)\par 1/24/20 ECG: NSR at 86 bpm, LAD, LAFB, PVCs, non-specific STTWC

## 2021-04-27 NOTE — REVIEW OF SYSTEMS
[Negative] : Heme/Lymph [Shortness Of Breath] : no shortness of breath [Dyspnea on exertion] : not dyspnea during exertion

## 2021-04-27 NOTE — ASSESSMENT
[FreeTextEntry1] : 1. CAD s/p IRASEMA-OM1 3/2021\par - hospitalization reports reviewed\par - instructed to take plavix for 1 wk only in setting of previous GIB\par - cont ASA/NOAC/BB\par - ed done re heart healthy lifestyle modifications and diet \par - Nl EF on 3/2021 TTE in hospital\par \par 2. PAF\par - now in NSR\par - CHADsVasc score 5\par - cont NOAC\par -cont Toprol 50 qd for better rate control\par \par \par 3. HTN\par - now controlled\par - cont BB and ARB\par - continue current meds for now\par \par 4. PE/DVT\par - cont to f/u w/ Dr. Fuller\par - cont NOAC\par \par 5. HLD\par - cont statin\par - ed done\par \par RTC 3 months, earlier if sx

## 2021-05-06 NOTE — HISTORY OF PRESENT ILLNESS
[FreeTextEntry1] : 80M w/ stage IV prostate CA (s/p prostatectomy, on chemotherapy), who presented to [facility]  with progressive SOB and JAIME x1mo associated w/ generalized fatigue in the setting\par of decreased PO intake, found to have segmental PE of RLL on CTA Chest and LLE\par DVT on US Dopplers likely 2/2 malignancy. Hospital course c/b coffee ground\par emesis,  resolved with EGD demonstrating duodenal ulcers and gastritis. Cardiology consulted for new onset atrial fibrillation during hospitalization, started on BB and instructed to continue NOAC. Admitted to [facility] 3/28/21 w/ angina and JERI. S/p [facility] w/ IRASEMA-OM1. Instructred to start triple tx for 1 week, then discontinue plavix. No c/o abnl bleeding. States anginal sx have completely resolved. Now returns again after recent admission to [facility] for facial numbness (CVA ruled out) which was subsequently relieved by removing his dentures. During hospitalization, was noted to have a drop in his H/H and was transfused 1U PRBCs. Cleared to resume NOAC and ASA by Gi, now returns for 1 week f/u after repeat H/H. States he was transfused another unit this AM by his Hematologist/Dr. Fuller for a Hgb of 8.1. States his NOAC was held and wants to h=know if he can restart it. \par \par \par \par \par 12/11/20 ECG: NSR at 86 bpm, LAFB, NS STTWC (unchanged from previous)\par 1/24/20 ECG: NSR at 86 bpm, LAD, LAFB, PVCs, non-specific STTWC

## 2021-05-06 NOTE — ASSESSMENT
[FreeTextEntry1] : 1. CAD s/p IRASEMA-OM1 3/2021\par - hospitalization reports reviewed\par - instructed to take plavix for 1 wk only in setting of previous GIB\par - ed done re heart healthy lifestyle modifications and diet \par - Nl EF on 3/2021 TTE in hospital\par - recent drop in H/H in setting of triple tx x1 week, pt usually w/ Hgb 8-9 at baseline\par - had a lenghthy discssion w/ pt, his daughter as well as pt's Hem/Onc MD/Dr. Fuller. Given pt's hypercoaguable stae, h/o PE/DVT as well as .afib, benefits of NOAC outweigh risks. Cleared by GI to restart as well. Will restart at low dose Eliquis 2.5 bid and pt will have his H/H followed diligently by Dr. Fuller\par - cont ASA/NOAC/BB\par \par 2. PAF\par - now in NSR\par - CHADsVasc score 5\par - cont NOAC as above\par -cont Toprol 50 qd for better rate control\par \par \par 3. HTN\par -  controlled\par - cont BB and ARB\par - continue current meds for now\par \par 4. PE/DVT\par - cont to f/u w/ Dr. Fuller\par - cont NOAC\par \par 5. HLD\par - cont statin\par - ed done\par \par RTC 3 months, earlier if sx. \par

## 2021-05-07 ENCOUNTER — EMERGENCY (EMERGENCY)
Facility: HOSPITAL | Age: 81
LOS: 1 days | Discharge: HOME | End: 2021-05-07
Attending: EMERGENCY MEDICINE | Admitting: EMERGENCY MEDICINE
Payer: MEDICARE

## 2021-05-07 VITALS
HEIGHT: 69 IN | DIASTOLIC BLOOD PRESSURE: 68 MMHG | HEART RATE: 85 BPM | OXYGEN SATURATION: 99 % | SYSTOLIC BLOOD PRESSURE: 126 MMHG | RESPIRATION RATE: 18 BRPM | TEMPERATURE: 98 F

## 2021-05-07 VITALS
SYSTOLIC BLOOD PRESSURE: 129 MMHG | HEART RATE: 82 BPM | OXYGEN SATURATION: 96 % | DIASTOLIC BLOOD PRESSURE: 69 MMHG | RESPIRATION RATE: 17 BRPM | TEMPERATURE: 100 F

## 2021-05-07 DIAGNOSIS — T78.3XXA ANGIONEUROTIC EDEMA, INITIAL ENCOUNTER: ICD-10-CM

## 2021-05-07 DIAGNOSIS — Z79.899 OTHER LONG TERM (CURRENT) DRUG THERAPY: ICD-10-CM

## 2021-05-07 DIAGNOSIS — Z20.822 CONTACT WITH AND (SUSPECTED) EXPOSURE TO COVID-19: ICD-10-CM

## 2021-05-07 DIAGNOSIS — E78.5 HYPERLIPIDEMIA, UNSPECIFIED: ICD-10-CM

## 2021-05-07 DIAGNOSIS — Z86.718 PERSONAL HISTORY OF OTHER VENOUS THROMBOSIS AND EMBOLISM: ICD-10-CM

## 2021-05-07 DIAGNOSIS — I10 ESSENTIAL (PRIMARY) HYPERTENSION: ICD-10-CM

## 2021-05-07 DIAGNOSIS — H40.9 UNSPECIFIED GLAUCOMA: ICD-10-CM

## 2021-05-07 DIAGNOSIS — E11.9 TYPE 2 DIABETES MELLITUS WITHOUT COMPLICATIONS: ICD-10-CM
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
## 2021-05-07 DIAGNOSIS — D64.9 ANEMIA, UNSPECIFIED: ICD-10-CM

## 2021-05-07 DIAGNOSIS — Z92.21 PERSONAL HISTORY OF ANTINEOPLASTIC CHEMOTHERAPY: ICD-10-CM

## 2021-05-07 DIAGNOSIS — I48.91 UNSPECIFIED ATRIAL FIBRILLATION: ICD-10-CM

## 2021-05-07 DIAGNOSIS — Z88.8 ALLERGY STATUS TO OTHER DRUGS, MEDICAMENTS AND BIOLOGICAL SUBSTANCES: ICD-10-CM

## 2021-05-07 DIAGNOSIS — Z86.711 PERSONAL HISTORY OF PULMONARY EMBOLISM: ICD-10-CM

## 2021-05-07 DIAGNOSIS — R22.0 LOCALIZED SWELLING, MASS AND LUMP, HEAD: ICD-10-CM

## 2021-05-07 DIAGNOSIS — I25.10 ATHEROSCLEROTIC HEART DISEASE OF NATIVE CORONARY ARTERY WITHOUT ANGINA PECTORIS: ICD-10-CM

## 2021-05-07 DIAGNOSIS — Z85.46 PERSONAL HISTORY OF MALIGNANT NEOPLASM OF PROSTATE: ICD-10-CM
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
## 2021-05-07 DIAGNOSIS — Z79.01 LONG TERM (CURRENT) USE OF ANTICOAGULANTS: ICD-10-CM

## 2021-05-07 LAB
ALBUMIN SERPL ELPH-MCNC: 3.6 G/DL — SIGNIFICANT CHANGE UP (ref 3.3–5)
ALP SERPL-CCNC: 36 U/L — LOW (ref 40–120)
ALT FLD-CCNC: 19 U/L — SIGNIFICANT CHANGE UP (ref 10–45)
ANION GAP SERPL CALC-SCNC: 14 MMOL/L — SIGNIFICANT CHANGE UP (ref 5–17)
APTT BLD: 40.4 SEC — HIGH (ref 27.5–35.5)
AST SERPL-CCNC: 26 U/L — SIGNIFICANT CHANGE UP (ref 10–40)
BASOPHILS # BLD AUTO: 0.05 K/UL — SIGNIFICANT CHANGE UP (ref 0–0.2)
BASOPHILS NFR BLD AUTO: 0.5 % — SIGNIFICANT CHANGE UP (ref 0–2)
BILIRUB SERPL-MCNC: 0.4 MG/DL — SIGNIFICANT CHANGE UP (ref 0.2–1.2)
BLD GP AB SCN SERPL QL: NEGATIVE — SIGNIFICANT CHANGE UP
BUN SERPL-MCNC: 10 MG/DL — SIGNIFICANT CHANGE UP (ref 7–23)
CALCIUM SERPL-MCNC: 9.2 MG/DL — SIGNIFICANT CHANGE UP (ref 8.4–10.5)
CHLORIDE SERPL-SCNC: 104 MMOL/L — SIGNIFICANT CHANGE UP (ref 96–108)
CO2 SERPL-SCNC: 19 MMOL/L — LOW (ref 22–31)
CREAT SERPL-MCNC: 1.34 MG/DL — HIGH (ref 0.5–1.3)
EOSINOPHIL # BLD AUTO: 0.14 K/UL — SIGNIFICANT CHANGE UP (ref 0–0.5)
EOSINOPHIL NFR BLD AUTO: 1.3 % — SIGNIFICANT CHANGE UP (ref 0–6)
GLUCOSE SERPL-MCNC: 116 MG/DL — HIGH (ref 70–99)
HCT VFR BLD CALC: 29.8 % — LOW (ref 39–50)
HGB BLD-MCNC: 9.6 G/DL — LOW (ref 13–17)
IMM GRANULOCYTES NFR BLD AUTO: 0.7 % — SIGNIFICANT CHANGE UP (ref 0–1.5)
INR BLD: 1.5 — HIGH (ref 0.88–1.16)
LYMPHOCYTES # BLD AUTO: 1.78 K/UL — SIGNIFICANT CHANGE UP (ref 1–3.3)
LYMPHOCYTES # BLD AUTO: 16.2 % — SIGNIFICANT CHANGE UP (ref 13–44)
MCHC RBC-ENTMCNC: 28.4 PG — SIGNIFICANT CHANGE UP (ref 27–34)
MCHC RBC-ENTMCNC: 32.2 GM/DL — SIGNIFICANT CHANGE UP (ref 32–36)
MCV RBC AUTO: 88.2 FL — SIGNIFICANT CHANGE UP (ref 80–100)
MONOCYTES # BLD AUTO: 1.18 K/UL — HIGH (ref 0–0.9)
MONOCYTES NFR BLD AUTO: 10.8 % — SIGNIFICANT CHANGE UP (ref 2–14)
NEUTROPHILS # BLD AUTO: 7.74 K/UL — HIGH (ref 1.8–7.4)
NEUTROPHILS NFR BLD AUTO: 70.5 % — SIGNIFICANT CHANGE UP (ref 43–77)
NRBC # BLD: 0 /100 WBCS — SIGNIFICANT CHANGE UP (ref 0–0)
NT-PROBNP SERPL-SCNC: 845 PG/ML — HIGH (ref 0–300)
PLATELET # BLD AUTO: 306 K/UL — SIGNIFICANT CHANGE UP (ref 150–400)
POTASSIUM SERPL-MCNC: 4.6 MMOL/L — SIGNIFICANT CHANGE UP (ref 3.5–5.3)
POTASSIUM SERPL-SCNC: 4.6 MMOL/L — SIGNIFICANT CHANGE UP (ref 3.5–5.3)
PROT SERPL-MCNC: 6.1 G/DL — SIGNIFICANT CHANGE UP (ref 6–8.3)
PROTHROM AB SERPL-ACNC: 17.7 SEC — HIGH (ref 10.6–13.6)
RBC # BLD: 3.38 M/UL — LOW (ref 4.2–5.8)
RBC # FLD: 15.8 % — HIGH (ref 10.3–14.5)
RH IG SCN BLD-IMP: POSITIVE — SIGNIFICANT CHANGE UP
SARS-COV-2 RNA SPEC QL NAA+PROBE: SIGNIFICANT CHANGE UP
SODIUM SERPL-SCNC: 137 MMOL/L — SIGNIFICANT CHANGE UP (ref 135–145)
TROPONIN T SERPL-MCNC: 0.02 NG/ML — HIGH (ref 0–0.01)
TROPONIN T SERPL-MCNC: 0.02 NG/ML — HIGH (ref 0–0.01)
WBC # BLD: 10.97 K/UL — HIGH (ref 3.8–10.5)
WBC # FLD AUTO: 10.97 K/UL — HIGH (ref 3.8–10.5)

## 2021-05-07 PROCEDURE — 93010 ELECTROCARDIOGRAM REPORT: CPT

## 2021-05-07 PROCEDURE — U0005: CPT

## 2021-05-07 PROCEDURE — 86900 BLOOD TYPING SEROLOGIC ABO: CPT

## 2021-05-07 PROCEDURE — 85025 COMPLETE CBC W/AUTO DIFF WBC: CPT

## 2021-05-07 PROCEDURE — 71046 X-RAY EXAM CHEST 2 VIEWS: CPT

## 2021-05-07 PROCEDURE — 93005 ELECTROCARDIOGRAM TRACING: CPT

## 2021-05-07 PROCEDURE — 86901 BLOOD TYPING SEROLOGIC RH(D): CPT

## 2021-05-07 PROCEDURE — 36415 COLL VENOUS BLD VENIPUNCTURE: CPT

## 2021-05-07 PROCEDURE — 70491 CT SOFT TISSUE NECK W/DYE: CPT | Mod: 26,MG

## 2021-05-07 PROCEDURE — 83880 ASSAY OF NATRIURETIC PEPTIDE: CPT

## 2021-05-07 PROCEDURE — 96375 TX/PRO/DX INJ NEW DRUG ADDON: CPT

## 2021-05-07 PROCEDURE — G1004: CPT

## 2021-05-07 PROCEDURE — 96374 THER/PROPH/DIAG INJ IV PUSH: CPT | Mod: XU

## 2021-05-07 PROCEDURE — 70491 CT SOFT TISSUE NECK W/DYE: CPT

## 2021-05-07 PROCEDURE — 85610 PROTHROMBIN TIME: CPT

## 2021-05-07 PROCEDURE — 86850 RBC ANTIBODY SCREEN: CPT

## 2021-05-07 PROCEDURE — 85730 THROMBOPLASTIN TIME PARTIAL: CPT

## 2021-05-07 PROCEDURE — 80053 COMPREHEN METABOLIC PANEL: CPT

## 2021-05-07 PROCEDURE — 71046 X-RAY EXAM CHEST 2 VIEWS: CPT | Mod: 26

## 2021-05-07 PROCEDURE — 99285 EMERGENCY DEPT VISIT HI MDM: CPT | Mod: 25

## 2021-05-07 PROCEDURE — 99284 EMERGENCY DEPT VISIT MOD MDM: CPT | Mod: 25

## 2021-05-07 PROCEDURE — U0003: CPT

## 2021-05-07 PROCEDURE — 84484 ASSAY OF TROPONIN QUANT: CPT

## 2021-05-07 RX ORDER — FAMOTIDINE 10 MG/ML
20 INJECTION INTRAVENOUS ONCE
Refills: 0 | Status: COMPLETED | OUTPATIENT
Start: 2021-05-07 | End: 2021-05-07

## 2021-05-07 RX ORDER — DIPHENHYDRAMINE HCL 50 MG
25 CAPSULE ORAL ONCE
Refills: 0 | Status: COMPLETED | OUTPATIENT
Start: 2021-05-07 | End: 2021-05-07

## 2021-05-07 RX ADMIN — Medication 25 MILLIGRAM(S): at 08:10

## 2021-05-07 RX ADMIN — Medication 125 MILLIGRAM(S): at 08:10

## 2021-05-07 RX ADMIN — FAMOTIDINE 20 MILLIGRAM(S): 10 INJECTION INTRAVENOUS at 08:10

## 2021-05-07 NOTE — ED PROVIDER NOTE - CARE PROVIDER_API CALL
FARHAN MOONEY  Cardiology  [address] [facility] [address]  [place] [address]  Phone: [phone]  Fax: [phone]  Follow Up Time: 1-3 Days    Sandra Gill)  Critical Care Medicine; Internal Medicine  [address], [facility] [address]  [place] [address]  Phone: [phone]  Fax: [phone]  Follow Up Time: 7-10 Days

## 2021-05-07 NOTE — ED ADULT NURSE NOTE - CHPI ED NUR SYMPTOMS NEG
R flank pain and vomiting and diarrhea
no body aches/no chest pain/no chills/no cough/no diaphoresis/no edema/no fever/no headache/no hemoptysis/no shortness of breath/no wheezing

## 2021-05-07 NOTE — ED PROVIDER NOTE - CARE PROVIDERS DIRECT ADDRESSES
Interspace located. Using sterile technique, the area was anesthetized. The needle was slowly inserted and advanced at the appropriate angle into the subarachnoid space to allow CSF return. Stylet withdrawn. Cerebral Spinal Fluid was evaluated and any required specimens were drawn. Stylet replaced, needle removed, skin cleaned, sterile dressing applied. Patient placed in supine position.
,[email or web address]

## 2021-05-07 NOTE — ED PROVIDER NOTE - ATTENDING CONTRIBUTION TO CARE
81 yo male h/o prostate cancer (on Lupron/Xgeva as outpatient, follows up w/ Dr. Fuller), HTN, HLD, type II DM, anemia of chronic disease/DOMITILA, PE/DVT (in 01/2020), A-Fib (on Eliquis 2.5 mg BID, reduced dose 2/2 fall risk with last fall 03/2021), CAD (s/p IRASEMA x 1 OM 03/30/2021, triple therapy ASA/Plavix/Eliquis x 1 week then d/c Plavix as per Interventional Cardiologist's note) recently admitted for facial numbness w neg imaging/stroke team eval complicated by anemia requiring transfusion w repeat transfusion after dc and pending gi eval c/o lip and chin swelling this am w/o pain, fever.  No tongue, throat swelling, cp. + JAIME at baseline but daughter reports increase x several days w/o exertional cp.  No abd pain, n/v/d.  Pt recently started lisinopril x several wks.  Well appearing, nad, conjunctivae pink, + lower lip and lower face swelling w/o erythema, warmth, induration, op benign, nl voice, no drool/stridor, no resp distress, lung cta, heart irreg, abd soft, nt, ext no gross deformity, no gross neuro deficits   Pt c/o facial swelling - ? ace related angioedema.  No sig aw concerns at this time.  No exam findings to suggest ludwigs or other dental/oral infection.  Plan meds for angioedema, ct w iv contrast to eval lower face/neck; reassess.

## 2021-05-07 NOTE — ED PROVIDER NOTE - PATIENT PORTAL LINK FT
You can access the FollowMyHealth Patient Portal offered by [facility] by registering at the following website: [email or web address]. By joining [employer]’s FollowMyHealth portal, you will also be able to view your health information using other applications (apps) compatible with our system.

## 2021-05-07 NOTE — ED PROVIDER NOTE - NSFOLLOWUPINSTRUCTIONS_ED_ALL_ED_FT
Follow up with your Cardiologist in 1-3 days and Dr Gill in one week.                         Angioedema    WHAT YOU NEED TO KNOW:    Angioedema is sudden swelling caused by fluid that collects in deep layers of the skin. Swelling occurs most often on the face, lips, tongue, or throat, but it can happen anywhere on the body. Your risk for angioedema increases if you have food or insect allergies or a family history of angioedema. Emotional stress, autoimmune disorders, and medicines, such as ACE inhibitors, also increase your risk. Your symptoms may be mild, or you may develop anaphylaxis. Anaphylaxis is a sudden, life-threatening reaction that needs immediate treatment.     DISCHARGE INSTRUCTIONS:    Call 911 for signs or symptoms of anaphylaxis, such as trouble breathing, swelling in your mouth or throat, or wheezing. You may also have itching, a rash, hives, or feel like you are going to faint.    Return to the emergency department if:   •You have sudden behavior changes or irritability.       •You are dizzy and your heart is beating faster than usual.       Contact your healthcare provider if:   •Your swelling does not improve, even after you take your medicines.       •You have questions or concerns about your condition or care.       Medicines:   •Antihistamines decrease mild symptoms such as itching or a rash.       •Epinephrine is used to treat severe allergic reactions such as anaphylaxis.       •Steroids: This medicine may be given to decrease redness, pain, and swelling.      •Take your medicine as directed. Contact your healthcare provider if you think your medicine is not helping or if you have side effects. Tell him of her if you are allergic to any medicine. Keep a list of the medicines, vitamins, and herbs you take. Include the amounts, and when and why you take them. Bring the list or the pill bottles to follow-up visits. Carry your medicine list with you in case of an emergency.      Steps to take for signs or symptoms of anaphylaxis:   •Immediately give 1 shot of epinephrine only into the outer thigh muscle.       •Leave the shot in place as directed. Your healthcare provider may recommend you leave it in place for up to 10 seconds before you remove it. This helps make sure all of the epinephrine is delivered.       •Call 911 and go to the emergency department, even if the shot improved symptoms. Do not drive yourself. Bring the used epinephrine shot with you.       Safety precautions to take if you are at risk for anaphylaxis:   •Keep 2 shots of epinephrine with you at all times. You may need a second shot, because epinephrine only works for about 20 minutes and symptoms may return. Your healthcare provider can show you and family members how to give the shot. Check the expiration date every month and replace it before it expires.      •Create an action plan. Your healthcare provider can help you create a written plan that explains the allergy and an emergency plan to treat a reaction. The plan explains when to give a second epinephrine shot if symptoms return or do not improve after the first. Give copies of the action plan and emergency instructions to family members, work and school staff, and  providers. Show them how to give a shot of epinephrine.      •Be careful when you exercise. If you have had exercise-induced anaphylaxis, do not exercise right after you eat. Stop exercising right away if you start to develop any signs or symptoms of anaphylaxis. You may first feel tired, warm, or have itchy skin. Hives, swelling, and severe breathing problems may develop if you continue to exercise.      •Carry medical alert identification. Wear medical alert jewelry or carry a card that explains the allergy. Ask your healthcare provider where to get these items.   Medical Alert Jewelry           •Keep a symptom diary. Include information about how often your symptoms occur, how long they last, and if they are mild or severe. Also keep information on what you ate, what happened, or which medicines you took before the swelling started.       •Avoid triggers. Triggers include foods, medicines, and other things that you know cause symptoms. You may need to see a specialist, such as an allergist or dietitian, to learn what to avoid.      Follow up with your healthcare provider as directed: Write down your questions so you remember to ask them during your visits.        © Copyright [employer] 2021           back to top                          © Copyright [employer] 2021

## 2021-05-07 NOTE — ED PROVIDER NOTE - PR
I will START or STAY ON the medications listed below when I get home from the hospital:    Oral Contraceptive  -- 1 tab(s) by mouth once a day (at bedtime)  -- Indication: For Birth control    famotidine 20 mg oral tablet  -- 1 tab HS before and 1 tab am of surgery   -- Indication: For GERD
128ms

## 2021-05-07 NOTE — ED PROVIDER NOTE - CLINICAL SUMMARY MEDICAL DECISION MAKING FREE TEXT BOX
lip and chin swelling. vss. lungs clear on exam. no swelling to oral airway on exam. ecg no ischemic changes, labs noted troponin 0.02 and repeat unchanged. likely related to elevated creatinine. cxr on acute pathology. ct scan done and no airway involvement or masses. solumedrol, benadryl and pepcid given with improvement of swelling in ED. case d/w Dr Gill and Dr Sánchez and plan for d/c home. pt instructed to d/c the lisinopril. return precautions d/w pt.

## 2021-05-07 NOTE — ED ADULT NURSE NOTE - SUICIDE SCREENING QUESTION 1
No Peng Advancement Flap Text: The defect edges were debeveled with a #15 scalpel blade.  Given the location of the defect, shape of the defect and the proximity to free margins a Peng advancement flap was deemed most appropriate.  Using a sterile surgical marker, an appropriate advancement flap was drawn incorporating the defect and placing the expected incisions within the relaxed skin tension lines where possible. The area thus outlined was incised deep to adipose tissue with a #15 scalpel blade.  The skin margins were undermined to an appropriate distance in all directions utilizing iris scissors.

## 2021-05-07 NOTE — ED ADULT TRIAGE NOTE - ARRIVAL INFO ADDITIONAL COMMENTS
pt c/o sob and chin swelling this am.   no sob appreciated.  chin and jaw protruding.  no lip or tongue swelling.  no difficulty speaking.

## 2021-05-07 NOTE — ED PROVIDER NOTE - ENMT, MLM
Airway patent, Nasal mucosa clear. Mouth with normal mucosa. Throat has no vesicles, no oropharyngeal exudates and uvula is midline. no tonuge swelling or swelling under tongue. + swelling to chin and lower lip. no vesicles or lesions.

## 2021-05-07 NOTE — ED PROVIDER NOTE - OBJECTIVE STATEMENT
81 yo male h/o prostate cancer (on Lupron/Xgeva as outpatient, follows up w/ Dr. Fuller), HTN, HLD, type II DM, anemia of chronic disease/DOMITILA, PE/DVT (in 01/2020), A-Fib (on Eliquis 2.5 mg BID, reduced dose 2/2 fall risk with last fall 03/2021), CAD (s/p IRASEMA x 1 OM 03/30/2021, triple therapy ASA/Plavix/Eliquis x 1 week then d/c Plavix as per Interventional Cardiologist's note) recently admitted for facial numbness w neg imaging/stroke team eval complicated by anemia requiring transfusion w repeat transfusion after dc and pending gi eval c/o lip and chin swelling this am. Pt states he awoke with swelling to lower lip and chin. pt denies pain. pt states tingling to area. no fever or chills. no cp. pt admits to sob but unchanged for months. no cough or wheezing. no abd pain, n/v/d. no ha or dizziness. no further complaints.

## 2021-05-07 NOTE — ED PROVIDER NOTE - PROVIDER TOKENS
PROVIDER:[TOKEN:[[id number]],FOLLOWUP:[1-3 Days]],PROVIDER:[TOKEN:[[id number]],FOLLOWUP:[7-10 Days]]

## 2021-05-10 NOTE — ED POST DISCHARGE NOTE - NS ED POST DC CALL 1
Attempted, no message left [facility] -   Obstetrics  History & Physical    Patient Name: Rae Patel  MRN: [id number]  Admission Date: 2020  Primary Care Provider: Roberto Shaikh MD    Subjective:     Principal Problem:<principal problem not specified>    History of Present Illness:   IUP at 39w4d for induction of labor    Obstetric HPI:  Patient reports None contractions, active fetal movement, No vaginal bleeding , No loss of fluid     This pregnancy has been complicated by AMA    OB History    Para Term  AB Living   2 1 1 0 0 1   SAB TAB Ectopic Multiple Live Births   0 0 0 0 1      # Outcome Date GA Lbr Meliton/2nd Weight Sex Delivery Anes PTL Lv   2 Current            1 Term 18 40w0d / 03:53 3.65 kg (8 lb 0.8 oz) F Vag-Spont EPI N ANDERSON      Name: TERENCE, HARSHAL SUH      Apgar1: 5  Apgar5: 8     Past Medical History:   Diagnosis Date    AMA (advanced maternal age) multigravida 35+, second trimester 2019    De Quervain's syndrome (tenosynovitis)      Past Surgical History:   Procedure Laterality Date    BREAST SURGERY      WISDOM TOOTH EXTRACTION         PTA Medications   Medication Sig    PNV Comb No.59/Iron/FA/DHA (PRENATAL-DHA ORAL) Take 1 capsule by mouth.        Review of patient's allergies indicates:   Allergen Reactions    Penicillins Hives     RASH         Family History     Problem Relation (Age of Onset)    Melanoma Mother    Prostate cancer Father        Tobacco Use    Smoking status: Never Smoker    Smokeless tobacco: Never Used   Substance and Sexual Activity    Alcohol use: No    Drug use: No    Sexual activity: Yes     Partners: Male     Birth control/protection: None     Review of Systems   Constitutional: Negative.    HENT: Negative.    Eyes: Negative.    Respiratory: Negative.    Cardiovascular: Negative.    Gastrointestinal: Negative.    Endocrine: Negative.    Genitourinary: Negative.    Musculoskeletal: Negative.    Integumentary:  Negative.   Neurological:  Negative.    Hematological: Negative.    Psychiatric/Behavioral: Negative.    All other systems reviewed and are negative.  Breast: negative.       Objective:     Vital Signs (Most Recent):  Temp: 97.8 °F (36.6 °C) (20)  Pulse: 96 (20)  Resp: 18 (20)  BP: 125/80 (20 1846)  SpO2: 100 % (20) Vital Signs (24h Range):  Temp:  [97.8 °F (36.6 °C)] 97.8 °F (36.6 °C)  Pulse:  [81-99] 96  Resp:  [18] 18  SpO2:  [99 %-100 %] 100 %  BP: (120-125)/(77-82) 125/80     Weight: 68.7 kg (151 lb 7.3 oz)  Body mass index is 28.62 kg/m².    FHT: 150Cat 1 (reassuring)  TOCO:  Q 20 minutes    Physical Exam:   Constitutional: She is oriented to person, place, and time. She appears well-developed and well-nourished.     Eyes: Pupils are equal, round, and reactive to light. Conjunctivae are normal.    Neck: Normal range of motion.    Cardiovascular: Normal rate and regular rhythm.     Pulmonary/Chest: Breath sounds normal.        Abdominal: Soft.     Genitourinary:    Vagina and uterus normal.             Musculoskeletal: Normal range of motion and moves all extremeties.       Neurological: She is alert and oriented to person, place, and time.    Skin: Skin is warm.    Psychiatric: She has a normal mood and affect. Her behavior is normal. Thought content normal.       Cervix:  Dilation:  2  Effacement:  50%  Station: -2  Presentation: Vertex     Significant Labs:  Lab Results   Component Value Date    GROUPTRH A POS 2019    HEPBSAG Negative 2019    STREPBCULT No Group B Streptococcus isolated 2020       I have personallly reviewed all pertinent lab results from the last 24 hours.    Assessment/Plan:     41 y.o. female  at 39w4d for:    Post-dates pregnancy  Admit for pitocin induction        Kati Delgado CNM  Obstetrics  [facility] -

## 2021-05-11 ENCOUNTER — NON-APPOINTMENT (OUTPATIENT)
Age: 81
End: 2021-05-11

## 2021-05-11 ENCOUNTER — APPOINTMENT (OUTPATIENT)
Age: 81
End: 2021-05-11
Payer: MEDICARE

## 2021-05-11 PROCEDURE — 99204 OFFICE O/P NEW MOD 45 MIN: CPT

## 2021-05-11 PROCEDURE — 99214 OFFICE O/P EST MOD 30 MIN: CPT

## 2021-05-11 PROCEDURE — 99072 ADDL SUPL MATRL&STAF TM PHE: CPT

## 2021-05-12 NOTE — HISTORY OF PRESENT ILLNESS
[de-identified] : Here for post hospitalization and f/u for hemoccult + GI bleed evaluation\par Multiple medical problems, here with his daughter first visit with me\par Notably see HPI from Dr Sánchez and ref by Dr Fuller (HEME, see for stage 4 prostate CA and two recent transfusions April 16/23)\par Hx PE/SVT/AFIB, s/p COV19 vaccination x 2\par Baseline HGB 9.4, trended down to 7.4\par March 30 had SOB and ended up with stent placement [facility]\par Course complicated by angioedema was on prednisone and slowly resolving\par \par Had a COLO a few years ago reportedly normal, and at age 64 per daughter\par On Eliquis and ASA, had been on plavix for 7 days now off

## 2021-05-12 NOTE — PHYSICAL EXAM
[General Appearance - Alert] : alert [General Appearance - In No Acute Distress] : in no acute distress [General Appearance - Well Nourished] : well nourished [General Appearance - Well Developed] : well developed [Sclera] : the sclera and conjunctiva were normal [Outer Ear] : the ears and nose were normal in appearance [Neck Appearance] : the appearance of the neck was normal [] : no respiratory distress [Respiration, Rhythm And Depth] : normal respiratory rhythm and effort [Exaggerated Use Of Accessory Muscles For Inspiration] : no accessory muscle use [Bowel Sounds] : normal bowel sounds [Abdomen Soft] : soft [No CVA Tenderness] : no ~M costovertebral angle tenderness [Abnormal Walk] : normal gait [Musculoskeletal - Swelling] : no joint swelling seen [Skin Color & Pigmentation] : normal skin color and pigmentation [Skin Turgor] : normal skin turgor [Oriented To Time, Place, And Person] : oriented to person, place, and time [Impaired Insight] : insight and judgment were intact

## 2021-05-12 NOTE — ASSESSMENT
[FreeTextEntry1] : Occult + anemia\par Multiple medical problems\par Would benefit from bidirectional endoscopy at [facility]\par Will secure clearance from Dr. Lee re: holding eliquis prior to procedures

## 2021-05-13 ENCOUNTER — EMERGENCY (EMERGENCY)
Facility: HOSPITAL | Age: 81
LOS: 1 days | Discharge: HOME | End: 2021-05-13
Attending: EMERGENCY MEDICINE | Admitting: EMERGENCY MEDICINE
Payer: MEDICARE

## 2021-05-13 VITALS
RESPIRATION RATE: 20 BRPM | OXYGEN SATURATION: 99 % | DIASTOLIC BLOOD PRESSURE: 79 MMHG | SYSTOLIC BLOOD PRESSURE: 149 MMHG | TEMPERATURE: 98 F | HEART RATE: 104 BPM | WEIGHT: 177.91 LBS | HEIGHT: 69 IN

## 2021-05-13 VITALS
DIASTOLIC BLOOD PRESSURE: 77 MMHG | SYSTOLIC BLOOD PRESSURE: 153 MMHG | OXYGEN SATURATION: 97 % | HEART RATE: 93 BPM | RESPIRATION RATE: 20 BRPM

## 2021-05-13 DIAGNOSIS — Z20.822 CONTACT WITH AND (SUSPECTED) EXPOSURE TO COVID-19: ICD-10-CM

## 2021-05-13 DIAGNOSIS — R94.31 ABNORMAL ELECTROCARDIOGRAM [ECG] [EKG]: ICD-10-CM

## 2021-05-13 DIAGNOSIS — I10 ESSENTIAL (PRIMARY) HYPERTENSION: ICD-10-CM

## 2021-05-13 DIAGNOSIS — Z79.52 LONG TERM (CURRENT) USE OF SYSTEMIC STEROIDS: ICD-10-CM

## 2021-05-13 DIAGNOSIS — R06.02 SHORTNESS OF BREATH: ICD-10-CM

## 2021-05-13 DIAGNOSIS — Z79.82 LONG TERM (CURRENT) USE OF ASPIRIN: ICD-10-CM
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
## 2021-05-13 DIAGNOSIS — Z88.8 ALLERGY STATUS TO OTHER DRUGS, MEDICAMENTS AND BIOLOGICAL SUBSTANCES: ICD-10-CM

## 2021-05-13 DIAGNOSIS — R22.0 LOCALIZED SWELLING, MASS AND LUMP, HEAD: ICD-10-CM

## 2021-05-13 DIAGNOSIS — I82.409 ACUTE EMBOLISM AND THROMBOSIS OF UNSPECIFIED DEEP VEINS OF UNSPECIFIED LOWER EXTREMITY: ICD-10-CM

## 2021-05-13 DIAGNOSIS — I26.99 OTHER PULMONARY EMBOLISM WITHOUT ACUTE COR PULMONALE: ICD-10-CM

## 2021-05-13 DIAGNOSIS — I48.91 UNSPECIFIED ATRIAL FIBRILLATION: ICD-10-CM

## 2021-05-13 DIAGNOSIS — E11.9 TYPE 2 DIABETES MELLITUS WITHOUT COMPLICATIONS: ICD-10-CM

## 2021-05-13 DIAGNOSIS — Z79.899 OTHER LONG TERM (CURRENT) DRUG THERAPY: ICD-10-CM
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
## 2021-05-13 DIAGNOSIS — T78.3XXA ANGIONEUROTIC EDEMA, INITIAL ENCOUNTER: ICD-10-CM

## 2021-05-13 DIAGNOSIS — Z79.51 LONG TERM (CURRENT) USE OF INHALED STEROIDS: ICD-10-CM

## 2021-05-13 DIAGNOSIS — E78.5 HYPERLIPIDEMIA, UNSPECIFIED: ICD-10-CM

## 2021-05-13 LAB
ALBUMIN SERPL ELPH-MCNC: 3.6 G/DL — SIGNIFICANT CHANGE UP (ref 3.3–5)
ALP SERPL-CCNC: 33 U/L — LOW (ref 40–120)
ALT FLD-CCNC: 17 U/L — SIGNIFICANT CHANGE UP (ref 10–45)
ANION GAP SERPL CALC-SCNC: 13 MMOL/L — SIGNIFICANT CHANGE UP (ref 5–17)
APTT BLD: 32.4 SEC — SIGNIFICANT CHANGE UP (ref 27.5–35.5)
AST SERPL-CCNC: 24 U/L — SIGNIFICANT CHANGE UP (ref 10–40)
BASE EXCESS BLDV CALC-SCNC: -2.8 MMOL/L — LOW (ref -2–3)
BASOPHILS # BLD AUTO: 0.02 K/UL — SIGNIFICANT CHANGE UP (ref 0–0.2)
BASOPHILS NFR BLD AUTO: 0.1 % — SIGNIFICANT CHANGE UP (ref 0–2)
BILIRUB SERPL-MCNC: 0.4 MG/DL — SIGNIFICANT CHANGE UP (ref 0.2–1.2)
BUN SERPL-MCNC: 21 MG/DL — SIGNIFICANT CHANGE UP (ref 7–23)
CA-I SERPL-SCNC: 1.27 MMOL/L — SIGNIFICANT CHANGE UP (ref 1.15–1.33)
CALCIUM SERPL-MCNC: 9.5 MG/DL — SIGNIFICANT CHANGE UP (ref 8.4–10.5)
CHLORIDE SERPL-SCNC: 102 MMOL/L — SIGNIFICANT CHANGE UP (ref 96–108)
CO2 BLDV-SCNC: 23 MMOL/L — SIGNIFICANT CHANGE UP (ref 22–26)
CO2 SERPL-SCNC: 21 MMOL/L — LOW (ref 22–31)
CREAT SERPL-MCNC: 1.39 MG/DL — HIGH (ref 0.5–1.3)
EOSINOPHIL # BLD AUTO: 0.19 K/UL — SIGNIFICANT CHANGE UP (ref 0–0.5)
EOSINOPHIL NFR BLD AUTO: 1.2 % — SIGNIFICANT CHANGE UP (ref 0–6)
GAS PNL BLDV: 135 MMOL/L — LOW (ref 136–145)
GAS PNL BLDV: SIGNIFICANT CHANGE UP
GLUCOSE SERPL-MCNC: 119 MG/DL — HIGH (ref 70–99)
HCO3 BLDV-SCNC: 22 MMOL/L — SIGNIFICANT CHANGE UP (ref 22–29)
HCT VFR BLD CALC: 27.8 % — LOW (ref 39–50)
HGB BLD-MCNC: 9 G/DL — LOW (ref 13–17)
IMM GRANULOCYTES NFR BLD AUTO: 1.7 % — HIGH (ref 0–1.5)
INR BLD: 1.52 — HIGH (ref 0.88–1.16)
LYMPHOCYTES # BLD AUTO: 1.82 K/UL — SIGNIFICANT CHANGE UP (ref 1–3.3)
LYMPHOCYTES # BLD AUTO: 11.4 % — LOW (ref 13–44)
MCHC RBC-ENTMCNC: 28.5 PG — SIGNIFICANT CHANGE UP (ref 27–34)
MCHC RBC-ENTMCNC: 32.4 GM/DL — SIGNIFICANT CHANGE UP (ref 32–36)
MCV RBC AUTO: 88 FL — SIGNIFICANT CHANGE UP (ref 80–100)
MONOCYTES # BLD AUTO: 1.57 K/UL — HIGH (ref 0–0.9)
MONOCYTES NFR BLD AUTO: 9.8 % — SIGNIFICANT CHANGE UP (ref 2–14)
NEUTROPHILS # BLD AUTO: 12.11 K/UL — HIGH (ref 1.8–7.4)
NEUTROPHILS NFR BLD AUTO: 75.8 % — SIGNIFICANT CHANGE UP (ref 43–77)
NRBC # BLD: 0 /100 WBCS — SIGNIFICANT CHANGE UP (ref 0–0)
NT-PROBNP SERPL-SCNC: 426 PG/ML — HIGH (ref 0–300)
PCO2 BLDV: 37 MMHG — LOW (ref 42–55)
PH BLDV: 7.38 — SIGNIFICANT CHANGE UP (ref 7.32–7.43)
PLATELET # BLD AUTO: 408 K/UL — HIGH (ref 150–400)
PO2 BLDV: 21 MMHG — SIGNIFICANT CHANGE UP
POTASSIUM BLDV-SCNC: 4.4 MMOL/L — SIGNIFICANT CHANGE UP (ref 3.5–5.1)
POTASSIUM SERPL-MCNC: 4.4 MMOL/L — SIGNIFICANT CHANGE UP (ref 3.5–5.3)
POTASSIUM SERPL-SCNC: 4.4 MMOL/L — SIGNIFICANT CHANGE UP (ref 3.5–5.3)
PROT SERPL-MCNC: 6.5 G/DL — SIGNIFICANT CHANGE UP (ref 6–8.3)
PROTHROM AB SERPL-ACNC: 17.9 SEC — HIGH (ref 10.6–13.6)
RBC # BLD: 3.16 M/UL — LOW (ref 4.2–5.8)
RBC # FLD: 15.9 % — HIGH (ref 10.3–14.5)
SAO2 % BLDV: 30.2 % — SIGNIFICANT CHANGE UP
SARS-COV-2 RNA SPEC QL NAA+PROBE: SIGNIFICANT CHANGE UP
SODIUM SERPL-SCNC: 136 MMOL/L — SIGNIFICANT CHANGE UP (ref 135–145)
TROPONIN T SERPL-MCNC: 0.02 NG/ML — HIGH (ref 0–0.01)
WBC # BLD: 15.98 K/UL — HIGH (ref 3.8–10.5)
WBC # FLD AUTO: 15.98 K/UL — HIGH (ref 3.8–10.5)

## 2021-05-13 PROCEDURE — 71045 X-RAY EXAM CHEST 1 VIEW: CPT | Mod: 26

## 2021-05-13 PROCEDURE — 85730 THROMBOPLASTIN TIME PARTIAL: CPT

## 2021-05-13 PROCEDURE — 96374 THER/PROPH/DIAG INJ IV PUSH: CPT

## 2021-05-13 PROCEDURE — 99284 EMERGENCY DEPT VISIT MOD MDM: CPT | Mod: 25

## 2021-05-13 PROCEDURE — 80053 COMPREHEN METABOLIC PANEL: CPT

## 2021-05-13 PROCEDURE — 85610 PROTHROMBIN TIME: CPT

## 2021-05-13 PROCEDURE — 84132 ASSAY OF SERUM POTASSIUM: CPT

## 2021-05-13 PROCEDURE — 83880 ASSAY OF NATRIURETIC PEPTIDE: CPT

## 2021-05-13 PROCEDURE — 99285 EMERGENCY DEPT VISIT HI MDM: CPT | Mod: 25

## 2021-05-13 PROCEDURE — 36415 COLL VENOUS BLD VENIPUNCTURE: CPT

## 2021-05-13 PROCEDURE — 96372 THER/PROPH/DIAG INJ SC/IM: CPT | Mod: XU

## 2021-05-13 PROCEDURE — 84295 ASSAY OF SERUM SODIUM: CPT

## 2021-05-13 PROCEDURE — 96375 TX/PRO/DX INJ NEW DRUG ADDON: CPT

## 2021-05-13 PROCEDURE — 93005 ELECTROCARDIOGRAM TRACING: CPT

## 2021-05-13 PROCEDURE — 85025 COMPLETE CBC W/AUTO DIFF WBC: CPT

## 2021-05-13 PROCEDURE — 84484 ASSAY OF TROPONIN QUANT: CPT

## 2021-05-13 PROCEDURE — 93010 ELECTROCARDIOGRAM REPORT: CPT

## 2021-05-13 PROCEDURE — 71045 X-RAY EXAM CHEST 1 VIEW: CPT

## 2021-05-13 PROCEDURE — 82803 BLOOD GASES ANY COMBINATION: CPT

## 2021-05-13 PROCEDURE — 87635 SARS-COV-2 COVID-19 AMP PRB: CPT

## 2021-05-13 PROCEDURE — 82330 ASSAY OF CALCIUM: CPT

## 2021-05-13 RX ORDER — SODIUM CHLORIDE 9 MG/ML
500 INJECTION INTRAMUSCULAR; INTRAVENOUS; SUBCUTANEOUS ONCE
Refills: 0 | Status: COMPLETED | OUTPATIENT
Start: 2021-05-13 | End: 2021-05-13

## 2021-05-13 RX ORDER — AMPICILLIN SODIUM AND SULBACTAM SODIUM 250; 125 MG/ML; MG/ML
3 INJECTION, POWDER, FOR SUSPENSION INTRAMUSCULAR; INTRAVENOUS ONCE
Refills: 0 | Status: COMPLETED | OUTPATIENT
Start: 2021-05-13 | End: 2021-05-13

## 2021-05-13 RX ORDER — DIPHENHYDRAMINE HCL 50 MG
25 CAPSULE ORAL ONCE
Refills: 0 | Status: COMPLETED | OUTPATIENT
Start: 2021-05-13 | End: 2021-05-13

## 2021-05-13 RX ORDER — TRAMADOL HYDROCHLORIDE 50 MG/1
1 TABLET ORAL
Qty: 6 | Refills: 0
Start: 2021-05-13 | End: 2021-05-14

## 2021-05-13 RX ORDER — FAMOTIDINE 10 MG/ML
20 INJECTION INTRAVENOUS ONCE
Refills: 0 | Status: COMPLETED | OUTPATIENT
Start: 2021-05-13 | End: 2021-05-13

## 2021-05-13 RX ORDER — OXYCODONE AND ACETAMINOPHEN 5; 325 MG/1; MG/1
1 TABLET ORAL ONCE
Refills: 0 | Status: DISCONTINUED | OUTPATIENT
Start: 2021-05-13 | End: 2021-05-13

## 2021-05-13 RX ADMIN — SODIUM CHLORIDE 1000 MILLILITER(S): 9 INJECTION INTRAMUSCULAR; INTRAVENOUS; SUBCUTANEOUS at 07:54

## 2021-05-13 RX ADMIN — OXYCODONE AND ACETAMINOPHEN 1 TABLET(S): 5; 325 TABLET ORAL at 09:01

## 2021-05-13 RX ADMIN — AMPICILLIN SODIUM AND SULBACTAM SODIUM 200 GRAM(S): 250; 125 INJECTION, POWDER, FOR SUSPENSION INTRAMUSCULAR; INTRAVENOUS at 09:01

## 2021-05-13 RX ADMIN — Medication 125 MILLIGRAM(S): at 05:59

## 2021-05-13 RX ADMIN — FAMOTIDINE 20 MILLIGRAM(S): 10 INJECTION INTRAVENOUS at 05:59

## 2021-05-13 RX ADMIN — Medication 25 MILLIGRAM(S): at 05:58

## 2021-05-13 RX ADMIN — OXYCODONE AND ACETAMINOPHEN 1 TABLET(S): 5; 325 TABLET ORAL at 07:45

## 2021-05-13 NOTE — ED PROVIDER NOTE - NSFOLLOWUPINSTRUCTIONS_ED_ALL_ED_FT
TAKE ANTIBIOTICS, FOLLOW UP WITH ENT WITHIN A WK, FOLLOW UP WITH DR HUANG AS SCHEDULED  Adenitis  WHAT YOU NEED TO KNOW:  Adenitis is a condition that causes your lymph nodes to become swollen and tender You may also have a fever. Adenitis is a sign of infection usually caused by bacteria.  DISCHARGE INSTRUCTIONS:  Call your local emergency number (911 in the [place]) if:   •You have trouble breathing or swallowing.  Return to the emergency department if:   •You have new or worsening redness or swelling.  •You develop a large, soft bump that may leak pus.  Call your doctor if:   •Your symptoms do not improve after 10 days of treatment.  •You have questions or concerns about your condition or care.  Medicines: You may need any of the following:   •Antibiotics help treat a bacterial infection.  •Acetaminophen decreases pain and fever. It is available without a doctor's order. Ask how much to take and how often to take it. Follow directions. Read the labels of all other medicines you are using to see if they also contain acetaminophen, or ask your doctor or pharmacist. Acetaminophen can cause liver damage if not taken correctly. Do not use more than 4 grams (4,000 milligrams) total of acetaminophen in one day.   •NSAIDs, such as ibuprofen, help decrease swelling, pain, and fever. NSAIDs can cause stomach bleeding or kidney problems in certain people. If you take blood thinner medicine, always ask your healthcare provider if NSAIDs are safe for you. Always read the medicine label and follow directions.  •Take your medicine as directed. Contact your healthcare provider if you think your medicine is not helping or if you have side effects. Tell him of her if you are allergic to any medicine. Keep a list of the medicines, vitamins, and herbs you take. Include the amounts, and when and why you take them. Bring the list or the pill bottles to follow-up visits. Carry your medicine list with you in case of an emergency.  Manage your symptoms:   •Apply moist heat on your swollen lymph nodes for 20 to 30 minutes every 2 hours or as directed. Heat helps decrease pain and swelling. You can make a moist heat pack by soaking a small towel in hot water. Let it cool until you can hold it with your bare hands. Then wring out the extra water. Place the towel in a plastic bag, and wrap the bag with a dry towel around the bag. Place the pack over your swollen lymph nodes.  •Elevate your head and upper back. Keep your head and upper back elevated when you rest, such as in a recliner. Place extra pillows under your head and neck when you sleep in bed. Elevation helps decrease swelling.  Prevent an infection:   •Wash your hands often. Wash your hands several times each day. Wash after you use the bathroom, change a child's diaper, and before you prepare or eat food. Use soap and water every time. Rub your soapy hands together, lacing your fingers. Wash the front and back of your hands, and in between your fingers. Use the fingers of one hand to scrub under the fingernails of the other hand. Wash for at least 20 seconds. Rinse with warm, running water for several seconds. Then dry your hands with a clean towel or paper towel. Use hand  that contains alcohol if soap and water are not available. Do not touch your eyes, nose, or mouth without washing your hands first.  Handwashing  •Cover a sneeze or cough. Use a tissue that covers your mouth and nose. Throw the tissue away in a trash can right away. Use the bend of your arm if a tissue is not available. Wash your hands well with soap and water or use a hand .  •Clean surfaces often. Clean doorknobs, countertops, cell phones, and other surfaces that are touched often. Use a disinfecting wipe, a single-use sponge, or a cloth you can wash and reuse. Use disinfecting  if you do not have wipes. You can create a disinfecting  by mixing 1 part bleach with 10 parts water.  •Ask about vaccines you may need. Vaccines help prevent diseases caused by some viruses and bacteria. Get the influenza (flu) vaccine as soon as recommended each year. The flu vaccine is usually available starting in September or October. Flu viruses change, so it is important to get a flu vaccine every year. Get the pneumonia vaccine if recommended. This vaccine is usually recommended every 5 years. Your provider will tell you when to get this vaccine, if needed. He or she can tell you if you should get other vaccines, and when to get them.

## 2021-05-13 NOTE — CONSULT NOTE ADULT - SUBJECTIVE AND OBJECTIVE BOX
HPI 80M with PMHx HTN previously on lisinopril and well controlled T2DM presents with submandibular swelling.  Pt states that he was last in the ED on Friday where he had swelling of the lips thought to be secondary to angioedema from lisinopril and was discharged on a steroid taper.  He states that over the last couple of days he has been having difficulty swallowing secondary to odynophagia and that swallowing solids has become difficult for him.  Denies any overt dysphagia or coughing with drinking fluids.  Denies any changes to his voice.  Has dyspnea on exertion but does not feel any SOB at baseline.  Has had decreased PO intake due to the odynophagia.  No fevers at home.  Has reflux at baseline.    PAST MEDICAL & SURGICAL HISTORY:  Hyperlipidemia  Malignant neoplasm of prostate, s/p total prostatectomy in 1994 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  Glaucoma  Type 2 diabetes mellitus  Essential hypertension  Chemotherapy session for neoplasm, Prostate Cancer  Pulmonary embolism, 1/2020  DVT (deep venous thrombosis)  1/2020 on Eliquis  Atrial fibrillation, on Eliquis  Other postprocedural status, S/P prostatectomy    Allergies: ACE inhibitors (Angioedema)    Social History: former smoker quit in 1990s, former EtOH quit in 1990s, no recreational drug use      Vital Signs Last 24 Hrs  T(C): 36.6 (13 May 2021 05:15), Max: 36.6 (13 May 2021 05:15)  T(F): 97.9 (13 May 2021 05:15), Max: 97.9 (13 May 2021 05:15)  HR: 93 (13 May 2021 10:05) (90 - 104)  BP: 153/77 (13 May 2021 10:05) (147/68 - 153/77)  BP(mean): --  RR: 20 (13 May 2021 10:05) (18 - 20)  SpO2: 97% (13 May 2021 10:05) (97% - 99%)      PHYSICAL EXAM:  General: NAD, lying in bed, pleasant, conversant, strong phonation  Head: NCAT, facial movement grossly symmetric  Eyes: EOMI  Nose: clear anteriorly  OC/OP: MMM, tongue midline and soft to palpation, FOM nonedematous and soft to palpation, edentulous, soft palate without edema, uvula midline, posterior pharyngeal wall easily visible, clear saliva expressed on left side with palpation of SMG, no significant amount of clear saliva expressed on right side, no palpable sialoliths, possible purulence in gingivolingual sulcus  Neck: tenderness to palpation along bilateral submandibular glands, otherwise soft, supple, no palpable LAD or masses  Respiratory: NLB on RA, no appreciable stridor    Flexible laryngoscopy: nasal cavity clear, nasopharynx clear, oropharynx clear, BOT and vallecula clear, epiglottis and AE folds sharp, pyriform sinuses clear, FVC wnl, TVC wnl with full ROM, mild post-cricoid edema noted, airway widely patent, no masses or lesions

## 2021-05-13 NOTE — ED PROVIDER NOTE - CLINICAL SUMMARY MEDICAL DECISION MAKING FREE TEXT BOX
swelling to lower lip, chin and neck, no acute resp distress currently, speaking in full sentences, no intraoral swelling, no drooling, no hypoxia  -check labs, ekg  -cxr  -benadryl, pepcid, solumedrol  -ENT consulted

## 2021-05-13 NOTE — ED PROVIDER NOTE - OBJECTIVE STATEMENT
80M hx htn, afib (on eliquis), dvt/PE, dm, high chol, prostate ca, c/o facial swelling. states woke up in the middle of the night with again swelling to lower lip, chin and upper neck.  pt was seen in ED last week for angioedema, with same swelling distribution. pt was treated with benadryl, solumedrol and pepcid with improved swelling and was discharged with prednisone.  pt states while taking steroids at home developed shingles to left flank/chest.  currently on valtrex. pt has not taken lisinopril since initial episode of angioedema.  states has occasional shortness of breath with exertion. no chest pain. no cough. no vomiting.

## 2021-05-13 NOTE — ED ADULT TRIAGE NOTE - OTHER COMPLAINTS
SOB x 2 days, angioedema since yesterday, hx of shingles on Valtrex. Experienced angioedema while taking lisinopril last week, lisinopril dc'd but SOB and angioedema returned.

## 2021-05-13 NOTE — ED ADULT NURSE NOTE - OBJECTIVE STATEMENT
pt a7ox4 resting comfortably in stretcher. pt c/o lower lip/ chin and neck swelling. pt was sleeping and woke up in the middle of the night with swelling. pt was seen last week in [facility] ed for same complaint, d/c home with steroids. pt reports d/c lisinopril last week.  pt also reports shingles outbreak this past week, on valtrex. denies cp, sob, n v,d, fevers,cough, weakness, ha. speech clear, no drooling noted, no difficulty swallowing. pt a&ox4 resting comfortably in stretcher. pt c/o lower lip/ chin and neck swelling. pt was sleeping and woke up in the middle of the night with swelling. pt was seen last week in [facility] ed for same complaint, d/c home with steroids. pt reports d/c lisinopril last week.  pt also reports shingles outbreak this past week, on valtrex. denies cp, sob, n v,d, fevers,cough, weakness, ha. speech clear, no drooling noted, no difficulty swallowing.

## 2021-05-13 NOTE — ED ADULT NURSE REASSESSMENT NOTE - NS ED NURSE REASSESS COMMENT FT1
Assumed care of pt, pt a&ox3. Respirations even and unlabored. Angioedema seen of lower lip and jaw, but pt denies SOB or difficulty breathing. Pt speaking in full sentences, spo2 97%. Pt awaiting consult from ENT. Reports pain decreased after percocet.

## 2021-05-13 NOTE — ED PROVIDER NOTE - PROGRESS NOTE DETAILS
cxr: no acute cardiac, pulmonary or bony pathology appreciated. spoke with Dr. Gill - he will see pt in ED andrew: pt received at sign out from dr ba as pending ent eval, to be seen by dr gonzalez in ed as well, dispo as per consults Director - pt received from night team.  Pt resting, c/o pain from shingles > mouth/throat pain.  Nl voice, no drool or stridor, no resp distress, + swelling lower lip, chin extending to ant upper neck area; op, tongue nl, lung cta.  Await ent.  VS, labs reviewed. seen and scoped by ent - recommending abx for adenitis, ent f/u in a wk; spoke to dr gonzalez - unable to see pt in ed but ok w/dc home, will see in office -- plan communicated to pt, who is also requesting rx for pain meds due to shingles pain

## 2021-05-13 NOTE — CONSULT NOTE ADULT - ASSESSMENT
80M recently diagnosed with angioedema secondary to lisinopril use presents with sialadenitis of bilateral submandibular glands.  May be secondary to decreased PO intake.  No evidence of oral cavity edema or findings on flexible laryngoscopy.  - Recommend course of augmentin for sialadenitis  - Discussed importance of staying well hydrated, use of sialogogues, warm compresses, and TID massage with patient and daughter  - Pt can follow up with Dr. Au in 1 week  - Please page ENT with any questions or concerns

## 2021-05-13 NOTE — ED PROVIDER NOTE - CARE PROVIDER_API CALL
Linette Au)  Otolaryngology  [address] [place] [address], [address]  [place], [place] [address]  Phone: [phone]  Fax: [phone]  Follow Up Time:

## 2021-05-13 NOTE — ED PROVIDER NOTE - PHYSICAL EXAMINATION
+swelling to lower lip, chin, anterior upper neck  no crepitus  no drooling, no trismus, no stridor    left flank/abd - shingles, dried, no erythema, no desquamation

## 2021-05-14 ENCOUNTER — APPOINTMENT (OUTPATIENT)
Dept: HEART AND VASCULAR | Facility: CLINIC | Age: 81
End: 2021-05-14
Payer: MEDICARE

## 2021-05-14 ENCOUNTER — NON-APPOINTMENT (OUTPATIENT)
Age: 81
End: 2021-05-14

## 2021-05-14 ENCOUNTER — APPOINTMENT (OUTPATIENT)
Dept: INTERNAL MEDICINE | Facility: CLINIC | Age: 81
End: 2021-05-14
Payer: MEDICARE

## 2021-05-14 VITALS
BODY MASS INDEX: 25.47 KG/M2 | OXYGEN SATURATION: 100 % | HEIGHT: 69 IN | DIASTOLIC BLOOD PRESSURE: 76 MMHG | HEART RATE: 93 BPM | TEMPERATURE: 98.5 F | SYSTOLIC BLOOD PRESSURE: 161 MMHG | WEIGHT: 171.98 LBS

## 2021-05-14 VITALS
SYSTOLIC BLOOD PRESSURE: 146 MMHG | HEIGHT: 69 IN | WEIGHT: 178 LBS | DIASTOLIC BLOOD PRESSURE: 74 MMHG | BODY MASS INDEX: 26.36 KG/M2 | HEART RATE: 107 BPM | OXYGEN SATURATION: 99 % | TEMPERATURE: 98.2 F

## 2021-05-14 DIAGNOSIS — Z01.810 ENCOUNTER FOR PREPROCEDURAL CARDIOVASCULAR EXAMINATION: ICD-10-CM

## 2021-05-14 DIAGNOSIS — Z00.00 ENCOUNTER FOR GENERAL ADULT MEDICAL EXAMINATION W/OUT ABNORMAL FINDINGS: ICD-10-CM

## 2021-05-14 DIAGNOSIS — Z92.89 PERSONAL HISTORY OF OTHER MEDICAL TREATMENT: ICD-10-CM

## 2021-05-14 DIAGNOSIS — R39.15 URGENCY OF URINATION: ICD-10-CM

## 2021-05-14 PROCEDURE — 99214 OFFICE O/P EST MOD 30 MIN: CPT

## 2021-05-14 PROCEDURE — G0439: CPT

## 2021-05-14 PROCEDURE — 93000 ELECTROCARDIOGRAM COMPLETE: CPT

## 2021-05-14 PROCEDURE — 99072 ADDL SUPL MATRL&STAF TM PHE: CPT

## 2021-05-14 NOTE — ASSESSMENT
[FreeTextEntry1] : Multiple medical problems as outlined; \par Needs to be cleared from Cardiology for the Upper and lower endoscopy\par Unclear reason for angioedema; Will followup with Dr. Au;

## 2021-05-14 NOTE — HISTORY OF PRESENT ILLNESS
[FreeTextEntry1] : Interim reviewed;\par  [de-identified] : As  above;\par Able to urinate\par Recent two admissions for Angioedema; \par Concerned about URI; also had been on Lisinopril ; ENT scope adenoid infection;  Getting Augmentin; \par Jaw still swollen\par Needs upper and lower endoscopy and off Apixaban ; Will discuss with Cardiology  \par Still with PE and DVT\par Vaccinated for Covid; Also Shingles; and states had Shingles know\par Only able to walk a half block then gets short of breath

## 2021-05-14 NOTE — PHYSICAL EXAM
[Normal] : no posterior cervical lymphadenopathy and no anterior cervical lymphadenopathy [de-identified] : Jaw still swollen [de-identified] : Herpes Zpster left flank

## 2021-05-14 NOTE — HEALTH RISK ASSESSMENT
[Fair] :  ~his/her~ mood as fair [No] : No [No falls in past year] : Patient reported no falls in the past year [] : No

## 2021-05-20 ENCOUNTER — APPOINTMENT (OUTPATIENT)
Dept: HEART AND VASCULAR | Facility: CLINIC | Age: 81
End: 2021-05-20

## 2021-05-20 ENCOUNTER — RESULT REVIEW (OUTPATIENT)
Age: 81
End: 2021-05-20

## 2021-05-20 ENCOUNTER — OUTPATIENT (OUTPATIENT)
Dept: OUTPATIENT SERVICES | Facility: HOSPITAL | Age: 81
LOS: 1 days | End: 2021-05-20
Payer: MEDICARE

## 2021-05-20 DIAGNOSIS — I25.10 ATHEROSCLEROTIC HEART DISEASE OF NATIVE CORONARY ARTERY WITHOUT ANGINA PECTORIS: ICD-10-CM

## 2021-05-20 DIAGNOSIS — R06.09 OTHER FORMS OF DYSPNEA: ICD-10-CM

## 2021-05-20 PROCEDURE — 93306 TTE W/DOPPLER COMPLETE: CPT | Mod: 26

## 2021-05-20 PROCEDURE — 93016 CV STRESS TEST SUPVJ ONLY: CPT

## 2021-05-20 PROCEDURE — A9500: CPT

## 2021-05-20 PROCEDURE — 93018 CV STRESS TEST I&R ONLY: CPT

## 2021-05-20 PROCEDURE — 78452 HT MUSCLE IMAGE SPECT MULT: CPT

## 2021-05-20 PROCEDURE — 93306 TTE W/DOPPLER COMPLETE: CPT

## 2021-05-20 PROCEDURE — 78452 HT MUSCLE IMAGE SPECT MULT: CPT | Mod: 26

## 2021-05-20 PROCEDURE — 93017 CV STRESS TEST TRACING ONLY: CPT

## 2021-05-20 PROCEDURE — A9505: CPT

## 2021-05-20 NOTE — ASSESSMENT
[FreeTextEntry1] : 1. CAD s/p IRASEMA-OM1 3/2021\par - hospitalization reports reviewed\par - instructed to take plavix for 1 wk only in setting of previous GIB\par - ed done re heart healthy lifestyle modifications and diet \par - Nl EF on 3/2021 TTE in hospital\par - recent drop in H/H in setting of triple tx x1 week, pt usually w/ Hgb 8-9 at baseline\par - had a lengthy discussion w/ pt, his daughter as well as pt's Hem/Onc MD/Dr. Fuller. Given pt's hypercoagulable state, h/o PE/DVT as well as afib, benefits of NOAC outweigh risks. Cleared by GI to restart as well. Will restart at low dose Eliquis 2.5 bid and pt will have his H/H followed diligently by Dr. Fuller\par - cont ASA/NOAC/BB\par \par 2. Preoperative cardiac evaluation prior to EG/colonoscopy\par - + JAIME, METS<4\par - TTE\par - MPI\par - continue current meds for now \par - further recommendations pending results \par \par 2. PAF\par - now in NSR\par - CHADsVasc score 5\par - cont NOAC as above\par -cont Toprol 50 qd for better rate control\par \par \par 3. HTN\par - uncontrolled, ACE DC"d due to angioedema\par - cont BB \par - add Imdur 30 qd\par - continue rest of current meds for now\par \par 4. PE/DVT\par - cont to f/u w/ Dr. Fuller\par - cont NOAC\par \par 5. HLD\par - cont statin\par - ed done\par \par RTC after testing

## 2021-05-20 NOTE — ADDENDUM
[FreeTextEntry1] : 5/20/21 \par Preoperative cardiac evaluation prior to EG/colonoscopy\par - TTE and MPI both wnl \par - results discussed in detail with pt \par - hold NOAC 2 days prior, day of, and 2 days post procedure, restart thereafter\par - pt is medically optimized from a cardiac standpoint for the upcoming surgery with the aforementioned recommendations

## 2021-05-30 ENCOUNTER — INPATIENT (INPATIENT)
Facility: HOSPITAL | Age: 81
LOS: 7 days | Discharge: HOME | DRG: 982 | End: 2021-06-07
Attending: INTERNAL MEDICINE | Admitting: INTERNAL MEDICINE
Payer: MEDICARE

## 2021-05-30 VITALS
SYSTOLIC BLOOD PRESSURE: 162 MMHG | TEMPERATURE: 98 F | WEIGHT: 177.91 LBS | OXYGEN SATURATION: 98 % | DIASTOLIC BLOOD PRESSURE: 73 MMHG | RESPIRATION RATE: 18 BRPM | HEIGHT: 69 IN | HEART RATE: 109 BPM

## 2021-05-30 DIAGNOSIS — I10 ESSENTIAL (PRIMARY) HYPERTENSION: ICD-10-CM

## 2021-05-30 DIAGNOSIS — R73.03 PREDIABETES: ICD-10-CM

## 2021-05-30 DIAGNOSIS — R63.8 OTHER SYMPTOMS AND SIGNS CONCERNING FOOD AND FLUID INTAKE: ICD-10-CM

## 2021-05-30 DIAGNOSIS — D64.9 ANEMIA, UNSPECIFIED: ICD-10-CM

## 2021-05-30 DIAGNOSIS — I48.91 UNSPECIFIED ATRIAL FIBRILLATION: ICD-10-CM

## 2021-05-30 DIAGNOSIS — Z86.711 PERSONAL HISTORY OF PULMONARY EMBOLISM: ICD-10-CM

## 2021-05-30 DIAGNOSIS — C61 MALIGNANT NEOPLASM OF PROSTATE: ICD-10-CM

## 2021-05-30 DIAGNOSIS — T78.40XA ALLERGY, UNSPECIFIED, INITIAL ENCOUNTER: ICD-10-CM

## 2021-05-30 DIAGNOSIS — L03.211 CELLULITIS OF FACE: ICD-10-CM

## 2021-05-30 DIAGNOSIS — I25.10 ATHEROSCLEROTIC HEART DISEASE OF NATIVE CORONARY ARTERY WITHOUT ANGINA PECTORIS: ICD-10-CM

## 2021-05-30 DIAGNOSIS — H40.9 UNSPECIFIED GLAUCOMA: ICD-10-CM

## 2021-05-30 LAB
ALBUMIN SERPL ELPH-MCNC: 3.3 G/DL — SIGNIFICANT CHANGE UP (ref 3.3–5)
ALP SERPL-CCNC: 42 U/L — SIGNIFICANT CHANGE UP (ref 40–120)
ALT FLD-CCNC: 15 U/L — SIGNIFICANT CHANGE UP (ref 10–45)
ANION GAP SERPL CALC-SCNC: 13 MMOL/L — SIGNIFICANT CHANGE UP (ref 5–17)
APTT BLD: 42.9 SEC — HIGH (ref 27.5–35.5)
AST SERPL-CCNC: 24 U/L — SIGNIFICANT CHANGE UP (ref 10–40)
BASOPHILS # BLD AUTO: 0.03 K/UL — SIGNIFICANT CHANGE UP (ref 0–0.2)
BASOPHILS NFR BLD AUTO: 0.5 % — SIGNIFICANT CHANGE UP (ref 0–2)
BILIRUB SERPL-MCNC: 0.4 MG/DL — SIGNIFICANT CHANGE UP (ref 0.2–1.2)
BLD GP AB SCN SERPL QL: NEGATIVE — SIGNIFICANT CHANGE UP
BUN SERPL-MCNC: 12 MG/DL — SIGNIFICANT CHANGE UP (ref 7–23)
CALCIUM SERPL-MCNC: 8.9 MG/DL — SIGNIFICANT CHANGE UP (ref 8.4–10.5)
CHLORIDE SERPL-SCNC: 107 MMOL/L — SIGNIFICANT CHANGE UP (ref 96–108)
CO2 SERPL-SCNC: 19 MMOL/L — LOW (ref 22–31)
CREAT SERPL-MCNC: 1.18 MG/DL — SIGNIFICANT CHANGE UP (ref 0.5–1.3)
EOSINOPHIL # BLD AUTO: 0.24 K/UL — SIGNIFICANT CHANGE UP (ref 0–0.5)
EOSINOPHIL NFR BLD AUTO: 3.7 % — SIGNIFICANT CHANGE UP (ref 0–6)
GLUCOSE BLDC GLUCOMTR-MCNC: 94 MG/DL — SIGNIFICANT CHANGE UP (ref 70–99)
GLUCOSE SERPL-MCNC: 115 MG/DL — HIGH (ref 70–99)
HCT VFR BLD CALC: 22.8 % — LOW (ref 39–50)
HGB BLD-MCNC: 7.2 G/DL — LOW (ref 13–17)
IMM GRANULOCYTES NFR BLD AUTO: 0.3 % — SIGNIFICANT CHANGE UP (ref 0–1.5)
INR BLD: 1.57 — HIGH (ref 0.88–1.16)
LYMPHOCYTES # BLD AUTO: 1.19 K/UL — SIGNIFICANT CHANGE UP (ref 1–3.3)
LYMPHOCYTES # BLD AUTO: 18.1 % — SIGNIFICANT CHANGE UP (ref 13–44)
MCHC RBC-ENTMCNC: 28.7 PG — SIGNIFICANT CHANGE UP (ref 27–34)
MCHC RBC-ENTMCNC: 31.6 GM/DL — LOW (ref 32–36)
MCV RBC AUTO: 90.8 FL — SIGNIFICANT CHANGE UP (ref 80–100)
MONOCYTES # BLD AUTO: 0.67 K/UL — SIGNIFICANT CHANGE UP (ref 0–0.9)
MONOCYTES NFR BLD AUTO: 10.2 % — SIGNIFICANT CHANGE UP (ref 2–14)
NEUTROPHILS # BLD AUTO: 4.41 K/UL — SIGNIFICANT CHANGE UP (ref 1.8–7.4)
NEUTROPHILS NFR BLD AUTO: 67.2 % — SIGNIFICANT CHANGE UP (ref 43–77)
NRBC # BLD: 0 /100 WBCS — SIGNIFICANT CHANGE UP (ref 0–0)
PLATELET # BLD AUTO: 310 K/UL — SIGNIFICANT CHANGE UP (ref 150–400)
POTASSIUM SERPL-MCNC: 4.3 MMOL/L — SIGNIFICANT CHANGE UP (ref 3.5–5.3)
POTASSIUM SERPL-SCNC: 4.3 MMOL/L — SIGNIFICANT CHANGE UP (ref 3.5–5.3)
PROT SERPL-MCNC: 6.1 G/DL — SIGNIFICANT CHANGE UP (ref 6–8.3)
PROTHROM AB SERPL-ACNC: 18.4 SEC — HIGH (ref 10.6–13.6)
RBC # BLD: 2.51 M/UL — LOW (ref 4.2–5.8)
RBC # FLD: 18.6 % — HIGH (ref 10.3–14.5)
RH IG SCN BLD-IMP: POSITIVE — SIGNIFICANT CHANGE UP
SARS-COV-2 RNA SPEC QL NAA+PROBE: NEGATIVE — SIGNIFICANT CHANGE UP
SODIUM SERPL-SCNC: 139 MMOL/L — SIGNIFICANT CHANGE UP (ref 135–145)
WBC # BLD: 6.56 K/UL — SIGNIFICANT CHANGE UP (ref 3.8–10.5)
WBC # FLD AUTO: 6.56 K/UL — SIGNIFICANT CHANGE UP (ref 3.8–10.5)

## 2021-05-30 PROCEDURE — 71045 X-RAY EXAM CHEST 1 VIEW: CPT | Mod: 26

## 2021-05-30 PROCEDURE — 99285 EMERGENCY DEPT VISIT HI MDM: CPT | Mod: 25

## 2021-05-30 PROCEDURE — 93010 ELECTROCARDIOGRAM REPORT: CPT

## 2021-05-30 PROCEDURE — 70491 CT SOFT TISSUE NECK W/DYE: CPT | Mod: 26,MA

## 2021-05-30 RX ORDER — HYDROCHLOROTHIAZIDE 25 MG
25 TABLET ORAL DAILY
Refills: 0 | Status: DISCONTINUED | OUTPATIENT
Start: 2021-05-30 | End: 2021-06-07

## 2021-05-30 RX ORDER — SODIUM CHLORIDE 9 MG/ML
1000 INJECTION, SOLUTION INTRAVENOUS
Refills: 0 | Status: DISCONTINUED | OUTPATIENT
Start: 2021-05-30 | End: 2021-06-07

## 2021-05-30 RX ORDER — AMLODIPINE BESYLATE 2.5 MG/1
1 TABLET ORAL
Qty: 0 | Refills: 0 | DISCHARGE

## 2021-05-30 RX ORDER — ACETAMINOPHEN 500 MG
650 TABLET ORAL EVERY 6 HOURS
Refills: 0 | Status: DISCONTINUED | OUTPATIENT
Start: 2021-05-30 | End: 2021-06-07

## 2021-05-30 RX ORDER — SODIUM CHLORIDE 9 MG/ML
500 INJECTION INTRAMUSCULAR; INTRAVENOUS; SUBCUTANEOUS ONCE
Refills: 0 | Status: COMPLETED | OUTPATIENT
Start: 2021-05-30 | End: 2021-05-30

## 2021-05-30 RX ORDER — DENOSUMAB 60 MG/ML
1.7 INJECTION SUBCUTANEOUS
Qty: 0 | Refills: 0 | DISCHARGE

## 2021-05-30 RX ORDER — POTASSIUM CHLORIDE 20 MEQ
1 PACKET (EA) ORAL
Qty: 0 | Refills: 0 | DISCHARGE

## 2021-05-30 RX ORDER — METOPROLOL TARTRATE 50 MG
50 TABLET ORAL EVERY 24 HOURS
Refills: 0 | Status: DISCONTINUED | OUTPATIENT
Start: 2021-05-30 | End: 2021-06-07

## 2021-05-30 RX ORDER — ALBUTEROL 90 UG/1
2 AEROSOL, METERED ORAL EVERY 6 HOURS
Refills: 0 | Status: DISCONTINUED | OUTPATIENT
Start: 2021-05-30 | End: 2021-06-07

## 2021-05-30 RX ORDER — BRIMONIDINE TARTRATE 2 MG/MG
1 SOLUTION/ DROPS OPHTHALMIC
Refills: 0 | Status: DISCONTINUED | OUTPATIENT
Start: 2021-05-30 | End: 2021-06-07

## 2021-05-30 RX ORDER — PANTOPRAZOLE SODIUM 20 MG/1
40 TABLET, DELAYED RELEASE ORAL EVERY 12 HOURS
Refills: 0 | Status: DISCONTINUED | OUTPATIENT
Start: 2021-05-30 | End: 2021-06-07

## 2021-05-30 RX ORDER — PREGABALIN 225 MG/1
1000 CAPSULE ORAL DAILY
Refills: 0 | Status: DISCONTINUED | OUTPATIENT
Start: 2021-05-30 | End: 2021-06-07

## 2021-05-30 RX ORDER — DEXTROSE 50 % IN WATER 50 %
25 SYRINGE (ML) INTRAVENOUS ONCE
Refills: 0 | Status: DISCONTINUED | OUTPATIENT
Start: 2021-05-30 | End: 2021-06-07

## 2021-05-30 RX ORDER — LATANOPROST 0.05 MG/ML
1 SOLUTION/ DROPS OPHTHALMIC; TOPICAL AT BEDTIME
Refills: 0 | Status: DISCONTINUED | OUTPATIENT
Start: 2021-05-30 | End: 2021-06-07

## 2021-05-30 RX ORDER — ISOSORBIDE MONONITRATE 60 MG/1
30 TABLET, EXTENDED RELEASE ORAL DAILY
Refills: 0 | Status: DISCONTINUED | OUTPATIENT
Start: 2021-05-31 | End: 2021-06-07

## 2021-05-30 RX ORDER — DEXTROSE 50 % IN WATER 50 %
15 SYRINGE (ML) INTRAVENOUS ONCE
Refills: 0 | Status: DISCONTINUED | OUTPATIENT
Start: 2021-05-30 | End: 2021-06-07

## 2021-05-30 RX ORDER — DEXTROSE 50 % IN WATER 50 %
12.5 SYRINGE (ML) INTRAVENOUS ONCE
Refills: 0 | Status: DISCONTINUED | OUTPATIENT
Start: 2021-05-30 | End: 2021-06-07

## 2021-05-30 RX ORDER — FINASTERIDE 5 MG/1
5 TABLET, FILM COATED ORAL AT BEDTIME
Refills: 0 | Status: DISCONTINUED | OUTPATIENT
Start: 2021-05-30 | End: 2021-06-07

## 2021-05-30 RX ORDER — ATORVASTATIN CALCIUM 80 MG/1
40 TABLET, FILM COATED ORAL AT BEDTIME
Refills: 0 | Status: DISCONTINUED | OUTPATIENT
Start: 2021-05-30 | End: 2021-06-07

## 2021-05-30 RX ORDER — INSULIN LISPRO 100/ML
VIAL (ML) SUBCUTANEOUS
Refills: 0 | Status: DISCONTINUED | OUTPATIENT
Start: 2021-05-30 | End: 2021-06-07

## 2021-05-30 RX ORDER — ACETAMINOPHEN 500 MG
975 TABLET ORAL ONCE
Refills: 0 | Status: COMPLETED | OUTPATIENT
Start: 2021-05-30 | End: 2021-05-30

## 2021-05-30 RX ORDER — GLUCAGON INJECTION, SOLUTION 0.5 MG/.1ML
1 INJECTION, SOLUTION SUBCUTANEOUS ONCE
Refills: 0 | Status: DISCONTINUED | OUTPATIENT
Start: 2021-05-30 | End: 2021-06-07

## 2021-05-30 RX ORDER — DORZOLAMIDE HYDROCHLORIDE 20 MG/ML
1 SOLUTION/ DROPS OPHTHALMIC THREE TIMES A DAY
Refills: 0 | Status: DISCONTINUED | OUTPATIENT
Start: 2021-05-30 | End: 2021-06-07

## 2021-05-30 RX ORDER — ASPIRIN/CALCIUM CARB/MAGNESIUM 324 MG
81 TABLET ORAL DAILY
Refills: 0 | Status: DISCONTINUED | OUTPATIENT
Start: 2021-05-30 | End: 2021-06-07

## 2021-05-30 RX ADMIN — PREGABALIN 1000 MICROGRAM(S): 225 CAPSULE ORAL at 19:26

## 2021-05-30 RX ADMIN — BRIMONIDINE TARTRATE 1 DROP(S): 2 SOLUTION/ DROPS OPHTHALMIC at 19:26

## 2021-05-30 RX ADMIN — DORZOLAMIDE HYDROCHLORIDE 1 DROP(S): 20 SOLUTION/ DROPS OPHTHALMIC at 21:43

## 2021-05-30 RX ADMIN — SODIUM CHLORIDE 500 MILLILITER(S): 9 INJECTION INTRAMUSCULAR; INTRAVENOUS; SUBCUTANEOUS at 15:26

## 2021-05-30 RX ADMIN — LATANOPROST 1 DROP(S): 0.05 SOLUTION/ DROPS OPHTHALMIC; TOPICAL at 21:43

## 2021-05-30 RX ADMIN — ATORVASTATIN CALCIUM 40 MILLIGRAM(S): 80 TABLET, FILM COATED ORAL at 21:42

## 2021-05-30 RX ADMIN — Medication 81 MILLIGRAM(S): at 19:26

## 2021-05-30 RX ADMIN — Medication 50 MILLIGRAM(S): at 19:27

## 2021-05-30 RX ADMIN — PANTOPRAZOLE SODIUM 40 MILLIGRAM(S): 20 TABLET, DELAYED RELEASE ORAL at 18:17

## 2021-05-30 RX ADMIN — Medication 100 MILLIGRAM(S): at 15:26

## 2021-05-30 RX ADMIN — Medication 25 MILLIGRAM(S): at 19:27

## 2021-05-30 RX ADMIN — FINASTERIDE 5 MILLIGRAM(S): 5 TABLET, FILM COATED ORAL at 21:42

## 2021-05-30 RX ADMIN — Medication 975 MILLIGRAM(S): at 17:27

## 2021-05-30 RX ADMIN — Medication 975 MILLIGRAM(S): at 14:31

## 2021-05-30 NOTE — H&P ADULT - ASSESSMENT
79 y/o M with PMHx of prostate ca on chemo, HTN, HLD, DM II, anemia of chronic disease/DOMITILA (last tx 2 yrs ago, Hgb 8-9 on prior admissions [id number]), PE/DVT 1/2020, Afib in Eliquis 2.5 mg BID, CAD (s/p IRASEMA x 1 OM 3/30/21 on asa and Eliquis (s/p 1 week plavix 2/2 risk of GI bleed), esophagitis/gastritis/ duodenitis who presents for worsening pain/swelling and numbness of right lower jaw. Admitted for cellulitis and b/l sialoadenitis after failed PO Augmentin, started on IV Clindamycin Hospital course c/b acute 2 point drop in Hb in patient w/ hx of GI bleed.

## 2021-05-30 NOTE — H&P ADULT - PROBLEM SELECTOR PLAN 1
patient recently presented to ED with right sided submandibular pain/ swelling- found to have cellulitis and b/l sialoadenitis and sent home with Augmentin PO. pt has since completed course of Abx with return of right sided facial pain/ swelling/ numbness  - CT shows Right lower face swelling overlying the right buccal surface of the right hemimandible which may represent cellulitis. No evidence of drainable fluid collection. No evidence of airway obstruction.  - VSS, no leukocytosis, afebrile/ not septic appearing   PLAN  - ENT consulted, follow recs   - start Clindamycin 600mg IV q8h

## 2021-05-30 NOTE — H&P ADULT - NSHPOUTPATIENTPROVIDERS_GEN_ALL_CORE
Cardiologist, Dr. Charli Sánchez  Oncologist, Dr. Fuller [phone]; [phone]  Urologist, Dr. Roa [phone]  Health Care Proxy Daughter Aziza [phone]  PCP: Dr. Gill  GI: Dr. Leigh [phone]

## 2021-05-30 NOTE — H&P ADULT - PROBLEM SELECTOR PLAN 6
- home meds: imdur 30mg daily, hctx 25mg daily, toprol 50mg daily  - recently taken off lisinopril 2/2 angioedema reaction   - continue home medications

## 2021-05-30 NOTE — H&P ADULT - PROBLEM SELECTOR PLAN 2
- hx of DOMITILA/ AOCD, on iron PO BID   - Hb usually between 8-9 at baseline, EGD in 2020 revealing esophagitis, gastritis, and duodenitis with clean based duodenal ulcer   - on presentation Hb 7.2, Hb was 9 on May 13th- pt states his stools are always dark because of PO iron, denies hematochezia or hematemesis, hematuria or signs of active blood loss   - hx of transfusion x2, hx of coffee ground emesis in Jan 2020 where GI did EGD and saw gastritis, esophagitis, and duodenitis  -EGD @ [facility] 1/13/20: Esophagitis, 2cm hiatal hernia, Gastritis, duodenitis, 2 small clean based ulcer in D2.   - pt is HD stable   PLAN   - GI consulted, pt was supposed to have cscope and EGD scheduled for next week (consulted as pt is here for IV Abx and may decide to scope while inpatient)  - PPI IV BID   - will transfuse 1 unit PRBC in setting of CAD s/p stent recently in March 2021  - active type and screen   - hold Eliquis in setting of possible GI bleed, resume once cleared for AC     #Esophagitis, gastritis, duodenitis  - Plan as above

## 2021-05-30 NOTE — H&P ADULT - PROBLEM SELECTOR PLAN 7
Not currently on medications per current med list provided by daughter   - Hb A1C 6.1 04/21   - ISS    #HLD  - c/w atorvastatin 40 daily

## 2021-05-30 NOTE — H&P ADULT - PROBLEM SELECTOR PLAN 4
- continue with toprol 50mg daily   - hold eliquis 2.5mg BID in setting of acute drop in Hb and r/p GI bleed

## 2021-05-30 NOTE — CONSULT NOTE ADULT - ASSESSMENT
80y Male PMHx  prostate ca on chemo, HTN, HLD, DM II, anemia of chronic disease/DOMITILA (requiring blood transfusions in the past), PE/DVT 1/2020, Afib in Eliquis, CAD (s/p IRASEMA x 1 OM 3/30/21, on asa, recently admitted for facial numbness w neg imaging/stroke team eval complicated by anemia requiring transfusion, recent ER visit for right lip and chin swelling, found to have cellulitis, completed course of augmentin who returns today for recurrent right chin swelling, redness and pain , again found to have cellulitis and admitted to medicine for IV antibiotics    Consider MRI brain and neck to further investigate for possible lesion causing hemifacial pain/V3 numbness  Continue IV clindamycin  Continue warm compresses to R mandibular region  Encourage oral hydration  Good oral hygiene   Will continue to follow  Discussed with attending on call, Dr. Chetan Burns ENT at [phone] with any questions/concerns. 80y Male PMHx  prostate ca on chemo, HTN, HLD, DM II, anemia of chronic disease/DOMITILA (requiring blood transfusions in the past), PE/DVT 1/2020, Afib in Eliquis, CAD (s/p IRASEMA x 1 OM 3/30/21, on asa, recently admitted for facial numbness w neg imaging/stroke team eval complicated by anemia requiring transfusion, recent ER visit for right lip and chin swelling, found to have cellulitis, completed course of augmentin who returns today for recurrent right chin swelling, redness and pain , again found to have cellulitis and admitted to medicine for IV antibiotics    Continue IV clindamycin  Continue warm compresses to R mandibular region  Encourage oral hydration  Good oral hygiene   Will continue to follow  Discussed with ENT attending on call, Dr. Benton  Will discuss in AM with [facility] attending on call  Page ENT at [phone] with any questions/concerns.

## 2021-05-30 NOTE — H&P ADULT - PROBLEM SELECTOR PLAN 3
-s/p IRASEMA x 1 OM 3/30/21 on asa and Eliquis (s/p 1 week plavix 2/2 risk of GI bleed)  - continue with asa 81mg, holding Eliquis in setting of acute drop in Hb  - c/w atorvastatin 40

## 2021-05-30 NOTE — ED ADULT TRIAGE NOTE - CHIEF COMPLAINT QUOTE
woke up this morning with facial swelling and pain. R sided facial swelling. reports being started on lisinopril on March 30th after cardiac stent placement. stopped taking lisinopril due to angioedema rxn.  started on isosorbide on may 14th.  speaking in clear appropriate sentences, airway patent

## 2021-05-30 NOTE — ED ADULT NURSE NOTE - PAIN RATING/NUMBER SCALE (0-10): ACTIVITY
Quality 130: Documentation Of Current Medications In The Medical Record: Current Medications Documented Quality 431: Preventive Care And Screening: Unhealthy Alcohol Use - Screening: Patient screened for unhealthy alcohol use using a single question and scores less than 2 times per year Quality 110: Preventive Care And Screening: Influenza Immunization: Influenza Immunization Administered during Influenza season Detail Level: Detailed Quality 226: Preventive Care And Screening: Tobacco Use: Screening And Cessation Intervention: Patient screened for tobacco use and is an ex/non-smoker 5

## 2021-05-30 NOTE — ED PROVIDER NOTE - CARE PLAN
Principal Discharge DX:	Submandibular swelling   Principal Discharge DX:	Submandibular swelling  Secondary Diagnosis:	Cellulitis

## 2021-05-30 NOTE — H&P ADULT - NSHPPHYSICALEXAM_GEN_ALL_CORE
.  VITAL SIGNS:  T(C): 36.7 (05-30-21 @ 17:34), Max: 37 (05-30-21 @ 16:06)  T(F): 98 (05-30-21 @ 17:34), Max: 98.6 (05-30-21 @ 16:06)  HR: 90 (05-30-21 @ 17:34) (87 - 109)  BP: 155/68 (05-30-21 @ 17:34) (125/67 - 162/73)  BP(mean): --  RR: 20 (05-30-21 @ 17:34) (18 - 20)  SpO2: 97% (05-30-21 @ 17:34) (97% - 98%)  Wt(kg): --    PHYSICAL EXAM:    Constitutional: WDWN resting comfortably in bed; NAD  Head: NC/AT  Eyes: PERRL, EOMI, clear conjunctiva  ENT: submandibular swelling and tenderness to palpation, no nasal discharge; uvula midline   Neck: supple; no JVD or thyromegaly  Respiratory: CTA B/L; no W/R/R, no retractions  Cardiac: +S1/S2; RRR; no M/R/G; PMI non-displaced  Gastrointestinal: soft, NT/ND; no rebound or guarding; +BSx4  Extremities: WWP, no clubbing or cyanosis; no peripheral edema  Vascular: 2+ radial, femoral, DP/PT pulses B/L  Dermatologic: skin warm, dry and intact; no rashes, wounds, or scars  Lymphatic: no submandibular or cervical LAD  Neurologic: AAOx3; CNII-XII grossly intact; no focal deficits

## 2021-05-30 NOTE — ED PROVIDER NOTE - CLINICAL SUMMARY MEDICAL DECISION MAKING FREE TEXT BOX
80M with PMHx  prostate ca on chemo, HTN, HLD, DM II, anemia of chronic disease/DOMITILA (requiring blood transfusions in the past), PE/DVT 1/2020, Afib in Eliquis, CAD (s/p IRASEMA x 1 OM 3/30/21, on asa, recently admitted for facial numbness w neg imaging/stroke team eval complicated by anemia requiring transfusion, recent ER visit for right lip and chin swelling, found to have cellulitis and b/l sialoadenitis, completed course of augmentin who returns today for recurrent right chin swelling, redness and pain, associated with right ear pain and decreased taste (for things like coffee, but states his V8 juice tasted ok). He also report continued persistent right chin numbness since he presented initially with swelling and discomfort in this area. He is not currently on abx. Denies difficulty swallowing or drinking, no f/c, no cp/sob, he is compliant with his meds. No other complaints.  Pt well-appearing, afebrile on exam, right Submandibular swelling and erythema noted. Plan for labs, CT neck ST, ENT 80M with PMHx  prostate ca on chemo, HTN, HLD, DM II, anemia of chronic disease/DOMITILA (requiring blood transfusions in the past), PE/DVT 1/2020, Afib in Eliquis, CAD (s/p IRASEMA x 1 OM 3/30/21, on asa, recently admitted for facial numbness w neg imaging/stroke team eval complicated by anemia requiring transfusion, recent ER visit for right lip and chin swelling, found to have cellulitis and b/l sialoadenitis, completed course of augmentin who returns today for recurrent right chin swelling, redness and pain, associated with right ear pain and decreased taste (for things like coffee, but states his V8 juice tasted ok). He also report continued persistent right chin numbness since he presented initially with swelling and discomfort in this area. He is not currently on abx. Denies difficulty swallowing or drinking, no f/c, no cp/sob, he is compliant with his meds. No other complaints.  Pt well-appearing, afebrile on exam, not septic, right Submandibular swelling and erythema noted. Plan for labs, CT neck ST, ENT.  Labs show downtrending Hgb 7.2, pt denies black or bloody stool, he is scheduled for GI workup on 6/16.  No leukocytosis. CT show cellulitis without abscess. D/w ENT and Dr. Gill and plan for admission for IV clinda, IV hydration, warm compresses, massage, and sialogogues (lemon tea, etc).   Pt stable for RMF at this time.

## 2021-05-30 NOTE — ED ADULT NURSE NOTE - OBJECTIVE STATEMENT
79 y/o male w/ hx of HTN, HLD, DMII, anemia, PE/DVT, afib, CAD, prostate ca, w/ recent admission to [facility] for facial numbness presents to ED for persistent right chin swelling, redness, and pain associated w/ right ear pain and decreased taste. Denies fever, chills, throat pain, difficulty swallowing, CP, SOB, NVD, back pain, neck pain.

## 2021-05-30 NOTE — H&P ADULT - NSHPLABSRESULTS_GEN_ALL_CORE
.  LABS:                         7.2    6.56  )-----------( 310      ( 30 May 2021 12:41 )             22.8     05-30    139  |  107  |  12  ----------------------------<  115<H>  4.3   |  19<L>  |  1.18    Ca    8.9      30 May 2021 12:41    TPro  6.1  /  Alb  3.3  /  TBili  0.4  /  DBili  x   /  AST  24  /  ALT  15  /  AlkPhos  42  05-30    PT/INR - ( 30 May 2021 12:41 )   PT: 18.4 sec;   INR: 1.57          PTT - ( 30 May 2021 12:41 )  PTT:42.9 sec              RADIOLOGY, EKG & ADDITIONAL TESTS: Reviewed.

## 2021-05-30 NOTE — H&P ADULT - HISTORY OF PRESENT ILLNESS
79 y/o M with PMHx of prostate ca on chemo, HTN, HLD, DM II, anemia of chronic disease/DOMITILA (last tx 2 yrs ago, Hgb 8-9 on prior admissions [id number]), PE/DVT 1/2020, Afib in Eliquis 2.5 mg BID, CAD (s/p IRASEMA x 1 OM 3/30/21 on asa and Eliquis (s/p 1 week plavix 2/2 risk of GI bleed), esophagitis/gastritis/ duodenitis who presents for worsening pain/swelling and numbness of right lower jaw. Patient had recent visit to ER for right jaw and lip swelling/pain on 5/13 and found to have cellulitis and b/l sialoadenitis. He completed course of Augmentin but he has recurrent right chin swelling, redness and pain. Patient reports associated right ear pain and decreased taste, and right jaw numbness. He denies difficulty swallowing but reports difficulty chewing (not wearing his dentures now). He denies fever or chills.     Patient also has hx of DOMITILA, where he is on oral iron BID. Per daughter he usually has dark stools but he is on oral iron. He was admitted for coffee ground emesis back in 01/2020 and EGD at that time showed gastritis, esophagitis, and duodenitis and gastric ulcer. He was placed on PPI. Then on more recent admission for facial numbness in April 2021- Hb dropped to 7 and pt got 1 unit PRBC. patient had fecal occult positive was likely due to PO iron supplementation, and plan was for outpatient follow up for repeat EGD/Colonoscopy. patient was cleared for discharge home back on his home medications of Aspirin 81 mg and Eliquis 2.5 mg BID. He is pending EGD/colonscopy next week with Dr. Leigh.     In the ED:  Vitals: 98.6, HR 87, /67, RR 18, O2 sat 97% on room air   Labs s/f: Hb 7.2 (9.0 5/13/21), no leukocytosis  EKG: NSR, unchanged from prior   CT: Right lower face swelling overlying the right buccal surface of the right hemimandible which may represent cellulitis. No evidence of drainable fluid collection. No evidence of airway obstruction.  Course: ENT consulted, patient started on clindamycin IV, 500cc NS bolus given       Admitted to medicine for facial cellulitis.

## 2021-05-30 NOTE — ED PROVIDER NOTE - OBJECTIVE STATEMENT
80M with PMHx  prostate ca on chemo, HTN, HLD, DM II, anemia of chronic disease/DOMTIILA, PE/DVT 1/2020, Afib in Eliquis, CAD (s/p IRASEMA x 1 OM 3/30/21, on asa/plavix? 80M with PMHx  prostate ca on chemo, HTN, HLD, DM II, anemia of chronic disease/DOMITILA (requiring blood transfusions in the past), PE/DVT 1/2020, Afib in Eliquis, CAD (s/p IRASEMA x 1 OM 3/30/21, on asa, recently admitted for facial numbness w neg imaging/stroke team eval complicated by anemia requiring transfusion, recent ER visit for right lip and chin swelling, found to have cellulitis and b/l sialoadenitis, completed course of augmentin who returns today for recurrent right chin swelling, redness and pain, associated with right ear pain and decreased taste (for things like coffee, but states his V8 juice tasted ok). He also report continued persistent right chin numbness since he presented initially with swelling and discomfort in this area. He is not currently on abx. Denies difficulty swallowing or drinking, no f/c, no cp/sob, he is compliant with his meds. No other complaints.

## 2021-05-30 NOTE — ED PROVIDER NOTE - PHYSICAL EXAMINATION
GEN: Elderly, Well appearing, well nourished, awake, alert, oriented to person, place, time/situation and in no apparent distress. NTAF  ENT: Right submandibular swelling, TTP and mild erythema, pt is adentulous, no intraoral lesions or abscess noted, Airway patent, Nasal mucosa clear. Mouth with normal mucosa.  EYES: Clear bilaterally. PERRL, EOMI  RESPIRATORY: Breathing comfortably with normal RR. No W/C/R, no hypoxia or resp distress.  CARDIAC: Regular rate and rhythm, no M/R/G  ABDOMEN: Soft, nontender, +bowel sounds, no rebound, rigidity, or guarding.  MSK: Range of motion is not limited, no deformities noted.  NEURO: Alert and oriented, no focal deficits.  SKIN: Skin normal color for race, warm, dry and intact. No evidence of rash.  PSYCH: Alert and oriented to person, place, time/situation. normal mood and affect. no apparent risk to self or others.

## 2021-05-30 NOTE — CONSULT NOTE ADULT - SUBJECTIVE AND OBJECTIVE BOX
HPI: 80y Male PMH prostate ca on chemo, HTN, HLD, DM II, PE/DVT 1/2020, Afib, CAD (s/p IRASEMA) comes in with R chin/mandibular pain and V3 numbness. Of note, pt was seen in ED by ENT on 5/13 for same complaint, thought to have sialadenitis (no imaging) and has since completed 10 days of augmentin. Also had a recent admission for facial numbness with negative stroke workup. CTH 4/15 negative for acute lesions. Recent CT from 5/7 suggests "subcutaneous edema and fat stranding involving the subcutaneous soft tissues of the anterior lower face which may represent cellulitis." Today he comes in mainly with complaint of pain and chin numbness along with change in taste that has been going on for one month. He says he had been compliant with massage, hydration and warm packs at home. Denies fevers and new SOB. Endorses some dysphagia.     In the ED, CT was obtained which suggests "right lower face swelling overlying the right buccal surface of the right hemimandible which may represent cellulitis" and the patient was admitted to medicine for IV antibiotics. He is afebrile with wbc wnl.    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    Glaucoma  Glaucoma    Type 2 diabetes mellitus  Diabetes    Essential hypertension  Hypertension    Chemotherapy session for neoplasm  Prostate Cancer    Pulmonary embolism  1/2020    DVT (deep venous thrombosis)  1/2020 on Eliquis    Atrial fibrillation  on Eliquis    Other postprocedural status  S/P prostatectomy      FAMILY HISTORY:  Family history of malignant neoplasm of prostate (Father, Sibling)  Father    MEDICATIONS:  Antiinfectives:   clindamycin IVPB 600 milliGRAM(s) IV Intermittent every 8 hours      Hematologic/Anticoagulation:  aspirin enteric coated 81 milliGRAM(s) Oral daily      Pain medications/Neuro:      IV fluids:  cyanocobalamin 1000 MICROGram(s) Oral daily  dextrose 5%. 1000 milliLiter(s) IV Continuous <Continuous>  dextrose 5%. 1000 milliLiter(s) IV Continuous <Continuous>      Endocrine Medications:   atorvastatin 40 milliGRAM(s) Oral at bedtime  dextrose 40% Gel 15 Gram(s) Oral once  dextrose 50% Injectable 25 Gram(s) IV Push once  dextrose 50% Injectable 12.5 Gram(s) IV Push once  dextrose 50% Injectable 25 Gram(s) IV Push once  finasteride 5 milliGRAM(s) Oral at bedtime  glucagon  Injectable 1 milliGRAM(s) IntraMuscular once  insulin lispro (ADMELOG) corrective regimen sliding scale   SubCutaneous Before meals and at bedtime      All other standing medications:   brimonidine 0.2% Ophthalmic Solution 1 Drop(s) Both EYES two times a day  dorzolamide 2% Ophthalmic Solution 1 Drop(s) Both EYES three times a day  hydrochlorothiazide 25 milliGRAM(s) Oral daily  latanoprost 0.005% Ophthalmic Solution 1 Drop(s) Both EYES at bedtime  metoprolol succinate ER 50 milliGRAM(s) Oral every 24 hours  pantoprazole  Injectable 40 milliGRAM(s) IV Push every 12 hours      All other PRN medications:  ALBUTerol    90 MICROgram(s) HFA Inhaler 2 Puff(s) Inhalation every 6 hours PRN      Vital Signs Last 24 Hrs  T(C): 36.7 (30 May 2021 17:34), Max: 37 (30 May 2021 16:06)  T(F): 98 (30 May 2021 17:34), Max: 98.6 (30 May 2021 16:06)  HR: 90 (30 May 2021 17:34) (87 - 109)  BP: 155/68 (30 May 2021 17:34) (125/67 - 162/73)  BP(mean): --  RR: 20 (30 May 2021 17:34) (18 - 20)  SpO2: 97% (30 May 2021 17:34) (97% - 98%)    LABS:  CBC-                        7.2    6.56  )-----------( 310      ( 30 May 2021 12:41 )             22.8     05-30    139  |  107  |  12  ----------------------------<  115<H>  4.3   |  19<L>  |  1.18    Ca    8.9      30 May 2021 12:41    TPro  6.1  /  Alb  3.3  /  TBili  0.4  /  DBili  x   /  AST  24  /  ALT  15  /  AlkPhos  42  05-30    Coagulation Studies-  PT/INR - ( 30 May 2021 12:41 )   PT: 18.4 sec;   INR: 1.57          PTT - ( 30 May 2021 12:41 )  PTT:42.9 sec  Endocrine Panel-  --  --  8.9 mg/dL        PHYSICAL EXAM:    ENT EXAM-   Constitutional: Well-developed, well-nourished.  No hoarseness.     Head:  normocephalic, atraumatic.   Nose:  No external deformity  OC/OP: Mandible edentulous. No pus from kamran/sherie duct. Floor of mouth, buccal mucosa, lips, hard palate, soft palate, uvula, posterior pharyngeal wall normal.  Mucosa moist. Buccal mucosa TTP.  Neck: +TTP in R submandibular region. No edema, fluctuance, or erythema  Lymph:  No cervical adenopathy.    MULTISYSTEM EXAM-  Neuro/Psych:  A&O x 3.    Cranial nerves: Decreased sensation R V3.  Eyes:  EOMI, no nystagmus.  Pulm:  No dyspnea, non-labored breathing  Cardiovascular: Carotid pulses 2+ bilaterally.  No periphreal edema.  Skin:  No rash or lesions on exposed skin of head/neck        RADIOLOGY & ADDITIONAL STUDIES: reviewed       HPI: 80y Male PMH prostate ca on chemo, HTN, HLD, DM II, PE/DVT 1/2020, Afib, CAD (s/p IRASEMA) comes in with R chin/mandibular pain and V3 numbness. Of note, pt was seen in ED by ENT on 5/13 for same complaint, thought to have sialadenitis (no imaging) and has since completed 10 days of augmentin. Also had a recent admission for facial numbness with negative stroke workup. Per April admission note, pt had a R facial abscess after dental procedure the month prior and completed a course of Augmentin. CTH 4/15 negative for acute lesions. Recent CT from 5/7 suggests "subcutaneous edema and fat stranding involving the subcutaneous soft tissues of the anterior lower face which may represent cellulitis." Today he comes in mainly with complaint of pain and chin numbness along with change in taste that has been going on for one month. He says he had been compliant with massage, hydration and warm packs at home. Denies fevers and new SOB. Endorses some dysphagia.     In the ED, CT was obtained which suggests "right lower face swelling overlying the right buccal surface of the right hemimandible which may represent cellulitis" and the patient was admitted to medicine for IV antibiotics. He is afebrile with wbc wnl.    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    Glaucoma  Glaucoma    Type 2 diabetes mellitus  Diabetes    Essential hypertension  Hypertension    Chemotherapy session for neoplasm  Prostate Cancer    Pulmonary embolism  1/2020    DVT (deep venous thrombosis)  1/2020 on Eliquis    Atrial fibrillation  on Eliquis    Other postprocedural status  S/P prostatectomy      FAMILY HISTORY:  Family history of malignant neoplasm of prostate (Father, Sibling)  Father    MEDICATIONS:  Antiinfectives:   clindamycin IVPB 600 milliGRAM(s) IV Intermittent every 8 hours      Hematologic/Anticoagulation:  aspirin enteric coated 81 milliGRAM(s) Oral daily      Pain medications/Neuro:      IV fluids:  cyanocobalamin 1000 MICROGram(s) Oral daily  dextrose 5%. 1000 milliLiter(s) IV Continuous <Continuous>  dextrose 5%. 1000 milliLiter(s) IV Continuous <Continuous>      Endocrine Medications:   atorvastatin 40 milliGRAM(s) Oral at bedtime  dextrose 40% Gel 15 Gram(s) Oral once  dextrose 50% Injectable 25 Gram(s) IV Push once  dextrose 50% Injectable 12.5 Gram(s) IV Push once  dextrose 50% Injectable 25 Gram(s) IV Push once  finasteride 5 milliGRAM(s) Oral at bedtime  glucagon  Injectable 1 milliGRAM(s) IntraMuscular once  insulin lispro (ADMELOG) corrective regimen sliding scale   SubCutaneous Before meals and at bedtime      All other standing medications:   brimonidine 0.2% Ophthalmic Solution 1 Drop(s) Both EYES two times a day  dorzolamide 2% Ophthalmic Solution 1 Drop(s) Both EYES three times a day  hydrochlorothiazide 25 milliGRAM(s) Oral daily  latanoprost 0.005% Ophthalmic Solution 1 Drop(s) Both EYES at bedtime  metoprolol succinate ER 50 milliGRAM(s) Oral every 24 hours  pantoprazole  Injectable 40 milliGRAM(s) IV Push every 12 hours      All other PRN medications:  ALBUTerol    90 MICROgram(s) HFA Inhaler 2 Puff(s) Inhalation every 6 hours PRN      Vital Signs Last 24 Hrs  T(C): 36.7 (30 May 2021 17:34), Max: 37 (30 May 2021 16:06)  T(F): 98 (30 May 2021 17:34), Max: 98.6 (30 May 2021 16:06)  HR: 90 (30 May 2021 17:34) (87 - 109)  BP: 155/68 (30 May 2021 17:34) (125/67 - 162/73)  BP(mean): --  RR: 20 (30 May 2021 17:34) (18 - 20)  SpO2: 97% (30 May 2021 17:34) (97% - 98%)    LABS:  CBC-                        7.2    6.56  )-----------( 310      ( 30 May 2021 12:41 )             22.8     05-30    139  |  107  |  12  ----------------------------<  115<H>  4.3   |  19<L>  |  1.18    Ca    8.9      30 May 2021 12:41    TPro  6.1  /  Alb  3.3  /  TBili  0.4  /  DBili  x   /  AST  24  /  ALT  15  /  AlkPhos  42  05-30    Coagulation Studies-  PT/INR - ( 30 May 2021 12:41 )   PT: 18.4 sec;   INR: 1.57          PTT - ( 30 May 2021 12:41 )  PTT:42.9 sec  Endocrine Panel-  --  --  8.9 mg/dL        PHYSICAL EXAM:    ENT EXAM-   Constitutional: Well-developed, well-nourished.  No hoarseness.     Head:  normocephalic, atraumatic.   Nose:  No external deformity  OC/OP: Mandible edentulous. No pus from kamran/sherie duct. Floor of mouth, buccal mucosa, lips, hard palate, soft palate, uvula, posterior pharyngeal wall normal.  Mucosa moist. Buccal mucosa TTP.  Neck: +TTP in R submandibular region. No edema, fluctuance, or erythema  Lymph:  No cervical adenopathy.    MULTISYSTEM EXAM-  Neuro/Psych:  A&O x 3.    Cranial nerves: Decreased sensation R V3.  Eyes:  EOMI, no nystagmus.  Pulm:  No dyspnea, non-labored breathing  Cardiovascular: Carotid pulses 2+ bilaterally.  No periphreal edema.  Skin:  No rash or lesions on exposed skin of head/neck        RADIOLOGY & ADDITIONAL STUDIES: reviewed

## 2021-05-30 NOTE — H&P ADULT - PROBLEM SELECTOR PLAN 10
F: none   E: replete as needed  N: soft diet (pain with mastication)  DVT ppx: SCDs, holding AC   GI pps: protonix     Dispo: RMF

## 2021-05-30 NOTE — PATIENT PROFILE ADULT - DO YOU LACK THE NECESSARY SUPPORT TO HELP YOU COPE WITH LIFE CHALLENGES?
escitalopram     Last Written Prescription Date: 07/27/16  Last Fill Quantity: 90, # refills: 2  [place] Visit with [facility] [place]:  04/12/16        Last PHQ-9 score on record=   PHQ-9 SCORE 4/21/2015   Total Score 0   Total Score Brandon Pittman                  yes

## 2021-05-31 LAB
ANION GAP SERPL CALC-SCNC: 9 MMOL/L — SIGNIFICANT CHANGE UP (ref 5–17)
BUN SERPL-MCNC: 11 MG/DL — SIGNIFICANT CHANGE UP (ref 7–23)
CALCIUM SERPL-MCNC: 8.4 MG/DL — SIGNIFICANT CHANGE UP (ref 8.4–10.5)
CHLORIDE SERPL-SCNC: 108 MMOL/L — SIGNIFICANT CHANGE UP (ref 96–108)
CO2 SERPL-SCNC: 20 MMOL/L — LOW (ref 22–31)
COVID-19 SPIKE DOMAIN AB INTERP: POSITIVE
COVID-19 SPIKE DOMAIN ANTIBODY RESULT: 18.2 U/ML — HIGH
CREAT SERPL-MCNC: 1.1 MG/DL — SIGNIFICANT CHANGE UP (ref 0.5–1.3)
GLUCOSE BLDC GLUCOMTR-MCNC: 107 MG/DL — HIGH (ref 70–99)
GLUCOSE BLDC GLUCOMTR-MCNC: 110 MG/DL — HIGH (ref 70–99)
GLUCOSE BLDC GLUCOMTR-MCNC: 126 MG/DL — HIGH (ref 70–99)
GLUCOSE BLDC GLUCOMTR-MCNC: 78 MG/DL — SIGNIFICANT CHANGE UP (ref 70–99)
GLUCOSE SERPL-MCNC: 88 MG/DL — SIGNIFICANT CHANGE UP (ref 70–99)
GRAM STN FLD: SIGNIFICANT CHANGE UP
HCT VFR BLD CALC: 22.8 % — LOW (ref 39–50)
HCT VFR BLD CALC: 23.7 % — LOW (ref 39–50)
HCT VFR BLD CALC: 27.5 % — LOW (ref 39–50)
HGB BLD-MCNC: 7.4 G/DL — LOW (ref 13–17)
HGB BLD-MCNC: 7.6 G/DL — LOW (ref 13–17)
HGB BLD-MCNC: 8.6 G/DL — LOW (ref 13–17)
MAGNESIUM SERPL-MCNC: 1.7 MG/DL — SIGNIFICANT CHANGE UP (ref 1.6–2.6)
MCHC RBC-ENTMCNC: 27.8 PG — SIGNIFICANT CHANGE UP (ref 27–34)
MCHC RBC-ENTMCNC: 28.2 PG — SIGNIFICANT CHANGE UP (ref 27–34)
MCHC RBC-ENTMCNC: 28.3 PG — SIGNIFICANT CHANGE UP (ref 27–34)
MCHC RBC-ENTMCNC: 31.3 GM/DL — LOW (ref 32–36)
MCHC RBC-ENTMCNC: 32.1 GM/DL — SIGNIFICANT CHANGE UP (ref 32–36)
MCHC RBC-ENTMCNC: 32.5 GM/DL — SIGNIFICANT CHANGE UP (ref 32–36)
MCV RBC AUTO: 87 FL — SIGNIFICANT CHANGE UP (ref 80–100)
MCV RBC AUTO: 88.1 FL — SIGNIFICANT CHANGE UP (ref 80–100)
MCV RBC AUTO: 89 FL — SIGNIFICANT CHANGE UP (ref 80–100)
NRBC # BLD: 0 /100 WBCS — SIGNIFICANT CHANGE UP (ref 0–0)
PLATELET # BLD AUTO: 301 K/UL — SIGNIFICANT CHANGE UP (ref 150–400)
PLATELET # BLD AUTO: 319 K/UL — SIGNIFICANT CHANGE UP (ref 150–400)
PLATELET # BLD AUTO: 324 K/UL — SIGNIFICANT CHANGE UP (ref 150–400)
POTASSIUM SERPL-MCNC: 4.1 MMOL/L — SIGNIFICANT CHANGE UP (ref 3.5–5.3)
POTASSIUM SERPL-SCNC: 4.1 MMOL/L — SIGNIFICANT CHANGE UP (ref 3.5–5.3)
RBC # BLD: 2.62 M/UL — LOW (ref 4.2–5.8)
RBC # BLD: 2.69 M/UL — LOW (ref 4.2–5.8)
RBC # BLD: 3.09 M/UL — LOW (ref 4.2–5.8)
RBC # FLD: 17.6 % — HIGH (ref 10.3–14.5)
RBC # FLD: 17.6 % — HIGH (ref 10.3–14.5)
RBC # FLD: 17.9 % — HIGH (ref 10.3–14.5)
SARS-COV-2 IGG+IGM SERPL QL IA: 18.2 U/ML — HIGH
SARS-COV-2 IGG+IGM SERPL QL IA: POSITIVE
SODIUM SERPL-SCNC: 137 MMOL/L — SIGNIFICANT CHANGE UP (ref 135–145)
SPECIMEN SOURCE: SIGNIFICANT CHANGE UP
WBC # BLD: 5.06 K/UL — SIGNIFICANT CHANGE UP (ref 3.8–10.5)
WBC # BLD: 5.1 K/UL — SIGNIFICANT CHANGE UP (ref 3.8–10.5)
WBC # BLD: 6.05 K/UL — SIGNIFICANT CHANGE UP (ref 3.8–10.5)
WBC # FLD AUTO: 5.06 K/UL — SIGNIFICANT CHANGE UP (ref 3.8–10.5)
WBC # FLD AUTO: 5.1 K/UL — SIGNIFICANT CHANGE UP (ref 3.8–10.5)
WBC # FLD AUTO: 6.05 K/UL — SIGNIFICANT CHANGE UP (ref 3.8–10.5)

## 2021-05-31 PROCEDURE — 99232 SBSQ HOSP IP/OBS MODERATE 35: CPT | Mod: GC

## 2021-05-31 RX ORDER — SOD SULF/SODIUM/NAHCO3/KCL/PEG
4000 SOLUTION, RECONSTITUTED, ORAL ORAL ONCE
Refills: 0 | Status: DISCONTINUED | OUTPATIENT
Start: 2021-05-31 | End: 2021-05-31

## 2021-05-31 RX ORDER — SODIUM CHLORIDE 9 MG/ML
250 INJECTION, SOLUTION INTRAVENOUS
Refills: 0 | Status: DISCONTINUED | OUTPATIENT
Start: 2021-05-31 | End: 2021-06-01

## 2021-05-31 RX ADMIN — BRIMONIDINE TARTRATE 1 DROP(S): 2 SOLUTION/ DROPS OPHTHALMIC at 17:18

## 2021-05-31 RX ADMIN — Medication 100 MILLIGRAM(S): at 22:21

## 2021-05-31 RX ADMIN — BRIMONIDINE TARTRATE 1 DROP(S): 2 SOLUTION/ DROPS OPHTHALMIC at 05:50

## 2021-05-31 RX ADMIN — Medication 50 MILLIGRAM(S): at 17:18

## 2021-05-31 RX ADMIN — Medication 100 MILLIGRAM(S): at 13:05

## 2021-05-31 RX ADMIN — PANTOPRAZOLE SODIUM 40 MILLIGRAM(S): 20 TABLET, DELAYED RELEASE ORAL at 05:49

## 2021-05-31 RX ADMIN — Medication 650 MILLIGRAM(S): at 00:41

## 2021-05-31 RX ADMIN — FINASTERIDE 5 MILLIGRAM(S): 5 TABLET, FILM COATED ORAL at 22:21

## 2021-05-31 RX ADMIN — PANTOPRAZOLE SODIUM 40 MILLIGRAM(S): 20 TABLET, DELAYED RELEASE ORAL at 17:18

## 2021-05-31 RX ADMIN — ISOSORBIDE MONONITRATE 30 MILLIGRAM(S): 60 TABLET, EXTENDED RELEASE ORAL at 13:05

## 2021-05-31 RX ADMIN — DORZOLAMIDE HYDROCHLORIDE 1 DROP(S): 20 SOLUTION/ DROPS OPHTHALMIC at 13:06

## 2021-05-31 RX ADMIN — LATANOPROST 1 DROP(S): 0.05 SOLUTION/ DROPS OPHTHALMIC; TOPICAL at 22:21

## 2021-05-31 RX ADMIN — PREGABALIN 1000 MICROGRAM(S): 225 CAPSULE ORAL at 13:05

## 2021-05-31 RX ADMIN — ATORVASTATIN CALCIUM 40 MILLIGRAM(S): 80 TABLET, FILM COATED ORAL at 22:21

## 2021-05-31 RX ADMIN — Medication 25 MILLIGRAM(S): at 05:49

## 2021-05-31 RX ADMIN — DORZOLAMIDE HYDROCHLORIDE 1 DROP(S): 20 SOLUTION/ DROPS OPHTHALMIC at 05:49

## 2021-05-31 RX ADMIN — Medication 650 MILLIGRAM(S): at 01:30

## 2021-05-31 RX ADMIN — SODIUM CHLORIDE 1 MILLILITER(S): 9 INJECTION, SOLUTION INTRAVENOUS at 22:20

## 2021-05-31 RX ADMIN — Medication 81 MILLIGRAM(S): at 13:05

## 2021-05-31 RX ADMIN — DORZOLAMIDE HYDROCHLORIDE 1 DROP(S): 20 SOLUTION/ DROPS OPHTHALMIC at 22:21

## 2021-05-31 RX ADMIN — Medication 100 MILLIGRAM(S): at 05:50

## 2021-05-31 RX ADMIN — SODIUM CHLORIDE 250 MILLILITER(S): 9 INJECTION, SOLUTION INTRAVENOUS at 22:52

## 2021-05-31 NOTE — CONSULT NOTE ADULT - SUBJECTIVE AND OBJECTIVE BOX
GASTROENTEROLOGY CONSULT NOTE  HPI:  79 y/o M with PMHx of prostate ca on chemo, HTN, HLD, DM II, anemia of chronic disease/DOMITILA (last tx 2 yrs ago, Hgb 8-9 on prior admissions [id number]), PE/DVT 1/2020, Afib in Eliquis 2.5 mg BID, CAD (s/p IRASEMA x 1 OM 3/30/21 on asa and Eliquis (s/p 1 week plavix 2/2 risk of GI bleed), esophagitis/gastritis/ duodenitis who presents for worsening pain/swelling and numbness of right lower jaw. Patient had recent visit to ER for right jaw and lip swelling/pain on 5/13 and found to have cellulitis and b/l sialoadenitis. He completed course of Augmentin but he has recurrent right chin swelling, redness and pain. Patient reports associated right ear pain and decreased taste, and right jaw numbness. He denies difficulty swallowing but reports difficulty chewing (not wearing his dentures now). He denies fever or chills.     Patient also has hx of DOMITILA, where he is on oral iron BID. Per daughter he usually has dark stools but he is on oral iron. He was admitted for coffee ground emesis back in 01/2020 and EGD at that time showed gastritis, esophagitis, and duodenitis and gastric ulcer. He was placed on PPI. Then on more recent admission for facial numbness in April 2021- Hb dropped to 7 and pt got 1 unit PRBC. patient had fecal occult positive was likely due to PO iron supplementation, and plan was for outpatient follow up for repeat EGD/Colonoscopy. patient was cleared for discharge home back on his home medications of Aspirin 81 mg and Eliquis 2.5 mg BID. He is pending EGD/colonscopy next week with Dr. Leigh.     In the ED:  Vitals: 98.6, HR 87, /67, RR 18, O2 sat 97% on room air   Labs s/f: Hb 7.2 (9.0 5/13/21), no leukocytosis  EKG: NSR, unchanged from prior   CT: Right lower face swelling overlying the right buccal surface of the right hemimandible which may represent cellulitis. No evidence of drainable fluid collection. No evidence of airway obstruction.  Course: ENT consulted, patient started on clindamycin IV, 500cc NS bolus given       Admitted to medicine for facial cellulitis.        (30 May 2021 17:18)    Allergies    ACE inhibitors (Angioedema)    Intolerances      Home Medications:  albuterol 90 mcg/inh inhalation aerosol: 2 puff(s) inhaled every 6 hours, As Needed (30 May 2021 18:33)  apixaban 2.5 mg oral tablet: 1 tab(s) orally 2 times a day (15 Apr 2021 22:49)  brimonidine 0.2% ophthalmic solution: 1 drop(s) to each affected eye 2 times a day (30 May 2021 18:34)  Caltrate 600 + D oral tablet: 1 tab(s) orally 2 times a day (15 Apr 2021 22:49)  Centrum Silver Men&#x27;s oral tablet: 1 tab(s) orally once a day (15 Apr 2021 22:49)  cyanocobalamin 1000 mcg oral tablet: 1 tab(s) orally once a day (15 Apr 2021 22:49)  dorzolamide 2% ophthalmic solution: 1 drop(s) to each affected eye 3 times a day (15 Apr 2021 22:49)  ferrous sulfate 325 mg (65 mg elemental iron) oral tablet: 1 tab(s) orally once a day (15 Apr 2021 22:49)  finasteride 5 mg oral tablet: 1 tab(s) orally once a day (15 Apr 2021 22:49)  hydroCHLOROthiazide 25 mg oral tablet: 1 tab(s) orally once a day (30 May 2021 18:35)  Imdur 30 mg oral tablet, extended release: 1 tab(s) orally once a day (30 May 2021 18:36)  latanoprost 0.005% ophthalmic solution: 1 drop(s) to each affected eye once a day (in the evening) (15 Apr 2021 22:49)  Lupron Depot: 11.5 milligram(s) intramuscular every 3 months (15 Apr 2021 22:49)  metoprolol succinate 50 mg oral tablet, extended release: 1 tab(s) orally once a day (15 Apr 2021 22:49)  pantoprazole 40 mg oral delayed release tablet: 1 tab(s) orally once a day (15 Apr 2021 22:49)  potassium chloride 10 mEq oral tablet, extended release: 1 tab(s) orally once a day (30 May 2021 18:31)    MEDICATIONS:  MEDICATIONS  (STANDING):  aspirin enteric coated 81 milliGRAM(s) Oral daily  atorvastatin 40 milliGRAM(s) Oral at bedtime  brimonidine 0.2% Ophthalmic Solution 1 Drop(s) Both EYES two times a day  clindamycin IVPB 600 milliGRAM(s) IV Intermittent every 8 hours  cyanocobalamin 1000 MICROGram(s) Oral daily  dextrose 40% Gel 15 Gram(s) Oral once  dextrose 5%. 1000 milliLiter(s) (50 mL/Hr) IV Continuous <Continuous>  dextrose 5%. 1000 milliLiter(s) (100 mL/Hr) IV Continuous <Continuous>  dextrose 50% Injectable 25 Gram(s) IV Push once  dextrose 50% Injectable 12.5 Gram(s) IV Push once  dextrose 50% Injectable 25 Gram(s) IV Push once  dorzolamide 2% Ophthalmic Solution 1 Drop(s) Both EYES three times a day  finasteride 5 milliGRAM(s) Oral at bedtime  glucagon  Injectable 1 milliGRAM(s) IntraMuscular once  hydrochlorothiazide 25 milliGRAM(s) Oral daily  insulin lispro (ADMELOG) corrective regimen sliding scale   SubCutaneous Before meals and at bedtime  isosorbide   mononitrate ER Tablet (IMDUR) 30 milliGRAM(s) Oral daily  latanoprost 0.005% Ophthalmic Solution 1 Drop(s) Both EYES at bedtime  metoprolol succinate ER 50 milliGRAM(s) Oral every 24 hours  pantoprazole  Injectable 40 milliGRAM(s) IV Push every 12 hours    MEDICATIONS  (PRN):  acetaminophen   Tablet .. 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    Glaucoma  Glaucoma    Type 2 diabetes mellitus  Diabetes    Essential hypertension  Hypertension    Chemotherapy session for neoplasm  Prostate Cancer    Pulmonary embolism  1/2020    DVT (deep venous thrombosis)  1/2020 on Eliquis    Atrial fibrillation  on Eliquis    Other postprocedural status  S/P prostatectomy      FAMILY HISTORY:  Family history of malignant neoplasm of prostate (Father, Sibling)  Father      SOCIAL HISTORY:  [place]: [ ] Current, [ ] Former, [ ] Never; Pack Years:  Alcohol:  Illicit Drugs:    REVIEW OF SYSTEMS:  CONSTITUTIONAL: No fevers or chills  HEENT: No visual changes; No vertigo or throat pain   NECK: No pain or stiffness  RESPIRATORY: No cough, wheezing, hemoptysis; No shortness of breath  CARDIOVASCULAR: No chest pain or palpitations  GASTROINTESTINAL: as above  GENITOURINARY: No dysuria, frequency or hematuria  NEUROLOGICAL: No numbness or weakness  SKIN: No itching, burning, rashes, or lesions   All other 10 review of systems is negative unless indicated above.    Vital Signs Last 24 Hrs  T(C): 36.5 (31 May 2021 13:27), Max: 36.9 (31 May 2021 01:33)  T(F): 97.7 (31 May 2021 13:27), Max: 98.4 (31 May 2021 01:33)  HR: 67 (31 May 2021 13:27) (60 - 73)  BP: 127/65 (31 May 2021 13:27) (112/57 - 127/65)  BP(mean): --  RR: 17 (31 May 2021 13:27) (17 - 18)  SpO2: 100% (31 May 2021 13:27) (97% - 100%)    05-31 @ 07:01  -  05-31 @ 18:32  --------------------------------------------------------  IN: 0 mL / OUT: 400 mL / NET: -400 mL        PHYSICAL EXAM:    General: Well developed; in no acute distress  Eyes: Anicteric sclerae, moist conjunctivae  HENT: Moist mucous membranes  Neck: Trachea midline, supple  Lungs: Normal respiratory effort  Cardiovascular: RRR  Abdomen: Soft, non-tender non-distended; No rebound or guarding  Extremities: Normal range of motion, No clubbing, cyanosis or edema  Neurological: Alert and oriented x3  Skin: Warm and dry. No obvious rash    LABS:                        8.6    6.05  )-----------( 324      ( 31 May 2021 10:33 )             27.5     05-31    137  |  108  |  11  ----------------------------<  88  4.1   |  20<L>  |  1.10    Ca    8.4      31 May 2021 06:37  Mg     1.7     05-31    TPro  6.1  /  Alb  3.3  /  TBili  0.4  /  DBili  x   /  AST  24  /  ALT  15  /  AlkPhos  42  05-30        PT/INR - ( 30 May 2021 12:41 )   PT: 18.4 sec;   INR: 1.57          PTT - ( 30 May 2021 12:41 )  PTT:42.9 sec    Culture - Surgical Swab (collected 31 May 2021 17:44)  Source: .Surgical Swab facial wound  Gram Stain (31 May 2021 17:57):    No organisms seen    Rare WBC's    Culture - Blood (collected 30 May 2021 13:53)  Source: .Blood Blood-Peripheral  Preliminary Report (31 May 2021 14:00):    No growth at 1 day.    Culture - Blood (collected 30 May 2021 13:53)  Source: .Blood Blood-Peripheral  Preliminary Report (31 May 2021 14:00):    No growth at 1 day.      RADIOLOGY & ADDITIONAL STUDIES:     Reviewed

## 2021-05-31 NOTE — PROGRESS NOTE ADULT - PROBLEM SELECTOR PLAN 10
F: none   E: replete as needed  N: soft diet (pain with mastication)  DVT ppx: SCDs, holding AC   GI pps: protonix     Dispo: [facility] F: none   E: replete as needed  N: soft diet (pain with mastication)  DVT ppx: SCDs, holding AC   GI pps: protonix   Dispo: [facility]

## 2021-05-31 NOTE — PROGRESS NOTE ADULT - ASSESSMENT
81 y/o M with PMHx of prostate ca on chemo, HTN, HLD, DM II, anemia of chronic disease/DOMITILA (last tx 2 yrs ago, Hgb 8-9 on prior admissions [id number]), PE/DVT 1/2020, Afib in Eliquis 2.5 mg BID, CAD (s/p IRASEMA x 1 OM 3/30/21 on asa and Eliquis (s/p 1 week plavix 2/2 risk of GI bleed), esophagitis/gastritis/ duodenitis who presents for worsening pain/swelling and numbness of right lower jaw. Admitted for cellulitis and b/l sialoadenitis after failed PO Augmentin, started on IV Clindamycin Hospital course c/b acute 2 point drop in Hb in patient w/ hx of GI bleed.  79 y/o M with PMHx of prostate ca on chemo, HTN, HLD, DM II, anemia of chronic disease/DOMITILA (last tx 2 yrs ago, Hgb 8-9 on prior admissions [id number]), PE/DVT 1/2020, Afib in Eliquis 2.5 mg BID, CAD (s/p IRASEMA x 1 OM 3/30/21 on asa and Eliquis (s/p 1 week plavix 2/2 risk of GI bleed), esophagitis/gastritis/ duodenitis who presents for worsening pain/swelling and numbness of right lower jaw. Admitted for cellulitis and b/l sialoadenitis after failed PO Augmentin, course c/b acute 2 point drop in Hb in patient w/ hx of GI bleed.

## 2021-05-31 NOTE — CONSULT NOTE ADULT - ASSESSMENT
79 y/o M with PMHx of prostate ca on chemo, HTN, HLD, DM II, anemia of chronic disease/DOMITILA (last tx 2 yrs ago, Hgb 8-9 on prior admissions 2457-4940), PE/DVT 1/2020, Afib in Eliquis 2.5 mg BID, CAD (s/p IRASEMA x 1 OM 3/30/21 on asa and Eliquis (s/p 1 week plavix 2/2 risk of GI bleed), esophagitis/gastritis/ duodenitis who presents for worsening pain/swelling and numbness of right lower jaw. 2/2 cellulitis and b/l sialoadenitis. Pt has dark stools but he is on oral iron. He was admitted for coffee ground emesis back in 01/2020 and EGD at that time showed gastritis, esophagitis, and duodenitis and gastric ulcer. He was placed on PPI. Pt was seen by Dr Leigh in the clinic for DOMITILA and positive FOBT for which a bidirectional endoscopy was planned. However since pt is hospitalized GI is consulted for inpatient scopes.    # DOMITILA  no evidence of active GIB  Hgb stable  - hold AC if able for 48-72 hrs  - will plan for EGD and colonoscopy for possibly Thursday    Recommendations discussed with primary team  Plan discussed with GI service attending    Phi Carter MD  PGY-4 GI fellow  Pager: [phone]

## 2021-05-31 NOTE — PROGRESS NOTE ADULT - PROBLEM SELECTOR PLAN 2
- hx of DOMITILA/ AOCD, on iron PO BID   - Hb usually between 8-9 at baseline, EGD in 2020 revealing esophagitis, gastritis, and duodenitis with clean based duodenal ulcer   - on presentation Hb 7.2, Hb was 9 on May 13th- pt states his stools are always dark because of PO iron, denies hematochezia or hematemesis, hematuria or signs of active blood loss   - hx of transfusion x2, hx of coffee ground emesis in Jan 2020 where GI did EGD and saw gastritis, esophagitis, and duodenitis  -EGD @ [facility] 1/13/20: Esophagitis, 2cm hiatal hernia, Gastritis, duodenitis, 2 small clean based ulcer in D2.   - pt is HD stable   PLAN   - GI consulted, pt was supposed to have cscope and EGD scheduled for next week (consulted as pt is here for IV Abx and may decide to scope while inpatient)  - PPI IV BID   - will transfuse 1 unit PRBC in setting of CAD s/p stent recently in March 2021  - active type and screen   - hold Eliquis in setting of possible GI bleed, resume once cleared for AC     #Esophagitis, gastritis, duodenitis  - Plan as above hx of DOMITILA/ AOCD, on iron PO BID. Hb usually between 8-9 at baseline, EGD in 2020 revealing esophagitis, gastritis, and duodenitis with clean based duodenal ulcer. P/w Hb 7.2, Hb was 9 on May 13th- pt states his stools are always dark because of PO iron, denies hematochezia or hematemesis, hematuria or signs of active blood loss. Was planned for EGD/colonoscopy outpt for this upcoming week.    -EGD @ [facility] 1/13/20: Esophagitis, 2cm hiatal hernia, Gastritis, duodenitis, 2 small clean based ulcer in D2.   - pt is HD stable   PLAN   - GI consulted, pt was supposed to have cscope and EGD scheduled for next week (consulted as pt is here for IV Abx and may decide to scope while inpatient)  - PPI IV BID   - will transfuse 1 unit PRBC in setting of CAD s/p stent recently in March 2021  - active type and screen   - hold Eliquis in setting of possible GI bleed, resume once cleared for AC     #Esophagitis, gastritis, duodenitis  - Plan as above hx of DOMITILA/ AOCD, on iron PO BID. Hb usually between 8-9 at baseline, EGD in 2020 revealing esophagitis, gastritis, and duodenitis with clean based duodenal ulcer. P/w Hb 7.2, Hb was 9 on May 13th- pt states his stools are always dark because of PO iron, denies hematochezia or hematemesis, hematuria or signs of active blood loss. Was planned for EGD/colonoscopy outpt for this upcoming week. s/p 1u pRBC, Hgb 8.6   - active type and screen (5/30)  - hold Eliquis in setting of possible GI bleed, resume once cleared for AC     #Esophagitis, gastritis, duodenitis  - Plan as above

## 2021-05-31 NOTE — PROGRESS NOTE ADULT - TIME BILLING
Patient seen and examined with house-staff during bedside rounds.  Resident note read, including vitals, physical findings, laboratory data, and radiological reports.   Revisions included below.  Direct personal management at bed side and extensive interpretation of the data.  Plan was outlined and discussed in details with the housestaff.  Decision making of high complexity  Action taken for acute disease activity to reflect the level of care provided:  - medication reconciliation  - review laboratory data  Patient is clinically stable.  Patient was transfused.  The patient was seen by GI.  Plan for endoscopy.  Patient also was started on clindamycin for his cellulitis.  There is no oral pathology bike poor dentition causing the cellulitis.  Patient might need CT scan to evaluate further treatment if is not responding.

## 2021-05-31 NOTE — PROGRESS NOTE ADULT - SUBJECTIVE AND OBJECTIVE BOX
**INCOMPLETE NOTE    OVERNIGHT EVENTS:    SUBJECTIVE:  Patient seen and examined at bedside.    Vital Signs Last 12 Hrs  T(F): 98.3 (05-31-21 @ 06:10), Max: 98.4 (05-31-21 @ 01:33)  HR: 60 (05-31-21 @ 06:10) (60 - 70)  BP: 112/57 (05-31-21 @ 06:10) (112/57 - 115/70)  BP(mean): --  RR: 17 (05-31-21 @ 06:10) (17 - 18)  SpO2: 97% (05-31-21 @ 06:10) (97% - 100%)  I&O's Summary      PHYSICAL EXAM:  Constitutional: NAD, comfortable in bed.  HEENT: NC/AT, PERRLA, EOMI, no conjunctival pallor or scleral icterus, MMM  Neck: Supple, no JVD  Respiratory: CTA B/L. No w/r/r.   Cardiovascular: RRR, normal S1 and S2, no m/r/g.   Gastrointestinal: +BS, soft NTND, no guarding or rebound tenderness, no palpable masses   Extremities: wwp; no cyanosis, clubbing or edema.   Vascular: Pulses equal and strong throughout.   Neurological: AAOx3, no CN deficits, strength and sensation intact throughout.   Skin: No gross skin abnormalities or rashes        LABS:                        7.2    6.56  )-----------( 310      ( 30 May 2021 12:41 )             22.8     05-31    137  |  108  |  11  ----------------------------<  88  4.1   |  20<L>  |  1.10    Ca    8.4      31 May 2021 06:37  Mg     1.7     05-31    TPro  6.1  /  Alb  3.3  /  TBili  0.4  /  DBili  x   /  AST  24  /  ALT  15  /  AlkPhos  42  05-30    PT/INR - ( 30 May 2021 12:41 )   PT: 18.4 sec;   INR: 1.57          PTT - ( 30 May 2021 12:41 )  PTT:42.9 sec        RADIOLOGY & ADDITIONAL TESTS:    MEDICATIONS  (STANDING):  aspirin enteric coated 81 milliGRAM(s) Oral daily  atorvastatin 40 milliGRAM(s) Oral at bedtime  brimonidine 0.2% Ophthalmic Solution 1 Drop(s) Both EYES two times a day  clindamycin IVPB 600 milliGRAM(s) IV Intermittent every 8 hours  cyanocobalamin 1000 MICROGram(s) Oral daily  dextrose 40% Gel 15 Gram(s) Oral once  dextrose 5%. 1000 milliLiter(s) (50 mL/Hr) IV Continuous <Continuous>  dextrose 5%. 1000 milliLiter(s) (100 mL/Hr) IV Continuous <Continuous>  dextrose 50% Injectable 25 Gram(s) IV Push once  dextrose 50% Injectable 12.5 Gram(s) IV Push once  dextrose 50% Injectable 25 Gram(s) IV Push once  dorzolamide 2% Ophthalmic Solution 1 Drop(s) Both EYES three times a day  finasteride 5 milliGRAM(s) Oral at bedtime  glucagon  Injectable 1 milliGRAM(s) IntraMuscular once  hydrochlorothiazide 25 milliGRAM(s) Oral daily  insulin lispro (ADMELOG) corrective regimen sliding scale   SubCutaneous Before meals and at bedtime  isosorbide   mononitrate ER Tablet (IMDUR) 30 milliGRAM(s) Oral daily  latanoprost 0.005% Ophthalmic Solution 1 Drop(s) Both EYES at bedtime  metoprolol succinate ER 50 milliGRAM(s) Oral every 24 hours  pantoprazole  Injectable 40 milliGRAM(s) IV Push every 12 hours  polyethylene glycol/electrolyte Solution. 4000 milliLiter(s) Oral once    MEDICATIONS  (PRN):  acetaminophen   Tablet .. 650 milliGRAM(s) Oral every 6 hours PRN Temp greater or equal to 38C (100.4F), Mild Pain (1 - 3)  ALBUTerol    90 MICROgram(s) HFA Inhaler 2 Puff(s) Inhalation every 6 hours PRN Shortness of Breath and/or Wheezing   OVERNIGHT EVENTS: Got 1u prbc     SUBJECTIVE:  Patient seen and examined at bedside. Has mild pain on R side in submandibular region. Otherwise no acute complaints, no fever/chills or worsening tenderness of R mandible. 12 pt ROS otherwise negative.     Vital Signs Last 12 Hrs  T(F): 98.3 (05-31-21 @ 06:10), Max: 98.4 (05-31-21 @ 01:33)  HR: 60 (05-31-21 @ 06:10) (60 - 70)  BP: 112/57 (05-31-21 @ 06:10) (112/57 - 115/70)  BP(mean): --  RR: 17 (05-31-21 @ 06:10) (17 - 18)  SpO2: 97% (05-31-21 @ 06:10) (97% - 100%)  I&O's Summary      PHYSICAL EXAM:  Constitutional: NAD, comfortable in bed.  HEENT: NC/AT, PERRLA, EOMI, poor dentition, R sided submandibular swelling and mild tenderness with no fluctuance; no conjunctival pallor or scleral icterus, MMM  Neck: Supple, no JVD  Respiratory: CTA B/L. No w/r/r.   Cardiovascular: RRR, normal S1 and S2, no m/r/g.   Gastrointestinal: +BS, soft NTND, no guarding or rebound tenderness, no palpable masses   Extremities: wwp; no cyanosis, clubbing or edema.   Vascular: Pulses equal and strong throughout.   Neurological: AAOx3, no CN deficits, strength and sensation intact throughout.   Skin: No gross skin abnormalities or rashes        LABS:                        7.2    6.56  )-----------( 310      ( 30 May 2021 12:41 )             22.8     05-31    137  |  108  |  11  ----------------------------<  88  4.1   |  20<L>  |  1.10    Ca    8.4      31 May 2021 06:37  Mg     1.7     05-31    TPro  6.1  /  Alb  3.3  /  TBili  0.4  /  DBili  x   /  AST  24  /  ALT  15  /  AlkPhos  42  05-30    PT/INR - ( 30 May 2021 12:41 )   PT: 18.4 sec;   INR: 1.57          PTT - ( 30 May 2021 12:41 )  PTT:42.9 sec        RADIOLOGY & ADDITIONAL TESTS:    MEDICATIONS  (STANDING):  aspirin enteric coated 81 milliGRAM(s) Oral daily  atorvastatin 40 milliGRAM(s) Oral at bedtime  brimonidine 0.2% Ophthalmic Solution 1 Drop(s) Both EYES two times a day  clindamycin IVPB 600 milliGRAM(s) IV Intermittent every 8 hours  cyanocobalamin 1000 MICROGram(s) Oral daily  dextrose 40% Gel 15 Gram(s) Oral once  dextrose 5%. 1000 milliLiter(s) (50 mL/Hr) IV Continuous <Continuous>  dextrose 5%. 1000 milliLiter(s) (100 mL/Hr) IV Continuous <Continuous>  dextrose 50% Injectable 25 Gram(s) IV Push once  dextrose 50% Injectable 12.5 Gram(s) IV Push once  dextrose 50% Injectable 25 Gram(s) IV Push once  dorzolamide 2% Ophthalmic Solution 1 Drop(s) Both EYES three times a day  finasteride 5 milliGRAM(s) Oral at bedtime  glucagon  Injectable 1 milliGRAM(s) IntraMuscular once  hydrochlorothiazide 25 milliGRAM(s) Oral daily  insulin lispro (ADMELOG) corrective regimen sliding scale   SubCutaneous Before meals and at bedtime  isosorbide   mononitrate ER Tablet (IMDUR) 30 milliGRAM(s) Oral daily  latanoprost 0.005% Ophthalmic Solution 1 Drop(s) Both EYES at bedtime  metoprolol succinate ER 50 milliGRAM(s) Oral every 24 hours  pantoprazole  Injectable 40 milliGRAM(s) IV Push every 12 hours  polyethylene glycol/electrolyte Solution. 4000 milliLiter(s) Oral once    MEDICATIONS  (PRN):  acetaminophen   Tablet .. 650 milliGRAM(s) Oral every 6 hours PRN Temp greater or equal to 38C (100.4F), Mild Pain (1 - 3)  ALBUTerol    90 MICROgram(s) HFA Inhaler 2 Puff(s) Inhalation every 6 hours PRN Shortness of Breath and/or Wheezing

## 2021-06-01 DIAGNOSIS — E11.9 TYPE 2 DIABETES MELLITUS WITHOUT COMPLICATIONS: ICD-10-CM

## 2021-06-01 LAB
ANION GAP SERPL CALC-SCNC: 11 MMOL/L — SIGNIFICANT CHANGE UP (ref 5–17)
BUN SERPL-MCNC: 9 MG/DL — SIGNIFICANT CHANGE UP (ref 7–23)
CALCIUM SERPL-MCNC: 7.8 MG/DL — LOW (ref 8.4–10.5)
CHLORIDE SERPL-SCNC: 103 MMOL/L — SIGNIFICANT CHANGE UP (ref 96–108)
CO2 SERPL-SCNC: 20 MMOL/L — LOW (ref 22–31)
CREAT SERPL-MCNC: 1.2 MG/DL — SIGNIFICANT CHANGE UP (ref 0.5–1.3)
GLUCOSE BLDC GLUCOMTR-MCNC: 119 MG/DL — HIGH (ref 70–99)
GLUCOSE BLDC GLUCOMTR-MCNC: 126 MG/DL — HIGH (ref 70–99)
GLUCOSE BLDC GLUCOMTR-MCNC: 142 MG/DL — HIGH (ref 70–99)
GLUCOSE BLDC GLUCOMTR-MCNC: 91 MG/DL — SIGNIFICANT CHANGE UP (ref 70–99)
GLUCOSE SERPL-MCNC: 98 MG/DL — SIGNIFICANT CHANGE UP (ref 70–99)
HCT VFR BLD CALC: 23.4 % — LOW (ref 39–50)
HGB BLD-MCNC: 7.5 G/DL — LOW (ref 13–17)
MAGNESIUM SERPL-MCNC: 1.6 MG/DL — SIGNIFICANT CHANGE UP (ref 1.6–2.6)
MCHC RBC-ENTMCNC: 27.9 PG — SIGNIFICANT CHANGE UP (ref 27–34)
MCHC RBC-ENTMCNC: 32.1 GM/DL — SIGNIFICANT CHANGE UP (ref 32–36)
MCV RBC AUTO: 87 FL — SIGNIFICANT CHANGE UP (ref 80–100)
NRBC # BLD: 0 /100 WBCS — SIGNIFICANT CHANGE UP (ref 0–0)
PHOSPHATE SERPL-MCNC: 3.7 MG/DL — SIGNIFICANT CHANGE UP (ref 2.5–4.5)
PLATELET # BLD AUTO: 302 K/UL — SIGNIFICANT CHANGE UP (ref 150–400)
POTASSIUM SERPL-MCNC: 3.8 MMOL/L — SIGNIFICANT CHANGE UP (ref 3.5–5.3)
POTASSIUM SERPL-SCNC: 3.8 MMOL/L — SIGNIFICANT CHANGE UP (ref 3.5–5.3)
RBC # BLD: 2.69 M/UL — LOW (ref 4.2–5.8)
RBC # FLD: 17.4 % — HIGH (ref 10.3–14.5)
SODIUM SERPL-SCNC: 134 MMOL/L — LOW (ref 135–145)
WBC # BLD: 4.95 K/UL — SIGNIFICANT CHANGE UP (ref 3.8–10.5)
WBC # FLD AUTO: 4.95 K/UL — SIGNIFICANT CHANGE UP (ref 3.8–10.5)

## 2021-06-01 PROCEDURE — 99233 SBSQ HOSP IP/OBS HIGH 50: CPT | Mod: GC

## 2021-06-01 RX ORDER — ALBUTEROL 90 UG/1
2 AEROSOL, METERED ORAL
Qty: 0 | Refills: 0 | DISCHARGE

## 2021-06-01 RX ORDER — POLYETHYLENE GLYCOL 3350 17 G/17G
17 POWDER, FOR SOLUTION ORAL
Refills: 0 | Status: DISCONTINUED | OUTPATIENT
Start: 2021-06-01 | End: 2021-06-07

## 2021-06-01 RX ORDER — LISINOPRIL 2.5 MG/1
1 TABLET ORAL
Qty: 0 | Refills: 0 | DISCHARGE

## 2021-06-01 RX ORDER — CHLORHEXIDINE GLUCONATE 213 G/1000ML
15 SOLUTION TOPICAL
Refills: 0 | Status: DISCONTINUED | OUTPATIENT
Start: 2021-06-01 | End: 2021-06-07

## 2021-06-01 RX ORDER — PREGABALIN 225 MG/1
1 CAPSULE ORAL
Qty: 0 | Refills: 0 | DISCHARGE

## 2021-06-01 RX ORDER — MULTIVIT-MIN/FERROUS GLUCONATE 9 MG/15 ML
1 LIQUID (ML) ORAL
Qty: 0 | Refills: 0 | DISCHARGE

## 2021-06-01 RX ORDER — POTASSIUM CHLORIDE 20 MEQ
1 PACKET (EA) ORAL
Qty: 0 | Refills: 0 | DISCHARGE

## 2021-06-01 RX ORDER — SENNA PLUS 8.6 MG/1
2 TABLET ORAL AT BEDTIME
Refills: 0 | Status: DISCONTINUED | OUTPATIENT
Start: 2021-06-01 | End: 2021-06-07

## 2021-06-01 RX ADMIN — Medication 100 MILLIGRAM(S): at 13:18

## 2021-06-01 RX ADMIN — DORZOLAMIDE HYDROCHLORIDE 1 DROP(S): 20 SOLUTION/ DROPS OPHTHALMIC at 13:18

## 2021-06-01 RX ADMIN — Medication 50 MILLIGRAM(S): at 18:45

## 2021-06-01 RX ADMIN — ATORVASTATIN CALCIUM 40 MILLIGRAM(S): 80 TABLET, FILM COATED ORAL at 22:17

## 2021-06-01 RX ADMIN — PREGABALIN 1000 MICROGRAM(S): 225 CAPSULE ORAL at 11:10

## 2021-06-01 RX ADMIN — LATANOPROST 1 DROP(S): 0.05 SOLUTION/ DROPS OPHTHALMIC; TOPICAL at 22:18

## 2021-06-01 RX ADMIN — FINASTERIDE 5 MILLIGRAM(S): 5 TABLET, FILM COATED ORAL at 22:17

## 2021-06-01 RX ADMIN — Medication 100 MILLIGRAM(S): at 22:17

## 2021-06-01 RX ADMIN — Medication 100 MILLIGRAM(S): at 05:26

## 2021-06-01 RX ADMIN — DORZOLAMIDE HYDROCHLORIDE 1 DROP(S): 20 SOLUTION/ DROPS OPHTHALMIC at 05:26

## 2021-06-01 RX ADMIN — SENNA PLUS 2 TABLET(S): 8.6 TABLET ORAL at 23:19

## 2021-06-01 RX ADMIN — BRIMONIDINE TARTRATE 1 DROP(S): 2 SOLUTION/ DROPS OPHTHALMIC at 17:34

## 2021-06-01 RX ADMIN — ISOSORBIDE MONONITRATE 30 MILLIGRAM(S): 60 TABLET, EXTENDED RELEASE ORAL at 11:10

## 2021-06-01 RX ADMIN — PANTOPRAZOLE SODIUM 40 MILLIGRAM(S): 20 TABLET, DELAYED RELEASE ORAL at 05:26

## 2021-06-01 RX ADMIN — PANTOPRAZOLE SODIUM 40 MILLIGRAM(S): 20 TABLET, DELAYED RELEASE ORAL at 22:17

## 2021-06-01 RX ADMIN — CHLORHEXIDINE GLUCONATE 15 MILLILITER(S): 213 SOLUTION TOPICAL at 18:45

## 2021-06-01 RX ADMIN — POLYETHYLENE GLYCOL 3350 17 GRAM(S): 17 POWDER, FOR SOLUTION ORAL at 23:19

## 2021-06-01 RX ADMIN — DORZOLAMIDE HYDROCHLORIDE 1 DROP(S): 20 SOLUTION/ DROPS OPHTHALMIC at 22:18

## 2021-06-01 RX ADMIN — BRIMONIDINE TARTRATE 1 DROP(S): 2 SOLUTION/ DROPS OPHTHALMIC at 05:27

## 2021-06-01 RX ADMIN — Medication 81 MILLIGRAM(S): at 11:10

## 2021-06-01 NOTE — PROGRESS NOTE ADULT - ASSESSMENT
81 y/o M with PMHx of prostate ca on chemo, HTN, HLD, DM II, anemia of chronic disease/DOMITILA (last tx 2 yrs ago, Hgb 8-9 on prior admissions [id number]), PE/DVT 1/2020, Afib in Eliquis 2.5 mg BID, CAD (s/p IRASEMA x 1 OM 3/30/21 on asa and Eliquis (s/p 1 week plavix 2/2 risk of GI bleed), esophagitis/gastritis/ duodenitis who presents for worsening pain/swelling and numbness of right lower jaw. Admitted for cellulitis and b/l sialoadenitis after failed PO Augmentin, also with dropping hemoglobin concerning for GI bleed pending EGD/Cscope.

## 2021-06-01 NOTE — PROGRESS NOTE ADULT - SUBJECTIVE AND OBJECTIVE BOX
INTERVAL HPI/OVERNIGHT EVENTS:  Mouth feels better; GI to scope patient while here; Off AC for now      MEDICATIONS  (STANDING):  aspirin enteric coated 81 milliGRAM(s) Oral daily  atorvastatin 40 milliGRAM(s) Oral at bedtime  brimonidine 0.2% Ophthalmic Solution 1 Drop(s) Both EYES two times a day  clindamycin IVPB 600 milliGRAM(s) IV Intermittent every 8 hours  cyanocobalamin 1000 MICROGram(s) Oral daily  dextrose 40% Gel 15 Gram(s) Oral once  dextrose 5%. 1000 milliLiter(s) (50 mL/Hr) IV Continuous <Continuous>  dextrose 5%. 1000 milliLiter(s) (100 mL/Hr) IV Continuous <Continuous>  dextrose 50% Injectable 25 Gram(s) IV Push once  dextrose 50% Injectable 12.5 Gram(s) IV Push once  dextrose 50% Injectable 25 Gram(s) IV Push once  dorzolamide 2% Ophthalmic Solution 1 Drop(s) Both EYES three times a day  finasteride 5 milliGRAM(s) Oral at bedtime  glucagon  Injectable 1 milliGRAM(s) IntraMuscular once  hydrochlorothiazide 25 milliGRAM(s) Oral daily  insulin lispro (ADMELOG) corrective regimen sliding scale   SubCutaneous Before meals and at bedtime  isosorbide   mononitrate ER Tablet (IMDUR) 30 milliGRAM(s) Oral daily  latanoprost 0.005% Ophthalmic Solution 1 Drop(s) Both EYES at bedtime  metoprolol succinate ER 50 milliGRAM(s) Oral every 24 hours  pantoprazole  Injectable 40 milliGRAM(s) IV Push every 12 hours    MEDICATIONS  (PRN):  acetaminophen   Tablet .. 650 milliGRAM(s) Oral every 6 hours PRN Temp greater or equal to 38C (100.4F), Mild Pain (1 - 3)  ALBUTerol    90 MICROgram(s) HFA Inhaler 2 Puff(s) Inhalation every 6 hours PRN Shortness of Breath and/or Wheezing      Allergies    ACE inhibitors (Angioedema)    Intolerances        Vital Signs Last 24 Hrs  T(C): 37.1 (01 Jun 2021 04:56), Max: 37.1 (01 Jun 2021 04:56)  T(F): 98.8 (01 Jun 2021 04:56), Max: 98.8 (01 Jun 2021 04:56)  HR: 80 (01 Jun 2021 04:56) (63 - 80)  BP: 104/53 (01 Jun 2021 04:56) (95/55 - 127/65)  BP(mean): --  RR: 18 (01 Jun 2021 04:56) (17 - 18)  SpO2: 99% (01 Jun 2021 04:56) (97% - 100%)          Constitutional: Awake    Eyes: SELENE    ENMT: Negative    Neck: Supple    Back:  no tenderness     Respiratory:  clear    Cardiovascular: S1 S2    Gastrointestinal: soft    Genitourinary:    Extremities: no edema    Vascular:    Neurological:    Skin:    Lymph Nodes:            05-31 @ 07:01  -  06-01 @ 07:00  --------------------------------------------------------  IN: 0 mL / OUT: 1050 mL / NET: -1050 mL      LABS:                        7.5    4.95  )-----------( 302      ( 01 Jun 2021 07:29 )             23.4     06-01    134<L>  |  103  |  9   ----------------------------<  98  3.8   |  20<L>  |  1.20    Ca    7.8<L>      01 Jun 2021 07:29  Phos  3.7     06-01  Mg     1.6     06-01    TPro  6.1  /  Alb  3.3  /  TBili  0.4  /  DBili  x   /  AST  24  /  ALT  15  /  AlkPhos  42  05-30    PT/INR - ( 30 May 2021 12:41 )   PT: 18.4 sec;   INR: 1.57          PTT - ( 30 May 2021 12:41 )  PTT:42.9 sec      RADIOLOGY & ADDITIONAL TESTS:

## 2021-06-01 NOTE — PROGRESS NOTE ADULT - SUBJECTIVE AND OBJECTIVE BOX
OVERNIGHT EVENTS: Hgb stable     SUBJECTIVE:  Patient seen and examined at bedside. Has mild pain on R side in submandibular region, which he says is improving. Less sensation on R mandible, unchanged. Otherwise no acute complaints, no fever/chills or worsening tenderness of R mandible. 12 pt ROS otherwise negative.     Vital Signs Last 12 Hrs  T(F): 98.3 (05-31-21 @ 06:10), Max: 98.4 (05-31-21 @ 01:33)  HR: 60 (05-31-21 @ 06:10) (60 - 70)  BP: 112/57 (05-31-21 @ 06:10) (112/57 - 115/70)  BP(mean): --  RR: 17 (05-31-21 @ 06:10) (17 - 18)  SpO2: 97% (05-31-21 @ 06:10) (97% - 100%)  I&O's Summary      PHYSICAL EXAM:  Constitutional: NAD, comfortable in bed.  HEENT: NC/AT, PERRLA, EOMI, poor dentition, R sided submandibular swelling and mild tenderness with no fluctuance; no conjunctival pallor or scleral icterus, MMM  Neck: Supple, no JVD  Respiratory: CTA B/L. No w/r/r.   Cardiovascular: RRR, normal S1 and S2, no m/r/g.   Gastrointestinal: +BS, soft NTND, no guarding or rebound tenderness, no palpable masses   Extremities: wwp; no cyanosis, clubbing or edema.   Vascular: Pulses equal and strong throughout.   Neurological: AAOx3, no CN deficits, strength deficits throughout. Decreased sensation in V3 distribution   Skin: No gross skin abnormalities or rashes        LABS:                        7.2    6.56  )-----------( 310      ( 30 May 2021 12:41 )             22.8     05-31    137  |  108  |  11  ----------------------------<  88  4.1   |  20<L>  |  1.10    Ca    8.4      31 May 2021 06:37  Mg     1.7     05-31    TPro  6.1  /  Alb  3.3  /  TBili  0.4  /  DBili  x   /  AST  24  /  ALT  15  /  AlkPhos  42  05-30    PT/INR - ( 30 May 2021 12:41 )   PT: 18.4 sec;   INR: 1.57          PTT - ( 30 May 2021 12:41 )  PTT:42.9 sec        RADIOLOGY & ADDITIONAL TESTS:    MEDICATIONS  (STANDING):  aspirin enteric coated 81 milliGRAM(s) Oral daily  atorvastatin 40 milliGRAM(s) Oral at bedtime  brimonidine 0.2% Ophthalmic Solution 1 Drop(s) Both EYES two times a day  clindamycin IVPB 600 milliGRAM(s) IV Intermittent every 8 hours  cyanocobalamin 1000 MICROGram(s) Oral daily  dextrose 40% Gel 15 Gram(s) Oral once  dextrose 5%. 1000 milliLiter(s) (50 mL/Hr) IV Continuous <Continuous>  dextrose 5%. 1000 milliLiter(s) (100 mL/Hr) IV Continuous <Continuous>  dextrose 50% Injectable 25 Gram(s) IV Push once  dextrose 50% Injectable 12.5 Gram(s) IV Push once  dextrose 50% Injectable 25 Gram(s) IV Push once  dorzolamide 2% Ophthalmic Solution 1 Drop(s) Both EYES three times a day  finasteride 5 milliGRAM(s) Oral at bedtime  glucagon  Injectable 1 milliGRAM(s) IntraMuscular once  hydrochlorothiazide 25 milliGRAM(s) Oral daily  insulin lispro (ADMELOG) corrective regimen sliding scale   SubCutaneous Before meals and at bedtime  isosorbide   mononitrate ER Tablet (IMDUR) 30 milliGRAM(s) Oral daily  latanoprost 0.005% Ophthalmic Solution 1 Drop(s) Both EYES at bedtime  metoprolol succinate ER 50 milliGRAM(s) Oral every 24 hours  pantoprazole  Injectable 40 milliGRAM(s) IV Push every 12 hours  polyethylene glycol/electrolyte Solution. 4000 milliLiter(s) Oral once    MEDICATIONS  (PRN):  acetaminophen   Tablet .. 650 milliGRAM(s) Oral every 6 hours PRN Temp greater or equal to 38C (100.4F), Mild Pain (1 - 3)  ALBUTerol    90 MICROgram(s) HFA Inhaler 2 Puff(s) Inhalation every 6 hours PRN Shortness of Breath and/or Wheezing

## 2021-06-01 NOTE — PROGRESS NOTE ADULT - PROBLEM SELECTOR PLAN 2
hx of DOMITILA/ AOCD, on iron PO BID. Hb usually between 8-9 at baseline, EGD in 2020 revealing esophagitis, gastritis, and duodenitis with clean based duodenal ulcer. No active signs/symptoms of bleed. Was planned for EGD/colonoscopy outpt for this upcoming week. s/p 1u pRBC, Hgb 8.6   - active type and screen (5/30)  -transfuse for Hgb <8  - hold Eliquis in setting of possible GI bleed, resume once cleared for AC   -plan for EGD/C scope on Thursday 6/3     #Esophagitis, gastritis, duodenitis  - Plan as above

## 2021-06-01 NOTE — CONSULT NOTE ADULT - SUBJECTIVE AND OBJECTIVE BOX
HPI: 80y Male PMH prostate ca on chemo, HTN, HLD, DM II, PE/DVT 1/2020, Afib, CAD (s/p IRASEMA) comes in with R chin/mandibular pain and V3 numbness. Of note, pt was seen in ED by ENT on 5/13 for same complaint, thought to have sialadenitis (no imaging) and has since completed 10 days of augmentin. Also had a recent admission for facial numbness with negative stroke workup. Per April admission note, pt had a R facial abscess after dental procedure the month prior and completed a course of Augmentin. CTH 4/15 negative for acute lesions. Recent CT from 5/7 suggests "subcutaneous edema and fat stranding involving the subcutaneous soft tissues of the anterior lower face which may represent cellulitis." Today he comes in mainly with complaint of pain and chin numbness along with change in taste that has been going on for one month. He says he had been compliant with massage, hydration and warm packs at home. Denies fevers and new SOB. Endorses some dysphagia.     In the ED, CT was obtained which suggests "right lower face swelling overlying the right buccal surface of the right hemimandible which may represent cellulitis" and the patient was admitted to medicine for IV antibiotics. He is afebrile with wbc wnl.    Interval 6/1: States that his     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  Glaucoma  Type 2 diabetes mellitus  Essential hypertension  Chemotherapy session for neoplasm  Prostate Cancer  Pulmonary embolism 1/2020  DVT (deep venous thrombosis) 1/2020 on Eliquis  Atrial fibrillation on Eliquis  Other postprocedural status S/P prostatectomy      FAMILY HISTORY:  Family history of malignant neoplasm of prostate (Father, Sibling), Father      MEDICATIONS  (STANDING):  aspirin enteric coated 81 milliGRAM(s) Oral daily  atorvastatin 40 milliGRAM(s) Oral at bedtime  brimonidine 0.2% Ophthalmic Solution 1 Drop(s) Both EYES two times a day  clindamycin IVPB 600 milliGRAM(s) IV Intermittent every 8 hours  cyanocobalamin 1000 MICROGram(s) Oral daily  dextrose 40% Gel 15 Gram(s) Oral once  dextrose 5%. 1000 milliLiter(s) (50 mL/Hr) IV Continuous <Continuous>  dextrose 5%. 1000 milliLiter(s) (100 mL/Hr) IV Continuous <Continuous>  dextrose 50% Injectable 25 Gram(s) IV Push once  dextrose 50% Injectable 12.5 Gram(s) IV Push once  dextrose 50% Injectable 25 Gram(s) IV Push once  dorzolamide 2% Ophthalmic Solution 1 Drop(s) Both EYES three times a day  finasteride 5 milliGRAM(s) Oral at bedtime  glucagon  Injectable 1 milliGRAM(s) IntraMuscular once  hydrochlorothiazide 25 milliGRAM(s) Oral daily  insulin lispro (ADMELOG) corrective regimen sliding scale   SubCutaneous Before meals and at bedtime  isosorbide   mononitrate ER Tablet (IMDUR) 30 milliGRAM(s) Oral daily  latanoprost 0.005% Ophthalmic Solution 1 Drop(s) Both EYES at bedtime  metoprolol succinate ER 50 milliGRAM(s) Oral every 24 hours  pantoprazole  Injectable 40 milliGRAM(s) IV Push every 12 hours    MEDICATIONS  (PRN):  acetaminophen   Tablet .. 650 milliGRAM(s) Oral every 6 hours PRN Temp greater or equal to 38C (100.4F), Mild Pain (1 - 3)  ALBUTerol    90 MICROgram(s) HFA Inhaler 2 Puff(s) Inhalation every 6 hours PRN Shortness of Breath and/or Wheezing        Vital Signs Last 24 Hrs  T(C): 37.1 (01 Jun 2021 04:56), Max: 37.1 (01 Jun 2021 04:56)  T(F): 98.8 (01 Jun 2021 04:56), Max: 98.8 (01 Jun 2021 04:56)  HR: 80 (01 Jun 2021 04:56) (63 - 80)  BP: 104/53 (01 Jun 2021 04:56) (95/55 - 127/65)  BP(mean): --  RR: 18 (01 Jun 2021 04:56) (17 - 18)  SpO2: 99% (01 Jun 2021 04:56) (97% - 100%)        LABS:             7.5    4.95  )-----------( 302      ( 01 Jun 2021 07:29 )             23.4     134<L>  |  103  |  9   ----------------------------<  98  3.8   |  20<L>  |  1.20    Ca    7.8<L>      01 Jun 2021 07:29  Phos  3.7     06-01  Mg     1.6     06-01            PHYSICAL EXAM:    ENT EXAM-   Constitutional: Well-developed, well-nourished.  No hoarseness.     Head:  normocephalic, atraumatic.   Nose:  No external deformity  OC/OP: Mandible edentulous. No pus from kamran/sherie duct. Floor of mouth, buccal mucosa, lips, hard palate, soft palate, uvula, posterior pharyngeal wall normal.  Mucosa moist. R Buccal mucosa TTP.  Small region of raised tissue/fistula at anterior midline gingivosuccal bulcus with intermittent purulent discharge noted  Neck: +TTP in R submandibular region. No edema, fluctuance, or erythema  Lymph:  No cervical adenopathy.    MULTISYSTEM EXAM-  Neuro/Psych:  A&O x 3.    Cranial nerves: Decreased sensation R chin / mental nerve  Eyes:  EOMI, no nystagmus.  Pulm:  No dyspnea, non-labored breathing  Cardiovascular: Carotid pulses 2+ bilaterally.  No peripheral edema.  Skin:  No rash or lesions on exposed skin of head/neck        RADIOLOGY & ADDITIONAL STUDIES: reviewed       HPI: 80y Male PMH prostate ca on chemo, HTN, HLD, DM II, PE/DVT 1/2020, Afib, CAD (s/p IRASEMA) comes in with R chin/mandibular pain and V3 numbness. Of note, pt was seen in ED by ENT on 5/13 for same complaint, thought to have sialadenitis (no imaging) and has since completed 10 days of augmentin. Also had a recent admission for facial numbness with negative stroke workup. Per April admission note, pt had a R facial abscess after dental procedure the month prior and completed a course of Augmentin. CTH 4/15 negative for acute lesions. Recent CT from 5/7 suggests "subcutaneous edema and fat stranding involving the subcutaneous soft tissues of the anterior lower face which may represent cellulitis." Today he comes in mainly with complaint of pain and chin numbness along with change in taste that has been going on for one month. He says he had been compliant with massage, hydration and warm packs at home. Denies fevers and new SOB. Endorses some dysphagia.     In the ED, CT was obtained which suggests "right lower face swelling overlying the right buccal surface of the right hemimandible which may represent cellulitis" and the patient was admitted to medicine for IV antibiotics. He is afebrile with wbc wnl.    Interval 6/1: States that his pain has improved since beginning IV abx.  Has been taking PO without issue.      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  Glaucoma  Type 2 diabetes mellitus  Essential hypertension  Chemotherapy session for neoplasm  Prostate Cancer  Pulmonary embolism 1/2020  DVT (deep venous thrombosis) 1/2020 on Eliquis  Atrial fibrillation on Eliquis  Other postprocedural status S/P prostatectomy      FAMILY HISTORY:  Family history of malignant neoplasm of prostate (Father, Sibling), Father      MEDICATIONS  (STANDING):  aspirin enteric coated 81 milliGRAM(s) Oral daily  atorvastatin 40 milliGRAM(s) Oral at bedtime  brimonidine 0.2% Ophthalmic Solution 1 Drop(s) Both EYES two times a day  clindamycin IVPB 600 milliGRAM(s) IV Intermittent every 8 hours  cyanocobalamin 1000 MICROGram(s) Oral daily  dextrose 40% Gel 15 Gram(s) Oral once  dextrose 5%. 1000 milliLiter(s) (50 mL/Hr) IV Continuous <Continuous>  dextrose 5%. 1000 milliLiter(s) (100 mL/Hr) IV Continuous <Continuous>  dextrose 50% Injectable 25 Gram(s) IV Push once  dextrose 50% Injectable 12.5 Gram(s) IV Push once  dextrose 50% Injectable 25 Gram(s) IV Push once  dorzolamide 2% Ophthalmic Solution 1 Drop(s) Both EYES three times a day  finasteride 5 milliGRAM(s) Oral at bedtime  glucagon  Injectable 1 milliGRAM(s) IntraMuscular once  hydrochlorothiazide 25 milliGRAM(s) Oral daily  insulin lispro (ADMELOG) corrective regimen sliding scale   SubCutaneous Before meals and at bedtime  isosorbide   mononitrate ER Tablet (IMDUR) 30 milliGRAM(s) Oral daily  latanoprost 0.005% Ophthalmic Solution 1 Drop(s) Both EYES at bedtime  metoprolol succinate ER 50 milliGRAM(s) Oral every 24 hours  pantoprazole  Injectable 40 milliGRAM(s) IV Push every 12 hours    MEDICATIONS  (PRN):  acetaminophen   Tablet .. 650 milliGRAM(s) Oral every 6 hours PRN Temp greater or equal to 38C (100.4F), Mild Pain (1 - 3)  ALBUTerol    90 MICROgram(s) HFA Inhaler 2 Puff(s) Inhalation every 6 hours PRN Shortness of Breath and/or Wheezing        Vital Signs Last 24 Hrs  T(C): 37.1 (01 Jun 2021 04:56), Max: 37.1 (01 Jun 2021 04:56)  T(F): 98.8 (01 Jun 2021 04:56), Max: 98.8 (01 Jun 2021 04:56)  HR: 80 (01 Jun 2021 04:56) (63 - 80)  BP: 104/53 (01 Jun 2021 04:56) (95/55 - 127/65)  BP(mean): --  RR: 18 (01 Jun 2021 04:56) (17 - 18)  SpO2: 99% (01 Jun 2021 04:56) (97% - 100%)        LABS:             7.5    4.95  )-----------( 302      ( 01 Jun 2021 07:29 )             23.4     134<L>  |  103  |  9   ----------------------------<  98  3.8   |  20<L>  |  1.20    Ca    7.8<L>      01 Jun 2021 07:29  Phos  3.7     06-01  Mg     1.6     06-01      Culture - Surgical Swab (collected 31 May 2021 17:44)  Source: .Surgical Swab facial wound  Gram Stain (31 May 2021 17:57):    No organisms seen    Rare WBC's  Preliminary Report (01 Jun 2021 09:57):    Rare Streptococcus species Identification to follow.    Culture in progress    Culture - Blood (collected 30 May 2021 13:53)  Source: .Blood Blood-Peripheral  Preliminary Report (31 May 2021 14:00):    No growth at 1 day.    Culture - Blood (collected 30 May 2021 13:53)  Source: .Blood Blood-Peripheral  Preliminary Report (31 May 2021 14:00):    No growth at 1 day.            PHYSICAL EXAM:    ENT EXAM-   Constitutional: Well-developed, well-nourished.  No hoarseness.     Head:  normocephalic, atraumatic.   Nose:  No external deformity  OC/OP: Mandible edentulous. No pus from kamran/sherie duct. Floor of mouth, buccal mucosa, lips, hard palate, soft palate, uvula, posterior pharyngeal wall normal.  Mucosa moist. R Buccal mucosa TTP.  Small region of raised tissue/fistula at anterior midline gingivosuccal bulcus with intermittent purulent discharge noted  Neck: +TTP in R submandibular region. No edema, fluctuance, or erythema  Lymph:  No cervical adenopathy.    MULTISYSTEM EXAM-  Neuro/Psych:  A&O x 3.    Cranial nerves: Decreased sensation R chin / mental nerve  Eyes:  EOMI, no nystagmus.  Pulm:  No dyspnea, non-labored breathing  Cardiovascular: Carotid pulses 2+ bilaterally.  No peripheral edema.  Skin:  No rash or lesions on exposed skin of head/neck        RADIOLOGY & ADDITIONAL STUDIES: reviewed

## 2021-06-01 NOTE — PROGRESS NOTE ADULT - PROBLEM SELECTOR PLAN 1
patient recently presented to ED with right sided submandibular pain/ swelling- found to have cellulitis and b/l sialoadenitis and sent home with Augmentin PO. Completed course of abx, though with return of R sided facial pain / swelling w numbness.  CT shows R buccal surface swelling which could represent cellulitis.   -ENT recs appreciated   -c/w clindamycin 600mg IV q8h (5/31-)

## 2021-06-01 NOTE — CONSULT NOTE ADULT - ASSESSMENT
80y Male PMHx  prostate ca on chemo, HTN, HLD, DM II, anemia of chronic disease/DOMITILA (requiring blood transfusions in the past), PE/DVT 1/2020, Afib in Eliquis, CAD (s/p IRASEMA x 1 OM 3/30/21, on asa, recently admitted for facial numbness w neg imaging/stroke team eval complicated by anemia requiring transfusion, recent ER visit for right lip and chin swelling, found to have cellulitis, completed course of augmentin who returns today for recurrent right chin swelling, redness and pain , again found to have cellulitis and admitted to medicine for IV antibiotics.  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    F/u final speciation and sensitivities of culture swab  Continue IV clindamycin  Continue warm compresses to R mandibular region  Encourage oral hydration  Good oral hygiene  Recommend peridex BID  Consider discussing risk/benefits of continuing denosumab with oncology  Please have patient follow up in clinic with OMFS attending Dr. Roman Marquez  Will continue to follow while patient is in house  Remainder of medical management per primary team  Please page ENT with any questions or concerns  Case discussed with attending Dr. Marquez

## 2021-06-01 NOTE — PROGRESS NOTE ADULT - PROBLEM SELECTOR PROBLEM 4
DATE OF SERVICE:  03/06/2017



INDICATIONS FOR EXAMINATION:   This patient is a 70-year-old male 

being evaluated for new onset seizure.  



AGE:   70Y





EEG FINDINGS: A routine 21-channel, awake digital EEG recording was 

accomplished utilizing the 10 to 20 international system with bipolar 

and referential montages. The background activity in the most alert 

resting state consists of a low to medium amplitude, fairly 

well-developed and well-sustained 6 to 7 Hz activity over the 

posterior head regions. This posterior rhythm attenuates to eye 

opening. There is a small amount of low amplitude 18 to 20 Hz beta 

activity seen maximally over the anterior head regions. Muscle and 

movement artifact was observed on a few occasions during the tracing.  



Hyperventilation was not performed. Photic stimulation at flash 

frequencies of 2 to 30 Hz produced a good symmetrical occipital 

driving response. No epileptiform discharges were seen.  



IMPRESSION: This EEG is mildly abnormal in diffuse fashion due to 

slight slowing of the EEG background. The EEG failed to reveal any 

focal, lateralized or epileptiform abnormalities. Clinical correlation 

is recommended. Atrial fibrillation

## 2021-06-02 DIAGNOSIS — I48.0 PAROXYSMAL ATRIAL FIBRILLATION: ICD-10-CM

## 2021-06-02 LAB
-  CEFTRIAXONE: SIGNIFICANT CHANGE UP
-  CLINDAMYCIN: SIGNIFICANT CHANGE UP
-  ERYTHROMYCIN: SIGNIFICANT CHANGE UP
-  LEVOFLOXACIN: SIGNIFICANT CHANGE UP
-  PENICILLIN: SIGNIFICANT CHANGE UP
-  VANCOMYCIN: SIGNIFICANT CHANGE UP
ANION GAP SERPL CALC-SCNC: 11 MMOL/L — SIGNIFICANT CHANGE UP (ref 5–17)
BUN SERPL-MCNC: 8 MG/DL — SIGNIFICANT CHANGE UP (ref 7–23)
CALCIUM SERPL-MCNC: 7.8 MG/DL — LOW (ref 8.4–10.5)
CHLORIDE SERPL-SCNC: 100 MMOL/L — SIGNIFICANT CHANGE UP (ref 96–108)
CO2 SERPL-SCNC: 21 MMOL/L — LOW (ref 22–31)
CREAT SERPL-MCNC: 1.13 MG/DL — SIGNIFICANT CHANGE UP (ref 0.5–1.3)
GLUCOSE BLDC GLUCOMTR-MCNC: 102 MG/DL — HIGH (ref 70–99)
GLUCOSE BLDC GLUCOMTR-MCNC: 103 MG/DL — HIGH (ref 70–99)
GLUCOSE BLDC GLUCOMTR-MCNC: 107 MG/DL — HIGH (ref 70–99)
GLUCOSE BLDC GLUCOMTR-MCNC: 129 MG/DL — HIGH (ref 70–99)
GLUCOSE SERPL-MCNC: 89 MG/DL — SIGNIFICANT CHANGE UP (ref 70–99)
HCT VFR BLD CALC: 27.4 % — LOW (ref 39–50)
HGB BLD-MCNC: 9 G/DL — LOW (ref 13–17)
MAGNESIUM SERPL-MCNC: 1.3 MG/DL — LOW (ref 1.6–2.6)
MCHC RBC-ENTMCNC: 28.5 PG — SIGNIFICANT CHANGE UP (ref 27–34)
MCHC RBC-ENTMCNC: 32.8 GM/DL — SIGNIFICANT CHANGE UP (ref 32–36)
MCV RBC AUTO: 86.7 FL — SIGNIFICANT CHANGE UP (ref 80–100)
METHOD TYPE: SIGNIFICANT CHANGE UP
METHOD TYPE: SIGNIFICANT CHANGE UP
NRBC # BLD: 0 /100 WBCS — SIGNIFICANT CHANGE UP (ref 0–0)
PHOSPHATE SERPL-MCNC: 3 MG/DL — SIGNIFICANT CHANGE UP (ref 2.5–4.5)
PLATELET # BLD AUTO: 310 K/UL — SIGNIFICANT CHANGE UP (ref 150–400)
POTASSIUM SERPL-MCNC: 3.4 MMOL/L — LOW (ref 3.5–5.3)
POTASSIUM SERPL-SCNC: 3.4 MMOL/L — LOW (ref 3.5–5.3)
RBC # BLD: 3.16 M/UL — LOW (ref 4.2–5.8)
RBC # FLD: 16.5 % — HIGH (ref 10.3–14.5)
SODIUM SERPL-SCNC: 132 MMOL/L — LOW (ref 135–145)
WBC # BLD: 5.17 K/UL — SIGNIFICANT CHANGE UP (ref 3.8–10.5)
WBC # FLD AUTO: 5.17 K/UL — SIGNIFICANT CHANGE UP (ref 3.8–10.5)

## 2021-06-02 PROCEDURE — 99222 1ST HOSP IP/OBS MODERATE 55: CPT

## 2021-06-02 PROCEDURE — 99232 SBSQ HOSP IP/OBS MODERATE 35: CPT | Mod: GC

## 2021-06-02 RX ORDER — PENICILLIN G POTASSIUM 5000000 [IU]/1
4 POWDER, FOR SOLUTION INTRAMUSCULAR; INTRAPLEURAL; INTRATHECAL; INTRAVENOUS EVERY 4 HOURS
Refills: 0 | Status: DISCONTINUED | OUTPATIENT
Start: 2021-06-02 | End: 2021-06-02

## 2021-06-02 RX ORDER — POTASSIUM CHLORIDE 20 MEQ
40 PACKET (EA) ORAL ONCE
Refills: 0 | Status: COMPLETED | OUTPATIENT
Start: 2021-06-02 | End: 2021-06-02

## 2021-06-02 RX ORDER — SOD SULF/SODIUM/NAHCO3/KCL/PEG
2000 SOLUTION, RECONSTITUTED, ORAL ORAL ONCE
Refills: 0 | Status: COMPLETED | OUTPATIENT
Start: 2021-06-02 | End: 2021-06-02

## 2021-06-02 RX ORDER — SOD SULF/SODIUM/NAHCO3/KCL/PEG
2000 SOLUTION, RECONSTITUTED, ORAL ORAL ONCE
Refills: 0 | Status: COMPLETED | OUTPATIENT
Start: 2021-06-03 | End: 2021-06-03

## 2021-06-02 RX ORDER — MAGNESIUM SULFATE 500 MG/ML
4 VIAL (ML) INJECTION ONCE
Refills: 0 | Status: COMPLETED | OUTPATIENT
Start: 2021-06-02 | End: 2021-06-02

## 2021-06-02 RX ADMIN — BRIMONIDINE TARTRATE 1 DROP(S): 2 SOLUTION/ DROPS OPHTHALMIC at 06:13

## 2021-06-02 RX ADMIN — Medication 40 MILLIEQUIVALENT(S): at 09:44

## 2021-06-02 RX ADMIN — ISOSORBIDE MONONITRATE 30 MILLIGRAM(S): 60 TABLET, EXTENDED RELEASE ORAL at 13:09

## 2021-06-02 RX ADMIN — Medication 100 MILLIGRAM(S): at 06:13

## 2021-06-02 RX ADMIN — FINASTERIDE 5 MILLIGRAM(S): 5 TABLET, FILM COATED ORAL at 21:40

## 2021-06-02 RX ADMIN — PANTOPRAZOLE SODIUM 40 MILLIGRAM(S): 20 TABLET, DELAYED RELEASE ORAL at 17:41

## 2021-06-02 RX ADMIN — CHLORHEXIDINE GLUCONATE 15 MILLILITER(S): 213 SOLUTION TOPICAL at 17:41

## 2021-06-02 RX ADMIN — Medication 100 GRAM(S): at 09:44

## 2021-06-02 RX ADMIN — DORZOLAMIDE HYDROCHLORIDE 1 DROP(S): 20 SOLUTION/ DROPS OPHTHALMIC at 21:40

## 2021-06-02 RX ADMIN — ATORVASTATIN CALCIUM 40 MILLIGRAM(S): 80 TABLET, FILM COATED ORAL at 21:40

## 2021-06-02 RX ADMIN — DORZOLAMIDE HYDROCHLORIDE 1 DROP(S): 20 SOLUTION/ DROPS OPHTHALMIC at 13:11

## 2021-06-02 RX ADMIN — Medication 81 MILLIGRAM(S): at 13:09

## 2021-06-02 RX ADMIN — DORZOLAMIDE HYDROCHLORIDE 1 DROP(S): 20 SOLUTION/ DROPS OPHTHALMIC at 06:14

## 2021-06-02 RX ADMIN — LATANOPROST 1 DROP(S): 0.05 SOLUTION/ DROPS OPHTHALMIC; TOPICAL at 21:40

## 2021-06-02 RX ADMIN — BRIMONIDINE TARTRATE 1 DROP(S): 2 SOLUTION/ DROPS OPHTHALMIC at 17:43

## 2021-06-02 RX ADMIN — Medication 2000 MILLILITER(S): at 17:41

## 2021-06-02 RX ADMIN — Medication 50 MILLIGRAM(S): at 17:42

## 2021-06-02 RX ADMIN — POLYETHYLENE GLYCOL 3350 17 GRAM(S): 17 POWDER, FOR SOLUTION ORAL at 17:41

## 2021-06-02 RX ADMIN — CHLORHEXIDINE GLUCONATE 15 MILLILITER(S): 213 SOLUTION TOPICAL at 06:13

## 2021-06-02 RX ADMIN — PANTOPRAZOLE SODIUM 40 MILLIGRAM(S): 20 TABLET, DELAYED RELEASE ORAL at 06:14

## 2021-06-02 RX ADMIN — Medication 100 MILLIGRAM(S): at 21:40

## 2021-06-02 RX ADMIN — Medication 25 MILLIGRAM(S): at 06:14

## 2021-06-02 RX ADMIN — POLYETHYLENE GLYCOL 3350 17 GRAM(S): 17 POWDER, FOR SOLUTION ORAL at 06:15

## 2021-06-02 RX ADMIN — Medication 100 MILLIGRAM(S): at 13:10

## 2021-06-02 RX ADMIN — PREGABALIN 1000 MICROGRAM(S): 225 CAPSULE ORAL at 13:09

## 2021-06-02 NOTE — PROGRESS NOTE ADULT - PROBLEM SELECTOR PLAN 2
hx of DOMITILA/ AOCD, on iron PO BID. Hb usually between 8-9 at baseline, EGD in 2020 revealing esophagitis, gastritis, and duodenitis with clean based duodenal ulcer. No active signs/symptoms of bleed.  Hgb stable at ~9, s/p 2u prbc   - active type and screen (5/30)  -transfuse for Hgb <8  - hold Eliquis in setting of possible GI bleed, resume once cleared for AC   -plan for EGD/C scope on Thursday 6/3: golytely prep, NPO after midnight    #Esophagitis, gastritis, duodenitis  - Plan as above

## 2021-06-02 NOTE — PROGRESS NOTE ADULT - SUBJECTIVE AND OBJECTIVE BOX
OVERNIGHT EVENTS: bowel regimen ordered    SUBJECTIVE:  Patient seen and examined at bedside. Improving swelling and pain in R mandibular region. Numbness in R mandible, unchanged. Otherwise no acute complaints, no fever/chills or worsening tenderness of R mandible. 12 pt ROS otherwise negative.     Vital Signs Last 12 Hrs  T(F): 98.3 (05-31-21 @ 06:10), Max: 98.4 (05-31-21 @ 01:33)  HR: 60 (05-31-21 @ 06:10) (60 - 70)  BP: 112/57 (05-31-21 @ 06:10) (112/57 - 115/70)  BP(mean): --  RR: 17 (05-31-21 @ 06:10) (17 - 18)  SpO2: 97% (05-31-21 @ 06:10) (97% - 100%)  I&O's Summary      PHYSICAL EXAM:  Constitutional: NAD, comfortable in bed.  HEENT: NC/AT, PERRLA, EOMI, poor dentition, R sided submandibular swelling, improved, and mild tenderness with no fluctuance; no conjunctival pallor or scleral icterus, MMM  Neck: Supple, no JVD  Respiratory: CTA B/L. No w/r/r.   Cardiovascular: RRR, normal S1 and S2, no m/r/g.   Gastrointestinal: +BS, soft NTND, no guarding or rebound tenderness, no palpable masses   Extremities: wwp; no cyanosis, clubbing or edema.   Vascular: Pulses equal and strong throughout.   Neurological: AAOx3, no CN deficits, strength deficits throughout. Decreased sensation in V3 distribution   Skin: No gross skin abnormalities or rashes        LABS:                        7.2    6.56  )-----------( 310      ( 30 May 2021 12:41 )             22.8     05-31    137  |  108  |  11  ----------------------------<  88  4.1   |  20<L>  |  1.10    Ca    8.4      31 May 2021 06:37  Mg     1.7     05-31    TPro  6.1  /  Alb  3.3  /  TBili  0.4  /  DBili  x   /  AST  24  /  ALT  15  /  AlkPhos  42  05-30    PT/INR - ( 30 May 2021 12:41 )   PT: 18.4 sec;   INR: 1.57          PTT - ( 30 May 2021 12:41 )  PTT:42.9 sec        RADIOLOGY & ADDITIONAL TESTS:    MEDICATIONS  (STANDING):  aspirin enteric coated 81 milliGRAM(s) Oral daily  atorvastatin 40 milliGRAM(s) Oral at bedtime  brimonidine 0.2% Ophthalmic Solution 1 Drop(s) Both EYES two times a day  clindamycin IVPB 600 milliGRAM(s) IV Intermittent every 8 hours  cyanocobalamin 1000 MICROGram(s) Oral daily  dextrose 40% Gel 15 Gram(s) Oral once  dextrose 5%. 1000 milliLiter(s) (50 mL/Hr) IV Continuous <Continuous>  dextrose 5%. 1000 milliLiter(s) (100 mL/Hr) IV Continuous <Continuous>  dextrose 50% Injectable 25 Gram(s) IV Push once  dextrose 50% Injectable 12.5 Gram(s) IV Push once  dextrose 50% Injectable 25 Gram(s) IV Push once  dorzolamide 2% Ophthalmic Solution 1 Drop(s) Both EYES three times a day  finasteride 5 milliGRAM(s) Oral at bedtime  glucagon  Injectable 1 milliGRAM(s) IntraMuscular once  hydrochlorothiazide 25 milliGRAM(s) Oral daily  insulin lispro (ADMELOG) corrective regimen sliding scale   SubCutaneous Before meals and at bedtime  isosorbide   mononitrate ER Tablet (IMDUR) 30 milliGRAM(s) Oral daily  latanoprost 0.005% Ophthalmic Solution 1 Drop(s) Both EYES at bedtime  metoprolol succinate ER 50 milliGRAM(s) Oral every 24 hours  pantoprazole  Injectable 40 milliGRAM(s) IV Push every 12 hours  polyethylene glycol/electrolyte Solution. 4000 milliLiter(s) Oral once    MEDICATIONS  (PRN):  acetaminophen   Tablet .. 650 milliGRAM(s) Oral every 6 hours PRN Temp greater or equal to 38C (100.4F), Mild Pain (1 - 3)  ALBUTerol    90 MICROgram(s) HFA Inhaler 2 Puff(s) Inhalation every 6 hours PRN Shortness of Breath and/or Wheezing

## 2021-06-02 NOTE — PROGRESS NOTE ADULT - PROBLEM SELECTOR PLAN 10
F: none   E: replete as needed  N: clear liquid, NPO after midnight   DVT ppx: SCDs, holding AC   GI pps: protonix   Dispo: [facility]

## 2021-06-02 NOTE — PROGRESS NOTE ADULT - ASSESSMENT
81 y/o M with PMHx of prostate ca on chemo, HTN, HLD, DM II, anemia of chronic disease/DOMITILA (last tx 2 yrs ago, Hgb 8-9 on prior admissions [id number]), PE/DVT 1/2020, Afib in Eliquis 2.5 mg BID, CAD (s/p IRASEMA x 1 OM 3/30/21 on asa and Eliquis (s/p 1 week plavix 2/2 risk of GI bleed), esophagitis/gastritis/ duodenitis who presents for worsening pain/swelling and numbness of right lower jaw. 2/2 cellulitis and b/l sialoadenitis. Pt has dark stools but he is on oral iron. He was admitted for coffee ground emesis back in 01/2020 and EGD at that time showed gastritis, esophagitis, and duodenitis and gastric ulcer. He was placed on PPI. Pt was seen by Dr Leigh in the clinic for DOMITILA and positive FOBT for which a bidirectional endoscopy was planned. However since pt is hospitalized GI is consulted for inpatient scopes.    # DOMITILA  no evidence of active GIB  Hgb stable  - hold AC if able for 48-72 hrs  - plan for EGD and colonoscopy for tomorrow  - Please order split-dose prep with 4L GoLytely: 2L GoLytely given at 17:00 today and an additional 2L at 05:00 tomorrow  - Clear liquid diet for now  - NPO past MN except medications with sips of water (and remainining 2L GoLytely at 05:00 am as mentioned above)      Recommendations discussed with primary team  Plan discussed with GI service attending    Phi Carter MD  PGY-4 GI fellow  Pager: [phone]

## 2021-06-02 NOTE — PROGRESS NOTE ADULT - SUBJECTIVE AND OBJECTIVE BOX
GASTROENTEROLOGY PROGRESS NOTE  Patient seen and examined at bedside.    PERTINENT REVIEW OF SYSTEMS:  As noted above    Allergies    ACE inhibitors (Angioedema)    Intolerances      MEDICATIONS:  MEDICATIONS  (STANDING):  aspirin enteric coated 81 milliGRAM(s) Oral daily  atorvastatin 40 milliGRAM(s) Oral at bedtime  brimonidine 0.2% Ophthalmic Solution 1 Drop(s) Both EYES two times a day  chlorhexidine 0.12% Liquid 15 milliLiter(s) Oral Mucosa two times a day  clindamycin IVPB 600 milliGRAM(s) IV Intermittent every 8 hours  cyanocobalamin 1000 MICROGram(s) Oral daily  dextrose 40% Gel 15 Gram(s) Oral once  dextrose 5%. 1000 milliLiter(s) (50 mL/Hr) IV Continuous <Continuous>  dextrose 5%. 1000 milliLiter(s) (100 mL/Hr) IV Continuous <Continuous>  dextrose 50% Injectable 25 Gram(s) IV Push once  dextrose 50% Injectable 12.5 Gram(s) IV Push once  dextrose 50% Injectable 25 Gram(s) IV Push once  dorzolamide 2% Ophthalmic Solution 1 Drop(s) Both EYES three times a day  finasteride 5 milliGRAM(s) Oral at bedtime  glucagon  Injectable 1 milliGRAM(s) IntraMuscular once  hydrochlorothiazide 25 milliGRAM(s) Oral daily  insulin lispro (ADMELOG) corrective regimen sliding scale   SubCutaneous Before meals and at bedtime  isosorbide   mononitrate ER Tablet (IMDUR) 30 milliGRAM(s) Oral daily  latanoprost 0.005% Ophthalmic Solution 1 Drop(s) Both EYES at bedtime  metoprolol succinate ER 50 milliGRAM(s) Oral every 24 hours  pantoprazole  Injectable 40 milliGRAM(s) IV Push every 12 hours  polyethylene glycol 3350 17 Gram(s) Oral two times a day  senna 2 Tablet(s) Oral at bedtime    MEDICATIONS  (PRN):  acetaminophen   Tablet .. 650 milliGRAM(s) Oral every 6 hours PRN Temp greater or equal to 38C (100.4F), Mild Pain (1 - 3)  ALBUTerol    90 MICROgram(s) HFA Inhaler 2 Puff(s) Inhalation every 6 hours PRN Shortness of Breath and/or Wheezing    Vital Signs Last 24 Hrs  T(C): 37.1 (02 Jun 2021 05:54), Max: 37.2 (01 Jun 2021 18:42)  T(F): 98.8 (02 Jun 2021 05:54), Max: 99 (01 Jun 2021 18:42)  HR: 69 (02 Jun 2021 05:54) (67 - 72)  BP: 111/63 (02 Jun 2021 05:54) (102/61 - 127/68)  BP(mean): --  RR: 19 (02 Jun 2021 05:54) (18 - 19)  SpO2: 98% (02 Jun 2021 05:54) (96% - 99%)    06-01 @ 07:01  -  06-02 @ 07:00  --------------------------------------------------------  IN: 50 mL / OUT: 200 mL / NET: -150 mL      PHYSICAL EXAM:    General: Well developed; in no acute distress  HEENT: MMM, conjunctiva and sclera clear  Gastrointestinal: Soft non-tender non-distended; No rebound or guarding  Skin: Warm and dry. No obvious rash    LABS:                        7.5    4.95  )-----------( 302      ( 01 Jun 2021 07:29 )             23.4     06-01    134<L>  |  103  |  9   ----------------------------<  98  3.8   |  20<L>  |  1.20    Ca    7.8<L>      01 Jun 2021 07:29  Phos  3.7     06-01  Mg     1.6     06-01                        Culture - Surgical Swab (collected 31 May 2021 17:44)  Source: .Surgical Swab facial wound  Gram Stain (31 May 2021 17:57):    No organisms seen    Rare WBC's  Preliminary Report (01 Jun 2021 09:57):    Rare Streptococcus species Identification to follow.    Culture in progress    Culture - Blood (collected 30 May 2021 13:53)  Source: .Blood Blood-Peripheral  Preliminary Report (01 Jun 2021 14:00):    No growth at 2 days.    Culture - Blood (collected 30 May 2021 13:53)  Source: .Blood Blood-Peripheral  Preliminary Report (01 Jun 2021 14:00):    No growth at 2 days.      RADIOLOGY & ADDITIONAL STUDIES:  Reviewed

## 2021-06-02 NOTE — PROGRESS NOTE ADULT - TIME BILLING
Patient seen and examined; Facial swelling improved;  Continue antibiotics;  Follow sodium ;   Upper and lower endoscopy tomorrow

## 2021-06-02 NOTE — PROGRESS NOTE ADULT - ASSESSMENT
81 y/o M with PMHx of prostate ca on chemo, HTN, HLD, DM II, anemia of chronic disease/DOMITILA (last tx 2 yrs ago, Hgb 8-9 on prior admissions [id number]), PE/DVT 1/2020, Afib in Eliquis 2.5 mg BID, CAD (s/p IRASEMA x 1 OM 3/30/21 on asa and Eliquis (s/p 1 week plavix 2/2 risk of GI bleed), esophagitis/gastritis/ duodenitis who presents for worsening pain/swelling and numbness of right lower jaw. Admitted for cellulitis and b/l sialoadenitis after failed PO Augmentin, also with dropping hemoglobin concerning for GI bleed pending EGD/Cscope.

## 2021-06-02 NOTE — PROGRESS NOTE ADULT - PROBLEM SELECTOR PLAN 1
patient recently presented to ED with right sided submandibular pain/ swelling- found to have cellulitis and b/l sialoadenitis and sent home with Augmentin PO. Completed course of abx, though with return of R sided facial pain / swelling w numbness.  CT shows R buccal surface swelling which could represent cellulitis.   -ENT recs appreciated   -c/w clindamycin 600mg IV q8h (5/31-) patient recently presented to ED with right sided submandibular pain/ swelling- found to have cellulitis and b/l sialoadenitis and sent home with Augmentin PO. Completed course of abx, though with return of R sided facial pain / swelling w numbness.  CT shows R buccal surface swelling which could represent cellulitis. Surgical culture taken of R mandible shows strep mitis growth, resistant to clinda and sensitive to penicillin.  -ENT recs appreciated   -stop clindamycin, start penicillin G q4h (6/2-) patient recently presented to ED with right sided submandibular pain/ swelling- found to have cellulitis and b/l sialoadenitis and sent home with Augmentin PO. Completed course of abx, though with return of R sided facial pain / swelling w numbness.  CT shows R buccal surface swelling which could represent cellulitis. Surgical culture taken of R mandible shows strep mitis growth, resistant to clinda and sensitive to penicillin - however unclear if this is the pathogen causing cellulitis.  -ENT recs appreciated   -c/w IV clindamycin 600 mg IV q8h (5/31-)

## 2021-06-02 NOTE — PROGRESS NOTE ADULT - SUBJECTIVE AND OBJECTIVE BOX
INTERVAL HPI/OVERNIGHT EVENTS:  Mouth appears better; Got another unit of blood yesterday; For upper and lower endoscopy tomorrow      MEDICATIONS  (STANDING):  aspirin enteric coated 81 milliGRAM(s) Oral daily  atorvastatin 40 milliGRAM(s) Oral at bedtime  brimonidine 0.2% Ophthalmic Solution 1 Drop(s) Both EYES two times a day  chlorhexidine 0.12% Liquid 15 milliLiter(s) Oral Mucosa two times a day  cyanocobalamin 1000 MICROGram(s) Oral daily  dextrose 40% Gel 15 Gram(s) Oral once  dextrose 5%. 1000 milliLiter(s) (50 mL/Hr) IV Continuous <Continuous>  dextrose 5%. 1000 milliLiter(s) (100 mL/Hr) IV Continuous <Continuous>  dextrose 50% Injectable 25 Gram(s) IV Push once  dextrose 50% Injectable 12.5 Gram(s) IV Push once  dextrose 50% Injectable 25 Gram(s) IV Push once  dorzolamide 2% Ophthalmic Solution 1 Drop(s) Both EYES three times a day  finasteride 5 milliGRAM(s) Oral at bedtime  glucagon  Injectable 1 milliGRAM(s) IntraMuscular once  hydrochlorothiazide 25 milliGRAM(s) Oral daily  insulin lispro (ADMELOG) corrective regimen sliding scale   SubCutaneous Before meals and at bedtime  isosorbide   mononitrate ER Tablet (IMDUR) 30 milliGRAM(s) Oral daily  latanoprost 0.005% Ophthalmic Solution 1 Drop(s) Both EYES at bedtime  metoprolol succinate ER 50 milliGRAM(s) Oral every 24 hours  pantoprazole  Injectable 40 milliGRAM(s) IV Push every 12 hours  penicillin   G  potassium  IVPB 4 Million Unit(s) IV Intermittent every 4 hours  polyethylene glycol 3350 17 Gram(s) Oral two times a day  polyethylene glycol/electrolyte Solution. 2000 milliLiter(s) Oral once  senna 2 Tablet(s) Oral at bedtime    MEDICATIONS  (PRN):  acetaminophen   Tablet .. 650 milliGRAM(s) Oral every 6 hours PRN Temp greater or equal to 38C (100.4F), Mild Pain (1 - 3)  ALBUTerol    90 MICROgram(s) HFA Inhaler 2 Puff(s) Inhalation every 6 hours PRN Shortness of Breath and/or Wheezing      Allergies    ACE inhibitors (Angioedema)    Intolerances        Vital Signs Last 24 Hrs  T(C): 37.1 (02 Jun 2021 05:54), Max: 37.2 (01 Jun 2021 18:42)  T(F): 98.8 (02 Jun 2021 05:54), Max: 99 (01 Jun 2021 18:42)  HR: 69 (02 Jun 2021 05:54) (67 - 72)  BP: 111/63 (02 Jun 2021 05:54) (102/61 - 127/68)  BP(mean): --  RR: 19 (02 Jun 2021 05:54) (18 - 19)  SpO2: 98% (02 Jun 2021 05:54) (96% - 99%)          Constitutional: Awake    Eyes: SELENE    ENMT: Negative    Neck: Supple    Back:  no tenderness     Respiratory:  clear    Cardiovascular: S1 S2    Gastrointestinal: soft    Genitourinary:    Extremities: no edema    Vascular:    Neurological:    Skin:    Lymph Nodes:            06-01 @ 07:01  -  06-02 @ 07:00  --------------------------------------------------------  IN: 50 mL / OUT: 200 mL / NET: -150 mL      LABS:                        9.0    5.17  )-----------( 310      ( 02 Jun 2021 07:54 )             27.4     06-02    132<L>  |  100  |  8   ----------------------------<  89  3.4<L>   |  21<L>  |  1.13    Ca    7.8<L>      02 Jun 2021 07:49  Phos  3.0     06-02  Mg     1.3     06-02            RADIOLOGY & ADDITIONAL TESTS:

## 2021-06-03 ENCOUNTER — RESULT REVIEW (OUTPATIENT)
Age: 81
End: 2021-06-03

## 2021-06-03 LAB
-  CLINDAMYCIN: SIGNIFICANT CHANGE UP
-  ERYTHROMYCIN: SIGNIFICANT CHANGE UP
-  PENICILLIN: SIGNIFICANT CHANGE UP
-  VANCOMYCIN: SIGNIFICANT CHANGE UP
ANION GAP SERPL CALC-SCNC: 16 MMOL/L — SIGNIFICANT CHANGE UP (ref 5–17)
BUN SERPL-MCNC: 6 MG/DL — LOW (ref 7–23)
CALCIUM SERPL-MCNC: 7.9 MG/DL — LOW (ref 8.4–10.5)
CHLORIDE SERPL-SCNC: 98 MMOL/L — SIGNIFICANT CHANGE UP (ref 96–108)
CO2 SERPL-SCNC: 19 MMOL/L — LOW (ref 22–31)
CREAT SERPL-MCNC: 1.09 MG/DL — SIGNIFICANT CHANGE UP (ref 0.5–1.3)
CULTURE RESULTS: SIGNIFICANT CHANGE UP
GLUCOSE BLDC GLUCOMTR-MCNC: 108 MG/DL — HIGH (ref 70–99)
GLUCOSE BLDC GLUCOMTR-MCNC: 126 MG/DL — HIGH (ref 70–99)
GLUCOSE BLDC GLUCOMTR-MCNC: 93 MG/DL — SIGNIFICANT CHANGE UP (ref 70–99)
GLUCOSE SERPL-MCNC: 99 MG/DL — SIGNIFICANT CHANGE UP (ref 70–99)
HCT VFR BLD CALC: 31.4 % — LOW (ref 39–50)
HGB BLD-MCNC: 10.3 G/DL — LOW (ref 13–17)
MAGNESIUM SERPL-MCNC: 1.3 MG/DL — LOW (ref 1.6–2.6)
MCHC RBC-ENTMCNC: 28.6 PG — SIGNIFICANT CHANGE UP (ref 27–34)
MCHC RBC-ENTMCNC: 32.8 GM/DL — SIGNIFICANT CHANGE UP (ref 32–36)
MCV RBC AUTO: 87.2 FL — SIGNIFICANT CHANGE UP (ref 80–100)
METHOD TYPE: SIGNIFICANT CHANGE UP
METHOD TYPE: SIGNIFICANT CHANGE UP
NRBC # BLD: 0 /100 WBCS — SIGNIFICANT CHANGE UP (ref 0–0)
ORGANISM # SPEC MICROSCOPIC CNT: SIGNIFICANT CHANGE UP
PHOSPHATE SERPL-MCNC: 3 MG/DL — SIGNIFICANT CHANGE UP (ref 2.5–4.5)
PLATELET # BLD AUTO: 336 K/UL — SIGNIFICANT CHANGE UP (ref 150–400)
POTASSIUM SERPL-MCNC: 3.6 MMOL/L — SIGNIFICANT CHANGE UP (ref 3.5–5.3)
POTASSIUM SERPL-SCNC: 3.6 MMOL/L — SIGNIFICANT CHANGE UP (ref 3.5–5.3)
RBC # BLD: 3.6 M/UL — LOW (ref 4.2–5.8)
RBC # FLD: 16.8 % — HIGH (ref 10.3–14.5)
SODIUM SERPL-SCNC: 133 MMOL/L — LOW (ref 135–145)
SPECIMEN SOURCE: SIGNIFICANT CHANGE UP
WBC # BLD: 6.49 K/UL — SIGNIFICANT CHANGE UP (ref 3.8–10.5)
WBC # FLD AUTO: 6.49 K/UL — SIGNIFICANT CHANGE UP (ref 3.8–10.5)

## 2021-06-03 PROCEDURE — 45378 DIAGNOSTIC COLONOSCOPY: CPT

## 2021-06-03 PROCEDURE — 99232 SBSQ HOSP IP/OBS MODERATE 35: CPT | Mod: GC

## 2021-06-03 PROCEDURE — 43239 EGD BIOPSY SINGLE/MULTIPLE: CPT

## 2021-06-03 PROCEDURE — 88305 TISSUE EXAM BY PATHOLOGIST: CPT | Mod: 26

## 2021-06-03 RX ORDER — APIXABAN 2.5 MG/1
2.5 TABLET, FILM COATED ORAL EVERY 12 HOURS
Refills: 0 | Status: DISCONTINUED | OUTPATIENT
Start: 2021-06-03 | End: 2021-06-07

## 2021-06-03 RX ADMIN — FINASTERIDE 5 MILLIGRAM(S): 5 TABLET, FILM COATED ORAL at 21:21

## 2021-06-03 RX ADMIN — Medication 100 MILLIGRAM(S): at 13:16

## 2021-06-03 RX ADMIN — DORZOLAMIDE HYDROCHLORIDE 1 DROP(S): 20 SOLUTION/ DROPS OPHTHALMIC at 21:21

## 2021-06-03 RX ADMIN — BRIMONIDINE TARTRATE 1 DROP(S): 2 SOLUTION/ DROPS OPHTHALMIC at 05:28

## 2021-06-03 RX ADMIN — PANTOPRAZOLE SODIUM 40 MILLIGRAM(S): 20 TABLET, DELAYED RELEASE ORAL at 18:05

## 2021-06-03 RX ADMIN — Medication 100 MILLIGRAM(S): at 05:27

## 2021-06-03 RX ADMIN — APIXABAN 2.5 MILLIGRAM(S): 2.5 TABLET, FILM COATED ORAL at 21:21

## 2021-06-03 RX ADMIN — Medication 100 MILLIGRAM(S): at 21:21

## 2021-06-03 RX ADMIN — DORZOLAMIDE HYDROCHLORIDE 1 DROP(S): 20 SOLUTION/ DROPS OPHTHALMIC at 13:15

## 2021-06-03 RX ADMIN — DORZOLAMIDE HYDROCHLORIDE 1 DROP(S): 20 SOLUTION/ DROPS OPHTHALMIC at 05:28

## 2021-06-03 RX ADMIN — PREGABALIN 1000 MICROGRAM(S): 225 CAPSULE ORAL at 13:16

## 2021-06-03 RX ADMIN — ISOSORBIDE MONONITRATE 30 MILLIGRAM(S): 60 TABLET, EXTENDED RELEASE ORAL at 13:16

## 2021-06-03 RX ADMIN — Medication 81 MILLIGRAM(S): at 13:15

## 2021-06-03 RX ADMIN — BRIMONIDINE TARTRATE 1 DROP(S): 2 SOLUTION/ DROPS OPHTHALMIC at 18:04

## 2021-06-03 RX ADMIN — Medication 25 MILLIGRAM(S): at 05:27

## 2021-06-03 RX ADMIN — CHLORHEXIDINE GLUCONATE 15 MILLILITER(S): 213 SOLUTION TOPICAL at 05:27

## 2021-06-03 RX ADMIN — PANTOPRAZOLE SODIUM 40 MILLIGRAM(S): 20 TABLET, DELAYED RELEASE ORAL at 05:27

## 2021-06-03 RX ADMIN — Medication 2000 MILLILITER(S): at 05:30

## 2021-06-03 RX ADMIN — Medication 50 MILLIGRAM(S): at 18:05

## 2021-06-03 RX ADMIN — CHLORHEXIDINE GLUCONATE 15 MILLILITER(S): 213 SOLUTION TOPICAL at 18:05

## 2021-06-03 RX ADMIN — ATORVASTATIN CALCIUM 40 MILLIGRAM(S): 80 TABLET, FILM COATED ORAL at 21:21

## 2021-06-03 RX ADMIN — LATANOPROST 1 DROP(S): 0.05 SOLUTION/ DROPS OPHTHALMIC; TOPICAL at 21:21

## 2021-06-03 NOTE — DIETITIAN INITIAL EVALUATION ADULT. - MALNUTRITION
Moderate protein calorie malnutrition Moderate PCM, pt with 5% unintentional wt loss in 1 mo, meeting <50% EER in >1 month

## 2021-06-03 NOTE — PHYSICAL THERAPY INITIAL EVALUATION ADULT - PERTINENT HX OF CURRENT PROBLEM, REHAB EVAL
81 y/o M ......... who presents for worsening pain/swelling and numbness of right lower jaw. Patient had recent visit to ER for right jaw and lip swelling/pain on 5/13 and found to have cellulitis and b/l sialoadenitis. Please refer to H&P on [place] for remaining.

## 2021-06-03 NOTE — CHART NOTE - NSCHARTNOTEFT_GEN_A_CORE
EGD/colonoscopy with Dr Brooks:    EGD:    Ring in the gastroesophageal junction. (Additional Intervention).      Hiatal Hernia in the gastroesophageal junction.      Polyp in the fundus and cardia. (Biopsy).      Random gastric biopsies were taken to r/p H. Pylori.      Polyps in the second part of the duodenum and distal bulb. (Biopsy).      Random biopsies were obtained to r/o celiac disease.     Colonoscopy:  Difficult cecal intubation due to tight turn and likely adhesions requiring multiple repositioning and external pressure. Mild sigmoid diverticulosis. Otherwise normal colonoscopy.       Plan:   Await pathology results  Resume Previous Diet

## 2021-06-03 NOTE — PROGRESS NOTE ADULT - PROBLEM SELECTOR PLAN 10
F: none   E: replete as needed  N: clear liquid, NPO after midnight   DVT ppx: SCDs, holding AC   GI pps: protonix   Dispo: [facility]

## 2021-06-03 NOTE — PROGRESS NOTE ADULT - SUBJECTIVE AND OBJECTIVE BOX
OVERNIGHT EVENTS: MICKEY    SUBJECTIVE:  Patient seen and examined at bedside. Improving swelling and pain in R mandibular region. Numbness in R mandible, unchanged. Otherwise no acute complaints, no fever/chills or worsening tenderness of R mandible. 12 pt ROS otherwise negative.     Vital Signs Last 12 Hrs  T(F): 98.3 (05-31-21 @ 06:10), Max: 98.4 (05-31-21 @ 01:33)  HR: 60 (05-31-21 @ 06:10) (60 - 70)  BP: 112/57 (05-31-21 @ 06:10) (112/57 - 115/70)  BP(mean): --  RR: 17 (05-31-21 @ 06:10) (17 - 18)  SpO2: 97% (05-31-21 @ 06:10) (97% - 100%)  I&O's Summary      PHYSICAL EXAM:  Constitutional: NAD, comfortable in bed.  HEENT: NC/AT, PERRLA, EOMI, poor dentition, improved R sided submandibular swelling and mild tenderness with no fluctuance; no conjunctival pallor or scleral icterus, MMM  Neck: Supple, no JVD  Respiratory: CTA B/L. No w/r/r.   Cardiovascular: RRR, normal S1 and S2, no m/r/g.   Gastrointestinal: +BS, soft NTND, no guarding or rebound tenderness, no palpable masses   Extremities: wwp; no cyanosis, clubbing or edema.   Vascular: Pulses equal and strong throughout.   Neurological: AAOx3, no CN deficits, strength deficits throughout. Decreased sensation in V3 distribution on R side   Skin: No gross skin abnormalities or rashes        LABS:                        7.2    6.56  )-----------( 310      ( 30 May 2021 12:41 )             22.8     05-31    137  |  108  |  11  ----------------------------<  88  4.1   |  20<L>  |  1.10    Ca    8.4      31 May 2021 06:37  Mg     1.7     05-31    TPro  6.1  /  Alb  3.3  /  TBili  0.4  /  DBili  x   /  AST  24  /  ALT  15  /  AlkPhos  42  05-30    PT/INR - ( 30 May 2021 12:41 )   PT: 18.4 sec;   INR: 1.57          PTT - ( 30 May 2021 12:41 )  PTT:42.9 sec        RADIOLOGY & ADDITIONAL TESTS:    MEDICATIONS  (STANDING):  aspirin enteric coated 81 milliGRAM(s) Oral daily  atorvastatin 40 milliGRAM(s) Oral at bedtime  brimonidine 0.2% Ophthalmic Solution 1 Drop(s) Both EYES two times a day  clindamycin IVPB 600 milliGRAM(s) IV Intermittent every 8 hours  cyanocobalamin 1000 MICROGram(s) Oral daily  dextrose 40% Gel 15 Gram(s) Oral once  dextrose 5%. 1000 milliLiter(s) (50 mL/Hr) IV Continuous <Continuous>  dextrose 5%. 1000 milliLiter(s) (100 mL/Hr) IV Continuous <Continuous>  dextrose 50% Injectable 25 Gram(s) IV Push once  dextrose 50% Injectable 12.5 Gram(s) IV Push once  dextrose 50% Injectable 25 Gram(s) IV Push once  dorzolamide 2% Ophthalmic Solution 1 Drop(s) Both EYES three times a day  finasteride 5 milliGRAM(s) Oral at bedtime  glucagon  Injectable 1 milliGRAM(s) IntraMuscular once  hydrochlorothiazide 25 milliGRAM(s) Oral daily  insulin lispro (ADMELOG) corrective regimen sliding scale   SubCutaneous Before meals and at bedtime  isosorbide   mononitrate ER Tablet (IMDUR) 30 milliGRAM(s) Oral daily  latanoprost 0.005% Ophthalmic Solution 1 Drop(s) Both EYES at bedtime  metoprolol succinate ER 50 milliGRAM(s) Oral every 24 hours  pantoprazole  Injectable 40 milliGRAM(s) IV Push every 12 hours  polyethylene glycol/electrolyte Solution. 4000 milliLiter(s) Oral once    MEDICATIONS  (PRN):  acetaminophen   Tablet .. 650 milliGRAM(s) Oral every 6 hours PRN Temp greater or equal to 38C (100.4F), Mild Pain (1 - 3)  ALBUTerol    90 MICROgram(s) HFA Inhaler 2 Puff(s) Inhalation every 6 hours PRN Shortness of Breath and/or Wheezing

## 2021-06-03 NOTE — DIETITIAN INITIAL EVALUATION ADULT. - OTHER INFO
79 y/o M with PMHx of prostate ca on chemo, HTN, HLD, DM II, anemia of chronic disease/DOMITILA (last tx 2 yrs ago, Hgb 8-9 on prior admissions [id number]), PE/DVT 1/2020, Afib in Eliquis 2.5 mg BID, CAD (s/p IRASEMA x 1 OM 3/30/21 on asa and Eliquis (s/p 1 week plavix 2/2 risk of GI bleed), esophagitis/gastritis/ duodenitis who presents for worsening pain/swelling and numbness of right lower jaw. Admitted for cellulitis and b/l sialoadenitis after failed PO Augmentin, started on IV Clindamycin Hospital course c/b acute 2 point drop in Hb in patient w/ hx of GI bleed. Planned for EGD/colonoscopy today, showed no active bleeding.    At time of assessment, pt receiving procedure, spoke to daughter who was in room. Reports pt to have issues with chewing d/t ill fitting dentures, and pain in his mouth. Unsure if pt has swallowing issues, reports pt complains of pain in throat. She reported that she started making pureed foods for pt to eat, but pt consistently lacks appetite and has poor PO intake for at least 2 months. Reports pt has been drinking V8 and ensures. UBW stated to be 186 lbs, 9 lb/4.83% wt change. Denied any n/v currently. Unsure of issues with diarrhea or constipation. GI: distended, last BM 6/2. Skin integrity: Partha 19, pressure wise skin intact. See RD recs below

## 2021-06-03 NOTE — DIETITIAN INITIAL EVALUATION ADULT. - PROBLEM SELECTOR PLAN 3
-s/p IARSEMA x 1 OM 3/30/21 on asa and Eliquis (s/p 1 week plavix 2/2 risk of GI bleed)  - continue with asa 81mg, holding Eliquis in setting of acute drop in Hb  - c/w atorvastatin 40

## 2021-06-03 NOTE — DIETITIAN NUTRITION RISK NOTIFICATION - TREATMENT: THE FOLLOWING DIET HAS BEEN RECOMMENDED
1. Recommend speech and swallow eval for modified consistency of diet   2. Recommend include Ensure Enlive TID (1050 kcal, 60g protein, 540mL free H2O)   3. Monitor %PO intake   4. Trend wts weekly   5. Monitor labs: BMP, lytes, replete PRN

## 2021-06-03 NOTE — PROGRESS NOTE ADULT - SUBJECTIVE AND OBJECTIVE BOX
INTERVAL HPI/OVERNIGHT EVENTS:  GI findings noted; Biopsies done;  Right side of mouth still numb       MEDICATIONS  (STANDING):  apixaban 2.5 milliGRAM(s) Oral every 12 hours  aspirin enteric coated 81 milliGRAM(s) Oral daily  atorvastatin 40 milliGRAM(s) Oral at bedtime  brimonidine 0.2% Ophthalmic Solution 1 Drop(s) Both EYES two times a day  chlorhexidine 0.12% Liquid 15 milliLiter(s) Oral Mucosa two times a day  clindamycin IVPB 600 milliGRAM(s) IV Intermittent every 8 hours  cyanocobalamin 1000 MICROGram(s) Oral daily  dextrose 40% Gel 15 Gram(s) Oral once  dextrose 5%. 1000 milliLiter(s) (50 mL/Hr) IV Continuous <Continuous>  dextrose 5%. 1000 milliLiter(s) (100 mL/Hr) IV Continuous <Continuous>  dextrose 50% Injectable 25 Gram(s) IV Push once  dextrose 50% Injectable 12.5 Gram(s) IV Push once  dextrose 50% Injectable 25 Gram(s) IV Push once  dorzolamide 2% Ophthalmic Solution 1 Drop(s) Both EYES three times a day  finasteride 5 milliGRAM(s) Oral at bedtime  glucagon  Injectable 1 milliGRAM(s) IntraMuscular once  hydrochlorothiazide 25 milliGRAM(s) Oral daily  insulin lispro (ADMELOG) corrective regimen sliding scale   SubCutaneous Before meals and at bedtime  isosorbide   mononitrate ER Tablet (IMDUR) 30 milliGRAM(s) Oral daily  latanoprost 0.005% Ophthalmic Solution 1 Drop(s) Both EYES at bedtime  metoprolol succinate ER 50 milliGRAM(s) Oral every 24 hours  pantoprazole  Injectable 40 milliGRAM(s) IV Push every 12 hours  polyethylene glycol 3350 17 Gram(s) Oral two times a day  senna 2 Tablet(s) Oral at bedtime    MEDICATIONS  (PRN):  acetaminophen   Tablet .. 650 milliGRAM(s) Oral every 6 hours PRN Temp greater or equal to 38C (100.4F), Mild Pain (1 - 3)  ALBUTerol    90 MICROgram(s) HFA Inhaler 2 Puff(s) Inhalation every 6 hours PRN Shortness of Breath and/or Wheezing      Allergies    ACE inhibitors (Angioedema)    Intolerances        Vital Signs Last 24 Hrs  T(C): 36.9 (03 Jun 2021 12:54), Max: 37.1 (03 Jun 2021 05:30)  T(F): 98.4 (03 Jun 2021 12:54), Max: 98.7 (03 Jun 2021 05:30)  HR: 87 (03 Jun 2021 12:54) (69 - 87)  BP: 146/73 (03 Jun 2021 12:54) (104/68 - 146/73)  BP(mean): --  RR: 18 (03 Jun 2021 12:54) (18 - 18)  SpO2: 98% (03 Jun 2021 12:54) (97% - 98%)          Constitutional:  Awake    Eyes: SELENE    ENMT: Negative    Neck: Supple    Back:  no tenderness     Respiratory:  clear    Cardiovascular: S1 S2    Gastrointestinal: soft    Genitourinary:    Extremities:  no edema    Vascular:    Neurological:  intact    Skin:    Lymph Nodes:            LABS:                        10.3   6.49  )-----------( 336      ( 03 Jun 2021 12:45 )             31.4     06-03    133<L>  |  98  |  6<L>  ----------------------------<  99  3.6   |  19<L>  |  1.09    Ca    7.9<L>      03 Jun 2021 12:45  Phos  3.0     06-03  Mg     1.3     06-03            RADIOLOGY & ADDITIONAL TESTS:

## 2021-06-03 NOTE — PHYSICAL THERAPY INITIAL EVALUATION ADULT - ADDITIONAL COMMENTS
Patient reports previously independent with all ADLs/IADLs prior to admission. No HHA. Patient denies history of mechanical falls although daughter at bedside corrected patient stating "he fell about 2 months ago but didn't tell me". Daughter reports she will be staying with patient upon discharge from [facility] to assist as needed.

## 2021-06-03 NOTE — DIETITIAN INITIAL EVALUATION ADULT. - PROBLEM SELECTOR PLAN 2
- hx of DOMITILA/ AOCD, on iron PO BID   - Hb usually between 8-9 at baseline, EGD in 2020 revealing esophagitis, gastritis, and duodenitis with clean based duodenal ulcer   - on presentation Hb 7.2, Hb was 9 on May 13th- pt states his stools are always dark because of PO iron, denies hematochezia or hematemesis, hematuria or signs of active blood loss   - hx of transfusion x2, hx of coffee ground emesis in Jan 2020 where GI did EGD and saw gastritis, esophagitis, and duodenitis  -EGD @ [facility] 1/13/20: Esophagitis, 2cm hiatal hernia, Gastritis, duodenitis, 2 small clean based ulcer in D2.   - pt is HD stable   PLAN   - GI consulted, pt was supposed to have cscope and EGD scheduled for next week (consulted as pt is here for IV Abx and may decide to scope while inpatient)  - PPI IV BID   - will transfuse 1 unit PRBC in setting of CAD s/p stent recently in March 2021  - active type and screen   - hold Eliquis in setting of possible GI bleed, resume once cleared for AC     #Esophagitis, gastritis, duodenitis  - Plan as above

## 2021-06-03 NOTE — PHYSICAL THERAPY INITIAL EVALUATION ADULT - THERAPY FREQUENCY, PT EVAL
1-2 more PT sessions at [facility]; Patient educated on frequency of inpatient physical therapy at [facility], patient verbalized understanding.

## 2021-06-03 NOTE — PHYSICAL THERAPY INITIAL EVALUATION ADULT - ASR EQUIP NEEDS DISCH PT EVAL
Straight cane delivered and sized to patient. Reviewed/educated patient on appropriate and safe use of assistive device, patient and daughter both verbalized understanding.

## 2021-06-03 NOTE — PHYSICAL THERAPY INITIAL EVALUATION ADULT - GAIT DEVIATIONS NOTED, PT EVAL
fairly steady gait, ~3-4 mild LOBs although able to self recover (patient states "it's when I try to look around); good negotiation through hallway obstacles without gait disturbances noted/decreased layo/decreased step length

## 2021-06-03 NOTE — PHYSICAL THERAPY INITIAL EVALUATION ADULT - GENERAL OBSERVATIONS, REHAB EVAL
PT IE completed. Chart reviewed. Patient without complaints of pain at rest, agreeable to PT. Patient received semi-supine, NAD, +IV hep lock, daughter (Verna) at bedside, BRITTON Anderson cleared patient for treatment.

## 2021-06-03 NOTE — PROGRESS NOTE ADULT - PROBLEM SELECTOR PLAN 2
hx of DOMITILA/ AOCD, on iron PO BID. Hb usually between 8-9 at baseline, EGD in 2020 revealing esophagitis, gastritis, and duodenitis with clean based duodenal ulcer. No active signs/symptoms of bleed.  Hgb stable at ~9, s/p 2u prbc   - active type and screen (5/30)  -transfuse for Hgb <8  - hold Eliquis in setting of possible GI bleed, resume once cleared for AC   -f/u EGD and C scope results from 6/3     #Esophagitis, gastritis, duodenitis  - Plan as above

## 2021-06-03 NOTE — DIETITIAN INITIAL EVALUATION ADULT. - OTHER CALCULATIONS
ABW used to calculate energy needs due to pt's current body weight within % IBW (110%). Increased metabolic needs 2/2 moderate PCM

## 2021-06-03 NOTE — PROGRESS NOTE ADULT - PROBLEM SELECTOR PLAN 1
patient recently presented to ED with right sided submandibular pain/ swelling- found to have cellulitis and b/l sialoadenitis and sent home with Augmentin PO. Completed course of abx, though with return of R sided facial pain / swelling w numbness.  CT shows R buccal surface swelling which could represent cellulitis. Pt has had improvement in swelling and pain with clindamycin.   -ENT recs appreciated   -c/w IV clindamycin 600 mg IV q8h (5/31-)  -c/w peridex BID

## 2021-06-03 NOTE — PROGRESS NOTE ADULT - ASSESSMENT
79 y/o M with PMHx of prostate ca on chemo, HTN, HLD, DM II, anemia of chronic disease/DOMITILA (last tx 2 yrs ago, Hgb 8-9 on prior admissions [id number]), PE/DVT 1/2020, Afib in Eliquis 2.5 mg BID, CAD (s/p IRASEMA x 1 OM 3/30/21 on asa and Eliquis (s/p 1 week plavix 2/2 risk of GI bleed), esophagitis/gastritis/ duodenitis who presents for worsening pain/swelling and numbness of right lower jaw. Admitted for cellulitis and b/l sialoadenitis after failed PO Augmentin, also with dropping hemoglobin concerning for GI bleed pending EGD/Cscope.

## 2021-06-04 ENCOUNTER — TRANSCRIPTION ENCOUNTER (OUTPATIENT)
Age: 81
End: 2021-06-04

## 2021-06-04 LAB
ANION GAP SERPL CALC-SCNC: 12 MMOL/L — SIGNIFICANT CHANGE UP (ref 5–17)
BUN SERPL-MCNC: 5 MG/DL — LOW (ref 7–23)
CALCIUM SERPL-MCNC: 7.4 MG/DL — LOW (ref 8.4–10.5)
CHLORIDE SERPL-SCNC: 96 MMOL/L — SIGNIFICANT CHANGE UP (ref 96–108)
CO2 SERPL-SCNC: 21 MMOL/L — LOW (ref 22–31)
CREAT SERPL-MCNC: 0.98 MG/DL — SIGNIFICANT CHANGE UP (ref 0.5–1.3)
CULTURE RESULTS: SIGNIFICANT CHANGE UP
CULTURE RESULTS: SIGNIFICANT CHANGE UP
GLUCOSE BLDC GLUCOMTR-MCNC: 111 MG/DL — HIGH (ref 70–99)
GLUCOSE BLDC GLUCOMTR-MCNC: 122 MG/DL — HIGH (ref 70–99)
GLUCOSE BLDC GLUCOMTR-MCNC: 122 MG/DL — HIGH (ref 70–99)
GLUCOSE BLDC GLUCOMTR-MCNC: 140 MG/DL — HIGH (ref 70–99)
GLUCOSE SERPL-MCNC: 113 MG/DL — HIGH (ref 70–99)
HCT VFR BLD CALC: 25.4 % — LOW (ref 39–50)
HGB BLD-MCNC: 8.4 G/DL — LOW (ref 13–17)
MAGNESIUM SERPL-MCNC: 1.2 MG/DL — LOW (ref 1.6–2.6)
MCHC RBC-ENTMCNC: 28.2 PG — SIGNIFICANT CHANGE UP (ref 27–34)
MCHC RBC-ENTMCNC: 33.1 GM/DL — SIGNIFICANT CHANGE UP (ref 32–36)
MCV RBC AUTO: 85.2 FL — SIGNIFICANT CHANGE UP (ref 80–100)
NRBC # BLD: 0 /100 WBCS — SIGNIFICANT CHANGE UP (ref 0–0)
PHOSPHATE SERPL-MCNC: 3.3 MG/DL — SIGNIFICANT CHANGE UP (ref 2.5–4.5)
PLATELET # BLD AUTO: 297 K/UL — SIGNIFICANT CHANGE UP (ref 150–400)
POTASSIUM SERPL-MCNC: 2.8 MMOL/L — CRITICAL LOW (ref 3.5–5.3)
POTASSIUM SERPL-SCNC: 2.8 MMOL/L — CRITICAL LOW (ref 3.5–5.3)
RBC # BLD: 2.98 M/UL — LOW (ref 4.2–5.8)
RBC # FLD: 16.4 % — HIGH (ref 10.3–14.5)
SODIUM SERPL-SCNC: 129 MMOL/L — LOW (ref 135–145)
SPECIMEN SOURCE: SIGNIFICANT CHANGE UP
SPECIMEN SOURCE: SIGNIFICANT CHANGE UP
SURGICAL PATHOLOGY STUDY: SIGNIFICANT CHANGE UP
WBC # BLD: 5.1 K/UL — SIGNIFICANT CHANGE UP (ref 3.8–10.5)
WBC # FLD AUTO: 5.1 K/UL — SIGNIFICANT CHANGE UP (ref 3.8–10.5)

## 2021-06-04 PROCEDURE — 99232 SBSQ HOSP IP/OBS MODERATE 35: CPT | Mod: GC

## 2021-06-04 PROCEDURE — 99232 SBSQ HOSP IP/OBS MODERATE 35: CPT

## 2021-06-04 RX ORDER — POTASSIUM CHLORIDE 20 MEQ
40 PACKET (EA) ORAL EVERY 4 HOURS
Refills: 0 | Status: COMPLETED | OUTPATIENT
Start: 2021-06-04 | End: 2021-06-04

## 2021-06-04 RX ORDER — FERROUS SULFATE 325(65) MG
325 TABLET ORAL DAILY
Refills: 0 | Status: DISCONTINUED | OUTPATIENT
Start: 2021-06-04 | End: 2021-06-07

## 2021-06-04 RX ORDER — MAGNESIUM SULFATE 500 MG/ML
4 VIAL (ML) INJECTION ONCE
Refills: 0 | Status: COMPLETED | OUTPATIENT
Start: 2021-06-04 | End: 2021-06-04

## 2021-06-04 RX ORDER — POTASSIUM CHLORIDE 20 MEQ
10 PACKET (EA) ORAL
Refills: 0 | Status: COMPLETED | OUTPATIENT
Start: 2021-06-04 | End: 2021-06-04

## 2021-06-04 RX ADMIN — Medication 40 MILLIEQUIVALENT(S): at 14:08

## 2021-06-04 RX ADMIN — CHLORHEXIDINE GLUCONATE 15 MILLILITER(S): 213 SOLUTION TOPICAL at 18:34

## 2021-06-04 RX ADMIN — Medication 50 MILLIGRAM(S): at 18:34

## 2021-06-04 RX ADMIN — Medication 100 MILLIEQUIVALENT(S): at 12:15

## 2021-06-04 RX ADMIN — Medication 81 MILLIGRAM(S): at 12:16

## 2021-06-04 RX ADMIN — ISOSORBIDE MONONITRATE 30 MILLIGRAM(S): 60 TABLET, EXTENDED RELEASE ORAL at 12:18

## 2021-06-04 RX ADMIN — Medication 325 MILLIGRAM(S): at 12:16

## 2021-06-04 RX ADMIN — LATANOPROST 1 DROP(S): 0.05 SOLUTION/ DROPS OPHTHALMIC; TOPICAL at 22:37

## 2021-06-04 RX ADMIN — CHLORHEXIDINE GLUCONATE 15 MILLILITER(S): 213 SOLUTION TOPICAL at 05:50

## 2021-06-04 RX ADMIN — POLYETHYLENE GLYCOL 3350 17 GRAM(S): 17 POWDER, FOR SOLUTION ORAL at 18:34

## 2021-06-04 RX ADMIN — Medication 100 MILLIGRAM(S): at 22:36

## 2021-06-04 RX ADMIN — DORZOLAMIDE HYDROCHLORIDE 1 DROP(S): 20 SOLUTION/ DROPS OPHTHALMIC at 22:37

## 2021-06-04 RX ADMIN — Medication 25 MILLIGRAM(S): at 05:49

## 2021-06-04 RX ADMIN — PANTOPRAZOLE SODIUM 40 MILLIGRAM(S): 20 TABLET, DELAYED RELEASE ORAL at 18:34

## 2021-06-04 RX ADMIN — DORZOLAMIDE HYDROCHLORIDE 1 DROP(S): 20 SOLUTION/ DROPS OPHTHALMIC at 05:50

## 2021-06-04 RX ADMIN — Medication 40 MILLIEQUIVALENT(S): at 08:54

## 2021-06-04 RX ADMIN — Medication 100 MILLIGRAM(S): at 05:49

## 2021-06-04 RX ADMIN — PANTOPRAZOLE SODIUM 40 MILLIGRAM(S): 20 TABLET, DELAYED RELEASE ORAL at 05:50

## 2021-06-04 RX ADMIN — Medication 100 MILLIEQUIVALENT(S): at 10:18

## 2021-06-04 RX ADMIN — FINASTERIDE 5 MILLIGRAM(S): 5 TABLET, FILM COATED ORAL at 22:36

## 2021-06-04 RX ADMIN — APIXABAN 2.5 MILLIGRAM(S): 2.5 TABLET, FILM COATED ORAL at 08:54

## 2021-06-04 RX ADMIN — APIXABAN 2.5 MILLIGRAM(S): 2.5 TABLET, FILM COATED ORAL at 22:36

## 2021-06-04 RX ADMIN — BRIMONIDINE TARTRATE 1 DROP(S): 2 SOLUTION/ DROPS OPHTHALMIC at 18:34

## 2021-06-04 RX ADMIN — ATORVASTATIN CALCIUM 40 MILLIGRAM(S): 80 TABLET, FILM COATED ORAL at 22:36

## 2021-06-04 RX ADMIN — Medication 100 MILLIEQUIVALENT(S): at 14:26

## 2021-06-04 RX ADMIN — DORZOLAMIDE HYDROCHLORIDE 1 DROP(S): 20 SOLUTION/ DROPS OPHTHALMIC at 14:09

## 2021-06-04 RX ADMIN — PREGABALIN 1000 MICROGRAM(S): 225 CAPSULE ORAL at 12:18

## 2021-06-04 RX ADMIN — SENNA PLUS 2 TABLET(S): 8.6 TABLET ORAL at 22:38

## 2021-06-04 RX ADMIN — BRIMONIDINE TARTRATE 1 DROP(S): 2 SOLUTION/ DROPS OPHTHALMIC at 05:50

## 2021-06-04 RX ADMIN — Medication 100 GRAM(S): at 08:54

## 2021-06-04 RX ADMIN — Medication 100 MILLIGRAM(S): at 14:09

## 2021-06-04 NOTE — PROGRESS NOTE ADULT - PROBLEM SELECTOR PLAN 10
F: none   E: replete as needed  N: pureed   DVT ppx: eliquis   GI pps: protonix   Dispo: [facility]

## 2021-06-04 NOTE — SWALLOW BEDSIDE ASSESSMENT ADULT - NS SPL SWALLOW CLINIC TRIAL FT
Pt presents with functional maribell-pharyngeal swallow on this exam with no overt signs of airway protection deficits. Given sparse dentition and increased jaw pain with mastication, Pt would benefit from puree diet with thin liquids.

## 2021-06-04 NOTE — PROGRESS NOTE ADULT - SUBJECTIVE AND OBJECTIVE BOX
INTERVAL HPI/OVERNIGHT EVENTS:  Awake with continued pain in jaw;   Daughter is concerned about him coming home  Appetite poor;  Will discuss with ENT the jaw pain and concerned about osteonecrosis  Hyponatremia and hypokalemia noted       MEDICATIONS  (STANDING):  apixaban 2.5 milliGRAM(s) Oral every 12 hours  aspirin enteric coated 81 milliGRAM(s) Oral daily  atorvastatin 40 milliGRAM(s) Oral at bedtime  brimonidine 0.2% Ophthalmic Solution 1 Drop(s) Both EYES two times a day  chlorhexidine 0.12% Liquid 15 milliLiter(s) Oral Mucosa two times a day  clindamycin IVPB 600 milliGRAM(s) IV Intermittent every 8 hours  cyanocobalamin 1000 MICROGram(s) Oral daily  dextrose 40% Gel 15 Gram(s) Oral once  dextrose 5%. 1000 milliLiter(s) (50 mL/Hr) IV Continuous <Continuous>  dextrose 5%. 1000 milliLiter(s) (100 mL/Hr) IV Continuous <Continuous>  dextrose 50% Injectable 25 Gram(s) IV Push once  dextrose 50% Injectable 12.5 Gram(s) IV Push once  dextrose 50% Injectable 25 Gram(s) IV Push once  dorzolamide 2% Ophthalmic Solution 1 Drop(s) Both EYES three times a day  ferrous    sulfate 325 milliGRAM(s) Oral daily  finasteride 5 milliGRAM(s) Oral at bedtime  glucagon  Injectable 1 milliGRAM(s) IntraMuscular once  hydrochlorothiazide 25 milliGRAM(s) Oral daily  insulin lispro (ADMELOG) corrective regimen sliding scale   SubCutaneous Before meals and at bedtime  isosorbide   mononitrate ER Tablet (IMDUR) 30 milliGRAM(s) Oral daily  latanoprost 0.005% Ophthalmic Solution 1 Drop(s) Both EYES at bedtime  metoprolol succinate ER 50 milliGRAM(s) Oral every 24 hours  pantoprazole  Injectable 40 milliGRAM(s) IV Push every 12 hours  polyethylene glycol 3350 17 Gram(s) Oral two times a day  potassium chloride   Powder 40 milliEquivalent(s) Oral every 4 hours  potassium chloride  10 mEq/100 mL IVPB 10 milliEquivalent(s) IV Intermittent every 1 hour  senna 2 Tablet(s) Oral at bedtime    MEDICATIONS  (PRN):  acetaminophen   Tablet .. 650 milliGRAM(s) Oral every 6 hours PRN Temp greater or equal to 38C (100.4F), Mild Pain (1 - 3)  ALBUTerol    90 MICROgram(s) HFA Inhaler 2 Puff(s) Inhalation every 6 hours PRN Shortness of Breath and/or Wheezing      Allergies    ACE inhibitors (Angioedema)    Intolerances        Vital Signs Last 24 Hrs  T(C): 37.3 (04 Jun 2021 06:29), Max: 37.3 (04 Jun 2021 06:29)  T(F): 99.2 (04 Jun 2021 06:29), Max: 99.2 (04 Jun 2021 06:29)  HR: 77 (04 Jun 2021 06:29) (77 - 89)  BP: 131/71 (04 Jun 2021 06:29) (127/70 - 151/75)  BP(mean): --  RR: 18 (04 Jun 2021 06:29) (17 - 18)  SpO2: 99% (04 Jun 2021 06:29) (98% - 99%)          Constitutional:  Awake    Eyes: SELENE    ENMT: Negative    Neck: Supple    Back:  no tenderness     Respiratory:  clear    Cardiovascular: S1 S2    Gastrointestinal: soft    Genitourinary:    Extremities:  no edema    Vascular:    Neurological:    Skin:    Lymph Nodes:            LABS:                        8.4    5.10  )-----------( 297      ( 04 Jun 2021 06:57 )             25.4     06-04    129<L>  |  96  |  5<L>  ----------------------------<  113<H>  2.8<LL>   |  21<L>  |  0.98    Ca    7.4<L>      04 Jun 2021 06:57  Phos  3.3     06-04  Mg     1.2     06-04            RADIOLOGY & ADDITIONAL TESTS:

## 2021-06-04 NOTE — PROGRESS NOTE ADULT - PROBLEM SELECTOR PLAN 2
hx of DOMITILA/ AOCD, on iron PO BID. Hb usually between 8-9 at baseline, EGD in 2020 revealing esophagitis, gastritis, and duodenitis with clean based duodenal ulcer. No active signs/symptoms of bleed.  Hgb stable at ~9, s/p 2u prbc. EGD/colonoscopy with no signs of active bleed.    - active type and screen (5/30)  -transfuse for Hgb <8 Parent(s)

## 2021-06-04 NOTE — DISCHARGE NOTE PROVIDER - CARE PROVIDERS DIRECT ADDRESSES
,DirectAddress_Unknown ,DirectAddress_Unknown,[email or web address] [email or web address] [email or web address]

## 2021-06-04 NOTE — DISCHARGE NOTE PROVIDER - CARE PROVIDER_API CALL
Roman Marquez (DDS; MD)  OralMaxillofacial Surgery  [address] [place] [address], [facility] [address] [place] [address]  Phone: [phone]  Fax: [phone]  Follow Up Time: 2 weeks   Roman Marquez (DDS; MD)  OralMaxillofacial Surgery  [address] [place] [address], [address]  [place] [address]  Phone: [phone]  Fax: [phone]  Follow Up Time: 2 weeks    Linette Au)  Otolaryngology  [address], [address] [place] [address]  Phone: [phone]  Fax: [phone]  Follow Up Time: 2 weeks    Sandra Gill)  Critical Care Medicine; Internal Medicine  [address], [address]  [place] [address]  Phone: [phone]  Fax: [phone]  Follow Up Time: 2 weeks

## 2021-06-04 NOTE — DISCHARGE NOTE PROVIDER - NSDCFUSCHEDAPPT_GEN_ALL_CORE_FT
[place], EURAY E ; 06/11/2021 ; NPP HeartVasc [address] [facility] EURAY E ; 06/11/2021 ; [facility] [address] [facility] ; NPP Intmed [address] [facility] EURAY E ; 06/16/2021 ; NPP OPD Gastro [address] [place] [address] E ; 06/23/2021 ; NPP Otolaryng [address] [place] [address], EURAY E ; 06/25/2021 ; NPP Urology [address] [place], [place] [address]AY E ; 07/09/2021 ; NPP HeartVasc [address]

## 2021-06-04 NOTE — DISCHARGE NOTE PROVIDER - NSDCCPCAREPLAN_GEN_ALL_CORE_FT
PRINCIPAL DISCHARGE DIAGNOSIS  Diagnosis: Cellulitis  Assessment and Plan of Treatment: You were diagnosed with cellulitis of the right jaw. We did a CT scan of your face which showed swelling of the right jaw which was consistent with cellulitis. You had not improved with oral antibiotics outside the hospital, so we gave you IV antibiotics while you were in the hospital which markedly improved your symptoms. Our ear nose throat doctors evaluated you and thought that your symptoms could be related to your prostate cancer medication - please be sure to follow up with Dr Marquez and Dr Au (of ENT) upon discharge.      SECONDARY DISCHARGE DIAGNOSES  Diagnosis: Gastritis  Assessment and Plan of Treatment: Your blood counts were dropping during this hospitalization so there was concern that  you were bleeding. However, you had a EGD and colonoscopy that showed some polyps and a hernia, though was otherwise normal and showed no signs of an active bleed. If your symptoms persist, you can plan for a video capsule endoscopy with your gastroenterology doctors.

## 2021-06-04 NOTE — PROGRESS NOTE ADULT - SUBJECTIVE AND OBJECTIVE BOX
OVERNIGHT EVENTS: MICKEY    SUBJECTIVE:  Patient seen and examined at bedside. Improving swelling and pain in R mandibular region. Numbness in R mandible, unchanged. Otherwise no acute complaints, no fever/chills or worsening tenderness of R mandible. 12 pt ROS otherwise negative.     Vital Signs Last 12 Hrs  T(F): 98.3 (05-31-21 @ 06:10), Max: 98.4 (05-31-21 @ 01:33)  HR: 60 (05-31-21 @ 06:10) (60 - 70)  BP: 112/57 (05-31-21 @ 06:10) (112/57 - 115/70)  BP(mean): --  RR: 17 (05-31-21 @ 06:10) (17 - 18)  SpO2: 97% (05-31-21 @ 06:10) (97% - 100%)  I&O's Summary      PHYSICAL EXAM:  Constitutional: NAD, comfortable in bed.  HEENT: NC/AT, PERRLA, EOMI, poor dentition, no swelling or tenderness noted in R jaw; no conjunctival pallor or scleral icterus, MMM  Neck: Supple, no JVD  Respiratory: CTA B/L. No w/r/r.   Cardiovascular: RRR, normal S1 and S2, no m/r/g.   Gastrointestinal: +BS, soft NTND, no guarding or rebound tenderness, no palpable masses   Extremities: wwp; no cyanosis, clubbing or edema.   Vascular: Pulses equal and strong throughout.   Neurological: AAOx3, no CN deficits, strength deficits throughout. Decreased sensation in V3 distribution on R side   Skin: No gross skin abnormalities or rashes        LABS:                        7.2    6.56  )-----------( 310      ( 30 May 2021 12:41 )             22.8     05-31    137  |  108  |  11  ----------------------------<  88  4.1   |  20<L>  |  1.10    Ca    8.4      31 May 2021 06:37  Mg     1.7     05-31    TPro  6.1  /  Alb  3.3  /  TBili  0.4  /  DBili  x   /  AST  24  /  ALT  15  /  AlkPhos  42  05-30    PT/INR - ( 30 May 2021 12:41 )   PT: 18.4 sec;   INR: 1.57          PTT - ( 30 May 2021 12:41 )  PTT:42.9 sec        RADIOLOGY & ADDITIONAL TESTS:    MEDICATIONS  (STANDING):  aspirin enteric coated 81 milliGRAM(s) Oral daily  atorvastatin 40 milliGRAM(s) Oral at bedtime  brimonidine 0.2% Ophthalmic Solution 1 Drop(s) Both EYES two times a day  clindamycin IVPB 600 milliGRAM(s) IV Intermittent every 8 hours  cyanocobalamin 1000 MICROGram(s) Oral daily  dextrose 40% Gel 15 Gram(s) Oral once  dextrose 5%. 1000 milliLiter(s) (50 mL/Hr) IV Continuous <Continuous>  dextrose 5%. 1000 milliLiter(s) (100 mL/Hr) IV Continuous <Continuous>  dextrose 50% Injectable 25 Gram(s) IV Push once  dextrose 50% Injectable 12.5 Gram(s) IV Push once  dextrose 50% Injectable 25 Gram(s) IV Push once  dorzolamide 2% Ophthalmic Solution 1 Drop(s) Both EYES three times a day  finasteride 5 milliGRAM(s) Oral at bedtime  glucagon  Injectable 1 milliGRAM(s) IntraMuscular once  hydrochlorothiazide 25 milliGRAM(s) Oral daily  insulin lispro (ADMELOG) corrective regimen sliding scale   SubCutaneous Before meals and at bedtime  isosorbide   mononitrate ER Tablet (IMDUR) 30 milliGRAM(s) Oral daily  latanoprost 0.005% Ophthalmic Solution 1 Drop(s) Both EYES at bedtime  metoprolol succinate ER 50 milliGRAM(s) Oral every 24 hours  pantoprazole  Injectable 40 milliGRAM(s) IV Push every 12 hours  polyethylene glycol/electrolyte Solution. 4000 milliLiter(s) Oral once    MEDICATIONS  (PRN):  acetaminophen   Tablet .. 650 milliGRAM(s) Oral every 6 hours PRN Temp greater or equal to 38C (100.4F), Mild Pain (1 - 3)  ALBUTerol    90 MICROgram(s) HFA Inhaler 2 Puff(s) Inhalation every 6 hours PRN Shortness of Breath and/or Wheezing

## 2021-06-04 NOTE — DISCHARGE NOTE PROVIDER - HOSPITAL COURSE
#Discharge: do not delete    Patient is __ yo M/F with past medical history of _____  Presented with _____, found to have _____  Problem List/Main Diagnoses (system-based):   Inpatient treatment course:   New medications:   Labs to be followed outpatient:   Exam to be followed outpatient:    #Discharge: do not delete    79 y/o M with PMHx of prostate ca on chemo, HTN, HLD, DM II, anemia of chronic disease/DOMITILA (last tx 2 yrs ago, Hgb 8-9 on prior admissions [id number]), PE/DVT 1/2020, Afib in Eliquis 2.5 mg BID, CAD (s/p IRASEMA x 1 OM 3/30/21 on asa and Eliquis (s/p 1 week plavix 2/2 risk of GI bleed), esophagitis/gastritis/ duodenitis who presents for worsening pain/swelling and numbness of right lower jaw. Admitted for cellulitis and b/l sialoadenitis after failed PO Augmentin.    Problem List/Main Diagnoses (system-based):   1. Facial cellulitis - presented with R sided submandibular pain / swelling. s/p failing augmentin PO, since he returned with R sided facial pain.  -CT scan showed R buccal surface swelling consistent with cellulitis   -ENT consulted, f/u outpatient with Dr Marquez of [facility]   -s/p IV clindamycin (5/31-6/6) with improvement in tenderness, swelling  -peridex BID     2. Anemia - hx of DOMITILA / AOCD, on iron PO BID. Hgb 8-9 at baseline   -pt had slightly dropping Hgb during hospital course  -EGD and colonoscopy done with no acute signs of bleeding   -video capsule study outpatient with GI     3. CAD, A fib, PE, HTN, HLD, glaucoma - c/w home meds     New medications: none  Labs to be followed outpatient: Hgb   Exam to be followed outpatient: R jaw exam

## 2021-06-04 NOTE — PROGRESS NOTE ADULT - SUBJECTIVE AND OBJECTIVE BOX
GASTROENTEROLOGY PROGRESS NOTE  Patient seen and examined at bedside. No new complaints.    PERTINENT REVIEW OF SYSTEMS:  As noted above    Allergies    ACE inhibitors (Angioedema)    Intolerances      MEDICATIONS:  MEDICATIONS  (STANDING):  apixaban 2.5 milliGRAM(s) Oral every 12 hours  aspirin enteric coated 81 milliGRAM(s) Oral daily  atorvastatin 40 milliGRAM(s) Oral at bedtime  brimonidine 0.2% Ophthalmic Solution 1 Drop(s) Both EYES two times a day  chlorhexidine 0.12% Liquid 15 milliLiter(s) Oral Mucosa two times a day  clindamycin IVPB 600 milliGRAM(s) IV Intermittent every 8 hours  cyanocobalamin 1000 MICROGram(s) Oral daily  dextrose 40% Gel 15 Gram(s) Oral once  dextrose 5%. 1000 milliLiter(s) (50 mL/Hr) IV Continuous <Continuous>  dextrose 5%. 1000 milliLiter(s) (100 mL/Hr) IV Continuous <Continuous>  dextrose 50% Injectable 25 Gram(s) IV Push once  dextrose 50% Injectable 12.5 Gram(s) IV Push once  dextrose 50% Injectable 25 Gram(s) IV Push once  dorzolamide 2% Ophthalmic Solution 1 Drop(s) Both EYES three times a day  finasteride 5 milliGRAM(s) Oral at bedtime  glucagon  Injectable 1 milliGRAM(s) IntraMuscular once  hydrochlorothiazide 25 milliGRAM(s) Oral daily  insulin lispro (ADMELOG) corrective regimen sliding scale   SubCutaneous Before meals and at bedtime  isosorbide   mononitrate ER Tablet (IMDUR) 30 milliGRAM(s) Oral daily  latanoprost 0.005% Ophthalmic Solution 1 Drop(s) Both EYES at bedtime  metoprolol succinate ER 50 milliGRAM(s) Oral every 24 hours  pantoprazole  Injectable 40 milliGRAM(s) IV Push every 12 hours  polyethylene glycol 3350 17 Gram(s) Oral two times a day  senna 2 Tablet(s) Oral at bedtime    MEDICATIONS  (PRN):  acetaminophen   Tablet .. 650 milliGRAM(s) Oral every 6 hours PRN Temp greater or equal to 38C (100.4F), Mild Pain (1 - 3)  ALBUTerol    90 MICROgram(s) HFA Inhaler 2 Puff(s) Inhalation every 6 hours PRN Shortness of Breath and/or Wheezing    Vital Signs Last 24 Hrs  T(C): 37.3 (04 Jun 2021 06:29), Max: 37.3 (04 Jun 2021 06:29)  T(F): 99.2 (04 Jun 2021 06:29), Max: 99.2 (04 Jun 2021 06:29)  HR: 77 (04 Jun 2021 06:29) (77 - 89)  BP: 131/71 (04 Jun 2021 06:29) (127/70 - 151/75)  BP(mean): --  RR: 18 (04 Jun 2021 06:29) (17 - 18)  SpO2: 99% (04 Jun 2021 06:29) (98% - 99%)    PHYSICAL EXAM:    General: Well developed; in no acute distress  HEENT: MMM, conjunctiva and sclera clear  Gastrointestinal: Soft non-tender non-distended; No rebound or guarding  Skin: Warm and dry. No obvious rash    LABS:                        8.4    5.10  )-----------( 297      ( 04 Jun 2021 06:57 )             25.4     06-04    129<L>  |  96  |  5<L>  ----------------------------<  113<H>  2.8<LL>   |  21<L>  |  0.98    Ca    7.4<L>      04 Jun 2021 06:57  Phos  3.3     06-04  Mg     1.2     06-04                        RADIOLOGY & ADDITIONAL STUDIES:  Reviewed

## 2021-06-04 NOTE — PROGRESS NOTE ADULT - ASSESSMENT
81 y/o M with PMHx of prostate ca on chemo, HTN, HLD, DM II, anemia of chronic disease/DOMITILA (last tx 2 yrs ago, Hgb 8-9 on prior admissions [id number]), PE/DVT 1/2020, Afib in Eliquis 2.5 mg BID, CAD (s/p IRASEMA x 1 OM 3/30/21 on asa and Eliquis (s/p 1 week plavix 2/2 risk of GI bleed), esophagitis/gastritis/ duodenitis who presents for worsening pain/swelling and numbness of right lower jaw. Admitted for cellulitis and b/l sialoadenitis after failed PO Augmentin.

## 2021-06-04 NOTE — DISCHARGE NOTE PROVIDER - NSDCMRMEDTOKEN_GEN_ALL_CORE_FT
apixaban 2.5 mg oral tablet: 1 tab(s) orally 2 times a day  aspirin 81 mg oral delayed release tablet: 1 tab(s) orally once a day  atorvastatin 40 mg oral tablet: 1 tab(s) orally once a day   brimonidine 0.2% ophthalmic solution: 1 drop(s) to each affected eye 2 times a day  dorzolamide 2% ophthalmic solution: 1 drop(s) to each affected eye 3 times a day  ferrous sulfate 325 mg (65 mg elemental iron) oral tablet: 1 tab(s) orally 2 times a day  finasteride 5 mg oral tablet: 1 tab(s) orally once a day  hydroCHLOROthiazide 25 mg oral tablet: 1 tab(s) orally once a day  Imdur 30 mg oral tablet, extended release: 1 tab(s) orally once a day  latanoprost 0.005% ophthalmic solution: 1 drop(s) to each affected eye once a day (in the evening)  Lupron Depot: 11.5 milligram(s) intramuscular every 3 months  magnesium oxide 400 mg oral tablet: 1 tab(s) orally once a day  metoprolol succinate 50 mg oral tablet, extended release: 1 tab(s) orally once a day  pantoprazole 40 mg oral delayed release tablet: 1 tab(s) orally once a day   apixaban 2.5 mg oral tablet: 1 tab(s) orally 2 times a day  aspirin 81 mg oral delayed release tablet: 1 tab(s) orally once a day  atorvastatin 40 mg oral tablet: 1 tab(s) orally once a day   brimonidine 0.2% ophthalmic solution: 1 drop(s) to each affected eye 2 times a day  dorzolamide 2% ophthalmic solution: 1 drop(s) to each affected eye 3 times a day  ferrous sulfate 325 mg (65 mg elemental iron) oral tablet: 1 tab(s) orally 2 times a day  finasteride 5 mg oral tablet: 1 tab(s) orally once a day  hydroCHLOROthiazide 25 mg oral tablet: 1 tab(s) orally once a day  Imdur 30 mg oral tablet, extended release: 1 tab(s) orally once a day  latanoprost 0.005% ophthalmic solution: 1 drop(s) to each affected eye once a day (in the evening)  Lupron Depot: 11.5 milligram(s) intramuscular every 3 months  metoprolol succinate 50 mg oral tablet, extended release: 1 tab(s) orally once a day  pantoprazole 40 mg oral delayed release tablet: 1 tab(s) orally once a day   apixaban 2.5 mg oral tablet: 1 tab(s) orally 2 times a day  aspirin 81 mg oral delayed release tablet: 1 tab(s) orally once a day  atorvastatin 40 mg oral tablet: 1 tab(s) orally once a day   brimonidine 0.2% ophthalmic solution: 1 drop(s) to each affected eye 2 times a day  dorzolamide 2% ophthalmic solution: 1 drop(s) to each affected eye 3 times a day  ferrous sulfate 325 mg (65 mg elemental iron) oral tablet: 1 tab(s) orally 2 times a day  finasteride 5 mg oral tablet: 1 tab(s) orally once a day  Imdur 30 mg oral tablet, extended release: 1 tab(s) orally once a day  latanoprost 0.005% ophthalmic solution: 1 drop(s) to each affected eye once a day (in the evening)  Lupron Depot: 11.5 milligram(s) intramuscular every 3 months  metoprolol succinate 50 mg oral tablet, extended release: 1 tab(s) orally once a day  pantoprazole 40 mg oral delayed release tablet: 1 tab(s) orally once a day

## 2021-06-04 NOTE — PROGRESS NOTE ADULT - ASSESSMENT
81 y/o M with PMHx of prostate ca on chemo, HTN, HLD, DM II, anemia of chronic disease/DOMITILA (last tx 2 yrs ago, Hgb 8-9 on prior admissions [id number]), PE/DVT 1/2020, Afib in Eliquis 2.5 mg BID, CAD (s/p IRASEMA x 1 OM 3/30/21 on asa and Eliquis (s/p 1 week plavix 2/2 risk of GI bleed), esophagitis/gastritis/ duodenitis who presents for worsening pain/swelling and numbness of right lower jaw. 2/2 cellulitis and b/l sialoadenitis. Pt has dark stools but he is on oral iron. He was admitted for coffee ground emesis back in 01/2020 and EGD at that time showed gastritis, esophagitis, and duodenitis and gastric ulcer. He was placed on PPI. Pt was seen by Dr Leigh in the clinic for DOMITILA and positive FOBT for which a bidirectional endoscopy was planned. However since pt is hospitalized GI is consulted for inpatient scopes.    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      Polyp in the cardia. (Biopsy).      Random gastric biopsies were taken to r/p H. Pylori.      Polyps in the second part of the duodenum and distal bulb. (Biopsy).      Random biopsies were obtained to r/o celiac disease.   Colonoscopy:  Difficult cecal intubation due to tight turn and likely adhesions requiring multiple repositioning and external pressure. Mild sigmoid diverticulosis. Otherwise normal colonoscopy.       Plan:   Await pathology results  Diet per primary team  No contraindications for resuming anticoagulation  GI will sign off. call back PRN    Recommendations discussed with primary team  Plan discussed with GI service attending    Phi Carter MD  PGY-4 GI fellow  Pager: [phone]   79 y/o M with PMHx of prostate ca on chemo, HTN, HLD, DM II, anemia of chronic disease/DOMITILA (last tx 2 yrs ago, Hgb 8-9 on prior admissions [id number]), PE/DVT 1/2020, Afib in Eliquis 2.5 mg BID, CAD (s/p IRASEMA x 1 OM 3/30/21 on asa and Eliquis (s/p 1 week plavix 2/2 risk of GI bleed), esophagitis/gastritis/ duodenitis who presents for worsening pain/swelling and numbness of right lower jaw. 2/2 cellulitis and b/l sialoadenitis. Pt has dark stools but he is on oral iron. He was admitted for coffee ground emesis back in 01/2020 and EGD at that time showed gastritis, esophagitis, and duodenitis and gastric ulcer. He was placed on PPI. Pt was seen by Dr Leigh in the clinic for DOMITILA and positive FOBT for which a bidirectional endoscopy was planned. However since pt is hospitalized GI is consulted for inpatient scopes.    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      Polyp in the cardia. (Biopsy).      Random gastric biopsies were taken to r/p H. Pylori.      Polyps in the second part of the duodenum and distal bulb. (Biopsy).      Random biopsies were obtained to r/o celiac disease.   Colonoscopy:  Difficult cecal intubation due to tight turn and likely adhesions requiring multiple repositioning and external pressure. Mild sigmoid diverticulosis. Otherwise normal colonoscopy.       Plan:   Await pathology results  Diet per primary team  No contraindications for resuming anticoagulation  if continues to be inpt would plan for VCE on Monday to evaluate small bowel    Recommendations discussed with primary team  Plan discussed with GI service attending    Phi Carter MD  PGY-4 GI fellow  Pager: [phone]

## 2021-06-04 NOTE — PROGRESS NOTE ADULT - TIME BILLING
Patient seen and examined;   No discharge today;  Need ENT to follow up regarding this continued jaw pain and loss of taste;  Should MRI be done of Jaw ?  Potassium replacement;  H/H remains borderline

## 2021-06-04 NOTE — DISCHARGE NOTE PROVIDER - PROVIDER TOKENS
PROVIDER:[TOKEN:[[id number]],FOLLOWUP:[2 weeks]] PROVIDER:[TOKEN:[[id number]],FOLLOWUP:[2 weeks]],PROVIDER:[TOKEN:[[id number]],FOLLOWUP:[2 weeks]],PROVIDER:[TOKEN:[[id number]],FOLLOWUP:[2 weeks]]

## 2021-06-04 NOTE — DISCHARGE NOTE PROVIDER - DETAILS OF MALNUTRITION DIAGNOSIS/DIAGNOSES
This patient has been assessed with a concern for Malnutrition and was treated during this hospitalization for the following Nutrition diagnosis/diagnoses:     -  06/03/2021: Moderate protein-calorie malnutrition

## 2021-06-05 LAB
ANION GAP SERPL CALC-SCNC: 10 MMOL/L — SIGNIFICANT CHANGE UP (ref 5–17)
BUN SERPL-MCNC: 4 MG/DL — LOW (ref 7–23)
CALCIUM SERPL-MCNC: 8.2 MG/DL — LOW (ref 8.4–10.5)
CHLORIDE SERPL-SCNC: 100 MMOL/L — SIGNIFICANT CHANGE UP (ref 96–108)
CO2 SERPL-SCNC: 21 MMOL/L — LOW (ref 22–31)
CREAT SERPL-MCNC: 0.92 MG/DL — SIGNIFICANT CHANGE UP (ref 0.5–1.3)
GLUCOSE BLDC GLUCOMTR-MCNC: 107 MG/DL — HIGH (ref 70–99)
GLUCOSE BLDC GLUCOMTR-MCNC: 112 MG/DL — HIGH (ref 70–99)
GLUCOSE BLDC GLUCOMTR-MCNC: 113 MG/DL — HIGH (ref 70–99)
GLUCOSE BLDC GLUCOMTR-MCNC: 127 MG/DL — HIGH (ref 70–99)
GLUCOSE SERPL-MCNC: 109 MG/DL — HIGH (ref 70–99)
HCT VFR BLD CALC: 26.2 % — LOW (ref 39–50)
HGB BLD-MCNC: 8.6 G/DL — LOW (ref 13–17)
MAGNESIUM SERPL-MCNC: 2.1 MG/DL — SIGNIFICANT CHANGE UP (ref 1.6–2.6)
MCHC RBC-ENTMCNC: 28.3 PG — SIGNIFICANT CHANGE UP (ref 27–34)
MCHC RBC-ENTMCNC: 32.8 GM/DL — SIGNIFICANT CHANGE UP (ref 32–36)
MCV RBC AUTO: 86.2 FL — SIGNIFICANT CHANGE UP (ref 80–100)
NRBC # BLD: 0 /100 WBCS — SIGNIFICANT CHANGE UP (ref 0–0)
PHOSPHATE SERPL-MCNC: 2.6 MG/DL — SIGNIFICANT CHANGE UP (ref 2.5–4.5)
PLATELET # BLD AUTO: 287 K/UL — SIGNIFICANT CHANGE UP (ref 150–400)
POTASSIUM SERPL-MCNC: 4.1 MMOL/L — SIGNIFICANT CHANGE UP (ref 3.5–5.3)
POTASSIUM SERPL-SCNC: 4.1 MMOL/L — SIGNIFICANT CHANGE UP (ref 3.5–5.3)
RBC # BLD: 3.04 M/UL — LOW (ref 4.2–5.8)
RBC # FLD: 16.5 % — HIGH (ref 10.3–14.5)
SODIUM SERPL-SCNC: 131 MMOL/L — LOW (ref 135–145)
WBC # BLD: 5.33 K/UL — SIGNIFICANT CHANGE UP (ref 3.8–10.5)
WBC # FLD AUTO: 5.33 K/UL — SIGNIFICANT CHANGE UP (ref 3.8–10.5)

## 2021-06-05 PROCEDURE — 99232 SBSQ HOSP IP/OBS MODERATE 35: CPT

## 2021-06-05 RX ADMIN — CHLORHEXIDINE GLUCONATE 15 MILLILITER(S): 213 SOLUTION TOPICAL at 17:28

## 2021-06-05 RX ADMIN — Medication 100 MILLIGRAM(S): at 13:22

## 2021-06-05 RX ADMIN — DORZOLAMIDE HYDROCHLORIDE 1 DROP(S): 20 SOLUTION/ DROPS OPHTHALMIC at 13:22

## 2021-06-05 RX ADMIN — BRIMONIDINE TARTRATE 1 DROP(S): 2 SOLUTION/ DROPS OPHTHALMIC at 17:28

## 2021-06-05 RX ADMIN — PANTOPRAZOLE SODIUM 40 MILLIGRAM(S): 20 TABLET, DELAYED RELEASE ORAL at 06:20

## 2021-06-05 RX ADMIN — PANTOPRAZOLE SODIUM 40 MILLIGRAM(S): 20 TABLET, DELAYED RELEASE ORAL at 17:28

## 2021-06-05 RX ADMIN — BRIMONIDINE TARTRATE 1 DROP(S): 2 SOLUTION/ DROPS OPHTHALMIC at 06:21

## 2021-06-05 RX ADMIN — Medication 81 MILLIGRAM(S): at 12:05

## 2021-06-05 RX ADMIN — ATORVASTATIN CALCIUM 40 MILLIGRAM(S): 80 TABLET, FILM COATED ORAL at 22:06

## 2021-06-05 RX ADMIN — DORZOLAMIDE HYDROCHLORIDE 1 DROP(S): 20 SOLUTION/ DROPS OPHTHALMIC at 06:22

## 2021-06-05 RX ADMIN — Medication 100 MILLIGRAM(S): at 22:11

## 2021-06-05 RX ADMIN — Medication 25 MILLIGRAM(S): at 06:21

## 2021-06-05 RX ADMIN — POLYETHYLENE GLYCOL 3350 17 GRAM(S): 17 POWDER, FOR SOLUTION ORAL at 17:28

## 2021-06-05 RX ADMIN — Medication 50 MILLIGRAM(S): at 17:27

## 2021-06-05 RX ADMIN — FINASTERIDE 5 MILLIGRAM(S): 5 TABLET, FILM COATED ORAL at 22:06

## 2021-06-05 RX ADMIN — DORZOLAMIDE HYDROCHLORIDE 1 DROP(S): 20 SOLUTION/ DROPS OPHTHALMIC at 22:05

## 2021-06-05 RX ADMIN — POLYETHYLENE GLYCOL 3350 17 GRAM(S): 17 POWDER, FOR SOLUTION ORAL at 06:22

## 2021-06-05 RX ADMIN — PREGABALIN 1000 MICROGRAM(S): 225 CAPSULE ORAL at 12:06

## 2021-06-05 RX ADMIN — CHLORHEXIDINE GLUCONATE 15 MILLILITER(S): 213 SOLUTION TOPICAL at 06:20

## 2021-06-05 RX ADMIN — ISOSORBIDE MONONITRATE 30 MILLIGRAM(S): 60 TABLET, EXTENDED RELEASE ORAL at 12:06

## 2021-06-05 RX ADMIN — LATANOPROST 1 DROP(S): 0.05 SOLUTION/ DROPS OPHTHALMIC; TOPICAL at 22:05

## 2021-06-05 RX ADMIN — APIXABAN 2.5 MILLIGRAM(S): 2.5 TABLET, FILM COATED ORAL at 09:02

## 2021-06-05 RX ADMIN — Medication 325 MILLIGRAM(S): at 12:06

## 2021-06-05 RX ADMIN — Medication 100 MILLIGRAM(S): at 06:20

## 2021-06-05 RX ADMIN — APIXABAN 2.5 MILLIGRAM(S): 2.5 TABLET, FILM COATED ORAL at 22:06

## 2021-06-05 NOTE — PROGRESS NOTE ADULT - ASSESSMENT
79 y/o M with PMHx of prostate ca on chemo, HTN, HLD, DM II, anemia of chronic disease/DOMITILA (last tx 2 yrs ago, Hgb 8-9 on prior admissions [id number]), PE/DVT 1/2020, Afib in Eliquis 2.5 mg BID, CAD (s/p IRASEMA x 1 OM 3/30/21 on asa and Eliquis (s/p 1 week plavix 2/2 risk of GI bleed), esophagitis/gastritis/ duodenitis who presents for worsening pain/swelling and numbness of right lower jaw. 2/2 cellulitis and b/l sialoadenitis. Pt has dark stools but he is on oral iron. He was admitted for coffee ground emesis back in 01/2020 and EGD at that time showed gastritis, esophagitis, and duodenitis and gastric ulcer. He was placed on PPI. Pt was seen by Dr Leigh in the clinic for DOMITILA and positive FOBT for which a bidirectional endoscopy was planned. However since pt is hospitalized GI is consulted for inpatient scopes.    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      Polyp in the cardia. (Biopsy).      Random gastric biopsies were taken to r/p H. Pylori.      Polyps in the second part of the duodenum and distal bulb. (Biopsy).      Random biopsies were obtained to r/o celiac disease.   Colonoscopy:  Difficult cecal intubation due to tight turn and likely adhesions requiring multiple repositioning and external pressure. Mild sigmoid diverticulosis. Otherwise normal colonoscopy.       - pathology grossly unremarkable  - Diet per primary team  - has resumed anticoagulation; Hgb stable today  - planned for discharge Monday AM per primary team; plan for outpatient VCE    Thank you for allowing us to participate in the care of this patient.  GI will sign off. Please call back with any questions or concerns.     Ramana Weller MD  PGY-4, Gastroenterology Fellow  pager: [phone]

## 2021-06-05 NOTE — PROGRESS NOTE ADULT - SUBJECTIVE AND OBJECTIVE BOX
GASTROENTEROLOGY PROGRESS NOTE  Patient seen and examined at bedside. No acute events o/n. Endorses constipation, no BM for past 2 days.     PERTINENT REVIEW OF SYSTEMS:  CONSTITUTIONAL: No weakness, fevers or chills  HEENT: No visual changes; No vertigo or throat pain   GASTROINTESTINAL: As above.  NEUROLOGICAL: No numbness or weakness  SKIN: No itching, burning, rashes, or lesions     Allergies    ACE inhibitors (Angioedema)    Intolerances      MEDICATIONS:  MEDICATIONS  (STANDING):  apixaban 2.5 milliGRAM(s) Oral every 12 hours  aspirin enteric coated 81 milliGRAM(s) Oral daily  atorvastatin 40 milliGRAM(s) Oral at bedtime  brimonidine 0.2% Ophthalmic Solution 1 Drop(s) Both EYES two times a day  chlorhexidine 0.12% Liquid 15 milliLiter(s) Oral Mucosa two times a day  clindamycin IVPB 600 milliGRAM(s) IV Intermittent every 8 hours  cyanocobalamin 1000 MICROGram(s) Oral daily  dextrose 40% Gel 15 Gram(s) Oral once  dextrose 5%. 1000 milliLiter(s) (50 mL/Hr) IV Continuous <Continuous>  dextrose 5%. 1000 milliLiter(s) (100 mL/Hr) IV Continuous <Continuous>  dextrose 50% Injectable 25 Gram(s) IV Push once  dextrose 50% Injectable 12.5 Gram(s) IV Push once  dextrose 50% Injectable 25 Gram(s) IV Push once  dorzolamide 2% Ophthalmic Solution 1 Drop(s) Both EYES three times a day  ferrous    sulfate 325 milliGRAM(s) Oral daily  finasteride 5 milliGRAM(s) Oral at bedtime  glucagon  Injectable 1 milliGRAM(s) IntraMuscular once  hydrochlorothiazide 25 milliGRAM(s) Oral daily  insulin lispro (ADMELOG) corrective regimen sliding scale   SubCutaneous Before meals and at bedtime  isosorbide   mononitrate ER Tablet (IMDUR) 30 milliGRAM(s) Oral daily  latanoprost 0.005% Ophthalmic Solution 1 Drop(s) Both EYES at bedtime  metoprolol succinate ER 50 milliGRAM(s) Oral every 24 hours  pantoprazole  Injectable 40 milliGRAM(s) IV Push every 12 hours  polyethylene glycol 3350 17 Gram(s) Oral two times a day  senna 2 Tablet(s) Oral at bedtime    MEDICATIONS  (PRN):  acetaminophen   Tablet .. 650 milliGRAM(s) Oral every 6 hours PRN Temp greater or equal to 38C (100.4F), Mild Pain (1 - 3)  ALBUTerol    90 MICROgram(s) HFA Inhaler 2 Puff(s) Inhalation every 6 hours PRN Shortness of Breath and/or Wheezing    Vital Signs Last 24 Hrs  T(C): 36.9 (05 Jun 2021 12:22), Max: 37 (05 Jun 2021 05:00)  T(F): 98.4 (05 Jun 2021 12:22), Max: 98.6 (05 Jun 2021 05:00)  HR: 68 (05 Jun 2021 12:22) (61 - 71)  BP: 128/70 (05 Jun 2021 12:22) (108/68 - 128/70)  BP(mean): --  RR: 18 (05 Jun 2021 12:22) (17 - 18)  SpO2: 96% (05 Jun 2021 12:22) (96% - 98%)    PHYSICAL EXAM:    General: in no acute distress  HEENT: MMM, conjunctiva and sclera clear  Gastrointestinal: Soft non-tender non-distended; No rebound or guarding  Skin: Warm and dry. No obvious rash    LABS:                        8.6    5.33  )-----------( 287      ( 05 Jun 2021 06:59 )             26.2     06-05    131<L>  |  100  |  4<L>  ----------------------------<  109<H>  4.1   |  21<L>  |  0.92    Ca    8.2<L>      05 Jun 2021 06:59  Phos  2.6     06-05  Mg     2.1     06-05                        RADIOLOGY & ADDITIONAL STUDIES:  Reviewed

## 2021-06-05 NOTE — PROGRESS NOTE ADULT - ASSESSMENT
81 y/o M with PMHx of prostate ca on chemo, HTN, HLD, DM II, anemia of chronic disease/DOMITILA (last tx 2 yrs ago, Hgb 8-9 on prior admissions [id number]), PE/DVT 1/2020, Afib in Eliquis 2.5 mg BID, CAD (s/p IRASEMA x 1 OM 3/30/21 on asa and Eliquis (s/p 1 week plavix 2/2 risk of GI bleed), esophagitis/gastritis/ duodenitis who presents for worsening pain/swelling and numbness of right lower jaw. Admitted for cellulitis and b/l sialoadenitis after failed PO Augmentin. 79 y/o M with PMHx of prostate ca on chemo, HTN, HLD, DM II, anemia of chronic disease/DOMITILA (last tx 2 yrs ago, Hgb 8-9 on prior admissions 5229-2117), PE/DVT 1/2020, Afib in Eliquis 2.5 mg BID, CAD (s/p IRASEMA x 1 OM 3/30/21 on asa and Eliquis (s/p 1 week plavix 2/2 risk of GI bleed), esophagitis/gastritis/ duodenitis who presents for worsening pain/swelling and numbness of right lower jaw. Admitted for cellulitis and b/l sialoadenitis after failed PO Augmentin, on IV clindamycin.

## 2021-06-05 NOTE — PROGRESS NOTE ADULT - PROBLEM SELECTOR PLAN 10
F: none   E: replete as needed  N: pureed   DVT ppx: eliquis   GI pps: protonix   Dispo: [facility]

## 2021-06-05 NOTE — PROGRESS NOTE ADULT - PROBLEM SELECTOR PLAN 2
hx of DOMITILA/ AOCD, on iron PO BID. Hb usually between 8-9 at baseline, EGD in 2020 revealing esophagitis, gastritis, and duodenitis with clean based duodenal ulcer. No active signs/symptoms of bleed.  Hgb stable at ~9, s/p 2u prbc. EGD/colonoscopy with no signs of active bleed.    - active type and screen (5/30)  -transfuse for Hgb <8 hx of DOMITILA/ AOCD, on iron PO BID. Hb usually between 8-9 at baseline, EGD in 2020 revealing esophagitis, gastritis, and duodenitis with clean based duodenal ulcer. No active signs/symptoms of bleed.  Hgb stable at ~9, s/p 2u prbc. EGD/colonoscopy with no signs of active bleed.    - active type and screen (5/30)  -transfuse for Hgb <8  -as per GI, can plan for VCE outpatient w/ GI if leaving on Monday 6/7

## 2021-06-05 NOTE — PROGRESS NOTE ADULT - SUBJECTIVE AND OBJECTIVE BOX
Interval Events: Reviewed  Patient seen and examined at bedside.    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    Glaucoma  Glaucoma    Type 2 diabetes mellitus  Diabetes    Essential hypertension  Hypertension    Chemotherapy session for neoplasm  Prostate Cancer    Pulmonary embolism  1/2020    DVT (deep venous thrombosis)  1/2020 on Eliquis    Atrial fibrillation  on Eliquis    Other postprocedural status  S/P prostatectomy        MEDICATIONS:  Pulmonary:  ALBUTerol    90 MICROgram(s) HFA Inhaler 2 Puff(s) Inhalation every 6 hours PRN    Antimicrobials:  clindamycin IVPB 600 milliGRAM(s) IV Intermittent every 8 hours    Anticoagulants:  apixaban 2.5 milliGRAM(s) Oral every 12 hours  aspirin enteric coated 81 milliGRAM(s) Oral daily    Cardiac:  hydrochlorothiazide 25 milliGRAM(s) Oral daily  isosorbide   mononitrate ER Tablet (IMDUR) 30 milliGRAM(s) Oral daily  metoprolol succinate ER 50 milliGRAM(s) Oral every 24 hours      Allergies    ACE inhibitors (Angioedema)    Intolerances        Vital Signs Last 24 Hrs  T(C): 37 (05 Jun 2021 05:00), Max: 37 (05 Jun 2021 05:00)  T(F): 98.6 (05 Jun 2021 05:00), Max: 98.6 (05 Jun 2021 05:00)  HR: 71 (05 Jun 2021 05:00) (61 - 71)  BP: 119/63 (05 Jun 2021 05:00) (108/68 - 144/72)  BP(mean): --  RR: 17 (05 Jun 2021 05:00) (17 - 18)  SpO2: 98% (05 Jun 2021 05:00) (98% - 99%)        Review of Systems:   •	General: negative  •	Skin/Breast: negative  •	Ophthalmologic: negative  •	ENMT: negative  •	Respiratory and Thorax: negative  •	Cardiovascular: negative  •	Gastrointestinal: negative  •	Genitourinary: negative  •	Musculoskeletal: negative  •	Neurological: negative  •	Psychiatric: negative  •	Hematology/Lymphatics: negative  •	Endocrine: negative  •	Allergic/Immunologic: negative    Physical Exam:   Constitutional: NAD, comfortable in bed.  HEENT: NC/AT, PERRLA, EOMI, poor dentition, no swelling or tenderness noted in R jaw; no conjunctival pallor or scleral icterus, MMM  Neck: Supple, no JVD  Respiratory: CTA B/L. No w/r/r.   Cardiovascular: RRR, normal S1 and S2, no m/r/g.   Gastrointestinal: +BS, soft NTND, no guarding or rebound tenderness, no palpable masses   Extremities: wwp; no cyanosis, clubbing or edema.   Vascular: Pulses equal and strong throughout.   Neurological: AAOx3, no CN deficits, strength deficits throughout. Decreased sensation in V3 distribution on R side   Skin: No gross skin abnormalities or rashes        LABS:      CBC Full  -  ( 04 Jun 2021 06:57 )  WBC Count : 5.10 K/uL  RBC Count : 2.98 M/uL  Hemoglobin : 8.4 g/dL  Hematocrit : 25.4 %  Platelet Count - Automated : 297 K/uL  Mean Cell Volume : 85.2 fl  Mean Cell Hemoglobin : 28.2 pg  Mean Cell Hemoglobin Concentration : 33.1 gm/dL  Auto Neutrophil # : x  Auto Lymphocyte # : x  Auto Monocyte # : x  Auto Eosinophil # : x  Auto Basophil # : x  Auto Neutrophil % : x  Auto Lymphocyte % : x  Auto Monocyte % : x  Auto Eosinophil % : x  Auto Basophil % : x    06-04    129<L>  |  96  |  5<L>  ----------------------------<  113<H>  2.8<LL>   |  21<L>  |  0.98    Ca    7.4<L>      04 Jun 2021 06:57  Phos  3.3     06-04  Mg     1.2     06-04                          RADIOLOGY & ADDITIONAL STUDIES (The following images were personally reviewed):  Kim:                                     No  Urine output:                       adequate  DVT prophylaxis:                 Yes  Flattus:                                  Yes  Bowel movement:              No

## 2021-06-05 NOTE — PROGRESS NOTE ADULT - PROBLEM SELECTOR PLAN 10
F: none   E: replete as needed  N: pureed   DVT ppx: eliquis   GI pps: protonix   Dispo: [facility]

## 2021-06-05 NOTE — PROGRESS NOTE ADULT - PROBLEM SELECTOR PLAN 2
hx of DOMITILA/ AOCD, on iron PO BID. Hb usually between 8-9 at baseline, EGD in 2020 revealing esophagitis, gastritis, and duodenitis with clean based duodenal ulcer. No active signs/symptoms of bleed.  Hgb stable at ~9, s/p 2u prbc. EGD/colonoscopy with no signs of active bleed.    - active type and screen (5/30)  -transfuse for Hgb <8

## 2021-06-05 NOTE — PROGRESS NOTE ADULT - ASSESSMENT
81 y/o M with PMHx of prostate ca on chemo, HTN, HLD, DM II, anemia of chronic disease/DOMITILA (last tx 2 yrs ago, Hgb 8-9 on prior admissions [id number]), PE/DVT 1/2020, Afib in Eliquis 2.5 mg BID, CAD (s/p IRASEMA x 1 OM 3/30/21 on asa and Eliquis (s/p 1 week plavix 2/2 risk of GI bleed), esophagitis/gastritis/ duodenitis who presents for worsening pain/swelling and numbness of right lower jaw. Admitted for cellulitis and b/l sialoadenitis after failed PO Augmentin.

## 2021-06-06 LAB
ALBUMIN SERPL ELPH-MCNC: 2.9 G/DL — LOW (ref 3.3–5)
ALP SERPL-CCNC: 40 U/L — SIGNIFICANT CHANGE UP (ref 40–120)
ALT FLD-CCNC: 11 U/L — SIGNIFICANT CHANGE UP (ref 10–45)
ANION GAP SERPL CALC-SCNC: 8 MMOL/L — SIGNIFICANT CHANGE UP (ref 5–17)
AST SERPL-CCNC: 22 U/L — SIGNIFICANT CHANGE UP (ref 10–40)
BASOPHILS # BLD AUTO: 0.03 K/UL — SIGNIFICANT CHANGE UP (ref 0–0.2)
BASOPHILS NFR BLD AUTO: 0.4 % — SIGNIFICANT CHANGE UP (ref 0–2)
BILIRUB SERPL-MCNC: 0.3 MG/DL — SIGNIFICANT CHANGE UP (ref 0.2–1.2)
BUN SERPL-MCNC: 5 MG/DL — LOW (ref 7–23)
CALCIUM SERPL-MCNC: 8.8 MG/DL — SIGNIFICANT CHANGE UP (ref 8.4–10.5)
CHLORIDE SERPL-SCNC: 99 MMOL/L — SIGNIFICANT CHANGE UP (ref 96–108)
CO2 SERPL-SCNC: 23 MMOL/L — SIGNIFICANT CHANGE UP (ref 22–31)
CREAT SERPL-MCNC: 0.91 MG/DL — SIGNIFICANT CHANGE UP (ref 0.5–1.3)
EOSINOPHIL # BLD AUTO: 0.54 K/UL — HIGH (ref 0–0.5)
EOSINOPHIL NFR BLD AUTO: 8.1 % — HIGH (ref 0–6)
GLUCOSE BLDC GLUCOMTR-MCNC: 100 MG/DL — HIGH (ref 70–99)
GLUCOSE BLDC GLUCOMTR-MCNC: 105 MG/DL — HIGH (ref 70–99)
GLUCOSE BLDC GLUCOMTR-MCNC: 116 MG/DL — HIGH (ref 70–99)
GLUCOSE BLDC GLUCOMTR-MCNC: 154 MG/DL — HIGH (ref 70–99)
GLUCOSE SERPL-MCNC: 104 MG/DL — HIGH (ref 70–99)
HCT VFR BLD CALC: 26.6 % — LOW (ref 39–50)
HGB BLD-MCNC: 8.7 G/DL — LOW (ref 13–17)
IMM GRANULOCYTES NFR BLD AUTO: 0.7 % — SIGNIFICANT CHANGE UP (ref 0–1.5)
LYMPHOCYTES # BLD AUTO: 1.51 K/UL — SIGNIFICANT CHANGE UP (ref 1–3.3)
LYMPHOCYTES # BLD AUTO: 22.6 % — SIGNIFICANT CHANGE UP (ref 13–44)
MAGNESIUM SERPL-MCNC: 1.6 MG/DL — SIGNIFICANT CHANGE UP (ref 1.6–2.6)
MCHC RBC-ENTMCNC: 28.1 PG — SIGNIFICANT CHANGE UP (ref 27–34)
MCHC RBC-ENTMCNC: 32.7 GM/DL — SIGNIFICANT CHANGE UP (ref 32–36)
MCV RBC AUTO: 85.8 FL — SIGNIFICANT CHANGE UP (ref 80–100)
MONOCYTES # BLD AUTO: 0.83 K/UL — SIGNIFICANT CHANGE UP (ref 0–0.9)
MONOCYTES NFR BLD AUTO: 12.4 % — SIGNIFICANT CHANGE UP (ref 2–14)
NEUTROPHILS # BLD AUTO: 3.72 K/UL — SIGNIFICANT CHANGE UP (ref 1.8–7.4)
NEUTROPHILS NFR BLD AUTO: 55.8 % — SIGNIFICANT CHANGE UP (ref 43–77)
NRBC # BLD: 0 /100 WBCS — SIGNIFICANT CHANGE UP (ref 0–0)
PHOSPHATE SERPL-MCNC: 3.6 MG/DL — SIGNIFICANT CHANGE UP (ref 2.5–4.5)
PLATELET # BLD AUTO: 283 K/UL — SIGNIFICANT CHANGE UP (ref 150–400)
POTASSIUM SERPL-MCNC: 3.9 MMOL/L — SIGNIFICANT CHANGE UP (ref 3.5–5.3)
POTASSIUM SERPL-SCNC: 3.9 MMOL/L — SIGNIFICANT CHANGE UP (ref 3.5–5.3)
PROT SERPL-MCNC: 5.1 G/DL — LOW (ref 6–8.3)
RBC # BLD: 3.1 M/UL — LOW (ref 4.2–5.8)
RBC # FLD: 16.4 % — HIGH (ref 10.3–14.5)
SODIUM SERPL-SCNC: 130 MMOL/L — LOW (ref 135–145)
WBC # BLD: 6.68 K/UL — SIGNIFICANT CHANGE UP (ref 3.8–10.5)
WBC # FLD AUTO: 6.68 K/UL — SIGNIFICANT CHANGE UP (ref 3.8–10.5)

## 2021-06-06 PROCEDURE — 99232 SBSQ HOSP IP/OBS MODERATE 35: CPT

## 2021-06-06 RX ADMIN — FINASTERIDE 5 MILLIGRAM(S): 5 TABLET, FILM COATED ORAL at 21:42

## 2021-06-06 RX ADMIN — DORZOLAMIDE HYDROCHLORIDE 1 DROP(S): 20 SOLUTION/ DROPS OPHTHALMIC at 21:41

## 2021-06-06 RX ADMIN — BRIMONIDINE TARTRATE 1 DROP(S): 2 SOLUTION/ DROPS OPHTHALMIC at 06:12

## 2021-06-06 RX ADMIN — PANTOPRAZOLE SODIUM 40 MILLIGRAM(S): 20 TABLET, DELAYED RELEASE ORAL at 06:18

## 2021-06-06 RX ADMIN — POLYETHYLENE GLYCOL 3350 17 GRAM(S): 17 POWDER, FOR SOLUTION ORAL at 18:25

## 2021-06-06 RX ADMIN — Medication 50 MILLIGRAM(S): at 18:25

## 2021-06-06 RX ADMIN — PREGABALIN 1000 MICROGRAM(S): 225 CAPSULE ORAL at 10:06

## 2021-06-06 RX ADMIN — Medication 325 MILLIGRAM(S): at 10:06

## 2021-06-06 RX ADMIN — Medication 25 MILLIGRAM(S): at 06:12

## 2021-06-06 RX ADMIN — POLYETHYLENE GLYCOL 3350 17 GRAM(S): 17 POWDER, FOR SOLUTION ORAL at 06:13

## 2021-06-06 RX ADMIN — ISOSORBIDE MONONITRATE 30 MILLIGRAM(S): 60 TABLET, EXTENDED RELEASE ORAL at 11:11

## 2021-06-06 RX ADMIN — Medication 1: at 21:48

## 2021-06-06 RX ADMIN — DORZOLAMIDE HYDROCHLORIDE 1 DROP(S): 20 SOLUTION/ DROPS OPHTHALMIC at 14:01

## 2021-06-06 RX ADMIN — APIXABAN 2.5 MILLIGRAM(S): 2.5 TABLET, FILM COATED ORAL at 10:06

## 2021-06-06 RX ADMIN — BRIMONIDINE TARTRATE 1 DROP(S): 2 SOLUTION/ DROPS OPHTHALMIC at 18:25

## 2021-06-06 RX ADMIN — Medication 100 MILLIGRAM(S): at 06:18

## 2021-06-06 RX ADMIN — APIXABAN 2.5 MILLIGRAM(S): 2.5 TABLET, FILM COATED ORAL at 21:41

## 2021-06-06 RX ADMIN — CHLORHEXIDINE GLUCONATE 15 MILLILITER(S): 213 SOLUTION TOPICAL at 18:25

## 2021-06-06 RX ADMIN — ATORVASTATIN CALCIUM 40 MILLIGRAM(S): 80 TABLET, FILM COATED ORAL at 21:43

## 2021-06-06 RX ADMIN — Medication 81 MILLIGRAM(S): at 10:06

## 2021-06-06 RX ADMIN — PANTOPRAZOLE SODIUM 40 MILLIGRAM(S): 20 TABLET, DELAYED RELEASE ORAL at 18:25

## 2021-06-06 RX ADMIN — LATANOPROST 1 DROP(S): 0.05 SOLUTION/ DROPS OPHTHALMIC; TOPICAL at 21:41

## 2021-06-06 RX ADMIN — DORZOLAMIDE HYDROCHLORIDE 1 DROP(S): 20 SOLUTION/ DROPS OPHTHALMIC at 06:13

## 2021-06-06 RX ADMIN — CHLORHEXIDINE GLUCONATE 15 MILLILITER(S): 213 SOLUTION TOPICAL at 06:13

## 2021-06-06 NOTE — PROGRESS NOTE ADULT - PROBLEM SELECTOR PLAN 5
- pt with hx of PE/DVT, on eliquis
- pt with hx of PE/DVT  - holding AC as above
- pt with hx of PE/DVT, on eliquis

## 2021-06-06 NOTE — PROGRESS NOTE ADULT - ASSESSMENT
79 y/o M with PMHx of prostate ca on chemo, HTN, HLD, DM II, anemia of chronic disease/DOMITILA (last tx 2 yrs ago, Hgb 8-9 on prior admissions [id number]), PE/DVT 1/2020, Afib in Eliquis 2.5 mg BID, CAD (s/p IRASEMA x 1 OM 3/30/21 on asa and Eliquis (s/p 1 week plavix 2/2 risk of GI bleed), esophagitis/gastritis/ duodenitis who presents for worsening pain/swelling and numbness of right lower jaw. Admitted for cellulitis and b/l sialoadenitis after failed PO Augmentin, on IV clindamycin.

## 2021-06-06 NOTE — PROGRESS NOTE ADULT - PROBLEM SELECTOR PLAN 6
home meds: imdur 30mg daily, hctx 25mg daily, toprol 50mg daily. recently taken off lisinopril 2/2 angioedema reaction   - continue home medications
home meds: imdur 30mg daily, hctx 25mg daily, toprol 50mg daily. recently taken off lisinopril 2/2 angioedema reaction   - continue home medications
- home meds: imdur 30mg daily, hctx 25mg daily, toprol 50mg daily  - recently taken off lisinopril 2/2 angioedema reaction   - continue home medications
home meds: imdur 30mg daily, hctx 25mg daily, toprol 50mg daily. recently taken off lisinopril 2/2 angioedema reaction   - continue home medications
- home meds: imdur 30mg daily, hctx 25mg daily, toprol 50mg daily  - recently taken off lisinopril 2/2 angioedema reaction   - continue home medications
home meds: imdur 30mg daily, hctx 25mg daily, toprol 50mg daily. recently taken off lisinopril 2/2 angioedema reaction   - continue home medications
home meds: imdur 30mg daily, hctx 25mg daily, toprol 50mg daily. recently taken off lisinopril 2/2 angioedema reaction   - continue home medications
- home meds: imdur 30mg daily, hctx 25mg daily, toprol 50mg daily  - recently taken off lisinopril 2/2 angioedema reaction   - continue home medications

## 2021-06-06 NOTE — PROGRESS NOTE ADULT - PROBLEM SELECTOR PLAN 4
- continue with toprol 50mg daily, eliquis 2.5 mg BID
- continue with toprol 50mg daily, eliquis 2.5 mg BID
- continue with toprol 50mg daily   - hold eliquis 2.5mg BID in setting of acute drop in Hb and r/p GI bleed
- continue with toprol 50mg daily, eliquis 2.5 mg BID
- continue with toprol 50mg daily   - hold eliquis 2.5mg BID in setting of acute drop in Hb and r/p GI bleed
- continue with toprol 50mg daily, eliquis 2.5 mg BID
- continue with toprol 50mg daily   - hold eliquis 2.5mg BID in setting of acute drop in Hb and r/p GI bleed
- continue with toprol 50mg daily   - hold eliquis 2.5mg BID in setting of acute drop in Hb and r/p GI bleed

## 2021-06-06 NOTE — PROGRESS NOTE ADULT - PROBLEM SELECTOR PROBLEM 9
Malignant neoplasm of prostate

## 2021-06-06 NOTE — PROGRESS NOTE ADULT - PROBLEM SELECTOR PLAN 10
F: none   E: replete as needed  N: pureed   DVT ppx: eliquis   GI pps: protonix   Dispo: [facility]

## 2021-06-06 NOTE — PROGRESS NOTE ADULT - PROBLEM SELECTOR PLAN 3
s/p IRASEMA x 1 OM 3/30/21 on asa and Eliquis (s/p 1 week plavix 2/2 risk of GI bleed)  - continue with asa 81mg, eliquis 2.5 mg bID   - c/w atorvastatin 40
s/p IRASEMA x 1 OM 3/30/21 on asa and Eliquis (s/p 1 week plavix 2/2 risk of GI bleed)  - continue with asa 81mg, eliquis 2.5 mg bID   - c/w atorvastatin 40
-s/p IRASEMA x 1 OM 3/30/21 on asa and Eliquis (s/p 1 week plavix 2/2 risk of GI bleed)  - continue with asa 81mg, holding Eliquis in setting of acute drop in Hb  - c/w atorvastatin 40
s/p IRASEMA x 1 OM 3/30/21 on asa and Eliquis (s/p 1 week plavix 2/2 risk of GI bleed)  - continue with asa 81mg, eliquis 2.5 mg bID   - c/w atorvastatin 40
s/p IRASEMA x 1 OM 3/30/21 on asa and Eliquis (s/p 1 week plavix 2/2 risk of GI bleed)  - continue with asa 81mg, holding Eliquis in setting of acute drop in Hb  - c/w atorvastatin 40
s/p IRASEMA x 1 OM 3/30/21 on asa and Eliquis (s/p 1 week plavix 2/2 risk of GI bleed)  - continue with asa 81mg, eliquis 2.5 mg bID   - c/w atorvastatin 40
-s/p IRASEMA x 1 OM 3/30/21 on asa and Eliquis (s/p 1 week plavix 2/2 risk of GI bleed)  - continue with asa 81mg, holding Eliquis in setting of acute drop in Hb  - c/w atorvastatin 40
-s/p IRASEMA x 1 OM 3/30/21 on asa and Eliquis (s/p 1 week plavix 2/2 risk of GI bleed)  - continue with asa 81mg, holding Eliquis in setting of acute drop in Hb  - c/w atorvastatin 40

## 2021-06-06 NOTE — PROGRESS NOTE ADULT - SUBJECTIVE AND OBJECTIVE BOX
Interval Events: Reviewed  Patient seen and examined at bedside.    Patient is a 80y old  Male who presents with a chief complaint of submandibular cellulitis (05 Jun 2021 14:32)  no acute event overnight, right face is feeling a little better. , RA97%      PAST MEDICAL & SURGICAL HISTORY:  Hyperlipidemia  Hyperlipidemia    Malignant neoplasm of prostate  s/p total prostatectomy in 1994 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    Glaucoma  Glaucoma    Type 2 diabetes mellitus  Diabetes    Essential hypertension  Hypertension    Chemotherapy session for neoplasm  Prostate Cancer    Pulmonary embolism  1/2020    DVT (deep venous thrombosis)  1/2020 on Eliquis    Atrial fibrillation  on Eliquis    Other postprocedural status  S/P prostatectomy        MEDICATIONS:  Pulmonary:  ALBUTerol    90 MICROgram(s) HFA Inhaler 2 Puff(s) Inhalation every 6 hours PRN    Antimicrobials:  clindamycin IVPB 600 milliGRAM(s) IV Intermittent every 8 hours    Anticoagulants:  apixaban 2.5 milliGRAM(s) Oral every 12 hours  aspirin enteric coated 81 milliGRAM(s) Oral daily    Cardiac:  hydrochlorothiazide 25 milliGRAM(s) Oral daily  isosorbide   mononitrate ER Tablet (IMDUR) 30 milliGRAM(s) Oral daily  metoprolol succinate ER 50 milliGRAM(s) Oral every 24 hours      Allergies    ACE inhibitors (Angioedema)    Intolerances        Vital Signs Last 24 Hrs  T(C): 36.4 (06 Jun 2021 06:11), Max: 36.9 (05 Jun 2021 12:22)  T(F): 97.6 (06 Jun 2021 06:11), Max: 98.4 (05 Jun 2021 12:22)  HR: 68 (06 Jun 2021 06:11) (63 - 68)  BP: 135/64 (06 Jun 2021 06:11) (115/66 - 135/64)  BP(mean): --  RR: 18 (06 Jun 2021 06:11) (18 - 18)  SpO2: 100% (06 Jun 2021 06:11) (96% - 100%)    06-05 @ 07:01  -  06-06 @ 06:26  --------------------------------------------------------  IN: 0 mL / OUT: 200 mL / NET: -200 mL          Review of Systems:   •	General: negative  •	Skin/Breast: negative  •	Ophthalmologic: negative  •	ENMT: negative  •	Respiratory and Thorax: negative  •	Cardiovascular: negative  •	Gastrointestinal: negative  •	Genitourinary: negative  •	Musculoskeletal: negative  •	Neurological: negative  •	Psychiatric: negative  •	Hematology/Lymphatics: negative  •	Endocrine: negative  •	Allergic/Immunologic: negative    Physical Exam:   Constitutional: NAD, comfortable in bed.  HEENT: NC/AT, PERRLA, EOMI, poor dentition, no swelling or tenderness noted in R jaw; no conjunctival pallor or scleral icterus, MMM  Neck: Supple, no JVD  Respiratory: CTA B/L. No w/r/r.   Cardiovascular: RRR, normal S1 and S2, no m/r/g.   Gastrointestinal: +BS, soft NTND, no guarding or rebound tenderness, no palpable masses   Extremities: wwp; no cyanosis, clubbing or edema.   Vascular: Pulses equal and strong throughout.   Neurological: AAOx3, no CN deficits, strength deficits throughout. Decreased sensation in V3 distribution on R side   Skin: No gross skin abnormalities or rashes        LABS:      CBC Full  -  ( 05 Jun 2021 06:59 )  WBC Count : 5.33 K/uL  RBC Count : 3.04 M/uL  Hemoglobin : 8.6 g/dL  Hematocrit : 26.2 %  Platelet Count - Automated : 287 K/uL  Mean Cell Volume : 86.2 fl  Mean Cell Hemoglobin : 28.3 pg  Mean Cell Hemoglobin Concentration : 32.8 gm/dL  Auto Neutrophil # : x  Auto Lymphocyte # : x  Auto Monocyte # : x  Auto Eosinophil # : x  Auto Basophil # : x  Auto Neutrophil % : x  Auto Lymphocyte % : x  Auto Monocyte % : x  Auto Eosinophil % : x  Auto Basophil % : x    06-05    131<L>  |  100  |  4<L>  ----------------------------<  109<H>  4.1   |  21<L>  |  0.92    Ca    8.2<L>      05 Jun 2021 06:59  Phos  2.6     06-05  Mg     2.1     06-05                          RADIOLOGY & ADDITIONAL STUDIES (The following images were personally reviewed):  Kim:                                     No  Urine output:                       adequate  DVT prophylaxis:                 Yes  Flattus:                                  Yes  Bowel movement:              No   Interval Events: Reviewed  Patient seen and examined at bedside.    Patient is a 80y old  Male who presents with a chief complaint of submandibular cellulitis (05 Jun 2021 14:32)  no acute event overnight, right face is feeling a little better. , RA97%, positive small BM yesterday.       PAST MEDICAL & SURGICAL HISTORY:  Hyperlipidemia  Hyperlipidemia    Malignant neoplasm of prostate  s/p total prostatectomy in 1994 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    Glaucoma  Glaucoma    Type 2 diabetes mellitus  Diabetes    Essential hypertension  Hypertension    Chemotherapy session for neoplasm  Prostate Cancer    Pulmonary embolism  1/2020    DVT (deep venous thrombosis)  1/2020 on Eliquis    Atrial fibrillation  on Eliquis    Other postprocedural status  S/P prostatectomy        MEDICATIONS:  Pulmonary:  ALBUTerol    90 MICROgram(s) HFA Inhaler 2 Puff(s) Inhalation every 6 hours PRN    Antimicrobials:  clindamycin IVPB 600 milliGRAM(s) IV Intermittent every 8 hours    Anticoagulants:  apixaban 2.5 milliGRAM(s) Oral every 12 hours  aspirin enteric coated 81 milliGRAM(s) Oral daily    Cardiac:  hydrochlorothiazide 25 milliGRAM(s) Oral daily  isosorbide   mononitrate ER Tablet (IMDUR) 30 milliGRAM(s) Oral daily  metoprolol succinate ER 50 milliGRAM(s) Oral every 24 hours      Allergies    ACE inhibitors (Angioedema)    Intolerances        Vital Signs Last 24 Hrs  T(C): 36.4 (06 Jun 2021 06:11), Max: 36.9 (05 Jun 2021 12:22)  T(F): 97.6 (06 Jun 2021 06:11), Max: 98.4 (05 Jun 2021 12:22)  HR: 68 (06 Jun 2021 06:11) (63 - 68)  BP: 135/64 (06 Jun 2021 06:11) (115/66 - 135/64)  BP(mean): --  RR: 18 (06 Jun 2021 06:11) (18 - 18)  SpO2: 100% (06 Jun 2021 06:11) (96% - 100%)    06-05 @ 07:01  -  06-06 @ 06:26  --------------------------------------------------------  IN: 0 mL / OUT: 200 mL / NET: -200 mL          Review of Systems:   •	General: negative  •	Skin/Breast: negative  •	Ophthalmologic: negative  •	ENMT: negative  •	Respiratory and Thorax: negative  •	Cardiovascular: negative  •	Gastrointestinal: negative  •	Genitourinary: negative  •	Musculoskeletal: negative  •	Neurological: negative  •	Psychiatric: negative  •	Hematology/Lymphatics: negative  •	Endocrine: negative  •	Allergic/Immunologic: negative    Physical Exam:   Constitutional: NAD, comfortable in bed.  HEENT: NC/AT, PERRLA, EOMI, poor dentition, no swelling or tenderness noted in R jaw; no conjunctival pallor or scleral icterus, MMM  Neck: Supple, no JVD  Respiratory: CTA B/L. No w/r/r.   Cardiovascular: RRR, normal S1 and S2, no m/r/g.   Gastrointestinal: +BS, soft NTND, no guarding or rebound tenderness, no palpable masses   Extremities: wwp; no cyanosis, clubbing or edema.   Vascular: Pulses equal and strong throughout.   Neurological: AAOx3, no CN deficits, strength deficits throughout. Decreased sensation in V3 distribution on R side   Skin: No gross skin abnormalities or rashes        LABS:      CBC Full  -  ( 05 Jun 2021 06:59 )  WBC Count : 5.33 K/uL  RBC Count : 3.04 M/uL  Hemoglobin : 8.6 g/dL  Hematocrit : 26.2 %  Platelet Count - Automated : 287 K/uL  Mean Cell Volume : 86.2 fl  Mean Cell Hemoglobin : 28.3 pg  Mean Cell Hemoglobin Concentration : 32.8 gm/dL  Auto Neutrophil # : x  Auto Lymphocyte # : x  Auto Monocyte # : x  Auto Eosinophil # : x  Auto Basophil # : x  Auto Neutrophil % : x  Auto Lymphocyte % : x  Auto Monocyte % : x  Auto Eosinophil % : x  Auto Basophil % : x    06-05    131<L>  |  100  |  4<L>  ----------------------------<  109<H>  4.1   |  21<L>  |  0.92    Ca    8.2<L>      05 Jun 2021 06:59  Phos  2.6     06-05  Mg     2.1     06-05                          RADIOLOGY & ADDITIONAL STUDIES (The following images were personally reviewed):  Kim:                                     No  Urine output:                       adequate  DVT prophylaxis:                 Yes  Flattus:                                  Yes  Bowel movement:              No

## 2021-06-06 NOTE — PROGRESS NOTE ADULT - PROBLEM SELECTOR PLAN 2
hx of DOMITILA/ AOCD, on iron PO BID. Hb usually between 8-9 at baseline, EGD in 2020 revealing esophagitis, gastritis, and duodenitis with clean based duodenal ulcer. No active signs/symptoms of bleed.  Hgb stable at ~9, s/p 2u prbc. EGD/colonoscopy with no signs of active bleed.    - active type and screen (5/30)  -transfuse for Hgb <8  -as per GI, can plan for VCE outpatient w/ GI if leaving on Monday 6/7

## 2021-06-06 NOTE — PROGRESS NOTE ADULT - PROBLEM SELECTOR PLAN 8
- continue with home eye drops  - latanoprost, dorzolamide, and brimonidine

## 2021-06-06 NOTE — PROGRESS NOTE ADULT - PROBLEM SELECTOR PLAN 9
- S/p prostatectomy (1994) and currently on Lupron/Xgeva as an outpatient, followed by Dr. Fuller.

## 2021-06-07 ENCOUNTER — TRANSCRIPTION ENCOUNTER (OUTPATIENT)
Age: 81
End: 2021-06-07

## 2021-06-07 VITALS — HEART RATE: 63 BPM | DIASTOLIC BLOOD PRESSURE: 67 MMHG | SYSTOLIC BLOOD PRESSURE: 129 MMHG

## 2021-06-07 DIAGNOSIS — L03.90 CELLULITIS, UNSPECIFIED: ICD-10-CM

## 2021-06-07 LAB
ALBUMIN SERPL ELPH-MCNC: 2.6 G/DL — LOW (ref 3.3–5)
ALP SERPL-CCNC: 40 U/L — SIGNIFICANT CHANGE UP (ref 40–120)
ALT FLD-CCNC: 10 U/L — SIGNIFICANT CHANGE UP (ref 10–45)
ANION GAP SERPL CALC-SCNC: 12 MMOL/L — SIGNIFICANT CHANGE UP (ref 5–17)
AST SERPL-CCNC: 20 U/L — SIGNIFICANT CHANGE UP (ref 10–40)
BASOPHILS # BLD AUTO: 0.04 K/UL — SIGNIFICANT CHANGE UP (ref 0–0.2)
BASOPHILS NFR BLD AUTO: 0.6 % — SIGNIFICANT CHANGE UP (ref 0–2)
BILIRUB SERPL-MCNC: 0.3 MG/DL — SIGNIFICANT CHANGE UP (ref 0.2–1.2)
BLD GP AB SCN SERPL QL: NEGATIVE — SIGNIFICANT CHANGE UP
BUN SERPL-MCNC: 8 MG/DL — SIGNIFICANT CHANGE UP (ref 7–23)
CALCIUM SERPL-MCNC: 9.2 MG/DL — SIGNIFICANT CHANGE UP (ref 8.4–10.5)
CHLORIDE SERPL-SCNC: 95 MMOL/L — LOW (ref 96–108)
CO2 SERPL-SCNC: 22 MMOL/L — SIGNIFICANT CHANGE UP (ref 22–31)
CREAT SERPL-MCNC: 0.93 MG/DL — SIGNIFICANT CHANGE UP (ref 0.5–1.3)
EOSINOPHIL # BLD AUTO: 0.43 K/UL — SIGNIFICANT CHANGE UP (ref 0–0.5)
EOSINOPHIL NFR BLD AUTO: 6 % — SIGNIFICANT CHANGE UP (ref 0–6)
GLUCOSE BLDC GLUCOMTR-MCNC: 116 MG/DL — HIGH (ref 70–99)
GLUCOSE SERPL-MCNC: 98 MG/DL — SIGNIFICANT CHANGE UP (ref 70–99)
HCT VFR BLD CALC: 25.9 % — LOW (ref 39–50)
HGB BLD-MCNC: 8.6 G/DL — LOW (ref 13–17)
IMM GRANULOCYTES NFR BLD AUTO: 1.1 % — SIGNIFICANT CHANGE UP (ref 0–1.5)
LYMPHOCYTES # BLD AUTO: 1.41 K/UL — SIGNIFICANT CHANGE UP (ref 1–3.3)
LYMPHOCYTES # BLD AUTO: 19.7 % — SIGNIFICANT CHANGE UP (ref 13–44)
MAGNESIUM SERPL-MCNC: 1.4 MG/DL — LOW (ref 1.6–2.6)
MCHC RBC-ENTMCNC: 28.2 PG — SIGNIFICANT CHANGE UP (ref 27–34)
MCHC RBC-ENTMCNC: 33.2 GM/DL — SIGNIFICANT CHANGE UP (ref 32–36)
MCV RBC AUTO: 84.9 FL — SIGNIFICANT CHANGE UP (ref 80–100)
MONOCYTES # BLD AUTO: 0.88 K/UL — SIGNIFICANT CHANGE UP (ref 0–0.9)
MONOCYTES NFR BLD AUTO: 12.3 % — SIGNIFICANT CHANGE UP (ref 2–14)
NEUTROPHILS # BLD AUTO: 4.33 K/UL — SIGNIFICANT CHANGE UP (ref 1.8–7.4)
NEUTROPHILS NFR BLD AUTO: 60.3 % — SIGNIFICANT CHANGE UP (ref 43–77)
NRBC # BLD: 0 /100 WBCS — SIGNIFICANT CHANGE UP (ref 0–0)
PHOSPHATE SERPL-MCNC: 4.6 MG/DL — HIGH (ref 2.5–4.5)
PLATELET # BLD AUTO: 308 K/UL — SIGNIFICANT CHANGE UP (ref 150–400)
POTASSIUM SERPL-MCNC: 3.9 MMOL/L — SIGNIFICANT CHANGE UP (ref 3.5–5.3)
POTASSIUM SERPL-SCNC: 3.9 MMOL/L — SIGNIFICANT CHANGE UP (ref 3.5–5.3)
PROT SERPL-MCNC: 5.1 G/DL — LOW (ref 6–8.3)
RBC # BLD: 3.05 M/UL — LOW (ref 4.2–5.8)
RBC # FLD: 16.3 % — HIGH (ref 10.3–14.5)
RH IG SCN BLD-IMP: POSITIVE — SIGNIFICANT CHANGE UP
SODIUM SERPL-SCNC: 129 MMOL/L — LOW (ref 135–145)
WBC # BLD: 7.17 K/UL — SIGNIFICANT CHANGE UP (ref 3.8–10.5)
WBC # FLD AUTO: 7.17 K/UL — SIGNIFICANT CHANGE UP (ref 3.8–10.5)

## 2021-06-07 PROCEDURE — 83735 ASSAY OF MAGNESIUM: CPT

## 2021-06-07 PROCEDURE — 87184 SC STD DISK METHOD PER PLATE: CPT

## 2021-06-07 PROCEDURE — 70491 CT SOFT TISSUE NECK W/DYE: CPT

## 2021-06-07 PROCEDURE — 93005 ELECTROCARDIOGRAM TRACING: CPT

## 2021-06-07 PROCEDURE — 97161 PT EVAL LOW COMPLEX 20 MIN: CPT

## 2021-06-07 PROCEDURE — 86900 BLOOD TYPING SEROLOGIC ABO: CPT

## 2021-06-07 PROCEDURE — 87635 SARS-COV-2 COVID-19 AMP PRB: CPT

## 2021-06-07 PROCEDURE — 96375 TX/PRO/DX INJ NEW DRUG ADDON: CPT

## 2021-06-07 PROCEDURE — 36415 COLL VENOUS BLD VENIPUNCTURE: CPT

## 2021-06-07 PROCEDURE — 85025 COMPLETE CBC W/AUTO DIFF WBC: CPT

## 2021-06-07 PROCEDURE — 88305 TISSUE EXAM BY PATHOLOGIST: CPT

## 2021-06-07 PROCEDURE — 96374 THER/PROPH/DIAG INJ IV PUSH: CPT

## 2021-06-07 PROCEDURE — 87075 CULTR BACTERIA EXCEPT BLOOD: CPT

## 2021-06-07 PROCEDURE — 86850 RBC ANTIBODY SCREEN: CPT

## 2021-06-07 PROCEDURE — 85027 COMPLETE CBC AUTOMATED: CPT

## 2021-06-07 PROCEDURE — P9040: CPT

## 2021-06-07 PROCEDURE — 87070 CULTURE OTHR SPECIMN AEROBIC: CPT

## 2021-06-07 PROCEDURE — 92610 EVALUATE SWALLOWING FUNCTION: CPT

## 2021-06-07 PROCEDURE — 99232 SBSQ HOSP IP/OBS MODERATE 35: CPT | Mod: GC

## 2021-06-07 PROCEDURE — 97116 GAIT TRAINING THERAPY: CPT

## 2021-06-07 PROCEDURE — 71045 X-RAY EXAM CHEST 1 VIEW: CPT

## 2021-06-07 PROCEDURE — 87040 BLOOD CULTURE FOR BACTERIA: CPT

## 2021-06-07 PROCEDURE — 87181 SC STD AGAR DILUTION PER AGT: CPT

## 2021-06-07 PROCEDURE — 86769 SARS-COV-2 COVID-19 ANTIBODY: CPT

## 2021-06-07 PROCEDURE — 80053 COMPREHEN METABOLIC PANEL: CPT

## 2021-06-07 PROCEDURE — 82962 GLUCOSE BLOOD TEST: CPT

## 2021-06-07 PROCEDURE — 86901 BLOOD TYPING SEROLOGIC RH(D): CPT

## 2021-06-07 PROCEDURE — 80048 BASIC METABOLIC PNL TOTAL CA: CPT

## 2021-06-07 PROCEDURE — 85730 THROMBOPLASTIN TIME PARTIAL: CPT

## 2021-06-07 PROCEDURE — 84100 ASSAY OF PHOSPHORUS: CPT

## 2021-06-07 PROCEDURE — 36430 TRANSFUSION BLD/BLD COMPNT: CPT

## 2021-06-07 PROCEDURE — 85610 PROTHROMBIN TIME: CPT

## 2021-06-07 PROCEDURE — 99285 EMERGENCY DEPT VISIT HI MDM: CPT | Mod: 25

## 2021-06-07 PROCEDURE — 86923 COMPATIBILITY TEST ELECTRIC: CPT

## 2021-06-07 RX ORDER — PANTOPRAZOLE SODIUM 20 MG/1
40 TABLET, DELAYED RELEASE ORAL
Refills: 0 | Status: DISCONTINUED | OUTPATIENT
Start: 2021-06-08 | End: 2021-06-07

## 2021-06-07 RX ORDER — MAGNESIUM OXIDE 400 MG ORAL TABLET 241.3 MG
1 TABLET ORAL
Qty: 0 | Refills: 0 | DISCHARGE

## 2021-06-07 RX ORDER — ATORVASTATIN CALCIUM 80 MG/1
1 TABLET, FILM COATED ORAL
Qty: 30 | Refills: 3
Start: 2021-06-07 | End: 2021-10-04

## 2021-06-07 RX ORDER — ACETAMINOPHEN/DIPHENHYDRAMINE 500MG-25MG
1000 TABLET ORAL
Qty: 30 | Refills: 5 | Status: ACTIVE | COMMUNITY
Start: 2021-06-07 | End: 1900-01-01

## 2021-06-07 RX ORDER — MAGNESIUM SULFATE 500 MG/ML
4 VIAL (ML) INJECTION ONCE
Refills: 0 | Status: COMPLETED | OUTPATIENT
Start: 2021-06-07 | End: 2021-06-07

## 2021-06-07 RX ADMIN — DORZOLAMIDE HYDROCHLORIDE 1 DROP(S): 20 SOLUTION/ DROPS OPHTHALMIC at 06:06

## 2021-06-07 RX ADMIN — Medication 81 MILLIGRAM(S): at 11:27

## 2021-06-07 RX ADMIN — ISOSORBIDE MONONITRATE 30 MILLIGRAM(S): 60 TABLET, EXTENDED RELEASE ORAL at 11:28

## 2021-06-07 RX ADMIN — CHLORHEXIDINE GLUCONATE 15 MILLILITER(S): 213 SOLUTION TOPICAL at 06:16

## 2021-06-07 RX ADMIN — PREGABALIN 1000 MICROGRAM(S): 225 CAPSULE ORAL at 11:27

## 2021-06-07 RX ADMIN — BRIMONIDINE TARTRATE 1 DROP(S): 2 SOLUTION/ DROPS OPHTHALMIC at 06:06

## 2021-06-07 RX ADMIN — Medication 25 MILLIGRAM(S): at 06:06

## 2021-06-07 RX ADMIN — PANTOPRAZOLE SODIUM 40 MILLIGRAM(S): 20 TABLET, DELAYED RELEASE ORAL at 06:06

## 2021-06-07 RX ADMIN — Medication 100 GRAM(S): at 09:56

## 2021-06-07 RX ADMIN — Medication 325 MILLIGRAM(S): at 11:27

## 2021-06-07 RX ADMIN — APIXABAN 2.5 MILLIGRAM(S): 2.5 TABLET, FILM COATED ORAL at 09:56

## 2021-06-07 NOTE — PROGRESS NOTE ADULT - TIME BILLING
Patient seen and examined  Will discuss with staff; ; Patient seen and examined;  Labs noted;   Sodium is 129;  Will follow up in office

## 2021-06-07 NOTE — PROGRESS NOTE ADULT - NSICDXPILOT_GEN_ALL_CORE
[place]

## 2021-06-07 NOTE — PROGRESS NOTE ADULT - PROBLEM SELECTOR PROBLEM 3
Essential hypertension
CAD (coronary artery disease)
CAD (coronary artery disease)
Essential hypertension
CAD (coronary artery disease)
Essential hypertension
CAD (coronary artery disease)

## 2021-06-07 NOTE — DISCHARGE NOTE NURSING/CASE MANAGEMENT/SOCIAL WORK - PATIENT PORTAL LINK FT
You can access the FollowMyHealth Patient Portal offered by [facility] by registering at the following website: [email or web address]. By joining [facility]’s FollowMyHealth portal, you will also be able to view your health information using other applications (apps) compatible with our system.

## 2021-06-07 NOTE — PROGRESS NOTE ADULT - PROVIDER SPECIALTY LIST ADULT
Internal Medicine
Gastroenterology
Internal Medicine
Internal Medicine
Gastroenterology
Gastroenterology
Internal Medicine

## 2021-06-07 NOTE — PROGRESS NOTE ADULT - PROBLEM SELECTOR PROBLEM 5
History of pulmonary embolism
Facial cellulitis
History of pulmonary embolism
History of pulmonary embolism
Facial cellulitis
History of pulmonary embolism
History of pulmonary embolism
Facial cellulitis
History of pulmonary embolism

## 2021-06-07 NOTE — PROGRESS NOTE ADULT - SUBJECTIVE AND OBJECTIVE BOX
INTERVAL HPI/OVERNIGHT EVENTS:  Awake and alert;  Labs noted;  Awake without jaw pain      MEDICATIONS  (STANDING):  apixaban 2.5 milliGRAM(s) Oral every 12 hours  aspirin enteric coated 81 milliGRAM(s) Oral daily  atorvastatin 40 milliGRAM(s) Oral at bedtime  brimonidine 0.2% Ophthalmic Solution 1 Drop(s) Both EYES two times a day  chlorhexidine 0.12% Liquid 15 milliLiter(s) Oral Mucosa two times a day  cyanocobalamin 1000 MICROGram(s) Oral daily  dextrose 40% Gel 15 Gram(s) Oral once  dextrose 5%. 1000 milliLiter(s) (50 mL/Hr) IV Continuous <Continuous>  dextrose 5%. 1000 milliLiter(s) (100 mL/Hr) IV Continuous <Continuous>  dextrose 50% Injectable 25 Gram(s) IV Push once  dextrose 50% Injectable 12.5 Gram(s) IV Push once  dextrose 50% Injectable 25 Gram(s) IV Push once  dorzolamide 2% Ophthalmic Solution 1 Drop(s) Both EYES three times a day  ferrous    sulfate 325 milliGRAM(s) Oral daily  finasteride 5 milliGRAM(s) Oral at bedtime  glucagon  Injectable 1 milliGRAM(s) IntraMuscular once  hydrochlorothiazide 25 milliGRAM(s) Oral daily  insulin lispro (ADMELOG) corrective regimen sliding scale   SubCutaneous Before meals and at bedtime  isosorbide   mononitrate ER Tablet (IMDUR) 30 milliGRAM(s) Oral daily  latanoprost 0.005% Ophthalmic Solution 1 Drop(s) Both EYES at bedtime  metoprolol succinate ER 50 milliGRAM(s) Oral every 24 hours  pantoprazole    Tablet 40 milliGRAM(s) Oral before breakfast  polyethylene glycol 3350 17 Gram(s) Oral two times a day  senna 2 Tablet(s) Oral at bedtime    MEDICATIONS  (PRN):  acetaminophen   Tablet .. 650 milliGRAM(s) Oral every 6 hours PRN Temp greater or equal to 38C (100.4F), Mild Pain (1 - 3)  ALBUTerol    90 MICROgram(s) HFA Inhaler 2 Puff(s) Inhalation every 6 hours PRN Shortness of Breath and/or Wheezing      Allergies    ACE inhibitors (Angioedema)    Intolerances        Vital Signs Last 24 Hrs  T(C): 37.3 (07 Jun 2021 05:44), Max: 37.3 (07 Jun 2021 05:44)  T(F): 99.2 (07 Jun 2021 05:44), Max: 99.2 (07 Jun 2021 05:44)  HR: 67 (07 Jun 2021 05:44) (62 - 67)  BP: 122/65 (07 Jun 2021 05:44) (92/54 - 126/67)  BP(mean): 87 (06 Jun 2021 11:14) (87 - 87)  RR: 18 (07 Jun 2021 05:44) (14 - 18)  SpO2: 97% (07 Jun 2021 05:44) (97% - 99%)          Constitutional:  Awake    Eyes: SELENE    ENMT: Negative    Neck: Supple    Back:  no tenderness     Respiratory:  clear    Cardiovascular: S1 S2    Gastrointestinal:  soft    Genitourinary:    Extremities:  no edema    Vascular:    Neurological:    Skin:    Lymph Nodes:            06-06 @ 07:01  -  06-07 @ 07:00  --------------------------------------------------------  IN: 535 mL / OUT: 200 mL / NET: 335 mL      LABS:                        8.6    7.17  )-----------( 308      ( 07 Jun 2021 06:27 )             25.9     06-07    129<L>  |  95<L>  |  8   ----------------------------<  98  3.9   |  22  |  0.93    Ca    9.2      07 Jun 2021 06:27  Phos  4.6     06-07  Mg     1.4     06-07    TPro  5.1<L>  /  Alb  2.6<L>  /  TBili  0.3  /  DBili  x   /  AST  20  /  ALT  10  /  AlkPhos  40  06-07          RADIOLOGY & ADDITIONAL TESTS:

## 2021-06-07 NOTE — PROGRESS NOTE ADULT - PROBLEM SELECTOR PROBLEM 7
Type 2 diabetes mellitus
Pre-diabetes
Type 2 diabetes mellitus
Pre-diabetes
Type 2 diabetes mellitus
Pre-diabetes

## 2021-06-07 NOTE — PROGRESS NOTE ADULT - NUTRITIONAL ASSESSMENT
This patient has been assessed with a concern for Malnutrition and has been determined to have a diagnosis/diagnoses of Moderate protein-calorie malnutrition.    This patient is being managed with:   Diet DASH/TLC-  Sodium & Cholesterol Restricted  Dysphagia 1 Pureed Thin Liquids ([id number])  Supplement Feeding Modality:  Oral  Ensure Enlive Cans or Servings Per Day:  1       Frequency:  Three Times a day  Entered: Jun 4 2021 12:07PM    
This patient has been assessed with a concern for Malnutrition and has been determined to have a diagnosis/diagnoses of Moderate protein-calorie malnutrition.    This patient is being managed with:   Diet DASH/TLC-  Sodium & Cholesterol Restricted  Dysphagia 1 Pureed Thin Liquids ([id number])  Supplement Feeding Modality:  Oral  Ensure Enlive Cans or Servings Per Day:  1       Frequency:  Three Times a day  Entered: Jun 4 2021 12:07PM    
This patient has been assessed with a concern for Malnutrition and has been determined to have a diagnosis/diagnoses of Moderate protein-calorie malnutrition.    This patient is being managed with:   Diet DASH/TLC-  Sodium & Cholesterol Restricted  Dysphagia 3 Soft Thin Liquids ([id number])  Supplement Feeding Modality:  Oral  Ensure Enlive Cans or Servings Per Day:  1       Frequency:  Three Times a day  Entered: Shankar  3 2021  2:31PM    
This patient has been assessed with a concern for Malnutrition and has been determined to have a diagnosis/diagnoses of Moderate protein-calorie malnutrition.    This patient is being managed with:   Diet DASH/TLC-  Sodium & Cholesterol Restricted  Dysphagia 3 Soft Thin Liquids ([id number])  Supplement Feeding Modality:  Oral  Ensure Enlive Cans or Servings Per Day:  1       Frequency:  Three Times a day  Entered: Shankar  3 2021  2:31PM    
This patient has been assessed with a concern for Malnutrition and has been determined to have a diagnosis/diagnoses of Moderate protein-calorie malnutrition.    This patient is being managed with:   Diet DASH/TLC-  Sodium & Cholesterol Restricted  Dysphagia 1 Pureed Thin Liquids ([id number])  Supplement Feeding Modality:  Oral  Ensure Enlive Cans or Servings Per Day:  1       Frequency:  Three Times a day  Entered: Jun 4 2021 12:07PM    
This patient has been assessed with a concern for Malnutrition and has been determined to have a diagnosis/diagnoses of Moderate protein-calorie malnutrition.    This patient is being managed with:   Diet DASH/TLC-  Sodium & Cholesterol Restricted  Dysphagia 1 Pureed Thin Liquids ([id number])  Supplement Feeding Modality:  Oral  Ensure Enlive Cans or Servings Per Day:  1       Frequency:  Three Times a day  Entered: Jun 4 2021 12:07PM    
This patient has been assessed with a concern for Malnutrition and has been determined to have a diagnosis/diagnoses of Moderate protein-calorie malnutrition.    This patient is being managed with:   Diet DASH/TLC-  Sodium & Cholesterol Restricted  Dysphagia 1 Pureed Thin Liquids ([id number])  Supplement Feeding Modality:  Oral  Ensure Enlive Cans or Servings Per Day:  1       Frequency:  Three Times a day  Entered: Jun 4 2021 12:07PM

## 2021-06-07 NOTE — PROGRESS NOTE ADULT - PROBLEM SELECTOR PROBLEM 2
History of pulmonary embolism
Anemia
History of pulmonary embolism
Anemia

## 2021-06-07 NOTE — PROGRESS NOTE ADULT - PROBLEM SELECTOR PROBLEM 6
Essential hypertension
Essential hypertension
Atrial fibrillation
Essential hypertension
Atrial fibrillation
Essential hypertension
Atrial fibrillation
Essential hypertension

## 2021-06-07 NOTE — PROGRESS NOTE ADULT - PROBLEM SELECTOR PROBLEM 1
Malignant neoplasm of prostate
Facial cellulitis
Malignant neoplasm of prostate
Facial cellulitis
Malignant neoplasm of prostate
Facial cellulitis

## 2021-06-07 NOTE — PROGRESS NOTE ADULT - REASON FOR ADMISSION
submandibular cellulitis

## 2021-06-07 NOTE — PROGRESS NOTE ADULT - PROBLEM/PLAN-2
DISPLAY PLAN FREE TEXT
No complaints

## 2021-06-11 ENCOUNTER — APPOINTMENT (OUTPATIENT)
Dept: HEART AND VASCULAR | Facility: CLINIC | Age: 81
End: 2021-06-11

## 2021-06-11 DIAGNOSIS — Z90.79 ACQUIRED ABSENCE OF OTHER GENITAL ORGAN(S): ICD-10-CM

## 2021-06-11 DIAGNOSIS — E44.0 MODERATE PROTEIN-CALORIE MALNUTRITION: ICD-10-CM

## 2021-06-11 DIAGNOSIS — H40.9 UNSPECIFIED GLAUCOMA: ICD-10-CM

## 2021-06-11 DIAGNOSIS — Z79.01 LONG TERM (CURRENT) USE OF ANTICOAGULANTS: ICD-10-CM

## 2021-06-11 DIAGNOSIS — K59.00 CONSTIPATION, UNSPECIFIED: ICD-10-CM

## 2021-06-11 DIAGNOSIS — Z86.711 PERSONAL HISTORY OF PULMONARY EMBOLISM: ICD-10-CM

## 2021-06-11 DIAGNOSIS — I10 ESSENTIAL (PRIMARY) HYPERTENSION: ICD-10-CM

## 2021-06-11 DIAGNOSIS — L03.211 CELLULITIS OF FACE: ICD-10-CM

## 2021-06-11 DIAGNOSIS — I25.10 ATHEROSCLEROTIC HEART DISEASE OF NATIVE CORONARY ARTERY WITHOUT ANGINA PECTORIS: ICD-10-CM

## 2021-06-11 DIAGNOSIS — K20.90 ESOPHAGITIS, UNSPECIFIED WITHOUT BLEEDING: ICD-10-CM

## 2021-06-11 DIAGNOSIS — R71.0 PRECIPITOUS DROP IN HEMATOCRIT: ICD-10-CM

## 2021-06-11 DIAGNOSIS — K22.2 ESOPHAGEAL OBSTRUCTION: ICD-10-CM

## 2021-06-11 DIAGNOSIS — Z85.46 PERSONAL HISTORY OF MALIGNANT NEOPLASM OF PROSTATE: ICD-10-CM

## 2021-06-11 DIAGNOSIS — K29.70 GASTRITIS, UNSPECIFIED, WITHOUT BLEEDING: ICD-10-CM

## 2021-06-11 DIAGNOSIS — D63.8 ANEMIA IN OTHER CHRONIC DISEASES CLASSIFIED ELSEWHERE: ICD-10-CM

## 2021-06-11 DIAGNOSIS — K57.30 DIVERTICULOSIS OF LARGE INTESTINE WITHOUT PERFORATION OR ABSCESS WITHOUT BLEEDING: ICD-10-CM

## 2021-06-11 DIAGNOSIS — E11.9 TYPE 2 DIABETES MELLITUS WITHOUT COMPLICATIONS: ICD-10-CM

## 2021-06-11 DIAGNOSIS — D50.9 IRON DEFICIENCY ANEMIA, UNSPECIFIED: ICD-10-CM

## 2021-06-11 DIAGNOSIS — E78.5 HYPERLIPIDEMIA, UNSPECIFIED: ICD-10-CM

## 2021-06-11 DIAGNOSIS — K44.9 DIAPHRAGMATIC HERNIA WITHOUT OBSTRUCTION OR GANGRENE: ICD-10-CM

## 2021-06-11 DIAGNOSIS — Z86.718 PERSONAL HISTORY OF OTHER VENOUS THROMBOSIS AND EMBOLISM: ICD-10-CM

## 2021-06-11 DIAGNOSIS — K11.20 SIALOADENITIS, UNSPECIFIED: ICD-10-CM

## 2021-06-11 DIAGNOSIS — Z95.5 PRESENCE OF CORONARY ANGIOPLASTY IMPLANT AND GRAFT: ICD-10-CM

## 2021-06-11 DIAGNOSIS — K29.80 DUODENITIS WITHOUT BLEEDING: ICD-10-CM

## 2021-06-11 DIAGNOSIS — I48.0 PAROXYSMAL ATRIAL FIBRILLATION: ICD-10-CM

## 2021-06-11 DIAGNOSIS — K31.7 POLYP OF STOMACH AND DUODENUM: ICD-10-CM

## 2021-06-14 ENCOUNTER — APPOINTMENT (OUTPATIENT)
Dept: INTERNAL MEDICINE | Facility: CLINIC | Age: 81
End: 2021-06-14
Payer: MEDICARE

## 2021-06-14 VITALS
HEART RATE: 102 BPM | BODY MASS INDEX: 25.18 KG/M2 | WEIGHT: 170 LBS | TEMPERATURE: 79.9 F | OXYGEN SATURATION: 96 % | SYSTOLIC BLOOD PRESSURE: 158 MMHG | HEIGHT: 69 IN | DIASTOLIC BLOOD PRESSURE: 72 MMHG

## 2021-06-14 DIAGNOSIS — K13.0 DISEASES OF LIPS: ICD-10-CM

## 2021-06-14 PROCEDURE — 99213 OFFICE O/P EST LOW 20 MIN: CPT

## 2021-06-14 RX ORDER — MIRABEGRON 50 MG/1
50 TABLET, FILM COATED, EXTENDED RELEASE ORAL
Qty: 90 | Refills: 3 | Status: DISCONTINUED | COMMUNITY
Start: 2019-08-13 | End: 2021-06-14

## 2021-06-14 NOTE — HISTORY OF PRESENT ILLNESS
[FreeTextEntry1] : Recently in hospital with facial cellulitis;\par Will see Dr Au  regarding facial problem\par Still no tase [de-identified] : Appears improved now after a a week course of antibiotics in hospita\par

## 2021-06-14 NOTE — ASSESSMENT
[FreeTextEntry1] : Improved now; \par No change in current medication; \par Follow up with various consultants

## 2021-06-14 NOTE — PHYSICAL EXAM
[No Acute Distress] : no acute distress [Well Nourished] : well nourished [Well Developed] : well developed [Well-Appearing] : well-appearing [Normal Sclera/Conjunctiva] : normal sclera/conjunctiva [PERRL] : pupils equal round and reactive to light [EOMI] : extraocular movements intact [Normal Outer Ear/Nose] : the outer ears and nose were normal in appearance [Normal Oropharynx] : the oropharynx was normal [No JVD] : no jugular venous distention [No Lymphadenopathy] : no lymphadenopathy [Supple] : supple [Thyroid Normal, No Nodules] : the thyroid was normal and there were no nodules present [No Respiratory Distress] : no respiratory distress  [No Accessory Muscle Use] : no accessory muscle use [Clear to Auscultation] : lungs were clear to auscultation bilaterally [Normal Rate] : normal rate  [Regular Rhythm] : with a regular rhythm [Normal S1, S2] : normal S1 and S2 [No Murmur] : no murmur heard [No Carotid Bruits] : no carotid bruits [No Abdominal Bruit] : a ~M bruit was not heard ~T in the abdomen [No Varicosities] : no varicosities [Pedal Pulses Present] : the pedal pulses are present [No Edema] : there was no peripheral edema [No Palpable Aorta] : no palpable aorta [No Extremity Clubbing/Cyanosis] : no extremity clubbing/cyanosis [Soft] : abdomen soft [Non Tender] : non-tender [Non-distended] : non-distended [No Masses] : no abdominal mass palpated [No HSM] : no HSM [Normal Bowel Sounds] : normal bowel sounds [Normal Posterior Cervical Nodes] : no posterior cervical lymphadenopathy [Normal Anterior Cervical Nodes] : no anterior cervical lymphadenopathy [No CVA Tenderness] : no CVA  tenderness [No Spinal Tenderness] : no spinal tenderness [No Joint Swelling] : no joint swelling [Grossly Normal Strength/Tone] : grossly normal strength/tone [No Rash] : no rash [Coordination Grossly Intact] : coordination grossly intact [No Focal Deficits] : no focal deficits [Normal Gait] : normal gait [Deep Tendon Reflexes (DTR)] : deep tendon reflexes were 2+ and symmetric [Normal Affect] : the affect was normal [Normal Insight/Judgement] : insight and judgment were intact [de-identified] : Face improved

## 2021-06-16 ENCOUNTER — APPOINTMENT (OUTPATIENT)
Age: 81
End: 2021-06-16

## 2021-06-23 ENCOUNTER — APPOINTMENT (OUTPATIENT)
Dept: OTOLARYNGOLOGY | Facility: CLINIC | Age: 81
End: 2021-06-23

## 2021-06-25 ENCOUNTER — APPOINTMENT (OUTPATIENT)
Dept: UROLOGY | Facility: CLINIC | Age: 81
End: 2021-06-25
Payer: MEDICARE

## 2021-06-25 ENCOUNTER — MED ADMIN CHARGE (OUTPATIENT)
Age: 81
End: 2021-06-25

## 2021-06-25 ENCOUNTER — OUTPATIENT (OUTPATIENT)
Dept: OUTPATIENT SERVICES | Facility: HOSPITAL | Age: 81
LOS: 1 days | End: 2021-06-25
Payer: MEDICARE

## 2021-06-25 ENCOUNTER — APPOINTMENT (OUTPATIENT)
Age: 81
End: 2021-06-25

## 2021-06-25 VITALS
WEIGHT: 173.06 LBS | HEIGHT: 69 IN | RESPIRATION RATE: 17 BRPM | SYSTOLIC BLOOD PRESSURE: 132 MMHG | OXYGEN SATURATION: 100 % | TEMPERATURE: 98 F | HEART RATE: 93 BPM | DIASTOLIC BLOOD PRESSURE: 717 MMHG

## 2021-06-25 VITALS
RESPIRATION RATE: 17 BRPM | DIASTOLIC BLOOD PRESSURE: 717 MMHG | HEIGHT: 69 IN | HEART RATE: 93 BPM | WEIGHT: 173.06 LBS | OXYGEN SATURATION: 98 % | SYSTOLIC BLOOD PRESSURE: 132 MMHG | TEMPERATURE: 98 F

## 2021-06-25 VITALS — SYSTOLIC BLOOD PRESSURE: 163 MMHG | HEART RATE: 75 BPM | DIASTOLIC BLOOD PRESSURE: 78 MMHG | TEMPERATURE: 97 F

## 2021-06-25 DIAGNOSIS — D64.9 ANEMIA, UNSPECIFIED: ICD-10-CM

## 2021-06-25 PROCEDURE — P9040: CPT

## 2021-06-25 PROCEDURE — 86923 COMPATIBILITY TEST ELECTRIC: CPT

## 2021-06-25 PROCEDURE — 96372 THER/PROPH/DIAG INJ SC/IM: CPT

## 2021-06-25 PROCEDURE — 86901 BLOOD TYPING SEROLOGIC RH(D): CPT

## 2021-06-25 PROCEDURE — 86850 RBC ANTIBODY SCREEN: CPT

## 2021-06-25 PROCEDURE — 36415 COLL VENOUS BLD VENIPUNCTURE: CPT

## 2021-06-25 PROCEDURE — 36430 TRANSFUSION BLD/BLD COMPNT: CPT

## 2021-06-25 PROCEDURE — 99213 OFFICE O/P EST LOW 20 MIN: CPT | Mod: 25

## 2021-06-25 PROCEDURE — 86900 BLOOD TYPING SEROLOGIC ABO: CPT

## 2021-06-25 RX ORDER — ACETAMINOPHEN 500 MG
650 TABLET ORAL ONCE
Refills: 0 | Status: COMPLETED | OUTPATIENT
Start: 2021-06-25 | End: 2021-06-25

## 2021-06-25 RX ORDER — DIPHENHYDRAMINE HCL 50 MG
25 CAPSULE ORAL ONCE
Refills: 0 | Status: COMPLETED | OUTPATIENT
Start: 2021-06-25 | End: 2021-06-25

## 2021-06-25 RX ORDER — LEUPROLIDE ACETATE 22.5 MG
22.5 KIT INTRAMUSCULAR
Qty: 1 | Refills: 0 | Status: COMPLETED | OUTPATIENT
Start: 2021-06-25

## 2021-06-25 RX ADMIN — Medication 25 MILLIGRAM(S): at 10:22

## 2021-06-25 RX ADMIN — Medication 650 MILLIGRAM(S): at 10:48

## 2021-06-25 RX ADMIN — Medication 650 MILLIGRAM(S): at 10:22

## 2021-06-25 NOTE — HISTORY OF PRESENT ILLNESS
[FreeTextEntry1] : PSA now 65 (doubled x 1 month)\par recent cardiac stent\par recent admission necrosis of jaw\par loss of taste\par recent abscess\par increasing fatigue and discomfort\par voiding well\par no fevers chills\par no nausea vomting

## 2021-06-25 NOTE — ASSESSMENT
[FreeTextEntry1] : advanced prostate cacner\par lupron today\par referral for additonal prostate cancer med onc for possible trials\par f/u 3 months for next lupron

## 2021-06-28 RX ORDER — FINASTERIDE 5 MG/1
5 TABLET, FILM COATED ORAL DAILY
Qty: 90 | Refills: 3 | Status: ACTIVE | COMMUNITY
Start: 2017-03-13 | End: 1900-01-01

## 2021-06-29 ENCOUNTER — NON-APPOINTMENT (OUTPATIENT)
Age: 81
End: 2021-06-29

## 2021-07-07 LAB
APPEARANCE: ABNORMAL
BACTERIA UR CULT: ABNORMAL
BACTERIA: ABNORMAL
BILIRUBIN URINE: NEGATIVE
BLOOD URINE: ABNORMAL
COLOR: YELLOW
GLUCOSE QUALITATIVE U: NEGATIVE
HYALINE CASTS: 2 /LPF
KETONES URINE: NEGATIVE
LEUKOCYTE ESTERASE URINE: ABNORMAL
MICROSCOPIC-UA: NORMAL
NITRITE URINE: POSITIVE
PH URINE: 6
PROTEIN URINE: ABNORMAL
RED BLOOD CELLS URINE: 53 /HPF
SPECIFIC GRAVITY URINE: 1.02
SQUAMOUS EPITHELIAL CELLS: 1 /HPF
UROBILINOGEN URINE: NORMAL
WHITE BLOOD CELLS URINE: 430 /HPF

## 2021-07-08 DIAGNOSIS — R33.8 OTHER RETENTION OF URINE: ICD-10-CM

## 2021-07-20 ENCOUNTER — APPOINTMENT (OUTPATIENT)
Dept: INTERNAL MEDICINE | Facility: CLINIC | Age: 81
End: 2021-07-20
Payer: MEDICARE

## 2021-07-20 VITALS
HEART RATE: 86 BPM | TEMPERATURE: 98.4 F | DIASTOLIC BLOOD PRESSURE: 79 MMHG | OXYGEN SATURATION: 100 % | BODY MASS INDEX: 24.44 KG/M2 | WEIGHT: 165 LBS | HEIGHT: 69 IN | SYSTOLIC BLOOD PRESSURE: 155 MMHG | RESPIRATION RATE: 17 BRPM

## 2021-07-20 DIAGNOSIS — C77.2 SECONDARY AND UNSPECIFIED MALIGNANT NEOPLASM OF INTRA-ABDOMINAL LYMPH NODES: ICD-10-CM

## 2021-07-20 DIAGNOSIS — R11.0 NAUSEA: ICD-10-CM

## 2021-07-20 PROCEDURE — 99213 OFFICE O/P EST LOW 20 MIN: CPT | Mod: 25

## 2021-07-20 PROCEDURE — 36415 COLL VENOUS BLD VENIPUNCTURE: CPT

## 2021-07-20 NOTE — HISTORY OF PRESENT ILLNESS
[FreeTextEntry1] : All noted reviewed;\par Will see oncologist at [facility]; \par Will also see Dr. Mcmillan\par Repeat CT done of Abdomen and Pelvis [de-identified] : Had recent CBC\par Transfused  2 units June 25\par Appetite poor\par Numbness of mouth persists;

## 2021-07-20 NOTE — ASSESSMENT
[FreeTextEntry1] : Will repeat CBC;\par Will be seen at [facility] and also Dr. Mcmillan \par May need further blood

## 2021-07-22 LAB
BASOPHILS # BLD AUTO: 0.02 K/UL
BASOPHILS NFR BLD AUTO: 0.4 %
EOSINOPHIL # BLD AUTO: 0.28 K/UL
EOSINOPHIL NFR BLD AUTO: 5.3 %
HCT VFR BLD CALC: 26.9 %
HGB BLD-MCNC: 8.5 G/DL
IMM GRANULOCYTES NFR BLD AUTO: 0.4 %
LYMPHOCYTES # BLD AUTO: 1.43 K/UL
LYMPHOCYTES NFR BLD AUTO: 27.1 %
MAN DIFF?: NORMAL
MCHC RBC-ENTMCNC: 28 PG
MCHC RBC-ENTMCNC: 31.6 GM/DL
MCV RBC AUTO: 88.5 FL
MONOCYTES # BLD AUTO: 0.62 K/UL
MONOCYTES NFR BLD AUTO: 11.8 %
NEUTROPHILS # BLD AUTO: 2.9 K/UL
NEUTROPHILS NFR BLD AUTO: 55 %
PLATELET # BLD AUTO: 294 K/UL
RBC # BLD: 3.04 M/UL
RBC # FLD: 18.1 %
WBC # FLD AUTO: 5.27 K/UL

## 2021-07-27 LAB — BACTERIA UR CULT: ABNORMAL

## 2021-07-30 ENCOUNTER — OUTPATIENT (OUTPATIENT)
Dept: OUTPATIENT SERVICES | Facility: HOSPITAL | Age: 81
LOS: 1 days | End: 2021-07-30
Payer: MEDICARE

## 2021-07-30 DIAGNOSIS — D64.9 ANEMIA, UNSPECIFIED: ICD-10-CM

## 2021-07-30 PROCEDURE — 91110 GI TRC IMG INTRAL ESOPH-ILE: CPT

## 2021-08-20 ENCOUNTER — APPOINTMENT (OUTPATIENT)
Dept: HEART AND VASCULAR | Facility: CLINIC | Age: 81
End: 2021-08-20
Payer: MEDICARE

## 2021-08-20 ENCOUNTER — INPATIENT (INPATIENT)
Facility: HOSPITAL | Age: 81
LOS: 4 days | Discharge: HOME | DRG: 872 | End: 2021-08-25
Attending: INTERNAL MEDICINE | Admitting: INTERNAL MEDICINE
Payer: MEDICARE

## 2021-08-20 ENCOUNTER — NON-APPOINTMENT (OUTPATIENT)
Age: 81
End: 2021-08-20

## 2021-08-20 VITALS
OXYGEN SATURATION: 96 % | BODY MASS INDEX: 26.83 KG/M2 | TEMPERATURE: 99.9 F | SYSTOLIC BLOOD PRESSURE: 130 MMHG | HEIGHT: 65.35 IN | HEART RATE: 83 BPM | WEIGHT: 162.99 LBS | DIASTOLIC BLOOD PRESSURE: 60 MMHG

## 2021-08-20 VITALS
OXYGEN SATURATION: 96 % | SYSTOLIC BLOOD PRESSURE: 155 MMHG | TEMPERATURE: 100 F | WEIGHT: 162.04 LBS | RESPIRATION RATE: 17 BRPM | DIASTOLIC BLOOD PRESSURE: 71 MMHG | HEART RATE: 84 BPM | HEIGHT: 69 IN

## 2021-08-20 LAB
APPEARANCE UR: ABNORMAL
BACTERIA # UR AUTO: ABNORMAL /HPF
BILIRUB UR-MCNC: NEGATIVE — SIGNIFICANT CHANGE UP
C DIFF GDH STL QL: SIGNIFICANT CHANGE UP
C DIFF GDH STL QL: SIGNIFICANT CHANGE UP
COLOR SPEC: YELLOW — SIGNIFICANT CHANGE UP
COMMENT - URINE: SIGNIFICANT CHANGE UP
DIFF PNL FLD: ABNORMAL
EPI CELLS # UR: SIGNIFICANT CHANGE UP /HPF (ref 0–5)
GLUCOSE UR QL: NEGATIVE — SIGNIFICANT CHANGE UP
KETONES UR-MCNC: NEGATIVE — SIGNIFICANT CHANGE UP
LEUKOCYTE ESTERASE UR-ACNC: ABNORMAL
NITRITE UR-MCNC: POSITIVE
OB PNL STL: NEGATIVE — SIGNIFICANT CHANGE UP
PH UR: 6.5 — SIGNIFICANT CHANGE UP (ref 5–8)
PROT UR-MCNC: ABNORMAL MG/DL
RBC CASTS # UR COMP ASSIST: < 5 /HPF — SIGNIFICANT CHANGE UP
SARS-COV-2 RNA SPEC QL NAA+PROBE: SIGNIFICANT CHANGE UP
SP GR SPEC: 1.01 — SIGNIFICANT CHANGE UP (ref 1–1.03)
UROBILINOGEN FLD QL: 0.2 E.U./DL — SIGNIFICANT CHANGE UP
WBC UR QL: ABNORMAL /HPF

## 2021-08-20 PROCEDURE — G1012: CPT

## 2021-08-20 PROCEDURE — 99285 EMERGENCY DEPT VISIT HI MDM: CPT

## 2021-08-20 PROCEDURE — 74177 CT ABD & PELVIS W/CONTRAST: CPT | Mod: 26,ME

## 2021-08-20 PROCEDURE — 93000 ELECTROCARDIOGRAM COMPLETE: CPT

## 2021-08-20 PROCEDURE — 99214 OFFICE O/P EST MOD 30 MIN: CPT

## 2021-08-20 RX ORDER — VANCOMYCIN HCL 1 G
125 VIAL (EA) INTRAVENOUS ONCE
Refills: 0 | Status: COMPLETED | OUTPATIENT
Start: 2021-08-20 | End: 2021-08-20

## 2021-08-20 RX ORDER — AMPICILLIN TRIHYDRATE 250 MG
1 CAPSULE ORAL ONCE
Refills: 0 | Status: COMPLETED | OUTPATIENT
Start: 2021-08-20 | End: 2021-08-20

## 2021-08-20 RX ORDER — AMPICILLIN TRIHYDRATE 250 MG
500 CAPSULE ORAL ONCE
Refills: 0 | Status: DISCONTINUED | OUTPATIENT
Start: 2021-08-20 | End: 2021-08-20

## 2021-08-20 RX ORDER — IOHEXOL 300 MG/ML
30 INJECTION, SOLUTION INTRAVENOUS ONCE
Refills: 0 | Status: COMPLETED | OUTPATIENT
Start: 2021-08-20 | End: 2021-08-20

## 2021-08-20 RX ORDER — ACETAMINOPHEN 500 MG
650 TABLET ORAL ONCE
Refills: 0 | Status: COMPLETED | OUTPATIENT
Start: 2021-08-20 | End: 2021-08-20

## 2021-08-20 RX ADMIN — Medication 650 MILLIGRAM(S): at 18:32

## 2021-08-20 RX ADMIN — Medication 108 GRAM(S): at 22:47

## 2021-08-20 RX ADMIN — Medication 125 MILLIGRAM(S): at 23:35

## 2021-08-20 RX ADMIN — IOHEXOL 30 MILLILITER(S): 300 INJECTION, SOLUTION INTRAVENOUS at 18:32

## 2021-08-20 NOTE — ED PROVIDER NOTE - PROGRESS NOTE DETAILS
CTAP shows proctocolitis UA shows nitrite positive uti. on 7/23/21 pts Ucx outpatient showed Enterococcus faecalis with sensitivity to ampicillin (s < 2). discussed CT findings with GI fellow who said to wait for stool cx (C diff and GI PCR) before treating the proctocolitis with antibiotics and unlikely RT induced as goes higher in colon and wasn't seen on prior CT. pt and family updated Dr Gill pagetete for admission awaiting callback will admit C diff + will give PO vanc 125 mg for tx d/w Dr Gill who accepts pt for admission to his service

## 2021-08-20 NOTE — ED PROVIDER NOTE - OBJECTIVE STATEMENT
79 y/o M with PMHx of prostate ca (recently stopped chemo secondary to adverse effects), HTN, HLD, DM II, anemia of chronic disease/DOMITILA (last tx 2 yrs ago, Hgb 8-9 on prior admissions [id number]), PE/DVT 1/2020, Afib in Eliquis 2.5 mg BID, CAD (s/p IRASEMA x 1 OM 3/30/21 on asa and Eliquis (s/p 1 week plavix 2/2 risk of GI bleed), esophagitis/gastritis/ duodenitis presents to ED c/o non-bloody diarrhea x 3 weeks. Pt states it occurs every time he eats or drinks, and started after he had his endoscopy and colonoscopy done outpatient on 7/30. He finished 2 abx courses (Bactrim and Augmentin), most recently finished 1 week ago for UTI. He was admitted in June 2021 for anemia requiring blood transfusion with occult blood positive, but no clear source of bleeding was detected on EGD or colonoscopy. Pt also reports weakness and lower abdominal pain. He denies fever, chills, cough, nausea and vomiting, dysuria, hematuria, rash, leg edema, or any other acute complaints.

## 2021-08-20 NOTE — ED PROVIDER NOTE - CLINICAL SUMMARY MEDICAL DECISION MAKING FREE TEXT BOX
81 y/o M with PMHx of prostate ca (no longer on chemo secondary to adverse effects), HTN, HLD, DM II, anemia of chronic disease/DOMITILA (last tx 2 yrs ago, Hgb 8-9 on prior admissions [id number]), PE/DVT 1/2020, Afib in Eliquis 2.5 mg BID, CAD (s/p IRASEMA x 1 OM 3/30/21 on asa and Eliquis (s/p 1 week plavix 2/2 risk of GI bleed), esophagitis/gastritis/ duodenitis presents to ED with 3 week hx of diarrhea with associated weakness and lower abdominal pain. On exam, pt's VS normal, has mild suprapubic tenderness to palpation, no rebound or guarding, rectal exam showed brown stool and no hemorrhoids. Concerned for C. diff colitis versus UTI versus VG versus GI bleed. H&H today was 7.4 and 23.2, down from hemoglobin of 3 which daughter says was pt's hemoglobin 3 weeks ago. Due to pt's cardiac hx and declining hemoglobin levels, will give 1 unit PRBC, obtain CT AP, C. diff test, and reassess.

## 2021-08-20 NOTE — ED PROVIDER NOTE - CARE PLAN
Principal Discharge DX:	UTI (urinary tract infection)  Secondary Diagnosis:	Proctocolitis   1 Principal Discharge DX:	UTI (urinary tract infection)  Secondary Diagnosis:	Proctocolitis  Secondary Diagnosis:	C. difficile diarrhea

## 2021-08-20 NOTE — ED ADULT NURSE NOTE - OBJECTIVE STATEMENT
Pt presents reporting watery/mucousy diarrhea since the end of july. Pt was recently treated for 2 urinary tract infections. Outpatient blood work done today found low hemoglobin, pt has hx of requiring blood transfusions, last episode in june.

## 2021-08-20 NOTE — ED ADULT TRIAGE NOTE - OTHER COMPLAINTS
pt here for abd pain and diarrhea since Jul 30th following endoscopy and colonoscopy, also reports UTIx2 since onset of sx with 2 courses of abx, well appearing in triage

## 2021-08-21 DIAGNOSIS — A41.9 SEPSIS, UNSPECIFIED ORGANISM: ICD-10-CM

## 2021-08-21 DIAGNOSIS — R63.8 OTHER SYMPTOMS AND SIGNS CONCERNING FOOD AND FLUID INTAKE: ICD-10-CM

## 2021-08-21 DIAGNOSIS — I48.91 UNSPECIFIED ATRIAL FIBRILLATION: ICD-10-CM

## 2021-08-21 DIAGNOSIS — I82.409 ACUTE EMBOLISM AND THROMBOSIS OF UNSPECIFIED DEEP VEINS OF UNSPECIFIED LOWER EXTREMITY: ICD-10-CM

## 2021-08-21 DIAGNOSIS — A04.72 ENTEROCOLITIS DUE TO CLOSTRIDIUM DIFFICILE, NOT SPECIFIED AS RECURRENT: ICD-10-CM

## 2021-08-21 DIAGNOSIS — I25.10 ATHEROSCLEROTIC HEART DISEASE OF NATIVE CORONARY ARTERY WITHOUT ANGINA PECTORIS: ICD-10-CM

## 2021-08-21 DIAGNOSIS — I10 ESSENTIAL (PRIMARY) HYPERTENSION: ICD-10-CM

## 2021-08-21 DIAGNOSIS — C61 MALIGNANT NEOPLASM OF PROSTATE: ICD-10-CM

## 2021-08-21 DIAGNOSIS — D64.9 ANEMIA, UNSPECIFIED: ICD-10-CM

## 2021-08-21 LAB
ALBUMIN SERPL ELPH-MCNC: 3.4 G/DL — SIGNIFICANT CHANGE UP (ref 3.3–5)
ALP SERPL-CCNC: 48 U/L — SIGNIFICANT CHANGE UP (ref 40–120)
ALT FLD-CCNC: 8 U/L — LOW (ref 10–45)
ANION GAP SERPL CALC-SCNC: 10 MMOL/L — SIGNIFICANT CHANGE UP (ref 5–17)
ANION GAP SERPL CALC-SCNC: 9 MMOL/L — SIGNIFICANT CHANGE UP (ref 5–17)
AST SERPL-CCNC: 25 U/L — SIGNIFICANT CHANGE UP (ref 10–40)
BASOPHILS # BLD AUTO: 0.14 K/UL — SIGNIFICANT CHANGE UP (ref 0–0.2)
BASOPHILS NFR BLD AUTO: 0.9 % — SIGNIFICANT CHANGE UP (ref 0–2)
BILIRUB SERPL-MCNC: 0.7 MG/DL — SIGNIFICANT CHANGE UP (ref 0.2–1.2)
BUN SERPL-MCNC: 10 MG/DL — SIGNIFICANT CHANGE UP (ref 7–23)
BUN SERPL-MCNC: 11 MG/DL — SIGNIFICANT CHANGE UP (ref 7–23)
BURR CELLS BLD QL SMEAR: PRESENT — SIGNIFICANT CHANGE UP
CALCIUM SERPL-MCNC: 7.9 MG/DL — LOW (ref 8.4–10.5)
CALCIUM SERPL-MCNC: 8.2 MG/DL — LOW (ref 8.4–10.5)
CHLORIDE SERPL-SCNC: 103 MMOL/L — SIGNIFICANT CHANGE UP (ref 96–108)
CHLORIDE SERPL-SCNC: 105 MMOL/L — SIGNIFICANT CHANGE UP (ref 96–108)
CO2 SERPL-SCNC: 18 MMOL/L — LOW (ref 22–31)
CO2 SERPL-SCNC: 21 MMOL/L — LOW (ref 22–31)
COVID-19 SPIKE DOMAIN AB INTERP: POSITIVE
COVID-19 SPIKE DOMAIN ANTIBODY RESULT: 175 U/ML — HIGH
CREAT SERPL-MCNC: 1.13 MG/DL — SIGNIFICANT CHANGE UP (ref 0.5–1.3)
CREAT SERPL-MCNC: 1.17 MG/DL — SIGNIFICANT CHANGE UP (ref 0.5–1.3)
EOSINOPHIL # BLD AUTO: 0.12 K/UL — SIGNIFICANT CHANGE UP (ref 0–0.5)
EOSINOPHIL NFR BLD AUTO: 0.8 % — SIGNIFICANT CHANGE UP (ref 0–6)
GIANT PLATELETS BLD QL SMEAR: PRESENT — SIGNIFICANT CHANGE UP
GLUCOSE BLDC GLUCOMTR-MCNC: 136 MG/DL — HIGH (ref 70–99)
GLUCOSE BLDC GLUCOMTR-MCNC: 140 MG/DL — HIGH (ref 70–99)
GLUCOSE BLDC GLUCOMTR-MCNC: 144 MG/DL — HIGH (ref 70–99)
GLUCOSE BLDC GLUCOMTR-MCNC: 95 MG/DL — SIGNIFICANT CHANGE UP (ref 70–99)
GLUCOSE SERPL-MCNC: 112 MG/DL — HIGH (ref 70–99)
GLUCOSE SERPL-MCNC: 153 MG/DL — HIGH (ref 70–99)
HCT VFR BLD CALC: 27.4 % — LOW (ref 39–50)
HCT VFR BLD CALC: 27.4 % — LOW (ref 39–50)
HGB BLD-MCNC: 8.8 G/DL — LOW (ref 13–17)
HGB BLD-MCNC: 9 G/DL — LOW (ref 13–17)
HYPOCHROMIA BLD QL: SLIGHT — SIGNIFICANT CHANGE UP
LYMPHOCYTES # BLD AUTO: 0.79 K/UL — LOW (ref 1–3.3)
LYMPHOCYTES # BLD AUTO: 5.2 % — LOW (ref 13–44)
MAGNESIUM SERPL-MCNC: 1.6 MG/DL — SIGNIFICANT CHANGE UP (ref 1.6–2.6)
MAGNESIUM SERPL-MCNC: 1.7 MG/DL — SIGNIFICANT CHANGE UP (ref 1.6–2.6)
MANUAL SMEAR VERIFICATION: SIGNIFICANT CHANGE UP
MCHC RBC-ENTMCNC: 28.3 PG — SIGNIFICANT CHANGE UP (ref 27–34)
MCHC RBC-ENTMCNC: 28.6 PG — SIGNIFICANT CHANGE UP (ref 27–34)
MCHC RBC-ENTMCNC: 32.1 GM/DL — SIGNIFICANT CHANGE UP (ref 32–36)
MCHC RBC-ENTMCNC: 32.8 GM/DL — SIGNIFICANT CHANGE UP (ref 32–36)
MCV RBC AUTO: 87 FL — SIGNIFICANT CHANGE UP (ref 80–100)
MCV RBC AUTO: 88.1 FL — SIGNIFICANT CHANGE UP (ref 80–100)
MONOCYTES # BLD AUTO: 0.14 K/UL — SIGNIFICANT CHANGE UP (ref 0–0.9)
MONOCYTES NFR BLD AUTO: 0.9 % — LOW (ref 2–14)
NEUTROPHILS # BLD AUTO: 14.01 K/UL — HIGH (ref 1.8–7.4)
NEUTROPHILS NFR BLD AUTO: 91.3 % — HIGH (ref 43–77)
NEUTS BAND # BLD: 0.9 % — SIGNIFICANT CHANGE UP (ref 0–8)
NRBC # BLD: 0 /100 WBCS — SIGNIFICANT CHANGE UP (ref 0–0)
OVALOCYTES BLD QL SMEAR: SLIGHT — SIGNIFICANT CHANGE UP
PHOSPHATE SERPL-MCNC: 2.4 MG/DL — LOW (ref 2.5–4.5)
PLAT MORPH BLD: ABNORMAL
PLATELET # BLD AUTO: 267 K/UL — SIGNIFICANT CHANGE UP (ref 150–400)
PLATELET # BLD AUTO: 298 K/UL — SIGNIFICANT CHANGE UP (ref 150–400)
POIKILOCYTOSIS BLD QL AUTO: SLIGHT — SIGNIFICANT CHANGE UP
POLYCHROMASIA BLD QL SMEAR: SLIGHT — SIGNIFICANT CHANGE UP
POTASSIUM SERPL-MCNC: 3.8 MMOL/L — SIGNIFICANT CHANGE UP (ref 3.5–5.3)
POTASSIUM SERPL-MCNC: 3.9 MMOL/L — SIGNIFICANT CHANGE UP (ref 3.5–5.3)
POTASSIUM SERPL-SCNC: 3.8 MMOL/L — SIGNIFICANT CHANGE UP (ref 3.5–5.3)
POTASSIUM SERPL-SCNC: 3.9 MMOL/L — SIGNIFICANT CHANGE UP (ref 3.5–5.3)
PROT SERPL-MCNC: 5.9 G/DL — LOW (ref 6–8.3)
RBC # BLD: 3.11 M/UL — LOW (ref 4.2–5.8)
RBC # BLD: 3.15 M/UL — LOW (ref 4.2–5.8)
RBC # FLD: 17.2 % — HIGH (ref 10.3–14.5)
RBC # FLD: 17.2 % — HIGH (ref 10.3–14.5)
RBC BLD AUTO: ABNORMAL
SARS-COV-2 IGG+IGM SERPL QL IA: 175 U/ML — HIGH
SARS-COV-2 IGG+IGM SERPL QL IA: POSITIVE
SCHISTOCYTES BLD QL AUTO: SLIGHT — SIGNIFICANT CHANGE UP
SODIUM SERPL-SCNC: 133 MMOL/L — LOW (ref 135–145)
SODIUM SERPL-SCNC: 133 MMOL/L — LOW (ref 135–145)
SPHEROCYTES BLD QL SMEAR: SLIGHT — SIGNIFICANT CHANGE UP
WBC # BLD: 15.19 K/UL — HIGH (ref 3.8–10.5)
WBC # BLD: 15.36 K/UL — HIGH (ref 3.8–10.5)
WBC # FLD AUTO: 15.19 K/UL — HIGH (ref 3.8–10.5)
WBC # FLD AUTO: 15.36 K/UL — HIGH (ref 3.8–10.5)

## 2021-08-21 PROCEDURE — 71045 X-RAY EXAM CHEST 1 VIEW: CPT | Mod: 26

## 2021-08-21 PROCEDURE — 99232 SBSQ HOSP IP/OBS MODERATE 35: CPT

## 2021-08-21 RX ORDER — ACETAMINOPHEN 500 MG
1000 TABLET ORAL ONCE
Refills: 0 | Status: DISCONTINUED | OUTPATIENT
Start: 2021-08-21 | End: 2021-08-25

## 2021-08-21 RX ORDER — DORZOLAMIDE HYDROCHLORIDE 20 MG/ML
1 SOLUTION/ DROPS OPHTHALMIC THREE TIMES A DAY
Refills: 0 | Status: DISCONTINUED | OUTPATIENT
Start: 2021-08-21 | End: 2021-08-25

## 2021-08-21 RX ORDER — BRIMONIDINE TARTRATE 2 MG/MG
1 SOLUTION/ DROPS OPHTHALMIC
Refills: 0 | Status: DISCONTINUED | OUTPATIENT
Start: 2021-08-21 | End: 2021-08-25

## 2021-08-21 RX ORDER — DEXTROSE 50 % IN WATER 50 %
15 SYRINGE (ML) INTRAVENOUS ONCE
Refills: 0 | Status: DISCONTINUED | OUTPATIENT
Start: 2021-08-21 | End: 2021-08-25

## 2021-08-21 RX ORDER — METOPROLOL TARTRATE 50 MG
50 TABLET ORAL DAILY
Refills: 0 | Status: DISCONTINUED | OUTPATIENT
Start: 2021-08-21 | End: 2021-08-25

## 2021-08-21 RX ORDER — DEXTROSE 50 % IN WATER 50 %
25 SYRINGE (ML) INTRAVENOUS ONCE
Refills: 0 | Status: DISCONTINUED | OUTPATIENT
Start: 2021-08-21 | End: 2021-08-25

## 2021-08-21 RX ORDER — POTASSIUM CHLORIDE 20 MEQ
10 PACKET (EA) ORAL DAILY
Refills: 0 | Status: DISCONTINUED | OUTPATIENT
Start: 2021-08-21 | End: 2021-08-25

## 2021-08-21 RX ORDER — APIXABAN 2.5 MG/1
2.5 TABLET, FILM COATED ORAL EVERY 12 HOURS
Refills: 0 | Status: DISCONTINUED | OUTPATIENT
Start: 2021-08-21 | End: 2021-08-25

## 2021-08-21 RX ORDER — SODIUM CHLORIDE 9 MG/ML
1000 INJECTION, SOLUTION INTRAVENOUS
Refills: 0 | Status: DISCONTINUED | OUTPATIENT
Start: 2021-08-21 | End: 2021-08-25

## 2021-08-21 RX ORDER — SODIUM CHLORIDE 9 MG/ML
1000 INJECTION, SOLUTION INTRAVENOUS ONCE
Refills: 0 | Status: COMPLETED | OUTPATIENT
Start: 2021-08-21 | End: 2021-08-21

## 2021-08-21 RX ORDER — PANTOPRAZOLE SODIUM 20 MG/1
40 TABLET, DELAYED RELEASE ORAL
Refills: 0 | Status: DISCONTINUED | OUTPATIENT
Start: 2021-08-21 | End: 2021-08-25

## 2021-08-21 RX ORDER — VANCOMYCIN HCL 1 G
125 VIAL (EA) INTRAVENOUS EVERY 6 HOURS
Refills: 0 | Status: DISCONTINUED | OUTPATIENT
Start: 2021-08-21 | End: 2021-08-25

## 2021-08-21 RX ORDER — FERROUS SULFATE 325(65) MG
325 TABLET ORAL
Refills: 0 | Status: DISCONTINUED | OUTPATIENT
Start: 2021-08-21 | End: 2021-08-25

## 2021-08-21 RX ORDER — ASPIRIN/CALCIUM CARB/MAGNESIUM 324 MG
81 TABLET ORAL DAILY
Refills: 0 | Status: DISCONTINUED | OUTPATIENT
Start: 2021-08-21 | End: 2021-08-25

## 2021-08-21 RX ORDER — LATANOPROST 0.05 MG/ML
1 SOLUTION/ DROPS OPHTHALMIC; TOPICAL AT BEDTIME
Refills: 0 | Status: DISCONTINUED | OUTPATIENT
Start: 2021-08-21 | End: 2021-08-25

## 2021-08-21 RX ORDER — DEXTROSE 50 % IN WATER 50 %
12.5 SYRINGE (ML) INTRAVENOUS ONCE
Refills: 0 | Status: DISCONTINUED | OUTPATIENT
Start: 2021-08-21 | End: 2021-08-25

## 2021-08-21 RX ORDER — INSULIN LISPRO 100/ML
VIAL (ML) SUBCUTANEOUS
Refills: 0 | Status: DISCONTINUED | OUTPATIENT
Start: 2021-08-21 | End: 2021-08-25

## 2021-08-21 RX ORDER — FINASTERIDE 5 MG/1
5 TABLET, FILM COATED ORAL DAILY
Refills: 0 | Status: DISCONTINUED | OUTPATIENT
Start: 2021-08-21 | End: 2021-08-25

## 2021-08-21 RX ORDER — ALBUTEROL 90 UG/1
2 AEROSOL, METERED ORAL EVERY 6 HOURS
Refills: 0 | Status: DISCONTINUED | OUTPATIENT
Start: 2021-08-21 | End: 2021-08-25

## 2021-08-21 RX ORDER — GLUCAGON INJECTION, SOLUTION 0.5 MG/.1ML
1 INJECTION, SOLUTION SUBCUTANEOUS ONCE
Refills: 0 | Status: DISCONTINUED | OUTPATIENT
Start: 2021-08-21 | End: 2021-08-25

## 2021-08-21 RX ORDER — PIPERACILLIN AND TAZOBACTAM 4; .5 G/20ML; G/20ML
4.5 INJECTION, POWDER, LYOPHILIZED, FOR SOLUTION INTRAVENOUS EVERY 6 HOURS
Refills: 0 | Status: DISCONTINUED | OUTPATIENT
Start: 2021-08-21 | End: 2021-08-23

## 2021-08-21 RX ORDER — ISOSORBIDE MONONITRATE 60 MG/1
30 TABLET, EXTENDED RELEASE ORAL DAILY
Refills: 0 | Status: DISCONTINUED | OUTPATIENT
Start: 2021-08-21 | End: 2021-08-25

## 2021-08-21 RX ORDER — PREGABALIN 225 MG/1
1000 CAPSULE ORAL DAILY
Refills: 0 | Status: DISCONTINUED | OUTPATIENT
Start: 2021-08-21 | End: 2021-08-25

## 2021-08-21 RX ORDER — ACETAMINOPHEN 500 MG
650 TABLET ORAL ONCE
Refills: 0 | Status: COMPLETED | OUTPATIENT
Start: 2021-08-21 | End: 2021-08-21

## 2021-08-21 RX ADMIN — PANTOPRAZOLE SODIUM 40 MILLIGRAM(S): 20 TABLET, DELAYED RELEASE ORAL at 07:27

## 2021-08-21 RX ADMIN — Medication 650 MILLIGRAM(S): at 11:59

## 2021-08-21 RX ADMIN — Medication 10 MILLIEQUIVALENT(S): at 12:15

## 2021-08-21 RX ADMIN — PIPERACILLIN AND TAZOBACTAM 200 GRAM(S): 4; .5 INJECTION, POWDER, LYOPHILIZED, FOR SOLUTION INTRAVENOUS at 12:14

## 2021-08-21 RX ADMIN — Medication 125 MILLIGRAM(S): at 08:31

## 2021-08-21 RX ADMIN — Medication 1 TABLET(S): at 12:15

## 2021-08-21 RX ADMIN — Medication 650 MILLIGRAM(S): at 10:14

## 2021-08-21 RX ADMIN — APIXABAN 2.5 MILLIGRAM(S): 2.5 TABLET, FILM COATED ORAL at 08:31

## 2021-08-21 RX ADMIN — DORZOLAMIDE HYDROCHLORIDE 1 DROP(S): 20 SOLUTION/ DROPS OPHTHALMIC at 22:44

## 2021-08-21 RX ADMIN — BRIMONIDINE TARTRATE 1 DROP(S): 2 SOLUTION/ DROPS OPHTHALMIC at 18:33

## 2021-08-21 RX ADMIN — Medication 50 MILLIGRAM(S): at 07:27

## 2021-08-21 RX ADMIN — Medication 81 MILLIGRAM(S): at 12:16

## 2021-08-21 RX ADMIN — APIXABAN 2.5 MILLIGRAM(S): 2.5 TABLET, FILM COATED ORAL at 19:53

## 2021-08-21 RX ADMIN — DORZOLAMIDE HYDROCHLORIDE 1 DROP(S): 20 SOLUTION/ DROPS OPHTHALMIC at 14:57

## 2021-08-21 RX ADMIN — PREGABALIN 1000 MICROGRAM(S): 225 CAPSULE ORAL at 12:15

## 2021-08-21 RX ADMIN — Medication 125 MILLIGRAM(S): at 16:34

## 2021-08-21 RX ADMIN — SODIUM CHLORIDE 1000 MILLILITER(S): 9 INJECTION, SOLUTION INTRAVENOUS at 06:49

## 2021-08-21 RX ADMIN — FINASTERIDE 5 MILLIGRAM(S): 5 TABLET, FILM COATED ORAL at 12:15

## 2021-08-21 RX ADMIN — PIPERACILLIN AND TAZOBACTAM 200 GRAM(S): 4; .5 INJECTION, POWDER, LYOPHILIZED, FOR SOLUTION INTRAVENOUS at 18:32

## 2021-08-21 RX ADMIN — ISOSORBIDE MONONITRATE 30 MILLIGRAM(S): 60 TABLET, EXTENDED RELEASE ORAL at 12:15

## 2021-08-21 RX ADMIN — Medication 125 MILLIGRAM(S): at 22:44

## 2021-08-21 RX ADMIN — PIPERACILLIN AND TAZOBACTAM 200 GRAM(S): 4; .5 INJECTION, POWDER, LYOPHILIZED, FOR SOLUTION INTRAVENOUS at 07:27

## 2021-08-21 RX ADMIN — Medication 325 MILLIGRAM(S): at 18:33

## 2021-08-21 NOTE — H&P ADULT - PROBLEM SELECTOR PLAN 8
-hx of DOMITILA/ AOCD, on iron PO BID  -per daughter recent EGD, colonoscopy negative   -still pending results of pill endoscopy  -No active signs/symptoms of bleed  - active type and screen (5/30)  -transfuse for Hgb <8 -Hb 7.4 on admission s/p 1 u pRBC in ED  -hx of DOMITILA/ AOCD, on iron PO BID  -per daughter recent EGD, colonoscopy negative   -still pending results of pill endoscopy  -No active signs/symptoms of bleed  Plan:  - active type and screen (5/30)  -transfuse for Hgb <8 -Hb 7.4 on admission s/p 1 u pRBC in ED  -hx of DOMITILA/ AOCD, on iron PO BID  -per daughter recent EGD, colonoscopy negative   -still pending results of pill endoscopy  -No active signs/symptoms of bleed  Plan:  - active type and screen  -transfuse for Hgb <8

## 2021-08-21 NOTE — H&P ADULT - HISTORY OF PRESENT ILLNESS
80M PMH of prostate ca (recently stopped chemo secondary to adverse effects), HTN, HLD, DM II, anemia of chronic disease/DOMITILA, PE/DVT (1/2020), Afib on Eliquis 2.5 mg BID, CAD (s/p IRASEMA x 1 3/30/21 on asa and Eliquis, esophagitis/gastritis/ duodenitis presents to ED c/o non-bloody diarrhea x 3 weeks. Pt reports that he had EGD and colonoscopy 7/30 that were negative and is still pending result of pill endoscopy as workup for anemia. However, since his scopes, pt has started having diarrhea with about 6 episodes of watery and nonbloody diarrhea per day. Pt also c/o 8/10 crampy abdominal pain. Pt has also finished 2 abx courses (Bactrim and Augmentin) for UTI, however these were started after diarrhea had already started. No Abx prior to diarrhea. Pt denies fever, chills, cough, nausea and vomiting, dysuria, hematuria, rash, leg edema, or any other acute complaints.    ED Course:  VS: T 102.4, HR 98, /67, O2 94  Labs s/f: Hb 7.4, WBC 13.4, Cr 1.2 (baseline about .9), UA with WBCs. C diff PCR positive  Imaging:  CT AP: proctocolitis, cystitis, increased retroperitoneal LAD  Treatment:  1u pRBC, ampicillin, PO vanc

## 2021-08-21 NOTE — H&P ADULT - NSHPLABSRESULTS_GEN_ALL_CORE
.  LABS:                         9.0    15.36 )-----------( 298      ( 21 Aug 2021 05:01 )             27.4         133<L>  |  103  |  11  ----------------------------<  112<H>  3.9   |  21<L>  |  1.17    Ca    8.2<L>      21 Aug 2021 05:01  Mg     1.6         TPro  5.9<L>  /  Alb  3.4  /  TBili  0.7  /  DBili  x   /  AST  25  /  ALT  8<L>  /  AlkPhos  48        Urinalysis Basic - ( 20 Aug 2021 20:23 )    Color: Yellow / Appearance: Cloudy / S.010 / pH: x  Gluc: x / Ketone: NEGATIVE  / Bili: Negative / Urobili: 0.2 E.U./dL   Blood: x / Protein: Trace mg/dL / Nitrite: POSITIVE   Leuk Esterase: Moderate / RBC: < 5 /HPF / WBC Many /HPF   Sq Epi: x / Non Sq Epi: 0-5 /HPF / Bacteria: Many /HPF                RADIOLOGY, EKG & ADDITIONAL TESTS: Reviewed.

## 2021-08-21 NOTE — H&P ADULT - PROBLEM SELECTOR PLAN 1
Presenting with diarrhea x3 weeks with T 102.4, HR 98, WBC 13.8 on admission. UA with WBCs but denies dysuria. C diff positive with CTAP showing proctocolitis, cystitis.  -s/p 1u pRBC for anemia  -s/p 1 dose ampicillin  Plan:  -cont PO vanc  -give additional 1L lactated ringers, with already given 1u pRBC, should be adequate fluid resuscitation  -cont zosyn for coverage of UTI due to positive UA despite denying dysuria as pt is septic

## 2021-08-21 NOTE — H&P ADULT - PROBLEM SELECTOR PLAN 3
s/p IRASEMA x 1 3/30/21 on asa and Eliquis (s/p 1 week plavix 2/2 risk of GI bleed)  - continue with asa 81mg, eliquis 2.5 mg BID, atorvastatin 40.    Pre-diabetes  - Not currently on medications per current med list provided by daughter   - Hb A1C 6.1 04/21   - ISS

## 2021-08-21 NOTE — H&P ADULT - PROBLEM SELECTOR PLAN 5
-S/p prostatectomy (1994)   -unclear if currently on Lupron/Xgeva as an outpatient as daughter reports not getting chemo but was on med list provided   -CTAP showing worsening retroperitoneal LAD  -followed by Dr. Fuller.

## 2021-08-21 NOTE — H&P ADULT - BIRTH SEX
20F, no significant pmh, presenting with dizziness. patient reports she has been feeling lightheaded since yesterday. continues to feel lightheaded today. had some nausea. no headache, chest pain, shortness of breath, vomiting, abdominal pain, pain or burning with urination.
Male

## 2021-08-21 NOTE — H&P ADULT - NSHPPHYSICALEXAM_GEN_ALL_CORE
.  VITAL SIGNS:  T(C): 38 (08-21-21 @ 06:39), Max: 39.1 (08-21-21 @ 04:10)  T(F): 100.4 (08-21-21 @ 06:39), Max: 102.4 (08-21-21 @ 04:10)  HR: 95 (08-21-21 @ 06:39) (84 - 98)  BP: 155/71 (08-21-21 @ 06:39) (155/71 - 183/81)  BP(mean): --  RR: 18 (08-21-21 @ 06:39) (16 - 18)  SpO2: 99% (08-21-21 @ 06:39) (94% - 99%)  Wt(kg): --    PHYSICAL EXAM:    Constitutional: NAD  Head: NC/AT  ENT: dry mucous membranes  Neck: supple; no JVD or thyromegaly  Respiratory: CTA B/L; no W/R/R, no retractions  Cardiac: +S1/S2; RRR; no M/R/G; PMI non-displaced  Gastrointestinal: soft, NT/ND; no rebound or guarding; +BSx4  Extremities: WWP, no clubbing or cyanosis; no peripheral edema  Musculoskeletal: NROM x4; no joint swelling, tenderness or erythema  Vascular: 2+ radial, DP/PT pulses B/L  Dermatologic: skin warm, dry and intact; no rashes, wounds, or scars  Neurologic: AAOx3; CNII-XII grossly intact; no focal deficits  Psychiatric: affect and characteristics of appearance, verbalizations, behaviors are appropriate

## 2021-08-21 NOTE — H&P ADULT - ASSESSMENT
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

## 2021-08-22 LAB
A1C WITH ESTIMATED AVERAGE GLUCOSE RESULT: 4.9 % — SIGNIFICANT CHANGE UP (ref 4–5.6)
ALBUMIN SERPL ELPH-MCNC: 2.7 G/DL — LOW (ref 3.3–5)
ALP SERPL-CCNC: 44 U/L — SIGNIFICANT CHANGE UP (ref 40–120)
ALT FLD-CCNC: 6 U/L — LOW (ref 10–45)
ANION GAP SERPL CALC-SCNC: 11 MMOL/L — SIGNIFICANT CHANGE UP (ref 5–17)
ANISOCYTOSIS BLD QL: SLIGHT — SIGNIFICANT CHANGE UP
AST SERPL-CCNC: 18 U/L — SIGNIFICANT CHANGE UP (ref 10–40)
BASOPHILS # BLD AUTO: 0 K/UL — SIGNIFICANT CHANGE UP (ref 0–0.2)
BASOPHILS NFR BLD AUTO: 0 % — SIGNIFICANT CHANGE UP (ref 0–2)
BILIRUB SERPL-MCNC: 0.5 MG/DL — SIGNIFICANT CHANGE UP (ref 0.2–1.2)
BUN SERPL-MCNC: 8 MG/DL — SIGNIFICANT CHANGE UP (ref 7–23)
BURR CELLS BLD QL SMEAR: PRESENT — SIGNIFICANT CHANGE UP
CALCIUM SERPL-MCNC: 7.4 MG/DL — LOW (ref 8.4–10.5)
CHLORIDE SERPL-SCNC: 107 MMOL/L — SIGNIFICANT CHANGE UP (ref 96–108)
CO2 SERPL-SCNC: 20 MMOL/L — LOW (ref 22–31)
CREAT SERPL-MCNC: 1.14 MG/DL — SIGNIFICANT CHANGE UP (ref 0.5–1.3)
EOSINOPHIL # BLD AUTO: 0.16 K/UL — SIGNIFICANT CHANGE UP (ref 0–0.5)
EOSINOPHIL NFR BLD AUTO: 0.9 % — SIGNIFICANT CHANGE UP (ref 0–6)
ESTIMATED AVERAGE GLUCOSE: 94 MG/DL — SIGNIFICANT CHANGE UP (ref 68–114)
GIANT PLATELETS BLD QL SMEAR: PRESENT — SIGNIFICANT CHANGE UP
GLUCOSE BLDC GLUCOMTR-MCNC: 103 MG/DL — HIGH (ref 70–99)
GLUCOSE BLDC GLUCOMTR-MCNC: 129 MG/DL — HIGH (ref 70–99)
GLUCOSE BLDC GLUCOMTR-MCNC: 99 MG/DL — SIGNIFICANT CHANGE UP (ref 70–99)
GLUCOSE BLDC GLUCOMTR-MCNC: 99 MG/DL — SIGNIFICANT CHANGE UP (ref 70–99)
GLUCOSE SERPL-MCNC: 90 MG/DL — SIGNIFICANT CHANGE UP (ref 70–99)
HCT VFR BLD CALC: 25.2 % — LOW (ref 39–50)
HGB BLD-MCNC: 8.2 G/DL — LOW (ref 13–17)
HYPOCHROMIA BLD QL: SIGNIFICANT CHANGE UP
LYMPHOCYTES # BLD AUTO: 0.75 K/UL — LOW (ref 1–3.3)
LYMPHOCYTES # BLD AUTO: 4.3 % — LOW (ref 13–44)
MAGNESIUM SERPL-MCNC: 1.6 MG/DL — SIGNIFICANT CHANGE UP (ref 1.6–2.6)
MANUAL SMEAR VERIFICATION: SIGNIFICANT CHANGE UP
MCHC RBC-ENTMCNC: 28.2 PG — SIGNIFICANT CHANGE UP (ref 27–34)
MCHC RBC-ENTMCNC: 32.5 GM/DL — SIGNIFICANT CHANGE UP (ref 32–36)
MCV RBC AUTO: 86.6 FL — SIGNIFICANT CHANGE UP (ref 80–100)
MONOCYTES # BLD AUTO: 0.45 K/UL — SIGNIFICANT CHANGE UP (ref 0–0.9)
MONOCYTES NFR BLD AUTO: 2.6 % — SIGNIFICANT CHANGE UP (ref 2–14)
NEUTROPHILS # BLD AUTO: 16.03 K/UL — HIGH (ref 1.8–7.4)
NEUTROPHILS NFR BLD AUTO: 92.2 % — HIGH (ref 43–77)
OVALOCYTES BLD QL SMEAR: SLIGHT — SIGNIFICANT CHANGE UP
PHOSPHATE SERPL-MCNC: 2.5 MG/DL — SIGNIFICANT CHANGE UP (ref 2.5–4.5)
PLAT MORPH BLD: NORMAL — SIGNIFICANT CHANGE UP
PLATELET # BLD AUTO: 266 K/UL — SIGNIFICANT CHANGE UP (ref 150–400)
POIKILOCYTOSIS BLD QL AUTO: SLIGHT — SIGNIFICANT CHANGE UP
POTASSIUM SERPL-MCNC: 3.2 MMOL/L — LOW (ref 3.5–5.3)
POTASSIUM SERPL-SCNC: 3.2 MMOL/L — LOW (ref 3.5–5.3)
PROT SERPL-MCNC: 4.9 G/DL — LOW (ref 6–8.3)
RBC # BLD: 2.91 M/UL — LOW (ref 4.2–5.8)
RBC # FLD: 17.1 % — HIGH (ref 10.3–14.5)
RBC BLD AUTO: ABNORMAL
SCHISTOCYTES BLD QL AUTO: SLIGHT — SIGNIFICANT CHANGE UP
SODIUM SERPL-SCNC: 138 MMOL/L — SIGNIFICANT CHANGE UP (ref 135–145)
WBC # BLD: 17.39 K/UL — HIGH (ref 3.8–10.5)
WBC # FLD AUTO: 17.39 K/UL — HIGH (ref 3.8–10.5)

## 2021-08-22 PROCEDURE — 99232 SBSQ HOSP IP/OBS MODERATE 35: CPT

## 2021-08-22 RX ORDER — MAGNESIUM SULFATE 500 MG/ML
2 VIAL (ML) INJECTION ONCE
Refills: 0 | Status: COMPLETED | OUTPATIENT
Start: 2021-08-22 | End: 2021-08-22

## 2021-08-22 RX ORDER — POTASSIUM PHOSPHATE, MONOBASIC POTASSIUM PHOSPHATE, DIBASIC 236; 224 MG/ML; MG/ML
15 INJECTION, SOLUTION INTRAVENOUS ONCE
Refills: 0 | Status: COMPLETED | OUTPATIENT
Start: 2021-08-22 | End: 2021-08-22

## 2021-08-22 RX ORDER — MAGNESIUM SULFATE 500 MG/ML
2 VIAL (ML) INJECTION ONCE
Refills: 0 | Status: DISCONTINUED | OUTPATIENT
Start: 2021-08-22 | End: 2021-08-22

## 2021-08-22 RX ADMIN — LATANOPROST 1 DROP(S): 0.05 SOLUTION/ DROPS OPHTHALMIC; TOPICAL at 21:16

## 2021-08-22 RX ADMIN — DORZOLAMIDE HYDROCHLORIDE 1 DROP(S): 20 SOLUTION/ DROPS OPHTHALMIC at 21:15

## 2021-08-22 RX ADMIN — BRIMONIDINE TARTRATE 1 DROP(S): 2 SOLUTION/ DROPS OPHTHALMIC at 08:44

## 2021-08-22 RX ADMIN — Medication 325 MILLIGRAM(S): at 18:24

## 2021-08-22 RX ADMIN — PIPERACILLIN AND TAZOBACTAM 200 GRAM(S): 4; .5 INJECTION, POWDER, LYOPHILIZED, FOR SOLUTION INTRAVENOUS at 11:52

## 2021-08-22 RX ADMIN — Medication 125 MILLIGRAM(S): at 14:43

## 2021-08-22 RX ADMIN — DORZOLAMIDE HYDROCHLORIDE 1 DROP(S): 20 SOLUTION/ DROPS OPHTHALMIC at 14:43

## 2021-08-22 RX ADMIN — Medication 1 TABLET(S): at 11:53

## 2021-08-22 RX ADMIN — DORZOLAMIDE HYDROCHLORIDE 1 DROP(S): 20 SOLUTION/ DROPS OPHTHALMIC at 08:44

## 2021-08-22 RX ADMIN — PIPERACILLIN AND TAZOBACTAM 200 GRAM(S): 4; .5 INJECTION, POWDER, LYOPHILIZED, FOR SOLUTION INTRAVENOUS at 02:38

## 2021-08-22 RX ADMIN — FINASTERIDE 5 MILLIGRAM(S): 5 TABLET, FILM COATED ORAL at 11:53

## 2021-08-22 RX ADMIN — Medication 81 MILLIGRAM(S): at 11:53

## 2021-08-22 RX ADMIN — PIPERACILLIN AND TAZOBACTAM 200 GRAM(S): 4; .5 INJECTION, POWDER, LYOPHILIZED, FOR SOLUTION INTRAVENOUS at 07:23

## 2021-08-22 RX ADMIN — APIXABAN 2.5 MILLIGRAM(S): 2.5 TABLET, FILM COATED ORAL at 19:31

## 2021-08-22 RX ADMIN — PIPERACILLIN AND TAZOBACTAM 200 GRAM(S): 4; .5 INJECTION, POWDER, LYOPHILIZED, FOR SOLUTION INTRAVENOUS at 18:24

## 2021-08-22 RX ADMIN — Medication 325 MILLIGRAM(S): at 07:22

## 2021-08-22 RX ADMIN — PREGABALIN 1000 MICROGRAM(S): 225 CAPSULE ORAL at 11:53

## 2021-08-22 RX ADMIN — Medication 50 MILLIGRAM(S): at 07:22

## 2021-08-22 RX ADMIN — Medication 125 MILLIGRAM(S): at 08:45

## 2021-08-22 RX ADMIN — POTASSIUM PHOSPHATE, MONOBASIC POTASSIUM PHOSPHATE, DIBASIC 62.5 MILLIMOLE(S): 236; 224 INJECTION, SOLUTION INTRAVENOUS at 11:52

## 2021-08-22 RX ADMIN — Medication 125 MILLIGRAM(S): at 19:31

## 2021-08-22 RX ADMIN — Medication 10 MILLIEQUIVALENT(S): at 11:53

## 2021-08-22 RX ADMIN — ISOSORBIDE MONONITRATE 30 MILLIGRAM(S): 60 TABLET, EXTENDED RELEASE ORAL at 11:52

## 2021-08-22 RX ADMIN — APIXABAN 2.5 MILLIGRAM(S): 2.5 TABLET, FILM COATED ORAL at 08:44

## 2021-08-22 RX ADMIN — BRIMONIDINE TARTRATE 1 DROP(S): 2 SOLUTION/ DROPS OPHTHALMIC at 18:25

## 2021-08-22 RX ADMIN — PANTOPRAZOLE SODIUM 40 MILLIGRAM(S): 20 TABLET, DELAYED RELEASE ORAL at 07:23

## 2021-08-22 RX ADMIN — Medication 50 GRAM(S): at 07:24

## 2021-08-23 LAB
-  AMPICILLIN/SULBACTAM: SIGNIFICANT CHANGE UP
-  AMPICILLIN: SIGNIFICANT CHANGE UP
-  CEFAZOLIN: SIGNIFICANT CHANGE UP
-  CEFTRIAXONE: SIGNIFICANT CHANGE UP
-  CIPROFLOXACIN: SIGNIFICANT CHANGE UP
-  ERTAPENEM: SIGNIFICANT CHANGE UP
-  GENTAMICIN: SIGNIFICANT CHANGE UP
-  NITROFURANTOIN: SIGNIFICANT CHANGE UP
-  PIPERACILLIN/TAZOBACTAM: SIGNIFICANT CHANGE UP
-  TOBRAMYCIN: SIGNIFICANT CHANGE UP
-  TRIMETHOPRIM/SULFAMETHOXAZOLE: SIGNIFICANT CHANGE UP
ALBUMIN SERPL ELPH-MCNC: 2.7 G/DL — LOW (ref 3.3–5)
ALP SERPL-CCNC: 54 U/L — SIGNIFICANT CHANGE UP (ref 40–120)
ALT FLD-CCNC: 9 U/L — LOW (ref 10–45)
ANION GAP SERPL CALC-SCNC: 11 MMOL/L — SIGNIFICANT CHANGE UP (ref 5–17)
AST SERPL-CCNC: 22 U/L — SIGNIFICANT CHANGE UP (ref 10–40)
BACTERIA UR CULT: ABNORMAL
BASOPHILS # BLD AUTO: 0.03 K/UL — SIGNIFICANT CHANGE UP (ref 0–0.2)
BASOPHILS NFR BLD AUTO: 0.2 % — SIGNIFICANT CHANGE UP (ref 0–2)
BILIRUB SERPL-MCNC: 0.5 MG/DL — SIGNIFICANT CHANGE UP (ref 0.2–1.2)
BLD GP AB SCN SERPL QL: NEGATIVE — SIGNIFICANT CHANGE UP
BUN SERPL-MCNC: 7 MG/DL — SIGNIFICANT CHANGE UP (ref 7–23)
CALCIUM SERPL-MCNC: 7.7 MG/DL — LOW (ref 8.4–10.5)
CHLORIDE SERPL-SCNC: 108 MMOL/L — SIGNIFICANT CHANGE UP (ref 96–108)
CO2 SERPL-SCNC: 19 MMOL/L — LOW (ref 22–31)
CREAT SERPL-MCNC: 1.06 MG/DL — SIGNIFICANT CHANGE UP (ref 0.5–1.3)
CULTURE RESULTS: SIGNIFICANT CHANGE UP
EOSINOPHIL # BLD AUTO: 0.62 K/UL — HIGH (ref 0–0.5)
EOSINOPHIL NFR BLD AUTO: 4.9 % — SIGNIFICANT CHANGE UP (ref 0–6)
GLUCOSE BLDC GLUCOMTR-MCNC: 101 MG/DL — HIGH (ref 70–99)
GLUCOSE BLDC GLUCOMTR-MCNC: 104 MG/DL — HIGH (ref 70–99)
GLUCOSE BLDC GLUCOMTR-MCNC: 109 MG/DL — HIGH (ref 70–99)
GLUCOSE BLDC GLUCOMTR-MCNC: 92 MG/DL — SIGNIFICANT CHANGE UP (ref 70–99)
GLUCOSE SERPL-MCNC: 94 MG/DL — SIGNIFICANT CHANGE UP (ref 70–99)
HCT VFR BLD CALC: 28.1 % — LOW (ref 39–50)
HGB BLD-MCNC: 9.1 G/DL — LOW (ref 13–17)
IMM GRANULOCYTES NFR BLD AUTO: 0.7 % — SIGNIFICANT CHANGE UP (ref 0–1.5)
LYMPHOCYTES # BLD AUTO: 0.94 K/UL — LOW (ref 1–3.3)
LYMPHOCYTES # BLD AUTO: 7.5 % — LOW (ref 13–44)
MAGNESIUM SERPL-MCNC: 2 MG/DL — SIGNIFICANT CHANGE UP (ref 1.6–2.6)
MCHC RBC-ENTMCNC: 28.4 PG — SIGNIFICANT CHANGE UP (ref 27–34)
MCHC RBC-ENTMCNC: 32.4 GM/DL — SIGNIFICANT CHANGE UP (ref 32–36)
MCV RBC AUTO: 87.8 FL — SIGNIFICANT CHANGE UP (ref 80–100)
METHOD TYPE: SIGNIFICANT CHANGE UP
MONOCYTES # BLD AUTO: 0.79 K/UL — SIGNIFICANT CHANGE UP (ref 0–0.9)
MONOCYTES NFR BLD AUTO: 6.3 % — SIGNIFICANT CHANGE UP (ref 2–14)
NEUTROPHILS # BLD AUTO: 10.09 K/UL — HIGH (ref 1.8–7.4)
NEUTROPHILS NFR BLD AUTO: 80.4 % — HIGH (ref 43–77)
NRBC # BLD: 0 /100 WBCS — SIGNIFICANT CHANGE UP (ref 0–0)
ORGANISM # SPEC MICROSCOPIC CNT: SIGNIFICANT CHANGE UP
ORGANISM # SPEC MICROSCOPIC CNT: SIGNIFICANT CHANGE UP
PHOSPHATE SERPL-MCNC: 2.4 MG/DL — LOW (ref 2.5–4.5)
PLATELET # BLD AUTO: 303 K/UL — SIGNIFICANT CHANGE UP (ref 150–400)
POTASSIUM SERPL-MCNC: 3.3 MMOL/L — LOW (ref 3.5–5.3)
POTASSIUM SERPL-SCNC: 3.3 MMOL/L — LOW (ref 3.5–5.3)
PROT SERPL-MCNC: 5.6 G/DL — LOW (ref 6–8.3)
RBC # BLD: 3.2 M/UL — LOW (ref 4.2–5.8)
RBC # FLD: 17.2 % — HIGH (ref 10.3–14.5)
RH IG SCN BLD-IMP: POSITIVE — SIGNIFICANT CHANGE UP
SODIUM SERPL-SCNC: 138 MMOL/L — SIGNIFICANT CHANGE UP (ref 135–145)
SPECIMEN SOURCE: SIGNIFICANT CHANGE UP
WBC # BLD: 12.56 K/UL — HIGH (ref 3.8–10.5)
WBC # FLD AUTO: 12.56 K/UL — HIGH (ref 3.8–10.5)

## 2021-08-23 PROCEDURE — 99232 SBSQ HOSP IP/OBS MODERATE 35: CPT | Mod: GC

## 2021-08-23 RX ORDER — POTASSIUM CHLORIDE 20 MEQ
20 PACKET (EA) ORAL
Refills: 0 | Status: COMPLETED | OUTPATIENT
Start: 2021-08-23 | End: 2021-08-23

## 2021-08-23 RX ORDER — CEFTRIAXONE 500 MG/1
1000 INJECTION, POWDER, FOR SOLUTION INTRAMUSCULAR; INTRAVENOUS EVERY 24 HOURS
Refills: 0 | Status: COMPLETED | OUTPATIENT
Start: 2021-08-23 | End: 2021-08-25

## 2021-08-23 RX ADMIN — Medication 1 TABLET(S): at 12:06

## 2021-08-23 RX ADMIN — Medication 125 MILLIGRAM(S): at 06:49

## 2021-08-23 RX ADMIN — DORZOLAMIDE HYDROCHLORIDE 1 DROP(S): 20 SOLUTION/ DROPS OPHTHALMIC at 22:41

## 2021-08-23 RX ADMIN — Medication 10 MILLIEQUIVALENT(S): at 12:06

## 2021-08-23 RX ADMIN — PIPERACILLIN AND TAZOBACTAM 200 GRAM(S): 4; .5 INJECTION, POWDER, LYOPHILIZED, FOR SOLUTION INTRAVENOUS at 06:46

## 2021-08-23 RX ADMIN — DORZOLAMIDE HYDROCHLORIDE 1 DROP(S): 20 SOLUTION/ DROPS OPHTHALMIC at 06:45

## 2021-08-23 RX ADMIN — Medication 125 MILLIGRAM(S): at 01:49

## 2021-08-23 RX ADMIN — BRIMONIDINE TARTRATE 1 DROP(S): 2 SOLUTION/ DROPS OPHTHALMIC at 17:45

## 2021-08-23 RX ADMIN — PREGABALIN 1000 MICROGRAM(S): 225 CAPSULE ORAL at 12:06

## 2021-08-23 RX ADMIN — Medication 50 MILLIEQUIVALENT(S): at 17:44

## 2021-08-23 RX ADMIN — FINASTERIDE 5 MILLIGRAM(S): 5 TABLET, FILM COATED ORAL at 12:05

## 2021-08-23 RX ADMIN — APIXABAN 2.5 MILLIGRAM(S): 2.5 TABLET, FILM COATED ORAL at 18:58

## 2021-08-23 RX ADMIN — PANTOPRAZOLE SODIUM 40 MILLIGRAM(S): 20 TABLET, DELAYED RELEASE ORAL at 06:45

## 2021-08-23 RX ADMIN — Medication 81 MILLIGRAM(S): at 12:05

## 2021-08-23 RX ADMIN — DORZOLAMIDE HYDROCHLORIDE 1 DROP(S): 20 SOLUTION/ DROPS OPHTHALMIC at 14:28

## 2021-08-23 RX ADMIN — Medication 125 MILLIGRAM(S): at 15:53

## 2021-08-23 RX ADMIN — Medication 50 MILLIEQUIVALENT(S): at 14:35

## 2021-08-23 RX ADMIN — APIXABAN 2.5 MILLIGRAM(S): 2.5 TABLET, FILM COATED ORAL at 06:47

## 2021-08-23 RX ADMIN — Medication 50 MILLIGRAM(S): at 05:46

## 2021-08-23 RX ADMIN — BRIMONIDINE TARTRATE 1 DROP(S): 2 SOLUTION/ DROPS OPHTHALMIC at 06:45

## 2021-08-23 RX ADMIN — Medication 50 MILLIEQUIVALENT(S): at 11:22

## 2021-08-23 RX ADMIN — Medication 325 MILLIGRAM(S): at 05:46

## 2021-08-23 RX ADMIN — ISOSORBIDE MONONITRATE 30 MILLIGRAM(S): 60 TABLET, EXTENDED RELEASE ORAL at 12:06

## 2021-08-23 RX ADMIN — PIPERACILLIN AND TAZOBACTAM 200 GRAM(S): 4; .5 INJECTION, POWDER, LYOPHILIZED, FOR SOLUTION INTRAVENOUS at 00:58

## 2021-08-23 RX ADMIN — CEFTRIAXONE 1000 MILLIGRAM(S): 500 INJECTION, POWDER, FOR SOLUTION INTRAMUSCULAR; INTRAVENOUS at 14:27

## 2021-08-23 RX ADMIN — Medication 325 MILLIGRAM(S): at 17:44

## 2021-08-23 RX ADMIN — Medication 125 MILLIGRAM(S): at 22:40

## 2021-08-23 RX ADMIN — LATANOPROST 1 DROP(S): 0.05 SOLUTION/ DROPS OPHTHALMIC; TOPICAL at 22:40

## 2021-08-24 ENCOUNTER — TRANSCRIPTION ENCOUNTER (OUTPATIENT)
Age: 81
End: 2021-08-24

## 2021-08-24 DIAGNOSIS — N39.0 URINARY TRACT INFECTION, SITE NOT SPECIFIED: ICD-10-CM

## 2021-08-24 LAB
ALBUMIN SERPL ELPH-MCNC: 2.9 G/DL — LOW (ref 3.3–5)
ALP SERPL-CCNC: 44 U/L — SIGNIFICANT CHANGE UP (ref 40–120)
ALT FLD-CCNC: 11 U/L — SIGNIFICANT CHANGE UP (ref 10–45)
ANION GAP SERPL CALC-SCNC: 8 MMOL/L — SIGNIFICANT CHANGE UP (ref 5–17)
AST SERPL-CCNC: 29 U/L — SIGNIFICANT CHANGE UP (ref 10–40)
BILIRUB SERPL-MCNC: 0.2 MG/DL — SIGNIFICANT CHANGE UP (ref 0.2–1.2)
BUN SERPL-MCNC: 4 MG/DL — LOW (ref 7–23)
CALCIUM SERPL-MCNC: 7.6 MG/DL — LOW (ref 8.4–10.5)
CHLORIDE SERPL-SCNC: 111 MMOL/L — HIGH (ref 96–108)
CO2 SERPL-SCNC: 18 MMOL/L — LOW (ref 22–31)
CREAT SERPL-MCNC: 0.97 MG/DL — SIGNIFICANT CHANGE UP (ref 0.5–1.3)
GLUCOSE BLDC GLUCOMTR-MCNC: 102 MG/DL — HIGH (ref 70–99)
GLUCOSE BLDC GLUCOMTR-MCNC: 103 MG/DL — HIGH (ref 70–99)
GLUCOSE BLDC GLUCOMTR-MCNC: 88 MG/DL — SIGNIFICANT CHANGE UP (ref 70–99)
GLUCOSE BLDC GLUCOMTR-MCNC: 90 MG/DL — SIGNIFICANT CHANGE UP (ref 70–99)
GLUCOSE SERPL-MCNC: 97 MG/DL — SIGNIFICANT CHANGE UP (ref 70–99)
HCT VFR BLD CALC: 25.7 % — LOW (ref 39–50)
HGB BLD-MCNC: 8.3 G/DL — LOW (ref 13–17)
MAGNESIUM SERPL-MCNC: 1.7 MG/DL — SIGNIFICANT CHANGE UP (ref 1.6–2.6)
MCHC RBC-ENTMCNC: 28.6 PG — SIGNIFICANT CHANGE UP (ref 27–34)
MCHC RBC-ENTMCNC: 32.3 GM/DL — SIGNIFICANT CHANGE UP (ref 32–36)
MCV RBC AUTO: 88.6 FL — SIGNIFICANT CHANGE UP (ref 80–100)
NRBC # BLD: 0 /100 WBCS — SIGNIFICANT CHANGE UP (ref 0–0)
PHOSPHATE SERPL-MCNC: 1.6 MG/DL — LOW (ref 2.5–4.5)
PLATELET # BLD AUTO: 283 K/UL — SIGNIFICANT CHANGE UP (ref 150–400)
POTASSIUM SERPL-MCNC: 4.2 MMOL/L — SIGNIFICANT CHANGE UP (ref 3.5–5.3)
POTASSIUM SERPL-SCNC: 4.2 MMOL/L — SIGNIFICANT CHANGE UP (ref 3.5–5.3)
PROT SERPL-MCNC: 5 G/DL — LOW (ref 6–8.3)
RBC # BLD: 2.9 M/UL — LOW (ref 4.2–5.8)
RBC # FLD: 17.4 % — HIGH (ref 10.3–14.5)
SODIUM SERPL-SCNC: 137 MMOL/L — SIGNIFICANT CHANGE UP (ref 135–145)
WBC # BLD: 8.19 K/UL — SIGNIFICANT CHANGE UP (ref 3.8–10.5)
WBC # FLD AUTO: 8.19 K/UL — SIGNIFICANT CHANGE UP (ref 3.8–10.5)

## 2021-08-24 PROCEDURE — 99232 SBSQ HOSP IP/OBS MODERATE 35: CPT | Mod: GC

## 2021-08-24 RX ORDER — MAGNESIUM SULFATE 500 MG/ML
2 VIAL (ML) INJECTION ONCE
Refills: 0 | Status: COMPLETED | OUTPATIENT
Start: 2021-08-24 | End: 2021-08-24

## 2021-08-24 RX ORDER — POTASSIUM PHOSPHATE, MONOBASIC POTASSIUM PHOSPHATE, DIBASIC 236; 224 MG/ML; MG/ML
30 INJECTION, SOLUTION INTRAVENOUS ONCE
Refills: 0 | Status: COMPLETED | OUTPATIENT
Start: 2021-08-24 | End: 2021-08-24

## 2021-08-24 RX ADMIN — DORZOLAMIDE HYDROCHLORIDE 1 DROP(S): 20 SOLUTION/ DROPS OPHTHALMIC at 15:56

## 2021-08-24 RX ADMIN — DORZOLAMIDE HYDROCHLORIDE 1 DROP(S): 20 SOLUTION/ DROPS OPHTHALMIC at 05:57

## 2021-08-24 RX ADMIN — APIXABAN 2.5 MILLIGRAM(S): 2.5 TABLET, FILM COATED ORAL at 17:21

## 2021-08-24 RX ADMIN — Medication 50 GRAM(S): at 14:40

## 2021-08-24 RX ADMIN — PREGABALIN 1000 MICROGRAM(S): 225 CAPSULE ORAL at 11:06

## 2021-08-24 RX ADMIN — BRIMONIDINE TARTRATE 1 DROP(S): 2 SOLUTION/ DROPS OPHTHALMIC at 17:22

## 2021-08-24 RX ADMIN — FINASTERIDE 5 MILLIGRAM(S): 5 TABLET, FILM COATED ORAL at 11:06

## 2021-08-24 RX ADMIN — Medication 325 MILLIGRAM(S): at 05:55

## 2021-08-24 RX ADMIN — Medication 10 MILLIEQUIVALENT(S): at 11:06

## 2021-08-24 RX ADMIN — Medication 125 MILLIGRAM(S): at 22:42

## 2021-08-24 RX ADMIN — APIXABAN 2.5 MILLIGRAM(S): 2.5 TABLET, FILM COATED ORAL at 06:04

## 2021-08-24 RX ADMIN — Medication 125 MILLIGRAM(S): at 17:19

## 2021-08-24 RX ADMIN — BRIMONIDINE TARTRATE 1 DROP(S): 2 SOLUTION/ DROPS OPHTHALMIC at 06:00

## 2021-08-24 RX ADMIN — POTASSIUM PHOSPHATE, MONOBASIC POTASSIUM PHOSPHATE, DIBASIC 83.33 MILLIMOLE(S): 236; 224 INJECTION, SOLUTION INTRAVENOUS at 15:54

## 2021-08-24 RX ADMIN — Medication 125 MILLIGRAM(S): at 05:55

## 2021-08-24 RX ADMIN — Medication 325 MILLIGRAM(S): at 17:21

## 2021-08-24 RX ADMIN — Medication 50 MILLIGRAM(S): at 05:56

## 2021-08-24 RX ADMIN — LATANOPROST 1 DROP(S): 0.05 SOLUTION/ DROPS OPHTHALMIC; TOPICAL at 22:42

## 2021-08-24 RX ADMIN — CEFTRIAXONE 1000 MILLIGRAM(S): 500 INJECTION, POWDER, FOR SOLUTION INTRAMUSCULAR; INTRAVENOUS at 14:02

## 2021-08-24 RX ADMIN — Medication 81 MILLIGRAM(S): at 11:06

## 2021-08-24 RX ADMIN — Medication 125 MILLIGRAM(S): at 11:06

## 2021-08-24 RX ADMIN — DORZOLAMIDE HYDROCHLORIDE 1 DROP(S): 20 SOLUTION/ DROPS OPHTHALMIC at 22:42

## 2021-08-24 RX ADMIN — ISOSORBIDE MONONITRATE 30 MILLIGRAM(S): 60 TABLET, EXTENDED RELEASE ORAL at 11:08

## 2021-08-24 RX ADMIN — PANTOPRAZOLE SODIUM 40 MILLIGRAM(S): 20 TABLET, DELAYED RELEASE ORAL at 06:04

## 2021-08-24 RX ADMIN — Medication 1 TABLET(S): at 11:07

## 2021-08-24 NOTE — PROGRESS NOTE ADULT - PROBLEM SELECTOR PLAN 1
Presenting with diarrhea x3 weeks with T 102.4, HR 98, WBC 13.8 on admission. UA with WBCs but denies dysuria. C diff positive with CTAP showing proctocolitis, cystitis.  -s/p 1u pRBC for anemia  -s/p 1 dose ampicillin  Plan:  -cont PO vanc  -give additional 1L lactated ringers, with already given 1u pRBC, should be adequate fluid resuscitation  -cont zosyn for coverage of UTI due to positive UA despite denying dysuria as pt is septic
Presenting with diarrhea x3 weeks with T 102.4, HR 98, WBC 13.8 on admission. UA with WBCs but denies dysuria. C diff positive with CTAP showing proctocolitis, cystitis.  -s/p 1u pRBC for anemia  -s/p 1 dose ampicillin  Plan:  -cont PO vanc  -give additional 1L lactated ringers, with already given 1u pRBC, should be adequate fluid resuscitation  -cont zosyn for coverage of UTI due to positive UA despite denying dysuria as pt is septic
Resolved. Presenting with diarrhea x3 weeks with T 102.4, HR 98, WBC 13.8 on admission. UA with WBCs but denies dysuria. C diff positive with CTAP showing proctocolitis, cystitis.  -s/p 1u pRBC for anemia  -s/p 1 dose ampicillin  Plan:  -cont PO vanc for Cdiff  -for simple UTI, dc'ed zosyn 8/23 (received 2 days), changed to CTX given sensitivities on UCx (E coli), can transition to PO for dc
Resolved. Presenting with diarrhea x3 weeks with T 102.4, HR 98, WBC 13.8 on admission. UA with WBCs but denies dysuria. C diff positive with CTAP showing proctocolitis, cystitis.  -s/p 1u pRBC for anemia  -s/p 1 dose ampicillin  Plan:  -cont PO vanc for Cdiff  -for simple UTI, dc'ed zosyn 8/23 (received 2 days), changed to CTX given sensitivities on UCx (E coli)
Presenting with diarrhea x3 weeks with T 102.4, HR 98, WBC 13.8 on admission. UA with WBCs but denies dysuria. C diff positive with CTAP showing proctocolitis, cystitis.  -s/p 1u pRBC for anemia  -s/p 1 dose ampicillin  Plan:  -cont PO vanc  -give additional 1L lactated ringers, with already given 1u pRBC, should be adequate fluid resuscitation  -cont zosyn for coverage of UTI due to positive UA despite denying dysuria as pt is septic

## 2021-08-24 NOTE — PROGRESS NOTE ADULT - PROBLEM SELECTOR PLAN 7
-pt with hx of PE/DVT  -cont eliquis

## 2021-08-24 NOTE — PROGRESS NOTE ADULT - PROBLEM SELECTOR PROBLEM 2
Clostridium difficile colitis

## 2021-08-24 NOTE — PROGRESS NOTE ADULT - ASSESSMENT
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

## 2021-08-24 NOTE — PROGRESS NOTE ADULT - PROBLEM SELECTOR PROBLEM 7
Deep vein thrombosis (DVT)

## 2021-08-24 NOTE — DISCHARGE NOTE PROVIDER - CARE PROVIDER_API CALL
Sandra Gill)  Critical Care Medicine; Internal Medicine  [address], [address]  [place], [place] [address]  Phone: [phone]  Fax: [phone]  Follow Up Time:

## 2021-08-24 NOTE — DISCHARGE NOTE PROVIDER - NSDCCAREPROVSEEN_GEN_ALL_CORE_FT
Sandra Gill Jairo, Sandra Norman, Germania Snadra Gill Bushra  Patient seen and examined with house-staff during bedside rounds.  Resident note read, including vitals, physical findings, laboratory data, and radiological reports.   Revisions included below.  Direct personal management at bed side and extensive interpretation of the data.  Plan was outlined and discussed in details with the housestaff.  Decision making of high complexity  Action taken for acute disease activity to reflect the level of care provided:  - medication reconciliation  - review laboratory dataThe patient was seen and examined.  The patient improved with decrease in the bowel movement.  DC on Flagyl or p.o. vancomycin.  The patient to follow-up with GI.

## 2021-08-24 NOTE — PROGRESS NOTE ADULT - PROBLEM SELECTOR PLAN 6
-cont imdur 30mg daily, toprol 50mg daily

## 2021-08-24 NOTE — PROGRESS NOTE ADULT - PROBLEM SELECTOR PLAN 9
F: giving 1L LR  E: replete prn  N: dash/tlc    DVT: on eliquis    Code status: full
F: giving 1L LR  E: replete prn  N: dash/tlc    DVT: on eliquis    Code status: full
F: s/p 1L LR, none currently  E: replete prn  N: dash/tlc    DVT: on eliquis    Code status: full
F: giving 1L LR  E: replete prn  N: dash/tlc    DVT: on eliquis    Code status: full
F: s/p 1L LR, none currently  E: replete prn  N: dash/tlc    DVT: on eliquis    Code status: full

## 2021-08-24 NOTE — DISCHARGE NOTE PROVIDER - NSDCCPTREATMENT_GEN_ALL_CORE_FT
PRINCIPAL PROCEDURE  Procedure: CT abdomen pelvis  Findings and Treatment: Findings:  Lower chest: Since 3/20/2021, unchanged enlarged paraesophageal node,measuring up to 1.7 cm and prevertebral node measuring up to 1.2 cm Coronary artery calcifications.  Liver:  Normal.  Gallbladder: No radiopaque stones gallbladder.  Spleen:  Normal.  Pancreas:  Atrophic.  Adrenal glands:  Normal.  Kidneys: No renal stones or hydronephrosis bilaterally. Interval increase size 6.3 cm interpolar left renal cyst. Bilateral subcentimeter hypodensities, too small to characterize.  Adenopathy:  Since 1/14/2019, slight interval increase in extensive masslike retroperitoneal lymphadenopathy, largest now aortocaval node measur  < end of copied text >  ing 2.3 cm in short axis.  Ascites: None.  Gastrointestinal tract: There is circumferential wall thickening, submucosal edema and pericolonic fat stranding from the level of theproximal descending colon through the rectum with relative loss of the typical haustral pattern. No obstruction or free air.  Vessels: Severe calcified plaque of the aortoiliac system.  Pelvic organs: The bladder is thick-walled with perivesical fat stranding. Post prostatectomy. Trace pelvic fluid.  Soft tissues: Tiny fat-containing umbilical hernia. Unchanged moderate size fat-containing left abdominal wall hernia.  Bones: Since 2017, there is a new chronic appearing moderate compression deformity L2. Unchanged grade 2 anterolisthesis of L5 on S1. Additional multilevel degenerative changes of the spine.  Impression:  1.  Proctocolitis from the level of the descending colon to the rectum.  2.  Wall thickening of the urinary bladderwith perivesicular fat stranding. Correlate with urinalysis to exclude cystitis.  3.  Since 3/28/2021, unchanged enlarged paraesophageal and retrocrural lymphadenopathy. Mild interval increase in extensive bulky retroperitoneal lymphadenopathy since 1/14/2019< from: CT Abdomen and Pelvis w/ Oral Cont and w/ IV Cont (08.20.21 @ 19:54) >        SECONDARY PROCEDURE  Procedure: Urine culture  Findings and Treatment: Culture - Urine (08.20.21 @ 21:01)    -  Ampicillin: S <=8 These ampicillin results predict results for amoxicillin    -  Ampicillin/Sulbactam: S <=4/2 Enterobacter, Citrobacter, and Serratia may develop resistance during prolonged therapy (3-4 days)    -  Cefazolin: S <=2 (MIC_CL_COM_ENTERIC_CEFAZU) For uncomplicated UTI with K. pneumoniae, E. coli, or P. mirablis: VALERIY <=16 is sensitive and VALERIY >=32 is resistant. This also predicts results for oral agents cefaclor, cefdinir, cefpodoxime, cefprozil, cefuroxime axetil, cephalexin and locarbef for uncomplicated UTI. Note that some isolates may be susceptible to these agents while testing resistant to cefazolin.    -  Ceftriaxone: S <=1 Enterobacter, Citrobacter, and Serratia may develop resistance during prolonged therapy    -  Ciprofloxacin: R >2    -  Ertapenem: S <=0.5    -  Gentamicin: S <=2    -  Nitrofurantoin: S <=32 Should not be used to treat pyelonephritis    -  Piperacillin/Tazobactam: S <=8    -  Tobramycin: S <=2    -  Trimethoprim/Sulfamethoxazole: S <=0.5/9.5    Specimen Source: Clean Catch Clean Catch (Midstream)    Culture Results:   >100,000 CFU/ml Escherichia coli    Organism Identification: Escherichia coli    Organism: Escherichia coli    Method Type: VALERIY

## 2021-08-24 NOTE — DISCHARGE NOTE PROVIDER - HOSPITAL COURSE
OVERNIGHT EVENTS:    SUBJECTIVE / INTERVAL HPI: Patient seen and examined at bedside.     VITAL SIGNS:  Vital Signs Last 24 Hrs  T(C): 37.1 (24 Aug 2021 08:58), Max: 37.2 (24 Aug 2021 05:53)  T(F): 98.8 (24 Aug 2021 08:58), Max: 98.9 (24 Aug 2021 05:53)  HR: 72 (24 Aug 2021 08:58) (72 - 82)  BP: 154/81 (24 Aug 2021 08:58) (115/59 - 154/81)  BP(mean): --  RR: 16 (24 Aug 2021 08:58) (16 - 18)  SpO2: 98% (24 Aug 2021 08:58) (98% - 100%)    PHYSICAL EXAM:  General: NAD, laying in bed, speaking in full sentences  HEENT: head NC/AT, no conjunctival injection, EOMI, MMM  Neck: supple, JVD not visualized  Cardio: RRR, +S1/S2, no M/R/G  Resp: lungs CTAB, no cough/wheezes/rales/rhonchi  Abdo: soft, NT, ND, +bowel sounds x4, no organomegaly or palpable mass    Extremities: WWP, no edema/cyanosis/clubbing   Vasc: 2+ radial and DP pulses b/l  Neuro: A&Ox3  Psych: speech non-pressured, thoughts goal-oriented  Skin: dry, intact, no visible jaundice   MSK: no joint swelling    MEDICATIONS:  MEDICATIONS  (STANDING):  acetaminophen  IVPB .. 1000 milliGRAM(s) IV Intermittent once  apixaban 2.5 milliGRAM(s) Oral every 12 hours  aspirin enteric coated 81 milliGRAM(s) Oral daily  brimonidine 0.2% Ophthalmic Solution 1 Drop(s) Both EYES two times a day  calcium carbonate 1250 mG  + Vitamin D (OsCal 500 + D) 1 Tablet(s) Oral daily  cefTRIAXone Injectable. 1000 milliGRAM(s) IV Push every 24 hours  cyanocobalamin 1000 MICROGram(s) Oral daily  dextrose 40% Gel 15 Gram(s) Oral once  dextrose 5%. 1000 milliLiter(s) (50 mL/Hr) IV Continuous <Continuous>  dextrose 5%. 1000 milliLiter(s) (100 mL/Hr) IV Continuous <Continuous>  dextrose 50% Injectable 25 Gram(s) IV Push once  dextrose 50% Injectable 12.5 Gram(s) IV Push once  dextrose 50% Injectable 25 Gram(s) IV Push once  dorzolamide 2% Ophthalmic Solution 1 Drop(s) Both EYES three times a day  ferrous    sulfate 325 milliGRAM(s) Oral two times a day  finasteride 5 milliGRAM(s) Oral daily  glucagon  Injectable 1 milliGRAM(s) IntraMuscular once  insulin lispro (ADMELOG) corrective regimen sliding scale   SubCutaneous Before meals and at bedtime  isosorbide   mononitrate ER Tablet (IMDUR) 30 milliGRAM(s) Oral daily  latanoprost 0.005% Ophthalmic Solution 1 Drop(s) Both EYES at bedtime  metoprolol succinate ER 50 milliGRAM(s) Oral daily  pantoprazole    Tablet 40 milliGRAM(s) Oral before breakfast  potassium chloride    Tablet ER 10 milliEquivalent(s) Oral daily  vancomycin    Solution 125 milliGRAM(s) Oral every 6 hours    MEDICATIONS  (PRN):  ALBUTerol    90 MICROgram(s) HFA Inhaler 2 Puff(s) Inhalation every 6 hours PRN Shortness of Breath and/or Wheezing      ALLERGIES:  Allergies    ACE inhibitors (Angioedema)  x-geva (Unknown)    Intolerances        LABS:                        9.1    12.56 )-----------( 303      ( 23 Aug 2021 06:25 )             28.1     08-23    138  |  108  |  7   ----------------------------<  94  3.3<L>   |  19<L>  |  1.06    Ca    7.7<L>      23 Aug 2021 06:25  Phos  2.4     08-23  Mg     2.0     08-23    TPro  5.6<L>  /  Alb  2.7<L>  /  TBili  0.5  /  DBili  x   /  AST  22  /  ALT  9<L>  /  AlkPhos  54  08-23        CAPILLARY BLOOD GLUCOSE      POCT Blood Glucose.: 90 mg/dL (24 Aug 2021 08:14)      RADIOLOGY & ADDITIONAL TESTS: Reviewed. 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    Inpatient treatment course: s/p 1u pRBC for anemia, LR 1L bolus, 1 dose ampicillin     Problem List/Main Diagnoses:   #Sepsis (RESOLVED)  T 102.4, HR 98, WBC 13.8 on admission. C diff positive, UA positive. CT AP showed proctocolitis and cystitis. However, BCx negative x2.  - Plan as below    #C diff colitis  Profuse watery diarrhea. No abx use. At discharge, continues to have diarrhea, but improved.  - PO vanc 125mg q6h for C diff for 1wk, last day 8/27    #Acute cystitis (RESOLVED)  UCx grew E coli, resistant only to ciprofloxacin. Received zosyn for 2 days then CTX for 3 days.      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    Inpatient treatment course: s/p 1u pRBC for anemia, LR 1L bolus, 1 dose ampicillin     Problem List/Main Diagnoses:   #Sepsis (RESOLVED)  T 102.4, HR 98, WBC 13.8 on admission. C diff positive, UA positive. CT AP showed proctocolitis and cystitis. However, BCx negative x2.  - Plan as below    #C diff colitis  Profuse watery diarrhea. No abx use. At discharge, continues to have diarrhea, but improved.  - PO vanc 125mg q6h for C diff for 10 days, last day 8/30    #Acute cystitis (RESOLVED)  UCx grew E coli, resistant only to ciprofloxacin. Received zosyn for 2 days then CTX for 3 days.      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    Inpatient treatment course: s/p 1u pRBC for anemia, LR 1L bolus, 1 dose ampicillin     Problem List/Main Diagnoses:   #C diff colitis  Profuse watery diarrhea. No abx use. At discharge, continues to have diarrhea, but improved.  - PO vanc 125mg q6h for C diff for 10 days, last day 8/30    #Sepsis   T 102.4, HR 98, WBC 13.8 on admission. C diff positive, UA positive. CT AP showed proctocolitis and cystitis. However, BCx negative x2.  - Plan as below    #Acute cystitis (RESOLVED)  UCx grew E coli, resistant only to ciprofloxacin. Received zosyn for 2 days then CTX for 3 days.    Physical Examination prior to discharge:  General: Obese, NAD, laying in bed, speaking in full sentences  HEENT: head NC/AT, no conjunctival injection, EOMI, MMM  Neck: supple, JVD not visualized  Cardio: RRR, +S1/S2, no M/R/G  Resp: lungs CTAB, no cough/wheezes/rales/rhonchi  Abdo: soft, NT, mildly distended, +bowel sounds x4, no organomegaly or palpable mass    Extremities: WWP, no edema/cyanosis/clubbing   Vasc: 2+ radial and DP pulses b/l  Neuro: A&Ox3  Psych: speech non-pressured, thoughts goal-oriented  Skin: dry, intact, no visible jaundice   MSK: no joint swelling      New medications/therapies:   PO vanc 125mg q6h for 7 days     Medication modification:    Medication discontinued:     New lines/hardware: None    Labs to be followed outpatient: None    Exam to be followed outpatient: None    Discharge plan: Discharge to home       Patient is 79 yo M with past medical history of prostate ca (recently stopped chemo secondary to adverse effects), HTN, HLD, DM II, anemia of chronic disease/DOMITILA, 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     Inpatient treatment course:   Patient is 79 yo M with past medical history of prostate cancer, HTN, HLD, DM II, anemia of chronic disease/DOMITILA, PE/DVT (1/2020), Afib on Eliquis 2.5 mg BID, CAD, esophagitis/gastritis/duodenitis that presented with 3 weeks watery diarrhea. Patient found to have sepsis, C diff and acute UTI and was admitted due to sepsis and antibiotic treatment. Stool culture came positive for C fiff for which she was started in PO Vancomycin. Patient was also was found to have positive UA for E. coli treated with Ceftriaxone x 3 days. Patient with episodes of fever treated with Tylenol. patient hospital course was complicated for anemia for which 1 unite of pRBC was given and currently Hgb 9.4. Patient will be discharge and will continue PO Vancomycin for 7 more days to complete 7 day treatment course.     Problem List/Main Diagnoses:   #C diff colitis  Profuse watery diarrhea. No abx use. At discharge, continues to have diarrhea, but improved.  - PO vanc 125mg q6h for C diff for 10 days, last day 8/30    #Sepsis   T 102.4, HR 98, WBC 13.8 on admission. C diff positive, UA positive. CT AP showed proctocolitis and cystitis. However, BCx negative x2.  - Plan as below    #Acute cystitis   UCx grew E coli, resistant only to ciprofloxacin. Received zosyn for 2 days then CTX for 3 days.    Physical Examination prior to discharge:  General: Obese, NAD, laying in bed, speaking in full sentences  HEENT: head NC/AT, no conjunctival injection, EOMI, MMM  Neck: supple, JVD not visualized  Cardio: RRR, +S1/S2, no M/R/G  Resp: lungs CTAB, no cough/wheezes/rales/rhonchi  Abdo: soft, NT, mildly distended, +bowel sounds x4, no organomegaly or palpable mass    Extremities: WWP, no edema/cyanosis/clubbing   Vasc: 2+ radial and DP pulses b/l  Neuro: A&Ox3  Psych: speech non-pressured, thoughts goal-oriented  Skin: dry, intact, no visible jaundice   MSK: no joint swelling      New medications/therapies:   PO vanc 125mg q6h for 7 days     Medication modification: None    Medication discontinued: None    New lines/hardware: None    Labs to be followed outpatient: None    Exam to be followed outpatient: None    Discharge plan: Discharge to home

## 2021-08-24 NOTE — PROGRESS NOTE ADULT - SUBJECTIVE AND OBJECTIVE BOX
Interval Events: Reviewed  Patient seen and examined at bedside.    Patient is a 80y old  Male who presents with a chief complaint of C diff (21 Aug 2021 06:27)  Tmax 102.4 F, tylenol 1gm given IV.      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 4.5 Gram(s) IV Intermittent every 6 hours  vancomycin    Solution 125 milliGRAM(s) Oral every 6 hours    Anticoagulants:  apixaban 2.5 milliGRAM(s) Oral every 12 hours  aspirin enteric coated 81 milliGRAM(s) Oral daily    Cardiac:  isosorbide   mononitrate ER Tablet (IMDUR) 30 milliGRAM(s) Oral daily  metoprolol succinate ER 50 milliGRAM(s) Oral daily      Allergies    ACE inhibitors (Angioedema)  x-geva (Unknown)    Intolerances        Vital Signs Last 24 Hrs  T(C): 38 (21 Aug 2021 06:39), Max: 39.1 (21 Aug 2021 04:10)  T(F): 100.4 (21 Aug 2021 06:39), Max: 102.4 (21 Aug 2021 04:10)  HR: 95 (21 Aug 2021 06:39) (84 - 98)  BP: 155/71 (21 Aug 2021 06:39) (155/71 - 183/81)  BP(mean): --  RR: 18 (21 Aug 2021 06:39) (16 - 18)  SpO2: 99% (21 Aug 2021 06:39) (94% - 99%)        Review of Systems:   •	General: negative  •	Skin/Breast: negative  •	Ophthalmologic: negative  •	ENMT: negative  •	Respiratory and Thorax: negative  •	Cardiovascular: negative  •	Gastrointestinal: negative  •	Genitourinary: negative  •	Musculoskeletal: negative  •	Neurological: negative  •	Psychiatric: negative  •	Hematology/Lymphatics: negative  •	Endocrine: negative  •	Allergic/Immunologic: negative    Physical Exam:   • Constitutional:	Well-developed, well nourished  • Eyes:	EOMI; PERRL; no drainage or redness  • ENMT:	No oral lesions; no gross abnormalities  • Neck	no thyromegaly or nodules  • Breasts:	not examined  • Back:	No deformity or limitation of movement  • Respiratory:	Breath Sounds equal & clear to auscultation, no accessory muscle use  • Cardiovascular:	Regular rate & rhythm, normal S1, S2; no murmurs, gallops or rubs; no S3, S4  • Gastrointestinal:	Soft, non-tender, no hepatosplenomegaly, normal bowel sounds  • Genitourinary:	not examined  • Rectal: not examined  • Extremities:	No cyanosis, clubbing or edema  • Vascular:	Equal and normal pulses (dorsalis pedis)  • Neurologica:l	not examined  • Skin:	No lesions; no rash  • Lymph Nodes:	No lymphadedenopathy  • Musculoskeletal:	No joint pain, swelling or deformity; no limitation of movement        LABS:      CBC Full  -  ( 21 Aug 2021 05:01 )  WBC Count : 15.36 K/uL  RBC Count : 3.15 M/uL  Hemoglobin : 9.0 g/dL  Hematocrit : 27.4 %  Platelet Count - Automated : 298 K/uL  Mean Cell Volume : 87.0 fl  Mean Cell Hemoglobin : 28.6 pg  Mean Cell Hemoglobin Concentration : 32.8 gm/dL  Auto Neutrophil # : x  Auto Lymphocyte # : x  Auto Monocyte # : x  Auto Eosinophil # : x  Auto Basophil # : x  Auto Neutrophil % : x  Auto Lymphocyte % : x  Auto Monocyte % : x  Auto Eosinophil % : x  Auto Basophil % : x        133<L>  |  103  |  11  ----------------------------<  112<H>  3.9   |  21<L>  |  1.17    Ca    8.2<L>      21 Aug 2021 05:01  Mg     1.6         TPro  5.9<L>  /  Alb  3.4  /  TBili  0.7  /  DBili  x   /  AST  25  /  ALT  8<L>  /  AlkPhos  48            Urinalysis Basic - ( 20 Aug 2021 20:23 )    Color: Yellow / Appearance: Cloudy / S.010 / pH: x  Gluc: x / Ketone: NEGATIVE  / Bili: Negative / Urobili: 0.2 E.U./dL   Blood: x / Protein: Trace mg/dL / Nitrite: POSITIVE   Leuk Esterase: Moderate / RBC: < 5 /HPF / WBC Many /HPF   Sq Epi: x / Non Sq Epi: 0-5 /HPF / Bacteria: Many /HPF              Culture Results:   Culture in progress ( @ 21:01)      RADIOLOGY & ADDITIONAL STUDIES (The following images were personally reviewed):  Kim:                                     No  Urine output:                       adequate  DVT prophylaxis:                 Yes  Flattus:                                  Yes  Bowel movement:              No  
OVERNIGHT EVENTS: No acute events overnight. Pt continues to have diarrhea but pt believes it has been mildly improving.    SUBJECTIVE / INTERVAL HPI: Patient seen and examined at bedside. Pt has no new complaints. Reports improvement in sore throat. Pt denies any fever, chills, headache, shortness of breath, nausea, vomiting, diarrhea.     VITAL SIGNS:  Vital Signs Last 24 Hrs  T(C): 37.2 (24 Aug 2021 16:27), Max: 37.2 (24 Aug 2021 05:53)  T(F): 98.9 (24 Aug 2021 16:27), Max: 98.9 (24 Aug 2021 05:53)  HR: 75 (24 Aug 2021 16:27) (72 - 82)  BP: 147/71 (24 Aug 2021 16:27) (115/59 - 154/81)  BP(mean): --  RR: 18 (24 Aug 2021 16:27) (16 - 18)  SpO2: 98% (24 Aug 2021 16:27) (98% - 100%)    PHYSICAL EXAM:  General: Obese, NAD, laying in bed, speaking in full sentences  HEENT: head NC/AT, no conjunctival injection, EOMI, MMM  Neck: supple, JVD not visualized  Cardio: RRR, +S1/S2, no M/R/G  Resp: lungs CTAB, no cough/wheezes/rales/rhonchi  Abdo: soft, NT, mildly distended, +bowel sounds x4, no organomegaly or palpable mass    Extremities: WWP, no edema/cyanosis/clubbing   Vasc: 2+ radial and DP pulses b/l  Neuro: A&Ox3  Psych: speech non-pressured, thoughts goal-oriented  Skin: dry, intact, no visible jaundice   MSK: no joint swelling    MEDICATIONS:  MEDICATIONS  (STANDING):  acetaminophen  IVPB .. 1000 milliGRAM(s) IV Intermittent once  apixaban 2.5 milliGRAM(s) Oral every 12 hours  aspirin enteric coated 81 milliGRAM(s) Oral daily  brimonidine 0.2% Ophthalmic Solution 1 Drop(s) Both EYES two times a day  calcium carbonate 1250 mG  + Vitamin D (OsCal 500 + D) 1 Tablet(s) Oral daily  cefTRIAXone Injectable. 1000 milliGRAM(s) IV Push every 24 hours  cyanocobalamin 1000 MICROGram(s) Oral daily  dextrose 40% Gel 15 Gram(s) Oral once  dextrose 5%. 1000 milliLiter(s) (50 mL/Hr) IV Continuous <Continuous>  dextrose 5%. 1000 milliLiter(s) (100 mL/Hr) IV Continuous <Continuous>  dextrose 50% Injectable 25 Gram(s) IV Push once  dextrose 50% Injectable 12.5 Gram(s) IV Push once  dextrose 50% Injectable 25 Gram(s) IV Push once  dorzolamide 2% Ophthalmic Solution 1 Drop(s) Both EYES three times a day  ferrous    sulfate 325 milliGRAM(s) Oral two times a day  finasteride 5 milliGRAM(s) Oral daily  glucagon  Injectable 1 milliGRAM(s) IntraMuscular once  insulin lispro (ADMELOG) corrective regimen sliding scale   SubCutaneous Before meals and at bedtime  isosorbide   mononitrate ER Tablet (IMDUR) 30 milliGRAM(s) Oral daily  latanoprost 0.005% Ophthalmic Solution 1 Drop(s) Both EYES at bedtime  metoprolol succinate ER 50 milliGRAM(s) Oral daily  pantoprazole    Tablet 40 milliGRAM(s) Oral before breakfast  potassium chloride    Tablet ER 10 milliEquivalent(s) Oral daily  vancomycin    Solution 125 milliGRAM(s) Oral every 6 hours    MEDICATIONS  (PRN):  ALBUTerol    90 MICROgram(s) HFA Inhaler 2 Puff(s) Inhalation every 6 hours PRN Shortness of Breath and/or Wheezing      ALLERGIES:  Allergies    ACE inhibitors (Angioedema)  x-geva (Unknown)    Intolerances        LABS:                        8.3    8.19  )-----------( 283      ( 24 Aug 2021 11:15 )             25.7     08-24    137  |  111<H>  |  4<L>  ----------------------------<  97  4.2   |  18<L>  |  0.97    Ca    7.6<L>      24 Aug 2021 11:15  Phos  1.6     08-24  Mg     1.7     08-24    TPro  5.0<L>  /  Alb  2.9<L>  /  TBili  0.2  /  DBili  x   /  AST  29  /  ALT  11  /  AlkPhos  44  08-24        CAPILLARY BLOOD GLUCOSE      POCT Blood Glucose.: 88 mg/dL (24 Aug 2021 16:47)      RADIOLOGY & ADDITIONAL TESTS: Reviewed.
Interval Events: Reviewed  Patient seen and examined at bedside.    Patient is a 80y old  Male who presents with a chief complaint of C diff (21 Aug 2021 08:33)  no acute event overnight, Tmax 99.2 F over 12hrs, ab pain is improving      PAST MEDICAL & SURGICAL HISTORY:  Hyperlipidemia  Hyperlipidemia    Malignant neoplasm of prostate  s/p total prostatectomy in  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    Glaucoma  Glaucoma    Type 2 diabetes mellitus  Diabetes    Essential hypertension  Hypertension    Chemotherapy session for neoplasm  Prostate Cancer    Pulmonary embolism  2020    DVT (deep venous thrombosis)  2020 on Eliquis    Atrial fibrillation  on Eliquis    Other postprocedural status  S/P prostatectomy        MEDICATIONS:  Pulmonary:  ALBUTerol    90 MICROgram(s) HFA Inhaler 2 Puff(s) Inhalation every 6 hours PRN    Antimicrobials:  piperacillin/tazobactam IVPB.. 4.5 Gram(s) IV Intermittent every 6 hours  vancomycin    Solution 125 milliGRAM(s) Oral every 6 hours    Anticoagulants:  apixaban 2.5 milliGRAM(s) Oral every 12 hours  aspirin enteric coated 81 milliGRAM(s) Oral daily    Cardiac:  isosorbide   mononitrate ER Tablet (IMDUR) 30 milliGRAM(s) Oral daily  metoprolol succinate ER 50 milliGRAM(s) Oral daily      Allergies    ACE inhibitors (Angioedema)  x-geva (Unknown)    Intolerances        Vital Signs Last 24 Hrs  T(C): 37.2 (21 Aug 2021 22:52), Max: 38 (21 Aug 2021 06:39)  T(F): 98.9 (21 Aug 2021 22:52), Max: 100.4 (21 Aug 2021 06:39)  HR: 80 (21 Aug 2021 22:52) (78 - 95)  BP: 125/67 (21 Aug 2021 22:52) (125/67 - 155/71)  BP(mean): --  RR: 20 (21 Aug 2021 22:52) (17 - 20)  SpO2: 97% (21 Aug 2021 22:52) (97% - 100%)        Review of Systems:   •	General: negative  •	Skin/Breast: negative  •	Ophthalmologic: negative  •	ENMT: negative  •	Respiratory and Thorax: negative  •	Cardiovascular: negative  •	Gastrointestinal: negative  •	Genitourinary: negative  •	Musculoskeletal: negative  •	Neurological: negative  •	Psychiatric: negative  •	Hematology/Lymphatics: negative  •	Endocrine: negative  •	Allergic/Immunologic: negative    Physical Exam:   • Constitutional:	Well-developed, well nourished  • Eyes:	EOMI; PERRL; no drainage or redness  • ENMT:	No oral lesions; no gross abnormalities  • Neck	 no thyromegaly or nodules  • Breasts:	not examined  • Back:	No deformity or limitation of movement  • Respiratory:	Breath Sounds equal & clear to auscultation, no accessory muscle use  • Cardiovascular:	Regular rate & rhythm, normal S1, S2; no murmurs, gallops or rubs; no S3, S4  • Gastrointestinal:	Soft, non-tender, no hepatosplenomegaly, normal bowel sounds  • Genitourinary:	not examined  • Rectal: not examined  • Extremities:	No cyanosis, clubbing or edema  • Vascular:	Equal and normal pulses (carotid, femoral, dorsalis pedis)  • Neurologica:l	not examined  • Skin:	No lesions; no rash  • Lymph Nodes:	No lymphadedenopathy  • Musculoskeletal:	No joint pain, swelling or deformity; no limitation of movement        LABS:      CBC Full  -  ( 21 Aug 2021 08:49 )  WBC Count : 15.19 K/uL  RBC Count : 3.11 M/uL  Hemoglobin : 8.8 g/dL  Hematocrit : 27.4 %  Platelet Count - Automated : 267 K/uL  Mean Cell Volume : 88.1 fl  Mean Cell Hemoglobin : 28.3 pg  Mean Cell Hemoglobin Concentration : 32.1 gm/dL  Auto Neutrophil # : 14.01 K/uL  Auto Lymphocyte # : 0.79 K/uL  Auto Monocyte # : 0.14 K/uL  Auto Eosinophil # : 0.12 K/uL  Auto Basophil # : 0.14 K/uL  Auto Neutrophil % : 91.3 %  Auto Lymphocyte % : 5.2 %  Auto Monocyte % : 0.9 %  Auto Eosinophil % : 0.8 %  Auto Basophil % : 0.9 %        133<L>  |  105  |  10  ----------------------------<  153<H>  3.8   |  18<L>  |  1.13    Ca    7.9<L>      21 Aug 2021 08:49  Phos  2.4     -  Mg     1.7         TPro  5.9<L>  /  Alb  3.4  /  TBili  0.7  /  DBili  x   /  AST  25  /  ALT  8<L>  /  AlkPhos  48            Urinalysis Basic - ( 20 Aug 2021 20:23 )    Color: Yellow / Appearance: Cloudy / S.010 / pH: x  Gluc: x / Ketone: NEGATIVE  / Bili: Negative / Urobili: 0.2 E.U./dL   Blood: x / Protein: Trace mg/dL / Nitrite: POSITIVE   Leuk Esterase: Moderate / RBC: < 5 /HPF / WBC Many /HPF   Sq Epi: x / Non Sq Epi: 0-5 /HPF / Bacteria: Many /HPF              Culture Results:   Culture in progress ( @ 21:01)      RADIOLOGY & ADDITIONAL STUDIES (The following images were personally reviewed):  Kim:                                     No  Urine output:                       adequate  DVT prophylaxis:                 Yes  Flattus:                                  Yes  Bowel movement:              No  
OVERNIGHT EVENTS: No acute events overnight.     SUBJECTIVE / INTERVAL HPI: Patient seen and examined at bedside. Pt reports continued watery diarrhea. Pt reports mild sore throat but no cough, fever, or runny nose. Pt denies abdominal pain. Pt denies any chills, headache, shortness of breath, nausea, vomiting, diarrhea.     VITAL SIGNS:  Vital Signs Last 24 Hrs  T(C): 37.3 (22 Aug 2021 08:41), Max: 37.7 (22 Aug 2021 06:13)  T(F): 99.2 (22 Aug 2021 08:41), Max: 99.9 (22 Aug 2021 06:13)  HR: 78 (22 Aug 2021 08:41) (78 - 89)  BP: 123/64 (22 Aug 2021 08:41) (123/64 - 135/71)  BP(mean): --  RR: 18 (22 Aug 2021 08:41) (17 - 20)  SpO2: 98% (22 Aug 2021 08:41) (97% - 100%)    PHYSICAL EXAM:  General: NAD, laying in bed, speaking in full sentences, green-colored feces staining bed  HEENT: head NC/AT, no conjunctival injection, EOMI, MMM  Neck: supple, JVD not visualized  Cardio: RRR, +S1/S2, no M/R/G  Resp: lungs CTAB, no cough/wheezes/rales/rhonchi  Abdo: soft, NT, mildly distended, +bowel sounds x4, no organomegaly or palpable mass    Extremities: WWP, no edema/cyanosis/clubbing   Vasc: 2+ radial and DP pulses b/l  Neuro: A&Ox3  Psych: speech non-pressured, thoughts goal-oriented  Skin: dry, intact, no visible jaundice   MSK: no joint swelling    MEDICATIONS:  MEDICATIONS  (STANDING):  acetaminophen  IVPB .. 1000 milliGRAM(s) IV Intermittent once  apixaban 2.5 milliGRAM(s) Oral every 12 hours  aspirin enteric coated 81 milliGRAM(s) Oral daily  brimonidine 0.2% Ophthalmic Solution 1 Drop(s) Both EYES two times a day  calcium carbonate 1250 mG  + Vitamin D (OsCal 500 + D) 1 Tablet(s) Oral daily  cyanocobalamin 1000 MICROGram(s) Oral daily  dextrose 40% Gel 15 Gram(s) Oral once  dextrose 5%. 1000 milliLiter(s) (50 mL/Hr) IV Continuous <Continuous>  dextrose 5%. 1000 milliLiter(s) (100 mL/Hr) IV Continuous <Continuous>  dextrose 50% Injectable 25 Gram(s) IV Push once  dextrose 50% Injectable 12.5 Gram(s) IV Push once  dextrose 50% Injectable 25 Gram(s) IV Push once  dorzolamide 2% Ophthalmic Solution 1 Drop(s) Both EYES three times a day  ferrous    sulfate 325 milliGRAM(s) Oral two times a day  finasteride 5 milliGRAM(s) Oral daily  glucagon  Injectable 1 milliGRAM(s) IntraMuscular once  insulin lispro (ADMELOG) corrective regimen sliding scale   SubCutaneous Before meals and at bedtime  isosorbide   mononitrate ER Tablet (IMDUR) 30 milliGRAM(s) Oral daily  latanoprost 0.005% Ophthalmic Solution 1 Drop(s) Both EYES at bedtime  metoprolol succinate ER 50 milliGRAM(s) Oral daily  pantoprazole    Tablet 40 milliGRAM(s) Oral before breakfast  piperacillin/tazobactam IVPB.. 4.5 Gram(s) IV Intermittent every 6 hours  potassium chloride    Tablet ER 10 milliEquivalent(s) Oral daily  potassium phosphate IVPB 15 milliMole(s) IV Intermittent once  vancomycin    Solution 125 milliGRAM(s) Oral every 6 hours    MEDICATIONS  (PRN):  ALBUTerol    90 MICROgram(s) HFA Inhaler 2 Puff(s) Inhalation every 6 hours PRN Shortness of Breath and/or Wheezing      ALLERGIES:  Allergies    ACE inhibitors (Angioedema)  x-geva (Unknown)    Intolerances        LABS:                        8.2    17.39 )-----------( 266      ( 22 Aug 2021 06:54 )             25.2         138  |  107  |  8   ----------------------------<  90  3.2<L>   |  20<L>  |  1.14    Ca    7.4<L>      22 Aug 2021 06:54  Phos  2.5       Mg     1.6         TPro  4.9<L>  /  Alb  2.7<L>  /  TBili  0.5  /  DBili  x   /  AST  18  /  ALT  6<L>  /  AlkPhos  44        Urinalysis Basic - ( 20 Aug 2021 20:23 )    Color: Yellow / Appearance: Cloudy / S.010 / pH: x  Gluc: x / Ketone: NEGATIVE  / Bili: Negative / Urobili: 0.2 E.U./dL   Blood: x / Protein: Trace mg/dL / Nitrite: POSITIVE   Leuk Esterase: Moderate / RBC: < 5 /HPF / WBC Many /HPF   Sq Epi: x / Non Sq Epi: 0-5 /HPF / Bacteria: Many /HPF      CAPILLARY BLOOD GLUCOSE      POCT Blood Glucose.: 103 mg/dL (22 Aug 2021 08:37)      RADIOLOGY & ADDITIONAL TESTS: Reviewed.
OVERNIGHT EVENTS: No acute events overnight. Pt continues to have constant watery diarrhea.    SUBJECTIVE / INTERVAL HPI: Patient seen and examined at bedside. Reports mild abd discomfort but denies pain. Reports continued sore throat and cough w/o worsening of severity. Pt has no new complaints. Pt denies any fever, chills, headache, shortness of breath, nausea, vomiting.    VITAL SIGNS:  Vital Signs Last 24 Hrs  T(C): 36.8 (23 Aug 2021 15:49), Max: 37.2 (23 Aug 2021 06:23)  T(F): 98.2 (23 Aug 2021 15:49), Max: 98.9 (23 Aug 2021 06:23)  HR: 72 (23 Aug 2021 15:49) (72 - 78)  BP: 136/72 (23 Aug 2021 15:49) (122/70 - 136/72)  BP(mean): --  RR: 18 (23 Aug 2021 15:49) (16 - 18)  SpO2: 98% (23 Aug 2021 15:49) (97% - 99%)    PHYSICAL EXAM:  General: NAD, laying in bed, speaking in full sentences, green-colored feces staining bed  HEENT: head NC/AT, no conjunctival injection, EOMI, MMM  Neck: supple, JVD not visualized  Cardio: RRR, +S1/S2, no M/R/G  Resp: lungs CTAB, no cough/wheezes/rales/rhonchi  Abdo: soft, NT, mildly distended, +bowel sounds x4, no organomegaly or palpable mass    Extremities: WWP, no edema/cyanosis/clubbing   Vasc: 2+ radial and DP pulses b/l  Neuro: A&Ox3  Psych: speech non-pressured, thoughts goal-oriented  Skin: dry, intact, no visible jaundice   MSK: no joint swelling    MEDICATIONS:  MEDICATIONS  (STANDING):  acetaminophen  IVPB .. 1000 milliGRAM(s) IV Intermittent once  apixaban 2.5 milliGRAM(s) Oral every 12 hours  aspirin enteric coated 81 milliGRAM(s) Oral daily  brimonidine 0.2% Ophthalmic Solution 1 Drop(s) Both EYES two times a day  calcium carbonate 1250 mG  + Vitamin D (OsCal 500 + D) 1 Tablet(s) Oral daily  cefTRIAXone Injectable. 1000 milliGRAM(s) IV Push every 24 hours  cyanocobalamin 1000 MICROGram(s) Oral daily  dextrose 40% Gel 15 Gram(s) Oral once  dextrose 5%. 1000 milliLiter(s) (50 mL/Hr) IV Continuous <Continuous>  dextrose 5%. 1000 milliLiter(s) (100 mL/Hr) IV Continuous <Continuous>  dextrose 50% Injectable 25 Gram(s) IV Push once  dextrose 50% Injectable 12.5 Gram(s) IV Push once  dextrose 50% Injectable 25 Gram(s) IV Push once  dorzolamide 2% Ophthalmic Solution 1 Drop(s) Both EYES three times a day  ferrous    sulfate 325 milliGRAM(s) Oral two times a day  finasteride 5 milliGRAM(s) Oral daily  glucagon  Injectable 1 milliGRAM(s) IntraMuscular once  insulin lispro (ADMELOG) corrective regimen sliding scale   SubCutaneous Before meals and at bedtime  isosorbide   mononitrate ER Tablet (IMDUR) 30 milliGRAM(s) Oral daily  latanoprost 0.005% Ophthalmic Solution 1 Drop(s) Both EYES at bedtime  metoprolol succinate ER 50 milliGRAM(s) Oral daily  pantoprazole    Tablet 40 milliGRAM(s) Oral before breakfast  potassium chloride    Tablet ER 10 milliEquivalent(s) Oral daily  potassium chloride  20 mEq/100 mL IVPB 20 milliEquivalent(s) IV Intermittent every 2 hours  vancomycin    Solution 125 milliGRAM(s) Oral every 6 hours    MEDICATIONS  (PRN):  ALBUTerol    90 MICROgram(s) HFA Inhaler 2 Puff(s) Inhalation every 6 hours PRN Shortness of Breath and/or Wheezing      ALLERGIES:  Allergies    ACE inhibitors (Angioedema)  x-geva (Unknown)    Intolerances        LABS:                        9.1    12.56 )-----------( 303      ( 23 Aug 2021 06:25 )             28.1     08-23    138  |  108  |  7   ----------------------------<  94  3.3<L>   |  19<L>  |  1.06    Ca    7.7<L>      23 Aug 2021 06:25  Phos  2.4     08-23  Mg     2.0     08-23    TPro  5.6<L>  /  Alb  2.7<L>  /  TBili  0.5  /  DBili  x   /  AST  22  /  ALT  9<L>  /  AlkPhos  54  08-23        CAPILLARY BLOOD GLUCOSE      POCT Blood Glucose.: 104 mg/dL (23 Aug 2021 12:12)      RADIOLOGY & ADDITIONAL TESTS: Reviewed.

## 2021-08-24 NOTE — PROGRESS NOTE ADULT - TIME BILLING
Patient seen and examined with house-staff during bedside rounds.  Resident note read, including vitals, physical findings, laboratory data, and radiological reports.   Revisions included below.  Direct personal management at bed side and extensive interpretation of the data.  Plan was outlined and discussed in details with the housestaff.  Decision making of high complexity  Action taken for acute disease activity to reflect the level of care provided:  - medication reconciliation  - review laboratory data  Patient is clinically stable.  The state patient is still having watery diarrhea.  Reviewed the CT scan and consistent with pan proctitis plus cystitis.  Is also increased in the abdominal adenopathy.  Continue antibiotic.  Continue anticoagulation.  Cardiac status is stable.  Patient might need further evaluation.  Of above if he does not improve
Patient seen and examined with house-staff during bedside rounds.  Resident note read, including vitals, physical findings, laboratory data, and radiological reports.   Revisions included below.  Direct personal management at bed side and extensive interpretation of the data.  Plan was outlined and discussed in details with the housestaff.  Decision making of high complexity  Action taken for acute disease activity to reflect the level of care provided:  - medication reconciliation  - review laboratory data  he is still having diarrhea.  I discussed the case in details with the patient and wife. aware of the enlarged abdominal adenoapthy.  GI follow up

## 2021-08-24 NOTE — DISCHARGE NOTE PROVIDER - NSDCFUSCHEDAPPT_GEN_ALL_CORE_FT
[place] E ; 09/13/2021 ; NPP Gastro [address] [facility] E ; 09/24/2021 ; NPP Urology [address] [facility] E ; 11/05/2021 ; P [facility] [address] [facility] E ; 11/12/2021 ; [facility] [address]

## 2021-08-24 NOTE — DISCHARGE NOTE PROVIDER - NSDCMRMEDTOKEN_GEN_ALL_CORE_FT
albuterol 90 mcg/inh inhalation aerosol with adapter: 2 puff(s) inhaled 4 times a day, As Needed  apixaban 2.5 mg oral tablet: 1 tab(s) orally 2 times a day  aspirin 81 mg oral delayed release tablet: 1 tab(s) orally once a day  brimonidine 0.2% ophthalmic solution: 1 drop(s) to each affected eye 2 times a day  Caltrate 600 + D oral tablet: 1 tab(s) orally once a day  dorzolamide 2% ophthalmic solution: 1 drop(s) to each affected eye 3 times a day  ferrous sulfate 325 mg (65 mg elemental iron) oral tablet: 1 tab(s) orally 2 times a day  finasteride 5 mg oral tablet: 1 tab(s) orally once a day  Imdur 30 mg oral tablet, extended release: 1 tab(s) orally once a day  latanoprost 0.005% ophthalmic solution: 1 drop(s) to each affected eye once a day (in the evening)  Lupron Depot: 11.5 milligram(s) intramuscular every 3 months  metoprolol succinate 50 mg oral tablet, extended release: 1 tab(s) orally once a day  pantoprazole 40 mg oral delayed release tablet: 1 tab(s) orally once a day  potassium chloride 10 mEq oral capsule, extended release: 1 cap(s) orally once a day  Vitamin B12 1000 mcg oral tablet: 1 tab(s) orally once a day   albuterol 90 mcg/inh inhalation aerosol with adapter: 2 puff(s) inhaled 4 times a day, As Needed  apixaban 2.5 mg oral tablet: 1 tab(s) orally 2 times a day  aspirin 81 mg oral delayed release tablet: 1 tab(s) orally once a day  brimonidine 0.2% ophthalmic solution: 1 drop(s) to each affected eye 2 times a day  Caltrate 600 + D oral tablet: 1 tab(s) orally once a day  dorzolamide 2% ophthalmic solution: 1 drop(s) to each affected eye 3 times a day  ferrous sulfate 325 mg (65 mg elemental iron) oral tablet: 1 tab(s) orally 2 times a day  finasteride 5 mg oral tablet: 1 tab(s) orally once a day  Imdur 30 mg oral tablet, extended release: 1 tab(s) orally once a day  latanoprost 0.005% ophthalmic solution: 1 drop(s) to each affected eye once a day (in the evening)  Lupron Depot: 11.5 milligram(s) intramuscular every 3 months  metoprolol succinate 50 mg oral tablet, extended release: 1 tab(s) orally once a day  pantoprazole 40 mg oral delayed release tablet: 1 tab(s) orally once a day  potassium chloride 10 mEq oral capsule, extended release: 1 cap(s) orally once a day  vancomycin 125 mg oral capsule: 1 cap(s) orally every 6 hours  Vitamin B12 1000 mcg oral tablet: 1 tab(s) orally once a day

## 2021-08-24 NOTE — PROGRESS NOTE ADULT - PROBLEM SELECTOR PLAN 4
- continue with toprol 50mg daily, eliquis 2.5 mg BID

## 2021-08-24 NOTE — PROGRESS NOTE ADULT - PROBLEM SELECTOR PLAN 8
-Hb 7.4 on admission s/p 1 u pRBC in ED  -hx of DOMITILA/ AOCD, on iron PO BID  -per daughter recent EGD, colonoscopy negative   -still pending results of pill endoscopy  -No active signs/symptoms of bleed  Plan:  - active type and screen  -transfuse for Hgb <8

## 2021-08-24 NOTE — DISCHARGE NOTE PROVIDER - NSDCCPCAREPLAN_GEN_ALL_CORE_FT
PRINCIPAL DISCHARGE DIAGNOSIS  Diagnosis: C. difficile diarrhea  Assessment and Plan of Treatment: You had a gastrointestinal infection from a bacteria called Clostridium difficile. This infection causes a lot of watery diarrhea which can lead to dehydration, fever, and abdominal pain. You were treated with oral antibiotics, which you should continue taking until 8/27 as your last day, even if your symptoms resolve. Your diarrhea is expected to eventually stop. Please make sure to eat and drink as much as you can until the diarrhea improves. If the diarrhea worsens after you finish the antibiotics, or if you notice blood in your stool, please seek help from a doctor.      SECONDARY DISCHARGE DIAGNOSES  Diagnosis: UTI (urinary tract infection)  Assessment and Plan of Treatment: Urinary tract infections, also called "UTIs," are infections that affect either the bladder or the kidneys. Bladder infections are more common than kidney infections. Bladder infections happen when bacteria get into the urethra and travel up into the bladder. Kidney infections happen when the bacteria travel even higher, up into the kidneys. The symptoms of a bladder infection include pain or a burning feeling when you urinate, the need to urinate often, the need to urinate suddenly or in a hurry, blood in the urine. Signs that an infection has spread to the kidneys include fever, back pain, or nausea/vomiting. It is important that you take your antibiotics as prescribed and to completion to properly treat your urinary tract infection and prevent antibiotic resistance.     PRINCIPAL DISCHARGE DIAGNOSIS  Diagnosis: C. difficile diarrhea  Assessment and Plan of Treatment: You had a gastrointestinal infection from a bacteria called Clostridium difficile. This infection causes a lot of watery diarrhea which can lead to dehydration, fever, and abdominal pain. You were treated with oral antibiotics, which you should continue taking until 8/30 as your last day, even if your symptoms resolve. Your diarrhea is expected to eventually stop. Please make sure to eat and drink as much as you can until the diarrhea improves. If the diarrhea worsens after you finish the antibiotics, or if you notice blood in your stool, please seek help from a doctor.      SECONDARY DISCHARGE DIAGNOSES  Diagnosis: UTI (urinary tract infection)  Assessment and Plan of Treatment: Urinary tract infections, also called "UTIs," are infections that affect either the bladder or the kidneys. Bladder infections are more common than kidney infections. Bladder infections happen when bacteria get into the urethra and travel up into the bladder. Kidney infections happen when the bacteria travel even higher, up into the kidneys. The symptoms of a bladder infection include pain or a burning feeling when you urinate, the need to urinate often, the need to urinate suddenly or in a hurry, blood in the urine. Signs that an infection has spread to the kidneys include fever, back pain, or nausea/vomiting. It is important that you take your antibiotics as prescribed and to completion to properly treat your urinary tract infection and prevent antibiotic resistance.

## 2021-08-25 ENCOUNTER — TRANSCRIPTION ENCOUNTER (OUTPATIENT)
Age: 81
End: 2021-08-25

## 2021-08-25 VITALS — DIASTOLIC BLOOD PRESSURE: 77 MMHG | HEART RATE: 50 BPM | SYSTOLIC BLOOD PRESSURE: 147 MMHG | TEMPERATURE: 97 F

## 2021-08-25 LAB
ALBUMIN SERPL ELPH-MCNC: 2.6 G/DL — LOW (ref 3.3–5)
ALP SERPL-CCNC: 50 U/L — SIGNIFICANT CHANGE UP (ref 40–120)
ALT FLD-CCNC: 16 U/L — SIGNIFICANT CHANGE UP (ref 10–45)
ANION GAP SERPL CALC-SCNC: 10 MMOL/L — SIGNIFICANT CHANGE UP (ref 5–17)
AST SERPL-CCNC: 39 U/L — SIGNIFICANT CHANGE UP (ref 10–40)
BASOPHILS # BLD AUTO: 0.05 K/UL — SIGNIFICANT CHANGE UP (ref 0–0.2)
BASOPHILS NFR BLD AUTO: 0.5 % — SIGNIFICANT CHANGE UP (ref 0–2)
BILIRUB SERPL-MCNC: 0.2 MG/DL — SIGNIFICANT CHANGE UP (ref 0.2–1.2)
BUN SERPL-MCNC: 3 MG/DL — LOW (ref 7–23)
CALCIUM SERPL-MCNC: 8.1 MG/DL — LOW (ref 8.4–10.5)
CHLORIDE SERPL-SCNC: 111 MMOL/L — HIGH (ref 96–108)
CO2 SERPL-SCNC: 17 MMOL/L — LOW (ref 22–31)
CREAT SERPL-MCNC: 0.91 MG/DL — SIGNIFICANT CHANGE UP (ref 0.5–1.3)
EOSINOPHIL # BLD AUTO: 0.5 K/UL — SIGNIFICANT CHANGE UP (ref 0–0.5)
EOSINOPHIL NFR BLD AUTO: 5.5 % — SIGNIFICANT CHANGE UP (ref 0–6)
GLUCOSE BLDC GLUCOMTR-MCNC: 100 MG/DL — HIGH (ref 70–99)
GLUCOSE BLDC GLUCOMTR-MCNC: 105 MG/DL — HIGH (ref 70–99)
GLUCOSE SERPL-MCNC: 100 MG/DL — HIGH (ref 70–99)
HCT VFR BLD CALC: 27 % — LOW (ref 39–50)
HGB BLD-MCNC: 8.6 G/DL — LOW (ref 13–17)
IMM GRANULOCYTES NFR BLD AUTO: 2 % — HIGH (ref 0–1.5)
LYMPHOCYTES # BLD AUTO: 1.23 K/UL — SIGNIFICANT CHANGE UP (ref 1–3.3)
LYMPHOCYTES # BLD AUTO: 13.5 % — SIGNIFICANT CHANGE UP (ref 13–44)
MAGNESIUM SERPL-MCNC: 1.9 MG/DL — SIGNIFICANT CHANGE UP (ref 1.6–2.6)
MCHC RBC-ENTMCNC: 28.4 PG — SIGNIFICANT CHANGE UP (ref 27–34)
MCHC RBC-ENTMCNC: 31.9 GM/DL — LOW (ref 32–36)
MCV RBC AUTO: 89.1 FL — SIGNIFICANT CHANGE UP (ref 80–100)
MONOCYTES # BLD AUTO: 0.85 K/UL — SIGNIFICANT CHANGE UP (ref 0–0.9)
MONOCYTES NFR BLD AUTO: 9.3 % — SIGNIFICANT CHANGE UP (ref 2–14)
NEUTROPHILS # BLD AUTO: 6.33 K/UL — SIGNIFICANT CHANGE UP (ref 1.8–7.4)
NEUTROPHILS NFR BLD AUTO: 69.2 % — SIGNIFICANT CHANGE UP (ref 43–77)
NRBC # BLD: 0 /100 WBCS — SIGNIFICANT CHANGE UP (ref 0–0)
PHOSPHATE SERPL-MCNC: 3.3 MG/DL — SIGNIFICANT CHANGE UP (ref 2.5–4.5)
PLATELET # BLD AUTO: 304 K/UL — SIGNIFICANT CHANGE UP (ref 150–400)
POTASSIUM SERPL-MCNC: 4.2 MMOL/L — SIGNIFICANT CHANGE UP (ref 3.5–5.3)
POTASSIUM SERPL-SCNC: 4.2 MMOL/L — SIGNIFICANT CHANGE UP (ref 3.5–5.3)
PROT SERPL-MCNC: 5.1 G/DL — LOW (ref 6–8.3)
RBC # BLD: 3.03 M/UL — LOW (ref 4.2–5.8)
RBC # FLD: 17.5 % — HIGH (ref 10.3–14.5)
SODIUM SERPL-SCNC: 138 MMOL/L — SIGNIFICANT CHANGE UP (ref 135–145)
WBC # BLD: 9.14 K/UL — SIGNIFICANT CHANGE UP (ref 3.8–10.5)
WBC # FLD AUTO: 9.14 K/UL — SIGNIFICANT CHANGE UP (ref 3.8–10.5)

## 2021-08-25 PROCEDURE — 82962 GLUCOSE BLOOD TEST: CPT

## 2021-08-25 PROCEDURE — 87449 NOS EACH ORGANISM AG IA: CPT

## 2021-08-25 PROCEDURE — 71045 X-RAY EXAM CHEST 1 VIEW: CPT

## 2021-08-25 PROCEDURE — 84100 ASSAY OF PHOSPHORUS: CPT

## 2021-08-25 PROCEDURE — 81001 URINALYSIS AUTO W/SCOPE: CPT

## 2021-08-25 PROCEDURE — P9040: CPT

## 2021-08-25 PROCEDURE — 86901 BLOOD TYPING SEROLOGIC RH(D): CPT

## 2021-08-25 PROCEDURE — 80053 COMPREHEN METABOLIC PANEL: CPT

## 2021-08-25 PROCEDURE — 99239 HOSP IP/OBS DSCHRG MGMT >30: CPT | Mod: GC

## 2021-08-25 PROCEDURE — 86900 BLOOD TYPING SEROLOGIC ABO: CPT

## 2021-08-25 PROCEDURE — 97161 PT EVAL LOW COMPLEX 20 MIN: CPT

## 2021-08-25 PROCEDURE — 86850 RBC ANTIBODY SCREEN: CPT

## 2021-08-25 PROCEDURE — 36430 TRANSFUSION BLD/BLD COMPNT: CPT

## 2021-08-25 PROCEDURE — 86923 COMPATIBILITY TEST ELECTRIC: CPT

## 2021-08-25 PROCEDURE — 87040 BLOOD CULTURE FOR BACTERIA: CPT

## 2021-08-25 PROCEDURE — 85025 COMPLETE CBC W/AUTO DIFF WBC: CPT

## 2021-08-25 PROCEDURE — 80048 BASIC METABOLIC PNL TOTAL CA: CPT

## 2021-08-25 PROCEDURE — G1012: CPT

## 2021-08-25 PROCEDURE — 85027 COMPLETE CBC AUTOMATED: CPT

## 2021-08-25 PROCEDURE — 87324 CLOSTRIDIUM AG IA: CPT

## 2021-08-25 PROCEDURE — 36415 COLL VENOUS BLD VENIPUNCTURE: CPT

## 2021-08-25 PROCEDURE — 86769 SARS-COV-2 COVID-19 ANTIBODY: CPT

## 2021-08-25 PROCEDURE — 87086 URINE CULTURE/COLONY COUNT: CPT

## 2021-08-25 PROCEDURE — 83036 HEMOGLOBIN GLYCOSYLATED A1C: CPT

## 2021-08-25 PROCEDURE — 87635 SARS-COV-2 COVID-19 AMP PRB: CPT

## 2021-08-25 PROCEDURE — 83735 ASSAY OF MAGNESIUM: CPT

## 2021-08-25 PROCEDURE — 87186 SC STD MICRODIL/AGAR DIL: CPT

## 2021-08-25 PROCEDURE — 82272 OCCULT BLD FECES 1-3 TESTS: CPT

## 2021-08-25 PROCEDURE — 99285 EMERGENCY DEPT VISIT HI MDM: CPT | Mod: 25

## 2021-08-25 PROCEDURE — 74177 CT ABD & PELVIS W/CONTRAST: CPT | Mod: ME

## 2021-08-25 RX ORDER — VANCOMYCIN HCL 1 G
1 VIAL (EA) INTRAVENOUS
Qty: 28 | Refills: 0
Start: 2021-08-25 | End: 2021-08-31

## 2021-08-25 RX ORDER — MAGNESIUM SULFATE 500 MG/ML
1 VIAL (ML) INJECTION ONCE
Refills: 0 | Status: COMPLETED | OUTPATIENT
Start: 2021-08-25 | End: 2021-08-25

## 2021-08-25 RX ADMIN — Medication 325 MILLIGRAM(S): at 06:04

## 2021-08-25 RX ADMIN — Medication 81 MILLIGRAM(S): at 11:40

## 2021-08-25 RX ADMIN — Medication 10 MILLIEQUIVALENT(S): at 11:41

## 2021-08-25 RX ADMIN — PREGABALIN 1000 MICROGRAM(S): 225 CAPSULE ORAL at 11:40

## 2021-08-25 RX ADMIN — PANTOPRAZOLE SODIUM 40 MILLIGRAM(S): 20 TABLET, DELAYED RELEASE ORAL at 06:04

## 2021-08-25 RX ADMIN — DORZOLAMIDE HYDROCHLORIDE 1 DROP(S): 20 SOLUTION/ DROPS OPHTHALMIC at 06:05

## 2021-08-25 RX ADMIN — DORZOLAMIDE HYDROCHLORIDE 1 DROP(S): 20 SOLUTION/ DROPS OPHTHALMIC at 15:03

## 2021-08-25 RX ADMIN — ISOSORBIDE MONONITRATE 30 MILLIGRAM(S): 60 TABLET, EXTENDED RELEASE ORAL at 11:41

## 2021-08-25 RX ADMIN — FINASTERIDE 5 MILLIGRAM(S): 5 TABLET, FILM COATED ORAL at 11:40

## 2021-08-25 RX ADMIN — CEFTRIAXONE 1000 MILLIGRAM(S): 500 INJECTION, POWDER, FOR SOLUTION INTRAMUSCULAR; INTRAVENOUS at 15:03

## 2021-08-25 RX ADMIN — Medication 125 MILLIGRAM(S): at 11:40

## 2021-08-25 RX ADMIN — BRIMONIDINE TARTRATE 1 DROP(S): 2 SOLUTION/ DROPS OPHTHALMIC at 06:08

## 2021-08-25 RX ADMIN — Medication 100 GRAM(S): at 11:59

## 2021-08-25 RX ADMIN — Medication 1 TABLET(S): at 11:41

## 2021-08-25 RX ADMIN — APIXABAN 2.5 MILLIGRAM(S): 2.5 TABLET, FILM COATED ORAL at 06:08

## 2021-08-25 RX ADMIN — Medication 50 MILLIGRAM(S): at 06:04

## 2021-08-25 RX ADMIN — Medication 125 MILLIGRAM(S): at 06:03

## 2021-08-25 NOTE — DISCHARGE NOTE NURSING/CASE MANAGEMENT/SOCIAL WORK - PATIENT PORTAL LINK FT
You can access the FollowMyHealth Patient Portal offered by [facility] by registering at the following website: [email or web address]. By joining [facility]’s FollowMyHealth portal, you will also be able to view your health information using other applications (apps) compatible with our system.

## 2021-08-25 NOTE — CONSULT NOTE ADULT - SUBJECTIVE AND OBJECTIVE BOX
Patient is a 80y old  Male who presents with a chief complaint of C diff (25 Aug 2021 11:04)       HPI:  80M PMH of prostate ca (recently stopped chemo secondary to adverse effects), HTN, HLD, DM II, anemia of chronic disease/DOMITILA, PE/DVT (1/2020), Afib on Eliquis 2.5 mg BID, CAD (s/p IRASEMA x 1 3/30/21 on asa and Eliquis, esophagitis/gastritis/ duodenitis presents to ED c/o non-bloody diarrhea x 3 weeks. Pt reports that he had EGD and colonoscopy 7/30 that were negative and is still pending result of pill endoscopy as workup for anemia. However, since his scopes, pt has started having diarrhea with about 6 episodes of watery and nonbloody diarrhea per day. Pt also c/o 8/10 crampy abdominal pain. Pt has also finished 2 abx courses (Bactrim and Augmentin) for UTI, however these were started after diarrhea had already started. No Abx prior to diarrhea. Pt denies fever, chills, cough, nausea and vomiting, dysuria, hematuria, rash, leg edema, or any other acute complaints.    ED Course:  VS: T 102.4, HR 98, /67, O2 94  Labs s/f: Hb 7.4, WBC 13.4, Cr 1.2 (baseline about .9), UA with WBCs. C diff PCR positive  Imaging:  CT AP: proctocolitis, cystitis, increased retroperitoneal LAD  Treatment:  1u pRBC, ampicillin, PO vanc (21 Aug 2021 06:27)      PAST MEDICAL & SURGICAL HISTORY:  Hyperlipidemia  Hyperlipidemia    Malignant neoplasm of prostate  s/p total prostatectomy in 1994 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    Glaucoma  Glaucoma    Type 2 diabetes mellitus  Diabetes    Essential hypertension  Hypertension    Chemotherapy session for neoplasm  Prostate Cancer    Pulmonary embolism  1/2020    DVT (deep venous thrombosis)  1/2020 on Eliquis    Atrial fibrillation  on Eliquis    Other postprocedural status  S/P prostatectomy        MEDICATIONS  (STANDING):  acetaminophen  IVPB .. 1000 milliGRAM(s) IV Intermittent once  apixaban 2.5 milliGRAM(s) Oral every 12 hours  aspirin enteric coated 81 milliGRAM(s) Oral daily  brimonidine 0.2% Ophthalmic Solution 1 Drop(s) Both EYES two times a day  calcium carbonate 1250 mG  + Vitamin D (OsCal 500 + D) 1 Tablet(s) Oral daily  cefTRIAXone Injectable. 1000 milliGRAM(s) IV Push every 24 hours  cyanocobalamin 1000 MICROGram(s) Oral daily  dextrose 40% Gel 15 Gram(s) Oral once  dextrose 5%. 1000 milliLiter(s) (50 mL/Hr) IV Continuous <Continuous>  dextrose 5%. 1000 milliLiter(s) (100 mL/Hr) IV Continuous <Continuous>  dextrose 50% Injectable 25 Gram(s) IV Push once  dextrose 50% Injectable 12.5 Gram(s) IV Push once  dextrose 50% Injectable 25 Gram(s) IV Push once  dorzolamide 2% Ophthalmic Solution 1 Drop(s) Both EYES three times a day  ferrous    sulfate 325 milliGRAM(s) Oral two times a day  finasteride 5 milliGRAM(s) Oral daily  glucagon  Injectable 1 milliGRAM(s) IntraMuscular once  insulin lispro (ADMELOG) corrective regimen sliding scale   SubCutaneous Before meals and at bedtime  isosorbide   mononitrate ER Tablet (IMDUR) 30 milliGRAM(s) Oral daily  latanoprost 0.005% Ophthalmic Solution 1 Drop(s) Both EYES at bedtime  metoprolol succinate ER 50 milliGRAM(s) Oral daily  pantoprazole    Tablet 40 milliGRAM(s) Oral before breakfast  potassium chloride    Tablet ER 10 milliEquivalent(s) Oral daily  vancomycin    Solution 125 milliGRAM(s) Oral every 6 hours    MEDICATIONS  (PRN):  ALBUTerol    90 MICROgram(s) HFA Inhaler 2 Puff(s) Inhalation every 6 hours PRN Shortness of Breath and/or Wheezing    FAMILY HISTORY:  Family history of malignant neoplasm of prostate (Father, Sibling)  Father        CBC Full  -  ( 25 Aug 2021 06:47 )  WBC Count : 9.14 K/uL  RBC Count : 3.03 M/uL  Hemoglobin : 8.6 g/dL  Hematocrit : 27.0 %  Platelet Count - Automated : 304 K/uL  Mean Cell Volume : 89.1 fl  Mean Cell Hemoglobin : 28.4 pg  Mean Cell Hemoglobin Concentration : 31.9 gm/dL  Auto Neutrophil # : 6.33 K/uL  Auto Lymphocyte # : 1.23 K/uL  Auto Monocyte # : 0.85 K/uL  Auto Eosinophil # : 0.50 K/uL  Auto Basophil # : 0.05 K/uL  Auto Neutrophil % : 69.2 %  Auto Lymphocyte % : 13.5 %  Auto Monocyte % : 9.3 %  Auto Eosinophil % : 5.5 %  Auto Basophil % : 0.5 %      08-25    138  |  111<H>  |  3<L>  ----------------------------<  100<H>  4.2   |  17<L>  |  0.91    Ca    8.1<L>      25 Aug 2021 06:46  Phos  3.3     08-25  Mg     1.9     08-25    TPro  5.1<L>  /  Alb  2.6<L>  /  TBili  0.2  /  DBili  x   /  AST  39  /  ALT  16  /  AlkPhos  50  08-25            Radiology:    < from: CT Abdomen and Pelvis w/ Oral Cont and w/ IV Cont (08.20.21 @ 19:54) >  EXAM:  CT ABDOMEN AND PELVIS OC IC                          PROCEDURE DATE:  08/20/2021          INTERPRETATION:  CT SCAN OF ABDOMEN AND PELVIS    History: Lower abdominal pain. Question C. difficile. History of metastatic prostate cancer.    Technique: CT scan of abdomen and pelvis was performed from lung bases through symphysis pubis. Intravenous and oral contrast material were utilized. Axial, sagittal and coronal reformatted images were reviewed.    Comparison: Multiple prior studies including CTA chest dated 3/20/2021. CT-guided biopsy dated 2/26/2019. PET/CT dated 1/14/2019. CT abdomen pelvis dated 4/4/2017 and additional studies dating back to 2006.    Findings:    Lower chest: Since 3/20/2021, unchanged enlarged paraesophageal node,measuring up to 1.7 cm and prevertebral node measuring up to 1.2 cm Coronary artery calcifications.    Liver:  Normal.    Gallbladder: No radiopaque stones gallbladder.    Spleen:  Normal.    Pancreas:  Atrophic.    Adrenal glands:  Normal.    Kidneys: No renal stones or hydronephrosis bilaterally. Interval increase size 6.3 cm interpolar left renal cyst. Bilateral subcentimeter hypodensities, too small to characterize.    Adenopathy:  Since 1/14/2019, slight interval increase in extensive masslike retroperitoneal lymphadenopathy, largest now aortocaval node measuring 2.3 cm in short axis.    Ascites: None.    Gastrointestinal tract: There is circumferential wall thickening, submucosal edema and pericolonic fat stranding from the level of theproximal descending colon through the rectum with relative loss of the typical haustral pattern. No obstruction or free air.    Vessels: Severe calcified plaque of the aortoiliac system.    Pelvic organs: The bladder is thick-walled with perivesical fat stranding. Post prostatectomy. Trace pelvic fluid.    Soft tissues: Tiny fat-containing umbilical hernia. Unchanged moderate size fat-containing left abdominal wall hernia.    Bones: Since 2017, there is a new chronic appearing moderate compression deformity L2. Unchanged grade 2 anterolisthesis of L5 on S1. Additional multilevel degenerative changes of the spine.    Impression:  1.  Proctocolitis from the level of the descending colon to the rectum.  2.  Wall thickening of the urinary bladderwith perivesicular fat stranding. Correlate with urinalysis to exclude cystitis.  3.  Since 3/28/2021, unchanged enlarged paraesophageal and retrocrural lymphadenopathy. Mild interval increase in extensive bulky retroperitoneal lymphadenopathy since 1/14/2019        < from: Xray Chest 1 View- PORTABLE-Urgent (Xray Chest 1 View- PORTABLE-Urgent .) (08.21.21 @ 05:40) >  EXAM:  XR CHEST PORTABLE URGENT 1V                          PROCEDURE DATE:  08/21/2021          INTERPRETATION:  Clinical History: Sepsis    Frontal examination of the chest demonstrates the heart to be within normal limits in transverse diameter.No acute infiltrates. Discoid change lung bases. Calcification aortic knob. Degenerative changes thoracic spine.    IMPRESSION: No acute infiltrates                Vital Signs Last 24 Hrs  T(C): 36.8 (25 Aug 2021 10:29), Max: 37.2 (24 Aug 2021 16:27)  T(F): 98.2 (25 Aug 2021 10:29), Max: 98.9 (24 Aug 2021 16:27)  HR: 65 (25 Aug 2021 10:29) (65 - 81)  BP: 150/67 (25 Aug 2021 10:29) (129/70 - 150/67)  BP(mean): --  RR: 18 (25 Aug 2021 10:29) (17 - 18)  SpO2: 99% (25 Aug 2021 10:29) (98% - 100%)        REVIEW OF SYSTEMS:    CONSTITUTIONAL: No fever, weight loss, or fatigue  EYES: No eye pain, visual disturbances, or discharge  ENMT:  No difficulty hearing, tinnitus, vertigo; No sinus or throat pain  NECK: No pain or stiffness  BREASTS: No pain, masses, or nipple discharge  RESPIRATORY: No cough, wheezing, chills or hemoptysis; No shortness of breath  CARDIOVASCULAR: No chest pain, palpitations, dizziness, or leg swelling  GASTROINTESTINAL: diarrhea > 3 weeks  GENITOURINARY: No dysuria, frequency, hematuria, or incontinence  NEUROLOGICAL: No headaches, memory loss, loss of strength, numbness, or tremors  SKIN: No itching, burning, rashes, or lesions   LYMPH NODES: No enlarged glands  ENDOCRINE: No heat or cold intolerance; No hair loss  MUSCULOSKELETAL: No joint pain or swelling; No muscle, back, or extremity pain  PSYCHIATRIC: No depression, anxiety, mood swings, or difficulty sleeping  HEME/LYMPH: No easy bruising, or bleeding gums  ALLERGY AND IMMUNOLOGIC: No hives or eczema  VASCULAR: no swelling, erythema,   : no dysuria, hematuria, frequency        Physical Exam:  on C Diff contact isolation, 81 yo AA gentleman lying in semi Becerra's position, still c/o diarrhea but less so    Head: normocephalic, atraumatic    Eyes: PERRLA, EOMI, no nystagmus, sclera anicteric    ENT: nasal discharge, uvula midline, no oropharyngeal erythema/exudate    Neck: supple, negative JVD, negative carotid bruits, no thyromegaly    Chest: CTA bilaterally, neg wheeze/ rhonchi/ rales/ crackles/ egophany    Cardiovascular: regular rate and rhythm, neg murmurs/rubs/gallops    Abdomen: soft, non distended, non tender to palpation in all 4 quadrants, negative rebound/guarding, normal bowel sounds    Extremities: WWP, neg cyanosis/clubbing/edema, negative calf tenderness to palpation, negative Bren's sign      Neurologic Exam:    Alert and oriented to person, place, date/year, speech fluent w/o dysarthria    Motor Exam:    Right UE:          no focal weakness > 4/5    Left UE:             no focal weakness > 4/5      Right LE:           no focal weakness > 4/5    Left LE:             no focal weakness > 4/5               Sensation:           intact to light touch x 4 extremities                                                 DTR:                  biceps/brachioradialis: equal bilaterally                                                     patella/ankle: equal bilaterally                          Gait:  not tested        PM&R Impression:    1) C Diff colitis  2) no focal weakness    Recommendations/ Plan :    1) Physical therapy focusing on therapeutic exercises, bed mobility/transfer out of bed evaluation, progressive ambulation with assistive devices prn.    2) Anticipated Disposition Plan/Recs:   pending functional progress

## 2021-08-25 NOTE — CONSULT NOTE ADULT - ASSESSMENT
per Internal Medicine    81 yo M PMH of prostate ca (recently stopped chemo secondary to adverse effects), HTN, HLD, DM II, anemia of chronic disease/DOMITILA, PE/DVT (1/2020), Afib on Eliquis 2.5 mg BID, CAD (s/p IRASEMA x 1 3/30/21) on asa and Eliquis, esophagitis/gastritis/ duodenitis presents to ED c/o non-bloody diarrhea x 3 weeks a/f sepsis 2/2 C diff.    Problem/Plan - 1:  ·  Problem: Sepsis.   ·  Plan: Resolved. Presenting with diarrhea x3 weeks with T 102.4, HR 98, WBC 13.8 on admission. UA with WBCs but denies dysuria. C diff positive with CTAP showing proctocolitis, cystitis.  -s/p 1u pRBC for anemia  -s/p 1 dose ampicillin  Plan:  -cont PO vanc for Cdiff  -for simple UTI, dc'ed zosyn 8/23 (received 2 days), changed to CTX given sensitivities on UCx (E coli), can transition to PO for dc.    Problem/Plan - 2:  ·  Problem: Clostridium difficile colitis.   ·  Plan: -tx as above.    Problem/Plan - 3:  ·  Problem: CAD (coronary artery disease).   ·  Plan: s/p IRASEMA x 1 3/30/21 on asa and Eliquis (s/p 1 week plavix 2/2 risk of GI bleed)  - continue with asa 81mg, eliquis 2.5 mg BID, atorvastatin 40.    Pre-diabetes  - Not currently on medications per current med list provided by daughter   - Hb A1C 6.1 04/21   - ISS.    Problem/Plan - 4:  ·  Problem: Afib.   ·  Plan: - continue with toprol 50mg daily, eliquis 2.5 mg BID.    Problem/Plan - 5:  ·  Problem: Prostate CA.   ·  Plan: -S/p prostatectomy (1994)   -unclear if currently on Lupron/Xgeva as an outpatient as daughter reports not getting chemo but was on med list provided   -CTAP showing worsening retroperitoneal LAD  -followed by Dr. Fuller.    Problem/Plan - 6:  ·  Problem: HTN (hypertension).   ·  Plan: -cont imdur 30mg daily, toprol 50mg daily.    Problem/Plan - 7:  ·  Problem: Deep vein thrombosis (DVT).   ·  Plan: -pt with hx of PE/DVT  -cont eliquis.    Problem/Plan - 8:  ·  Problem: Anemia.   ·  Plan: -Hb 7.4 on admission s/p 1 u pRBC in ED  -hx of DOMITILA/ AOCD, on iron PO BID  -per daughter recent EGD, colonoscopy negative   -still pending results of pill endoscopy  -No active signs/symptoms of bleed  Plan:  - active type and screen  -transfuse for Hgb <8.    Problem/Plan - 9:  ·  Problem: Nutrition, metabolism, and development symptoms.   ·  Plan: F: s/p 1L LR, none currently  E: replete prn  N: dash/tlc    DVT: on eliquis    Code status: full.

## 2021-08-25 NOTE — PHYSICAL THERAPY INITIAL EVALUATION ADULT - PERTINENT HX OF CURRENT PROBLEM, REHAB EVAL
80M PMH of prostate ca (recently stopped chemo secondary to adverse effects), HTN, HLD, DM II, anemia of chronic disease/DOMITILA, PE/DVT (1/2020), Afib on Eliquis 2.5 mg BID, CAD (s/p IRASEMA x 1 3/30/21 on asa and Eliquis, esophagitis/gastritis/ duodenitis presents to ED c/o non-bloody diarrhea x 3 weeks.

## 2021-08-30 DIAGNOSIS — I48.91 UNSPECIFIED ATRIAL FIBRILLATION: ICD-10-CM

## 2021-08-30 DIAGNOSIS — R73.03 PREDIABETES: ICD-10-CM

## 2021-08-30 DIAGNOSIS — E78.5 HYPERLIPIDEMIA, UNSPECIFIED: ICD-10-CM

## 2021-08-30 DIAGNOSIS — B96.20 UNSPECIFIED ESCHERICHIA COLI [E. COLI] AS THE CAUSE OF DISEASES CLASSIFIED ELSEWHERE: ICD-10-CM

## 2021-08-30 DIAGNOSIS — Z79.01 LONG TERM (CURRENT) USE OF ANTICOAGULANTS: ICD-10-CM

## 2021-08-30 DIAGNOSIS — Z79.82 LONG TERM (CURRENT) USE OF ASPIRIN: ICD-10-CM

## 2021-08-30 DIAGNOSIS — H40.9 UNSPECIFIED GLAUCOMA: ICD-10-CM

## 2021-08-30 DIAGNOSIS — A04.72 ENTEROCOLITIS DUE TO CLOSTRIDIUM DIFFICILE, NOT SPECIFIED AS RECURRENT: ICD-10-CM

## 2021-08-30 DIAGNOSIS — D50.9 IRON DEFICIENCY ANEMIA, UNSPECIFIED: ICD-10-CM

## 2021-08-30 DIAGNOSIS — K42.9 UMBILICAL HERNIA WITHOUT OBSTRUCTION OR GANGRENE: ICD-10-CM

## 2021-08-30 DIAGNOSIS — N30.00 ACUTE CYSTITIS WITHOUT HEMATURIA: ICD-10-CM

## 2021-08-30 DIAGNOSIS — A41.9 SEPSIS, UNSPECIFIED ORGANISM: ICD-10-CM

## 2021-08-30 DIAGNOSIS — Z88.8 ALLERGY STATUS TO OTHER DRUGS, MEDICAMENTS AND BIOLOGICAL SUBSTANCES STATUS: ICD-10-CM

## 2021-08-30 DIAGNOSIS — Z95.5 PRESENCE OF CORONARY ANGIOPLASTY IMPLANT AND GRAFT: ICD-10-CM

## 2021-08-30 DIAGNOSIS — Z86.711 PERSONAL HISTORY OF PULMONARY EMBOLISM: ICD-10-CM

## 2021-08-30 DIAGNOSIS — Z85.46 PERSONAL HISTORY OF MALIGNANT NEOPLASM OF PROSTATE: ICD-10-CM

## 2021-08-30 DIAGNOSIS — I25.10 ATHEROSCLEROTIC HEART DISEASE OF NATIVE CORONARY ARTERY WITHOUT ANGINA PECTORIS: ICD-10-CM

## 2021-08-30 DIAGNOSIS — Z92.21 PERSONAL HISTORY OF ANTINEOPLASTIC CHEMOTHERAPY: ICD-10-CM

## 2021-08-30 DIAGNOSIS — Z87.891 PERSONAL HISTORY OF NICOTINE DEPENDENCE: ICD-10-CM

## 2021-08-30 DIAGNOSIS — Z79.51 LONG TERM (CURRENT) USE OF INHALED STEROIDS: ICD-10-CM

## 2021-08-30 DIAGNOSIS — Z86.718 PERSONAL HISTORY OF OTHER VENOUS THROMBOSIS AND EMBOLISM: ICD-10-CM

## 2021-08-30 DIAGNOSIS — I10 ESSENTIAL (PRIMARY) HYPERTENSION: ICD-10-CM

## 2021-08-30 DIAGNOSIS — R63.8 OTHER SYMPTOMS AND SIGNS CONCERNING FOOD AND FLUID INTAKE: ICD-10-CM

## 2021-08-30 DIAGNOSIS — D63.8 ANEMIA IN OTHER CHRONIC DISEASES CLASSIFIED ELSEWHERE: ICD-10-CM

## 2021-09-13 ENCOUNTER — APPOINTMENT (OUTPATIENT)
Age: 81
End: 2021-09-13
Payer: MEDICARE

## 2021-09-13 ENCOUNTER — LABORATORY RESULT (OUTPATIENT)
Age: 81
End: 2021-09-13

## 2021-09-13 ENCOUNTER — NON-APPOINTMENT (OUTPATIENT)
Age: 81
End: 2021-09-13

## 2021-09-13 VITALS
SYSTOLIC BLOOD PRESSURE: 140 MMHG | RESPIRATION RATE: 12 BRPM | WEIGHT: 156 LBS | OXYGEN SATURATION: 98 % | BODY MASS INDEX: 23.11 KG/M2 | HEIGHT: 69 IN | HEART RATE: 89 BPM | TEMPERATURE: 97.7 F | DIASTOLIC BLOOD PRESSURE: 70 MMHG

## 2021-09-13 DIAGNOSIS — N39.0 URINARY TRACT INFECTION, SITE NOT SPECIFIED: ICD-10-CM

## 2021-09-13 DIAGNOSIS — R63.4 ABNORMAL WEIGHT LOSS: ICD-10-CM

## 2021-09-13 LAB
APPEARANCE: CLEAR
BACTERIA UR CULT: ABNORMAL
BACTERIA: ABNORMAL
BILIRUBIN URINE: NEGATIVE
BLOOD URINE: NEGATIVE
COLOR: YELLOW
GLUCOSE QUALITATIVE U: NEGATIVE
HYALINE CASTS: 0 /LPF
KETONES URINE: NEGATIVE
LEUKOCYTE ESTERASE URINE: NEGATIVE
MICROSCOPIC-UA: NORMAL
NITRITE URINE: POSITIVE
PH URINE: 7
PROTEIN URINE: NEGATIVE
RED BLOOD CELLS URINE: 0 /HPF
SPECIFIC GRAVITY URINE: 1.01
SQUAMOUS EPITHELIAL CELLS: 1 /HPF
UROBILINOGEN URINE: NORMAL
WHITE BLOOD CELLS URINE: 0 /HPF

## 2021-09-13 PROCEDURE — 99214 OFFICE O/P EST MOD 30 MIN: CPT

## 2021-09-14 ENCOUNTER — NON-APPOINTMENT (OUTPATIENT)
Age: 81
End: 2021-09-14

## 2021-09-14 PROBLEM — R63.4 WEIGHT LOSS: Status: ACTIVE | Noted: 2021-09-13

## 2021-09-14 LAB
ALBUMIN SERPL ELPH-MCNC: 3.5 G/DL
ALP BLD-CCNC: 43 U/L
ALT SERPL-CCNC: 11 U/L
ANION GAP SERPL CALC-SCNC: 17 MMOL/L
AST SERPL-CCNC: 31 U/L
BASOPHILS # BLD AUTO: 0.02 K/UL
BASOPHILS NFR BLD AUTO: 0.3 %
BILIRUB SERPL-MCNC: 0.2 MG/DL
BUN SERPL-MCNC: 8 MG/DL
CALCIUM SERPL-MCNC: 9 MG/DL
CHLORIDE SERPL-SCNC: 107 MMOL/L
CO2 SERPL-SCNC: 17 MMOL/L
CREAT SERPL-MCNC: 1.07 MG/DL
EOSINOPHIL # BLD AUTO: 0.34 K/UL
EOSINOPHIL NFR BLD AUTO: 5.1 %
GLUCOSE SERPL-MCNC: 90 MG/DL
HCT VFR BLD CALC: 25 %
HGB BLD-MCNC: 7.6 G/DL
IMM GRANULOCYTES NFR BLD AUTO: 0.4 %
LYMPHOCYTES # BLD AUTO: 1.15 K/UL
LYMPHOCYTES NFR BLD AUTO: 17.1 %
MAN DIFF?: NORMAL
MCHC RBC-ENTMCNC: 28.4 PG
MCHC RBC-ENTMCNC: 30.4 GM/DL
MCV RBC AUTO: 93.3 FL
MONOCYTES # BLD AUTO: 0.54 K/UL
MONOCYTES NFR BLD AUTO: 8 %
NEUTROPHILS # BLD AUTO: 4.64 K/UL
NEUTROPHILS NFR BLD AUTO: 69.1 %
PLATELET # BLD AUTO: 367 K/UL
POTASSIUM SERPL-SCNC: 4.4 MMOL/L
PROT SERPL-MCNC: 6 G/DL
RBC # BLD: 2.68 M/UL
RBC # FLD: 18.6 %
SODIUM SERPL-SCNC: 142 MMOL/L
WBC # FLD AUTO: 6.72 K/UL

## 2021-09-14 NOTE — ASSESSMENT
[FreeTextEntry1] : 80M w/ PMHx of prostate Ca (recently stopped chemo secondary to adverse effects), HTN, HLD, DM2, AOCD/DOMITILA, PE/DVT (1/2020), Afib on Eliquis 2.5 mg BID, CAD (s/p IRASEMA x 1 3/30/21) on ASA presents for posthospitalization followup.\par \par #AOCD/DOMITILA\par -Continue PPI\par -Continue iron repletion\par -Continue followup with Dr. Mcmillan, I spoke with Dr Mcmillan and reviewed his condition, when last seen diarrhea was improving but will need to follow this closely\par -Obtain collateral from Dr. Mcmillan's office, he is epogen like stimulating agents\par -VCE with evidence of AVM, can evaluate role of enteroscopy if anemia persist on A/C but feeling in conjunction with dr Mcmillan is this relates to advanced prostate cancer, getting opinion at [facility] [place]\par -Obtain CBC\par \par #C diff colitis\par Completed treatment, will continue to monitor symptoms following therapy and consider role of repeating testing given persistent diarrhea\par -consider 2nd line rx (Dificid) if no response to titrating immodium, start with 1 tab then escalate\par - hold PPI as may exacerbate\par -Obtain CMP \par \par RTC 1 months\par \par Hank Fisher MD\par Gastroenterology Fellow

## 2021-09-14 NOTE — PHYSICAL EXAM
[General Appearance - Alert] : alert [General Appearance - In No Acute Distress] : in no acute distress [Sclera] : the sclera and conjunctiva were normal [Extraocular Movements] : extraocular movements were intact [Neck Appearance] : the appearance of the neck was normal [] : no respiratory distress [Exaggerated Use Of Accessory Muscles For Inspiration] : no accessory muscle use [Bowel Sounds] : normal bowel sounds [Abdomen Soft] : soft [Abdomen Tenderness] : non-tender [Skin Color & Pigmentation] : normal skin color and pigmentation [Skin Turgor] : normal skin turgor [Cranial Nerves] : cranial nerves 2-12 were intact [Deep Tendon Reflexes (DTR)] : deep tendon reflexes were 2+ and symmetric [Oriented To Time, Place, And Person] : oriented to person, place, and time [Impaired Insight] : insight and judgment were intact

## 2021-09-14 NOTE — HISTORY OF PRESENT ILLNESS
[FreeTextEntry1] : 80M w/ PMHx of prostate Ca (recently stopped chemo secondary to adverse effects), HTN, HLD, DM2, AOCD/DOMITILA, PE/DVT (1/2020), Afib on Eliquis 2.5 mg BID, CAD (s/p IRASEMA x 1 3/30/21) on ASA presents for posthospitalization followup.\par Accompanied by daughter\par \par Recent hospital admission for 8/20-8/25/21 for sepsis found to have C diff and UTI.  Patient treated with ceftriaxone and vancomycin before discharge.  He reports initial improvement in symptoms but diarrhea return after discharged with 3-4 loose BM per day.  Dark stool since hospitalization, reportedly since being on iron repletion.  \par \par Patient denies fever, chill, cp, sob, abd pain, nausea, vomiting, hematochezia. Weight loss of around 8 lb since last month.  Dtr reports pending evaluation at [facility] [place] for management of prostate Ca\par \par VCE 7/21\par Gastritis, Duodenitits, prominent villi in the jejunum and nonbleeding AVM at 1:53:18.  \par \par EGD/colonoscopy with Dr Brooks on 6/3/21\par EGD:\par Schatzki Ring in the gastroesophageal junction s/p excisional biopsy\par Hiatal Hernia in the gastroesophageal junction.  \par Polyp in the cardia. (Biopsy).  \par Random gastric biopsies were taken to r/p H. Pylori.  \par Polyps in the second part of the duodenum and distal bulb. (Biopsy).  \par Random biopsies were obtained to r/o celiac disease. \par \par Colonoscopy:\par Difficult cecal intubation due to tight turn and likely adhesions requiring multiple repositioning and external pressure. Mild sigmoid diverticulosis. Otherwise normal colonoscopy.

## 2021-09-15 RX ORDER — LOPERAMIDE HYDROCHLORIDE 2 MG/1
2 TABLET ORAL DAILY
Qty: 30 | Refills: 0 | Status: ACTIVE | COMMUNITY
Start: 2021-09-15 | End: 1900-01-01

## 2021-09-15 NOTE — ASSESSMENT
[FreeTextEntry1] : 1. CAD s/p IRASEMA-OM1 3/2021\par - hospitalization reports reviewed\par - instructed to take plavix for 1 wk only in setting of previous GIB\par - ed done re heart healthy lifestyle modifications and diet \par - Nl EF on 3/2021 TTE in hospital\par - recent drop in H/H in setting of triple tx x1 week, pt usually w/ Hgb 8-9 at baseline\par - had a lengthy discussion w/ pt, his daughter as well as pt's Hem/Onc MD/Dr. Fuller. Given pt's hypercoagulable state, h/o PE/DVT as well as afib, benefits of NOAC outweigh risks. Cleared by GI to restart as well. Restarted  low dose Eliquis 2.5 bid and pt will have his H/H followed diligently by Dr. Fuller\par - cont ASA/NOAC/BB\par \par 2. Preoperative cardiac evaluation prior to EG/colonoscopy\par - + JAIME, METS<4\par - 5/20/21 TTE wnl\par - 5/20/21  MPI wnl\par - continue current meds for now \par - pt is medically optimized from a cardiac standpoint for the upcoming surgery with the aforementioned recommendations \par \par 2. PAF\par - now in NSR\par - CHADsVasc score 5\par - cont NOAC as above\par -cont Toprol 50 qd for better rate control\par \par \par 3. HTN\par - now controlled, ACE DC"d due to angioedema\par - cont BB \par - added Imdur 30 qd\par - continue rest of current meds for now\par \par 4. PE/DVT\par - cont to f/u w/ Dr. Fuller\par - cont NOAC\par \par 5. HLD\par - cont statin\par - ed done\par \par RTC 3 months . \par

## 2021-09-15 NOTE — HISTORY OF PRESENT ILLNESS
[FreeTextEntry1] : 80M w/ stage IV prostate CA (s/p prostatectomy, on chemotherapy), who presented to [facility]  with progressive SOB and JAIME x1mo associated w/ generalized fatigue in the setting\par of decreased PO intake, found to have segmental PE of RLL on CTA Chest and LLE\par DVT on US Dopplers likely 2/2 malignancy. Hospital course c/b coffee ground\par emesis,  resolved with EGD demonstrating duodenal ulcers and gastritis. Cardiology consulted for new onset atrial fibrillation during hospitalization, started on BB and instructed to continue NOAC. Admitted to [facility] 3/28/21 w/ angina and JERI. S/p [facility] w/ IRASEMA-OM1. Instructred to start triple tx for 1 week, then discontinue plavix. No c/o abnl bleeding. States anginal sx have completely resolved. Now returns again after recent admission to [facility] for facial numbness (CVA ruled out) which was subsequently relieved by removing his dentures. During hospitalization, was noted to have a drop in his H/H and was transfused 1U PRBCs. Cleared to resume NOAC and ASA by GI, follows Dr. Fuller (Heme/Onc) for serial H/Hs. Now presents for preop cardiac evaluation prior to EGD/Colonoscopy.  Does c/o recurrence of his JAIME. Returns today for results of TTE and MPI. Anemic at [facility] office today, given aranesp.\par \par \par \par 8/20/21 ECG: NSR at 80 bpm, LAFB, NS STTWC\par 12/11/20 ECG: NSR at 86 bpm, LAFB, NS STTWC (unchanged from previous)\par 1/24/20 ECG: NSR at 86 bpm, LAD, LAFB, PVCs, non-specific STTWC

## 2021-09-15 NOTE — CARDIOLOGY SUMMARY
[de-identified] : 5/20/21 [facility] sakinal [de-identified] : 5/20/21 CONCLUSIONS:\par \par  1. Mild symmetric left ventricular hypertrophy.\par  2. Normal left ventricular systolic function. Left ventricular ejection fraction is 60-65%.\par  3. Normal right ventricular size and systolic function.\par  4. Dilated left atrium.\par  5. Aortic sclerosis without significant stenosis.\par  6. Trace aortic regurgitation.\par  7. Mild mitral regurgitation.\par  8. Pulmonary artery systolic pressure is 33 mmHg.\par  9. No pericardial effusion.\par

## 2021-09-21 ENCOUNTER — NON-APPOINTMENT (OUTPATIENT)
Age: 81
End: 2021-09-21

## 2021-09-23 ENCOUNTER — INPATIENT (INPATIENT)
Facility: HOSPITAL | Age: 81
LOS: 4 days | Discharge: HOME | DRG: 372 | End: 2021-09-28
Attending: INTERNAL MEDICINE | Admitting: INTERNAL MEDICINE
Payer: MEDICARE

## 2021-09-23 VITALS
HEART RATE: 108 BPM | DIASTOLIC BLOOD PRESSURE: 77 MMHG | HEIGHT: 69 IN | RESPIRATION RATE: 18 BRPM | TEMPERATURE: 98 F | OXYGEN SATURATION: 98 % | SYSTOLIC BLOOD PRESSURE: 164 MMHG | WEIGHT: 147.05 LBS

## 2021-09-23 DIAGNOSIS — I25.10 ATHEROSCLEROTIC HEART DISEASE OF NATIVE CORONARY ARTERY WITHOUT ANGINA PECTORIS: ICD-10-CM

## 2021-09-23 DIAGNOSIS — D63.8 ANEMIA IN OTHER CHRONIC DISEASES CLASSIFIED ELSEWHERE: ICD-10-CM

## 2021-09-23 DIAGNOSIS — E11.9 TYPE 2 DIABETES MELLITUS WITHOUT COMPLICATIONS: ICD-10-CM

## 2021-09-23 DIAGNOSIS — Z29.9 ENCOUNTER FOR PROPHYLACTIC MEASURES, UNSPECIFIED: ICD-10-CM

## 2021-09-23 DIAGNOSIS — I48.0 PAROXYSMAL ATRIAL FIBRILLATION: ICD-10-CM

## 2021-09-23 DIAGNOSIS — A04.72 ENTEROCOLITIS DUE TO CLOSTRIDIUM DIFFICILE, NOT SPECIFIED AS RECURRENT: ICD-10-CM

## 2021-09-23 DIAGNOSIS — N17.9 ACUTE KIDNEY FAILURE, UNSPECIFIED: ICD-10-CM

## 2021-09-23 DIAGNOSIS — I10 ESSENTIAL (PRIMARY) HYPERTENSION: ICD-10-CM

## 2021-09-23 DIAGNOSIS — C61 MALIGNANT NEOPLASM OF PROSTATE: ICD-10-CM

## 2021-09-23 LAB
ALBUMIN SERPL ELPH-MCNC: 3.2 G/DL — LOW (ref 3.3–5)
ALP SERPL-CCNC: 56 U/L — SIGNIFICANT CHANGE UP (ref 40–120)
ALT FLD-CCNC: 11 U/L — SIGNIFICANT CHANGE UP (ref 10–45)
ANION GAP SERPL CALC-SCNC: 14 MMOL/L — SIGNIFICANT CHANGE UP (ref 5–17)
APPEARANCE UR: CLEAR — SIGNIFICANT CHANGE UP
APTT BLD: 35.1 SEC — SIGNIFICANT CHANGE UP (ref 27.5–35.5)
AST SERPL-CCNC: 28 U/L — SIGNIFICANT CHANGE UP (ref 10–40)
BACTERIA # UR AUTO: PRESENT /HPF
BASOPHILS # BLD AUTO: 0.03 K/UL — SIGNIFICANT CHANGE UP (ref 0–0.2)
BASOPHILS NFR BLD AUTO: 0.2 % — SIGNIFICANT CHANGE UP (ref 0–2)
BILIRUB SERPL-MCNC: 0.2 MG/DL — SIGNIFICANT CHANGE UP (ref 0.2–1.2)
BILIRUB UR-MCNC: NEGATIVE — SIGNIFICANT CHANGE UP
BLD GP AB SCN SERPL QL: NEGATIVE — SIGNIFICANT CHANGE UP
BUN SERPL-MCNC: 11 MG/DL — SIGNIFICANT CHANGE UP (ref 7–23)
C DIFF GDH STL QL: SIGNIFICANT CHANGE UP
C DIFF GDH STL QL: SIGNIFICANT CHANGE UP
CALCIUM SERPL-MCNC: 8.3 MG/DL — LOW (ref 8.4–10.5)
CHLORIDE SERPL-SCNC: 106 MMOL/L — SIGNIFICANT CHANGE UP (ref 96–108)
CK MB CFR SERPL CALC: 1.8 NG/ML — SIGNIFICANT CHANGE UP (ref 0–6.7)
CK SERPL-CCNC: 37 U/L — SIGNIFICANT CHANGE UP (ref 30–200)
CO2 SERPL-SCNC: 16 MMOL/L — LOW (ref 22–31)
COLOR SPEC: YELLOW — SIGNIFICANT CHANGE UP
CREAT SERPL-MCNC: 1.56 MG/DL — HIGH (ref 0.5–1.3)
CULTURE RESULTS: SIGNIFICANT CHANGE UP
DIFF PNL FLD: NEGATIVE — SIGNIFICANT CHANGE UP
EOSINOPHIL # BLD AUTO: 0.24 K/UL — SIGNIFICANT CHANGE UP (ref 0–0.5)
EOSINOPHIL NFR BLD AUTO: 1.9 % — SIGNIFICANT CHANGE UP (ref 0–6)
EPI CELLS # UR: ABNORMAL /HPF (ref 0–5)
GLUCOSE SERPL-MCNC: 103 MG/DL — HIGH (ref 70–99)
GLUCOSE UR QL: NEGATIVE — SIGNIFICANT CHANGE UP
GRAN CASTS # UR COMP ASSIST: ABNORMAL /LPF
HCT VFR BLD CALC: 23.4 % — LOW (ref 39–50)
HGB BLD-MCNC: 7.7 G/DL — LOW (ref 13–17)
HYALINE CASTS # UR AUTO: ABNORMAL /LPF (ref 0–2)
IMM GRANULOCYTES NFR BLD AUTO: 1 % — SIGNIFICANT CHANGE UP (ref 0–1.5)
INR BLD: 1.75 — HIGH (ref 0.88–1.16)
KETONES UR-MCNC: NEGATIVE — SIGNIFICANT CHANGE UP
LACTATE SERPL-SCNC: 2.6 MMOL/L — HIGH (ref 0.5–2)
LEUKOCYTE ESTERASE UR-ACNC: NEGATIVE — SIGNIFICANT CHANGE UP
LIDOCAIN IGE QN: 8 U/L — SIGNIFICANT CHANGE UP (ref 7–60)
LYMPHOCYTES # BLD AUTO: 1.07 K/UL — SIGNIFICANT CHANGE UP (ref 1–3.3)
LYMPHOCYTES # BLD AUTO: 8.5 % — LOW (ref 13–44)
MCHC RBC-ENTMCNC: 28.4 PG — SIGNIFICANT CHANGE UP (ref 27–34)
MCHC RBC-ENTMCNC: 32.9 GM/DL — SIGNIFICANT CHANGE UP (ref 32–36)
MCV RBC AUTO: 86.3 FL — SIGNIFICANT CHANGE UP (ref 80–100)
MONOCYTES # BLD AUTO: 0.72 K/UL — SIGNIFICANT CHANGE UP (ref 0–0.9)
MONOCYTES NFR BLD AUTO: 5.7 % — SIGNIFICANT CHANGE UP (ref 2–14)
NEUTROPHILS # BLD AUTO: 10.38 K/UL — HIGH (ref 1.8–7.4)
NEUTROPHILS NFR BLD AUTO: 82.7 % — HIGH (ref 43–77)
NITRITE UR-MCNC: NEGATIVE — SIGNIFICANT CHANGE UP
NRBC # BLD: 0 /100 WBCS — SIGNIFICANT CHANGE UP (ref 0–0)
PH UR: 5.5 — SIGNIFICANT CHANGE UP (ref 5–8)
PLATELET # BLD AUTO: 400 K/UL — SIGNIFICANT CHANGE UP (ref 150–400)
POTASSIUM SERPL-MCNC: 3 MMOL/L — LOW (ref 3.5–5.3)
POTASSIUM SERPL-SCNC: 3 MMOL/L — LOW (ref 3.5–5.3)
PROT SERPL-MCNC: 6.2 G/DL — SIGNIFICANT CHANGE UP (ref 6–8.3)
PROT UR-MCNC: 30 MG/DL
PROTHROM AB SERPL-ACNC: 20.5 SEC — HIGH (ref 10.6–13.6)
RBC # BLD: 2.71 M/UL — LOW (ref 4.2–5.8)
RBC # FLD: 18.5 % — HIGH (ref 10.3–14.5)
RBC CASTS # UR COMP ASSIST: < 5 /HPF — SIGNIFICANT CHANGE UP
RH IG SCN BLD-IMP: POSITIVE — SIGNIFICANT CHANGE UP
SARS-COV-2 RNA SPEC QL NAA+PROBE: NEGATIVE — SIGNIFICANT CHANGE UP
SODIUM SERPL-SCNC: 136 MMOL/L — SIGNIFICANT CHANGE UP (ref 135–145)
SP GR SPEC: >=1.03 — SIGNIFICANT CHANGE UP (ref 1–1.03)
SPECIMEN SOURCE: SIGNIFICANT CHANGE UP
TROPONIN T SERPL-MCNC: 0.02 NG/ML — HIGH (ref 0–0.01)
TROPONIN T SERPL-MCNC: 0.03 NG/ML — HIGH (ref 0–0.01)
UROBILINOGEN FLD QL: 0.2 E.U./DL — SIGNIFICANT CHANGE UP
WBC # BLD: 12.57 K/UL — HIGH (ref 3.8–10.5)
WBC # FLD AUTO: 12.57 K/UL — HIGH (ref 3.8–10.5)
WBC UR QL: < 5 /HPF — SIGNIFICANT CHANGE UP

## 2021-09-23 PROCEDURE — 71045 X-RAY EXAM CHEST 1 VIEW: CPT | Mod: 26

## 2021-09-23 PROCEDURE — 99285 EMERGENCY DEPT VISIT HI MDM: CPT | Mod: 25

## 2021-09-23 PROCEDURE — 99222 1ST HOSP IP/OBS MODERATE 55: CPT | Mod: GC

## 2021-09-23 PROCEDURE — 93010 ELECTROCARDIOGRAM REPORT: CPT

## 2021-09-23 PROCEDURE — 99223 1ST HOSP IP/OBS HIGH 75: CPT

## 2021-09-23 RX ORDER — PREGABALIN 225 MG/1
1000 CAPSULE ORAL DAILY
Refills: 0 | Status: DISCONTINUED | OUTPATIENT
Start: 2021-09-23 | End: 2021-09-28

## 2021-09-23 RX ORDER — METOPROLOL TARTRATE 50 MG
50 TABLET ORAL EVERY 24 HOURS
Refills: 0 | Status: DISCONTINUED | OUTPATIENT
Start: 2021-09-23 | End: 2021-09-28

## 2021-09-23 RX ORDER — INFLUENZA VIRUS VACCINE 15; 15; 15; 15 UG/.5ML; UG/.5ML; UG/.5ML; UG/.5ML
0.7 SUSPENSION INTRAMUSCULAR ONCE
Refills: 0 | Status: DISCONTINUED | OUTPATIENT
Start: 2021-09-23 | End: 2021-09-28

## 2021-09-23 RX ORDER — SODIUM CHLORIDE 9 MG/ML
1000 INJECTION INTRAMUSCULAR; INTRAVENOUS; SUBCUTANEOUS ONCE
Refills: 0 | Status: COMPLETED | OUTPATIENT
Start: 2021-09-23 | End: 2021-09-23

## 2021-09-23 RX ORDER — POTASSIUM CHLORIDE 20 MEQ
10 PACKET (EA) ORAL DAILY
Refills: 0 | Status: DISCONTINUED | OUTPATIENT
Start: 2021-09-23 | End: 2021-09-28

## 2021-09-23 RX ORDER — POTASSIUM CHLORIDE 20 MEQ
40 PACKET (EA) ORAL ONCE
Refills: 0 | Status: COMPLETED | OUTPATIENT
Start: 2021-09-23 | End: 2021-09-23

## 2021-09-23 RX ORDER — ASPIRIN/CALCIUM CARB/MAGNESIUM 324 MG
81 TABLET ORAL DAILY
Refills: 0 | Status: DISCONTINUED | OUTPATIENT
Start: 2021-09-23 | End: 2021-09-28

## 2021-09-23 RX ORDER — INFLUENZA VIRUS VACCINE 15; 15; 15; 15 UG/.5ML; UG/.5ML; UG/.5ML; UG/.5ML
0.5 SUSPENSION INTRAMUSCULAR ONCE
Refills: 0 | Status: DISCONTINUED | OUTPATIENT
Start: 2021-09-23 | End: 2021-09-23

## 2021-09-23 RX ORDER — BRIMONIDINE TARTRATE 2 MG/MG
1 SOLUTION/ DROPS OPHTHALMIC THREE TIMES A DAY
Refills: 0 | Status: DISCONTINUED | OUTPATIENT
Start: 2021-09-23 | End: 2021-09-28

## 2021-09-23 RX ORDER — VANCOMYCIN HCL 1 G
125 VIAL (EA) INTRAVENOUS ONCE
Refills: 0 | Status: COMPLETED | OUTPATIENT
Start: 2021-09-23 | End: 2021-09-23

## 2021-09-23 RX ORDER — FERROUS SULFATE 325(65) MG
325 TABLET ORAL DAILY
Refills: 0 | Status: DISCONTINUED | OUTPATIENT
Start: 2021-09-23 | End: 2021-09-28

## 2021-09-23 RX ORDER — BRIMONIDINE TARTRATE 2 MG/MG
1 SOLUTION/ DROPS OPHTHALMIC AT BEDTIME
Refills: 0 | Status: DISCONTINUED | OUTPATIENT
Start: 2021-09-23 | End: 2021-09-23

## 2021-09-23 RX ORDER — ATORVASTATIN CALCIUM 80 MG/1
20 TABLET, FILM COATED ORAL AT BEDTIME
Refills: 0 | Status: DISCONTINUED | OUTPATIENT
Start: 2021-09-23 | End: 2021-09-28

## 2021-09-23 RX ORDER — FIDAXOMICIN 200 MG/5ML
200 GRANULE, FOR SUSPENSION ORAL
Refills: 0 | Status: DISCONTINUED | OUTPATIENT
Start: 2021-09-23 | End: 2021-09-28

## 2021-09-23 RX ORDER — PANTOPRAZOLE SODIUM 20 MG/1
40 TABLET, DELAYED RELEASE ORAL
Refills: 0 | Status: DISCONTINUED | OUTPATIENT
Start: 2021-09-23 | End: 2021-09-28

## 2021-09-23 RX ORDER — ISOSORBIDE MONONITRATE 60 MG/1
30 TABLET, EXTENDED RELEASE ORAL DAILY
Refills: 0 | Status: DISCONTINUED | OUTPATIENT
Start: 2021-09-23 | End: 2021-09-28

## 2021-09-23 RX ORDER — ALBUTEROL 90 UG/1
2 AEROSOL, METERED ORAL EVERY 6 HOURS
Refills: 0 | Status: DISCONTINUED | OUTPATIENT
Start: 2021-09-23 | End: 2021-09-28

## 2021-09-23 RX ORDER — PANTOPRAZOLE SODIUM 20 MG/1
40 TABLET, DELAYED RELEASE ORAL ONCE
Refills: 0 | Status: COMPLETED | OUTPATIENT
Start: 2021-09-23 | End: 2021-09-23

## 2021-09-23 RX ORDER — FINASTERIDE 5 MG/1
5 TABLET, FILM COATED ORAL DAILY
Refills: 0 | Status: DISCONTINUED | OUTPATIENT
Start: 2021-09-23 | End: 2021-09-28

## 2021-09-23 RX ORDER — LATANOPROST 0.05 MG/ML
1 SOLUTION/ DROPS OPHTHALMIC; TOPICAL
Qty: 0 | Refills: 0 | DISCHARGE

## 2021-09-23 RX ORDER — MAGNESIUM OXIDE 400 MG ORAL TABLET 241.3 MG
400 TABLET ORAL DAILY
Refills: 0 | Status: DISCONTINUED | OUTPATIENT
Start: 2021-09-23 | End: 2021-09-28

## 2021-09-23 RX ORDER — APIXABAN 2.5 MG/1
2.5 TABLET, FILM COATED ORAL EVERY 12 HOURS
Refills: 0 | Status: DISCONTINUED | OUTPATIENT
Start: 2021-09-23 | End: 2021-09-28

## 2021-09-23 RX ORDER — DORZOLAMIDE HYDROCHLORIDE 20 MG/ML
1 SOLUTION/ DROPS OPHTHALMIC THREE TIMES A DAY
Refills: 0 | Status: DISCONTINUED | OUTPATIENT
Start: 2021-09-23 | End: 2021-09-28

## 2021-09-23 RX ADMIN — SODIUM CHLORIDE 1000 MILLILITER(S): 9 INJECTION INTRAMUSCULAR; INTRAVENOUS; SUBCUTANEOUS at 09:36

## 2021-09-23 RX ADMIN — DORZOLAMIDE HYDROCHLORIDE 1 DROP(S): 20 SOLUTION/ DROPS OPHTHALMIC at 15:25

## 2021-09-23 RX ADMIN — BRIMONIDINE TARTRATE 1 DROP(S): 2 SOLUTION/ DROPS OPHTHALMIC at 14:38

## 2021-09-23 RX ADMIN — Medication 40 MILLIEQUIVALENT(S): at 10:34

## 2021-09-23 RX ADMIN — APIXABAN 2.5 MILLIGRAM(S): 2.5 TABLET, FILM COATED ORAL at 18:29

## 2021-09-23 RX ADMIN — ATORVASTATIN CALCIUM 20 MILLIGRAM(S): 80 TABLET, FILM COATED ORAL at 22:16

## 2021-09-23 RX ADMIN — PANTOPRAZOLE SODIUM 40 MILLIGRAM(S): 20 TABLET, DELAYED RELEASE ORAL at 15:26

## 2021-09-23 RX ADMIN — FIDAXOMICIN 200 MILLIGRAM(S): 200 GRANULE, FOR SUSPENSION ORAL at 19:30

## 2021-09-23 RX ADMIN — Medication 125 MILLIGRAM(S): at 10:25

## 2021-09-23 RX ADMIN — Medication 40 MILLIEQUIVALENT(S): at 15:25

## 2021-09-23 RX ADMIN — Medication 50 MILLIGRAM(S): at 18:29

## 2021-09-23 RX ADMIN — ISOSORBIDE MONONITRATE 30 MILLIGRAM(S): 60 TABLET, EXTENDED RELEASE ORAL at 18:29

## 2021-09-23 NOTE — CONSULT NOTE ADULT - ASSESSMENT
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    #Diarrhea w/ JERI  Patient with recent C diff colitis, would rule out recurrence or inadequate tx of first episode with stool studies.  Continue supportive management.    -Obtain stool studies, C diff, GI PCR, and O&P  -Continue supportive management including IVF as tolerated  -Correct electrolytes    Recommendations d/w primary team  [place] [facility]    Hank Fisher MD  Gastroenterology Fellow  c: [phone]

## 2021-09-23 NOTE — H&P ADULT - PROBLEM SELECTOR PLAN 3
He is anemic to 7.7, it was 8.6 on discharge 1M ago. There is no concern for acute bleeding. Anemia before was deeemed to be of chronic disease given prostate cancer and recently negative C-scope, EGD, and capsule endoscopy. Given recent stenting and some vague shortness of breath before will transfuse for Hb of > 8  - will give 1 unit of PRBC  - follow up CBC tomorrow

## 2021-09-23 NOTE — CHART NOTE - NSCHARTNOTEFT_GEN_A_CORE
Infectious Diseases Anti-infective Approval Note    Medication:  Fidaxomicin  Dose:  200 mg  Route:  po  Frequency:  q12h  Duration**: 10 d    Dose may be adjusted as needed for alterations in renal function.    *THIS IS NOT AN INFECTIOUS DISEASES CONSULTATION*    **Indicates duration of approval, not necessarily duration of treatment

## 2021-09-23 NOTE — ED PROVIDER NOTE - PHYSICAL EXAMINATION
CONSTITUTIONAL: Well-appearing;  in no apparent distress.   HEAD: Normocephalic; atraumatic.   EYES: PERRL; EOM intact; conjunctiva and sclera clear  ENT: normal nose; no rhinorrhea; normal pharynx with no erythema or lesions.   NECK: Supple; non-tender; no LAD  CARDIOVASCULAR: Normal S1, S2; No audible murmurs. Regular rate and rhythm.   RESPIRATORY: Breathing easily; breath sounds clear and equal bilaterally; no wheezes, rhonchi, or rales.  GI: Soft; non-distended; non-tender; no palpable organomegaly.   MSK: FROM at all extremities, normal tone   EXT: No cyanosis or edema; N/V intact  SKIN: Normal for age and race; warm; dry; good turgor; no apparent lesions or rash.   NEURO: A & O x 3; face symmetric; grossly unremarkable.   PSYCHOLOGICAL: The patient’s mood and manner are appropriate.

## 2021-09-23 NOTE — H&P ADULT - ASSESSMENT
80M PMH of recent C.Diff collitis, prostate ca (stopped chemo last year secondary to adverse effects), HTN, HLD, DM II, anemia of chronic disease/DOMITILA, PE/DVT (1/2020), Afib on Eliquis 2.5 mg BID, CAD (s/p IRASEMA x 1 3/30/21 on asa and Eliquis) presenting with watery diarrhea which is most likely 2/2 C.Diff recurrence     80M PMH of recent C.Diff collitis, prostate ca (stopped chemo last year secondary to adverse effects), HTN, HLD, DM II, anemia of chronic disease/DOMITILA, PE/DVT (1/2020), Afib on Eliquis 2.5 mg BID, CAD (s/p IRASEMA x 1 3/30/21 on asa and Eliquis) presenting with watery diarrhea which is most likely 2/2 C.Diff recurrence.

## 2021-09-23 NOTE — H&P ADULT - PROBLEM SELECTOR PLAN 1
Presents with 12-15 episodes of watery diarrhea i/s/o + C.Diff and mild increase in WBC. Otherwise no systemic symtpsoms  Patient was here in August with C.Diff, completed course of Vanc, but diarrhea never fully resolved. This is either 1st recurrence or failure of treatment, therefore will switch to Fidaxomycin 200mg BID, approved by Dr Mackenzie.  He is able to tolerate PO  - start Fidaxomycin 200mg BID x 10 days  - consider gentle hydration, but now will hold off as he will be given transfusion as below Presents with 12-15 episodes of watery diarrhea i/s/o + C. Diff and mild increase in WBC. Otherwise no systemic symptoms.  Patient was here in August with C.Diff, completed course of Vanc, but diarrhea never fully resolved. This is either 1st recurrence or failure of treatment, therefore will switch to Fidaxomycin 200mg BID, approved by Dr Mackenzie.  He is able to tolerate PO  - start Fidaxomycin 200mg BID x 10 days  - consider gentle hydration, but now will hold off as he will be given transfusion as below

## 2021-09-23 NOTE — ED ADULT TRIAGE NOTE - OTHER COMPLAINTS
patient reports recent dx of C-diff with 10-15 diarrhea episodes daily and SOB-- reports hx of anemia-- speaking in full, complete sentences

## 2021-09-23 NOTE — H&P ADULT - PROBLEM SELECTOR PLAN 6
He is not on any meds at home  - will do sliding scale insulin here  - A1C in am At home he is only on Metoprolol 50XL, which is mostly for his AF. On last admission BP was well controlled.  - will c/w Metoprolol 50 XL QD

## 2021-09-23 NOTE — ED ADULT NURSE NOTE - NSIMPLEMENTINTERV_GEN_ALL_ED
Implemented All Fall with Harm Risk Interventions:  [place] to call system. Call bell, personal items and telephone within reach. Instruct patient to call for assistance. Room bathroom lighting operational. Non-slip footwear when patient is off stretcher. Physically safe environment: no spills, clutter or unnecessary equipment. Stretcher in lowest position, wheels locked, appropriate side rails in place. Provide visual cue, wrist band, yellow gown, etc. Monitor gait and stability. Monitor for mental status changes and reorient to person, place, and time. Review medications for side effects contributing to fall risk. Reinforce activity limits and safety measures with patient and family. Provide visual clues: red socks.

## 2021-09-23 NOTE — ED ADULT NURSE NOTE - OBJECTIVE STATEMENT
Patient presents alert and oriented to person, place, time, and situation accompanied by daughter to ED c/o 7/10 generalized abdominal pain with 10-12 episodes of diarrhea a day and SOB on exertion for the past 3 days. Patient states that he was admitted here 7/30/21 and discharged with antibiotics to treat C. Diff however the diarrhea is back again. Patient states that diarrhea is clear with small black stools (which is normal for him). PMH Anemia, HLD, Cardiac Stents, and PE (on Eliquis). Allergies verified. Patient is vaccinated against COVID. Patient denies nausea, vomiting, urinary complaints, fever, blood in toilet or on toilet paper, and no pain on palpation of abdomen. Patient resting in bed in no acute distress.

## 2021-09-23 NOTE — H&P ADULT - PROBLEM SELECTOR PLAN 4
He underwent stening in March 2021. He is currently on ASA and Eliquis  - c/w ASA QD  - c/w Eliquis 2.5 BID He is now in Sinus Rhythm. He is on Apixaban and MTP XL 50mg QD at home  - c/w Apixaban 2.5 BID   - c/w MTP XL 50mg QD

## 2021-09-23 NOTE — ED PROVIDER NOTE - CLINICAL SUMMARY MEDICAL DECISION MAKING FREE TEXT BOX
79 yo M with pmh of prostate ca (recently stopped chemo secondary to adverse effects), HTN, HLD, DM II, anemia of chronic disease/DOMITILA, PE/DVT (1/2020), Afib on Eliquis 2.5 mg BID, CAD (s/p IRASEMA x 1 3/30/21 on asa and Eliquis, esophagitis/gastritis/ duodenitis, admission from 8/20-8/25 for cdiff s/p PO vanco c/o diarrhea since August. Pt states the diarrhea was somewhat better for a few days having about 4 episodes daily but this past week going about 10 times per day. Reports watery non-bloody stools. Also c/o sob over the past 3 days (similar to when he has been anemic in the past). Denies fever, chills, cp, n/v, abd pain. Pt finished a one week course of bactrim last week for a UTI. , normal BP. Non-toxic appearing. Abd soft, nt, nd. Lungs cta b/l. Labs, fluids, cxr. 79 yo M with pmh of prostate ca (recently stopped chemo secondary to adverse effects), HTN, HLD, DM II, anemia of chronic disease/DOMITILA, PE/DVT (1/2020), Afib on Eliquis 2.5 mg BID, CAD (s/p IRASEMA x 1 3/30/21 on asa and Eliquis, esophagitis/gastritis/ duodenitis, admission from 8/20-8/25 for cdiff s/p PO vanco c/o diarrhea since August. Pt states the diarrhea was somewhat better for a few days having about 4 episodes daily but this past week going about 10 times per day. Reports watery non-bloody stools. Also c/o sob over the past 3 days (similar to when he has been anemic in the past). Denies fever, chills, cp, n/v, abd pain. Pt finished a one week course of bactrim last week for a UTI. , normal BP. Non-toxic appearing. Abd soft, nt, nd. Lungs cta b/l. Labs, fluids, cxr. concern for recurrent cdiff

## 2021-09-23 NOTE — H&P ADULT - HISTORY OF PRESENT ILLNESS
80M PMH of prostate ca (stopped chemo last year secondary to adverse effects), HTN, HLD, DM II, anemia of chronic disease/DOMITILA, PE/DVT (1/2020), Afib on Eliquis 2.5 mg BID, CAD (s/p IRASEMA x 1 3/30/21 on asa and Eliquis) presents with nonbloody diarrhea. Of note, he was admitted here in late August for C.diff collitis and completed course of PO vancomycin. On discharge diarrhea was improving (2-3 episodes a day), however, over last week again with 10-12 episodes daily. It was not improving, therefore he decided to come to ED today. No fevers. Other than diarrhea he feels okay. He had some shortness of breath earlier today, but it completely resolved by now. No abdominal pain. No blood in diarrhea. No black stools.    Vitals: , Afebrile, /77, RR 18 O2 sat 98%    Labs notable for WBC of 12 with neutrophils of 10. Hb 7.7 (8.6 back in August). K+ of 3.0. Crea o 1.56 (0.91 in AUgust). Troponin 0.03.     Imaging: Chest xray - unremarkable    Course: Given 1L of IVF. Given 1 dose of PO vanc.       Physical exam:  Gen: pleasant elderly male in NAD, lying comfortably on a stretcher  Lungs: CTAB, no signs of increased WOB  Heart: somewhat irregular, HR in 90s, no rmg  Abd: soft, very midly tender throughout upon deep palpation  Ext: WWP, no peripheral edema, good distal pulses  Neuro: grossly no focal deficits, aao x 3     SH: Nonsmoker, no EtoH, no drugs,  lives at home and daughter helps with ADLs.   80M PMH of prostate ca (stopped chemo last year secondary to adverse effects), HTN, HLD, DM II, anemia of chronic disease/DOMITILA, PE/DVT (1/2020), Afib on Eliquis 2.5 mg BID, CAD (s/p IRASEMA x 1 3/30/21 on asa and Eliquis) presents with nonbloody diarrhea. Of note, he was admitted here in late August for C.diff collitis and completed course of PO vancomycin. On discharge diarrhea was improving (2-3 episodes a day), however, it never fully resolved and over last week he's been having again 10-12 episodes daily. It was not improving, therefore he decided to come to ED today. No fevers. Other than diarrhea he feels okay. He had some shortness of breath earlier today, but it completely resolved by now. No abdominal pain. No blood in diarrhea. No black stools.    Vitals: , Afebrile, /77, RR 18 O2 sat 98%    Labs notable for WBC of 12 with neutrophils of 10. Hb 7.7 (8.6 back in August). K+ of 3.0. Crea o 1.56 (0.91 in AUgust). Troponin 0.03.     Imaging: Chest xray - unremarkable    Course: Given 1L of IVF. Given 1 dose of PO vanc.       Physical exam:  Gen: pleasant elderly male in NAD, lying comfortably on a stretcher  Lungs: CTAB, no signs of increased WOB  Heart: somewhat irregular, HR in 90s, no rmg  Abd: soft, very midly tender throughout upon deep palpation  Ext: WWP, no peripheral edema, good distal pulses  Neuro: grossly no focal deficits, aao x 3     SH: Nonsmoker, no EtoH, no drugs,  lives at home and daughter helps with ADLs.   80M PMH of prostate ca (stopped chemo last year secondary to adverse effects), HTN, HLD, DM II, anemia of chronic disease/DOMITILA, PE/DVT (1/2020), Afib on Eliquis 2.5 mg BID, CAD (s/p IRASEMA x 1 3/30/21 on asa and Eliquis) presents with nonbloody diarrhea. Of note, he was admitted here in late August for C.diff collitis and completed course of PO vancomycin. On discharge diarrhea was improving (2-3 episodes a day), however, it never fully resolved and over last week he's been having again 10-12 episodes daily. It was not improving, therefore he decided to come to ED today. No fevers. Other than diarrhea he feels okay. He had some shortness of breath earlier today, but it completely resolved by now. No abdominal pain. No blood in diarrhea. No black stools.    Vitals: , Afebrile, /77, RR 18 O2 sat 98%    Labs notable for WBC of 12 with neutrophils of 10. Hb 7.7 (8.6 back in August). K+ of 3.0. Crea o 1.56 (0.91 in AUgust). Troponin 0.03.     Imaging: Chest xray - unremarkable. EKG - NSR with PACs,     Course: Given 1L of IVF. Given 1 dose of PO vanc.       Physical exam:  Gen: pleasant elderly male in NAD, lying comfortably on a stretcher  Lungs: CTAB, no signs of increased WOB  Heart: somewhat irregular, HR in 90s, no rmg  Abd: soft, very midly tender throughout upon deep palpation  Ext: WWP, no peripheral edema, good distal pulses  Neuro: grossly no focal deficits, aao x 3     SH: Nonsmoker, no EtoH, no drugs,  lives at home and daughter helps with ADLs.    Meds: as below, med rec done with patient's daughter who had a med list 80M PMH of prostate ca (stopped chemo last year secondary to adverse effects), HTN, HLD, DM II, anemia of chronic disease/DOMITILA, PE/DVT (1/2020), Afib on Eliquis 2.5 mg BID, CAD (s/p IRASEMA x 1 3/30/21 on asa and Eliquis) presents with nonbloody diarrhea. Of note, he was admitted here in late August with Diarrhea x 1 Month and was found to have C.diff collitis. He completed course of PO vancomycin x 10 days. On discharge (day 3-4 of Vanco) diarrhea was improving (2-3 episodes a day), however, it never fully resolved and over last week he's been having again 10-12 episodes daily. It was not improving, therefore he decided to come to ED today. No fevers. Other than diarrhea he feels okay. He had some shortness of breath earlier today, but it completely resolved by now. No abdominal pain. No blood in diarrhea. No black stools.    Vitals: , Afebrile, /77, RR 18 O2 sat 98%    Labs notable for WBC of 12 with neutrophils of 10. Hb 7.7 (8.6 back in August). K+ of 3.0. Crea o 1.56 (0.91 in AUgust). Troponin 0.03.     Imaging: Chest xray - unremarkable. EKG - NSR with PACs,     Course: Given 1L of IVF. Given 1 dose of PO vanc.       Physical exam:  Gen: pleasant elderly male in NAD, lying comfortably on a stretcher  Lungs: CTAB, no signs of increased WOB  Heart: somewhat irregular, HR in 90s, no rmg  Abd: soft, very midly tender throughout upon deep palpation  Ext: WWP, no peripheral edema, good distal pulses  Neuro: grossly no focal deficits, aao x 3     SH: Nonsmoker, no EtoH, no drugs,  lives at home and daughter helps with ADLs.    Meds: as below, med rec done with patient's daughter who had a med list

## 2021-09-23 NOTE — H&P ADULT - PROBLEM SELECTOR PLAN 2
He is anemic to 7.7, it was 8.6 on discharge 1M ago. There is no concern for acute bleeding. Anemia before was deeemed to be of chronic disease given prostate cancer and recently negative C-scope, EGD, and capsule endoscopy. Given recent stenting and some vague shortness of breath before will transfuse for Hb of > 8  - will give 1 unit of PRBC  - follow up CBC tomorrow Crea is 1.5, while baseline around 0.8. JERI is most likely prerenal given obvious history of fluid loss  - follow crea tomorrow after 1L of IVF and 1 U of PRBCs

## 2021-09-23 NOTE — CONSULT NOTE ADULT - SUBJECTIVE AND OBJECTIVE BOX
HPI:  80M w/ PMHx of HTN, HLD, DM2, prostate Ca (recently stopped chemo secondary to adverse effects), AOCD, DOMITILA, 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  Recent hospital admission from 8/20-8/25/21 for sepsis found to have C diff and UTI. Patient treated with ceftriaxone and vancomycin PO before discharge. He reports initial improvement in symptoms but diarrhea return after discharged with 3-4 loose BM per day. He was seen in the office with Dr. Leigh on 9/13/21 with diarrhea, but reported tolerating PO.  Symptoms continues to worsen after being diagnosed with UTI and started on bactrim.  Diarrhea with around 8 loose/watery BM per day.  He developed shortness of breath and inability to maintain hydration before coming to the ED.  He denies fever, chill, nausea, vomiting, hematochezia, melena.  He reports feeling better after one liter of fluid.      Allergies    ACE inhibitors (Angioedema)  x-geva (Unknown)    Intolerances      Home Medications:  albuterol 90 mcg/inh inhalation aerosol with adapter: 2 puff(s) inhaled 4 times a day, As Needed (23 Sep 2021 15:00)  apixaban 2.5 mg oral tablet: 1 tab(s) orally 2 times a day (23 Sep 2021 15:00)  brimonidine 0.2% ophthalmic solution: 1 drop(s) to each affected eye 2 times a day (23 Sep 2021 15:00)  Caltrate 600 + D oral tablet: 1 tab(s) orally once a day (23 Sep 2021 15:00)  dorzolamide 2% ophthalmic solution: 1 drop(s) to each affected eye 3 times a day (23 Sep 2021 15:00)  finasteride 5 mg oral tablet: 1 tab(s) orally once a day (23 Sep 2021 15:00)  Imdur 30 mg oral tablet, extended release: 1 tab(s) orally once a day (23 Sep 2021 15:00)  Lupron Depot: 11.5 milligram(s) intramuscular every 3 months (23 Sep 2021 15:00)  metoprolol succinate 50 mg oral tablet, extended release: 1 tab(s) orally once a day (23 Sep 2021 15:00)  pantoprazole 40 mg oral delayed release tablet: 1 tab(s) orally once a day (23 Sep 2021 15:00)  potassium chloride 10 mEq oral capsule, extended release: 1 cap(s) orally once a day (23 Sep 2021 15:00)  Vitamin B12 1000 mcg oral tablet: 1 tab(s) orally once a day (23 Sep 2021 15:00)    MEDICATIONS:  MEDICATIONS  (STANDING):  apixaban 2.5 milliGRAM(s) Oral every 12 hours  aspirin enteric coated 81 milliGRAM(s) Oral daily  atorvastatin 20 milliGRAM(s) Oral at bedtime  brimonidine 0.2% Ophthalmic Solution 1 Drop(s) Both EYES three times a day  calcium carbonate 1250 mG  + Vitamin D (OsCal 500 + D) 1 Tablet(s) Oral daily  cyanocobalamin 1000 MICROGram(s) Oral daily  dorzolamide 2% Ophthalmic Solution 1 Drop(s) Both EYES three times a day  fidaxomicin 200 milliGRAM(s) Oral two times a day  finasteride 5 milliGRAM(s) Oral daily  isosorbide   mononitrate ER Tablet (IMDUR) 30 milliGRAM(s) Oral daily  magnesium oxide 400 milliGRAM(s) Oral daily  metoprolol succinate ER 50 milliGRAM(s) Oral every 24 hours  pantoprazole    Tablet 40 milliGRAM(s) Oral before breakfast  potassium chloride    Tablet ER 10 milliEquivalent(s) Oral daily    MEDICATIONS  (PRN):  ALBUTerol    90 MICROgram(s) HFA Inhaler 2 Puff(s) Inhalation every 6 hours PRN Shortness of Breath and/or Wheezing    PAST MEDICAL & SURGICAL HISTORY:  Hyperlipidemia  Hyperlipidemia    Malignant neoplasm of prostate  s/p total prostatectomy in 1994 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    Glaucoma  Glaucoma    Type 2 diabetes mellitus  Diabetes    Essential hypertension  Hypertension    Chemotherapy session for neoplasm  Prostate Cancer    Pulmonary embolism  1/2020    DVT (deep venous thrombosis)  1/2020 on Eliquis    Atrial fibrillation  on Eliquis    Other postprocedural status  S/P prostatectomy      FAMILY HISTORY:  Family history of malignant neoplasm of prostate (Father, Sibling)  Father      SOCIAL HISTORY:  Tobacco:   Alcohol:  Illicit Drugs:    REVIEW OF SYSTEMS:  All other 10 review of systems is negative except as indicated in HPI    Vital Signs Last 24 Hrs  T(C): 36.7 (23 Sep 2021 11:31), Max: 36.8 (23 Sep 2021 08:26)  T(F): 98 (23 Sep 2021 11:31), Max: 98.2 (23 Sep 2021 08:26)  HR: 104 (23 Sep 2021 11:31) (104 - 108)  BP: 137/73 (23 Sep 2021 11:31) (137/73 - 164/77)  BP(mean): --  RR: 18 (23 Sep 2021 11:31) (18 - 18)  SpO2: 94% (23 Sep 2021 11:31) (94% - 98%)      PHYSICAL EXAM:    General: Elderly male laying in bed, in no acute distress  Eyes: moist conjunctivae, sclerae anicteric  HENT: Moist mucous membranes, tongue midline  Neck: Trachea midline, supple  Lungs: Normal respiratory effort and no intercostal retractions  Cardiovascular: RRR, S1S2  Abdomen: Soft, non-tender non-distended; No rebound or guarding  Neurological: Alert and oriented x3, nonfocal exam  Skin: Warm and dry.     LABS:                        7.7    12.57 )-----------( 400      ( 23 Sep 2021 09:40 )             23.4     09-23    136  |  106  |  11  ----------------------------<  103<H>  3.0<L>   |  16<L>  |  1.56<H>    Ca    8.3<L>      23 Sep 2021 09:40  Mg     1.6     09-23    TPro  6.2  /  Alb  3.2<L>  /  TBili  0.2  /  DBili  x   /  AST  28  /  ALT  11  /  AlkPhos  56  09-23    Culture Results:   GI PCR Results: NOT detected  *******Please Note:*******  GI panel PCR evaluates for:  Campylobacter, Plesiomonas shigelloides, Salmonella,  Vibrio, Yersinia enterocolitica, Enteroaggregative  Escherichia coli (EAEC), Enteropathogenic E.coli (EPEC),  Enterotoxigenic E. coli (ETEC) lt/st, Shiga-like  toxin-producing E. coli (STEC) stx1/stx2,  Shigella/ Enteroinvasive E. coli (EIEC), Cryptosporidium,  Cyclospora cayetanensis, Entamoeba histolytica,  Giardia lamblia, Adenovirus F 40/41, Astrovirus,  Norovirus GI/GII, Rotavirus A, Sapovirus (09-23 @ 11:57)    PT/INR - ( 23 Sep 2021 09:40 )   PT: 20.5 sec;   INR: 1.75       PTT - ( 23 Sep 2021 09:40 )  PTT:35.1 sec    GI PCR Panel, Stool (collected 23 Sep 2021 11:57)  Source: .Stool Feces  Final Report (23 Sep 2021 13:41):    GI PCR Results: NOT detected    *******Please Note:*******    GI panel PCR evaluates for:    Campylobacter, Plesiomonas shigelloides, Salmonella,    Vibrio, Yersinia enterocolitica, Enteroaggregative    Escherichia coli (EAEC), Enteropathogenic E.coli (EPEC),    Enterotoxigenic E. coli (ETEC) lt/st, Shiga-like    toxin-producing E. coli (STEC) stx1/stx2,    Shigella/ Enteroinvasive E. coli (EIEC), Cryptosporidium,    Cyclospora cayetanensis, Entamoeba histolytica,    Giardia lamblia, Adenovirus F 40/41, Astrovirus,    Norovirus GI/GII, Rotavirus A, Sapovirus    RADIOLOGY & ADDITIONAL STUDIES:   Reviewed

## 2021-09-23 NOTE — ED PROVIDER NOTE - OBJECTIVE STATEMENT
81 yo M with pmh of prostate ca (recently stopped chemo secondary to adverse effects), HTN, HLD, DM II, anemia of chronic disease/DOMITILA, PE/DVT (1/2020), Afib on Eliquis 2.5 mg BID, CAD (s/p IRASEMA x 1 3/30/21 on asa and Eliquis, esophagitis/gastritis/ duodenitis, admission from 8/20-8/25 for cdiff s/p PO vanco c/o diarrhea since August. Pt states the diarrhea was somewhat better for a few days having about 4 episodes daily but this past week going about 10 times per day. Reports watery non-bloody stools. Also c/o sob over the past 3 days (similar to when he has been anemic in the past). Denies fever, chills, cp, n/v, abd pain. Pt finished a one week course of bactrim last week for a UTI.

## 2021-09-23 NOTE — H&P ADULT - PROBLEM SELECTOR PLAN 5
At home he is only on Metoprolol 50XL, which is mostly for his AF. On last admission BP was well controlled.  - will c/w Metoprolol 50 XL QD He underwent stening in March 2021. He is currently on ASA and Eliquis. Troponin is 0.03, but there is no concern for ACS as no symptoms, non-ischemic EKG. It is most likely type II myocardial injury i/s/o known CAD, JERI, and infection  - c/w ASA QD  - c/w Eliquis 2.5 BID

## 2021-09-23 NOTE — H&P ADULT - NSHPLABSRESULTS_GEN_ALL_CORE
LABS:                         7.7    12.57 )-----------( 400      ( 23 Sep 2021 09:40 )             23.4     09-23    136  |  106  |  11  ----------------------------<  103<H>  3.0<L>   |  16<L>  |  1.56<H>    Ca    8.3<L>      23 Sep 2021 09:40  Mg     1.6     09-23    TPro  6.2  /  Alb  3.2<L>  /  TBili  0.2  /  DBili  x   /  AST  28  /  ALT  11  /  AlkPhos  56  09-23    PT/INR - ( 23 Sep 2021 09:40 )   PT: 20.5 sec;   INR: 1.75          PTT - ( 23 Sep 2021 09:40 )  PTT:35.1 sec  Urinalysis Basic - ( 23 Sep 2021 10:39 )    Color: Yellow / Appearance: Clear / SG: >=1.030 / pH: x  Gluc: x / Ketone: NEGATIVE  / Bili: Negative / Urobili: 0.2 E.U./dL   Blood: x / Protein: 30 mg/dL / Nitrite: NEGATIVE   Leuk Esterase: NEGATIVE / RBC: < 5 /HPF / WBC < 5 /HPF   Sq Epi: x / Non Sq Epi: 5-10 /HPF / Bacteria: Present /HPF      CARDIAC MARKERS ( 23 Sep 2021 09:40 )  x     / 0.03 ng/mL / 37 U/L / x     / 1.8 ng/mL        Lactate, Blood: 2.6 mmol/L (09-23 @ 11:20)      RADIOLOGY, EKG & ADDITIONAL TESTS:

## 2021-09-23 NOTE — ED PROVIDER NOTE - ATTENDING CONTRIBUTION TO CARE
80 M hx anemia HTN HLD DM, PE on Eliquis, CAD s/p stents recent CDiff in Aug tx with Vancomycin p/w ongoing diarrhea x 1 week, 10 times daily.  Was on bactrim last week for UTI.  No ab pain, n/v.  + sob and hx of anemia.  Denies blood loss in stool.  Belly soft nontender.  Plan labs, IVF, CXR, ekg and reassess.  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

## 2021-09-23 NOTE — H&P ADULT - PROBLEM SELECTOR PLAN 8
No metastatis. Underwent radiation in 2018. Then was getting chemo, but it was stopped 1 year ago due to severe side effects. He has a follow-up with [facility] soon to discuss further options. Currently is getting Lupron injections. Next dose is scheduled in 2 weeks

## 2021-09-24 LAB
ANION GAP SERPL CALC-SCNC: 11 MMOL/L — SIGNIFICANT CHANGE UP (ref 5–17)
BASOPHILS # BLD AUTO: 0.04 K/UL — SIGNIFICANT CHANGE UP (ref 0–0.2)
BASOPHILS NFR BLD AUTO: 0.3 % — SIGNIFICANT CHANGE UP (ref 0–2)
BUN SERPL-MCNC: 8 MG/DL — SIGNIFICANT CHANGE UP (ref 7–23)
CALCIUM SERPL-MCNC: 7.9 MG/DL — LOW (ref 8.4–10.5)
CHLORIDE SERPL-SCNC: 114 MMOL/L — HIGH (ref 96–108)
CO2 SERPL-SCNC: 16 MMOL/L — LOW (ref 22–31)
COVID-19 SPIKE DOMAIN AB INTERP: POSITIVE
COVID-19 SPIKE DOMAIN ANTIBODY RESULT: 41.9 U/ML — HIGH
CREAT SERPL-MCNC: 1.24 MG/DL — SIGNIFICANT CHANGE UP (ref 0.5–1.3)
CULTURE RESULTS: SIGNIFICANT CHANGE UP
EOSINOPHIL # BLD AUTO: 0.25 K/UL — SIGNIFICANT CHANGE UP (ref 0–0.5)
EOSINOPHIL NFR BLD AUTO: 1.7 % — SIGNIFICANT CHANGE UP (ref 0–6)
FERRITIN SERPL-MCNC: 504 NG/ML — HIGH (ref 30–400)
GLUCOSE SERPL-MCNC: 77 MG/DL — SIGNIFICANT CHANGE UP (ref 70–99)
HCT VFR BLD CALC: 25.9 % — LOW (ref 39–50)
HGB BLD-MCNC: 8.4 G/DL — LOW (ref 13–17)
IMM GRANULOCYTES NFR BLD AUTO: 1.3 % — SIGNIFICANT CHANGE UP (ref 0–1.5)
IRON SATN MFR SERPL: 20 UG/DL — LOW (ref 45–165)
IRON SATN MFR SERPL: 26 % — SIGNIFICANT CHANGE UP (ref 16–55)
LYMPHOCYTES # BLD AUTO: 1.32 K/UL — SIGNIFICANT CHANGE UP (ref 1–3.3)
LYMPHOCYTES # BLD AUTO: 9 % — LOW (ref 13–44)
MAGNESIUM SERPL-MCNC: 1.6 MG/DL — SIGNIFICANT CHANGE UP (ref 1.6–2.6)
MCHC RBC-ENTMCNC: 28 PG — SIGNIFICANT CHANGE UP (ref 27–34)
MCHC RBC-ENTMCNC: 32.4 GM/DL — SIGNIFICANT CHANGE UP (ref 32–36)
MCV RBC AUTO: 86.3 FL — SIGNIFICANT CHANGE UP (ref 80–100)
MONOCYTES # BLD AUTO: 0.95 K/UL — HIGH (ref 0–0.9)
MONOCYTES NFR BLD AUTO: 6.5 % — SIGNIFICANT CHANGE UP (ref 2–14)
NEUTROPHILS # BLD AUTO: 11.9 K/UL — HIGH (ref 1.8–7.4)
NEUTROPHILS NFR BLD AUTO: 81.2 % — HIGH (ref 43–77)
NRBC # BLD: 0 /100 WBCS — SIGNIFICANT CHANGE UP (ref 0–0)
PHOSPHATE SERPL-MCNC: 2.5 MG/DL — SIGNIFICANT CHANGE UP (ref 2.5–4.5)
PLATELET # BLD AUTO: 336 K/UL — SIGNIFICANT CHANGE UP (ref 150–400)
POTASSIUM SERPL-MCNC: 4.3 MMOL/L — SIGNIFICANT CHANGE UP (ref 3.5–5.3)
POTASSIUM SERPL-SCNC: 4.3 MMOL/L — SIGNIFICANT CHANGE UP (ref 3.5–5.3)
RBC # BLD: 3 M/UL — LOW (ref 4.2–5.8)
RBC # FLD: 17.7 % — HIGH (ref 10.3–14.5)
SARS-COV-2 IGG+IGM SERPL QL IA: 41.9 U/ML — HIGH
SARS-COV-2 IGG+IGM SERPL QL IA: POSITIVE
SODIUM SERPL-SCNC: 141 MMOL/L — SIGNIFICANT CHANGE UP (ref 135–145)
SPECIMEN SOURCE: SIGNIFICANT CHANGE UP
TIBC SERPL-MCNC: 77 UG/DL — LOW (ref 220–430)
UIBC SERPL-MCNC: 57 UG/DL — LOW (ref 110–370)
WBC # BLD: 14.65 K/UL — HIGH (ref 3.8–10.5)
WBC # FLD AUTO: 14.65 K/UL — HIGH (ref 3.8–10.5)

## 2021-09-24 PROCEDURE — 99232 SBSQ HOSP IP/OBS MODERATE 35: CPT | Mod: GC

## 2021-09-24 RX ORDER — SODIUM CHLORIDE 9 MG/ML
1000 INJECTION INTRAMUSCULAR; INTRAVENOUS; SUBCUTANEOUS
Refills: 0 | Status: DISCONTINUED | OUTPATIENT
Start: 2021-09-24 | End: 2021-09-24

## 2021-09-24 RX ADMIN — BRIMONIDINE TARTRATE 1 DROP(S): 2 SOLUTION/ DROPS OPHTHALMIC at 22:25

## 2021-09-24 RX ADMIN — APIXABAN 2.5 MILLIGRAM(S): 2.5 TABLET, FILM COATED ORAL at 18:21

## 2021-09-24 RX ADMIN — DORZOLAMIDE HYDROCHLORIDE 1 DROP(S): 20 SOLUTION/ DROPS OPHTHALMIC at 22:15

## 2021-09-24 RX ADMIN — FIDAXOMICIN 200 MILLIGRAM(S): 200 GRANULE, FOR SUSPENSION ORAL at 09:39

## 2021-09-24 RX ADMIN — Medication 1 TABLET(S): at 11:48

## 2021-09-24 RX ADMIN — BRIMONIDINE TARTRATE 1 DROP(S): 2 SOLUTION/ DROPS OPHTHALMIC at 06:59

## 2021-09-24 RX ADMIN — ATORVASTATIN CALCIUM 20 MILLIGRAM(S): 80 TABLET, FILM COATED ORAL at 22:15

## 2021-09-24 RX ADMIN — MAGNESIUM OXIDE 400 MG ORAL TABLET 400 MILLIGRAM(S): 241.3 TABLET ORAL at 11:49

## 2021-09-24 RX ADMIN — FIDAXOMICIN 200 MILLIGRAM(S): 200 GRANULE, FOR SUSPENSION ORAL at 18:00

## 2021-09-24 RX ADMIN — BRIMONIDINE TARTRATE 1 DROP(S): 2 SOLUTION/ DROPS OPHTHALMIC at 14:23

## 2021-09-24 RX ADMIN — DORZOLAMIDE HYDROCHLORIDE 1 DROP(S): 20 SOLUTION/ DROPS OPHTHALMIC at 14:24

## 2021-09-24 RX ADMIN — PANTOPRAZOLE SODIUM 40 MILLIGRAM(S): 20 TABLET, DELAYED RELEASE ORAL at 06:58

## 2021-09-24 RX ADMIN — FINASTERIDE 5 MILLIGRAM(S): 5 TABLET, FILM COATED ORAL at 11:49

## 2021-09-24 RX ADMIN — Medication 50 MILLIGRAM(S): at 18:21

## 2021-09-24 RX ADMIN — ISOSORBIDE MONONITRATE 30 MILLIGRAM(S): 60 TABLET, EXTENDED RELEASE ORAL at 11:57

## 2021-09-24 RX ADMIN — Medication 10 MILLIEQUIVALENT(S): at 11:48

## 2021-09-24 RX ADMIN — DORZOLAMIDE HYDROCHLORIDE 1 DROP(S): 20 SOLUTION/ DROPS OPHTHALMIC at 06:59

## 2021-09-24 RX ADMIN — Medication 81 MILLIGRAM(S): at 11:49

## 2021-09-24 RX ADMIN — SODIUM CHLORIDE 80 MILLILITER(S): 9 INJECTION INTRAMUSCULAR; INTRAVENOUS; SUBCUTANEOUS at 11:50

## 2021-09-24 RX ADMIN — APIXABAN 2.5 MILLIGRAM(S): 2.5 TABLET, FILM COATED ORAL at 06:58

## 2021-09-24 RX ADMIN — PREGABALIN 1000 MICROGRAM(S): 225 CAPSULE ORAL at 11:49

## 2021-09-24 RX ADMIN — Medication 325 MILLIGRAM(S): at 11:50

## 2021-09-24 NOTE — PROGRESS NOTE ADULT - ASSESSMENT
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    #Suspected C diff Colitis  C diff GDH +, would obtain C diff PCR.     -Obtain stool studies, C diff PCR, GI PCR, and O&P  -Correct electrolytes  -Continue supportive management including IVF as tolerated  -Monitor BM    Recommendations d/w primary team  Case d/w svc attg

## 2021-09-24 NOTE — PROGRESS NOTE ADULT - ASSESSMENT
80M PMH of recent C.Diff collitis, prostate ca (stopped chemo last year secondary to adverse effects), HTN, HLD, DM II, anemia of chronic disease/DOMITILA, PE/DVT (1/2020), Afib on Eliquis 2.5 mg BID, CAD (s/p IRASEMA x 1 3/30/21 on asa and Eliquis) presenting with watery diarrhea which is most likely 2/2 C.Diff recurrence.

## 2021-09-24 NOTE — PROGRESS NOTE ADULT - PROBLEM SELECTOR PLAN 7
At home he is only on Metoprolol 50XL, which is mostly for his AF. On last admission BP was well controlled.  - will c/w Metoprolol 50 XL QD

## 2021-09-24 NOTE — PROGRESS NOTE ADULT - PROBLEM SELECTOR PLAN 5
He is now in Sinus Rhythm. He is on Apixaban and MTP XL 50mg QD at home  - c/w Apixaban 2.5 BID   - c/w MTP XL 50mg QD

## 2021-09-25 LAB
ALBUMIN SERPL ELPH-MCNC: 2.9 G/DL — LOW (ref 3.3–5)
ALP SERPL-CCNC: 64 U/L — SIGNIFICANT CHANGE UP (ref 40–120)
ALT FLD-CCNC: 11 U/L — SIGNIFICANT CHANGE UP (ref 10–45)
ANION GAP SERPL CALC-SCNC: 9 MMOL/L — SIGNIFICANT CHANGE UP (ref 5–17)
AST SERPL-CCNC: 23 U/L — SIGNIFICANT CHANGE UP (ref 10–40)
BASOPHILS # BLD AUTO: 0.04 K/UL — SIGNIFICANT CHANGE UP (ref 0–0.2)
BASOPHILS NFR BLD AUTO: 0.3 % — SIGNIFICANT CHANGE UP (ref 0–2)
BILIRUB SERPL-MCNC: 0.2 MG/DL — SIGNIFICANT CHANGE UP (ref 0.2–1.2)
BUN SERPL-MCNC: 7 MG/DL — SIGNIFICANT CHANGE UP (ref 7–23)
CALCIUM SERPL-MCNC: 8.4 MG/DL — SIGNIFICANT CHANGE UP (ref 8.4–10.5)
CHLORIDE SERPL-SCNC: 113 MMOL/L — HIGH (ref 96–108)
CO2 SERPL-SCNC: 16 MMOL/L — LOW (ref 22–31)
CREAT SERPL-MCNC: 1.13 MG/DL — SIGNIFICANT CHANGE UP (ref 0.5–1.3)
EOSINOPHIL # BLD AUTO: 0.34 K/UL — SIGNIFICANT CHANGE UP (ref 0–0.5)
EOSINOPHIL NFR BLD AUTO: 2.2 % — SIGNIFICANT CHANGE UP (ref 0–6)
GLUCOSE SERPL-MCNC: 108 MG/DL — HIGH (ref 70–99)
HCT VFR BLD CALC: 28.2 % — LOW (ref 39–50)
HGB BLD-MCNC: 9 G/DL — LOW (ref 13–17)
IMM GRANULOCYTES NFR BLD AUTO: 1 % — SIGNIFICANT CHANGE UP (ref 0–1.5)
LYMPHOCYTES # BLD AUTO: 1.24 K/UL — SIGNIFICANT CHANGE UP (ref 1–3.3)
LYMPHOCYTES # BLD AUTO: 8 % — LOW (ref 13–44)
MAGNESIUM SERPL-MCNC: 1.5 MG/DL — LOW (ref 1.6–2.6)
MCHC RBC-ENTMCNC: 28 PG — SIGNIFICANT CHANGE UP (ref 27–34)
MCHC RBC-ENTMCNC: 31.9 GM/DL — LOW (ref 32–36)
MCV RBC AUTO: 87.9 FL — SIGNIFICANT CHANGE UP (ref 80–100)
MONOCYTES # BLD AUTO: 0.95 K/UL — HIGH (ref 0–0.9)
MONOCYTES NFR BLD AUTO: 6.1 % — SIGNIFICANT CHANGE UP (ref 2–14)
NEUTROPHILS # BLD AUTO: 12.8 K/UL — HIGH (ref 1.8–7.4)
NEUTROPHILS NFR BLD AUTO: 82.4 % — HIGH (ref 43–77)
NRBC # BLD: 0 /100 WBCS — SIGNIFICANT CHANGE UP (ref 0–0)
PHOSPHATE SERPL-MCNC: 2.2 MG/DL — LOW (ref 2.5–4.5)
PLATELET # BLD AUTO: 352 K/UL — SIGNIFICANT CHANGE UP (ref 150–400)
POTASSIUM SERPL-MCNC: 4.3 MMOL/L — SIGNIFICANT CHANGE UP (ref 3.5–5.3)
POTASSIUM SERPL-SCNC: 4.3 MMOL/L — SIGNIFICANT CHANGE UP (ref 3.5–5.3)
PROT SERPL-MCNC: 5.5 G/DL — LOW (ref 6–8.3)
RBC # BLD: 3.21 M/UL — LOW (ref 4.2–5.8)
RBC # FLD: 18.2 % — HIGH (ref 10.3–14.5)
SODIUM SERPL-SCNC: 138 MMOL/L — SIGNIFICANT CHANGE UP (ref 135–145)
WBC # BLD: 15.52 K/UL — HIGH (ref 3.8–10.5)
WBC # FLD AUTO: 15.52 K/UL — HIGH (ref 3.8–10.5)

## 2021-09-25 PROCEDURE — 99232 SBSQ HOSP IP/OBS MODERATE 35: CPT

## 2021-09-25 RX ADMIN — MAGNESIUM OXIDE 400 MG ORAL TABLET 400 MILLIGRAM(S): 241.3 TABLET ORAL at 12:13

## 2021-09-25 RX ADMIN — Medication 325 MILLIGRAM(S): at 12:13

## 2021-09-25 RX ADMIN — BRIMONIDINE TARTRATE 1 DROP(S): 2 SOLUTION/ DROPS OPHTHALMIC at 06:56

## 2021-09-25 RX ADMIN — FINASTERIDE 5 MILLIGRAM(S): 5 TABLET, FILM COATED ORAL at 12:13

## 2021-09-25 RX ADMIN — Medication 10 MILLIEQUIVALENT(S): at 12:12

## 2021-09-25 RX ADMIN — DORZOLAMIDE HYDROCHLORIDE 1 DROP(S): 20 SOLUTION/ DROPS OPHTHALMIC at 21:13

## 2021-09-25 RX ADMIN — FIDAXOMICIN 200 MILLIGRAM(S): 200 GRANULE, FOR SUSPENSION ORAL at 06:56

## 2021-09-25 RX ADMIN — DORZOLAMIDE HYDROCHLORIDE 1 DROP(S): 20 SOLUTION/ DROPS OPHTHALMIC at 06:56

## 2021-09-25 RX ADMIN — BRIMONIDINE TARTRATE 1 DROP(S): 2 SOLUTION/ DROPS OPHTHALMIC at 13:30

## 2021-09-25 RX ADMIN — ATORVASTATIN CALCIUM 20 MILLIGRAM(S): 80 TABLET, FILM COATED ORAL at 21:12

## 2021-09-25 RX ADMIN — APIXABAN 2.5 MILLIGRAM(S): 2.5 TABLET, FILM COATED ORAL at 06:56

## 2021-09-25 RX ADMIN — PANTOPRAZOLE SODIUM 40 MILLIGRAM(S): 20 TABLET, DELAYED RELEASE ORAL at 06:56

## 2021-09-25 RX ADMIN — APIXABAN 2.5 MILLIGRAM(S): 2.5 TABLET, FILM COATED ORAL at 17:01

## 2021-09-25 RX ADMIN — Medication 50 MILLIGRAM(S): at 17:01

## 2021-09-25 RX ADMIN — PREGABALIN 1000 MICROGRAM(S): 225 CAPSULE ORAL at 12:12

## 2021-09-25 RX ADMIN — DORZOLAMIDE HYDROCHLORIDE 1 DROP(S): 20 SOLUTION/ DROPS OPHTHALMIC at 13:30

## 2021-09-25 RX ADMIN — BRIMONIDINE TARTRATE 1 DROP(S): 2 SOLUTION/ DROPS OPHTHALMIC at 21:12

## 2021-09-25 RX ADMIN — ISOSORBIDE MONONITRATE 30 MILLIGRAM(S): 60 TABLET, EXTENDED RELEASE ORAL at 12:12

## 2021-09-25 RX ADMIN — Medication 1 TABLET(S): at 13:30

## 2021-09-25 RX ADMIN — Medication 81 MILLIGRAM(S): at 12:12

## 2021-09-25 RX ADMIN — FIDAXOMICIN 200 MILLIGRAM(S): 200 GRANULE, FOR SUSPENSION ORAL at 17:07

## 2021-09-25 NOTE — PROGRESS NOTE ADULT - ASSESSMENT
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    #C diff Colitis  C diff GDH Antigen + and toxin +. GIP PCR negative.  -Obtain C diff PCR and O&P  -Correct electrolytes  -Continue supportive management including IVF as tolerated  -Monitor BM and abdominal exam  -Cont fidaxomicin    Case d/w svc attg

## 2021-09-26 LAB
ANION GAP SERPL CALC-SCNC: 6 MMOL/L — SIGNIFICANT CHANGE UP (ref 5–17)
BUN SERPL-MCNC: 6 MG/DL — LOW (ref 7–23)
CALCIUM SERPL-MCNC: 8.2 MG/DL — LOW (ref 8.4–10.5)
CHLORIDE SERPL-SCNC: 113 MMOL/L — HIGH (ref 96–108)
CO2 SERPL-SCNC: 14 MMOL/L — LOW (ref 22–31)
CREAT SERPL-MCNC: 0.99 MG/DL — SIGNIFICANT CHANGE UP (ref 0.5–1.3)
GLUCOSE SERPL-MCNC: 91 MG/DL — SIGNIFICANT CHANGE UP (ref 70–99)
HCT VFR BLD CALC: 28.5 % — LOW (ref 39–50)
HGB BLD-MCNC: 9 G/DL — LOW (ref 13–17)
MAGNESIUM SERPL-MCNC: 1.4 MG/DL — LOW (ref 1.6–2.6)
MCHC RBC-ENTMCNC: 28.6 PG — SIGNIFICANT CHANGE UP (ref 27–34)
MCHC RBC-ENTMCNC: 31.6 GM/DL — LOW (ref 32–36)
MCV RBC AUTO: 90.5 FL — SIGNIFICANT CHANGE UP (ref 80–100)
NRBC # BLD: 0 /100 WBCS — SIGNIFICANT CHANGE UP (ref 0–0)
PHOSPHATE SERPL-MCNC: 2.6 MG/DL — SIGNIFICANT CHANGE UP (ref 2.5–4.5)
PLATELET # BLD AUTO: 321 K/UL — SIGNIFICANT CHANGE UP (ref 150–400)
POTASSIUM SERPL-MCNC: 4.6 MMOL/L — SIGNIFICANT CHANGE UP (ref 3.5–5.3)
POTASSIUM SERPL-SCNC: 4.6 MMOL/L — SIGNIFICANT CHANGE UP (ref 3.5–5.3)
RBC # BLD: 3.15 M/UL — LOW (ref 4.2–5.8)
RBC # FLD: 18.5 % — HIGH (ref 10.3–14.5)
SODIUM SERPL-SCNC: 133 MMOL/L — LOW (ref 135–145)
WBC # BLD: 9.16 K/UL — SIGNIFICANT CHANGE UP (ref 3.8–10.5)
WBC # FLD AUTO: 9.16 K/UL — SIGNIFICANT CHANGE UP (ref 3.8–10.5)

## 2021-09-26 PROCEDURE — 99232 SBSQ HOSP IP/OBS MODERATE 35: CPT

## 2021-09-26 RX ORDER — MAGNESIUM SULFATE 500 MG/ML
4 VIAL (ML) INJECTION ONCE
Refills: 0 | Status: COMPLETED | OUTPATIENT
Start: 2021-09-26 | End: 2021-09-26

## 2021-09-26 RX ADMIN — FINASTERIDE 5 MILLIGRAM(S): 5 TABLET, FILM COATED ORAL at 12:00

## 2021-09-26 RX ADMIN — BRIMONIDINE TARTRATE 1 DROP(S): 2 SOLUTION/ DROPS OPHTHALMIC at 14:00

## 2021-09-26 RX ADMIN — Medication 325 MILLIGRAM(S): at 12:15

## 2021-09-26 RX ADMIN — MAGNESIUM OXIDE 400 MG ORAL TABLET 400 MILLIGRAM(S): 241.3 TABLET ORAL at 12:00

## 2021-09-26 RX ADMIN — APIXABAN 2.5 MILLIGRAM(S): 2.5 TABLET, FILM COATED ORAL at 18:00

## 2021-09-26 RX ADMIN — DORZOLAMIDE HYDROCHLORIDE 1 DROP(S): 20 SOLUTION/ DROPS OPHTHALMIC at 21:13

## 2021-09-26 RX ADMIN — PANTOPRAZOLE SODIUM 40 MILLIGRAM(S): 20 TABLET, DELAYED RELEASE ORAL at 06:34

## 2021-09-26 RX ADMIN — FIDAXOMICIN 200 MILLIGRAM(S): 200 GRANULE, FOR SUSPENSION ORAL at 18:00

## 2021-09-26 RX ADMIN — ISOSORBIDE MONONITRATE 30 MILLIGRAM(S): 60 TABLET, EXTENDED RELEASE ORAL at 12:00

## 2021-09-26 RX ADMIN — Medication 10 MILLIEQUIVALENT(S): at 12:00

## 2021-09-26 RX ADMIN — ATORVASTATIN CALCIUM 20 MILLIGRAM(S): 80 TABLET, FILM COATED ORAL at 21:13

## 2021-09-26 RX ADMIN — APIXABAN 2.5 MILLIGRAM(S): 2.5 TABLET, FILM COATED ORAL at 06:34

## 2021-09-26 RX ADMIN — DORZOLAMIDE HYDROCHLORIDE 1 DROP(S): 20 SOLUTION/ DROPS OPHTHALMIC at 14:00

## 2021-09-26 RX ADMIN — Medication 81 MILLIGRAM(S): at 12:00

## 2021-09-26 RX ADMIN — BRIMONIDINE TARTRATE 1 DROP(S): 2 SOLUTION/ DROPS OPHTHALMIC at 06:35

## 2021-09-26 RX ADMIN — FIDAXOMICIN 200 MILLIGRAM(S): 200 GRANULE, FOR SUSPENSION ORAL at 06:37

## 2021-09-26 RX ADMIN — Medication 1 TABLET(S): at 12:00

## 2021-09-26 RX ADMIN — Medication 50 MILLIGRAM(S): at 18:00

## 2021-09-26 RX ADMIN — DORZOLAMIDE HYDROCHLORIDE 1 DROP(S): 20 SOLUTION/ DROPS OPHTHALMIC at 06:35

## 2021-09-26 RX ADMIN — BRIMONIDINE TARTRATE 1 DROP(S): 2 SOLUTION/ DROPS OPHTHALMIC at 21:13

## 2021-09-26 RX ADMIN — PREGABALIN 1000 MICROGRAM(S): 225 CAPSULE ORAL at 12:00

## 2021-09-26 RX ADMIN — Medication 100 GRAM(S): at 10:30

## 2021-09-26 NOTE — PHYSICAL THERAPY INITIAL EVALUATION ADULT - GAIT DEVIATIONS NOTED, PT EVAL
no LOB observed, decreased toe clearance b/l, decreased hip/knee flexion in swing phase, narrow step width/decreased layo/decreased weight-shifting ability

## 2021-09-26 NOTE — PHYSICAL THERAPY INITIAL EVALUATION ADULT - ADDITIONAL COMMENTS
Pt lives alone, uses SC for outdoor ambulation, no AD for indoor ambulation, and has stairs to access home (~12).

## 2021-09-26 NOTE — PROGRESS NOTE ADULT - ASSESSMENT
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    #C diff Colitis  C diff GDH Antigen + and toxin +. GIP PCR negative.  -Obtain C diff PCR and O&P  -Correct electrolytes  -Continue supportive management including IVF as tolerated  -Monitor BM and abdominal exam  -Cont fidaxomicin    Case d/w svc attg and primary team

## 2021-09-27 ENCOUNTER — TRANSCRIPTION ENCOUNTER (OUTPATIENT)
Age: 81
End: 2021-09-27

## 2021-09-27 LAB
ANION GAP SERPL CALC-SCNC: 7 MMOL/L — SIGNIFICANT CHANGE UP (ref 5–17)
BUN SERPL-MCNC: 7 MG/DL — SIGNIFICANT CHANGE UP (ref 7–23)
CALCIUM SERPL-MCNC: 8.5 MG/DL — SIGNIFICANT CHANGE UP (ref 8.4–10.5)
CHLORIDE SERPL-SCNC: 113 MMOL/L — HIGH (ref 96–108)
CO2 SERPL-SCNC: 18 MMOL/L — LOW (ref 22–31)
CREAT SERPL-MCNC: 1.01 MG/DL — SIGNIFICANT CHANGE UP (ref 0.5–1.3)
GLUCOSE SERPL-MCNC: 97 MG/DL — SIGNIFICANT CHANGE UP (ref 70–99)
HCT VFR BLD CALC: 27.6 % — LOW (ref 39–50)
HGB BLD-MCNC: 8.7 G/DL — LOW (ref 13–17)
MAGNESIUM SERPL-MCNC: 2.3 MG/DL — SIGNIFICANT CHANGE UP (ref 1.6–2.6)
MCHC RBC-ENTMCNC: 27.7 PG — SIGNIFICANT CHANGE UP (ref 27–34)
MCHC RBC-ENTMCNC: 31.5 GM/DL — LOW (ref 32–36)
MCV RBC AUTO: 87.9 FL — SIGNIFICANT CHANGE UP (ref 80–100)
NRBC # BLD: 0 /100 WBCS — SIGNIFICANT CHANGE UP (ref 0–0)
PHOSPHATE SERPL-MCNC: 2.7 MG/DL — SIGNIFICANT CHANGE UP (ref 2.5–4.5)
PLATELET # BLD AUTO: 352 K/UL — SIGNIFICANT CHANGE UP (ref 150–400)
POTASSIUM SERPL-MCNC: 5.2 MMOL/L — SIGNIFICANT CHANGE UP (ref 3.5–5.3)
POTASSIUM SERPL-SCNC: 5.2 MMOL/L — SIGNIFICANT CHANGE UP (ref 3.5–5.3)
RBC # BLD: 3.14 M/UL — LOW (ref 4.2–5.8)
RBC # FLD: 18.6 % — HIGH (ref 10.3–14.5)
SODIUM SERPL-SCNC: 138 MMOL/L — SIGNIFICANT CHANGE UP (ref 135–145)
WBC # BLD: 7.6 K/UL — SIGNIFICANT CHANGE UP (ref 3.8–10.5)
WBC # FLD AUTO: 7.6 K/UL — SIGNIFICANT CHANGE UP (ref 3.8–10.5)

## 2021-09-27 PROCEDURE — 99232 SBSQ HOSP IP/OBS MODERATE 35: CPT | Mod: GC

## 2021-09-27 PROCEDURE — 99232 SBSQ HOSP IP/OBS MODERATE 35: CPT

## 2021-09-27 RX ADMIN — PREGABALIN 1000 MICROGRAM(S): 225 CAPSULE ORAL at 12:31

## 2021-09-27 RX ADMIN — Medication 81 MILLIGRAM(S): at 12:31

## 2021-09-27 RX ADMIN — APIXABAN 2.5 MILLIGRAM(S): 2.5 TABLET, FILM COATED ORAL at 05:58

## 2021-09-27 RX ADMIN — BRIMONIDINE TARTRATE 1 DROP(S): 2 SOLUTION/ DROPS OPHTHALMIC at 13:59

## 2021-09-27 RX ADMIN — Medication 50 MILLIGRAM(S): at 17:40

## 2021-09-27 RX ADMIN — APIXABAN 2.5 MILLIGRAM(S): 2.5 TABLET, FILM COATED ORAL at 17:40

## 2021-09-27 RX ADMIN — Medication 1 TABLET(S): at 12:31

## 2021-09-27 RX ADMIN — BRIMONIDINE TARTRATE 1 DROP(S): 2 SOLUTION/ DROPS OPHTHALMIC at 06:25

## 2021-09-27 RX ADMIN — DORZOLAMIDE HYDROCHLORIDE 1 DROP(S): 20 SOLUTION/ DROPS OPHTHALMIC at 06:25

## 2021-09-27 RX ADMIN — DORZOLAMIDE HYDROCHLORIDE 1 DROP(S): 20 SOLUTION/ DROPS OPHTHALMIC at 22:26

## 2021-09-27 RX ADMIN — DORZOLAMIDE HYDROCHLORIDE 1 DROP(S): 20 SOLUTION/ DROPS OPHTHALMIC at 13:59

## 2021-09-27 RX ADMIN — FIDAXOMICIN 200 MILLIGRAM(S): 200 GRANULE, FOR SUSPENSION ORAL at 17:40

## 2021-09-27 RX ADMIN — MAGNESIUM OXIDE 400 MG ORAL TABLET 400 MILLIGRAM(S): 241.3 TABLET ORAL at 12:32

## 2021-09-27 RX ADMIN — FIDAXOMICIN 200 MILLIGRAM(S): 200 GRANULE, FOR SUSPENSION ORAL at 05:58

## 2021-09-27 RX ADMIN — BRIMONIDINE TARTRATE 1 DROP(S): 2 SOLUTION/ DROPS OPHTHALMIC at 22:27

## 2021-09-27 RX ADMIN — Medication 10 MILLIEQUIVALENT(S): at 12:32

## 2021-09-27 RX ADMIN — FINASTERIDE 5 MILLIGRAM(S): 5 TABLET, FILM COATED ORAL at 12:31

## 2021-09-27 RX ADMIN — PANTOPRAZOLE SODIUM 40 MILLIGRAM(S): 20 TABLET, DELAYED RELEASE ORAL at 06:00

## 2021-09-27 RX ADMIN — ATORVASTATIN CALCIUM 20 MILLIGRAM(S): 80 TABLET, FILM COATED ORAL at 22:26

## 2021-09-27 RX ADMIN — ISOSORBIDE MONONITRATE 30 MILLIGRAM(S): 60 TABLET, EXTENDED RELEASE ORAL at 12:31

## 2021-09-27 RX ADMIN — Medication 325 MILLIGRAM(S): at 12:32

## 2021-09-27 NOTE — PROGRESS NOTE ADULT - PROBLEM SELECTOR PLAN 9
DVT ppx: He is on eliquis   Diet: DASH  Fluids: None for no, getting PRBC  Code: Full  PCP: Dr. Gill DVT ppx: He is on eliquis   Diet: DASH  Fluids: None for now  Code: Full  PCP: Dr. Gill

## 2021-09-27 NOTE — PROGRESS NOTE ADULT - PROBLEM SELECTOR PROBLEM 7
Paroxysmal atrial fibrillation

## 2021-09-27 NOTE — PROGRESS NOTE ADULT - PROBLEM SELECTOR PLAN 1
He is still able to tolerate PO  - c/w Fidaxomycin 200mg BID x 10 days (9/23-10/2)  - c/w PO hydration  - F/U O/p stool
Presents with 12-15 episodes of watery diarrhea i/s/o + C. Diff and mild increase in WBC. Otherwise no systemic symptoms.  Patient was here in August with C.Diff, completed course of Vanc, but diarrhea never fully resolved. This is either 1st recurrence or failure of treatment, therefore will switch to Fidaxomycin 200mg BID, approved by Dr Mackenzie.  He is able to tolerate PO  - c/w Fidaxomycin 200mg BID x 10 days (9/23-10/2)  - c/w gentle hydration
Presents with 12-15 episodes of watery diarrhea i/s/o + C. Diff and mild increase in WBC. Otherwise no systemic symptoms.  Patient was here in August with C.Diff, completed course of Vanc, but diarrhea never fully resolved. This is either 1st recurrence or failure of treatment, therefore will switch to Fidaxomycin 200mg BID, approved by Dr Mackenzie.  He is able to tolerate PO  - c/w Fidaxomycin 200mg BID x 10 days (9/23-10/2)  - c/w gentle hydration  -F/U O/p stool
Presents with 12-15 episodes of watery diarrhea i/s/o + C. Diff and mild increase in WBC. Otherwise no systemic symptoms.  Patient was here in August with C.Diff, completed course of Vanc, but diarrhea never fully resolved. This is either 1st recurrence or failure of treatment, therefore will switch to Fidaxomycin 200mg BID, approved by Dr Mackenzie.  He is able to tolerate PO  - c/w Fidaxomycin 200mg BID x 10 days (9/23-10/2)  - c/w gentle hydration
Presents with 12-15 episodes of watery diarrhea i/s/o + C. Diff and mild increase in WBC. Otherwise no systemic symptoms.  Patient was here in August with C.Diff, completed course of Vanc, but diarrhea never fully resolved. This is either 1st recurrence or failure of treatment, therefore will switch to Fidaxomycin 200mg BID, approved by Dr Mackenzie.  He is able to tolerate PO  - c/w Fidaxomycin 200mg BID x 10 days (9/23-10/2)  - c/w gentle hydration  -F/U O/p stool

## 2021-09-27 NOTE — DISCHARGE NOTE PROVIDER - CARE PROVIDER_API CALL
Alphonso Leigh)  Gastroenterology; Internal Medicine  [address] [place] [address]  Phone: [phone]  Fax: [phone]  Scheduled Appointment: 10/11/2021 02:45 PM    Charli Sánchez)  Cardiology  [address]   Phone: [phone]  Fax: [phone]  Scheduled Appointment: 11/12/2021 01:00 PM    Abbie Roa  Urology  [address], [facility] [place] [address]  Phone: [phone]  Fax: [phone]  Scheduled Appointment: 10/07/2021 03:10 PM    Sandra Gill)  Critical Care Medicine; Internal Medicine  [address], [address] [place] [address]  Phone: [phone]  Fax: [phone]  Follow Up Time: 1 week   Alphonso Leigh)  Gastroenterology; Internal Medicine  [address] [place] [address]  Phone: [phone]  Fax: [phone]  Scheduled Appointment: 10/11/2021 02:45 PM    Charli Sánchez)  Cardiology  [address]   Phone: [phone]  Fax: [phone]  Scheduled Appointment: 11/12/2021 01:00 PM    Abbie Roa  Urology  [address], [facility] [place] [address]  Phone: [phone]  Fax: [phone]  Established Patient  Scheduled Appointment: 10/07/2021 03:10 PM    Sandra Gill)  Critical Care Medicine; Internal Medicine  [address], [address] [place] [address]  Phone: [phone]  Fax: [phone]  Established Patient  Scheduled Appointment: 10/04/2021 04:20 AM

## 2021-09-27 NOTE — PROGRESS NOTE ADULT - PROBLEM SELECTOR PLAN 3
He is anemic to 7.7, it was 8.6 on discharge 1M ago. There is no concern for acute bleeding. Anemia before was deemed to be of chronic disease given prostate cancer and recently negative C-scope, EGD, and capsule endoscopy. Given recent stenting and some vague shortness of breath before will transfuse for Hb of > 8  -s/p 1U   - Hgb with appropriate response >8
He is anemic to 7.7, it was 8.6 on discharge 1M ago. There is no concern for acute bleeding. Anemia before was deemed to be of chronic disease given prostate cancer and recently negative C-scope, EGD, and capsule endoscopy. Given recent stenting and some vague shortness of breath before will transfuse for Hb of > 8  -s/p 1U pRBCs  - Hgb with appropriate response; transfuse<8
He is anemic to 7.7, it was 8.6 on discharge 1M ago. There is no concern for acute bleeding. Anemia before was deemed to be of chronic disease given prostate cancer and recently negative C-scope, EGD, and capsule endoscopy. Given recent stenting and some vague shortness of breath before will transfuse for Hb of > 8  -s/p 1U   - Hgb with appropriate response >8

## 2021-09-27 NOTE — PROGRESS NOTE ADULT - PROBLEM SELECTOR PLAN 7
At home he is only on Metoprolol 50XL, which is mostly for his AF. On last admission BP was well controlled.  - will c/w Metoprolol 50 XL QD He is now in Sinus Rhythm. He is on Apixaban and MTP XL 50mg QD at home  - c/w Apixaban 2.5 BID   - c/w MTP XL 50mg QD

## 2021-09-27 NOTE — PROGRESS NOTE ADULT - PROBLEM/PLAN-5
DISPLAY PLAN FREE TEXT
Patient requests all Lab and Radiology Results on their Discharge Instructions

## 2021-09-27 NOTE — DISCHARGE NOTE PROVIDER - PROVIDER TOKENS
PROVIDER:[[id number]],SCHEDULEDAPPT:[10/11/2021],SCHEDULEDAPPTTIME:[02:45 PM]],PROVIDER:[[id number]],SCHEDULEDAPPT:[11/12/2021],SCHEDULEDAPPTTIME:[01:00 PM]],PROVIDER:[TOKEN:[[id number]],SCHEDULEDAPPT:[10/07/2021],SCHEDULEDAPPTTIME:[03:10 PM]],PROVIDER:[TOKEN:[[id number]],FOLLOWUP:[1 week]] PROVIDER:[TOKEN:[[id number]],SCHEDULEDAPPT:[10/11/2021],SCHEDULEDAPPTTIME:[02:45 PM]],PROVIDER:[TOKEN:[[id number]],SCHEDULEDAPPT:[11/12/2021],SCHEDULEDAPPTTIME:[01:00 PM]],PROVIDER:[TOKEN:[[id number]],SCHEDULEDAPPT:[10/07/2021],SCHEDULEDAPPTTIME:[03:10 PM],ESTABLISHEDPATIENT:[T]],PROVIDER:[[id number]],SCHEDULEDAPPT:[10/04/2021],SCHEDULEDAPPTTIME:[04:20 AM],ESTABLISHEDPATIENT:[T]]

## 2021-09-27 NOTE — PROGRESS NOTE ADULT - PROBLEM SELECTOR PLAN 4
He underwent stening in March 2021. He is currently on ASA and Eliquis. Troponin is 0.03, but there is no concern for ACS as no symptoms, non-ischemic EKG. It is most likely type II myocardial injury i/s/o known CAD, JERI, and infection  - c/w ASA QD  - c/w Eliquis 2.5 BID
He underwent stenting in March 2021. He is currently on ASA and Eliquis. Troponin is 0.03, but there is no concern for ACS as no symptoms, non-ischemic EKG. It is most likely type II myocardial injury i/s/o known CAD, JERI, and infection  - c/w ASA QD  - c/w Eliquis 2.5 BID
He underwent stenting in March 2021. He is currently on ASA and Eliquis. Troponin is 0.03, but there is no concern for ACS as no symptoms, non-ischemic EKG. It is most likely type II myocardial injury i/s/o known CAD, JERI, and infection  - c/w ASA QD  - c/w Eliquis 2.5 BID

## 2021-09-27 NOTE — PROGRESS NOTE ADULT - PROBLEM SELECTOR PROBLEM 1
C. difficile colitis

## 2021-09-27 NOTE — PROGRESS NOTE ADULT - TIME BILLING
Patient with recurrent C Diff; Now on Fidaxomicin and there maybe some improvement today;  Will continue present regimen;  OOB;  Follow H/H  Cardiac status is stable
Diarrhea may be improving;  Continue present regimen;  GI consult noted
Patient seen and examined;  Reoccurrence of C Diff   To be started on Fidaxomicin   H/H noted and got blood with history of  CAD

## 2021-09-27 NOTE — DISCHARGE NOTE PROVIDER - HOSPITAL COURSE
#Discharge: do not delete    Patient is __ yo M/F with past medical history of _____  Presented with _____, found to have _____  Problem List/Main Diagnoses (system-based):   Inpatient treatment course:   New medications:   Labs to be followed outpatient:   Exam to be followed outpatient:    #Discharge: do not delete    Patient is 79 yo M with past medical history of C. Diff colitis, prostate ca (stopped chemo last year 2/2 adverse effects), HTN, HLD, DMII, ACD/DOMITILA, PE/DVT (1/2020), Afib on eliquis 2.5mg BID, CAD (s/p IRASEMA x1 3/30/21 on asa and eliquis)  Presented with watery diarrhea, found to have C. Diff recurrence   Problem List/Main Diagnoses (system-based):   1. C. Diff colitis - 1st recurrence of Cdiff. Fidaxomycin 200mg BID x 10 days (9/23-10/2). Encouraged PO hydration. Discharge w/ Fidaxomycin. F/u o/p stool  2. JERI - JERI was likely prerenal given obvious hx of fluid loss.   Inpatient treatment course:   New medications:   Labs to be followed outpatient:   Exam to be followed outpatient:        #Discharge: do not delete    Patient is 79 yo M with past medical history of C. Diff colitis, prostate ca (stopped chemo last year 2/2 adverse effects), HTN, HLD, DMII, ACD/DOMITILA, PE/DVT (1/2020), Afib on eliquis 2.5mg BID, CAD (s/p IRASEMA x1 3/30/21 on asa and eliquis)  Presented with watery diarrhea, found to have C. Diff recurrence   Problem List/Main Diagnoses (system-based):   1. C. Diff colitis - 1st recurrence of Cdiff. Fidaxomicin 200mg BID x 10 days (9/23-10/2). Encouraged PO hydration. Discharge w/ Fidaxomicin. F/u o/p stool  2. JERI - JERI was likely prerenal given obvious hx of fluid loss and decreased PO intake. Improved w/ hydration.   3. Anemia - pt has anemia of chronic disease. Pt was given a transfusion for Hgb <8 on arrival  4. CAD - c/w home asa and eliquis  5. Atrial fib - c/w home eliquis and metoprolol   6. HTN - c/w home metoprolol and imdur 30mg qd  Inpatient treatment course: as above  New medications: fidaxomicin 200mg BID until 10/2  Labs to be followed outpatient:   Exam to be followed outpatient:        #Discharge: do not delete    Patient is 81 yo M with past medical history of C. Diff colitis, prostate ca (stopped chemo last year 2/2 adverse effects), HTN, HLD, DMII, ACD/DOMITILA, PE/DVT (1/2020), Afib on eliquis 2.5mg BID, CAD (s/p IRASEMA x1 3/30/21 on asa and eliquis)  Presented with watery diarrhea, found to have C. Diff recurrence   Problem List/Main Diagnoses (system-based):   1. C. Diff colitis - 1st recurrence of Cdiff. Fidaxomicin 200mg BID x 10 days (9/23-10/2). Encouraged PO hydration. Discharge w/ Fidaxomicin. F/u o/p stool  2. JERI - JERI was likely prerenal given obvious hx of fluid loss and decreased PO intake. Improved w/ hydration.   3. Anemia - pt has anemia of chronic disease. Pt was given a transfusion for Hgb <8 on arrival. Started on iron supplementation.  4. CAD - c/w home asa and eliquis  5. Atrial fib - c/w home eliquis and metoprolol   6. HTN - c/w home metoprolol and imdur 30mg qd  Inpatient treatment course: as above  New medications: fidaxomicin 200mg BID until 10/2. ferrous sulfate 325.  Labs to be followed outpatient: BMP for JERI  Exam to be followed outpatient: physical exam

## 2021-09-27 NOTE — PROGRESS NOTE ADULT - PROBLEM SELECTOR PROBLEM 6
Anemia of chronic disease
Diabetes

## 2021-09-27 NOTE — DISCHARGE NOTE PROVIDER - CARE PROVIDERS DIRECT ADDRESSES
[email or web address]

## 2021-09-27 NOTE — DISCHARGE NOTE PROVIDER - NSDCFUSCHEDAPPT_GEN_ALL_CORE_FT
[facility] E ; 10/07/2021 ; [facility]PP Urology [address] [facility] E ; 10/11/2021 ; NPP Gastro [address] [facility] E ; 11/05/2021 ; NPP HeartVasc [address] [facility] E ; 11/12/2021 ; NPP HeartVasc [address] [facility] E ; 10/04/2021 ; NPP Intmed [address] [facility] E ; 10/07/2021 ; NPP Urology [address] [facility] E ; 10/11/2021 ; NPP Gastro [address] [facility] E ; 11/05/2021 ; NPP HeartVasc [address] [facility] E ; 11/12/2021 ; NPP HeartVasc [address]

## 2021-09-27 NOTE — DISCHARGE NOTE PROVIDER - NSDCMRMEDTOKEN_GEN_ALL_CORE_FT
albuterol 90 mcg/inh inhalation aerosol with adapter: 2 puff(s) inhaled 4 times a day, As Needed  apixaban 2.5 mg oral tablet: 1 tab(s) orally 2 times a day  aspirin 81 mg oral delayed release tablet: 1 tab(s) orally once a day  brimonidine 0.2% ophthalmic solution: 1 drop(s) to each affected eye 2 times a day  Caltrate 600 + D oral tablet: 1 tab(s) orally once a day  dorzolamide 2% ophthalmic solution: 1 drop(s) to each affected eye 3 times a day  finasteride 5 mg oral tablet: 1 tab(s) orally once a day  Imdur 30 mg oral tablet, extended release: 1 tab(s) orally once a day  Lupron Depot: 11.5 milligram(s) intramuscular every 3 months  metoprolol succinate 50 mg oral tablet, extended release: 1 tab(s) orally once a day  pantoprazole 40 mg oral delayed release tablet: 1 tab(s) orally once a day  potassium chloride 10 mEq oral capsule, extended release: 1 cap(s) orally once a day  Vitamin B12 1000 mcg oral tablet: 1 tab(s) orally once a day   albuterol 90 mcg/inh inhalation aerosol with adapter: 2 puff(s) inhaled 4 times a day, As Needed  apixaban 2.5 mg oral tablet: 1 tab(s) orally 2 times a day  aspirin 81 mg oral delayed release tablet: 1 tab(s) orally once a day  atorvastatin 20 mg oral tablet: 1 tab(s) orally once a day (at bedtime)  brimonidine 0.2% ophthalmic solution: 1 drop(s) to each affected eye 2 times a day  Caltrate 600 + D oral tablet: 1 tab(s) orally once a day  dorzolamide 2% ophthalmic solution: 1 drop(s) to each affected eye 3 times a day  ferrous sulfate 325 mg (65 mg elemental iron) oral tablet: 1 tab(s) orally once a day  fidaxomicin 200 mg oral tablet: 1 tab(s) orally 2 times a day  finasteride 5 mg oral tablet: 1 tab(s) orally once a day  Imdur 30 mg oral tablet, extended release: 1 tab(s) orally once a day  Lupron Depot: 11.5 milligram(s) intramuscular every 3 months  metoprolol succinate 50 mg oral tablet, extended release: 1 tab(s) orally once a day  pantoprazole 40 mg oral delayed release tablet: 1 tab(s) orally once a day  potassium chloride 10 mEq oral capsule, extended release: 1 cap(s) orally once a day  Vitamin B12 1000 mcg oral tablet: 1 tab(s) orally once a day

## 2021-09-27 NOTE — PROGRESS NOTE ADULT - PROBLEM SELECTOR PROBLEM 8
JERI (acute kidney injury)
JERI (acute kidney injury)
Prostate cancer
Prostate cancer
JERI (acute kidney injury)
Prostate cancer

## 2021-09-27 NOTE — PROGRESS NOTE ADULT - PROBLEM SELECTOR PROBLEM 3
CAD (coronary artery disease)
CAD (coronary artery disease)
Anemia of chronic disease
Anemia of chronic disease
CAD (coronary artery disease)
Anemia of chronic disease

## 2021-09-27 NOTE — PROGRESS NOTE ADULT - PROBLEM SELECTOR PROBLEM 4
Hypertension
CAD (coronary artery disease)
Hypertension
Hypertension
CAD (coronary artery disease)

## 2021-09-27 NOTE — PROGRESS NOTE ADULT - PROBLEM SELECTOR PLAN 5
He is now in Sinus Rhythm. He is on Apixaban and MTP XL 50mg QD at home  - c/w Apixaban 2.5 BID   - c/w MTP XL 50mg QD At home he is only on Metoprolol 50XL, which is mostly for his AF. On last admission BP was well controlled.  - will c/w Metoprolol 50 XL QD  - will c/w imdur 30 qd

## 2021-09-27 NOTE — PROGRESS NOTE ADULT - PROBLEM SELECTOR PLAN 8
No metastatis. Underwent radiation in 2018. Then was getting chemo, but it was stopped 1 year ago due to severe side effects. He has a follow-up with [facility] soon to discuss further options. Currently is getting Lupron injections. Next dose is scheduled in 2 weeks
No metastatsis.  Underwent radiation in 2018. Then was getting chemo, but it was stopped 1 year ago due to severe side effects. He has a follow-up with [facility] soon to discuss further options. Currently is getting Lupron injections. Next dose is scheduled in 2 weeks
No metastatis. Underwent radiation in 2018. Then was getting chemo, but it was stopped 1 year ago due to severe side effects. He has a follow-up with [facility] soon to discuss further options. Currently is getting Lupron injections. Next dose is scheduled in 2 weeks

## 2021-09-27 NOTE — PROGRESS NOTE ADULT - PROBLEM SELECTOR PLAN 6
He is not on any meds at home. A1C 4.9  - will do sliding scale insulin here
He is not on any meds at home. A1C 4.9  - will do sliding scale insulin here
He is not on any meds at home  - will do sliding scale insulin here  - A1C in am
He is not on any meds at home  - will do sliding scale insulin here  - A1C in am
He is not on any meds at home. A1C 4.9  - will do sliding scale insulin here

## 2021-09-27 NOTE — PROGRESS NOTE ADULT - PROBLEM SELECTOR PROBLEM 2
Prostate cancer
JERI (acute kidney injury)
Prostate cancer
Prostate cancer
JERI (acute kidney injury)

## 2021-09-27 NOTE — PROGRESS NOTE ADULT - ASSESSMENT
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 GI consulted for recurrent C. diff.     #C diff Colitis  Patient presenting with persistent non-bloody diarrhea, unresolved since last hospitalization late August 2021 where found to be C diff positive. s/p 10 day course of PO Vancomycin with no resolution of diarrhea. C diff GDH Antigen + and toxin + on admission. Unclear if presentation this admission c/w recurrence or inadequate treatment of Cdiff of first episode.    -GIP PCR negative.  -f/u O&P  -Correct electrolytes  -Continue supportive management including IVF as tolerated  -Monitor BMs and abdominal exam  -C/w fidaxomicin 200 mg BID (9/23-10/2)  -Would hold off on FMT for now as this hospitalization, per chart review, appears to be first recurrence or inadequate tx of 1st episode of C diff, FMT could be considered appropriate in the setting of at least 2 recurrences    INCOMPLETE NOTE, PENDING DISCUSSION WITH C ATTENDING    Nadege Almodovar DO  Gastroenterology Fellow  Pager: [phone] 80M w/ PMHx of HTN, HLD, DM2, prostate Ca (recently stopped chemo secondary to adverse effects), AOCD, DOMITILA, 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 GI consulted for recurrent C. diff.     #C diff Colitis  Patient presenting with persistent non-bloody diarrhea, unresolved since last hospitalization late August 2021 where found to be C diff positive. s/p 10 day course of PO Vancomycin with no resolution of diarrhea. C diff GDH Antigen + and toxin + on admission. Unclear if presentation this admission c/w recurrence or inadequate treatment of Cdiff of first episode.    -GIP PCR negative.  -f/u O&P  -Correct electrolytes  -Continue supportive management including IVF as tolerated  -Monitor BMs and abdominal exam  -C/w fidaxomicin 200 mg BID (9/23-10/2)  -Would hold off on FMT for now as this hospitalization, per chart review, appears to be first recurrence or inadequate tx of 1st episode of C diff, FMT could be considered appropriate in the setting of at least 2 recurrences    Case discussed with [facility] attending and primary team.     Nadege Almodovar DO  Gastroenterology Fellow  Pager: [phone]

## 2021-09-27 NOTE — PROGRESS NOTE ADULT - PROBLEM SELECTOR PLAN 2
Crea is 1.5, while baseline around 0.8. JERI is most likely prerenal given obvious history of fluid loss
Crea is 1.5, while baseline around 0.8. JERI is most likely prerenal given obvious history of fluid loss  - follow crea tomorrow after 1L of IVF and 1 U of PRBCs
Crea is 1.01, while baseline around 0.8. JERI is most likely prerenal given obvious history of fluid loss
Crea is 1.5, while baseline around 0.8. JERI is most likely prerenal given obvious history of fluid loss  - follow crea tomorrow after 1L of IVF and 1 U of PRBCs
Crea is 1.5, while baseline around 0.8. JERI is most likely prerenal given obvious history of fluid loss

## 2021-09-28 ENCOUNTER — TRANSCRIPTION ENCOUNTER (OUTPATIENT)
Age: 81
End: 2021-09-28

## 2021-09-28 VITALS
SYSTOLIC BLOOD PRESSURE: 122 MMHG | TEMPERATURE: 98 F | OXYGEN SATURATION: 98 % | HEART RATE: 65 BPM | RESPIRATION RATE: 16 BRPM | DIASTOLIC BLOOD PRESSURE: 75 MMHG

## 2021-09-28 LAB
ALBUMIN SERPL ELPH-MCNC: 2.8 G/DL — LOW (ref 3.3–5)
ALP SERPL-CCNC: 52 U/L — SIGNIFICANT CHANGE UP (ref 40–120)
ALT FLD-CCNC: 15 U/L — SIGNIFICANT CHANGE UP (ref 10–45)
ANION GAP SERPL CALC-SCNC: 9 MMOL/L — SIGNIFICANT CHANGE UP (ref 5–17)
AST SERPL-CCNC: 31 U/L — SIGNIFICANT CHANGE UP (ref 10–40)
BASOPHILS # BLD AUTO: 0.05 K/UL — SIGNIFICANT CHANGE UP (ref 0–0.2)
BASOPHILS NFR BLD AUTO: 0.7 % — SIGNIFICANT CHANGE UP (ref 0–2)
BILIRUB SERPL-MCNC: 0.2 MG/DL — SIGNIFICANT CHANGE UP (ref 0.2–1.2)
BUN SERPL-MCNC: 7 MG/DL — SIGNIFICANT CHANGE UP (ref 7–23)
CALCIUM SERPL-MCNC: 8.7 MG/DL — SIGNIFICANT CHANGE UP (ref 8.4–10.5)
CHLORIDE SERPL-SCNC: 109 MMOL/L — HIGH (ref 96–108)
CO2 SERPL-SCNC: 19 MMOL/L — LOW (ref 22–31)
CREAT SERPL-MCNC: 1.06 MG/DL — SIGNIFICANT CHANGE UP (ref 0.5–1.3)
EOSINOPHIL # BLD AUTO: 0.28 K/UL — SIGNIFICANT CHANGE UP (ref 0–0.5)
EOSINOPHIL NFR BLD AUTO: 4 % — SIGNIFICANT CHANGE UP (ref 0–6)
GLUCOSE SERPL-MCNC: 93 MG/DL — SIGNIFICANT CHANGE UP (ref 70–99)
HCT VFR BLD CALC: 26.5 % — LOW (ref 39–50)
HGB BLD-MCNC: 8.6 G/DL — LOW (ref 13–17)
IMM GRANULOCYTES NFR BLD AUTO: 0.7 % — SIGNIFICANT CHANGE UP (ref 0–1.5)
LYMPHOCYTES # BLD AUTO: 1.55 K/UL — SIGNIFICANT CHANGE UP (ref 1–3.3)
LYMPHOCYTES # BLD AUTO: 22.1 % — SIGNIFICANT CHANGE UP (ref 13–44)
MAGNESIUM SERPL-MCNC: 1.8 MG/DL — SIGNIFICANT CHANGE UP (ref 1.6–2.6)
MCHC RBC-ENTMCNC: 28.9 PG — SIGNIFICANT CHANGE UP (ref 27–34)
MCHC RBC-ENTMCNC: 32.5 GM/DL — SIGNIFICANT CHANGE UP (ref 32–36)
MCV RBC AUTO: 88.9 FL — SIGNIFICANT CHANGE UP (ref 80–100)
MONOCYTES # BLD AUTO: 0.78 K/UL — SIGNIFICANT CHANGE UP (ref 0–0.9)
MONOCYTES NFR BLD AUTO: 11.1 % — SIGNIFICANT CHANGE UP (ref 2–14)
NEUTROPHILS # BLD AUTO: 4.29 K/UL — SIGNIFICANT CHANGE UP (ref 1.8–7.4)
NEUTROPHILS NFR BLD AUTO: 61.4 % — SIGNIFICANT CHANGE UP (ref 43–77)
NRBC # BLD: 0 /100 WBCS — SIGNIFICANT CHANGE UP (ref 0–0)
PHOSPHATE SERPL-MCNC: 3 MG/DL — SIGNIFICANT CHANGE UP (ref 2.5–4.5)
PLATELET # BLD AUTO: 348 K/UL — SIGNIFICANT CHANGE UP (ref 150–400)
POTASSIUM SERPL-MCNC: 4.8 MMOL/L — SIGNIFICANT CHANGE UP (ref 3.5–5.3)
POTASSIUM SERPL-SCNC: 4.8 MMOL/L — SIGNIFICANT CHANGE UP (ref 3.5–5.3)
PROT SERPL-MCNC: 5.3 G/DL — LOW (ref 6–8.3)
RBC # BLD: 2.98 M/UL — LOW (ref 4.2–5.8)
RBC # FLD: 18.6 % — HIGH (ref 10.3–14.5)
SODIUM SERPL-SCNC: 137 MMOL/L — SIGNIFICANT CHANGE UP (ref 135–145)
WBC # BLD: 7 K/UL — SIGNIFICANT CHANGE UP (ref 3.8–10.5)
WBC # FLD AUTO: 7 K/UL — SIGNIFICANT CHANGE UP (ref 3.8–10.5)

## 2021-09-28 PROCEDURE — 71045 X-RAY EXAM CHEST 1 VIEW: CPT

## 2021-09-28 PROCEDURE — 97161 PT EVAL LOW COMPLEX 20 MIN: CPT

## 2021-09-28 PROCEDURE — 86901 BLOOD TYPING SEROLOGIC RH(D): CPT

## 2021-09-28 PROCEDURE — 82553 CREATINE MB FRACTION: CPT

## 2021-09-28 PROCEDURE — 84100 ASSAY OF PHOSPHORUS: CPT

## 2021-09-28 PROCEDURE — 99232 SBSQ HOSP IP/OBS MODERATE 35: CPT

## 2021-09-28 PROCEDURE — 82728 ASSAY OF FERRITIN: CPT

## 2021-09-28 PROCEDURE — 87635 SARS-COV-2 COVID-19 AMP PRB: CPT

## 2021-09-28 PROCEDURE — 80048 BASIC METABOLIC PNL TOTAL CA: CPT

## 2021-09-28 PROCEDURE — 82550 ASSAY OF CK (CPK): CPT

## 2021-09-28 PROCEDURE — 83540 ASSAY OF IRON: CPT

## 2021-09-28 PROCEDURE — 85730 THROMBOPLASTIN TIME PARTIAL: CPT

## 2021-09-28 PROCEDURE — 36415 COLL VENOUS BLD VENIPUNCTURE: CPT

## 2021-09-28 PROCEDURE — 80053 COMPREHEN METABOLIC PANEL: CPT

## 2021-09-28 PROCEDURE — 87177 OVA AND PARASITES SMEARS: CPT

## 2021-09-28 PROCEDURE — 84484 ASSAY OF TROPONIN QUANT: CPT

## 2021-09-28 PROCEDURE — 85610 PROTHROMBIN TIME: CPT

## 2021-09-28 PROCEDURE — 97110 THERAPEUTIC EXERCISES: CPT

## 2021-09-28 PROCEDURE — 97116 GAIT TRAINING THERAPY: CPT

## 2021-09-28 PROCEDURE — 86850 RBC ANTIBODY SCREEN: CPT

## 2021-09-28 PROCEDURE — 36430 TRANSFUSION BLD/BLD COMPNT: CPT

## 2021-09-28 PROCEDURE — 85025 COMPLETE CBC W/AUTO DIFF WBC: CPT

## 2021-09-28 PROCEDURE — 87086 URINE CULTURE/COLONY COUNT: CPT

## 2021-09-28 PROCEDURE — 99285 EMERGENCY DEPT VISIT HI MDM: CPT

## 2021-09-28 PROCEDURE — 86769 SARS-COV-2 COVID-19 ANTIBODY: CPT

## 2021-09-28 PROCEDURE — 83690 ASSAY OF LIPASE: CPT

## 2021-09-28 PROCEDURE — 83550 IRON BINDING TEST: CPT

## 2021-09-28 PROCEDURE — 87507 IADNA-DNA/RNA PROBE TQ 12-25: CPT

## 2021-09-28 PROCEDURE — P9040: CPT

## 2021-09-28 PROCEDURE — 85027 COMPLETE CBC AUTOMATED: CPT

## 2021-09-28 PROCEDURE — 86900 BLOOD TYPING SEROLOGIC ABO: CPT

## 2021-09-28 PROCEDURE — 81001 URINALYSIS AUTO W/SCOPE: CPT

## 2021-09-28 PROCEDURE — 83605 ASSAY OF LACTIC ACID: CPT

## 2021-09-28 PROCEDURE — 87449 NOS EACH ORGANISM AG IA: CPT

## 2021-09-28 PROCEDURE — 87324 CLOSTRIDIUM AG IA: CPT

## 2021-09-28 PROCEDURE — 83735 ASSAY OF MAGNESIUM: CPT

## 2021-09-28 PROCEDURE — 86923 COMPATIBILITY TEST ELECTRIC: CPT

## 2021-09-28 PROCEDURE — 93005 ELECTROCARDIOGRAM TRACING: CPT

## 2021-09-28 RX ORDER — FIDAXOMICIN 200 MG/5ML
1 GRANULE, FOR SUSPENSION ORAL
Qty: 8 | Refills: 0
Start: 2021-09-28 | End: 2021-10-01

## 2021-09-28 RX ORDER — FIDAXOMICIN 200 MG/5ML
1 GRANULE, FOR SUSPENSION ORAL
Qty: 0 | Refills: 0 | DISCHARGE
Start: 2021-09-28

## 2021-09-28 RX ORDER — ATORVASTATIN CALCIUM 80 MG/1
1 TABLET, FILM COATED ORAL
Qty: 0 | Refills: 0 | DISCHARGE
Start: 2021-09-28

## 2021-09-28 RX ORDER — FERROUS SULFATE 325(65) MG
1 TABLET ORAL
Qty: 30 | Refills: 0
Start: 2021-09-28 | End: 2021-10-27

## 2021-09-28 RX ORDER — FERROUS SULFATE 325(65) MG
1 TABLET ORAL
Qty: 0 | Refills: 0 | DISCHARGE
Start: 2021-09-28 | End: 2021-10-27

## 2021-09-28 RX ADMIN — MAGNESIUM OXIDE 400 MG ORAL TABLET 400 MILLIGRAM(S): 241.3 TABLET ORAL at 12:06

## 2021-09-28 RX ADMIN — APIXABAN 2.5 MILLIGRAM(S): 2.5 TABLET, FILM COATED ORAL at 06:55

## 2021-09-28 RX ADMIN — Medication 1 TABLET(S): at 12:06

## 2021-09-28 RX ADMIN — Medication 81 MILLIGRAM(S): at 12:06

## 2021-09-28 RX ADMIN — DORZOLAMIDE HYDROCHLORIDE 1 DROP(S): 20 SOLUTION/ DROPS OPHTHALMIC at 07:03

## 2021-09-28 RX ADMIN — BRIMONIDINE TARTRATE 1 DROP(S): 2 SOLUTION/ DROPS OPHTHALMIC at 07:03

## 2021-09-28 RX ADMIN — BRIMONIDINE TARTRATE 1 DROP(S): 2 SOLUTION/ DROPS OPHTHALMIC at 15:13

## 2021-09-28 RX ADMIN — DORZOLAMIDE HYDROCHLORIDE 1 DROP(S): 20 SOLUTION/ DROPS OPHTHALMIC at 15:13

## 2021-09-28 RX ADMIN — Medication 10 MILLIEQUIVALENT(S): at 12:07

## 2021-09-28 RX ADMIN — Medication 325 MILLIGRAM(S): at 12:07

## 2021-09-28 RX ADMIN — PANTOPRAZOLE SODIUM 40 MILLIGRAM(S): 20 TABLET, DELAYED RELEASE ORAL at 06:55

## 2021-09-28 RX ADMIN — ISOSORBIDE MONONITRATE 30 MILLIGRAM(S): 60 TABLET, EXTENDED RELEASE ORAL at 12:08

## 2021-09-28 RX ADMIN — FINASTERIDE 5 MILLIGRAM(S): 5 TABLET, FILM COATED ORAL at 12:07

## 2021-09-28 RX ADMIN — FIDAXOMICIN 200 MILLIGRAM(S): 200 GRANULE, FOR SUSPENSION ORAL at 07:03

## 2021-09-28 RX ADMIN — PREGABALIN 1000 MICROGRAM(S): 225 CAPSULE ORAL at 12:07

## 2021-09-28 NOTE — PROGRESS NOTE ADULT - ASSESSMENT
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 GI consulted for recurrent C. diff.     #C diff Colitis  Patient presenting with persistent non-bloody diarrhea, unresolved since last hospitalization late August 2021 where found to be C diff positive. s/p 10 day course of PO Vancomycin with no resolution of diarrhea. C diff GDH Antigen + and toxin + on admission. Unclear if presentation this admission c/w recurrence or inadequate treatment of Cdiff of first episode.    -GIP PCR negative.  -f/u O&P  -Correct electrolytes  -Continue supportive management including IVF as tolerated  -Monitor BMs and abdominal exam  -C/w fidaxomicin 200 mg BID (9/23-10/2)  -Would hold off on FMT for now as this hospitalization, per chart review, appears to be first recurrence or inadequate tx of 1st episode of C diff, FMT could be considered appropriate in the setting of at least 2 recurrences    Case discussed with [facility] attending and primary team.     Nadege Almodovar DO  Gastroenterology Fellow  Pager: [phone]

## 2021-09-28 NOTE — PROGRESS NOTE ADULT - SUBJECTIVE AND OBJECTIVE BOX
GASTROENTEROLOGY PROGRESS NOTE  Patient seen and examined at bedside.  Denies abdominal pain  Continues to have diarrhea, unchanged from yesterday  GIP PCR negative    PERTINENT REVIEW OF SYSTEMS:  CONSTITUTIONAL: No weakness, fevers or chills  HEENT: No visual changes; No vertigo or throat pain   GASTROINTESTINAL: As above.  NEUROLOGICAL: No numbness or weakness  SKIN: No itching, burning, rashes, or lesions     Allergies    ACE inhibitors (Angioedema)  x-geva (Unknown)    Intolerances      MEDICATIONS:  MEDICATIONS  (STANDING):  apixaban 2.5 milliGRAM(s) Oral every 12 hours  aspirin enteric coated 81 milliGRAM(s) Oral daily  atorvastatin 20 milliGRAM(s) Oral at bedtime  brimonidine 0.2% Ophthalmic Solution 1 Drop(s) Both EYES three times a day  calcium carbonate 1250 mG  + Vitamin D (OsCal 500 + D) 1 Tablet(s) Oral daily  cyanocobalamin 1000 MICROGram(s) Oral daily  dorzolamide 2% Ophthalmic Solution 1 Drop(s) Both EYES three times a day  ferrous    sulfate 325 milliGRAM(s) Oral daily  fidaxomicin 200 milliGRAM(s) Oral two times a day  finasteride 5 milliGRAM(s) Oral daily  influenza  Vaccine (HIGH DOSE) 0.7 milliLiter(s) IntraMuscular once  isosorbide   mononitrate ER Tablet (IMDUR) 30 milliGRAM(s) Oral daily  magnesium oxide 400 milliGRAM(s) Oral daily  metoprolol succinate ER 50 milliGRAM(s) Oral every 24 hours  pantoprazole    Tablet 40 milliGRAM(s) Oral before breakfast  potassium chloride    Tablet ER 10 milliEquivalent(s) Oral daily    MEDICATIONS  (PRN):  ALBUTerol    90 MICROgram(s) HFA Inhaler 2 Puff(s) Inhalation every 6 hours PRN Shortness of Breath and/or Wheezing    Vital Signs Last 24 Hrs  T(C): 36.8 (25 Sep 2021 10:25), Max: 36.8 (25 Sep 2021 05:53)  T(F): 98.2 (25 Sep 2021 10:25), Max: 98.2 (25 Sep 2021 05:53)  HR: 66 (25 Sep 2021 10:25) (66 - 76)  BP: 123/71 (25 Sep 2021 10:25) (123/71 - 133/75)  BP(mean): --  RR: 17 (25 Sep 2021 10:25) (17 - 18)  SpO2: 100% (25 Sep 2021 10:25) (98% - 100%)    PHYSICAL EXAM:    General: in no acute distress  HEENT: MMM, conjunctiva and sclera clear  Gastrointestinal: Soft non-tender non-distended; No rebound or guarding  Skin: Warm and dry. No obvious rash    LABS:                        9.0    15.52 )-----------( 352      ( 25 Sep 2021 08:30 )             28.2     09-25    138  |  113<H>  |  7   ----------------------------<  108<H>  4.3   |  16<L>  |  1.13    Ca    8.4      25 Sep 2021 08:31  Phos  2.2     09-25  Mg     1.5     09-25    TPro  5.5<L>  /  Alb  2.9<L>  /  TBili  0.2  /  DBili  x   /  AST  23  /  ALT  11  /  AlkPhos  64  09-25          Urinalysis Basic - ( 23 Sep 2021 10:39 )    Color: Yellow / Appearance: Clear / SG: >=1.030 / pH: x  Gluc: x / Ketone: NEGATIVE  / Bili: Negative / Urobili: 0.2 E.U./dL   Blood: x / Protein: 30 mg/dL / Nitrite: NEGATIVE   Leuk Esterase: NEGATIVE / RBC: < 5 /HPF / WBC < 5 /HPF   Sq Epi: x / Non Sq Epi: 5-10 /HPF / Bacteria: Present /HPF                GI PCR Panel, Stool (collected 23 Sep 2021 11:57)  Source: .Stool Feces  Final Report (23 Sep 2021 13:41):    GI PCR Results: NOT detected    *******Please Note:*******    GI panel PCR evaluates for:    Campylobacter, Plesiomonas shigelloides, Salmonella,    Vibrio, Yersinia enterocolitica, Enteroaggregative    Escherichia coli (EAEC), Enteropathogenic E.coli (EPEC),    Enterotoxigenic E. coli (ETEC) lt/st, Shiga-like    toxin-producing E. coli (STEC) stx1/stx2,    Shigella/ Enteroinvasive E. coli (EIEC), Cryptosporidium,    Cyclospora cayetanensis, Entamoeba histolytica,    Giardia lamblia, Adenovirus F 40/41, Astrovirus,    Norovirus GI/GII, Rotavirus A, Sapovirus    Culture - Urine (collected 23 Sep 2021 11:56)  Source: Clean Catch Clean Catch (Midstream)  Final Report (24 Sep 2021 09:16):    Insignificant amount of mixed growth.      RADIOLOGY & ADDITIONAL STUDIES:  Reviewed
OVERNIGHT EVENTS: MICKEY    SUBJECTIVE / INTERVAL HPI: Patient seen and examined at bedside. States he is eating well. Denied nausea. 2x diarrhea episodes 5am 10am    VITAL SIGNS:  Vital Signs Last 24 Hrs  T(C): 37.1 (25 Sep 2021 16:21), Max: 37.1 (25 Sep 2021 16:21)  T(F): 98.7 (25 Sep 2021 16:21), Max: 98.7 (25 Sep 2021 16:21)  HR: 67 (25 Sep 2021 16:21) (66 - 69)  BP: 131/73 (25 Sep 2021 16:21) (123/71 - 133/75)  BP(mean): --  RR: 17 (25 Sep 2021 16:21) (17 - 18)  SpO2: 100% (25 Sep 2021 16:21) (98% - 100%)    PHYSICAL EXAM:    General: Well developed, well nourished, no acute distress  HEENT: NC/AT; PERRL, anicteric sclera; MMM  Neck: supple  Cardiovascular: +S1/S2, RRR, no murmurs, rubs, gallops  Respiratory: CTA B/L; no W/R/R  Gastrointestinal: soft, NT/ND; +BSx4  Extremities: WWP; no edema, clubbing or cyanosis  Vascular: 2+ radial, DP/PT pulses B/L  Neurological: AAOx3; no focal deficits    MEDICATIONS:  MEDICATIONS  (STANDING):  apixaban 2.5 milliGRAM(s) Oral every 12 hours  aspirin enteric coated 81 milliGRAM(s) Oral daily  atorvastatin 20 milliGRAM(s) Oral at bedtime  brimonidine 0.2% Ophthalmic Solution 1 Drop(s) Both EYES three times a day  calcium carbonate 1250 mG  + Vitamin D (OsCal 500 + D) 1 Tablet(s) Oral daily  cyanocobalamin 1000 MICROGram(s) Oral daily  dorzolamide 2% Ophthalmic Solution 1 Drop(s) Both EYES three times a day  ferrous    sulfate 325 milliGRAM(s) Oral daily  fidaxomicin 200 milliGRAM(s) Oral two times a day  finasteride 5 milliGRAM(s) Oral daily  influenza  Vaccine (HIGH DOSE) 0.7 milliLiter(s) IntraMuscular once  isosorbide   mononitrate ER Tablet (IMDUR) 30 milliGRAM(s) Oral daily  magnesium oxide 400 milliGRAM(s) Oral daily  metoprolol succinate ER 50 milliGRAM(s) Oral every 24 hours  pantoprazole    Tablet 40 milliGRAM(s) Oral before breakfast  potassium chloride    Tablet ER 10 milliEquivalent(s) Oral daily    MEDICATIONS  (PRN):  ALBUTerol    90 MICROgram(s) HFA Inhaler 2 Puff(s) Inhalation every 6 hours PRN Shortness of Breath and/or Wheezing      ALLERGIES:  Allergies    ACE inhibitors (Angioedema)  x-geva (Unknown)    Intolerances        LABS:                        9.0    15.52 )-----------( 352      ( 25 Sep 2021 08:30 )             28.2     09-25    138  |  113<H>  |  7   ----------------------------<  108<H>  4.3   |  16<L>  |  1.13    Ca    8.4      25 Sep 2021 08:31  Phos  2.2     09-25  Mg     1.5     09-25    TPro  5.5<L>  /  Alb  2.9<L>  /  TBili  0.2  /  DBili  x   /  AST  23  /  ALT  11  /  AlkPhos  64  09-25        CAPILLARY BLOOD GLUCOSE          RADIOLOGY & ADDITIONAL TESTS: Reviewed.    PLAN: 
GASTROENTEROLOGY PROGRESS NOTE  Patient seen and examined at bedside.  WBC downtrended today  No abdominal pain  Still w diarrhea (4 episodes overnight/ this am).    PERTINENT REVIEW OF SYSTEMS:  CONSTITUTIONAL: No weakness, fevers or chills  HEENT: No visual changes; No vertigo or throat pain   GASTROINTESTINAL: As above.  NEUROLOGICAL: No numbness or weakness  SKIN: No itching, burning, rashes, or lesions     Allergies    ACE inhibitors (Angioedema)  x-geva (Unknown)    Intolerances      MEDICATIONS:  MEDICATIONS  (STANDING):  apixaban 2.5 milliGRAM(s) Oral every 12 hours  aspirin enteric coated 81 milliGRAM(s) Oral daily  atorvastatin 20 milliGRAM(s) Oral at bedtime  brimonidine 0.2% Ophthalmic Solution 1 Drop(s) Both EYES three times a day  calcium carbonate 1250 mG  + Vitamin D (OsCal 500 + D) 1 Tablet(s) Oral daily  cyanocobalamin 1000 MICROGram(s) Oral daily  dorzolamide 2% Ophthalmic Solution 1 Drop(s) Both EYES three times a day  ferrous    sulfate 325 milliGRAM(s) Oral daily  fidaxomicin 200 milliGRAM(s) Oral two times a day  finasteride 5 milliGRAM(s) Oral daily  influenza  Vaccine (HIGH DOSE) 0.7 milliLiter(s) IntraMuscular once  isosorbide   mononitrate ER Tablet (IMDUR) 30 milliGRAM(s) Oral daily  magnesium oxide 400 milliGRAM(s) Oral daily  metoprolol succinate ER 50 milliGRAM(s) Oral every 24 hours  pantoprazole    Tablet 40 milliGRAM(s) Oral before breakfast  potassium chloride    Tablet ER 10 milliEquivalent(s) Oral daily    MEDICATIONS  (PRN):  ALBUTerol    90 MICROgram(s) HFA Inhaler 2 Puff(s) Inhalation every 6 hours PRN Shortness of Breath and/or Wheezing    Vital Signs Last 24 Hrs  T(C): 36.6 (26 Sep 2021 08:40), Max: 37.1 (25 Sep 2021 16:21)  T(F): 97.9 (26 Sep 2021 08:40), Max: 98.7 (25 Sep 2021 16:21)  HR: 71 (26 Sep 2021 08:40) (65 - 83)  BP: 142/78 (26 Sep 2021 08:40) (131/68 - 143/87)  BP(mean): --  RR: 15 (26 Sep 2021 08:40) (15 - 18)  SpO2: 99% (26 Sep 2021 08:40) (97% - 100%)    PHYSICAL EXAM:    General: in no acute distress  HEENT: MMM, conjunctiva and sclera clear  Gastrointestinal: Soft non-tender non-distended; No rebound or guarding  Skin: Warm and dry. No obvious rash    LABS:                        9.0    9.16  )-----------( 321      ( 26 Sep 2021 06:58 )             28.5     09-26    133<L>  |  113<H>  |  6<L>  ----------------------------<  91  4.6   |  14<L>  |  0.99    Ca    8.2<L>      26 Sep 2021 06:58  Phos  2.6     09-26  Mg     1.4     09-26    TPro  5.5<L>  /  Alb  2.9<L>  /  TBili  0.2  /  DBili  x   /  AST  23  /  ALT  11  /  AlkPhos  64  09-25                      RADIOLOGY & ADDITIONAL STUDIES:  Reviewed
GASTROENTEROLOGY PROGRESS NOTE  Patient seen and examined at bedside. Last BM yesterday afternoon.     ROS: Patient notes last episode of diarrhea yesterday afternoon. No abdominal pain, nausea/vomiting, denies any fevers, chills.     PERTINENT REVIEW OF SYSTEMS:  CONSTITUTIONAL: No weakness, fevers or chills  HEENT: No visual changes; No vertigo or throat pain   GASTROINTESTINAL: As above.  NEUROLOGICAL: No numbness or weakness  SKIN: No itching, burning, rashes, or lesions     Allergies    ACE inhibitors (Angioedema)  x-geva (Unknown)    Intolerances      MEDICATIONS:  MEDICATIONS  (STANDING):  apixaban 2.5 milliGRAM(s) Oral every 12 hours  aspirin enteric coated 81 milliGRAM(s) Oral daily  atorvastatin 20 milliGRAM(s) Oral at bedtime  brimonidine 0.2% Ophthalmic Solution 1 Drop(s) Both EYES three times a day  calcium carbonate 1250 mG  + Vitamin D (OsCal 500 + D) 1 Tablet(s) Oral daily  cyanocobalamin 1000 MICROGram(s) Oral daily  dorzolamide 2% Ophthalmic Solution 1 Drop(s) Both EYES three times a day  ferrous    sulfate 325 milliGRAM(s) Oral daily  fidaxomicin 200 milliGRAM(s) Oral two times a day  finasteride 5 milliGRAM(s) Oral daily  influenza  Vaccine (HIGH DOSE) 0.7 milliLiter(s) IntraMuscular once  isosorbide   mononitrate ER Tablet (IMDUR) 30 milliGRAM(s) Oral daily  magnesium oxide 400 milliGRAM(s) Oral daily  metoprolol succinate ER 50 milliGRAM(s) Oral every 24 hours  pantoprazole    Tablet 40 milliGRAM(s) Oral before breakfast  potassium chloride    Tablet ER 10 milliEquivalent(s) Oral daily    MEDICATIONS  (PRN):  ALBUTerol    90 MICROgram(s) HFA Inhaler 2 Puff(s) Inhalation every 6 hours PRN Shortness of Breath and/or Wheezing    Vital Signs Last 24 Hrs  T(C): 36.5 (28 Sep 2021 09:01), Max: 36.7 (27 Sep 2021 20:47)  T(F): 97.7 (28 Sep 2021 09:01), Max: 98.1 (27 Sep 2021 20:47)  HR: 65 (28 Sep 2021 09:01) (61 - 74)  BP: 122/75 (28 Sep 2021 09:01) (122/75 - 169/84)  BP(mean): --  RR: 16 (28 Sep 2021 09:01) (16 - 18)  SpO2: 98% (28 Sep 2021 09:01) (96% - 98%)    09-27 @ 07:01  -  09-28 @ 07:00  --------------------------------------------------------  IN: 0 mL / OUT: 500 mL / NET: -500 mL      PHYSICAL EXAM:    General:  male, lying comfortably in bed, NAD  HEENT: MMM, conjunctiva and sclera clear  Gastrointestinal: Soft non-tender non-distended; No rebound or guarding  Skin: Warm and dry. No obvious rash    LABS:                        8.6    7.00  )-----------( 348      ( 28 Sep 2021 06:36 )             26.5     09-28    137  |  109<H>  |  7   ----------------------------<  93  4.8   |  19<L>  |  1.06    Ca    8.7      28 Sep 2021 06:36  Phos  3.0     09-28  Mg     1.8     09-28    TPro  5.3<L>  /  Alb  2.8<L>  /  TBili  0.2  /  DBili  x   /  AST  31  /  ALT  15  /  AlkPhos  52  09-28                      RADIOLOGY & ADDITIONAL STUDIES:  Reviewed
GASTROENTEROLOGY PROGRESS NOTE  Patient seen and examined at bedside. Remains on Fidoxamicin.     ROS: Reports 6-7 episodes of diarrhea over the last 24 hrs. Denies any nausea/vomiting, no abdominal pain. No reported fevers.     PERTINENT REVIEW OF SYSTEMS:  CONSTITUTIONAL: No weakness, fevers or chills  HEENT: No visual changes; No vertigo or throat pain   GASTROINTESTINAL: As above.  NEUROLOGICAL: No numbness or weakness  SKIN: No itching, burning, rashes, or lesions     Allergies    ACE inhibitors (Angioedema)  x-geva (Unknown)    Intolerances      MEDICATIONS:  MEDICATIONS  (STANDING):  apixaban 2.5 milliGRAM(s) Oral every 12 hours  aspirin enteric coated 81 milliGRAM(s) Oral daily  atorvastatin 20 milliGRAM(s) Oral at bedtime  brimonidine 0.2% Ophthalmic Solution 1 Drop(s) Both EYES three times a day  calcium carbonate 1250 mG  + Vitamin D (OsCal 500 + D) 1 Tablet(s) Oral daily  cyanocobalamin 1000 MICROGram(s) Oral daily  dorzolamide 2% Ophthalmic Solution 1 Drop(s) Both EYES three times a day  ferrous    sulfate 325 milliGRAM(s) Oral daily  fidaxomicin 200 milliGRAM(s) Oral two times a day  finasteride 5 milliGRAM(s) Oral daily  influenza  Vaccine (HIGH DOSE) 0.7 milliLiter(s) IntraMuscular once  isosorbide   mononitrate ER Tablet (IMDUR) 30 milliGRAM(s) Oral daily  magnesium oxide 400 milliGRAM(s) Oral daily  metoprolol succinate ER 50 milliGRAM(s) Oral every 24 hours  pantoprazole    Tablet 40 milliGRAM(s) Oral before breakfast  potassium chloride    Tablet ER 10 milliEquivalent(s) Oral daily    MEDICATIONS  (PRN):  ALBUTerol    90 MICROgram(s) HFA Inhaler 2 Puff(s) Inhalation every 6 hours PRN Shortness of Breath and/or Wheezing    Vital Signs Last 24 Hrs  T(C): 36.7 (27 Sep 2021 06:06), Max: 36.9 (26 Sep 2021 20:38)  T(F): 98 (27 Sep 2021 06:06), Max: 98.5 (26 Sep 2021 20:38)  HR: 68 (27 Sep 2021 06:06) (68 - 79)  BP: 135/80 (27 Sep 2021 06:06) (135/80 - 155/79)  BP(mean): --  RR: 18 (27 Sep 2021 06:06) (15 - 18)  SpO2: 100% (27 Sep 2021 06:06) (99% - 100%)    PHYSICAL EXAM:    General:  male, lying comfortably in bed, NAD  HEENT: MMM, conjunctiva and sclera clear  Gastrointestinal: Soft non-tender non-distended; No rebound or guarding  Skin: Warm and dry. No obvious rash    LABS:                        9.0    9.16  )-----------( 321      ( 26 Sep 2021 06:58 )             28.5     09-26    133<L>  |  113<H>  |  6<L>  ----------------------------<  91  4.6   |  14<L>  |  0.99    Ca    8.2<L>      26 Sep 2021 06:58  Phos  2.6     09-26  Mg     1.4     09-26    TPro  5.5<L>  /  Alb  2.9<L>  /  TBili  0.2  /  DBili  x   /  AST  23  /  ALT  11  /  AlkPhos  64  09-25                      RADIOLOGY & ADDITIONAL STUDIES:  Reviewed
INTERVAL HPI/OVERNIGHT EVENTS:  Still with loose BM but maybe improving;  Otherwise patient with some tingling of fingers; Also weight loss;       MEDICATIONS  (STANDING):  apixaban 2.5 milliGRAM(s) Oral every 12 hours  aspirin enteric coated 81 milliGRAM(s) Oral daily  atorvastatin 20 milliGRAM(s) Oral at bedtime  brimonidine 0.2% Ophthalmic Solution 1 Drop(s) Both EYES three times a day  calcium carbonate 1250 mG  + Vitamin D (OsCal 500 + D) 1 Tablet(s) Oral daily  cyanocobalamin 1000 MICROGram(s) Oral daily  dorzolamide 2% Ophthalmic Solution 1 Drop(s) Both EYES three times a day  ferrous    sulfate 325 milliGRAM(s) Oral daily  fidaxomicin 200 milliGRAM(s) Oral two times a day  finasteride 5 milliGRAM(s) Oral daily  influenza  Vaccine (HIGH DOSE) 0.7 milliLiter(s) IntraMuscular once  isosorbide   mononitrate ER Tablet (IMDUR) 30 milliGRAM(s) Oral daily  magnesium oxide 400 milliGRAM(s) Oral daily  metoprolol succinate ER 50 milliGRAM(s) Oral every 24 hours  pantoprazole    Tablet 40 milliGRAM(s) Oral before breakfast  potassium chloride    Tablet ER 10 milliEquivalent(s) Oral daily    MEDICATIONS  (PRN):  ALBUTerol    90 MICROgram(s) HFA Inhaler 2 Puff(s) Inhalation every 6 hours PRN Shortness of Breath and/or Wheezing      Allergies    ACE inhibitors (Angioedema)  x-geva (Unknown)    Intolerances        Vital Signs Last 24 Hrs  T(C): 36.4 (24 Sep 2021 09:55), Max: 36.9 (23 Sep 2021 15:43)  T(F): 97.6 (24 Sep 2021 09:55), Max: 98.4 (23 Sep 2021 15:43)  HR: 89 (24 Sep 2021 09:55) (78 - 104)  BP: 123/77 (24 Sep 2021 09:55) (116/74 - 156/80)  BP(mean): --  RR: 19 (24 Sep 2021 09:55) (16 - 20)  SpO2: 96% (24 Sep 2021 09:55) (94% - 100%)          Constitutional: Awake     Eyes: SELENE    ENMT: Negative    Neck: Supple    Back:  no tenderness     Respiratory:  clear    Cardiovascular: S1 S2    Gastrointestinal: soft    Genitourinary:    Extremities:  no edema    Vascular:    Neurological:    Skin:    Lymph Nodes:            LABS:                        8.4    14.65 )-----------( 336      ( 24 Sep 2021 06:42 )             25.9     09-23    136  |  106  |  11  ----------------------------<  103<H>  3.0<L>   |  16<L>  |  1.56<H>    Ca    8.3<L>      23 Sep 2021 09:40  Mg     1.6     09-24    TPro  6.2  /  Alb  3.2<L>  /  TBili  0.2  /  DBili  x   /  AST  28  /  ALT  11  /  AlkPhos  56  09-23    PT/INR - ( 23 Sep 2021 09:40 )   PT: 20.5 sec;   INR: 1.75          PTT - ( 23 Sep 2021 09:40 )  PTT:35.1 sec  Urinalysis Basic - ( 23 Sep 2021 10:39 )    Color: Yellow / Appearance: Clear / SG: >=1.030 / pH: x  Gluc: x / Ketone: NEGATIVE  / Bili: Negative / Urobili: 0.2 E.U./dL   Blood: x / Protein: 30 mg/dL / Nitrite: NEGATIVE   Leuk Esterase: NEGATIVE / RBC: < 5 /HPF / WBC < 5 /HPF   Sq Epi: x / Non Sq Epi: 5-10 /HPF / Bacteria: Present /HPF        RADIOLOGY & ADDITIONAL TESTS:  
Pt seen and examined at bedside.  No acute event overnight.  Continues to have 6 watery BM, nonbloody.  Denies fever, chill, chest pain, shortness of breath, abdominal or epigastric pain, nausea, vomiting, hematemesis, constipation, melena, or hematochezia.  Tolerating PO this morning.    Allergies    ACE inhibitors (Angioedema)  x-geva (Unknown)    Intolerances      MEDICATIONS:  MEDICATIONS  (STANDING):  apixaban 2.5 milliGRAM(s) Oral every 12 hours  aspirin enteric coated 81 milliGRAM(s) Oral daily  atorvastatin 20 milliGRAM(s) Oral at bedtime  brimonidine 0.2% Ophthalmic Solution 1 Drop(s) Both EYES three times a day  calcium carbonate 1250 mG  + Vitamin D (OsCal 500 + D) 1 Tablet(s) Oral daily  cyanocobalamin 1000 MICROGram(s) Oral daily  dorzolamide 2% Ophthalmic Solution 1 Drop(s) Both EYES three times a day  ferrous    sulfate 325 milliGRAM(s) Oral daily  fidaxomicin 200 milliGRAM(s) Oral two times a day  finasteride 5 milliGRAM(s) Oral daily  influenza  Vaccine (HIGH DOSE) 0.7 milliLiter(s) IntraMuscular once  isosorbide   mononitrate ER Tablet (IMDUR) 30 milliGRAM(s) Oral daily  magnesium oxide 400 milliGRAM(s) Oral daily  metoprolol succinate ER 50 milliGRAM(s) Oral every 24 hours  pantoprazole    Tablet 40 milliGRAM(s) Oral before breakfast  potassium chloride    Tablet ER 10 milliEquivalent(s) Oral daily  sodium chloride 0.9%. 1000 milliLiter(s) (80 mL/Hr) IV Continuous <Continuous>    MEDICATIONS  (PRN):  ALBUTerol    90 MICROgram(s) HFA Inhaler 2 Puff(s) Inhalation every 6 hours PRN Shortness of Breath and/or Wheezing    Vital Signs Last 24 Hrs  T(C): 36.4 (24 Sep 2021 09:55), Max: 36.9 (23 Sep 2021 15:43)  T(F): 97.6 (24 Sep 2021 09:55), Max: 98.4 (23 Sep 2021 15:43)  HR: 89 (24 Sep 2021 09:55) (78 - 100)  BP: 123/77 (24 Sep 2021 09:55) (116/74 - 156/80)  BP(mean): --  RR: 19 (24 Sep 2021 09:55) (16 - 20)  SpO2: 96% (24 Sep 2021 09:55) (96% - 100%)    PHYSICAL EXAM:  General: Elderly male laying in bed, in no acute distress  HEENT: MMM, conjunctiva and sclera clear  Abdomen: Soft, non-tender non-distended; No rebound or guarding  Skin: Warm and dry. No obvious rash    LABS:                        8.4    14.65 )-----------( 336      ( 24 Sep 2021 06:42 )             25.9     09-23    136  |  106  |  11  ----------------------------<  103<H>  3.0<L>   |  16<L>  |  1.56<H>    Ca    8.3<L>      23 Sep 2021 09:40  Mg     1.6     09-24    TPro  6.2  /  Alb  3.2<L>  /  TBili  0.2  /  DBili  x   /  AST  28  /  ALT  11  /  AlkPhos  56  09-23    PT/INR - ( 23 Sep 2021 09:40 )   PT: 20.5 sec;   INR: 1.75       PTT - ( 23 Sep 2021 09:40 )  PTT:35.1 sec    Urinalysis Basic - ( 23 Sep 2021 10:39 )    Color: Yellow / Appearance: Clear / SG: >=1.030 / pH: x  Gluc: x / Ketone: NEGATIVE  / Bili: Negative / Urobili: 0.2 E.U./dL   Blood: x / Protein: 30 mg/dL / Nitrite: NEGATIVE   Leuk Esterase: NEGATIVE / RBC: < 5 /HPF / WBC < 5 /HPF   Sq Epi: x / Non Sq Epi: 5-10 /HPF / Bacteria: Present /HPF      GI PCR Panel, Stool (collected 23 Sep 2021 11:57)  Source: .Stool Feces  Final Report (23 Sep 2021 13:41):    GI PCR Results: NOT detected    *******Please Note:*******    GI panel PCR evaluates for:    Campylobacter, Plesiomonas shigelloides, Salmonella,    Vibrio, Yersinia enterocolitica, Enteroaggregative    Escherichia coli (EAEC), Enteropathogenic E.coli (EPEC),    Enterotoxigenic E. coli (ETEC) lt/st, Shiga-like    toxin-producing E. coli (STEC) stx1/stx2,    Shigella/ Enteroinvasive E. coli (EIEC), Cryptosporidium,    Cyclospora cayetanensis, Entamoeba histolytica,    Giardia lamblia, Adenovirus F 40/41, Astrovirus,    Norovirus GI/GII, Rotavirus A, Sapovirus    Culture - Urine (collected 23 Sep 2021 11:56)  Source: Clean Catch Clean Catch (Midstream)  Final Report (24 Sep 2021 09:16):    Insignificant amount of mixed growth.    RADIOLOGY & ADDITIONAL STUDIES: Reviewed
INTERVAL HPI/OVERNIGHT EVENTS:  Awake and alert;  Multiple episodes of diarrhea with 8- 9 BM's per day      MEDICATIONS  (STANDING):  apixaban 2.5 milliGRAM(s) Oral every 12 hours  aspirin enteric coated 81 milliGRAM(s) Oral daily  atorvastatin 20 milliGRAM(s) Oral at bedtime  brimonidine 0.2% Ophthalmic Solution 1 Drop(s) Both EYES three times a day  calcium carbonate 1250 mG  + Vitamin D (OsCal 500 + D) 1 Tablet(s) Oral daily  cyanocobalamin 1000 MICROGram(s) Oral daily  dorzolamide 2% Ophthalmic Solution 1 Drop(s) Both EYES three times a day  fidaxomicin 200 milliGRAM(s) Oral two times a day  finasteride 5 milliGRAM(s) Oral daily  isosorbide   mononitrate ER Tablet (IMDUR) 30 milliGRAM(s) Oral daily  magnesium oxide 400 milliGRAM(s) Oral daily  metoprolol succinate ER 50 milliGRAM(s) Oral every 24 hours  pantoprazole    Tablet 40 milliGRAM(s) Oral before breakfast  potassium chloride    Tablet ER 10 milliEquivalent(s) Oral daily    MEDICATIONS  (PRN):  ALBUTerol    90 MICROgram(s) HFA Inhaler 2 Puff(s) Inhalation every 6 hours PRN Shortness of Breath and/or Wheezing      Allergies    ACE inhibitors (Angioedema)  x-geva (Unknown)    Intolerances        Vital Signs Last 24 Hrs  T(C): 36.6 (23 Sep 2021 17:05), Max: 36.9 (23 Sep 2021 15:43)  T(F): 97.9 (23 Sep 2021 17:05), Max: 98.4 (23 Sep 2021 15:43)  HR: 80 (23 Sep 2021 17:05) (80 - 108)  BP: 152/83 (23 Sep 2021 17:05) (116/74 - 164/77)  BP(mean): --  RR: 18 (23 Sep 2021 17:05) (16 - 18)  SpO2: 100% (23 Sep 2021 17:05) (94% - 100%)          Constitutional:  Awake    Eyes: SELENE    ENMT: Negative    Neck: Supple    Back:  no tenderness     Respiratory:  clear    Cardiovascular: S1 S2    Gastrointestinal:  soft    Genitourinary:    Extremities:  no edema    Vascular:    Neurological:    Skin:    Lymph Nodes:            LABS:                        7.7    12.57 )-----------( 400      ( 23 Sep 2021 09:40 )             23.4     09-23    136  |  106  |  11  ----------------------------<  103<H>  3.0<L>   |  16<L>  |  1.56<H>    Ca    8.3<L>      23 Sep 2021 09:40  Mg     1.6     09-23    TPro  6.2  /  Alb  3.2<L>  /  TBili  0.2  /  DBili  x   /  AST  28  /  ALT  11  /  AlkPhos  56  09-23    PT/INR - ( 23 Sep 2021 09:40 )   PT: 20.5 sec;   INR: 1.75          PTT - ( 23 Sep 2021 09:40 )  PTT:35.1 sec  Urinalysis Basic - ( 23 Sep 2021 10:39 )    Color: Yellow / Appearance: Clear / SG: >=1.030 / pH: x  Gluc: x / Ketone: NEGATIVE  / Bili: Negative / Urobili: 0.2 E.U./dL   Blood: x / Protein: 30 mg/dL / Nitrite: NEGATIVE   Leuk Esterase: NEGATIVE / RBC: < 5 /HPF / WBC < 5 /HPF   Sq Epi: x / Non Sq Epi: 5-10 /HPF / Bacteria: Present /HPF        RADIOLOGY & ADDITIONAL TESTS:  
INTERVAL HPI/OVERNIGHT EVENTS:  Two BM today; Could be slowing ;  GI consult noted      MEDICATIONS  (STANDING):  apixaban 2.5 milliGRAM(s) Oral every 12 hours  aspirin enteric coated 81 milliGRAM(s) Oral daily  atorvastatin 20 milliGRAM(s) Oral at bedtime  brimonidine 0.2% Ophthalmic Solution 1 Drop(s) Both EYES three times a day  calcium carbonate 1250 mG  + Vitamin D (OsCal 500 + D) 1 Tablet(s) Oral daily  cyanocobalamin 1000 MICROGram(s) Oral daily  dorzolamide 2% Ophthalmic Solution 1 Drop(s) Both EYES three times a day  ferrous    sulfate 325 milliGRAM(s) Oral daily  fidaxomicin 200 milliGRAM(s) Oral two times a day  finasteride 5 milliGRAM(s) Oral daily  influenza  Vaccine (HIGH DOSE) 0.7 milliLiter(s) IntraMuscular once  isosorbide   mononitrate ER Tablet (IMDUR) 30 milliGRAM(s) Oral daily  magnesium oxide 400 milliGRAM(s) Oral daily  metoprolol succinate ER 50 milliGRAM(s) Oral every 24 hours  pantoprazole    Tablet 40 milliGRAM(s) Oral before breakfast  potassium chloride    Tablet ER 10 milliEquivalent(s) Oral daily    MEDICATIONS  (PRN):  ALBUTerol    90 MICROgram(s) HFA Inhaler 2 Puff(s) Inhalation every 6 hours PRN Shortness of Breath and/or Wheezing      Allergies    ACE inhibitors (Angioedema)  x-geva (Unknown)    Intolerances        Vital Signs Last 24 Hrs  T(C): 36.7 (27 Sep 2021 06:06), Max: 36.9 (26 Sep 2021 20:38)  T(F): 98 (27 Sep 2021 06:06), Max: 98.5 (26 Sep 2021 20:38)  HR: 68 (27 Sep 2021 06:06) (68 - 79)  BP: 135/80 (27 Sep 2021 06:06) (135/80 - 155/79)  BP(mean): --  RR: 18 (27 Sep 2021 06:06) (18 - 18)  SpO2: 100% (27 Sep 2021 06:06) (100% - 100%)          Constitutional:  Awake    Eyes: SELENE    ENMT: Negative    Neck: Supple    Back:  no tenderness     Respiratory:  clear    Cardiovascular: S1 S2    Gastrointestinal: soft    Genitourinary:    Extremities: no edema    Vascular:    Neurological: intact    Skin:    Lymph Nodes:            LABS:                        8.7    7.60  )-----------( 352      ( 27 Sep 2021 08:30 )             27.6     09-27    138  |  113<H>  |  7   ----------------------------<  97  5.2   |  18<L>  |  1.01    Ca    8.5      27 Sep 2021 08:30  Phos  2.7     09-27  Mg     2.3     09-27            RADIOLOGY & ADDITIONAL TESTS:  
Interval Events: Reviewed  Patient seen and examined at bedside.    Patient is a 80y old  Male who presents with a chief complaint of Diarrhea (25 Sep 2021 19:06)  no acute event overnight, 2 episodes watery stool overnight, denies ab pain, n/v      PAST MEDICAL & SURGICAL HISTORY:  Hyperlipidemia  Hyperlipidemia    Malignant neoplasm of prostate  s/p total prostatectomy in 1994 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    Glaucoma  Glaucoma    Type 2 diabetes mellitus  Diabetes    Essential hypertension  Hypertension    Chemotherapy session for neoplasm  Prostate Cancer    Pulmonary embolism  1/2020    DVT (deep venous thrombosis)  1/2020 on Eliquis    Atrial fibrillation  on Eliquis    Other postprocedural status  S/P prostatectomy        MEDICATIONS:  Pulmonary:  ALBUTerol    90 MICROgram(s) HFA Inhaler 2 Puff(s) Inhalation every 6 hours PRN    Antimicrobials:  fidaxomicin 200 milliGRAM(s) Oral two times a day    Anticoagulants:  apixaban 2.5 milliGRAM(s) Oral every 12 hours  aspirin enteric coated 81 milliGRAM(s) Oral daily    Cardiac:  isosorbide   mononitrate ER Tablet (IMDUR) 30 milliGRAM(s) Oral daily  metoprolol succinate ER 50 milliGRAM(s) Oral every 24 hours      Allergies    ACE inhibitors (Angioedema)  x-geva (Unknown)    Intolerances        Vital Signs Last 24 Hrs  T(C): 36.7 (26 Sep 2021 05:40), Max: 37.1 (25 Sep 2021 16:21)  T(F): 98 (26 Sep 2021 05:40), Max: 98.7 (25 Sep 2021 16:21)  HR: 65 (26 Sep 2021 05:40) (65 - 83)  BP: 131/68 (26 Sep 2021 05:40) (123/71 - 143/87)  BP(mean): --  RR: 16 (26 Sep 2021 05:40) (16 - 18)  SpO2: 100% (26 Sep 2021 05:40) (97% - 100%)        Review of Systems:   •	General: negative  •	Skin/Breast: negative  •	Ophthalmologic: negative  •	ENMT: negative  •	Respiratory and Thorax: negative  •	Cardiovascular: negative  •	Gastrointestinal: negative  •	Genitourinary: negative  •	Musculoskeletal: negative  •	Neurological: negative  •	Psychiatric: negative  •	Hematology/Lymphatics: negative  •	Endocrine: negative  •	Allergic/Immunologic: negative    Physical Exam:   • Constitutional:	thin male   • Eyes:	EOMI; PERRL; no drainage or redness  • ENMT:	No oral lesions; no gross abnormalities  • Neck	no thyromegaly or nodules  • Breasts:	not examined  • Back:	No deformity or limitation of movement  • Respiratory:	Breath Sounds equal & clear to auscultation, no accessory muscle use  • Cardiovascular:	Regular rate & rhythm, normal S1, S2; no murmurs, gallops or rubs; no S3, S4  • Gastrointestinal:	Soft, non-tender, no hepatosplenomegaly, normal bowel sounds  • Genitourinary:	not examined  • Rectal: not examined  • Extremities:	No cyanosis, clubbing or edema  • Vascular:	Equal and normal pulses (dorsalis pedis)  • Neurologica:l	not examined  • Skin:	No lesions; no rash  • Lymph Nodes:	No lymphadedenopathy  • Musculoskeletal:	No joint pain, swelling or deformity; no limitation of movement        LABS:      CBC Full  -  ( 25 Sep 2021 08:30 )  WBC Count : 15.52 K/uL  RBC Count : 3.21 M/uL  Hemoglobin : 9.0 g/dL  Hematocrit : 28.2 %  Platelet Count - Automated : 352 K/uL  Mean Cell Volume : 87.9 fl  Mean Cell Hemoglobin : 28.0 pg  Mean Cell Hemoglobin Concentration : 31.9 gm/dL  Auto Neutrophil # : 12.80 K/uL  Auto Lymphocyte # : 1.24 K/uL  Auto Monocyte # : 0.95 K/uL  Auto Eosinophil # : 0.34 K/uL  Auto Basophil # : 0.04 K/uL  Auto Neutrophil % : 82.4 %  Auto Lymphocyte % : 8.0 %  Auto Monocyte % : 6.1 %  Auto Eosinophil % : 2.2 %  Auto Basophil % : 0.3 %    09-25    138  |  113<H>  |  7   ----------------------------<  108<H>  4.3   |  16<L>  |  1.13    Ca    8.4      25 Sep 2021 08:31  Phos  2.2     09-25  Mg     1.5     09-25    TPro  5.5<L>  /  Alb  2.9<L>  /  TBili  0.2  /  DBili  x   /  AST  23  /  ALT  11  /  AlkPhos  64  09-25                        RADIOLOGY & ADDITIONAL STUDIES (The following images were personally reviewed):  Kim:                                     No  Urine output:                       adequate  DVT prophylaxis:                 Yes  Flattus:                                  Yes  Bowel movement:              No  
Interval Events: Reviewed  Patient seen and examined at bedside.    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    Glaucoma  Glaucoma    Type 2 diabetes mellitus  Diabetes    Essential hypertension  Hypertension    Chemotherapy session for neoplasm  Prostate Cancer    Pulmonary embolism  1/2020    DVT (deep venous thrombosis)  1/2020 on Eliquis    Atrial fibrillation  on Eliquis    Other postprocedural status  S/P prostatectomy        MEDICATIONS:  Pulmonary:  ALBUTerol    90 MICROgram(s) HFA Inhaler 2 Puff(s) Inhalation every 6 hours PRN    Antimicrobials:  fidaxomicin 200 milliGRAM(s) Oral two times a day    Anticoagulants:  apixaban 2.5 milliGRAM(s) Oral every 12 hours  aspirin enteric coated 81 milliGRAM(s) Oral daily    Cardiac:  isosorbide   mononitrate ER Tablet (IMDUR) 30 milliGRAM(s) Oral daily  metoprolol succinate ER 50 milliGRAM(s) Oral every 24 hours      Allergies    ACE inhibitors (Angioedema)  x-geva (Unknown)    Intolerances        Vital Signs Last 24 Hrs  T(C): 36.8 (25 Sep 2021 05:53), Max: 36.8 (25 Sep 2021 05:53)  T(F): 98.2 (25 Sep 2021 05:53), Max: 98.2 (25 Sep 2021 05:53)  HR: 69 (25 Sep 2021 05:53) (69 - 89)  BP: 133/75 (25 Sep 2021 05:53) (123/77 - 133/75)  BP(mean): --  RR: 18 (25 Sep 2021 05:53) (18 - 19)  SpO2: 98% (25 Sep 2021 05:53) (96% - 100%)        Review of Systems:   •	General: negative  •	Skin/Breast: negative  •	Ophthalmologic: negative  •	ENMT: negative  •	Respiratory and Thorax: negative  •	Cardiovascular: negative  •	Gastrointestinal: negative  •	Genitourinary: negative  •	Musculoskeletal: negative  •	Neurological: negative  •	Psychiatric: negative  •	Hematology/Lymphatics: negative  •	Endocrine: negative  •	Allergic/Immunologic: negative    Physical Exam:   • Constitutional: thin male  • Eyes:	EOMI; PERRL; no drainage or redness  • ENMT:	No oral lesions; no gross abnormalities  • Neck	no thyromegaly or nodules  • Breasts:	not examined  • Back:	No deformity or limitation of movement  • Respiratory:	Breath Sounds equal & clear to auscultation, no accessory muscle use  • Cardiovascular:	Regular rate & rhythm, normal S1, S2; no murmurs, gallops or rubs; no S3, S4  • Gastrointestinal:	Soft, non-tender, no hepatosplenomegaly, normal bowel sounds  • Genitourinary:	not examined  • Rectal: not examined  • Extremities:	No cyanosis, clubbing or edema  • Vascular:	Equal and normal pulses (dorsalis pedis)  • Neurologica:l	not examined  • Skin:	No lesions; no rash  • Lymph Nodes:	No lymphadedenopathy  • Musculoskeletal:	No joint pain, swelling or deformity; no limitation of movement        LABS:      CBC Full  -  ( 24 Sep 2021 06:42 )  WBC Count : 14.65 K/uL  RBC Count : 3.00 M/uL  Hemoglobin : 8.4 g/dL  Hematocrit : 25.9 %  Platelet Count - Automated : 336 K/uL  Mean Cell Volume : 86.3 fl  Mean Cell Hemoglobin : 28.0 pg  Mean Cell Hemoglobin Concentration : 32.4 gm/dL  Auto Neutrophil # : 11.90 K/uL  Auto Lymphocyte # : 1.32 K/uL  Auto Monocyte # : 0.95 K/uL  Auto Eosinophil # : 0.25 K/uL  Auto Basophil # : 0.04 K/uL  Auto Neutrophil % : 81.2 %  Auto Lymphocyte % : 9.0 %  Auto Monocyte % : 6.5 %  Auto Eosinophil % : 1.7 %  Auto Basophil % : 0.3 %    09-24    141  |  114<H>  |  8   ----------------------------<  77  4.3   |  16<L>  |  1.24    Ca    7.9<L>      24 Sep 2021 06:42  Phos  2.5     09-24  Mg     1.6     09-24    TPro  6.2  /  Alb  3.2<L>  /  TBili  0.2  /  DBili  x   /  AST  28  /  ALT  11  /  AlkPhos  56  09-23    PT/INR - ( 23 Sep 2021 09:40 )   PT: 20.5 sec;   INR: 1.75          PTT - ( 23 Sep 2021 09:40 )  PTT:35.1 sec      Urinalysis Basic - ( 23 Sep 2021 10:39 )    Color: Yellow / Appearance: Clear / SG: >=1.030 / pH: x  Gluc: x / Ketone: NEGATIVE  / Bili: Negative / Urobili: 0.2 E.U./dL   Blood: x / Protein: 30 mg/dL / Nitrite: NEGATIVE   Leuk Esterase: NEGATIVE / RBC: < 5 /HPF / WBC < 5 /HPF   Sq Epi: x / Non Sq Epi: 5-10 /HPF / Bacteria: Present /HPF              Culture Results:   GI PCR Results: NOT detected  *******Please Note:*******  GI panel PCR evaluates for:  Campylobacter, Plesiomonas shigelloides, Salmonella,  Vibrio, Yersinia enterocolitica, Enteroaggregative  Escherichia coli (EAEC), Enteropathogenic E.coli (EPEC),  Enterotoxigenic E. coli (ETEC) lt/st, Shiga-like  toxin-producing E. coli (STEC) stx1/stx2,  Shigella/ Enteroinvasive E. coli (EIEC), Cryptosporidium,  Cyclospora cayetanensis, Entamoeba histolytica,  Giardia lamblia, Adenovirus F 40/41, Astrovirus,  Norovirus GI/GII, Rotavirus A, Sapovirus (09-23 @ 11:57)  Culture Results:   Insignificant amount of mixed growth. (09-23 @ 11:56)      RADIOLOGY & ADDITIONAL STUDIES (The following images were personally reviewed):  Kim:                                     No  Urine output:                       adequate  DVT prophylaxis:                 Yes  Flattus:                                  Yes  Bowel movement:              No  
CC: Patient is a 80y old  Male who presents with a chief complaint of Diarrhea (27 Sep 2021 10:50)      INTERVAL EVENTS: MICKEY    SUBJECTIVE / INTERVAL HPI: Patient seen and examined at bedside. Pt complains of 2 episodes of diarrhea. Slowly improving. Tolerating PO diet. Says will drink more water.    ROS: negative unless otherwise stated above.    VITAL SIGNS:  Vital Signs Last 24 Hrs  T(C): 36.4 (27 Sep 2021 10:39), Max: 36.9 (26 Sep 2021 20:38)  T(F): 97.6 (27 Sep 2021 10:39), Max: 98.5 (26 Sep 2021 20:38)  HR: 74 (27 Sep 2021 10:39) (68 - 79)  BP: 134/77 (27 Sep 2021 10:39) (134/77 - 155/79)  BP(mean): --  RR: 17 (27 Sep 2021 10:39) (17 - 18)  SpO2: 96% (27 Sep 2021 10:39) (96% - 100%)        PHYSICAL EXAM:    General: NAD  HEENT: MMM  Neck: supple  Cardiovascular: +S1/S2; RRR  Respiratory: CTA B/L; no W/R/R  Gastrointestinal: soft, NT/ND  Extremities: WWP; no edema, clubbing or cyanosis  Vascular: 2+ radial, DP/PT pulses B/L  Neurological: AAOx3; no focal deficits    MEDICATIONS:  MEDICATIONS  (STANDING):  apixaban 2.5 milliGRAM(s) Oral every 12 hours  aspirin enteric coated 81 milliGRAM(s) Oral daily  atorvastatin 20 milliGRAM(s) Oral at bedtime  brimonidine 0.2% Ophthalmic Solution 1 Drop(s) Both EYES three times a day  calcium carbonate 1250 mG  + Vitamin D (OsCal 500 + D) 1 Tablet(s) Oral daily  cyanocobalamin 1000 MICROGram(s) Oral daily  dorzolamide 2% Ophthalmic Solution 1 Drop(s) Both EYES three times a day  ferrous    sulfate 325 milliGRAM(s) Oral daily  fidaxomicin 200 milliGRAM(s) Oral two times a day  finasteride 5 milliGRAM(s) Oral daily  influenza  Vaccine (HIGH DOSE) 0.7 milliLiter(s) IntraMuscular once  isosorbide   mononitrate ER Tablet (IMDUR) 30 milliGRAM(s) Oral daily  magnesium oxide 400 milliGRAM(s) Oral daily  metoprolol succinate ER 50 milliGRAM(s) Oral every 24 hours  pantoprazole    Tablet 40 milliGRAM(s) Oral before breakfast  potassium chloride    Tablet ER 10 milliEquivalent(s) Oral daily    MEDICATIONS  (PRN):  ALBUTerol    90 MICROgram(s) HFA Inhaler 2 Puff(s) Inhalation every 6 hours PRN Shortness of Breath and/or Wheezing      ALLERGIES:  Allergies    ACE inhibitors (Angioedema)  x-geva (Unknown)    Intolerances        LABS:                        8.7    7.60  )-----------( 352      ( 27 Sep 2021 08:30 )             27.6     09-27    138  |  113<H>  |  7   ----------------------------<  97  5.2   |  18<L>  |  1.01    Ca    8.5      27 Sep 2021 08:30  Phos  2.7     09-27  Mg     2.3     09-27          CAPILLARY BLOOD GLUCOSE          RADIOLOGY & ADDITIONAL TESTS: Reviewed.

## 2021-09-28 NOTE — PROGRESS NOTE ADULT - PROVIDER SPECIALTY LIST ADULT
Gastroenterology
Internal Medicine
Gastroenterology
Internal Medicine
Gastroenterology
Internal Medicine

## 2021-09-28 NOTE — DISCHARGE NOTE NURSING/CASE MANAGEMENT/SOCIAL WORK - PATIENT PORTAL LINK FT
You can access the FollowMyHealth Patient Portal offered by [facility] by registering at the following website: [email or web address]. By joining [facility]’s FollowMyHealth portal, you will also be able to view your health information using other applications (apps) compatible with our system.

## 2021-09-29 LAB
CULTURE RESULTS: SIGNIFICANT CHANGE UP
SPECIMEN SOURCE: SIGNIFICANT CHANGE UP

## 2021-10-01 DIAGNOSIS — I10 ESSENTIAL (PRIMARY) HYPERTENSION: ICD-10-CM

## 2021-10-01 DIAGNOSIS — N17.9 ACUTE KIDNEY FAILURE, UNSPECIFIED: ICD-10-CM

## 2021-10-01 DIAGNOSIS — E11.9 TYPE 2 DIABETES MELLITUS WITHOUT COMPLICATIONS: ICD-10-CM

## 2021-10-01 DIAGNOSIS — C77.9 SECONDARY AND UNSPECIFIED MALIGNANT NEOPLASM OF LYMPH NODE, UNSPECIFIED: ICD-10-CM

## 2021-10-01 DIAGNOSIS — C61 MALIGNANT NEOPLASM OF PROSTATE: ICD-10-CM

## 2021-10-01 DIAGNOSIS — E78.5 HYPERLIPIDEMIA, UNSPECIFIED: ICD-10-CM

## 2021-10-01 DIAGNOSIS — D63.8 ANEMIA IN OTHER CHRONIC DISEASES CLASSIFIED ELSEWHERE: ICD-10-CM

## 2021-10-01 DIAGNOSIS — A04.72 ENTEROCOLITIS DUE TO CLOSTRIDIUM DIFFICILE, NOT SPECIFIED AS RECURRENT: ICD-10-CM

## 2021-10-01 DIAGNOSIS — E86.0 DEHYDRATION: ICD-10-CM

## 2021-10-01 DIAGNOSIS — C78.00 SECONDARY MALIGNANT NEOPLASM OF UNSPECIFIED LUNG: ICD-10-CM

## 2021-10-01 DIAGNOSIS — I25.10 ATHEROSCLEROTIC HEART DISEASE OF NATIVE CORONARY ARTERY WITHOUT ANGINA PECTORIS: ICD-10-CM

## 2021-10-01 DIAGNOSIS — I48.0 PAROXYSMAL ATRIAL FIBRILLATION: ICD-10-CM

## 2021-10-01 DIAGNOSIS — R10.9 UNSPECIFIED ABDOMINAL PAIN: ICD-10-CM

## 2021-10-01 DIAGNOSIS — C79.89 SECONDARY MALIGNANT NEOPLASM OF OTHER SPECIFIED SITES: ICD-10-CM

## 2021-10-04 ENCOUNTER — APPOINTMENT (OUTPATIENT)
Dept: INTERNAL MEDICINE | Facility: CLINIC | Age: 81
End: 2021-10-04
Payer: MEDICARE

## 2021-10-04 VITALS
TEMPERATURE: 97.7 F | DIASTOLIC BLOOD PRESSURE: 70 MMHG | SYSTOLIC BLOOD PRESSURE: 170 MMHG | OXYGEN SATURATION: 99 % | HEIGHT: 69 IN | WEIGHT: 149 LBS | BODY MASS INDEX: 22.07 KG/M2 | HEART RATE: 80 BPM

## 2021-10-04 DIAGNOSIS — I82.409 ACUTE EMBOLISM AND THROMBOSIS OF UNSPECIFIED DEEP VEINS OF UNSPECIFIED LOWER EXTREMITY: ICD-10-CM

## 2021-10-04 PROCEDURE — G0008: CPT

## 2021-10-04 PROCEDURE — 36415 COLL VENOUS BLD VENIPUNCTURE: CPT

## 2021-10-04 PROCEDURE — 99213 OFFICE O/P EST LOW 20 MIN: CPT | Mod: 25

## 2021-10-04 PROCEDURE — 90662 IIV NO PRSV INCREASED AG IM: CPT

## 2021-10-04 NOTE — HEALTH RISK ASSESSMENT
[No] : No [No falls in past year] : Patient reported no falls in the past year [0] : 2) Feeling down, depressed, or hopeless: Not at all (0) [] : No [[id number]] : 0

## 2021-10-04 NOTE — ASSESSMENT
[FreeTextEntry1] : Likely improving diarrhea; \par Finishing up course of Fidaxomicin ; If he fails may have to consider fecal transplant ;\par Repeat CBC today ; Got blood transfusion during recent admission

## 2021-10-04 NOTE — HISTORY OF PRESENT ILLNESS
[de-identified] : As above finishing last dose of med today\par Will give Flu vaccine today [FreeTextEntry1] : Recent admit for C diff \par Treated for fidaxomicin for 10 days

## 2021-10-07 ENCOUNTER — MED ADMIN CHARGE (OUTPATIENT)
Age: 81
End: 2021-10-07

## 2021-10-07 ENCOUNTER — APPOINTMENT (OUTPATIENT)
Dept: UROLOGY | Facility: CLINIC | Age: 81
End: 2021-10-07
Payer: MEDICARE

## 2021-10-07 ENCOUNTER — APPOINTMENT (OUTPATIENT)
Dept: INTERNAL MEDICINE | Facility: CLINIC | Age: 81
End: 2021-10-07
Payer: MEDICARE

## 2021-10-07 VITALS — HEART RATE: 89 BPM | DIASTOLIC BLOOD PRESSURE: 64 MMHG | SYSTOLIC BLOOD PRESSURE: 144 MMHG | TEMPERATURE: 97.6 F

## 2021-10-07 VITALS
WEIGHT: 149 LBS | HEART RATE: 97 BPM | HEIGHT: 69 IN | DIASTOLIC BLOOD PRESSURE: 73 MMHG | BODY MASS INDEX: 22.07 KG/M2 | OXYGEN SATURATION: 97 % | SYSTOLIC BLOOD PRESSURE: 152 MMHG | TEMPERATURE: 97.8 F

## 2021-10-07 LAB
BASOPHILS # BLD AUTO: 0.04 K/UL
BASOPHILS NFR BLD AUTO: 0.6 %
EOSINOPHIL # BLD AUTO: 0.09 K/UL
EOSINOPHIL NFR BLD AUTO: 1.4 %
HCT VFR BLD CALC: 24.6 %
HGB BLD-MCNC: 7.9 G/DL
IMM GRANULOCYTES NFR BLD AUTO: 0.5 %
LYMPHOCYTES # BLD AUTO: 0.92 K/UL
LYMPHOCYTES NFR BLD AUTO: 14.3 %
MAN DIFF?: NORMAL
MCHC RBC-ENTMCNC: 28.6 PG
MCHC RBC-ENTMCNC: 32.1 GM/DL
MCV RBC AUTO: 89.1 FL
MONOCYTES # BLD AUTO: 0.82 K/UL
MONOCYTES NFR BLD AUTO: 12.7 %
NEUTROPHILS # BLD AUTO: 4.55 K/UL
NEUTROPHILS NFR BLD AUTO: 70.5 %
PLATELET # BLD AUTO: 314 K/UL
RBC # BLD: 2.76 M/UL
RBC # FLD: 19.9 %
WBC # FLD AUTO: 6.45 K/UL

## 2021-10-07 PROCEDURE — 96402 CHEMO HORMON ANTINEOPL SQ/IM: CPT

## 2021-10-07 PROCEDURE — 99213 OFFICE O/P EST LOW 20 MIN: CPT | Mod: 25

## 2021-10-07 PROCEDURE — 99214 OFFICE O/P EST MOD 30 MIN: CPT | Mod: 25

## 2021-10-07 PROCEDURE — 90662 IIV NO PRSV INCREASED AG IM: CPT

## 2021-10-07 PROCEDURE — G0008: CPT

## 2021-10-07 RX ORDER — LEUPROLIDE ACETATE 45 MG
45 KIT INTRAMUSCULAR
Qty: 1 | Refills: 0 | Status: COMPLETED | OUTPATIENT
Start: 2021-10-07

## 2021-10-07 RX ADMIN — LEUPROLIDE ACETATE 45 MG: KIT at 00:00

## 2021-10-07 NOTE — ASSESSMENT
[FreeTextEntry1] : advanced prostate cancer\par due to go to [facility]\par consider trials\par PSA progressing\par lupron 45 today\par f/u 3 months\par lupron 6 months

## 2021-10-07 NOTE — HISTORY OF PRESENT ILLNESS
[FreeTextEntry1] : Rash not Zoster; \par Diarrhea  improved [de-identified] : Otherwise poor appetite;\par Also not drinking

## 2021-10-07 NOTE — HISTORY OF PRESENT ILLNESS
[FreeTextEntry1] : signficant weight loss\par last PSA 65\par multiple c diff infections\par voiding comfortably\par due to be seen at [facility]\par

## 2021-10-11 ENCOUNTER — OUTPATIENT (OUTPATIENT)
Dept: OUTPATIENT SERVICES | Facility: HOSPITAL | Age: 81
LOS: 1 days | End: 2021-10-11
Payer: MEDICARE

## 2021-10-11 ENCOUNTER — APPOINTMENT (OUTPATIENT)
Age: 81
End: 2021-10-11

## 2021-10-11 VITALS
DIASTOLIC BLOOD PRESSURE: 73 MMHG | HEIGHT: 69 IN | HEART RATE: 81 BPM | RESPIRATION RATE: 20 BRPM | WEIGHT: 156.97 LBS | SYSTOLIC BLOOD PRESSURE: 129 MMHG | TEMPERATURE: 98 F | OXYGEN SATURATION: 99 %

## 2021-10-11 DIAGNOSIS — C61 MALIGNANT NEOPLASM OF PROSTATE: ICD-10-CM

## 2021-10-11 PROCEDURE — 36415 COLL VENOUS BLD VENIPUNCTURE: CPT

## 2021-10-11 PROCEDURE — 86900 BLOOD TYPING SEROLOGIC ABO: CPT

## 2021-10-11 PROCEDURE — P9040: CPT

## 2021-10-11 PROCEDURE — 86923 COMPATIBILITY TEST ELECTRIC: CPT

## 2021-10-11 PROCEDURE — 36430 TRANSFUSION BLD/BLD COMPNT: CPT

## 2021-10-11 PROCEDURE — 86901 BLOOD TYPING SEROLOGIC RH(D): CPT

## 2021-10-11 PROCEDURE — 86850 RBC ANTIBODY SCREEN: CPT

## 2021-10-11 RX ORDER — ACETAMINOPHEN 500 MG
650 TABLET ORAL ONCE
Refills: 0 | Status: COMPLETED | OUTPATIENT
Start: 2021-10-11 | End: 2021-10-11

## 2021-10-11 RX ORDER — DIPHENHYDRAMINE HCL 50 MG
25 CAPSULE ORAL ONCE
Refills: 0 | Status: COMPLETED | OUTPATIENT
Start: 2021-10-11 | End: 2021-10-11

## 2021-10-11 RX ADMIN — Medication 650 MILLIGRAM(S): at 14:06

## 2021-10-11 RX ADMIN — Medication 650 MILLIGRAM(S): at 13:50

## 2021-10-11 RX ADMIN — Medication 25 MILLIGRAM(S): at 13:50

## 2021-10-12 ENCOUNTER — APPOINTMENT (OUTPATIENT)
Age: 81
End: 2021-10-12
Payer: MEDICARE

## 2021-10-12 VITALS
HEIGHT: 69 IN | TEMPERATURE: 97.8 F | DIASTOLIC BLOOD PRESSURE: 70 MMHG | WEIGHT: 146 LBS | BODY MASS INDEX: 21.62 KG/M2 | SYSTOLIC BLOOD PRESSURE: 140 MMHG | RESPIRATION RATE: 14 BRPM | HEART RATE: 93 BPM | OXYGEN SATURATION: 94 %

## 2021-10-12 PROCEDURE — 99214 OFFICE O/P EST MOD 30 MIN: CPT

## 2021-10-13 NOTE — HISTORY OF PRESENT ILLNESS
[de-identified] : 81 yo M with prostate cancer (has undergone surgery, RT, chemo, follows with Dr. Mcmillan, had to stop chemo 2/2 to adverse effects and appears to be unresponsive to treatment) HTN, HLD, DM2, AOCD/DOMITILA, PE/DVT (1/2020), Afib on Eliquis 2.5 mg BID, CAD (s/p IRASEMA x 1 3/30/21) on ASA presents for posthospitalization follow up for recurrent C.Diff infection.\par \par Recent hospital admission for 8/20-8/25/21 for sepsis found to have C diff and UTI. Patient treated with ceftriaxone and vancomycin before discharge. He reports initial improvement in symptoms but diarrhea return after discharged with 3-4 loose BM per day. He then was re-admitted to [facility] in late September for continued C.Diff infection, this time treated with fidaxomicin. Per daughter, this worked much better than PO vanc. Symptoms initially improved but did not complete resolve. Last dose was 1 week ago, and since then diarrhea has gotten worse. Per daughter, pt is not eating or hydrating and she is worried is dehydrated. \par \par He saw Dr. Gill outpatient 5 days ago recommended continuing course of Fidaxomicin. Rx will be ready later today. \par \par Pt also had 2u pRBC yesterday + 200cc IVF yesterday at [facility] for anemia.

## 2021-10-13 NOTE — ASSESSMENT
[FreeTextEntry1] : 81 yo M with prostate cancer (has undergone surgery, RT, chemo, follows with Dr. Mcmillan, had to stop chemo 2/2 to adverse effects) HTN, HLD, DM2, AOCD/DOMITILA, PE/DVT (1/2020), Afib on Eliquis 2.5 mg BID, CAD (s/p IRASEMA x 1 3/30/21) on ASA presents for posthospitalization follow up for recurrent C.Diff infection.\par \par #C Diff\par - Agree with plan to repeat course of fidaxomicin\par - Can consider fecal transplant if does not respond to abx- will look into feasibility\par - Daughter and pt would like to manage hydration/ electrolyte as outpatient if possible\par - Pt agrees to hydrate with Gatorade/ Pedialyte at home - if unable to drink and remains fatigued, recommend returned to ED\par - Discussed getting BMP/ Mg today: Pt would like to avoid lab work today. If unable to hydrate will need repeat labs tomorrow and consider returning to infusion center for IVF/ electrolyte repletion\par - Will follow up with patient tomorrow

## 2021-10-13 NOTE — PHYSICAL EXAM
[Outer Ear] : the ears and nose were normal in appearance [Sclera] : the sclera and conjunctiva were normal [Neck Appearance] : the appearance of the neck was normal [Heart Rate And Rhythm] : heart rate was normal and rhythm regular [Abdomen Soft] : soft [Abdomen Tenderness] : non-tender [No CVA Tenderness] : no ~M costovertebral angle tenderness [] : no rash [No Focal Deficits] : no focal deficits [Oriented To Time, Place, And Person] : oriented to person, place, and time [FreeTextEntry1] : walks with cane

## 2021-11-05 ENCOUNTER — APPOINTMENT (OUTPATIENT)
Dept: HEART AND VASCULAR | Facility: CLINIC | Age: 81
End: 2021-11-05
Payer: MEDICARE

## 2021-11-05 PROCEDURE — 93306 TTE W/DOPPLER COMPLETE: CPT

## 2021-11-15 RX ORDER — PANTOPRAZOLE 40 MG/1
40 TABLET, DELAYED RELEASE ORAL DAILY
Qty: 90 | Refills: 3 | Status: ACTIVE | COMMUNITY
Start: 1900-01-01 | End: 1900-01-01

## 2021-11-19 ENCOUNTER — NON-APPOINTMENT (OUTPATIENT)
Age: 81
End: 2021-11-19

## 2021-11-19 ENCOUNTER — APPOINTMENT (OUTPATIENT)
Dept: HEART AND VASCULAR | Facility: CLINIC | Age: 81
End: 2021-11-19
Payer: MEDICARE

## 2021-11-19 VITALS
BODY MASS INDEX: 19.85 KG/M2 | SYSTOLIC BLOOD PRESSURE: 122 MMHG | HEART RATE: 89 BPM | DIASTOLIC BLOOD PRESSURE: 69 MMHG | HEIGHT: 69 IN | WEIGHT: 134 LBS | OXYGEN SATURATION: 99 % | TEMPERATURE: 98.6 F

## 2021-11-19 PROCEDURE — 93000 ELECTROCARDIOGRAM COMPLETE: CPT

## 2021-11-19 PROCEDURE — 99214 OFFICE O/P EST MOD 30 MIN: CPT

## 2021-11-19 NOTE — ASSESSMENT
[FreeTextEntry1] : 1. CAD s/p IRASEMA-OM1 3/2021\par - hospitalization reports reviewed\par - instructed to take plavix for 1 wk only in setting of previous GIB\par - ed done re heart healthy lifestyle modifications and diet \par - Nl EF on 3/2021 TTE in hospital\par - recent drop in H/H in setting of triple tx x1 week, pt usually w/ Hgb 8-9 at baseline\par - had a lengthy discussion w/ pt, his daughter as well as pt's Hem/Onc MD/Dr. Fuller. Given pt's hypercoagulable state, h/o PE/DVT as well as afib, benefits of NOAC outweigh risks. Cleared by GI to restart as well. Restarted  low dose Eliquis 2.5 bid and pt will have his H/H followed diligently by Dr. Fuller\par - cont ASA/NOAC/BB\par - 11/5/21 TTE reviewed, nl LV/RV fxn, mld to mod LVH,mild to mod MR, mild AI/TR\par - results discussed in detail with pt \par \par 2. PAF\par - now in NSR\par - CHADsVasc score 5\par - cont NOAC as above\par -cont Toprol 50 qd for rate control\par \par \par 3. HTN\par - now controlled, ACE DC"d due to angioedema\par - cont BB \par - cont Imdur 30 qd\par - continue rest of current meds for now\par \par 4. PE/DVT\par - cont to f/u w/ Dr. Fuller\par - cont NOAC\par \par 5. HLD\par - cont statin\par - ed done\par \par RTC 4 months . \par

## 2021-11-19 NOTE — HISTORY OF PRESENT ILLNESS
[FreeTextEntry1] : 80M w/ stage IV prostate CA (s/p prostatectomy, on chemotherapy), who presented to [facility]  with progressive SOB and JAIME x1mo associated w/ generalized fatigue in the setting\par of decreased PO intake, found to have segmental PE of RLL on CTA Chest and LLE\par DVT on US Dopplers likely 2/2 malignancy. Hospital course c/b coffee ground\par emesis,  resolved with EGD demonstrating duodenal ulcers and gastritis. Cardiology consulted for new onset atrial fibrillation during hospitalization, started on BB and instructed to continue NOAC. Admitted to [facility] 3/28/21 w/ angina and JERI. S/p [facility] w/ IRASEMA-OM1. Instructred to start triple tx for 1 week, then discontinue plavix. No c/o abnl bleeding. States anginal sx have completely resolved. Now returns again after recent admission to [facility] for facial numbness (CVA ruled out) which was subsequently relieved by removing his dentures. During hospitalization, was noted to have a drop in his H/H and was transfused 1U PRBCs. Cleared to resume NOAC and ASA by GI, follows Dr. Fuller (Heme/Onc) for serial H/Hs.\par Returns today for 3 month f/u and results of TTE. States he has had persistent C.Diff infections for which he is being f/b GI closely. No c/o increased JAIME or diminished ET. \par \par \par 11/19/21 ECG: NSR at 85 bpm, LAFB, QV1V2\par 8/20/21 ECG: NSR at 80 bpm, LAFB, NS STTWC\par 12/11/20 ECG: NSR at 86 bpm, LAFB, NS STTWC (unchanged from previous)\par 1/24/20 ECG: NSR at 86 bpm, LAD, LAFB, PVCs, non-specific STTWC

## 2021-11-24 RX ORDER — BEZLOTOXUMAB 25 MG/ML
1000 INJECTION, SOLUTION INTRAVENOUS
Qty: 1 | Refills: 0 | Status: ACTIVE | OUTPATIENT
Start: 2021-11-24

## 2021-11-30 ENCOUNTER — OUTPATIENT (OUTPATIENT)
Dept: OUTPATIENT SERVICES | Facility: HOSPITAL | Age: 81
LOS: 1 days | End: 2021-11-30
Payer: MEDICARE

## 2021-11-30 ENCOUNTER — APPOINTMENT (OUTPATIENT)
Age: 81
End: 2021-11-30

## 2021-11-30 DIAGNOSIS — D64.9 ANEMIA, UNSPECIFIED: ICD-10-CM

## 2021-11-30 PROCEDURE — 36415 COLL VENOUS BLD VENIPUNCTURE: CPT

## 2021-11-30 PROCEDURE — 86900 BLOOD TYPING SEROLOGIC ABO: CPT

## 2021-11-30 PROCEDURE — 86901 BLOOD TYPING SEROLOGIC RH(D): CPT

## 2021-11-30 PROCEDURE — 86850 RBC ANTIBODY SCREEN: CPT

## 2021-12-01 ENCOUNTER — APPOINTMENT (OUTPATIENT)
Age: 81
End: 2021-12-01

## 2021-12-01 ENCOUNTER — OUTPATIENT (OUTPATIENT)
Dept: OUTPATIENT SERVICES | Facility: HOSPITAL | Age: 81
LOS: 1 days | End: 2021-12-01
Payer: MEDICARE

## 2021-12-01 VITALS
SYSTOLIC BLOOD PRESSURE: 142 MMHG | RESPIRATION RATE: 18 BRPM | OXYGEN SATURATION: 98 % | HEART RATE: 73 BPM | TEMPERATURE: 97 F | DIASTOLIC BLOOD PRESSURE: 83 MMHG

## 2021-12-01 DIAGNOSIS — D64.9 ANEMIA, UNSPECIFIED: ICD-10-CM

## 2021-12-01 PROCEDURE — 36415 COLL VENOUS BLD VENIPUNCTURE: CPT

## 2021-12-01 PROCEDURE — P9040: CPT

## 2021-12-01 PROCEDURE — 36430 TRANSFUSION BLD/BLD COMPNT: CPT

## 2021-12-01 PROCEDURE — 86923 COMPATIBILITY TEST ELECTRIC: CPT

## 2021-12-01 RX ORDER — ACETAMINOPHEN 500 MG
650 TABLET ORAL ONCE
Refills: 0 | Status: COMPLETED | OUTPATIENT
Start: 2021-12-01 | End: 2021-12-01

## 2021-12-01 RX ORDER — DIPHENHYDRAMINE HCL 50 MG
25 CAPSULE ORAL ONCE
Refills: 0 | Status: COMPLETED | OUTPATIENT
Start: 2021-12-01 | End: 2021-12-01

## 2021-12-01 RX ADMIN — Medication 25 MILLIGRAM(S): at 10:30

## 2021-12-01 RX ADMIN — Medication 650 MILLIGRAM(S): at 10:30

## 2021-12-01 RX ADMIN — Medication 650 MILLIGRAM(S): at 11:00

## 2021-12-11 ENCOUNTER — NON-APPOINTMENT (OUTPATIENT)
Age: 81
End: 2021-12-11

## 2021-12-21 ENCOUNTER — NON-APPOINTMENT (OUTPATIENT)
Age: 81
End: 2021-12-21

## 2021-12-22 ENCOUNTER — APPOINTMENT (OUTPATIENT)
Age: 81
End: 2021-12-22

## 2022-01-13 ENCOUNTER — APPOINTMENT (OUTPATIENT)
Dept: UROLOGY | Facility: CLINIC | Age: 82
End: 2022-01-13
Payer: MEDICARE

## 2022-01-13 VITALS — DIASTOLIC BLOOD PRESSURE: 75 MMHG | TEMPERATURE: 97.6 F | SYSTOLIC BLOOD PRESSURE: 155 MMHG | HEART RATE: 66 BPM

## 2022-01-13 PROCEDURE — 99213 OFFICE O/P EST LOW 20 MIN: CPT

## 2022-01-14 NOTE — HISTORY OF PRESENT ILLNESS
[None] : None [FreeTextEntry1] : 81 yr old male with prostate cancer\par returns for follow \par C diff diarrhea - resolved two months ago \par went to [facility] [place] but was told not a candidate due to anemia\par now being seen at [facility] \par PSA  76.7 Dec 31, 2021\par          65 - last Oct 2021\par 50 lbs weight loss \par On Lupron 45- last dose in oct\par reports normal voiding\par

## 2022-01-14 NOTE — ASSESSMENT
[FreeTextEntry1] : Prostate cancer\par seen at [facility] for trials\par no pain \par PSA  76.7 Dec 31, 2021\par          65 - last Oct 2021\par Lupron 45  on April 2022\par Advised to follow up with PCP for appetite stimulant\par \par Follow up in April for Lupron 45 injection

## 2022-01-21 ENCOUNTER — APPOINTMENT (OUTPATIENT)
Dept: HEART AND VASCULAR | Facility: CLINIC | Age: 82
End: 2022-01-21
Payer: MEDICARE

## 2022-01-21 ENCOUNTER — NON-APPOINTMENT (OUTPATIENT)
Age: 82
End: 2022-01-21

## 2022-01-21 VITALS
WEIGHT: 137.99 LBS | HEART RATE: 68 BPM | BODY MASS INDEX: 20.44 KG/M2 | HEIGHT: 69 IN | TEMPERATURE: 97.3 F | DIASTOLIC BLOOD PRESSURE: 76 MMHG | SYSTOLIC BLOOD PRESSURE: 160 MMHG | OXYGEN SATURATION: 98 %

## 2022-01-21 PROCEDURE — 99214 OFFICE O/P EST MOD 30 MIN: CPT

## 2022-01-21 PROCEDURE — 93000 ELECTROCARDIOGRAM COMPLETE: CPT

## 2022-01-21 RX ORDER — ISOSORBIDE MONONITRATE 30 MG/1
30 TABLET, EXTENDED RELEASE ORAL DAILY
Qty: 90 | Refills: 3 | Status: DISCONTINUED | COMMUNITY
Start: 2021-05-14 | End: 2022-01-21

## 2022-01-25 ENCOUNTER — OUTPATIENT (OUTPATIENT)
Dept: OUTPATIENT SERVICES | Facility: HOSPITAL | Age: 82
LOS: 1 days | End: 2022-01-25
Payer: MEDICARE

## 2022-01-25 LAB — GLUCOSE BLDC GLUCOMTR-MCNC: 84 MG/DL — SIGNIFICANT CHANGE UP (ref 70–99)

## 2022-01-25 PROCEDURE — 82962 GLUCOSE BLOOD TEST: CPT

## 2022-01-25 PROCEDURE — A9588: CPT

## 2022-01-25 PROCEDURE — 78815 PET IMAGE W/CT SKULL-THIGH: CPT | Mod: 26,PS

## 2022-01-25 PROCEDURE — 78815 PET IMAGE W/CT SKULL-THIGH: CPT

## 2022-01-26 ENCOUNTER — APPOINTMENT (OUTPATIENT)
Dept: INTERNAL MEDICINE | Facility: CLINIC | Age: 82
End: 2022-01-26

## 2022-01-27 RX ORDER — CHLORHEXIDINE GLUCONATE 4 %
325 (65 FE) LIQUID (ML) TOPICAL TWICE DAILY
Qty: 60 | Refills: 3 | Status: ACTIVE | COMMUNITY
Start: 2021-06-07 | End: 1900-01-01

## 2022-02-09 NOTE — HISTORY OF PRESENT ILLNESS
[FreeTextEntry1] : 81M w/ stage IV prostate CA (s/p prostatectomy, on chemotherapy), who presented to [facility]  with progressive SOB and JAIME x1mo associated w/ generalized fatigue in the setting\par of decreased PO intake, found to have segmental PE of RLL on CTA Chest and LLE\par DVT on US Dopplers likely 2/2 malignancy. Hospital course c/b coffee ground\par emesis,  resolved with EGD demonstrating duodenal ulcers and gastritis. Cardiology consulted for new onset atrial fibrillation during hospitalization, started on BB and instructed to continue NOAC. Admitted to [facility] 3/28/21 w/ angina and JERI. S/p [facility] w/ IRASEMA-OM1. Instructred to start triple tx for 1 week, then discontinue plavix. No c/o abnl bleeding. States anginal sx have completely resolved. Now returns again after recent admission to [facility] for facial numbness (CVA ruled out) which was subsequently relieved by removing his dentures. During hospitalization, was noted to have a drop in his H/H and was transfused 1U PRBCs. Cleared to resume NOAC and ASA by GI, follows Dr. Fuller (Heme/Onc) for serial H/Hs.\par Returns today for 3 month f/u and results of TTE. No c/o increased JAIME or diminished ET compared to prior visit.  \par \par 1/21/22 ECG: NSR at 62 bpm, LAD, PAC, NS STTWC\par 11/19/21 ECG: NSR at 85 bpm, LAFB, QV1V2\par 8/20/21 ECG: NSR at 80 bpm, LAFB, NS STTWC\par 12/11/20 ECG: NSR at 86 bpm, LAFB, NS STTWC (unchanged from previous)\par 1/24/20 ECG: NSR at 86 bpm, LAD, LAFB, PVCs, non-specific STTWC

## 2022-02-09 NOTE — ASSESSMENT
[FreeTextEntry1] : 1. CAD s/p IRASEMA-OM1 3/2021\par - hospitalization reports reviewed\par - instructed to take plavix for 1 wk only in setting of previous GIB\par - ed done re heart healthy lifestyle modifications and diet \par - Nl EF on 3/2021 TTE in hospital\par - recent drop in H/H in setting of triple tx x1 week, pt usually w/ Hgb 8-9 at baseline\par - had a lengthy discussion w/ pt, his daughter as well as pt's Hem/Onc MD/Dr. Fuller. Given pt's hypercoagulable state, h/o PE/DVT as well as afib, benefits of NOAC outweigh risks. Cleared by GI to restart as well. Restarted  low dose Eliquis 2.5 bid and pt will have his H/H followed diligently by Dr. Fuller\par - cont ASA/NOAC/BB/statin\par - 11/5/21 TTE c/w preserved LV/RV sys fxn, mild to mod LVH, mild to mod MR, mild AI/TR\par \par 2. PAF\par - now in NSR\par - CHADsVasc score 5\par - cont NOAC as above\par -cont Toprol 50 qd for rate control\par \par \par 3. HTN\par - suboptimally controlled, ACE DC"d due to angioedema\par - cont BB \par - increase Imdur to 60 qd\par - continue rest of current meds for now\par \par 4. PE/DVT\par - cont to f/u w/ Dr. Fuller\par - cont NOAC\par \par 5. HLD\par - cont statin\par - ed done\par \par RTC 3 months . \par

## 2022-03-14 ENCOUNTER — APPOINTMENT (OUTPATIENT)
Age: 82
End: 2022-03-14

## 2022-03-14 ENCOUNTER — OUTPATIENT (OUTPATIENT)
Dept: OUTPATIENT SERVICES | Facility: HOSPITAL | Age: 82
LOS: 1 days | End: 2022-03-14
Payer: MEDICARE

## 2022-03-14 VITALS
DIASTOLIC BLOOD PRESSURE: 75 MMHG | RESPIRATION RATE: 18 BRPM | OXYGEN SATURATION: 96 % | HEART RATE: 58 BPM | HEIGHT: 62 IN | WEIGHT: 158.07 LBS | SYSTOLIC BLOOD PRESSURE: 131 MMHG | TEMPERATURE: 97 F

## 2022-03-14 DIAGNOSIS — A04.72 ENTEROCOLITIS DUE TO CLOSTRIDIUM DIFFICILE, NOT SPECIFIED AS RECURRENT: ICD-10-CM

## 2022-03-14 PROCEDURE — 86900 BLOOD TYPING SEROLOGIC ABO: CPT

## 2022-03-14 PROCEDURE — 86901 BLOOD TYPING SEROLOGIC RH(D): CPT

## 2022-03-14 PROCEDURE — 86923 COMPATIBILITY TEST ELECTRIC: CPT

## 2022-03-14 PROCEDURE — 36430 TRANSFUSION BLD/BLD COMPNT: CPT

## 2022-03-14 PROCEDURE — P9040: CPT

## 2022-03-14 PROCEDURE — 86850 RBC ANTIBODY SCREEN: CPT

## 2022-03-14 RX ORDER — DIPHENHYDRAMINE HCL 50 MG
25 CAPSULE ORAL ONCE
Refills: 0 | Status: COMPLETED | OUTPATIENT
Start: 2022-03-14 | End: 2022-03-14

## 2022-03-14 RX ORDER — ACETAMINOPHEN 500 MG
650 TABLET ORAL ONCE
Refills: 0 | Status: COMPLETED | OUTPATIENT
Start: 2022-03-14 | End: 2022-03-14

## 2022-03-14 RX ADMIN — Medication 650 MILLIGRAM(S): at 10:55

## 2022-03-14 RX ADMIN — Medication 25 MILLIGRAM(S): at 10:55

## 2022-03-18 ENCOUNTER — APPOINTMENT (OUTPATIENT)
Dept: HEART AND VASCULAR | Facility: CLINIC | Age: 82
End: 2022-03-18
Payer: MEDICARE

## 2022-03-18 ENCOUNTER — NON-APPOINTMENT (OUTPATIENT)
Age: 82
End: 2022-03-18

## 2022-03-18 VITALS
SYSTOLIC BLOOD PRESSURE: 147 MMHG | OXYGEN SATURATION: 97 % | DIASTOLIC BLOOD PRESSURE: 73 MMHG | HEIGHT: 69 IN | HEART RATE: 55 BPM | BODY MASS INDEX: 20.59 KG/M2 | TEMPERATURE: 97.9 F | WEIGHT: 139 LBS

## 2022-03-18 PROCEDURE — 99214 OFFICE O/P EST MOD 30 MIN: CPT

## 2022-03-18 PROCEDURE — 93000 ELECTROCARDIOGRAM COMPLETE: CPT

## 2022-04-13 NOTE — HISTORY OF PRESENT ILLNESS
[FreeTextEntry1] : 81M w/ stage IV prostate CA (s/p prostatectomy, on chemotherapy), who presented to [facility]  with progressive SOB and JAIME x1mo associated w/ generalized fatigue in the setting\par of decreased PO intake, found to have segmental PE of RLL on CTA Chest and LLE\par DVT on US Dopplers likely 2/2 malignancy. Hospital course c/b coffee ground\par emesis,  resolved with EGD demonstrating duodenal ulcers and gastritis. Cardiology consulted for new onset atrial fibrillation during hospitalization, started on BB and instructed to continue NOAC. Admitted to [facility] 3/28/21 w/ angina and JERI. S/p [facility] w/ IRASEMA-OM1. Instructed to start triple tx for 1 week, then discontinue plavix. No c/o abnl bleeding. States anginal sx have completely resolved. Now returns again after recent admission to [facility] for facial numbness (CVA ruled out) which was subsequently relieved by removing his dentures. During hospitalization, was noted to have a drop in his H/H and was transfused 1U PRBCs. Cleared to resume NOAC and ASA by GI, follows Dr. Fuller (Heme/Onc) for serial H/Hs.\par Returns today for 6 week f/u of BP. No c/o increased JAIME or diminished ET compared to prior visit. Brought in his home BP cuffs. States he had another 2U PRBC infusion 4 days ago.   \par \par 3/18/22 ECG: sinus bradycardia at 55 bpm, LAFB, poor RWP\par 1/21/22 ECG: NSR at 62 bpm, LAD, PAC, NS STTWC\par 11/19/21 ECG: NSR at 85 bpm, LAFB, QV1V2\par 8/20/21 ECG: NSR at 80 bpm, LAFB, NS STTWC\par 12/11/20 ECG: NSR at 86 bpm, LAFB, NS STTWC (unchanged from previous)\par 1/24/20 ECG: NSR at 86 bpm, LAD, LAFB, PVCs, non-specific STTWC

## 2022-04-13 NOTE — ASSESSMENT
[FreeTextEntry1] : 1. CAD s/p IRASEMA-OM1 3/2021\par - hospitalization reports reviewed\par - instructed to take plavix for 1 wk only in setting of previous GIB\par - ed done re heart healthy lifestyle modifications and diet \par - Nl EF on 3/2021 TTE in hospital\par - had a lengthy discussion w/ pt, his daughter as well as pt's Hem/Onc MD/Dr. Fuller. Given pt's hypercoagulable state, h/o PE/DVT as well as afib, benefits of NOAC outweigh risks. Cleared by GI to restart as well. Restarted  low dose Eliquis 2.5 bid and pt will have his H/H followed diligently by Dr. Fuller\par - cont ASA/NOAC/BB\par \par 2. PAF\par - now in NSR\par - CHADsVasc score 5\par - cont NOAC as above\par -cont Toprol 50 qd for better rate control\par \par \par 3. HTN\par - improved\par - home BP cuff readings c/t manual, wrist cuff inaccurate, brachial cuff wnl; ed done \par - ACE DC"d due to angioedema\par - cont BB \par - cont Imdur 60 qd\par - continue rest of current meds for now\par \par 4. PE/DVT\par - cont to f/u w/ Dr. Fuller\par - cont NOAC\par \par 5. HLD\par - cont statin\par - ed done\par \par RTC 3 months . \par

## 2022-04-25 ENCOUNTER — APPOINTMENT (OUTPATIENT)
Dept: INTERNAL MEDICINE | Facility: CLINIC | Age: 82
End: 2022-04-25
Payer: MEDICARE

## 2022-04-25 VITALS
TEMPERATURE: 97.7 F | RESPIRATION RATE: 12 BRPM | DIASTOLIC BLOOD PRESSURE: 70 MMHG | BODY MASS INDEX: 20.73 KG/M2 | HEIGHT: 69 IN | HEART RATE: 50 BPM | WEIGHT: 140 LBS | SYSTOLIC BLOOD PRESSURE: 163 MMHG | OXYGEN SATURATION: 100 %

## 2022-04-25 VITALS — SYSTOLIC BLOOD PRESSURE: 130 MMHG | DIASTOLIC BLOOD PRESSURE: 70 MMHG

## 2022-04-25 DIAGNOSIS — R19.5 OTHER FECAL ABNORMALITIES: ICD-10-CM

## 2022-04-25 PROCEDURE — 99213 OFFICE O/P EST LOW 20 MIN: CPT

## 2022-04-25 RX ORDER — AMOXICILLIN AND CLAVULANATE POTASSIUM 875; 125 MG/1; MG/1
875-125 TABLET, COATED ORAL
Qty: 14 | Refills: 0 | Status: DISCONTINUED | COMMUNITY
Start: 2021-07-27 | End: 2022-04-25

## 2022-04-25 NOTE — HISTORY OF PRESENT ILLNESS
[FreeTextEntry1] : All noted reviewed;\par Cardiology follow up noted;\par Getting injections every 6 months for Prostate Cancer [de-identified] : Weights have been stable;\par Medication reviewed; \par Had received two units of blood at transfusion center; March 13\par H/H has been stable;\par PSA has come down to 99

## 2022-07-01 ENCOUNTER — APPOINTMENT (OUTPATIENT)
Dept: UROLOGY | Facility: CLINIC | Age: 82
End: 2022-07-01

## 2022-07-01 VITALS
BODY MASS INDEX: 20.73 KG/M2 | WEIGHT: 140 LBS | DIASTOLIC BLOOD PRESSURE: 72 MMHG | HEART RATE: 81 BPM | TEMPERATURE: 97.5 F | SYSTOLIC BLOOD PRESSURE: 149 MMHG | HEIGHT: 69 IN

## 2022-07-01 PROCEDURE — 96402 CHEMO HORMON ANTINEOPL SQ/IM: CPT

## 2022-07-01 PROCEDURE — 99212 OFFICE O/P EST SF 10 MIN: CPT | Mod: 25

## 2022-07-01 RX ADMIN — LEUPROLIDE ACETATE 0 MG: KIT at 00:00

## 2022-07-01 NOTE — HISTORY OF PRESENT ILLNESS
[FreeTextEntry1] : rising PSAs now up to 155\par no bony pain\par continues to follow with ameri\par currently on no further treatment\par lupron alone at this time\par

## 2022-07-06 ENCOUNTER — APPOINTMENT (OUTPATIENT)
Dept: INFUSION THERAPY | Facility: CLINIC | Age: 82
End: 2022-07-06

## 2022-07-06 ENCOUNTER — OUTPATIENT (OUTPATIENT)
Dept: OUTPATIENT SERVICES | Facility: HOSPITAL | Age: 82
LOS: 1 days | End: 2022-07-06
Payer: MEDICARE

## 2022-07-06 VITALS
HEIGHT: 62 IN | HEART RATE: 64 BPM | OXYGEN SATURATION: 97 % | WEIGHT: 158.07 LBS | RESPIRATION RATE: 17 BRPM | DIASTOLIC BLOOD PRESSURE: 63 MMHG | SYSTOLIC BLOOD PRESSURE: 143 MMHG | TEMPERATURE: 97 F

## 2022-07-06 VITALS
HEART RATE: 66 BPM | SYSTOLIC BLOOD PRESSURE: 167 MMHG | OXYGEN SATURATION: 99 % | RESPIRATION RATE: 16 BRPM | DIASTOLIC BLOOD PRESSURE: 78 MMHG | TEMPERATURE: 97 F

## 2022-07-06 DIAGNOSIS — D64.9 ANEMIA, UNSPECIFIED: ICD-10-CM

## 2022-07-06 PROCEDURE — 86923 COMPATIBILITY TEST ELECTRIC: CPT

## 2022-07-06 PROCEDURE — 86900 BLOOD TYPING SEROLOGIC ABO: CPT

## 2022-07-06 PROCEDURE — P9040: CPT

## 2022-07-06 PROCEDURE — 96375 TX/PRO/DX INJ NEW DRUG ADDON: CPT

## 2022-07-06 PROCEDURE — 86901 BLOOD TYPING SEROLOGIC RH(D): CPT

## 2022-07-06 PROCEDURE — 36430 TRANSFUSION BLD/BLD COMPNT: CPT

## 2022-07-06 PROCEDURE — 86850 RBC ANTIBODY SCREEN: CPT

## 2022-07-06 PROCEDURE — 36415 COLL VENOUS BLD VENIPUNCTURE: CPT

## 2022-07-06 RX ORDER — DIPHENHYDRAMINE HCL 50 MG
25 CAPSULE ORAL ONCE
Refills: 0 | Status: COMPLETED | OUTPATIENT
Start: 2022-07-06 | End: 2022-07-06

## 2022-07-06 RX ORDER — ACETAMINOPHEN 500 MG
650 TABLET ORAL ONCE
Refills: 0 | Status: COMPLETED | OUTPATIENT
Start: 2022-07-06 | End: 2022-07-06

## 2022-07-06 RX ADMIN — Medication 650 MILLIGRAM(S): at 10:48

## 2022-07-06 RX ADMIN — Medication 202 MILLIGRAM(S): at 11:00

## 2022-07-06 RX ADMIN — Medication 25 MILLIGRAM(S): at 11:13

## 2022-07-06 RX ADMIN — Medication 650 MILLIGRAM(S): at 10:50

## 2022-07-08 ENCOUNTER — APPOINTMENT (OUTPATIENT)
Dept: HEART AND VASCULAR | Facility: CLINIC | Age: 82
End: 2022-07-08

## 2022-07-08 VITALS
DIASTOLIC BLOOD PRESSURE: 81 MMHG | HEART RATE: 62 BPM | SYSTOLIC BLOOD PRESSURE: 164 MMHG | BODY MASS INDEX: 20.14 KG/M2 | HEIGHT: 69 IN | OXYGEN SATURATION: 98 % | WEIGHT: 136 LBS

## 2022-07-08 DIAGNOSIS — R06.00 DYSPNEA, UNSPECIFIED: ICD-10-CM

## 2022-07-08 DIAGNOSIS — I26.99 OTHER PULMONARY EMBOLISM W/OUT ACUTE COR PULMONALE: ICD-10-CM

## 2022-07-08 PROCEDURE — 99214 OFFICE O/P EST MOD 30 MIN: CPT | Mod: 25

## 2022-07-08 PROCEDURE — 93000 ELECTROCARDIOGRAM COMPLETE: CPT

## 2022-07-20 ENCOUNTER — APPOINTMENT (OUTPATIENT)
Dept: HEART AND VASCULAR | Facility: CLINIC | Age: 82
End: 2022-07-20

## 2022-07-21 NOTE — ED PROVIDER NOTE - PMH
Atrial fibrillation  on Eliquis  Chemotherapy session for neoplasm  Prostate Cancer  DVT (deep venous thrombosis)  1/2020 on Eliquis  Essential hypertension  Hypertension  Glaucoma  Glaucoma  Hyperlipidemia  Hyperlipidemia  Malignant neoplasm of prostate  s/p total prostatectomy in 1994 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  Pulmonary embolism  1/2020  Type 2 diabetes mellitus  Diabetes   Winlevi Counseling:  I discussed with the patient the risks of topical clascoterone including but not limited to erythema, scaling, itching, and stinging. Patient voiced their understanding.

## 2022-07-29 ENCOUNTER — OUTPATIENT (OUTPATIENT)
Dept: OUTPATIENT SERVICES | Facility: HOSPITAL | Age: 82
LOS: 1 days | End: 2022-07-29
Payer: MEDICARE

## 2022-07-29 ENCOUNTER — APPOINTMENT (OUTPATIENT)
Dept: NUCLEAR MEDICINE | Facility: HOSPITAL | Age: 82
End: 2022-07-29

## 2022-07-29 LAB — GLUCOSE BLDC GLUCOMTR-MCNC: 94 MG/DL — SIGNIFICANT CHANGE UP (ref 70–99)

## 2022-07-29 PROCEDURE — 78815 PET IMAGE W/CT SKULL-THIGH: CPT | Mod: 26,PS

## 2022-07-29 PROCEDURE — A9595: CPT

## 2022-07-29 PROCEDURE — 82962 GLUCOSE BLOOD TEST: CPT

## 2022-07-29 PROCEDURE — 78815 PET IMAGE W/CT SKULL-THIGH: CPT

## 2022-08-09 NOTE — ED ADULT NURSE NOTE - CAS EDN INTEG ASSESS
"Subjective:       Patient ID: Holli Chaves is a 35 y.o. female.    Vitals:  height is 5' 8" (1.727 m) and weight is 57.6 kg (127 lb). Her temperature is 98 °F (36.7 °C). Her blood pressure is 96/47 (abnormal) and her pulse is 67. Her respiration is 18 and oxygen saturation is 98%.     Chief Complaint: Eye Pain    Pt states history of eye swelling in the same eye every 6 months.   Pt states she wears contacts but hasn't worn them since the swelling.     Eye Pain   The right eye is affected. This is a new problem. The current episode started in the past 7 days. The problem occurs constantly. The problem has been unchanged. The injury mechanism is unknown. The pain is at a severity of 4/10. The pain is moderate. There is no known exposure to pink eye. She wears contacts. Associated symptoms include an eye discharge. Pertinent negatives include no blurred vision, double vision, eye redness, fever, foreign body sensation, itching, nausea, photophobia, recent URI or vomiting. Treatments tried: tylenol  The treatment provided mild relief.       Constitution: Negative for fever.   Eyes: Positive for eye discharge, eye pain and eyelid swelling. Negative for eye itching, eye redness, photophobia, double vision and blurred vision.   Gastrointestinal: Negative for nausea and vomiting.       Objective:      Physical Exam   Constitutional:  Non-toxic appearance. She does not appear ill. No distress.   Eyes: Left eye exhibits no exudate. Right eye exhibits normal extraocular motion. Left eye exhibits normal extraocular motion.       Neurological: She is alert.   Skin: Skin is not diaphoretic.   Nursing note and vitals reviewed.           Hearing Screening    125Hz 250Hz 500Hz 1000Hz 2000Hz 3000Hz 4000Hz 6000Hz 8000Hz   Right ear:            Left ear:               Visual Acuity Screening    Right eye Left eye Both eyes   Without correction: 20/20 20/20 20/20   With correction:          Assessment:       1. Chalazion of right " lower eyelid          Plan:         Chalazion of right lower eyelid  -     erythromycin (ROMYCIN) ophthalmic ointment; Apply 1/2 cm ribbon to the right inner lower lid QID x 7 days  Dispense: 5 g; Refill: 0  -     Ambulatory referral/consult to Ophthalmology                    WDL

## 2022-08-19 RX ORDER — ACETAMINOPHEN 500 MG
650 TABLET ORAL ONCE
Refills: 0 | Status: DISCONTINUED | OUTPATIENT
Start: 2022-08-22 | End: 2022-09-05

## 2022-08-19 RX ORDER — DIPHENHYDRAMINE HCL 50 MG
25 CAPSULE ORAL ONCE
Refills: 0 | Status: DISCONTINUED | OUTPATIENT
Start: 2022-08-22 | End: 2022-09-05

## 2022-08-22 ENCOUNTER — OUTPATIENT (OUTPATIENT)
Dept: OUTPATIENT SERVICES | Facility: HOSPITAL | Age: 82
LOS: 1 days | End: 2022-08-22
Payer: MEDICARE

## 2022-08-22 ENCOUNTER — APPOINTMENT (OUTPATIENT)
Dept: INFUSION THERAPY | Facility: CLINIC | Age: 82
End: 2022-08-22

## 2022-08-22 VITALS
WEIGHT: 158.07 LBS | OXYGEN SATURATION: 96 % | SYSTOLIC BLOOD PRESSURE: 120 MMHG | RESPIRATION RATE: 16 BRPM | HEART RATE: 63 BPM | DIASTOLIC BLOOD PRESSURE: 63 MMHG | HEIGHT: 62 IN | TEMPERATURE: 97 F

## 2022-08-22 DIAGNOSIS — D64.9 ANEMIA, UNSPECIFIED: ICD-10-CM

## 2022-08-22 PROCEDURE — 86900 BLOOD TYPING SEROLOGIC ABO: CPT

## 2022-08-22 PROCEDURE — 86923 COMPATIBILITY TEST ELECTRIC: CPT

## 2022-08-22 PROCEDURE — 36430 TRANSFUSION BLD/BLD COMPNT: CPT

## 2022-08-22 PROCEDURE — 36415 COLL VENOUS BLD VENIPUNCTURE: CPT

## 2022-08-22 PROCEDURE — 86901 BLOOD TYPING SEROLOGIC RH(D): CPT

## 2022-08-22 PROCEDURE — 86850 RBC ANTIBODY SCREEN: CPT

## 2022-08-22 PROCEDURE — P9040: CPT

## 2022-08-29 ENCOUNTER — APPOINTMENT (OUTPATIENT)
Dept: HEART AND VASCULAR | Facility: CLINIC | Age: 82
End: 2022-08-29

## 2022-09-28 ENCOUNTER — APPOINTMENT (OUTPATIENT)
Dept: HEART AND VASCULAR | Facility: CLINIC | Age: 82
End: 2022-09-28

## 2022-09-28 PROCEDURE — 93306 TTE W/DOPPLER COMPLETE: CPT

## 2022-09-30 ENCOUNTER — APPOINTMENT (OUTPATIENT)
Dept: INFUSION THERAPY | Facility: CLINIC | Age: 82
End: 2022-09-30

## 2022-09-30 ENCOUNTER — OUTPATIENT (OUTPATIENT)
Dept: OUTPATIENT SERVICES | Facility: HOSPITAL | Age: 82
LOS: 1 days | End: 2022-09-30
Payer: MEDICARE

## 2022-09-30 VITALS
HEART RATE: 74 BPM | DIASTOLIC BLOOD PRESSURE: 74 MMHG | SYSTOLIC BLOOD PRESSURE: 100 MMHG | TEMPERATURE: 98 F | OXYGEN SATURATION: 99 % | RESPIRATION RATE: 18 BRPM

## 2022-09-30 DIAGNOSIS — D64.9 ANEMIA, UNSPECIFIED: ICD-10-CM

## 2022-09-30 PROCEDURE — 86900 BLOOD TYPING SEROLOGIC ABO: CPT

## 2022-09-30 PROCEDURE — 36430 TRANSFUSION BLD/BLD COMPNT: CPT

## 2022-09-30 PROCEDURE — 86901 BLOOD TYPING SEROLOGIC RH(D): CPT

## 2022-09-30 PROCEDURE — 86850 RBC ANTIBODY SCREEN: CPT

## 2022-09-30 PROCEDURE — 36415 COLL VENOUS BLD VENIPUNCTURE: CPT

## 2022-09-30 PROCEDURE — 86923 COMPATIBILITY TEST ELECTRIC: CPT

## 2022-09-30 PROCEDURE — P9040: CPT

## 2022-09-30 RX ORDER — DIPHENHYDRAMINE HCL 50 MG
25 CAPSULE ORAL ONCE
Refills: 0 | Status: DISCONTINUED | OUTPATIENT
Start: 2022-09-30 | End: 2022-10-14

## 2022-09-30 RX ORDER — ACETAMINOPHEN 500 MG
650 TABLET ORAL ONCE
Refills: 0 | Status: DISCONTINUED | OUTPATIENT
Start: 2022-09-30 | End: 2022-10-14

## 2022-10-17 NOTE — ED ADULT NURSE NOTE - MUSCULOSKELETAL ASSESSMENT
Airway  Urgency: elective    Airway not difficult    General Information and Staff    Patient location during procedure: OR    Indications and Patient Condition  Indications for airway management: airway protection    Preoxygenated: yes  MILS maintained throughout  Mask difficulty assessment: 2 - vent by mask + OA or adjuvant +/- NMBA    Final Airway Details  Final airway type: endotracheal airway      Successful airway: ETT  Cuffed: yes   Successful intubation technique: direct laryngoscopy  Endotracheal tube insertion site: oral  Blade: Carlo  Blade size: 3  ETT size (mm): 7.0  Cormack-Lehane Classification: grade I - full view of glottis  Placement verified by: chest auscultation and capnometry   Cuff volume (mL): 10  Measured from: teeth  ETT/EBT  to teeth (cm): 22  Number of attempts at approach: 1  Assessment: lips, teeth, and gum same as pre-op and atraumatic intubation           WDL

## 2022-10-21 ENCOUNTER — APPOINTMENT (OUTPATIENT)
Dept: HEART AND VASCULAR | Facility: CLINIC | Age: 82
End: 2022-10-21

## 2022-11-03 ENCOUNTER — INPATIENT (INPATIENT)
Facility: HOSPITAL | Age: 82
LOS: 12 days | Discharge: HOME HEALTH | DRG: 682 | End: 2022-11-16
Attending: INTERNAL MEDICINE | Admitting: INTERNAL MEDICINE
Payer: MEDICARE

## 2022-11-03 VITALS
TEMPERATURE: 98 F | OXYGEN SATURATION: 97 % | DIASTOLIC BLOOD PRESSURE: 70 MMHG | SYSTOLIC BLOOD PRESSURE: 129 MMHG | HEIGHT: 68 IN | WEIGHT: 138.89 LBS | RESPIRATION RATE: 18 BRPM | HEART RATE: 92 BPM

## 2022-11-03 LAB
ALBUMIN SERPL ELPH-MCNC: 3.6 G/DL — SIGNIFICANT CHANGE UP (ref 3.3–5)
ALP SERPL-CCNC: 74 U/L — SIGNIFICANT CHANGE UP (ref 40–120)
ALT FLD-CCNC: 9 U/L — LOW (ref 10–45)
ANION GAP SERPL CALC-SCNC: 14 MMOL/L — SIGNIFICANT CHANGE UP (ref 5–17)
APPEARANCE UR: ABNORMAL
AST SERPL-CCNC: 33 U/L — SIGNIFICANT CHANGE UP (ref 10–40)
BACTERIA # UR AUTO: ABNORMAL /HPF
BILIRUB SERPL-MCNC: 0.3 MG/DL — SIGNIFICANT CHANGE UP (ref 0.2–1.2)
BILIRUB UR-MCNC: NEGATIVE — SIGNIFICANT CHANGE UP
BUN SERPL-MCNC: 27 MG/DL — HIGH (ref 7–23)
CALCIUM SERPL-MCNC: 9.6 MG/DL — SIGNIFICANT CHANGE UP (ref 8.4–10.5)
CHLORIDE SERPL-SCNC: 107 MMOL/L — SIGNIFICANT CHANGE UP (ref 96–108)
CO2 SERPL-SCNC: 17 MMOL/L — LOW (ref 22–31)
COLOR SPEC: YELLOW — SIGNIFICANT CHANGE UP
CREAT SERPL-MCNC: 1.89 MG/DL — HIGH (ref 0.5–1.3)
DIFF PNL FLD: ABNORMAL
EGFR: 35 ML/MIN/1.73M2 — LOW
EPI CELLS # UR: SIGNIFICANT CHANGE UP /HPF (ref 0–5)
FLUAV AG NPH QL: SIGNIFICANT CHANGE UP
FLUBV AG NPH QL: SIGNIFICANT CHANGE UP
GLUCOSE SERPL-MCNC: 93 MG/DL — SIGNIFICANT CHANGE UP (ref 70–99)
GLUCOSE UR QL: NEGATIVE — SIGNIFICANT CHANGE UP
KETONES UR-MCNC: NEGATIVE — SIGNIFICANT CHANGE UP
LACTATE SERPL-SCNC: 1.5 MMOL/L — SIGNIFICANT CHANGE UP (ref 0.5–2)
LEUKOCYTE ESTERASE UR-ACNC: ABNORMAL
MAGNESIUM SERPL-MCNC: 2.1 MG/DL — SIGNIFICANT CHANGE UP (ref 1.6–2.6)
NITRITE UR-MCNC: POSITIVE
PH UR: 6 — SIGNIFICANT CHANGE UP (ref 5–8)
PHOSPHATE SERPL-MCNC: 2.5 MG/DL — SIGNIFICANT CHANGE UP (ref 2.5–4.5)
POTASSIUM SERPL-MCNC: 3.7 MMOL/L — SIGNIFICANT CHANGE UP (ref 3.5–5.3)
POTASSIUM SERPL-SCNC: 3.7 MMOL/L — SIGNIFICANT CHANGE UP (ref 3.5–5.3)
PROT SERPL-MCNC: 8.1 G/DL — SIGNIFICANT CHANGE UP (ref 6–8.3)
PROT UR-MCNC: ABNORMAL MG/DL
RBC CASTS # UR COMP ASSIST: < 5 /HPF — SIGNIFICANT CHANGE UP
RSV RNA NPH QL NAA+NON-PROBE: SIGNIFICANT CHANGE UP
SARS-COV-2 RNA SPEC QL NAA+PROBE: DETECTED
SODIUM SERPL-SCNC: 138 MMOL/L — SIGNIFICANT CHANGE UP (ref 135–145)
SP GR SPEC: 1.01 — SIGNIFICANT CHANGE UP (ref 1–1.03)
TROPONIN T SERPL-MCNC: 0.01 NG/ML — SIGNIFICANT CHANGE UP (ref 0–0.01)
UROBILINOGEN FLD QL: 0.2 E.U./DL — SIGNIFICANT CHANGE UP
WBC UR QL: ABNORMAL /HPF

## 2022-11-03 PROCEDURE — 93010 ELECTROCARDIOGRAM REPORT: CPT

## 2022-11-03 PROCEDURE — 71045 X-RAY EXAM CHEST 1 VIEW: CPT | Mod: 26

## 2022-11-03 PROCEDURE — 72100 X-RAY EXAM L-S SPINE 2/3 VWS: CPT | Mod: 26

## 2022-11-03 PROCEDURE — 99285 EMERGENCY DEPT VISIT HI MDM: CPT

## 2022-11-03 RX ORDER — LIDOCAINE 4 G/100G
1 CREAM TOPICAL ONCE
Refills: 0 | Status: COMPLETED | OUTPATIENT
Start: 2022-11-03 | End: 2022-11-03

## 2022-11-03 RX ORDER — BRIMONIDINE TARTRATE 2 MG/MG
1 SOLUTION/ DROPS OPHTHALMIC
Qty: 0 | Refills: 0 | DISCHARGE

## 2022-11-03 RX ORDER — ACETAMINOPHEN 500 MG
650 TABLET ORAL EVERY 6 HOURS
Refills: 0 | Status: DISCONTINUED | OUTPATIENT
Start: 2022-11-03 | End: 2022-11-16

## 2022-11-03 RX ORDER — ONDANSETRON 8 MG/1
4 TABLET, FILM COATED ORAL EVERY 8 HOURS
Refills: 0 | Status: DISCONTINUED | OUTPATIENT
Start: 2022-11-03 | End: 2022-11-16

## 2022-11-03 RX ORDER — ENOXAPARIN SODIUM 100 MG/ML
30 INJECTION SUBCUTANEOUS EVERY 24 HOURS
Refills: 0 | Status: DISCONTINUED | OUTPATIENT
Start: 2022-11-03 | End: 2022-11-04

## 2022-11-03 RX ORDER — LANOLIN ALCOHOL/MO/W.PET/CERES
3 CREAM (GRAM) TOPICAL AT BEDTIME
Refills: 0 | Status: DISCONTINUED | OUTPATIENT
Start: 2022-11-03 | End: 2022-11-16

## 2022-11-03 RX ORDER — ACETAMINOPHEN 500 MG
650 TABLET ORAL ONCE
Refills: 0 | Status: COMPLETED | OUTPATIENT
Start: 2022-11-03 | End: 2022-11-03

## 2022-11-03 RX ORDER — PREGABALIN 225 MG/1
1 CAPSULE ORAL
Qty: 0 | Refills: 0 | DISCHARGE

## 2022-11-03 RX ORDER — SODIUM CHLORIDE 9 MG/ML
1000 INJECTION INTRAMUSCULAR; INTRAVENOUS; SUBCUTANEOUS ONCE
Refills: 0 | Status: COMPLETED | OUTPATIENT
Start: 2022-11-03 | End: 2022-11-03

## 2022-11-03 RX ORDER — CEFTRIAXONE 500 MG/1
1000 INJECTION, POWDER, FOR SOLUTION INTRAMUSCULAR; INTRAVENOUS ONCE
Refills: 0 | Status: COMPLETED | OUTPATIENT
Start: 2022-11-03 | End: 2022-11-03

## 2022-11-03 RX ADMIN — Medication 650 MILLIGRAM(S): at 19:14

## 2022-11-03 RX ADMIN — CEFTRIAXONE 100 MILLIGRAM(S): 500 INJECTION, POWDER, FOR SOLUTION INTRAMUSCULAR; INTRAVENOUS at 19:14

## 2022-11-03 RX ADMIN — CEFTRIAXONE 1000 MILLIGRAM(S): 500 INJECTION, POWDER, FOR SOLUTION INTRAMUSCULAR; INTRAVENOUS at 22:50

## 2022-11-03 RX ADMIN — SODIUM CHLORIDE 1000 MILLILITER(S): 9 INJECTION INTRAMUSCULAR; INTRAVENOUS; SUBCUTANEOUS at 17:05

## 2022-11-03 RX ADMIN — LIDOCAINE 1 PATCH: 4 CREAM TOPICAL at 19:14

## 2022-11-03 RX ADMIN — SODIUM CHLORIDE 1000 MILLILITER(S): 9 INJECTION INTRAMUSCULAR; INTRAVENOUS; SUBCUTANEOUS at 22:50

## 2022-11-03 RX ADMIN — Medication 650 MILLIGRAM(S): at 20:00

## 2022-11-03 NOTE — H&P ADULT - PROBLEM SELECTOR PLAN 10
Baseline Hgb 8-9 on past admissions. Normocytic. 2/2 AoCD and DOMITILA. History of blood transfusions outpatient.  - C/w home Fe sulfate 325 daily

## 2022-11-03 NOTE — H&P ADULT - PROBLEM SELECTOR PLAN 5
H/o Stage IV prostate CA s/p prostatectomy and on chemotherapy with Lupron. Follows with urology and heme-onc Dr. Mcmillan.  - F/u outpatient heme-onc  - C/w home finasteride 5

## 2022-11-03 NOTE — H&P ADULT - ASSESSMENT
81M w/ CAD s/p PCI, Afib on eliquis, HTN, HLD, h/o PE/DVT, stage IV prostate CA s/p prostatectomy and on Lupron, and anemia (baseline Hgb 9) presents with a 3 day history of cough, diarrhea, and weakness, found to have COVID, UTI, and JERI.

## 2022-11-03 NOTE — H&P ADULT - PROBLEM SELECTOR PLAN 6
H/o pAFib. Follows with cardiologist at [facility].   - C/w eliquis 2.5 BID  - Toprol 50 daily

## 2022-11-03 NOTE — ED ADULT NURSE NOTE - NSIMPLEMENTINTERV_GEN_ALL_ED
Implemented All Fall with Harm Risk Interventions:  [place] to call system. Call bell, personal items and telephone within reach. Instruct patient to call for assistance. Room bathroom lighting operational. Non-slip footwear when patient is off stretcher. Physically safe environment: no spills, clutter or unnecessary equipment. Stretcher in lowest position, wheels locked, appropriate side rails in place. Provide visual cue, wrist band, yellow gown, etc. Monitor gait and stability. Monitor for mental status changes and reorient to person, place, and time. Review medications for side effects contributing to fall risk. Reinforce activity limits and safety measures with patient and family. Provide visual clues: red socks.

## 2022-11-03 NOTE — ED ADULT NURSE NOTE - OBJECTIVE STATEMENT
Patient a+o x4 c/o generalized weakness x 3 days, states "I can't get out of bed, I'm so weak". Patient states has been having intermittent cough and diarrhea. Daughter is historian, states hx stents x2, on blood thinners for afib, htn, hld. Patient speaking in full sentences without difficulty, denies sob/cp/dizziness/n/v/fever/chills. Iv initiated, labs collected and sent. Safety measures initiated, comfort measures rendered. Awaiting radiology results.

## 2022-11-03 NOTE — H&P ADULT - PROBLEM SELECTOR PLAN 2
Positive UA on admission. Patient has chronic urinary incontinence and frequency related to h/o prostate cancer and prostatectomy. Follows with urology. No dysuria.  - C/w CTX 1g x3 days

## 2022-11-03 NOTE — H&P ADULT - PROBLEM SELECTOR PLAN 7
19 Hour(s) 14 Minute(s) Normotensive on admission. Home med: HCTZ 25 and toprol 50. Allergic to ACEI, developed angioedema.  - C/w home HCTZ and BB

## 2022-11-03 NOTE — H&P ADULT - PROBLEM SELECTOR PLAN 8
Admission in 2021 to [facility] for dyspnea on exertion, found to have DVT and PE. High risk for blood clots given cancer history.  - C/w eliquis 2.5 BID

## 2022-11-03 NOTE — H&P ADULT - NSHPLABSRESULTS_GEN_ALL_CORE
.  LABS:                         9.0    7.58  )-----------( 321      ( 2022 17:05 )             29.3         138  |  107  |  27<H>  ----------------------------<  93  3.7   |  17<L>  |  1.89<H>    Ca    9.6      2022 22:50  Phos  2.5       Mg     2.1         TPro  8.1  /  Alb  3.6  /  TBili  0.3  /  DBili  x   /  AST  33  /  ALT  9<L>  /  AlkPhos  74        Urinalysis Basic - ( 2022 17:36 )    Color: Yellow / Appearance: SL Cloudy / S.015 / pH: x  Gluc: x / Ketone: NEGATIVE  / Bili: Negative / Urobili: 0.2 E.U./dL   Blood: x / Protein: Trace mg/dL / Nitrite: POSITIVE   Leuk Esterase: Large / RBC: < 5 /HPF / WBC Many /HPF   Sq Epi: x / Non Sq Epi: 0-5 /HPF / Bacteria: Many /HPF      CARDIAC MARKERS ( 2022 17:03 )  x     / 0.01 ng/mL / x     / x     / x            Lactate, Blood: 1.5 mmol/L ( @ 17:01)      RADIOLOGY, EKG & ADDITIONAL TESTS: Reviewed.

## 2022-11-03 NOTE — H&P ADULT - PROBLEM SELECTOR PLAN 11
C. diff positive on admission for diarrhea in August 2021. Treated with course of fidaxomicin.  - F/u GI PCR and c.diff

## 2022-11-03 NOTE — H&P ADULT - PROBLEM SELECTOR PLAN 4
Complaining of some cough and weakness the past few days. No shortness of breath. Not hypoxic. COVID vaccinated. Never had COVID before.  - Candidate for monoclonal Ab

## 2022-11-03 NOTE — H&P ADULT - PROBLEM SELECTOR PLAN 9
CAD s/p PCI w/ IRASEMA in 2021. Follows with cardiology Dr. Sánchez.  - C/w home atorva 20, ASA 81, imdur 30, and toprol 50

## 2022-11-03 NOTE — ED PROVIDER NOTE - CLINICAL SUMMARY MEDICAL DECISION MAKING FREE TEXT BOX
82 y/o M hx of CAD s/p PCI, Afib on Eliquis, HTN, HLD, Prostate Ca presenting with diarrhea & weakness/cough  Possibly 2/2 to infectious precipitant  Check screening labs, CXR, Covid, UA  IVF  Plain films of lumbar spine given history of prostate cancer   [place]   Reassess. 80 y/o M hx of CAD s/p PCI, Afib, PE/DVT on Eliquis, HTN, HLD, Prostate Ca presenting with diarrhea & weakness/cough  Possibly 2/2 to infectious precipitant  Check screening labs, CXR, Covid, UA  IVF  Plain films of lumbar spine given history of prostate cancer   [place]   Reassess.

## 2022-11-03 NOTE — H&P ADULT - HISTORY OF PRESENT ILLNESS
81M w/ CAD s/p PCI, Afib on eliquis, HTN, HLD, prostate CA in remission, presents with a 3 day history of cough, diarrhea, and weakness. Patient complains of chronic incontinence. 81M w/ CAD s/p PCI, Afib on eliquis, HTN, HLD, h/o PE/DVT, stage IV prostate CA s/p prostatectomy and on Lupron, and anemia (baseline Hgb 9) presents with a 3 day history of cough, diarrhea, and weakness. Denies N/V, abdominal pain. Patient complains of chronic urinary incontinence but no dysuria. Patient reports having C. diff one year prior. Patient accompanied by his daughter who also was not feeling well. COVID vaccinated with booster. Has never had COVID before. Denies headaches, fevers, chills, chest pain, or shortness of breath.     In the ED:  Initial vital signs: T: 98.3 F, HR: 92, BP: 129/70, R: 18, SpO2: 97% on RA  Labs: significant for Hgb 9.0 (at baseline), Na 133, Cr 1.94 (baseline 1.0), BUN 28, COVID positive, UA large LE/positive nitrites, many WBCs and bacteria  Imaging:  CXR: clear  EKG: NSR  Medications: CTX 1g, NS 1L   Consults: none

## 2022-11-03 NOTE — ED PROVIDER NOTE - PHYSICAL EXAMINATION
GENERAL: no acute distress  HEAD:  Atraumatic, Normocephalic  EYES: EOMI, PERRLA, conjunctiva and sclera clear  ENT: MMM; oropharynx clear  NECK: Supple, No JVD  CHEST/LUNG: Clear to auscultation bilaterally; No wheeze  HEART: Regular rate and rhythm; No murmurs, rubs, or gallops  ABDOMEN: Soft, Nontender, Nondistended; Bowel sounds present  BACK: no spinal point tenderness  EXTREMITIES:  2+ Peripheral Pulses, No clubbing, cyanosis, or edema  PSYCH: AAOx3  NEUROLOGY: no focal motor or sensory deficits. 5/5 muscle strength in all extremities.   SKIN: No rashes or lesions

## 2022-11-03 NOTE — ED PROVIDER NOTE - OBJECTIVE STATEMENT
82 y/o M hx of CAD s/p PCI, Afib on Eliquis, HTN, HLD, Prostate Ca presenting with diarrhea & weakness.    Patient reports weakness and voluminous diarrhea for the last three days. No fevers, but reports cough.   Daughter sick with respiratory symptoms. No nausea/vomiting/chest pain/abdominal pain  Notes back pain, but no leg weakness, saddle anesthesia or urinary retention. 80 y/o M hx of CAD s/p PCI, Afib on Eliquis, HTN, HLD, Prostate Ca presenting with diarrhea & weakness.    Patient reports weakness and voluminous diarrhea (watery, multiple episodes per day) for the last three days. No fevers, but reports cough.   Daughter sick with respiratory symptoms. No nausea/vomiting/chest pain/abdominal pain  Notes back pain, but no leg weakness, saddle anesthesia or urinary retention.  Treated for Cdiff one year prior.     PMD: Sandra Gill 80 y/o M hx of CAD s/p PCI, Afib, PE/DVT on Eliquis, HTN, HLD, Prostate Ca presenting with diarrhea & weakness.    Patient reports weakness and voluminous diarrhea (watery, multiple episodes per day) for the last three days. No fevers, but reports cough.   Daughter sick with respiratory symptoms. No nausea/vomiting/chest pain/abdominal pain  Notes back pain, but no leg weakness, saddle anesthesia or urinary retention.  Treated for Cdiff one year prior.     PMD: Sandra Gill

## 2022-11-03 NOTE — ED PROVIDER NOTE - CARE PLAN
Principal Discharge DX:	Urinary tract infection  Secondary Diagnosis:	JERI (acute kidney injury)   1 Principal Discharge DX:	Urinary tract infection  Secondary Diagnosis:	JERI (acute kidney injury)  Secondary Diagnosis:	COVID-19

## 2022-11-03 NOTE — H&P ADULT - NSHPSOCIALHISTORY_GEN_ALL_CORE
Denies smoking, alcohol, illicit drug use. Denies smoking, alcohol, illicit drug use. Ambulates with a cane at baseline.

## 2022-11-03 NOTE — H&P ADULT - PROBLEM SELECTOR PLAN 1
Patient presenting with several day history of weakness, with associated cough and diarrhea. No N/V. Found to be COVID positive with UTI and JERI. History of c. diff infection 1 year ago, completed course of fidaxomicin. Weakness likely due to dehydration from diarrhea and UTI. 0/4 SIRS criteria, no signs of sepsis. Ambulates with a cane at baseline.  - F/u c. diff test and GI PCR  - F/u BCx and UCx with sensitivities  - CTX 1g x3 days  - IV maintenance fluids  - PT/OT consult

## 2022-11-03 NOTE — H&P ADULT - PROBLEM SELECTOR PLAN 12
F: NS 100cc/hr  E: Replete for K<4, Mag<2  N: Regular Diet  A: Eliquis  Code status: Full  Dispo: RMF

## 2022-11-03 NOTE — ED PROVIDER NOTE - PROGRESS NOTE DETAILS
UA positive-will treat with ceftriaxone based on previous cultures.   JERI also noted & +Covid-19  Case d/w Dr. Gill-accepted to his service.

## 2022-11-03 NOTE — H&P ADULT - NSHPPHYSICALEXAM_GEN_ALL_CORE
.  VITAL SIGNS:  T(C): 36.7 (11-03-22 @ 22:45), Max: 36.8 (11-03-22 @ 15:17)  T(F): 98.1 (11-03-22 @ 22:45), Max: 98.3 (11-03-22 @ 15:17)  HR: 83 (11-03-22 @ 22:45) (78 - 92)  BP: 108/73 (11-03-22 @ 22:45) (108/73 - 129/70)  BP(mean): --  RR: 17 (11-03-22 @ 22:45) (17 - 18)  SpO2: 100% (11-03-22 @ 22:45) (97% - 100%)  Wt(kg): --    PHYSICAL EXAM:    General: Elderly man, frail  HEENT: NC/AT; PERRL, anicteric sclera; MMM  Neck: supple  Cardiovascular: +S1/S2; RRR  Respiratory: CTA B/L; no W/R/R  Gastrointestinal: soft, NT/ND; +BSx4  Extremities: WWP; no edema, clubbing or cyanosis  Vascular: 2+ radial, DP/PT pulses B/L  Neurological: AAOx3; no focal deficits

## 2022-11-03 NOTE — H&P ADULT - PROBLEM SELECTOR PLAN 3
Cr 1.98 on admission. Downtrending after 1L NS in ED. Baseline Cr 1.0. Likely prerenal. UA Spec Grav 1.015  - C/w IV maintenance fluids  - F/u urine lytes

## 2022-11-04 DIAGNOSIS — H40.9 UNSPECIFIED GLAUCOMA: ICD-10-CM

## 2022-11-04 DIAGNOSIS — Z86.19 PERSONAL HISTORY OF OTHER INFECTIOUS AND PARASITIC DISEASES: ICD-10-CM

## 2022-11-04 DIAGNOSIS — D64.9 ANEMIA, UNSPECIFIED: ICD-10-CM

## 2022-11-04 DIAGNOSIS — I25.10 ATHEROSCLEROTIC HEART DISEASE OF NATIVE CORONARY ARTERY WITHOUT ANGINA PECTORIS: ICD-10-CM

## 2022-11-04 DIAGNOSIS — R63.8 OTHER SYMPTOMS AND SIGNS CONCERNING FOOD AND FLUID INTAKE: ICD-10-CM

## 2022-11-04 DIAGNOSIS — N39.0 URINARY TRACT INFECTION, SITE NOT SPECIFIED: ICD-10-CM

## 2022-11-04 DIAGNOSIS — R53.1 WEAKNESS: ICD-10-CM

## 2022-11-04 DIAGNOSIS — I10 ESSENTIAL (PRIMARY) HYPERTENSION: ICD-10-CM

## 2022-11-04 DIAGNOSIS — U07.1 COVID-19: ICD-10-CM

## 2022-11-04 DIAGNOSIS — N17.9 ACUTE KIDNEY FAILURE, UNSPECIFIED: ICD-10-CM

## 2022-11-04 DIAGNOSIS — I48.0 PAROXYSMAL ATRIAL FIBRILLATION: ICD-10-CM

## 2022-11-04 DIAGNOSIS — C61 MALIGNANT NEOPLASM OF PROSTATE: ICD-10-CM

## 2022-11-04 DIAGNOSIS — R19.7 DIARRHEA, UNSPECIFIED: ICD-10-CM

## 2022-11-04 DIAGNOSIS — Z86.711 PERSONAL HISTORY OF PULMONARY EMBOLISM: ICD-10-CM

## 2022-11-04 LAB
ALBUMIN SERPL ELPH-MCNC: 2.9 G/DL — LOW (ref 3.3–5)
ALP SERPL-CCNC: 63 U/L — SIGNIFICANT CHANGE UP (ref 40–120)
ALT FLD-CCNC: 8 U/L — LOW (ref 10–45)
ANION GAP SERPL CALC-SCNC: 10 MMOL/L — SIGNIFICANT CHANGE UP (ref 5–17)
ANISOCYTOSIS BLD QL: SLIGHT — SIGNIFICANT CHANGE UP
AST SERPL-CCNC: 32 U/L — SIGNIFICANT CHANGE UP (ref 10–40)
BASOPHILS # BLD AUTO: 0 K/UL — SIGNIFICANT CHANGE UP (ref 0–0.2)
BASOPHILS # BLD AUTO: 0.02 K/UL — SIGNIFICANT CHANGE UP (ref 0–0.2)
BASOPHILS NFR BLD AUTO: 0 % — SIGNIFICANT CHANGE UP (ref 0–2)
BASOPHILS NFR BLD AUTO: 0.3 % — SIGNIFICANT CHANGE UP (ref 0–2)
BILIRUB SERPL-MCNC: 0.2 MG/DL — SIGNIFICANT CHANGE UP (ref 0.2–1.2)
BUN SERPL-MCNC: 27 MG/DL — HIGH (ref 7–23)
BURR CELLS BLD QL SMEAR: PRESENT — SIGNIFICANT CHANGE UP
C DIFF GDH STL QL: NEGATIVE — SIGNIFICANT CHANGE UP
C DIFF GDH STL QL: SIGNIFICANT CHANGE UP
CALCIUM SERPL-MCNC: 9.1 MG/DL — SIGNIFICANT CHANGE UP (ref 8.4–10.5)
CHLORIDE SERPL-SCNC: 110 MMOL/L — HIGH (ref 96–108)
CO2 SERPL-SCNC: 17 MMOL/L — LOW (ref 22–31)
CREAT ?TM UR-MCNC: 49 MG/DL — SIGNIFICANT CHANGE UP
CREAT SERPL-MCNC: 1.82 MG/DL — HIGH (ref 0.5–1.3)
DACRYOCYTES BLD QL SMEAR: SLIGHT — SIGNIFICANT CHANGE UP
EGFR: 37 ML/MIN/1.73M2 — LOW
EOSINOPHIL # BLD AUTO: 0.15 K/UL — SIGNIFICANT CHANGE UP (ref 0–0.5)
EOSINOPHIL # BLD AUTO: 0.19 K/UL — SIGNIFICANT CHANGE UP (ref 0–0.5)
EOSINOPHIL NFR BLD AUTO: 2.6 % — SIGNIFICANT CHANGE UP (ref 0–6)
EOSINOPHIL NFR BLD AUTO: 3 % — SIGNIFICANT CHANGE UP (ref 0–6)
FERRITIN SERPL-MCNC: 358 NG/ML — SIGNIFICANT CHANGE UP (ref 30–400)
GLUCOSE SERPL-MCNC: 74 MG/DL — SIGNIFICANT CHANGE UP (ref 70–99)
HCT VFR BLD CALC: 29.1 % — LOW (ref 39–50)
HCT VFR BLD CALC: 31.7 % — LOW (ref 39–50)
HGB BLD-MCNC: 8.8 G/DL — LOW (ref 13–17)
HGB BLD-MCNC: 9.3 G/DL — LOW (ref 13–17)
HYPOCHROMIA BLD QL: SLIGHT — SIGNIFICANT CHANGE UP
IMM GRANULOCYTES NFR BLD AUTO: 0.5 % — SIGNIFICANT CHANGE UP (ref 0–0.9)
IRON SATN MFR SERPL: 11 % — LOW (ref 16–55)
IRON SATN MFR SERPL: 13 UG/DL — LOW (ref 45–165)
IRON SATN MFR SERPL: 14 % — LOW (ref 16–55)
IRON SATN MFR SERPL: 17 UG/DL — LOW (ref 45–165)
LYMPHOCYTES # BLD AUTO: 0.78 K/UL — LOW (ref 1–3.3)
LYMPHOCYTES # BLD AUTO: 1.14 K/UL — SIGNIFICANT CHANGE UP (ref 1–3.3)
LYMPHOCYTES # BLD AUTO: 13.9 % — SIGNIFICANT CHANGE UP (ref 13–44)
LYMPHOCYTES # BLD AUTO: 17.8 % — SIGNIFICANT CHANGE UP (ref 13–44)
MACROCYTES BLD QL: SLIGHT — SIGNIFICANT CHANGE UP
MAGNESIUM SERPL-MCNC: 2.1 MG/DL — SIGNIFICANT CHANGE UP (ref 1.6–2.6)
MANUAL SMEAR VERIFICATION: SIGNIFICANT CHANGE UP
MCHC RBC-ENTMCNC: 26 PG — LOW (ref 27–34)
MCHC RBC-ENTMCNC: 26 PG — LOW (ref 27–34)
MCHC RBC-ENTMCNC: 29.3 GM/DL — LOW (ref 32–36)
MCHC RBC-ENTMCNC: 30.2 GM/DL — LOW (ref 32–36)
MCV RBC AUTO: 86.1 FL — SIGNIFICANT CHANGE UP (ref 80–100)
MCV RBC AUTO: 88.5 FL — SIGNIFICANT CHANGE UP (ref 80–100)
MICROCYTES BLD QL: SLIGHT — SIGNIFICANT CHANGE UP
MONOCYTES # BLD AUTO: 0.29 K/UL — SIGNIFICANT CHANGE UP (ref 0–0.9)
MONOCYTES # BLD AUTO: 0.48 K/UL — SIGNIFICANT CHANGE UP (ref 0–0.9)
MONOCYTES NFR BLD AUTO: 5.2 % — SIGNIFICANT CHANGE UP (ref 2–14)
MONOCYTES NFR BLD AUTO: 7.5 % — SIGNIFICANT CHANGE UP (ref 2–14)
NEUTROPHILS # BLD AUTO: 4.39 K/UL — SIGNIFICANT CHANGE UP (ref 1.8–7.4)
NEUTROPHILS # BLD AUTO: 4.53 K/UL — SIGNIFICANT CHANGE UP (ref 1.8–7.4)
NEUTROPHILS NFR BLD AUTO: 70.9 % — SIGNIFICANT CHANGE UP (ref 43–77)
NEUTROPHILS NFR BLD AUTO: 77.4 % — HIGH (ref 43–77)
NEUTS BAND # BLD: 0.9 % — SIGNIFICANT CHANGE UP (ref 0–8)
NRBC # BLD: 0 /100 WBCS — SIGNIFICANT CHANGE UP (ref 0–0)
OSMOLALITY UR: 217 MOSM/KG — LOW (ref 300–900)
OVALOCYTES BLD QL SMEAR: SLIGHT — SIGNIFICANT CHANGE UP
PHOSPHATE SERPL-MCNC: 3.1 MG/DL — SIGNIFICANT CHANGE UP (ref 2.5–4.5)
PLAT MORPH BLD: ABNORMAL
PLATELET # BLD AUTO: 253 K/UL — SIGNIFICANT CHANGE UP (ref 150–400)
PLATELET # BLD AUTO: 264 K/UL — SIGNIFICANT CHANGE UP (ref 150–400)
POIKILOCYTOSIS BLD QL AUTO: SIGNIFICANT CHANGE UP
POLYCHROMASIA BLD QL SMEAR: SLIGHT — SIGNIFICANT CHANGE UP
POTASSIUM SERPL-MCNC: 4 MMOL/L — SIGNIFICANT CHANGE UP (ref 3.5–5.3)
POTASSIUM SERPL-SCNC: 4 MMOL/L — SIGNIFICANT CHANGE UP (ref 3.5–5.3)
PROT SERPL-MCNC: 6.9 G/DL — SIGNIFICANT CHANGE UP (ref 6–8.3)
RBC # BLD: 3.38 M/UL — LOW (ref 4.2–5.8)
RBC # BLD: 3.58 M/UL — LOW (ref 4.2–5.8)
RBC # BLD: 3.58 M/UL — LOW (ref 4.2–5.8)
RBC # FLD: 20.1 % — HIGH (ref 10.3–14.5)
RBC # FLD: 20.7 % — HIGH (ref 10.3–14.5)
RBC BLD AUTO: ABNORMAL
RETICS #: 45.7 K/UL — SIGNIFICANT CHANGE UP (ref 25–125)
RETICS/RBC NFR: 1.3 % — SIGNIFICANT CHANGE UP (ref 0.5–2.5)
SCHISTOCYTES BLD QL AUTO: SLIGHT — SIGNIFICANT CHANGE UP
SMUDGE CELLS # BLD: PRESENT — SIGNIFICANT CHANGE UP
SODIUM SERPL-SCNC: 137 MMOL/L — SIGNIFICANT CHANGE UP (ref 135–145)
SODIUM UR-SCNC: 38 MMOL/L — SIGNIFICANT CHANGE UP
SPHEROCYTES BLD QL SMEAR: SLIGHT — SIGNIFICANT CHANGE UP
TIBC SERPL-MCNC: 118 UG/DL — LOW (ref 220–430)
TIBC SERPL-MCNC: 121 UG/DL — LOW (ref 220–430)
UIBC SERPL-MCNC: 104 UG/DL — LOW (ref 110–370)
UIBC SERPL-MCNC: 105 UG/DL — LOW (ref 110–370)
WBC # BLD: 5.61 K/UL — SIGNIFICANT CHANGE UP (ref 3.8–10.5)
WBC # BLD: 6.39 K/UL — SIGNIFICANT CHANGE UP (ref 3.8–10.5)
WBC # FLD AUTO: 5.61 K/UL — SIGNIFICANT CHANGE UP (ref 3.8–10.5)
WBC # FLD AUTO: 6.39 K/UL — SIGNIFICANT CHANGE UP (ref 3.8–10.5)

## 2022-11-04 PROCEDURE — 99233 SBSQ HOSP IP/OBS HIGH 50: CPT | Mod: GC

## 2022-11-04 RX ORDER — SODIUM CHLORIDE 9 MG/ML
1000 INJECTION INTRAMUSCULAR; INTRAVENOUS; SUBCUTANEOUS
Refills: 0 | Status: DISCONTINUED | OUTPATIENT
Start: 2022-11-04 | End: 2022-11-04

## 2022-11-04 RX ORDER — ATORVASTATIN CALCIUM 80 MG/1
20 TABLET, FILM COATED ORAL AT BEDTIME
Refills: 0 | Status: DISCONTINUED | OUTPATIENT
Start: 2022-11-04 | End: 2022-11-16

## 2022-11-04 RX ORDER — ISOSORBIDE MONONITRATE 60 MG/1
30 TABLET, EXTENDED RELEASE ORAL DAILY
Refills: 0 | Status: DISCONTINUED | OUTPATIENT
Start: 2022-11-04 | End: 2022-11-16

## 2022-11-04 RX ORDER — VALSARTAN 80 MG/1
160 TABLET ORAL DAILY
Refills: 0 | Status: DISCONTINUED | OUTPATIENT
Start: 2022-11-04 | End: 2022-11-04

## 2022-11-04 RX ORDER — SODIUM CHLORIDE 9 MG/ML
1000 INJECTION INTRAMUSCULAR; INTRAVENOUS; SUBCUTANEOUS
Refills: 0 | Status: DISCONTINUED | OUTPATIENT
Start: 2022-11-04 | End: 2022-11-05

## 2022-11-04 RX ORDER — METOPROLOL TARTRATE 50 MG
50 TABLET ORAL DAILY
Refills: 0 | Status: DISCONTINUED | OUTPATIENT
Start: 2022-11-04 | End: 2022-11-04

## 2022-11-04 RX ORDER — BEBTELOVIMAB 87.5 MG/ML
175 INJECTION, SOLUTION INTRAVENOUS ONCE
Refills: 0 | Status: COMPLETED | OUTPATIENT
Start: 2022-11-04 | End: 2022-11-04

## 2022-11-04 RX ORDER — POTASSIUM CHLORIDE 20 MEQ
40 PACKET (EA) ORAL ONCE
Refills: 0 | Status: COMPLETED | OUTPATIENT
Start: 2022-11-04 | End: 2022-11-04

## 2022-11-04 RX ORDER — PREGABALIN 225 MG/1
1000 CAPSULE ORAL DAILY
Refills: 0 | Status: DISCONTINUED | OUTPATIENT
Start: 2022-11-04 | End: 2022-11-16

## 2022-11-04 RX ORDER — APIXABAN 2.5 MG/1
2.5 TABLET, FILM COATED ORAL EVERY 12 HOURS
Refills: 0 | Status: DISCONTINUED | OUTPATIENT
Start: 2022-11-04 | End: 2022-11-16

## 2022-11-04 RX ORDER — PANTOPRAZOLE SODIUM 20 MG/1
40 TABLET, DELAYED RELEASE ORAL
Refills: 0 | Status: DISCONTINUED | OUTPATIENT
Start: 2022-11-04 | End: 2022-11-16

## 2022-11-04 RX ORDER — VALSARTAN 80 MG/1
1 TABLET ORAL
Qty: 0 | Refills: 0 | DISCHARGE

## 2022-11-04 RX ORDER — CEFTRIAXONE 500 MG/1
1000 INJECTION, POWDER, FOR SOLUTION INTRAMUSCULAR; INTRAVENOUS EVERY 24 HOURS
Refills: 0 | Status: COMPLETED | OUTPATIENT
Start: 2022-11-04 | End: 2022-11-05

## 2022-11-04 RX ORDER — DORZOLAMIDE HYDROCHLORIDE 20 MG/ML
1 SOLUTION/ DROPS OPHTHALMIC THREE TIMES A DAY
Refills: 0 | Status: DISCONTINUED | OUTPATIENT
Start: 2022-11-04 | End: 2022-11-16

## 2022-11-04 RX ORDER — ASPIRIN/CALCIUM CARB/MAGNESIUM 324 MG
81 TABLET ORAL DAILY
Refills: 0 | Status: DISCONTINUED | OUTPATIENT
Start: 2022-11-04 | End: 2022-11-16

## 2022-11-04 RX ORDER — FINASTERIDE 5 MG/1
5 TABLET, FILM COATED ORAL DAILY
Refills: 0 | Status: DISCONTINUED | OUTPATIENT
Start: 2022-11-04 | End: 2022-11-16

## 2022-11-04 RX ORDER — LOPERAMIDE HCL 2 MG
2 TABLET ORAL EVERY 4 HOURS
Refills: 0 | Status: ACTIVE | OUTPATIENT
Start: 2022-11-04 | End: 2023-10-03

## 2022-11-04 RX ORDER — HYDROCHLOROTHIAZIDE 25 MG
25 TABLET ORAL DAILY
Refills: 0 | Status: DISCONTINUED | OUTPATIENT
Start: 2022-11-04 | End: 2022-11-16

## 2022-11-04 RX ORDER — METOPROLOL TARTRATE 50 MG
50 TABLET ORAL DAILY
Refills: 0 | Status: DISCONTINUED | OUTPATIENT
Start: 2022-11-04 | End: 2022-11-16

## 2022-11-04 RX ADMIN — Medication 81 MILLIGRAM(S): at 13:09

## 2022-11-04 RX ADMIN — SODIUM CHLORIDE 100 MILLILITER(S): 9 INJECTION INTRAMUSCULAR; INTRAVENOUS; SUBCUTANEOUS at 00:38

## 2022-11-04 RX ADMIN — ENOXAPARIN SODIUM 30 MILLIGRAM(S): 100 INJECTION SUBCUTANEOUS at 05:59

## 2022-11-04 RX ADMIN — APIXABAN 2.5 MILLIGRAM(S): 2.5 TABLET, FILM COATED ORAL at 06:00

## 2022-11-04 RX ADMIN — PREGABALIN 1000 MICROGRAM(S): 225 CAPSULE ORAL at 13:10

## 2022-11-04 RX ADMIN — DORZOLAMIDE HYDROCHLORIDE 1 DROP(S): 20 SOLUTION/ DROPS OPHTHALMIC at 08:58

## 2022-11-04 RX ADMIN — ISOSORBIDE MONONITRATE 30 MILLIGRAM(S): 60 TABLET, EXTENDED RELEASE ORAL at 13:09

## 2022-11-04 RX ADMIN — Medication 40 MILLIEQUIVALENT(S): at 01:02

## 2022-11-04 RX ADMIN — DORZOLAMIDE HYDROCHLORIDE 1 DROP(S): 20 SOLUTION/ DROPS OPHTHALMIC at 13:09

## 2022-11-04 RX ADMIN — Medication 50 MILLIGRAM(S): at 05:59

## 2022-11-04 RX ADMIN — BEBTELOVIMAB 175 MILLIGRAM(S): 87.5 INJECTION, SOLUTION INTRAVENOUS at 13:10

## 2022-11-04 RX ADMIN — CEFTRIAXONE 100 MILLIGRAM(S): 500 INJECTION, POWDER, FOR SOLUTION INTRAMUSCULAR; INTRAVENOUS at 19:13

## 2022-11-04 RX ADMIN — LIDOCAINE 1 PATCH: 4 CREAM TOPICAL at 07:42

## 2022-11-04 RX ADMIN — DORZOLAMIDE HYDROCHLORIDE 1 DROP(S): 20 SOLUTION/ DROPS OPHTHALMIC at 21:48

## 2022-11-04 RX ADMIN — PANTOPRAZOLE SODIUM 40 MILLIGRAM(S): 20 TABLET, DELAYED RELEASE ORAL at 08:58

## 2022-11-04 RX ADMIN — Medication 25 MILLIGRAM(S): at 08:58

## 2022-11-04 RX ADMIN — FINASTERIDE 5 MILLIGRAM(S): 5 TABLET, FILM COATED ORAL at 13:09

## 2022-11-04 RX ADMIN — ATORVASTATIN CALCIUM 20 MILLIGRAM(S): 80 TABLET, FILM COATED ORAL at 21:48

## 2022-11-04 RX ADMIN — APIXABAN 2.5 MILLIGRAM(S): 2.5 TABLET, FILM COATED ORAL at 20:02

## 2022-11-04 NOTE — PROGRESS NOTE ADULT - PROBLEM SELECTOR PLAN 11
C. diff positive on admission for diarrhea in August 2021. Treated with course of fidaxomicin.  - C. diff negative

## 2022-11-04 NOTE — DIETITIAN INITIAL EVALUATION ADULT - OTHER INFO
81M w/ CAD s/p PCI, Afib on eliquis, HTN, HLD, h/o PE/DVT, stage IV prostate CA s/p prostatectomy and on Lupron, and anemia (baseline Hgb 9) presents with a 3 day history of cough, diarrhea, and weakness. Patient reports having C. diff one year prior. COVID vaccinated with booster. Has never had COVID before. Found to have COVID, UTI, and JERI.    Pt seen resting in bed, daughter by bedside. He denies any n/v/c at this time. States has been having frequent BMs daily, endorses diarrhea. States UBW ~180 lbs, CBW of 138 lbs. Indicates had wt change of 42 lbs/23%. Pt reported had C Diff for >1 year prior, and thus had tremendous wt loss. Appetite has not recovered since then, usually small PO intake of meals. Was Drinking 1 Ensure per day but was halted 2/2 not enjoying taste, would like to restart. PTA, daughter reports PO intake to be minimum, barely taking 1-2 bites/spoonful of food. This morning, had breakfast in the ED, reports eating well, had cream of wheat, eggs, sausage, and bread, reports >50% consumption. Continue to encourage PO intake as able, with focus on protein to meet needs. Pt amenable to receive ONS here. Denies any pain. Skin: No PU or edema noted. Partha score not noted yet. RD to follow. 81M w/ CAD s/p PCI, Afib on eliquis, HTN, HLD, h/o PE/DVT, stage IV prostate CA s/p prostatectomy and on Lupron, and anemia (baseline Hgb 9) presents with a 3 day history of cough, diarrhea, and weakness. Patient reports having C. diff one year prior. COVID vaccinated with booster. Has never had COVID before. Found to have COVID, UTI, and JERI.    Pt seen resting in bed, daughter by bedside. He denies any n/v/c at this time. States has been having frequent BMs daily, endorses diarrhea. States UBW ~180 lbs, CBW of 138 lbs. Indicates had wt change of 42 lbs/23% in >1year. Pt reported had C Diff for >1 year prior, and thus had tremendous wt loss. Appetite has not recovered since then, usually small PO intake of meals. Was Drinking 1 Ensure per day but was halted 2/2 not enjoying taste, would like to restart. PTA, daughter reports PO intake to be minimum, barely taking 1-2 bites/spoonful of food. This morning, had breakfast in the ED, reports eating well, had cream of wheat, eggs, sausage, and bread, reports >50% consumption. NFPE performed, noted severe wasting. Pt meets criteria for malnutrition per aspen criteria.  Continue to encourage PO intake as able, with focus on protein to meet needs. Pt amenable to receive ONS here. Denies any pain. Skin: No PU or edema noted. Partha score not noted yet. RD to follow.

## 2022-11-04 NOTE — PROGRESS NOTE ADULT - PROBLEM SELECTOR PLAN 2
Positive UA on admission. Patient has chronic urinary incontinence and frequency related to h/o prostate cancer and prostatectomy. Follows with urology. No fever, dysuria.  Culture + for >100k CFU Klebsiella  - C/w CTX 1g x3 days

## 2022-11-04 NOTE — OCCUPATIONAL THERAPY INITIAL EVALUATION ADULT - PLANNED THERAPY INTERVENTIONS, OT EVAL
ADL retraining/IADL retraining/balance training/bed mobility training/cognitive, visual perceptual/fine motor coordination training/motor coordination training/neuromuscular re-education/parent/caregiver training.../strengthening/transfer training

## 2022-11-04 NOTE — DIETITIAN INITIAL EVALUATION ADULT - PERTINENT LABORATORY DATA
11-04    137  |  110<H>  |  27<H>  ----------------------------<  74  4.0   |  17<L>  |  1.82<H>    Ca    9.1      04 Nov 2022 05:30  Phos  3.1     11-04  Mg     2.1     11-04    TPro  6.9  /  Alb  2.9<L>  /  TBili  0.2  /  DBili  x   /  AST  32  /  ALT  8<L>  /  AlkPhos  63  11-04

## 2022-11-04 NOTE — PROGRESS NOTE ADULT - PROBLEM SELECTOR PLAN 10
Baseline Hgb 8-9 on past admissions. Normocytic. 2/2 AoCD and DOMITILA. History of blood transfusions outpatient. Hx dark stools potentially 2/2 Fe supplementation.   - C/w home Fe sulfate 325 daily

## 2022-11-04 NOTE — OCCUPATIONAL THERAPY INITIAL EVALUATION ADULT - PERTINENT HX OF CURRENT PROBLEM, REHAB EVAL
81M w/ CAD s/p PCI, Afib on eliquis, HTN, HLD, h/o PE/DVT, stage IV prostate CA s/p prostatectomy and on Lupron, and anemia (baseline Hgb 9) presents with a 3 day history of cough, diarrhea, and weakness. Denies N/V, abdominal pain. Patient complains of chronic urinary incontinence but no dysuria. Patient reports having C. diff one year prior. Patient accompanied by his daughter who also was not feeling well. COVID vaccinated with booster. Has never had COVID prior.

## 2022-11-04 NOTE — PROGRESS NOTE ADULT - PROBLEM SELECTOR PLAN 7
Normotensive on admission. Home med: HCTZ 25 and toprol 50. Allergic to ACEI, developed angioedema.  - C/w home HCTZ and BB

## 2022-11-04 NOTE — PROGRESS NOTE ADULT - SUBJECTIVE AND OBJECTIVE BOX
** INCOMPLETE **    OVERNIGHT EVENTS:     SUBJECTIVE:    VITAL SIGNS:  Vital Signs Last 24 Hrs  T(C): 36.7 (04 Nov 2022 09:58), Max: 36.8 (03 Nov 2022 15:17)  T(F): 98.1 (04 Nov 2022 09:58), Max: 98.3 (03 Nov 2022 15:17)  HR: 79 (04 Nov 2022 09:58) (65 - 92)  BP: 116/72 (04 Nov 2022 09:58) (108/73 - 129/70)  BP(mean): --  RR: 16 (04 Nov 2022 09:58) (16 - 18)  SpO2: 99% (04 Nov 2022 09:58) (97% - 100%)    Parameters below as of 04 Nov 2022 09:58  Patient On (Oxygen Delivery Method): room air        PHYSICAL EXAM:  General: NAD; speaking in full sentences  HEENT: NC/AT; PERRL; EOMI; MMM  Neck: supple; no JVD  Cardiac: RRR; +S1/S2  Pulm: CTA B/L; no W/R/R  GI: soft, NT/ND, +BS  Extremities: WWP; no edema, clubbing or cyanosis  Vasc: 2+ radial, DP pulses B/L  Neuro: AAOx3; no focal deficits    MEDICATIONS:  MEDICATIONS  (STANDING):  apixaban 2.5 milliGRAM(s) Oral every 12 hours  aspirin enteric coated 81 milliGRAM(s) Oral daily  atorvastatin 20 milliGRAM(s) Oral at bedtime  cefTRIAXone   IVPB 1000 milliGRAM(s) IV Intermittent every 24 hours  cyanocobalamin 1000 MICROGram(s) Oral daily  dorzolamide 2% Ophthalmic Solution 1 Drop(s) Both EYES three times a day  finasteride 5 milliGRAM(s) Oral daily  hydrochlorothiazide 25 milliGRAM(s) Oral daily  isosorbide   mononitrate ER Tablet (IMDUR) 30 milliGRAM(s) Oral daily  metoprolol succinate ER 50 milliGRAM(s) Oral daily  pantoprazole    Tablet 40 milliGRAM(s) Oral before breakfast  sodium chloride 0.9%. 1000 milliLiter(s) (100 mL/Hr) IV Continuous <Continuous>    MEDICATIONS  (PRN):  acetaminophen     Tablet .. 650 milliGRAM(s) Oral every 6 hours PRN Temp greater or equal to 38C (100.4F), Mild Pain (1 - 3)  aluminum hydroxide/magnesium hydroxide/simethicone Suspension 30 milliLiter(s) Oral every 4 hours PRN Dyspepsia  melatonin 3 milliGRAM(s) Oral at bedtime PRN Insomnia  ondansetron Injectable 4 milliGRAM(s) IV Push every 8 hours PRN Nausea and/or Vomiting      ALLERGIES:  Allergies    ACE inhibitors (Angioedema)  x-geva (Unknown)    Intolerances        LABS:                        9.3    5.61  )-----------( 253      ( 04 Nov 2022 05:30 )             31.7     11-04    137  |  110<H>  |  27<H>  ----------------------------<  74  4.0   |  17<L>  |  1.82<H>    Ca    9.1      04 Nov 2022 05:30  Phos  3.1     11-04  Mg     2.1     11-04    TPro  6.9  /  Alb  2.9<L>  /  TBili  0.2  /  DBili  x   /  AST  32  /  ALT  8<L>  /  AlkPhos  63  11-04        RADIOLOGY & ADDITIONAL TESTS: Reviewed. INTERVAL HX: Pt had episode of voluminous dark watery stool in ED.     SUBJECTIVE: Pt examined at beside, has no acute complaints at the time. + diarrhea x 2 today. Denies F/C, N/V, HA, chest pain, dyspnea, dysuria.     VITAL SIGNS:  Vital Signs Last 24 Hrs  T(C): 36.7 (04 Nov 2022 09:58), Max: 36.8 (03 Nov 2022 15:17)  T(F): 98.1 (04 Nov 2022 09:58), Max: 98.3 (03 Nov 2022 15:17)  HR: 79 (04 Nov 2022 09:58) (65 - 92)  BP: 116/72 (04 Nov 2022 09:58) (108/73 - 129/70)  BP(mean): --  RR: 16 (04 Nov 2022 09:58) (16 - 18)  SpO2: 99% (04 Nov 2022 09:58) (97% - 100%)    Parameters below as of 04 Nov 2022 09:58  Patient On (Oxygen Delivery Method): room air        PHYSICAL EXAM:  General: NAD; speaking in full sentences  HEENT: NC/AT; PERRL; EOMI; MMM  Neck: supple; no JVD  Cardiac: regular rate; +S1/S2  Pulm: CTA B/L; no W/R/R  GI: soft, NT/ND, +BS  Extremities: WWP; no edema, clubbing or cyanosis  Vasc: 2+ radial, DP pulses B/L  Neuro: AAOx3; no focal deficits    MEDICATIONS:  MEDICATIONS  (STANDING):  apixaban 2.5 milliGRAM(s) Oral every 12 hours  aspirin enteric coated 81 milliGRAM(s) Oral daily  atorvastatin 20 milliGRAM(s) Oral at bedtime  cefTRIAXone   IVPB 1000 milliGRAM(s) IV Intermittent every 24 hours  cyanocobalamin 1000 MICROGram(s) Oral daily  dorzolamide 2% Ophthalmic Solution 1 Drop(s) Both EYES three times a day  finasteride 5 milliGRAM(s) Oral daily  hydrochlorothiazide 25 milliGRAM(s) Oral daily  isosorbide   mononitrate ER Tablet (IMDUR) 30 milliGRAM(s) Oral daily  metoprolol succinate ER 50 milliGRAM(s) Oral daily  pantoprazole    Tablet 40 milliGRAM(s) Oral before breakfast  sodium chloride 0.9%. 1000 milliLiter(s) (100 mL/Hr) IV Continuous <Continuous>    MEDICATIONS  (PRN):  acetaminophen     Tablet .. 650 milliGRAM(s) Oral every 6 hours PRN Temp greater or equal to 38C (100.4F), Mild Pain (1 - 3)  aluminum hydroxide/magnesium hydroxide/simethicone Suspension 30 milliLiter(s) Oral every 4 hours PRN Dyspepsia  melatonin 3 milliGRAM(s) Oral at bedtime PRN Insomnia  ondansetron Injectable 4 milliGRAM(s) IV Push every 8 hours PRN Nausea and/or Vomiting      ALLERGIES:  Allergies    ACE inhibitors (Angioedema)  x-geva (Unknown)    Intolerances        LABS:                        9.3    5.61  )-----------( 253      ( 04 Nov 2022 05:30 )             31.7     11-04    137  |  110<H>  |  27<H>  ----------------------------<  74  4.0   |  17<L>  |  1.82<H>    Ca    9.1      04 Nov 2022 05:30  Phos  3.1     11-04  Mg     2.1     11-04    TPro  6.9  /  Alb  2.9<L>  /  TBili  0.2  /  DBili  x   /  AST  32  /  ALT  8<L>  /  AlkPhos  63  11-04        RADIOLOGY & ADDITIONAL TESTS: Reviewed.

## 2022-11-04 NOTE — DIETITIAN INITIAL EVALUATION ADULT - ADD RECOMMEND
1. Continue with regular diet, rec include Ensure Plus High Protein 1xday (350 kcals, 20 g protein, 180 mls H2O)  2. BM and pain regimen per team  3. Monitor BMP, BG, POCT, renal indices, LFTs, lytes, replete prn  4. Rec include MVI  5. Diet edu prn   1. Continue with regular diet, rec include Ensure Plus High Protein 1xday (350 kcals, 20 g protein, 180 mls H2O)  2. BM and pain regimen per team  3. Monitor BMP, BG, POCT, renal indices, LFTs, lytes, replete prn  4. Rec include MVI  5. Diet edu prn

## 2022-11-04 NOTE — PATIENT PROFILE ADULT - MONEY FOR FOOD
no [de-identified] : Mr. HOOKS is a 7 year male here with mom who states pt is congested bilaterally, denies sore throat. mom states he has increased saliva production when talking, it accumulates in corners of mouth and has been since infancy\par at age 5 seen by Peds ENT for open mouth breathing, and in speech therapy since age 4 at school for pronunciation and tongue control (pt has a h/o macroglossia).  mom feels speech has improved\par \par saw Allergy and was rx astelin and flonase which mom states did not help after use for a month\par denies hearing loss or recent otalgia\par mom states he snores heavily and has disrupted, moving often but no daytime sleepiness or hyperactivity\par + witnessed apneas \par \par h/o OM as toddler\par uses Loratadine drops in eyes for seasonal allergies in spring and fall\par

## 2022-11-04 NOTE — OCCUPATIONAL THERAPY INITIAL EVALUATION ADULT - ADDITIONAL COMMENTS
Pt lives in private house w/ ~13 stairs to enter and I flight inside. Pt was independent prior to admission. Pt has a shower chair and uses a cane for ambulation.

## 2022-11-04 NOTE — PROGRESS NOTE ADULT - SUBJECTIVE AND OBJECTIVE BOX
INTERVAL HPI/OVERNIGHT EVENTS:  All notes reviewed;  Awake and alert;  Having large amounts od diarrhea but C Diff negative;   Covid Positive  Has UTI      MEDICATIONS  (STANDING):  apixaban 2.5 milliGRAM(s) Oral every 12 hours  aspirin enteric coated 81 milliGRAM(s) Oral daily  atorvastatin 20 milliGRAM(s) Oral at bedtime  cefTRIAXone   IVPB 1000 milliGRAM(s) IV Intermittent every 24 hours  cyanocobalamin 1000 MICROGram(s) Oral daily  dorzolamide 2% Ophthalmic Solution 1 Drop(s) Both EYES three times a day  finasteride 5 milliGRAM(s) Oral daily  hydrochlorothiazide 25 milliGRAM(s) Oral daily  isosorbide   mononitrate ER Tablet (IMDUR) 30 milliGRAM(s) Oral daily  metoprolol succinate ER 50 milliGRAM(s) Oral daily  pantoprazole    Tablet 40 milliGRAM(s) Oral before breakfast  sodium chloride 0.9%. 1000 milliLiter(s) (100 mL/Hr) IV Continuous <Continuous>    MEDICATIONS  (PRN):  acetaminophen     Tablet .. 650 milliGRAM(s) Oral every 6 hours PRN Temp greater or equal to 38C (100.4F), Mild Pain (1 - 3)  aluminum hydroxide/magnesium hydroxide/simethicone Suspension 30 milliLiter(s) Oral every 4 hours PRN Dyspepsia  melatonin 3 milliGRAM(s) Oral at bedtime PRN Insomnia  ondansetron Injectable 4 milliGRAM(s) IV Push every 8 hours PRN Nausea and/or Vomiting      Allergies    ACE inhibitors (Angioedema)  x-geva (Unknown)    Intolerances        Vital Signs Last 24 Hrs  T(C): 36.7 (2022 09:58), Max: 36.8 (2022 15:17)  T(F): 98.1 (2022 09:58), Max: 98.3 (2022 15:17)  HR: 79 (2022 09:58) (65 - 92)  BP: 116/72 (2022 09:58) (108/73 - 129/70)  BP(mean): --  RR: 16 (2022 09:58) (16 - 18)  SpO2: 99% (2022 09:58) (97% - 100%)    Parameters below as of 2022 09:58  Patient On (Oxygen Delivery Method): room air                  Constitutional:  Awake      Eyes: SELENE    ENMT: Negative    Neck: Supple    Back:  no tenderness     Respiratory: clear     Cardiovascular: S1 S2    Gastrointestinal: soft    Genitourinary:    Extremities:  no edema    Vascular:    Neurological:    Skin:    Lymph Nodes:            LABS:                        9.3    5.61  )-----------( 253      ( 2022 05:30 )             31.7     11-04    137  |  110<H>  |  27<H>  ----------------------------<  74  4.0   |  17<L>  |  1.82<H>    Ca    9.1      2022 05:30  Phos  3.1     11-04  Mg     2.1     11-04    TPro  6.9  /  Alb  2.9<L>  /  TBili  0.2  /  DBili  x   /  AST  32  /  ALT  8<L>  /  AlkPhos  63  11-04      Urinalysis Basic - ( 2022 17:36 )    Color: Yellow / Appearance: SL Cloudy / S.015 / pH: x  Gluc: x / Ketone: NEGATIVE  / Bili: Negative / Urobili: 0.2 E.U./dL   Blood: x / Protein: Trace mg/dL / Nitrite: POSITIVE   Leuk Esterase: Large / RBC: < 5 /HPF / WBC Many /HPF   Sq Epi: x / Non Sq Epi: 0-5 /HPF / Bacteria: Many /HPF        RADIOLOGY & ADDITIONAL TESTS:

## 2022-11-04 NOTE — PROGRESS NOTE ADULT - PROBLEM SELECTOR PLAN 4
Complaining of some cough and weakness the past few days. No shortness of breath. Not hypoxic. COVID vaccinated. Never had COVID before.  - Start monoclonal Ab tx

## 2022-11-04 NOTE — PHYSICAL THERAPY INITIAL EVALUATION ADULT - GENERAL OBSERVATIONS, REHAB EVAL
PT IE completed. Chart reviewed. Pt received semi-supine, NAD, +RA, +IV heplock, daughter at bedside. BRITTON Calderon cleared pt for PT.

## 2022-11-04 NOTE — DIETITIAN INITIAL EVALUATION ADULT - PERTINENT MEDS FT
MEDICATIONS  (STANDING):  apixaban 2.5 milliGRAM(s) Oral every 12 hours  aspirin enteric coated 81 milliGRAM(s) Oral daily  atorvastatin 20 milliGRAM(s) Oral at bedtime  bebtelovimab (EUA) Injectable 175 milliGRAM(s) IV Push once  cefTRIAXone   IVPB 1000 milliGRAM(s) IV Intermittent every 24 hours  cyanocobalamin 1000 MICROGram(s) Oral daily  dorzolamide 2% Ophthalmic Solution 1 Drop(s) Both EYES three times a day  finasteride 5 milliGRAM(s) Oral daily  hydrochlorothiazide 25 milliGRAM(s) Oral daily  isosorbide   mononitrate ER Tablet (IMDUR) 30 milliGRAM(s) Oral daily  metoprolol succinate ER 50 milliGRAM(s) Oral daily  pantoprazole    Tablet 40 milliGRAM(s) Oral before breakfast  sodium chloride 0.9%. 1000 milliLiter(s) (80 mL/Hr) IV Continuous <Continuous>    MEDICATIONS  (PRN):  acetaminophen     Tablet .. 650 milliGRAM(s) Oral every 6 hours PRN Temp greater or equal to 38C (100.4F), Mild Pain (1 - 3)  aluminum hydroxide/magnesium hydroxide/simethicone Suspension 30 milliLiter(s) Oral every 4 hours PRN Dyspepsia  melatonin 3 milliGRAM(s) Oral at bedtime PRN Insomnia  ondansetron Injectable 4 milliGRAM(s) IV Push every 8 hours PRN Nausea and/or Vomiting

## 2022-11-04 NOTE — PATIENT PROFILE ADULT - FALL HARM RISK - RISK INTERVENTIONS

## 2022-11-04 NOTE — PROGRESS NOTE ADULT - PROBLEM SELECTOR PLAN 1
Patient presenting with several day history of weakness, with associated cough and diarrhea. No N/V. Found to be COVID positive with UTI and JERI. History of C. diff infection 1 year ago, completed course of fidaxomicin. Weakness likely due to dehydration from diarrhea and UTI. 0/4 SIRS criteria, no signs of sepsis. Ambulates with a cane at baseline.  - C.Diff negative  - F/u GI PCR  - F/u BCx and UCx with sensitivities  - c/w CTX 1g x3 days  - IV maintenance fluids  - PT/OT consult

## 2022-11-04 NOTE — PHYSICAL THERAPY INITIAL EVALUATION ADULT - LEVEL OF INDEPENDENCE: STAIR NEGOTIATION, REHAB EVAL
2/2 COVID isolation precautions, but pt able to perform standing marching 1x10 with straight cane, and standing marching 1x10 without AD/unable to perform

## 2022-11-04 NOTE — DIETITIAN NUTRITION RISK NOTIFICATION - TREATMENT: THE FOLLOWING DIET HAS BEEN RECOMMENDED
Diet, Regular (11-03-22 @ 23:53) [Active]    1. Continue with regular diet, rec include Ensure Plus High Protein 1xday (350 kcals, 20 g protein, 180 mls H2O)  2. BM and pain regimen per team  3. Monitor BMP, BG, POCT, renal indices, LFTs, lytes, replete prn  4. Rec include MVI  5. Diet edu

## 2022-11-04 NOTE — OCCUPATIONAL THERAPY INITIAL EVALUATION ADULT - GENERAL OBSERVATIONS, REHAB EVAL
Pt's RN Yumiko aware of intent to eval/tx; cleared Pt. PT Donaldo present. Pt's daughter present. Pt received in supine - +telemetry, heplock, Pt w/ good affect, follows directives.

## 2022-11-04 NOTE — PHYSICAL THERAPY INITIAL EVALUATION ADULT - ADDITIONAL COMMENTS
Pt reports living in apartment with 1 FOS to enter and 1 FOS inside. Reports being independent with daily activities, and ambulates with straight cane. Daughter at bedside endorses helping when available, and performs chores such as shopping.

## 2022-11-04 NOTE — DIETITIAN INITIAL EVALUATION ADULT - OTHER CALCULATIONS
lbs. %IBW 89%. ABW used to calculate energy needs due to pt's current body weight within % IBW. Needs adjusted for age and wt, COVID+, UTI. Fluids per team

## 2022-11-04 NOTE — PHYSICAL THERAPY INITIAL EVALUATION ADULT - GROSSLY INTACT, SENSORY
negative Regular rate & rhythm, normal S1, S2; no murmurs, gallops or rubs; no S3, S4 Left UE/Right UE/Left LE/Right LE/Grossly Intact

## 2022-11-04 NOTE — DIETITIAN INITIAL EVALUATION ADULT - PERSON TAUGHT/METHOD
Encouraged adequate PO intake and protein to meet high demands for nutrients/verbal instruction/patient instructed

## 2022-11-05 LAB
-  AMPICILLIN/SULBACTAM: SIGNIFICANT CHANGE UP
-  AMPICILLIN: SIGNIFICANT CHANGE UP
-  CEFAZOLIN: SIGNIFICANT CHANGE UP
-  CEFTRIAXONE: SIGNIFICANT CHANGE UP
-  CIPROFLOXACIN: SIGNIFICANT CHANGE UP
-  ERTAPENEM: SIGNIFICANT CHANGE UP
-  GENTAMICIN: SIGNIFICANT CHANGE UP
-  NITROFURANTOIN: SIGNIFICANT CHANGE UP
-  PIPERACILLIN/TAZOBACTAM: SIGNIFICANT CHANGE UP
-  TOBRAMYCIN: SIGNIFICANT CHANGE UP
-  TRIMETHOPRIM/SULFAMETHOXAZOLE: SIGNIFICANT CHANGE UP
ANION GAP SERPL CALC-SCNC: 10 MMOL/L — SIGNIFICANT CHANGE UP (ref 5–17)
BASOPHILS # BLD AUTO: 0.04 K/UL — SIGNIFICANT CHANGE UP (ref 0–0.2)
BASOPHILS NFR BLD AUTO: 0.7 % — SIGNIFICANT CHANGE UP (ref 0–2)
BUN SERPL-MCNC: 21 MG/DL — SIGNIFICANT CHANGE UP (ref 7–23)
CALCIUM SERPL-MCNC: 9 MG/DL — SIGNIFICANT CHANGE UP (ref 8.4–10.5)
CHLORIDE SERPL-SCNC: 109 MMOL/L — HIGH (ref 96–108)
CO2 SERPL-SCNC: 16 MMOL/L — LOW (ref 22–31)
CREAT SERPL-MCNC: 1.5 MG/DL — HIGH (ref 0.5–1.3)
CULTURE RESULTS: SIGNIFICANT CHANGE UP
EGFR: 46 ML/MIN/1.73M2 — LOW
EOSINOPHIL # BLD AUTO: 0.25 K/UL — SIGNIFICANT CHANGE UP (ref 0–0.5)
EOSINOPHIL NFR BLD AUTO: 4.1 % — SIGNIFICANT CHANGE UP (ref 0–6)
GLUCOSE SERPL-MCNC: 89 MG/DL — SIGNIFICANT CHANGE UP (ref 70–99)
HCT VFR BLD CALC: 28.6 % — LOW (ref 39–50)
HGB BLD-MCNC: 8.6 G/DL — LOW (ref 13–17)
IMM GRANULOCYTES NFR BLD AUTO: 0.5 % — SIGNIFICANT CHANGE UP (ref 0–0.9)
LYMPHOCYTES # BLD AUTO: 1.41 K/UL — SIGNIFICANT CHANGE UP (ref 1–3.3)
LYMPHOCYTES # BLD AUTO: 23.2 % — SIGNIFICANT CHANGE UP (ref 13–44)
MAGNESIUM SERPL-MCNC: 1.7 MG/DL — SIGNIFICANT CHANGE UP (ref 1.6–2.6)
MCHC RBC-ENTMCNC: 25.9 PG — LOW (ref 27–34)
MCHC RBC-ENTMCNC: 30.1 GM/DL — LOW (ref 32–36)
MCV RBC AUTO: 86.1 FL — SIGNIFICANT CHANGE UP (ref 80–100)
METHOD TYPE: SIGNIFICANT CHANGE UP
MONOCYTES # BLD AUTO: 0.53 K/UL — SIGNIFICANT CHANGE UP (ref 0–0.9)
MONOCYTES NFR BLD AUTO: 8.7 % — SIGNIFICANT CHANGE UP (ref 2–14)
NEUTROPHILS # BLD AUTO: 3.81 K/UL — SIGNIFICANT CHANGE UP (ref 1.8–7.4)
NEUTROPHILS NFR BLD AUTO: 62.8 % — SIGNIFICANT CHANGE UP (ref 43–77)
NRBC # BLD: 0 /100 WBCS — SIGNIFICANT CHANGE UP (ref 0–0)
ORGANISM # SPEC MICROSCOPIC CNT: SIGNIFICANT CHANGE UP
ORGANISM # SPEC MICROSCOPIC CNT: SIGNIFICANT CHANGE UP
PHOSPHATE SERPL-MCNC: 2.7 MG/DL — SIGNIFICANT CHANGE UP (ref 2.5–4.5)
PLATELET # BLD AUTO: 263 K/UL — SIGNIFICANT CHANGE UP (ref 150–400)
POTASSIUM SERPL-MCNC: 3.9 MMOL/L — SIGNIFICANT CHANGE UP (ref 3.5–5.3)
POTASSIUM SERPL-SCNC: 3.9 MMOL/L — SIGNIFICANT CHANGE UP (ref 3.5–5.3)
RBC # BLD: 3.32 M/UL — LOW (ref 4.2–5.8)
RBC # FLD: 20.2 % — HIGH (ref 10.3–14.5)
SODIUM SERPL-SCNC: 135 MMOL/L — SIGNIFICANT CHANGE UP (ref 135–145)
SPECIMEN SOURCE: SIGNIFICANT CHANGE UP
WBC # BLD: 6.07 K/UL — SIGNIFICANT CHANGE UP (ref 3.8–10.5)
WBC # FLD AUTO: 6.07 K/UL — SIGNIFICANT CHANGE UP (ref 3.8–10.5)

## 2022-11-05 PROCEDURE — 99232 SBSQ HOSP IP/OBS MODERATE 35: CPT

## 2022-11-05 RX ORDER — MAGNESIUM OXIDE 400 MG ORAL TABLET 241.3 MG
400 TABLET ORAL ONCE
Refills: 0 | Status: COMPLETED | OUTPATIENT
Start: 2022-11-05 | End: 2022-11-05

## 2022-11-05 RX ORDER — SODIUM CHLORIDE 9 MG/ML
1000 INJECTION INTRAMUSCULAR; INTRAVENOUS; SUBCUTANEOUS
Refills: 0 | Status: DISCONTINUED | OUTPATIENT
Start: 2022-11-05 | End: 2022-11-08

## 2022-11-05 RX ADMIN — DORZOLAMIDE HYDROCHLORIDE 1 DROP(S): 20 SOLUTION/ DROPS OPHTHALMIC at 21:57

## 2022-11-05 RX ADMIN — SODIUM CHLORIDE 80 MILLILITER(S): 9 INJECTION INTRAMUSCULAR; INTRAVENOUS; SUBCUTANEOUS at 11:34

## 2022-11-05 RX ADMIN — APIXABAN 2.5 MILLIGRAM(S): 2.5 TABLET, FILM COATED ORAL at 06:30

## 2022-11-05 RX ADMIN — Medication 81 MILLIGRAM(S): at 11:34

## 2022-11-05 RX ADMIN — PANTOPRAZOLE SODIUM 40 MILLIGRAM(S): 20 TABLET, DELAYED RELEASE ORAL at 06:29

## 2022-11-05 RX ADMIN — Medication 25 MILLIGRAM(S): at 06:30

## 2022-11-05 RX ADMIN — ATORVASTATIN CALCIUM 20 MILLIGRAM(S): 80 TABLET, FILM COATED ORAL at 21:58

## 2022-11-05 RX ADMIN — Medication 50 MILLIGRAM(S): at 06:30

## 2022-11-05 RX ADMIN — FINASTERIDE 5 MILLIGRAM(S): 5 TABLET, FILM COATED ORAL at 11:34

## 2022-11-05 RX ADMIN — APIXABAN 2.5 MILLIGRAM(S): 2.5 TABLET, FILM COATED ORAL at 18:21

## 2022-11-05 RX ADMIN — DORZOLAMIDE HYDROCHLORIDE 1 DROP(S): 20 SOLUTION/ DROPS OPHTHALMIC at 14:47

## 2022-11-05 RX ADMIN — PREGABALIN 1000 MICROGRAM(S): 225 CAPSULE ORAL at 11:35

## 2022-11-05 RX ADMIN — ISOSORBIDE MONONITRATE 30 MILLIGRAM(S): 60 TABLET, EXTENDED RELEASE ORAL at 14:47

## 2022-11-05 RX ADMIN — CEFTRIAXONE 100 MILLIGRAM(S): 500 INJECTION, POWDER, FOR SOLUTION INTRAMUSCULAR; INTRAVENOUS at 18:21

## 2022-11-05 RX ADMIN — DORZOLAMIDE HYDROCHLORIDE 1 DROP(S): 20 SOLUTION/ DROPS OPHTHALMIC at 06:31

## 2022-11-05 RX ADMIN — MAGNESIUM OXIDE 400 MG ORAL TABLET 400 MILLIGRAM(S): 241.3 TABLET ORAL at 11:34

## 2022-11-05 NOTE — PROGRESS NOTE ADULT - PROBLEM SELECTOR PLAN 6
H/o pAFib. Follows with cardiologist at [facility].   - C/w eliquis 2.5 BID  - Toprol 50 daily

## 2022-11-05 NOTE — PROGRESS NOTE ADULT - SUBJECTIVE AND OBJECTIVE BOX
Interval Events: Reviewed  Patient seen and examined at bedside.    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      Glaucoma  Glaucoma      Type 2 diabetes mellitus  Diabetes      Essential hypertension  Hypertension      Chemotherapy session for neoplasm  Prostate Cancer      Pulmonary embolism  2020      DVT (deep venous thrombosis)  2020 on Eliquis      Atrial fibrillation  on Eliquis      Other postprocedural status  S/P prostatectomy          MEDICATIONS:  Pulmonary:    Antimicrobials:  cefTRIAXone   IVPB 1000 milliGRAM(s) IV Intermittent every 24 hours    Anticoagulants:  apixaban 2.5 milliGRAM(s) Oral every 12 hours  aspirin enteric coated 81 milliGRAM(s) Oral daily    Cardiac:  hydrochlorothiazide 25 milliGRAM(s) Oral daily  isosorbide   mononitrate ER Tablet (IMDUR) 30 milliGRAM(s) Oral daily  metoprolol succinate ER 50 milliGRAM(s) Oral daily      Allergies    ACE inhibitors (Angioedema)  x-geva (Unknown)    Intolerances        Vital Signs Last 24 Hrs  T(C): 36.8 (2022 06:41), Max: 36.8 (2022 17:24)  T(F): 98.2 (2022 06:41), Max: 98.3 (2022 17:24)  HR: 65 (2022 06:41) (65 - 82)  BP: 130/69 (2022 06:41) (109/60 - 130/70)  BP(mean): --  RR: 17 (2022 22:57) (16 - 17)  SpO2: 98% (2022 06:41) (95% - 100%)    Parameters below as of 2022 06:41  Patient On (Oxygen Delivery Method): room air         @ 07:01  -   @ 07:00  --------------------------------------------------------  IN: 880 mL / OUT: 200 mL / NET: 680 mL          Review of Systems:   •	General: negative  •	Skin/Breast: negative  •	Ophthalmologic: negative  •	ENMT: negative  •	Respiratory and Thorax: negative  •	Cardiovascular: negative  •	Gastrointestinal: negative  •	Genitourinary: negative  •	Musculoskeletal: negative  •	Neurological: negative  •	Psychiatric: negative  •	Hematology/Lymphatics: negative  •	Endocrine: negative  •	Allergic/Immunologic: negative    Physical Exam:   • Constitutional:	thin, elderly male, NAD  • Eyes:	EOMI; PERRL; no drainage or redness  • ENMT:	No oral lesions; no gross abnormalities  • Neck	no thyromegaly or nodules  • Breasts:	not examined  • Back:	No deformity or limitation of movement  • Respiratory:	Breath Sounds equal & clear to auscultation, no accessory muscle use  • Cardiovascular:	Regular rate & rhythm, normal S1, S2; no murmurs, gallops or rubs; no S3, S4  • Gastrointestinal:	Soft, non-tender, no hepatosplenomegaly, normal bowel sounds  • Genitourinary:	not examined  • Rectal: not examined  • Extremities:	No cyanosis, clubbing or edema  • Vascular:	Equal and normal pulses (dorsalis pedis)  • Neurologica:l	not examined  • Skin:	No lesions; no rash  • Lymph Nodes:	No lymphadedenopathy  • Musculoskeletal:	No joint pain, swelling or deformity; no limitation of movement        LABS:      CBC Full  -  ( 2022 20:31 )  WBC Count : 6.39 K/uL  RBC Count : 3.38 M/uL  Hemoglobin : 8.8 g/dL  Hematocrit : 29.1 %  Platelet Count - Automated : 264 K/uL  Mean Cell Volume : 86.1 fl  Mean Cell Hemoglobin : 26.0 pg  Mean Cell Hemoglobin Concentration : 30.2 gm/dL  Auto Neutrophil # : 4.53 K/uL  Auto Lymphocyte # : 1.14 K/uL  Auto Monocyte # : 0.48 K/uL  Auto Eosinophil # : 0.19 K/uL  Auto Basophil # : 0.02 K/uL  Auto Neutrophil % : 70.9 %  Auto Lymphocyte % : 17.8 %  Auto Monocyte % : 7.5 %  Auto Eosinophil % : 3.0 %  Auto Basophil % : 0.3 %        137  |  110<H>  |  27<H>  ----------------------------<  74  4.0   |  17<L>  |  1.82<H>    Ca    9.1      2022 05:30  Phos  3.1     -  Mg     2.1         TPro  6.9  /  Alb  2.9<L>  /  TBili  0.2  /  DBili  x   /  AST  32  /  ALT  8<L>  /  AlkPhos  63  -          Urinalysis Basic - ( 2022 17:36 )    Color: Yellow / Appearance: SL Cloudy / S.015 / pH: x  Gluc: x / Ketone: NEGATIVE  / Bili: Negative / Urobili: 0.2 E.U./dL   Blood: x / Protein: Trace mg/dL / Nitrite: POSITIVE   Leuk Esterase: Large / RBC: < 5 /HPF / WBC Many /HPF   Sq Epi: x / Non Sq Epi: 0-5 /HPF / Bacteria: Many /HPF              Culture Results:   No growth at 1 day. ( @ 23:20)  Culture Results:   No growth at 1 day. ( @ 23:20)  Culture Results:   >100,000 CFU/ml Klebsiella pneumoniae  Susceptibility to follow. ( @ 17:36)      RADIOLOGY & ADDITIONAL STUDIES (The following images were personally reviewed):  Kim:                                     No  Urine output:                       adequate  DVT prophylaxis:                 Yes  Flattus:                                  Yes  Bowel movement:              No

## 2022-11-05 NOTE — PROGRESS NOTE ADULT - PROBLEM SELECTOR PLAN 8
Admission in 2021 to [facility] for dyspnea on exertion, found to have DVT and PE. High risk for blood clots given cancer history.  - C/w eliquis 2.5 BID

## 2022-11-06 LAB
ANION GAP SERPL CALC-SCNC: 13 MMOL/L — SIGNIFICANT CHANGE UP (ref 5–17)
APPEARANCE UR: CLEAR — SIGNIFICANT CHANGE UP
BACTERIA # UR AUTO: SIGNIFICANT CHANGE UP /HPF
BILIRUB UR-MCNC: NEGATIVE — SIGNIFICANT CHANGE UP
BUN SERPL-MCNC: 15 MG/DL — SIGNIFICANT CHANGE UP (ref 7–23)
CALCIUM SERPL-MCNC: 8.9 MG/DL — SIGNIFICANT CHANGE UP (ref 8.4–10.5)
CHLORIDE SERPL-SCNC: 112 MMOL/L — HIGH (ref 96–108)
CO2 SERPL-SCNC: 12 MMOL/L — LOW (ref 22–31)
COLOR SPEC: YELLOW — SIGNIFICANT CHANGE UP
COMMENT - URINE: SIGNIFICANT CHANGE UP
CREAT SERPL-MCNC: 1.18 MG/DL — SIGNIFICANT CHANGE UP (ref 0.5–1.3)
DIFF PNL FLD: ABNORMAL
EGFR: 62 ML/MIN/1.73M2 — SIGNIFICANT CHANGE UP
EPI CELLS # UR: SIGNIFICANT CHANGE UP /HPF (ref 0–5)
GLUCOSE SERPL-MCNC: 80 MG/DL — SIGNIFICANT CHANGE UP (ref 70–99)
GLUCOSE UR QL: NEGATIVE — SIGNIFICANT CHANGE UP
KETONES UR-MCNC: NEGATIVE — SIGNIFICANT CHANGE UP
LEUKOCYTE ESTERASE UR-ACNC: ABNORMAL
MAGNESIUM SERPL-MCNC: 1.6 MG/DL — SIGNIFICANT CHANGE UP (ref 1.6–2.6)
NITRITE UR-MCNC: NEGATIVE — SIGNIFICANT CHANGE UP
PH UR: 6 — SIGNIFICANT CHANGE UP (ref 5–8)
PHOSPHATE SERPL-MCNC: 3.1 MG/DL — SIGNIFICANT CHANGE UP (ref 2.5–4.5)
POTASSIUM SERPL-MCNC: 3.8 MMOL/L — SIGNIFICANT CHANGE UP (ref 3.5–5.3)
POTASSIUM SERPL-SCNC: 3.8 MMOL/L — SIGNIFICANT CHANGE UP (ref 3.5–5.3)
PROT UR-MCNC: NEGATIVE MG/DL — SIGNIFICANT CHANGE UP
RBC CASTS # UR COMP ASSIST: < 5 /HPF — SIGNIFICANT CHANGE UP
SODIUM SERPL-SCNC: 137 MMOL/L — SIGNIFICANT CHANGE UP (ref 135–145)
SP GR SPEC: 1.01 — SIGNIFICANT CHANGE UP (ref 1–1.03)
UROBILINOGEN FLD QL: 0.2 E.U./DL — SIGNIFICANT CHANGE UP
WBC UR QL: < 5 /HPF — SIGNIFICANT CHANGE UP

## 2022-11-06 PROCEDURE — 99232 SBSQ HOSP IP/OBS MODERATE 35: CPT | Mod: GC

## 2022-11-06 PROCEDURE — 99232 SBSQ HOSP IP/OBS MODERATE 35: CPT

## 2022-11-06 RX ADMIN — Medication 25 MILLIGRAM(S): at 06:00

## 2022-11-06 RX ADMIN — APIXABAN 2.5 MILLIGRAM(S): 2.5 TABLET, FILM COATED ORAL at 05:59

## 2022-11-06 RX ADMIN — Medication 50 MILLIGRAM(S): at 05:59

## 2022-11-06 RX ADMIN — DORZOLAMIDE HYDROCHLORIDE 1 DROP(S): 20 SOLUTION/ DROPS OPHTHALMIC at 06:00

## 2022-11-06 RX ADMIN — FINASTERIDE 5 MILLIGRAM(S): 5 TABLET, FILM COATED ORAL at 11:46

## 2022-11-06 RX ADMIN — PANTOPRAZOLE SODIUM 40 MILLIGRAM(S): 20 TABLET, DELAYED RELEASE ORAL at 07:27

## 2022-11-06 RX ADMIN — DORZOLAMIDE HYDROCHLORIDE 1 DROP(S): 20 SOLUTION/ DROPS OPHTHALMIC at 15:38

## 2022-11-06 RX ADMIN — ATORVASTATIN CALCIUM 20 MILLIGRAM(S): 80 TABLET, FILM COATED ORAL at 22:30

## 2022-11-06 RX ADMIN — DORZOLAMIDE HYDROCHLORIDE 1 DROP(S): 20 SOLUTION/ DROPS OPHTHALMIC at 22:55

## 2022-11-06 RX ADMIN — APIXABAN 2.5 MILLIGRAM(S): 2.5 TABLET, FILM COATED ORAL at 17:32

## 2022-11-06 RX ADMIN — Medication 81 MILLIGRAM(S): at 11:46

## 2022-11-06 RX ADMIN — PREGABALIN 1000 MICROGRAM(S): 225 CAPSULE ORAL at 11:47

## 2022-11-06 RX ADMIN — ISOSORBIDE MONONITRATE 30 MILLIGRAM(S): 60 TABLET, EXTENDED RELEASE ORAL at 11:47

## 2022-11-06 NOTE — PROGRESS NOTE ADULT - SUBJECTIVE AND OBJECTIVE BOX
INTERVAL HPI/OVERNIGHT EVENTS:  No complaint today;  Diarrhea improved  Off Antibiotics; Would repeat Urine      MEDICATIONS  (STANDING):  apixaban 2.5 milliGRAM(s) Oral every 12 hours  aspirin enteric coated 81 milliGRAM(s) Oral daily  atorvastatin 20 milliGRAM(s) Oral at bedtime  cyanocobalamin 1000 MICROGram(s) Oral daily  dorzolamide 2% Ophthalmic Solution 1 Drop(s) Both EYES three times a day  finasteride 5 milliGRAM(s) Oral daily  hydrochlorothiazide 25 milliGRAM(s) Oral daily  isosorbide   mononitrate ER Tablet (IMDUR) 30 milliGRAM(s) Oral daily  metoprolol succinate ER 50 milliGRAM(s) Oral daily  pantoprazole    Tablet 40 milliGRAM(s) Oral before breakfast  sodium chloride 0.9%. 1000 milliLiter(s) (80 mL/Hr) IV Continuous <Continuous>    MEDICATIONS  (PRN):  acetaminophen     Tablet .. 650 milliGRAM(s) Oral every 6 hours PRN Temp greater or equal to 38C (100.4F), Mild Pain (1 - 3)  aluminum hydroxide/magnesium hydroxide/simethicone Suspension 30 milliLiter(s) Oral every 4 hours PRN Dyspepsia  loperamide 2 milliGRAM(s) Oral every 4 hours PRN Diarrhea  melatonin 3 milliGRAM(s) Oral at bedtime PRN Insomnia  ondansetron Injectable 4 milliGRAM(s) IV Push every 8 hours PRN Nausea and/or Vomiting      Allergies    ACE inhibitors (Angioedema)  x-geva (Unknown)    Intolerances        Vital Signs Last 24 Hrs  T(C): 36.7 (06 Nov 2022 05:18), Max: 36.8 (05 Nov 2022 21:09)  T(F): 98 (06 Nov 2022 05:18), Max: 98.3 (05 Nov 2022 21:09)  HR: 62 (06 Nov 2022 05:18) (62 - 69)  BP: 116/64 (06 Nov 2022 05:18) (116/64 - 149/72)  BP(mean): --  RR: 19 (06 Nov 2022 05:18) (18 - 19)  SpO2: 99% (06 Nov 2022 05:18) (99% - 100%)    Parameters below as of 06 Nov 2022 05:18  Patient On (Oxygen Delivery Method): room air               Constitutional:  Awake     Eyes: SELENE    ENMT: Negative    Neck: Supple    Back:  no tenderness     Respiratory:  clear    Cardiovascular: S1 S2    Gastrointestinal:  soft    Genitourinary:    Extremities:  no edema    Vascular:    Neurological:    Skin:    Lymph Nodes:            11-05 @ 08:01  -  11-06 @ 07:00  --------------------------------------------------------  IN: 800 mL / OUT: 600 mL / NET: 200 mL      LABS:                        8.6    6.07  )-----------( 263      ( 05 Nov 2022 05:30 )             28.6     11-06    137  |  112<H>  |  15  ----------------------------<  80  3.8   |  12<L>  |  1.18    Ca    8.9      06 Nov 2022 06:46  Phos  3.1     11-06  Mg     1.6     11-06            RADIOLOGY & ADDITIONAL TESTS:

## 2022-11-06 NOTE — PROGRESS NOTE ADULT - PROBLEM SELECTOR PLAN 6
H/o pAFib. Follows with cardiologist at [facility].   - C/w eliquis 2.5 BID  - Toprol 50 daily

## 2022-11-06 NOTE — PROGRESS NOTE ADULT - PROBLEM SELECTOR PLAN 8
Admission in 2021 to [facility] for dyspnea on exertion, found to have DVT and PE. High risk for blood clots given cancer history.  - C/w eliquis 2.5 BID

## 2022-11-06 NOTE — PROGRESS NOTE ADULT - SUBJECTIVE AND OBJECTIVE BOX
Interval Events: Reviewed  Patient seen and examined at bedside.    Patient is a 81y old  Male who presents with a chief complaint of weakness and diarrhea (05 Nov 2022 07:10)  no acute event overnight, RA 99%, no diarrhea overnight       PAST MEDICAL & SURGICAL HISTORY:  Hyperlipidemia  Hyperlipidemia      Malignant neoplasm of prostate  s/p total prostatectomy in 1994 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      Glaucoma  Glaucoma      Type 2 diabetes mellitus  Diabetes      Essential hypertension  Hypertension      Chemotherapy session for neoplasm  Prostate Cancer      Pulmonary embolism  1/2020      DVT (deep venous thrombosis)  1/2020 on Eliquis      Atrial fibrillation  on Eliquis      Other postprocedural status  S/P prostatectomy          MEDICATIONS:  Pulmonary:    Antimicrobials:    Anticoagulants:  apixaban 2.5 milliGRAM(s) Oral every 12 hours  aspirin enteric coated 81 milliGRAM(s) Oral daily    Cardiac:  hydrochlorothiazide 25 milliGRAM(s) Oral daily  isosorbide   mononitrate ER Tablet (IMDUR) 30 milliGRAM(s) Oral daily  metoprolol succinate ER 50 milliGRAM(s) Oral daily      Allergies    ACE inhibitors (Angioedema)  x-geva (Unknown)    Intolerances        Vital Signs Last 24 Hrs  T(C): 36.7 (06 Nov 2022 05:18), Max: 36.8 (05 Nov 2022 21:09)  T(F): 98 (06 Nov 2022 05:18), Max: 98.3 (05 Nov 2022 21:09)  HR: 62 (06 Nov 2022 05:18) (62 - 69)  BP: 116/64 (06 Nov 2022 05:18) (116/64 - 149/72)  BP(mean): --  RR: 19 (06 Nov 2022 05:18) (18 - 19)  SpO2: 99% (06 Nov 2022 05:18) (99% - 100%)    Parameters below as of 06 Nov 2022 05:18  Patient On (Oxygen Delivery Method): room air        11-05 @ 08:01  -  11-06 @ 07:00  --------------------------------------------------------  IN: 800 mL / OUT: 600 mL / NET: 200 mL          Review of Systems:   •	General: negative  •	Skin/Breast: negative  •	Ophthalmologic: negative  •	ENMT: negative  •	Respiratory and Thorax: negative  •	Cardiovascular: negative  •	Gastrointestinal: negative  •	Genitourinary: negative  •	Musculoskeletal: negative  •	Neurological: negative  •	Psychiatric: negative  •	Hematology/Lymphatics: negative  •	Endocrine: negative  •	Allergic/Immunologic: negative    Physical Exam:   • Constitutional:	thin elderly male, NAD  • Eyes:	EOMI; PERRL; no drainage or redness  • ENMT:	No oral lesions; no gross abnormalities  • Neck: no thyromegaly or nodules  • Breasts:	not examined  • Back:	No deformity or limitation of movement  • Respiratory:	Breath Sounds equal & clear to auscultation, no accessory muscle use  • Cardiovascular:	Regular rate & rhythm, normal S1, S2; no murmurs, gallops or rubs; no S3, S4  • Gastrointestinal:	Soft, non-tender, no hepatosplenomegaly, normal bowel sounds  • Genitourinary:	not examined  • Rectal: not examined  • Extremities:	No cyanosis, clubbing or edema  • Vascular:	Equal and normal pulses (dorsalis pedis)  • Neurologica:l	not examined  • Skin:	No lesions; no rash  • Lymph Nodes:	No lymphadedenopathy  • Musculoskeletal:	No joint pain, swelling or deformity; no limitation of movement        LABS:      CBC Full  -  ( 05 Nov 2022 05:30 )  WBC Count : 6.07 K/uL  RBC Count : 3.32 M/uL  Hemoglobin : 8.6 g/dL  Hematocrit : 28.6 %  Platelet Count - Automated : 263 K/uL  Mean Cell Volume : 86.1 fl  Mean Cell Hemoglobin : 25.9 pg  Mean Cell Hemoglobin Concentration : 30.1 gm/dL  Auto Neutrophil # : 3.81 K/uL  Auto Lymphocyte # : 1.41 K/uL  Auto Monocyte # : 0.53 K/uL  Auto Eosinophil # : 0.25 K/uL  Auto Basophil # : 0.04 K/uL  Auto Neutrophil % : 62.8 %  Auto Lymphocyte % : 23.2 %  Auto Monocyte % : 8.7 %  Auto Eosinophil % : 4.1 %  Auto Basophil % : 0.7 %    11-05    135  |  109<H>  |  21  ----------------------------<  89  3.9   |  16<L>  |  1.50<H>    Ca    9.0      05 Nov 2022 05:30  Phos  2.7     11-05  Mg     1.7     11-05                          RADIOLOGY & ADDITIONAL STUDIES (The following images were personally reviewed):  Kim:                                     No  Urine output:                       adequate  DVT prophylaxis:                 Yes  Flattus:                                  Yes  Bowel movement:              No

## 2022-11-07 LAB
ANION GAP SERPL CALC-SCNC: 11 MMOL/L — SIGNIFICANT CHANGE UP (ref 5–17)
BUN SERPL-MCNC: 18 MG/DL — SIGNIFICANT CHANGE UP (ref 7–23)
CALCIUM SERPL-MCNC: 8.8 MG/DL — SIGNIFICANT CHANGE UP (ref 8.4–10.5)
CHLORIDE SERPL-SCNC: 106 MMOL/L — SIGNIFICANT CHANGE UP (ref 96–108)
CO2 SERPL-SCNC: 17 MMOL/L — LOW (ref 22–31)
CREAT SERPL-MCNC: 1.37 MG/DL — HIGH (ref 0.5–1.3)
EGFR: 52 ML/MIN/1.73M2 — LOW
GLUCOSE SERPL-MCNC: 89 MG/DL — SIGNIFICANT CHANGE UP (ref 70–99)
HCT VFR BLD CALC: 28.8 % — LOW (ref 39–50)
HGB BLD-MCNC: 8.8 G/DL — LOW (ref 13–17)
MAGNESIUM SERPL-MCNC: 1.5 MG/DL — LOW (ref 1.6–2.6)
MCHC RBC-ENTMCNC: 25.3 PG — LOW (ref 27–34)
MCHC RBC-ENTMCNC: 30.6 GM/DL — LOW (ref 32–36)
MCV RBC AUTO: 82.8 FL — SIGNIFICANT CHANGE UP (ref 80–100)
NRBC # BLD: 0 /100 WBCS — SIGNIFICANT CHANGE UP (ref 0–0)
PHOSPHATE SERPL-MCNC: 2.9 MG/DL — SIGNIFICANT CHANGE UP (ref 2.5–4.5)
PLATELET # BLD AUTO: 293 K/UL — SIGNIFICANT CHANGE UP (ref 150–400)
POTASSIUM SERPL-MCNC: 3.3 MMOL/L — LOW (ref 3.5–5.3)
POTASSIUM SERPL-SCNC: 3.3 MMOL/L — LOW (ref 3.5–5.3)
RBC # BLD: 3.48 M/UL — LOW (ref 4.2–5.8)
RBC # FLD: 19.7 % — HIGH (ref 10.3–14.5)
SODIUM SERPL-SCNC: 134 MMOL/L — LOW (ref 135–145)
WBC # BLD: 7.12 K/UL — SIGNIFICANT CHANGE UP (ref 3.8–10.5)
WBC # FLD AUTO: 7.12 K/UL — SIGNIFICANT CHANGE UP (ref 3.8–10.5)

## 2022-11-07 PROCEDURE — 99232 SBSQ HOSP IP/OBS MODERATE 35: CPT | Mod: GC

## 2022-11-07 RX ADMIN — ATORVASTATIN CALCIUM 20 MILLIGRAM(S): 80 TABLET, FILM COATED ORAL at 22:22

## 2022-11-07 RX ADMIN — FINASTERIDE 5 MILLIGRAM(S): 5 TABLET, FILM COATED ORAL at 12:10

## 2022-11-07 RX ADMIN — DORZOLAMIDE HYDROCHLORIDE 1 DROP(S): 20 SOLUTION/ DROPS OPHTHALMIC at 06:40

## 2022-11-07 RX ADMIN — DORZOLAMIDE HYDROCHLORIDE 1 DROP(S): 20 SOLUTION/ DROPS OPHTHALMIC at 22:25

## 2022-11-07 RX ADMIN — PANTOPRAZOLE SODIUM 40 MILLIGRAM(S): 20 TABLET, DELAYED RELEASE ORAL at 06:37

## 2022-11-07 RX ADMIN — Medication 81 MILLIGRAM(S): at 12:10

## 2022-11-07 RX ADMIN — APIXABAN 2.5 MILLIGRAM(S): 2.5 TABLET, FILM COATED ORAL at 17:42

## 2022-11-07 RX ADMIN — Medication 25 MILLIGRAM(S): at 06:37

## 2022-11-07 RX ADMIN — ISOSORBIDE MONONITRATE 30 MILLIGRAM(S): 60 TABLET, EXTENDED RELEASE ORAL at 12:10

## 2022-11-07 RX ADMIN — DORZOLAMIDE HYDROCHLORIDE 1 DROP(S): 20 SOLUTION/ DROPS OPHTHALMIC at 13:55

## 2022-11-07 RX ADMIN — Medication 50 MILLIGRAM(S): at 06:37

## 2022-11-07 RX ADMIN — PREGABALIN 1000 MICROGRAM(S): 225 CAPSULE ORAL at 12:10

## 2022-11-07 RX ADMIN — APIXABAN 2.5 MILLIGRAM(S): 2.5 TABLET, FILM COATED ORAL at 06:37

## 2022-11-07 NOTE — PROGRESS NOTE ADULT - PROBLEM SELECTOR PLAN 1
Patient presenting with several day history of weakness, with associated cough and diarrhea. No N/V. Found to be COVID positive with UTI and JERI. History of C. diff infection 1 year ago, completed course of fidaxomicin. Weakness likely due to dehydration from diarrhea and UTI. 0/4 SIRS criteria, no signs of sepsis. Ambulates with a cane at baseline.  - C.Diff negative  - F/u GI PCR  - Blood cx NGTD  -urine cx growing klebsiella  - c/w CTX 1g x3 days  - IV maintenance fluids  - PT/OT consult Patient presenting with several day history of weakness, with associated cough and diarrhea. No N/V. Found to be COVID positive with UTI and JERI. History of C. diff infection 1 year ago, completed course of fidaxomicin. Weakness likely due to dehydration from diarrhea and UTI. 0/4 SIRS criteria, no signs of sepsis. Ambulates with a cane at baseline.  - C.Diff negative  - F/u GI PCR  - Blood cx NGTD  -urine cx growing klebsiella  - c/w CTX 1g x3 days  - IV maintenance fluids  - PT/OT consulted, OT recommends home with OT

## 2022-11-07 NOTE — PROGRESS NOTE ADULT - SUBJECTIVE AND OBJECTIVE BOX
INTERVAL HPI/OVERNIGHT EVENTS:  Looks much better;  Will go home tomorrow  Diarrhea resolved  Urine looks improved;        MEDICATIONS  (STANDING):  apixaban 2.5 milliGRAM(s) Oral every 12 hours  aspirin enteric coated 81 milliGRAM(s) Oral daily  atorvastatin 20 milliGRAM(s) Oral at bedtime  cyanocobalamin 1000 MICROGram(s) Oral daily  dorzolamide 2% Ophthalmic Solution 1 Drop(s) Both EYES three times a day  finasteride 5 milliGRAM(s) Oral daily  hydrochlorothiazide 25 milliGRAM(s) Oral daily  isosorbide   mononitrate ER Tablet (IMDUR) 30 milliGRAM(s) Oral daily  metoprolol succinate ER 50 milliGRAM(s) Oral daily  pantoprazole    Tablet 40 milliGRAM(s) Oral before breakfast  sodium chloride 0.9%. 1000 milliLiter(s) (80 mL/Hr) IV Continuous <Continuous>    MEDICATIONS  (PRN):  acetaminophen     Tablet .. 650 milliGRAM(s) Oral every 6 hours PRN Temp greater or equal to 38C (100.4F), Mild Pain (1 - 3)  aluminum hydroxide/magnesium hydroxide/simethicone Suspension 30 milliLiter(s) Oral every 4 hours PRN Dyspepsia  loperamide 2 milliGRAM(s) Oral every 4 hours PRN Diarrhea  melatonin 3 milliGRAM(s) Oral at bedtime PRN Insomnia  ondansetron Injectable 4 milliGRAM(s) IV Push every 8 hours PRN Nausea and/or Vomiting      Allergies    ACE inhibitors (Angioedema)  x-geva (Unknown)    Intolerances        Vital Signs Last 24 Hrs  T(C): 36.4 (2022 05:45), Max: 36.7 (2022 12:00)  T(F): 97.5 (2022 05:45), Max: 98 (2022 12:00)  HR: 61 (2022 06:30) (61 - 69)  BP: 119/65 (2022 06:30) (112/68 - 133/71)  BP(mean): --  RR: 18 (2022 05:45) (18 - 18)  SpO2: 100% (2022 05:45) (99% - 100%)    Parameters below as of 2022 05:45  Patient On (Oxygen Delivery Method): room air              Constitutional: Awake and alert;  Good appetite     Eyes: SELENE    ENMT: Negative    Neck: Supple    Back:  no tenderness     Respiratory:  clear    Cardiovascular: S1 S2    Gastrointestinal:  soft    Genitourinary:    Extremities: no edema     Vascular:    Neurological:    Skin:    Lymph Nodes:             @ 07:01  -   @ 07:00  --------------------------------------------------------  IN: 0 mL / OUT: 1050 mL / NET: -1050 mL      LABS:                        8.8    7.12  )-----------( 293      ( 2022 05:30 )             28.8     -    134<L>  |  106  |  18  ----------------------------<  89  3.3<L>   |  17<L>  |  1.37<H>    Ca    8.8      2022 05:30  Phos  2.9       Mg     1.5             Urinalysis Basic - ( 2022 12:14 )    Color: Yellow / Appearance: Clear / S.010 / pH: x  Gluc: x / Ketone: NEGATIVE  / Bili: Negative / Urobili: 0.2 E.U./dL   Blood: x / Protein: NEGATIVE mg/dL / Nitrite: NEGATIVE   Leuk Esterase: Moderate / RBC: < 5 /HPF / WBC < 5 /HPF   Sq Epi: x / Non Sq Epi: 0-5 /HPF / Bacteria: None /HPF        RADIOLOGY & ADDITIONAL TESTS:

## 2022-11-07 NOTE — PROGRESS NOTE ADULT - PROBLEM SELECTOR PLAN 8
Admission in 2021 to [facility] for dyspnea on exertion, found to have DVT and PE. High risk for blood clots given cancer history.  - C/w eliquis 2.5 BID

## 2022-11-07 NOTE — PROGRESS NOTE ADULT - PROBLEM SELECTOR PLAN 3
Cr 1.98 on admission. Downtrending after 1L NS in ED. Baseline Cr 1.0. Likely prerenal. UA Spec Grav 1.015  - C/w IV maintenance fluids  - urne osm 217, urine Na 38

## 2022-11-07 NOTE — PROGRESS NOTE ADULT - PROBLEM SELECTOR PLAN 4
Complaining of some cough and weakness the past few days. No shortness of breath. Not hypoxic. COVID vaccinated. Never had COVID before.  - given MAB tx

## 2022-11-07 NOTE — PROGRESS NOTE ADULT - PROBLEM SELECTOR PLAN 6
H/o pAFib. Follows with cardiologist at [facility].   - C/w eliquis 2.5 BID  - Toprol 50 daily

## 2022-11-07 NOTE — PROGRESS NOTE ADULT - SUBJECTIVE AND OBJECTIVE BOX
OVERNIGHT EVENTS:    SUBJECTIVE / INTERVAL HPI: Patient seen and examined at bedside.     VITAL SIGNS:  Vital Signs Last 24 Hrs  T(C): 36.4 (2022 05:45), Max: 36.7 (2022 12:00)  T(F): 97.5 (2022 05:45), Max: 98 (2022 12:00)  HR: 61 (2022 06:30) (61 - 69)  BP: 119/65 (2022 06:30) (112/68 - 133/71)  BP(mean): --  RR: 18 (2022 05:45) (18 - 18)  SpO2: 100% (2022 05:45) (99% - 100%)    Parameters below as of 2022 05:45  Patient On (Oxygen Delivery Method): room air        PHYSICAL EXAM:    General: WDWN  HEENT: NCAT; PERRL, anicteric sclera; MMM  Neck: supple, trachea midline  Cardiovascular: S1, S2 normal; RRR, no M/G/R  Respiratory: CTABL; no W/R/R  Gastrointestinal: soft, nontender, nondistended. bowel sounds present.  Skin: no ulcerations or visible rashes appreciated  Extremities: WWP; no edema, clubbing or cyanosis  Vascular: 2+ radial, DP/PT pulses B/L  Neurological: AAOx3; CN II-XII grossly intact; no focal deficits    MEDICATIONS:  MEDICATIONS  (STANDING):  apixaban 2.5 milliGRAM(s) Oral every 12 hours  aspirin enteric coated 81 milliGRAM(s) Oral daily  atorvastatin 20 milliGRAM(s) Oral at bedtime  cyanocobalamin 1000 MICROGram(s) Oral daily  dorzolamide 2% Ophthalmic Solution 1 Drop(s) Both EYES three times a day  finasteride 5 milliGRAM(s) Oral daily  hydrochlorothiazide 25 milliGRAM(s) Oral daily  isosorbide   mononitrate ER Tablet (IMDUR) 30 milliGRAM(s) Oral daily  metoprolol succinate ER 50 milliGRAM(s) Oral daily  pantoprazole    Tablet 40 milliGRAM(s) Oral before breakfast  sodium chloride 0.9%. 1000 milliLiter(s) (80 mL/Hr) IV Continuous <Continuous>    MEDICATIONS  (PRN):  acetaminophen     Tablet .. 650 milliGRAM(s) Oral every 6 hours PRN Temp greater or equal to 38C (100.4F), Mild Pain (1 - 3)  aluminum hydroxide/magnesium hydroxide/simethicone Suspension 30 milliLiter(s) Oral every 4 hours PRN Dyspepsia  loperamide 2 milliGRAM(s) Oral every 4 hours PRN Diarrhea  melatonin 3 milliGRAM(s) Oral at bedtime PRN Insomnia  ondansetron Injectable 4 milliGRAM(s) IV Push every 8 hours PRN Nausea and/or Vomiting      ALLERGIES:  Allergies    ACE inhibitors (Angioedema)  x-geva (Unknown)    Intolerances        LABS:                        8.8    7.12  )-----------( 293      ( 2022 05:30 )             28.8     11-07    134<L>  |  106  |  18  ----------------------------<  89  3.3<L>   |  17<L>  |  1.37<H>    Ca    8.8      2022 05:30  Phos  2.9       Mg     1.5     -07        Urinalysis Basic - ( 2022 12:14 )    Color: Yellow / Appearance: Clear / S.010 / pH: x  Gluc: x / Ketone: NEGATIVE  / Bili: Negative / Urobili: 0.2 E.U./dL   Blood: x / Protein: NEGATIVE mg/dL / Nitrite: NEGATIVE   Leuk Esterase: Moderate / RBC: < 5 /HPF / WBC < 5 /HPF   Sq Epi: x / Non Sq Epi: 0-5 /HPF / Bacteria: None /HPF      CAPILLARY BLOOD GLUCOSE          RADIOLOGY & ADDITIONAL TESTS: Reviewed. OVERNIGHT EVENTS:  MICKEY    SUBJECTIVE / INTERVAL HPI: Patient seen and examined at bedside.     CONSTITUTIONAL: No weakness, fevers or chills, pt reports improvement in cough  EYES/ENT: No visual changes;  No vertigo or throat pain   NECK: No pain or stiffness  RESPIRATORY: c/o mild cough that has improved overall, wheezing, hemoptysis; No shortness of breath  CARDIOVASCULAR: No chest pain or palpitations  GASTROINTESTINAL: No abdominal or epigastric pain. No nausea, vomiting, or hematemesis; No diarrhea or constipation. No melena or hematochezia.  GENITOURINARY: No dysuria, frequency or hematuria  NEUROLOGICAL: No numbness or weakness  SKIN: No itching, burning, rashes, or lesions   All other review of systems is negative unless indicated above.      VITAL SIGNS:  Vital Signs Last 24 Hrs  T(C): 36.4 (2022 05:45), Max: 36.7 (2022 12:00)  T(F): 97.5 (2022 05:45), Max: 98 (2022 12:00)  HR: 61 (2022 06:30) (61 - 69)  BP: 119/65 (2022 06:30) (112/68 - 133/71)  BP(mean): --  RR: 18 (2022 05:45) (18 - 18)  SpO2: 100% (2022 05:45) (99% - 100%)    Parameters below as of 2022 05:45  Patient On (Oxygen Delivery Method): room air        PHYSICAL EXAM:    General: WDWN, NAD, talkative and cooperative  HEENT: NCAT; PERRL, anicteric sclera; MMM  Neck: supple, trachea midline  Cardiovascular: S1, S2 normal; RRR, no M/G/R  Respiratory: wheezing-mild BL and diffusely, no accessory M use  Gastrointestinal: soft, nontender, nondistended. bowel sounds present.  Skin: no ulcerations or visible rashes appreciated  Extremities: WWP; no edema, clubbing or cyanosis  Vascular: 2+ radial, DP/PT pulses B/L  Neurological: AAOx3; CN II-XII grossly intact; no focal deficits    MEDICATIONS:  MEDICATIONS  (STANDING):  apixaban 2.5 milliGRAM(s) Oral every 12 hours  aspirin enteric coated 81 milliGRAM(s) Oral daily  atorvastatin 20 milliGRAM(s) Oral at bedtime  cyanocobalamin 1000 MICROGram(s) Oral daily  dorzolamide 2% Ophthalmic Solution 1 Drop(s) Both EYES three times a day  finasteride 5 milliGRAM(s) Oral daily  hydrochlorothiazide 25 milliGRAM(s) Oral daily  isosorbide   mononitrate ER Tablet (IMDUR) 30 milliGRAM(s) Oral daily  metoprolol succinate ER 50 milliGRAM(s) Oral daily  pantoprazole    Tablet 40 milliGRAM(s) Oral before breakfast  sodium chloride 0.9%. 1000 milliLiter(s) (80 mL/Hr) IV Continuous <Continuous>    MEDICATIONS  (PRN):  acetaminophen     Tablet .. 650 milliGRAM(s) Oral every 6 hours PRN Temp greater or equal to 38C (100.4F), Mild Pain (1 - 3)  aluminum hydroxide/magnesium hydroxide/simethicone Suspension 30 milliLiter(s) Oral every 4 hours PRN Dyspepsia  loperamide 2 milliGRAM(s) Oral every 4 hours PRN Diarrhea  melatonin 3 milliGRAM(s) Oral at bedtime PRN Insomnia  ondansetron Injectable 4 milliGRAM(s) IV Push every 8 hours PRN Nausea and/or Vomiting      ALLERGIES:  Allergies    ACE inhibitors (Angioedema)  x-geva (Unknown)    Intolerances        LABS:                        8.8    7.12  )-----------( 293      ( 2022 05:30 )             28.8     11-07    134<L>  |  106  |  18  ----------------------------<  89  3.3<L>   |  17<L>  |  1.37<H>    Ca    8.8      2022 05:30  Phos  2.9     11-07  Mg     1.5     11-07        Urinalysis Basic - ( 2022 12:14 )    Color: Yellow / Appearance: Clear / S.010 / pH: x  Gluc: x / Ketone: NEGATIVE  / Bili: Negative / Urobili: 0.2 E.U./dL   Blood: x / Protein: NEGATIVE mg/dL / Nitrite: NEGATIVE   Leuk Esterase: Moderate / RBC: < 5 /HPF / WBC < 5 /HPF   Sq Epi: x / Non Sq Epi: 0-5 /HPF / Bacteria: None /HPF      CAPILLARY BLOOD GLUCOSE          RADIOLOGY & ADDITIONAL TESTS: Reviewed.

## 2022-11-08 ENCOUNTER — TRANSCRIPTION ENCOUNTER (OUTPATIENT)
Age: 82
End: 2022-11-08

## 2022-11-08 DIAGNOSIS — Z29.9 ENCOUNTER FOR PROPHYLACTIC MEASURES, UNSPECIFIED: ICD-10-CM

## 2022-11-08 DIAGNOSIS — A49.8 OTHER BACTERIAL INFECTIONS OF UNSPECIFIED SITE: ICD-10-CM

## 2022-11-08 LAB
ANION GAP SERPL CALC-SCNC: 11 MMOL/L — SIGNIFICANT CHANGE UP (ref 5–17)
BASOPHILS # BLD AUTO: 0.07 K/UL — SIGNIFICANT CHANGE UP (ref 0–0.2)
BASOPHILS NFR BLD AUTO: 0.6 % — SIGNIFICANT CHANGE UP (ref 0–2)
BUN SERPL-MCNC: 17 MG/DL — SIGNIFICANT CHANGE UP (ref 7–23)
C DIFF GDH STL QL: SIGNIFICANT CHANGE UP
C DIFF GDH STL QL: SIGNIFICANT CHANGE UP
CALCIUM SERPL-MCNC: 9.5 MG/DL — SIGNIFICANT CHANGE UP (ref 8.4–10.5)
CHLORIDE SERPL-SCNC: 104 MMOL/L — SIGNIFICANT CHANGE UP (ref 96–108)
CO2 SERPL-SCNC: 19 MMOL/L — LOW (ref 22–31)
CREAT SERPL-MCNC: 1.49 MG/DL — HIGH (ref 0.5–1.3)
EGFR: 47 ML/MIN/1.73M2 — LOW
EOSINOPHIL # BLD AUTO: 0.33 K/UL — SIGNIFICANT CHANGE UP (ref 0–0.5)
EOSINOPHIL NFR BLD AUTO: 2.9 % — SIGNIFICANT CHANGE UP (ref 0–6)
GLUCOSE SERPL-MCNC: 85 MG/DL — SIGNIFICANT CHANGE UP (ref 70–99)
HCT VFR BLD CALC: 32.2 % — LOW (ref 39–50)
HGB BLD-MCNC: 9.7 G/DL — LOW (ref 13–17)
IMM GRANULOCYTES NFR BLD AUTO: 1.5 % — HIGH (ref 0–0.9)
LYMPHOCYTES # BLD AUTO: 1.61 K/UL — SIGNIFICANT CHANGE UP (ref 1–3.3)
LYMPHOCYTES # BLD AUTO: 14.3 % — SIGNIFICANT CHANGE UP (ref 13–44)
MAGNESIUM SERPL-MCNC: 1.6 MG/DL — SIGNIFICANT CHANGE UP (ref 1.6–2.6)
MCHC RBC-ENTMCNC: 25.6 PG — LOW (ref 27–34)
MCHC RBC-ENTMCNC: 30.1 GM/DL — LOW (ref 32–36)
MCV RBC AUTO: 85 FL — SIGNIFICANT CHANGE UP (ref 80–100)
MONOCYTES # BLD AUTO: 0.75 K/UL — SIGNIFICANT CHANGE UP (ref 0–0.9)
MONOCYTES NFR BLD AUTO: 6.7 % — SIGNIFICANT CHANGE UP (ref 2–14)
NEUTROPHILS # BLD AUTO: 8.29 K/UL — HIGH (ref 1.8–7.4)
NEUTROPHILS NFR BLD AUTO: 74 % — SIGNIFICANT CHANGE UP (ref 43–77)
NRBC # BLD: 0 /100 WBCS — SIGNIFICANT CHANGE UP (ref 0–0)
PHOSPHATE SERPL-MCNC: 3.7 MG/DL — SIGNIFICANT CHANGE UP (ref 2.5–4.5)
PLATELET # BLD AUTO: 354 K/UL — SIGNIFICANT CHANGE UP (ref 150–400)
POTASSIUM SERPL-MCNC: 3.3 MMOL/L — LOW (ref 3.5–5.3)
POTASSIUM SERPL-SCNC: 3.3 MMOL/L — LOW (ref 3.5–5.3)
RBC # BLD: 3.79 M/UL — LOW (ref 4.2–5.8)
RBC # FLD: 19.9 % — HIGH (ref 10.3–14.5)
SODIUM SERPL-SCNC: 134 MMOL/L — LOW (ref 135–145)
WBC # BLD: 11.22 K/UL — HIGH (ref 3.8–10.5)
WBC # FLD AUTO: 11.22 K/UL — HIGH (ref 3.8–10.5)

## 2022-11-08 PROCEDURE — 99232 SBSQ HOSP IP/OBS MODERATE 35: CPT | Mod: GC

## 2022-11-08 PROCEDURE — 76770 US EXAM ABDO BACK WALL COMP: CPT | Mod: 26

## 2022-11-08 RX ORDER — MAGNESIUM SULFATE 500 MG/ML
2 VIAL (ML) INJECTION ONCE
Refills: 0 | Status: COMPLETED | OUTPATIENT
Start: 2022-11-08 | End: 2022-11-08

## 2022-11-08 RX ORDER — POTASSIUM CHLORIDE 20 MEQ
40 PACKET (EA) ORAL ONCE
Refills: 0 | Status: DISCONTINUED | OUTPATIENT
Start: 2022-11-08 | End: 2022-11-08

## 2022-11-08 RX ORDER — CEFTRIAXONE 500 MG/1
1000 INJECTION, POWDER, FOR SOLUTION INTRAMUSCULAR; INTRAVENOUS EVERY 24 HOURS
Refills: 0 | Status: COMPLETED | OUTPATIENT
Start: 2022-11-08 | End: 2022-11-10

## 2022-11-08 RX ORDER — POTASSIUM CHLORIDE 20 MEQ
20 PACKET (EA) ORAL
Refills: 0 | Status: COMPLETED | OUTPATIENT
Start: 2022-11-08 | End: 2022-11-08

## 2022-11-08 RX ORDER — VANCOMYCIN HCL 1 G
125 VIAL (EA) INTRAVENOUS EVERY 6 HOURS
Refills: 0 | Status: DISCONTINUED | OUTPATIENT
Start: 2022-11-08 | End: 2022-11-10

## 2022-11-08 RX ADMIN — DORZOLAMIDE HYDROCHLORIDE 1 DROP(S): 20 SOLUTION/ DROPS OPHTHALMIC at 06:41

## 2022-11-08 RX ADMIN — Medication 125 MILLIGRAM(S): at 14:53

## 2022-11-08 RX ADMIN — DORZOLAMIDE HYDROCHLORIDE 1 DROP(S): 20 SOLUTION/ DROPS OPHTHALMIC at 22:05

## 2022-11-08 RX ADMIN — Medication 125 MILLIGRAM(S): at 20:25

## 2022-11-08 RX ADMIN — PANTOPRAZOLE SODIUM 40 MILLIGRAM(S): 20 TABLET, DELAYED RELEASE ORAL at 06:42

## 2022-11-08 RX ADMIN — Medication 50 MILLIGRAM(S): at 06:42

## 2022-11-08 RX ADMIN — Medication 20 MILLIEQUIVALENT(S): at 10:40

## 2022-11-08 RX ADMIN — APIXABAN 2.5 MILLIGRAM(S): 2.5 TABLET, FILM COATED ORAL at 20:26

## 2022-11-08 RX ADMIN — Medication 25 MILLIGRAM(S): at 06:42

## 2022-11-08 RX ADMIN — APIXABAN 2.5 MILLIGRAM(S): 2.5 TABLET, FILM COATED ORAL at 06:42

## 2022-11-08 RX ADMIN — Medication 20 MILLIEQUIVALENT(S): at 14:54

## 2022-11-08 RX ADMIN — CEFTRIAXONE 100 MILLIGRAM(S): 500 INJECTION, POWDER, FOR SOLUTION INTRAMUSCULAR; INTRAVENOUS at 12:38

## 2022-11-08 RX ADMIN — Medication 20 MILLIEQUIVALENT(S): at 11:19

## 2022-11-08 RX ADMIN — ATORVASTATIN CALCIUM 20 MILLIGRAM(S): 80 TABLET, FILM COATED ORAL at 22:03

## 2022-11-08 RX ADMIN — Medication 25 GRAM(S): at 10:40

## 2022-11-08 NOTE — PROGRESS NOTE ADULT - PROBLEM SELECTOR PLAN 2
Positive UA on admission. Patient has chronic urinary incontinence and frequency related to h/o prostate cancer and prostatectomy. Follows with urology. No fever, dysuria.  Culture + for >100k CFU Klebsiella  - C/w CTX 1g x3 days Complaining of some cough and weakness the past few days. No shortness of breath. Not hypoxic. COVID vaccinated. Never had COVID before.  - given MAB tx Pt presented with generalized weakness. Has hx of chronic urinary incontinence and frequency related to h/o prostate cancer and prostatectomy. Follows with urology. No fever, dysuria on admission.   + UA, Culture + for >100k CFU Klebsiella. Received CTX 1g IV x 2, last 11/5.    Pt c/o worsening L sided flank pain, +CVA tenderness. WBC inc to 11 from 7.  - Restart CTX 1g IV q24h  - F/u bilateral renal U/S to r/o pyelonephritis or hydronephrosis Pt presented with generalized weakness. Has hx of chronic urinary incontinence and frequency related to h/o prostate cancer and prostatectomy. Follows with urology. No fever, dysuria on admission.   + UA, Culture + for >100k CFU Klebsiella. Received CTX 1g IV x 2, last 11/5.    Pt c/o worsening L sided flank pain, +CVA tenderness. WBC inc to 11 from 7.  - Restart CTX 1g IV q24h  - F/u bilateral renal U/S to r/o pyelonephritis or hydronephrosis  - Last UA 11/6 +leuk esterase, - nitrites  - bladder scans q6h to evaluate for retention

## 2022-11-08 NOTE — PROGRESS NOTE ADULT - PROBLEM SELECTOR PLAN 11
C. diff positive on admission for diarrhea in August 2021. Treated with course of fidaxomicin.  - C. diff negative Baseline Hgb 8-9 on past admissions. Normocytic. 2/2 AoCD and DOMITILA. History of blood transfusions outpatient. Hx dark stools potentially 2/2 Fe supplementation.   - C/w home Fe sulfate 325 daily C. diff positive on admission for diarrhea in August 2021. Treated with course of fidaxomicin.  - C. diff negative, likely false negative as pt experiencing voluminous, dark, watery diarrhea prior to admission  - Repeat C. diff +

## 2022-11-08 NOTE — PROGRESS NOTE ADULT - PROBLEM SELECTOR PLAN 6
H/o pAFib. Follows with cardiologist at [facility].   - C/w eliquis 2.5 BID  - Toprol 50 daily H/o Stage IV prostate CA s/p prostatectomy and on chemotherapy with Lupron. Follows with urology and heme-onc Dr. Mcmillan.  - F/u outpatient heme-onc  - C/w home finasteride 5 Patient presenting with several day history of weakness, with associated cough and diarrhea. No N/V. Found to be COVID positive with UTI and JERI. History of C. diff infection 1 year ago, completed course of fidaxomicin. Weakness likely due to dehydration from diarrhea and UTI. 0/4 SIRS criteria, no signs of sepsis. Ambulates with a cane at baseline.  - C.Diff negative  - F/u GI PCR  - Blood cx NGTD  -urine cx growing klebsiella  - c/w CTX 1g x3 days  - IV maintenance fluids  - PT/OT consulted, OT recommends home with OT

## 2022-11-08 NOTE — PROGRESS NOTE ADULT - PROBLEM SELECTOR PLAN 7
Normotensive on admission. Home med: HCTZ 25 and toprol 50. Allergic to ACEI, developed angioedema.  - C/w home HCTZ and BB H/o Stage IV prostate CA s/p prostatectomy and on chemotherapy with Lupron. Follows with urology and heme-onc Dr. Mcmillan.  - F/u outpatient heme-onc  - C/w home finasteride 5 Normotensive on admission. Home med: HCTZ 25mg qd and metoprolol 50mg qd. Allergic to ACEI, developed angioedema.  - C/w home HCTZ and BB

## 2022-11-08 NOTE — DISCHARGE NOTE PROVIDER - HOSPITAL COURSE
#Discharge: do not delete    Patient is 82 yo M with past medical history of CAD s/p PCI, Afib (on Eliquis), HTN, HLD, h/o PE/DVT, stage IV prostate CA s/p prostatectomy (on Lupron), and anemia (baseline Hgb 9) who presented with cough, diarrhea, and weakness x 3 days, found to have a UTI and COVID-19. The patient was treated with IV antibiotics and monoclonal antibodies and progressively improved. Patient was medically optimized, stable and ready for discharge. Plan of care and return precautions were discussed with the patient who verbally stated understanding.     Problem List/Main Diagnoses:     #UTI    #COVID-19    #JERI    #Atrial Fibrillation    #HTN    #HLD    #Prostate Cancer    New medications/therapies:   New lines/hardware: None  Labs to be followed outpatient: BMP (creatinine)    Exam to be followed outpatient: Internal Medicine, Urology, Cardiology    Discharge plan: discharge to home    Physical Exam Upon Discharge:    T(C): 36.8 (11-08-22 @ 05:09), Max: 36.8 (11-08-22 @ 05:09)  HR: 77 (11-08-22 @ 05:09) (69 - 77)  BP: 138/68 (11-08-22 @ 05:09) (124/69 - 138/68)  RR: 18 (11-08-22 @ 05:09) (17 - 18)  SpO2: 96% (11-08-22 @ 05:09) (95% - 99%)    PHYSICAL EXAM:  General: AAOx3, no acute distress, laying in bed comfortably  HEENT: head NCAT, eyes EOMI, PERRLA, no conjunctival pallor, no scleral icterus, no oropharyngeal exudates, erythema, or lesions, moist mucous membranes  Neck: supple, non-tender, no cervical LAD, no thyromegaly  Pulmonary: lung fields CTAB, no wheezing, rales, or rhonchi, no tenderness to palpation, no dullness to percussion, tactile fremitus WNL  Cardiovascular: RRR, normal S1/S2, no m/r/g, no JVD, no carotid bruits b/l, PMI not displaced  Abdominal: soft, NTND, +BS, no hepatosplenomegaly, no CVA tenderness, no masses, no bruits  Genitourinary:  Extremities: no peripheral edema or cyanosis, pulses 2+ in upper and lower extremities b/l, capillary refill < 2 sec.  Neuro: CN II-XII grossly intact and symmetric b/l, motor, sensory, coordination grossly intact in all extremities, DTR 2+ and symmetric b/l  Skin: warm, dry, no visible jaundice, no new rashes   #Discharge: do not delete    Patient is 80 yo M with past medical history of CAD s/p PCI, Afib (on Eliquis), HTN, HLD, h/o PE/DVT, stage IV prostate CA s/p prostatectomy (on Lupron), and anemia (baseline Hgb 9) who presented with cough, diarrhea, and weakness x 3 days, found to have C. Diff, UTI, COVID-19. The patient was treated with IV antibiotics for UTI, po antibiotics for C. Diff infection and monoclonal antibodies for COVID-19 and progressively improved. Patient was medically optimized, stable and ready for discharge. Plan of care and return precautions were discussed with the patient who verbally stated understanding.     Problem List/Main Diagnoses:     #C. Diff Infection  Clostridium difficile infection.   ·  Plan: Pt has hx of C.Diff infection 8/2021 tx with fidaxomicin. At presentation, was experiencing watery, voluminous, dark-colored diarrhea ~4x/daily. C.Diff toxin neg on admission, potentially false negative as pt had diarrhea prior to admission. Pt still experiencing diarrhea, repeat C.Diff +    Diarrhea initially improved, now with worsening diarrhea.   - d/c Vancomycin to 500mg po q6h  - d/c Flagyl 500mg IV q8h  - c/w 200mg po BID x 10d per ID for 10d total course  - isolation/contact precautions.    #UTI  Pt presented with generalized weakness. Has hx of chronic urinary incontinence and frequency related to h/o prostate cancer and prostatectomy. Follows with urology.   + UA, Culture + for >100k CFU Klebsiella.   - Received CTX 1g x __ days    #COVID-19  Mild cough and weakness on admission. No dyspnea, hypoxia. COVID+  - Received monoclonal Abs    #JERI  Cr 1.98 on admission. Downtrended after IVF. Per urology, baseline Cr 1.28-1.84 outpatient.     - Cr initially downtrended after IVF, inc to 1.5 and stable   - CT shows R hydronephrosis and hydroureter 2/2 adenopathy (likely from prostate ca mets)  - F/u urology reccs- no acute intervention at the time  - trend Cr.     Hydronephrosis, right.   ·  Plan: Pt with UTI developed L CVA tenderness. Renal U/S showed 7.5cm simple renal cyst on the L, moderate hydronephrosis on the R. Pt currently asymptomatic, no R flank pain.     Asymptomatic. No acute intervention at this time.     - CTAP: New right hydronephrosis and hydroureter to the patient's mid to distal pelvis; possibly secondary to adenopathy; no  tract calcifications bilaterally. If clinically indicated recommend a CT urogram for further evaluation to identify the point of obstruction.      #Atrial Fibrillation    #HTN    #HLD    #Prostate Cancer    New medications/therapies: Fidoxamicin ____ BID x _____ for 10 day total course  New lines/hardware: None  Labs to be followed outpatient: BMP (creatinine)    Exam to be followed outpatient: Internal Medicine, Urology, Cardiology    Discharge plan: discharge to home    Physical Exam Upon Discharge:   #Discharge: do not delete    Patient is 82 yo M with past medical history of CAD s/p PCI, Afib (on Eliquis), HTN, HLD, h/o PE/DVT, stage IV prostate CA s/p prostatectomy (on Lupron), and anemia (baseline Hgb 9) who presented with cough, diarrhea, and weakness x 3 days, found to have C. Diff, UTI, COVID-19. The patient was treated with IV antibiotics for UTI, po antibiotics for C. Diff infection and monoclonal antibodies for COVID-19 and progressively improved. Patient was medically optimized, stable and ready for discharge. Plan of care and return precautions were discussed with the patient who verbally stated understanding.     Problem List/Main Diagnoses:     #C. Diff Infection  Pt has hx of C.Diff infection 8/2021 tx with fidaxomicin. At presentation, was experiencing watery, voluminous, dark-colored diarrhea ~4x/daily. C.Diff +  - Received Vancomycin 125mg po with no improvement, escalated Abx  - Received Vancomycin 500mg po q6h and Flagyl 500mg IV q8h with initial improvement then with sxs returned  - Received FIdaxomicin 200mg po BID x 10d per ID for 10d total course    #UTI  Pt presented with generalized weakness. Has hx of chronic urinary incontinence and frequency related to h/o prostate cancer and prostatectomy. Follows with urology.   + UA, Culture + for >100k CFU Klebsiella.   - Received CTX 1g x __ days    #COVID-19  Mild cough and weakness on admission. No dyspnea, hypoxia. COVID+  - Received monoclonal Abs    #JERI  Cr 1.98 on admission. Downtrended after IVF. Baseline Cr 1.3-1.8.   - Cr initially downtrended after IVF, then remained stable at 1.5, likely baseline  - CT shows R hydronephrosis and hydroureter 2/2 adenopathy (likely from prostate ca mets)    #Hydronephrosis  CTAP: Moderate hydronephrosis on the R. Pt asymptomatic, no R flank pain.     #Prostate Cancer  Pt has Stage IV prostate Ca (previously tx in 1994 s/p prostatectomy) now with mets to bone. .   - Continued home finasteride 5mg qd  - F/u outpatient oncology and urology    #Atrial Fibrillation  Hx of pAFib, follows with cardiology  - Continued Eliquis 2.5 BID, Toprol 50mg qd    #HTN  - Continued home HCTZ and ____    #Hx of Pulmonary Embolism  Admitted to [facility] in 2021 for JAIME, found to have DVT and PE. High risk for blood clots given hypercoaguability in setting of active cancer  - C/w Eliquis 2.5 BID.    #CAD  CAD s/p PCI w/ IRASEMA in 2021. Follows with cardiology Dr. Sánchez.  - Continued home atorvastatin 20mg qd, ASA 81mg qd, Imdur 30mg qd    #Anemia, normocytic  Baseline Hgb 8-9 on past admissions. Ted 2/2 AoCD. Hx of blood transfusions.   - Restart home Fe sulfate 325 qd    New medications/therapies: Fidoxamicin ____ BID x _____ for 10 day total course  New lines/hardware: None  Labs to be followed outpatient: BMP (creatinine)    Exam to be followed outpatient: Internal Medicine, Urology, Cardiology    Discharge plan: discharge to home    Physical Exam Upon Discharge:   #Discharge: do not delete    Patient is 82 yo M with past medical history of CAD s/p PCI, Afib (on Eliquis), HTN, HLD, h/o PE/DVT, stage IV prostate CA s/p prostatectomy (on Lupron), and anemia (baseline Hgb 9) who presented with cough, diarrhea, and weakness x 3 days, found to have C. Diff, UTI, COVID-19. The patient was treated with IV antibiotics for UTI, po antibiotics for C. Diff infection and monoclonal antibodies for COVID-19 and progressively improved. Patient was medically optimized, stable and ready for discharge. Plan of care and return precautions were discussed with the patient who verbally stated understanding.     Problem List/Main Diagnoses:     #C. Diff Infection  Pt has hx of C.Diff infection 8/2021 tx with fidaxomicin. At presentation, was experiencing watery, voluminous, dark-colored diarrhea ~4x/daily. C.Diff +  - Received Vancomycin 125mg po q6h x 3 days with no improvement, escalated Abx  - Received Vancomycin 500mg po q6h x 5 days and Flagyl 500mg IV q8h x 5 days with initial improvement then with sxs returned  - Received Fidaxomicin 200mg po BID x 10d per ID for 10d total course    #UTI  Pt presented with generalized weakness. Has hx of chronic urinary incontinence and frequency related to h/o prostate cancer and prostatectomy. Follows with urology.   + UA, Culture + for >100k CFU Klebsiella.   - Received CTX 1g IV x 6 days    #COVID-19  Mild cough and weakness on admission. No dyspnea, hypoxia. COVID+  - Received monoclonal Abs    #JERI  Cr 1.98 on admission. Downtrended after IVF. Baseline Cr 1.3-1.8.   - Cr initially downtrended after IVF, then remained stable at 1.5, likely baseline  - CT shows R hydronephrosis and hydroureter 2/2 adenopathy (likely from prostate ca mets)    #Hydronephrosis  CTAP: Moderate hydronephrosis on the R. Pt asymptomatic, no R flank pain.     #Prostate Cancer  Pt has Stage IV prostate Ca (previously tx in 1994 s/p prostatectomy) now with mets to bone. .   - Continued home finasteride 5mg qd  - F/u outpatient oncology and urology    #Atrial Fibrillation  Hx of pAFib, follows with cardiology  - Continued Eliquis 2.5 BID, Toprol 50mg qd    #HTN  - Continued home HCTZ and Toprol 50mg qd    #Hx of Pulmonary Embolism  Admitted to [facility] in 2021 for JAIME, found to have DVT and PE. High risk for blood clots given hypercoaguability in setting of active cancer  - C/w Eliquis 2.5 BID.    #CAD  CAD s/p PCI w/ IRASEMA in 2021. Follows with cardiology Dr. Sánchez.  - Continued home atorvastatin 20mg qd, ASA 81mg qd, Imdur 30mg qd    #Anemia, normocytic  Baseline Hgb 8-9 on past admissions. Ted 2/2 AoCD. Hx of blood transfusions.   - Restart home Fe sulfate 325mg qd    New medications/therapies: Fidoxamicin 200mg po BID x _____ for 10 day total course  New lines/hardware: None  Labs to be followed outpatient: BMP (creatinine)    Exam to be followed outpatient: Internal Medicine, Urology, Cardiology    Discharge plan: discharge to home    Physical Exam Upon Discharge:   Patient is 82 yo M with past medical history of CAD s/p PCI, Afib (on Eliquis), HTN, HLD, h/o PE/DVT, stage IV prostate CA s/p prostatectomy (on Lupron), and anemia (baseline Hgb 9) who presented with cough, diarrhea, and weakness x 3 days, found to have C. Diff, UTI, COVID-19. The patient was treated with IV antibiotics for UTI, po antibiotics for C. Diff infection and monoclonal antibodies for COVID-19 and progressively improved. Patient was medically optimized, stable and ready for discharge. Plan of care and return precautions were discussed with the patient who verbally stated understanding.     Problem List/Main Diagnoses:   #C. Diff Infection  Pt has hx of C.Diff infection 8/2021 tx with fidaxomicin. At presentation, was experiencing watery, voluminous, dark-colored diarrhea ~4x/daily. C.Diff +. Initially received Vancomycin 125mg po q6h x 3 days with no improvement. Then escalated Abx to Vancomycin 500mg po q6h x 5 days and Flagyl 500mg IV q8h x 5 days with initial improvement then with sxs returned  - Currently on Fidaxomicin 200mg po BID x 10d per ID for 10d total course (11/14-11/23)    #UTI  Pt presented with generalized weakness. Has hx of chronic urinary incontinence and frequency related to h/o prostate cancer and prostatectomy. Follows with urology.   + UA, Culture + for >100k CFU Klebsiella.   - Received CTX 1g IV x 6 days    #COVID-19  Mild cough and weakness on admission. No dyspnea, hypoxia. COVID+  - Received monoclonal Abs    #JERI  Cr 1.98 on admission. Downtrended after IVF. Baseline Cr 1.3-1.8.   - Cr initially downtrended after IVF, then remained stable at 1.5, likely baseline  - CT shows R hydronephrosis and hydroureter 2/2 adenopathy (likely from prostate ca mets)    #Hydronephrosis  CTAP: Moderate hydronephrosis on the R. Pt asymptomatic, no R flank pain.     #Prostate Cancer  Pt has Stage IV prostate Ca (previously tx in 1994 s/p prostatectomy) now with mets to bone. .   - Continued home finasteride 5mg qd  - F/u outpatient oncology and urology    #Atrial Fibrillation  Hx of pAFib, follows with cardiology  - Continued Eliquis 2.5 BID, Toprol 50mg qd    #HTN  - Continued home HCTZ and Toprol 50mg qd    #Hx of Pulmonary Embolism  Admitted to [facility] in 2021 for JAIME, found to have DVT and PE. High risk for blood clots given hypercoaguability in setting of active cancer  - C/w Eliquis 2.5 BID.    #CAD  CAD s/p PCI w/ IRASEMA in 2021. Follows with cardiology Dr. Sánchez.  - Continued home atorvastatin 20mg qd, ASA 81mg qd, Imdur 30mg qd    #Anemia, normocytic  Baseline Hgb 8-9 on past admissions. [place]limpialey 2/2 AoCD. Hx of blood transfusions.   - Restart home Fe sulfate 325mg qd    New medications/therapies: Fidoxamicin 200mg po BID x 10 day total course (11/14-11/23)  New lines/hardware: None  Labs to be followed outpatient: BMP (creatinine)    Exam to be followed outpatient: Internal Medicine, Urology, Cardiology    Discharge plan: discharge to home

## 2022-11-08 NOTE — PROGRESS NOTE ADULT - PROBLEM SELECTOR PROBLEM 12
Nutrition, metabolism, and development symptoms History of Clostridium difficile infection Prophylactic measure

## 2022-11-08 NOTE — PROGRESS NOTE ADULT - PROBLEM SELECTOR PLAN 12
F: NS 100cc/hr  E: Replete for K<4, Mag<2  N: Regular Diet  A: Eliquis  Code status: Full  Dispo: [facility] C. diff positive on admission for diarrhea in August 2021. Treated with course of fidaxomicin.  - C. diff negative Plan:  F: None  E: replete K<4, Mg<2  N: regular  VTE Prophylaxis: None  GI: None  C: Full Code  D: [facility]

## 2022-11-08 NOTE — PROGRESS NOTE ADULT - PROBLEM SELECTOR PLAN 3
Cr 1.98 on admission. Downtrending after 1L NS in ED. Baseline Cr 1.0. Likely prerenal. UA Spec Grav 1.015  - C/w IV maintenance fluids  - urne osm 217, urine Na 38 Cr 1.98 on admission. Downtrended after IVF. Baseline Cr 1.0. Likely prerenal.     - Cr initially downtrended after IVF, now inc to 1.5  - repeat urine lytes with AM labs

## 2022-11-08 NOTE — PROGRESS NOTE ADULT - PROBLEM SELECTOR PLAN 4
Complaining of some cough and weakness the past few days. No shortness of breath. Not hypoxic. COVID vaccinated. Never had COVID before.  - given MAB tx H/o pAFib. Follows with cardiologist at [facility].   - C/w eliquis 2.5 BID  - Toprol 50 daily C/o cough and weakness on admission. No dyspnea, hypoxia. COVID vaccinated. Never had COVID before.  - given MAB tx

## 2022-11-08 NOTE — PROGRESS NOTE ADULT - PROBLEM SELECTOR PLAN 5
H/o Stage IV prostate CA s/p prostatectomy and on chemotherapy with Lupron. Follows with urology and heme-onc Dr. Mcmillan.  - F/u outpatient heme-onc  - C/w home finasteride 5 Patient presenting with several day history of weakness, with associated cough and diarrhea. No N/V. Found to be COVID positive with UTI and JERI. History of C. diff infection 1 year ago, completed course of fidaxomicin. Weakness likely due to dehydration from diarrhea and UTI. 0/4 SIRS criteria, no signs of sepsis. Ambulates with a cane at baseline.  - C.Diff negative  - F/u GI PCR  - Blood cx NGTD  -urine cx growing klebsiella  - c/w CTX 1g x3 days  - IV maintenance fluids  - PT/OT consulted, OT recommends home with OT H/o pAFib. Follows with cardiologist at [facility].   - C/w eliquis 2.5 BID  - Toprol 50 daily

## 2022-11-08 NOTE — PROGRESS NOTE ADULT - SUBJECTIVE AND OBJECTIVE BOX
INTERVAL HPI/OVERNIGHT EVENTS:  Interim reviewed;  Patient with left sided flank pain;   Also diarrhea has returned;  No burning with urination      MEDICATIONS  (STANDING):  apixaban 2.5 milliGRAM(s) Oral every 12 hours  aspirin enteric coated 81 milliGRAM(s) Oral daily  atorvastatin 20 milliGRAM(s) Oral at bedtime  cyanocobalamin 1000 MICROGram(s) Oral daily  dorzolamide 2% Ophthalmic Solution 1 Drop(s) Both EYES three times a day  finasteride 5 milliGRAM(s) Oral daily  hydrochlorothiazide 25 milliGRAM(s) Oral daily  isosorbide   mononitrate ER Tablet (IMDUR) 30 milliGRAM(s) Oral daily  magnesium sulfate  IVPB 2 Gram(s) IV Intermittent once  metoprolol succinate ER 50 milliGRAM(s) Oral daily  pantoprazole    Tablet 40 milliGRAM(s) Oral before breakfast  potassium chloride    Tablet ER 20 milliEquivalent(s) Oral every 2 hours    MEDICATIONS  (PRN):  acetaminophen     Tablet .. 650 milliGRAM(s) Oral every 6 hours PRN Temp greater or equal to 38C (100.4F), Mild Pain (1 - 3)  aluminum hydroxide/magnesium hydroxide/simethicone Suspension 30 milliLiter(s) Oral every 4 hours PRN Dyspepsia  loperamide 2 milliGRAM(s) Oral every 4 hours PRN Diarrhea  melatonin 3 milliGRAM(s) Oral at bedtime PRN Insomnia  ondansetron Injectable 4 milliGRAM(s) IV Push every 8 hours PRN Nausea and/or Vomiting      Allergies    ACE inhibitors (Angioedema)  x-geva (Unknown)    Intolerances        Vital Signs Last 24 Hrs  T(C): 36.8 (2022 05:09), Max: 36.8 (2022 05:09)  T(F): 98.2 (2022 05:09), Max: 98.2 (2022 05:09)  HR: 77 (2022 05:09) (69 - 77)  BP: 138/68 (2022 05:09) (124/69 - 138/68)  BP(mean): --  RR: 18 (2022 05:09) (17 - 18)  SpO2: 96% (2022 05:09) (95% - 99%)    Parameters below as of 2022 21:06  Patient On (Oxygen Delivery Method): room air              Constitutional:  Awake and alert    Eyes: SELENE    ENMT: Negative    Neck: Supple    Back:  no tenderness     Respiratory:  clear    Cardiovascular: S1 S2    Gastrointestinal:  left flank pain    Genitourinary:    Extremities:  no edema    Vascular:    Neurological:    Skin:    Lymph Nodes:            LABS:                        9.7    11.22 )-----------( 354      ( 2022 05:30 )             32.2     11-08    134<L>  |  104  |  17  ----------------------------<  85  3.3<L>   |  19<L>  |  1.49<H>    Ca    9.5      2022 05:30  Phos  3.7     11-08  Mg     1.6     11-08        Urinalysis Basic - ( 2022 12:14 )    Color: Yellow / Appearance: Clear / S.010 / pH: x  Gluc: x / Ketone: NEGATIVE  / Bili: Negative / Urobili: 0.2 E.U./dL   Blood: x / Protein: NEGATIVE mg/dL / Nitrite: NEGATIVE   Leuk Esterase: Moderate / RBC: < 5 /HPF / WBC < 5 /HPF   Sq Epi: x / Non Sq Epi: 0-5 /HPF / Bacteria: None /HPF        RADIOLOGY & ADDITIONAL TESTS:

## 2022-11-08 NOTE — DISCHARGE NOTE PROVIDER - NSDCMRMEDTOKEN_GEN_ALL_CORE_FT
albuterol 90 mcg/inh inhalation aerosol with adapter: 2 puff(s) inhaled 4 times a day, As Needed  apixaban 2.5 mg oral tablet: 1 tab(s) orally 2 times a day  aspirin 81 mg oral delayed release tablet: 1 tab(s) orally once a day  atorvastatin 20 mg oral tablet: 1 tab(s) orally once a day (at bedtime)  Caltrate 600 + D oral tablet: 1 tab(s) orally once a day  cyanocobalamin 1000 mcg oral tablet: 1 tab(s) orally once a day  dorzolamide 2% ophthalmic solution: 1 drop(s) to each affected eye 3 times a day  ferrous sulfate 325 mg (65 mg elemental iron) oral tablet: 1 tab(s) orally 2 times a day  finasteride 5 mg oral tablet: 1 tab(s) orally once a day  hydroCHLOROthiazide 25 mg oral tablet: 1 tab(s) orally once a day  Imdur 30 mg oral tablet, extended release: 1 tab(s) orally once a day  Lupron Depot: 11.5 milligram(s) intramuscular every 3 months  metoprolol succinate 50 mg oral tablet, extended release: 1 tab(s) orally once a day  pantoprazole 40 mg oral delayed release tablet: 1 tab(s) orally once a day  potassium chloride 10 mEq oral capsule, extended release: 1 cap(s) orally once a day   albuterol 90 mcg/inh inhalation aerosol with adapter: 2 puff(s) inhaled 4 times a day, As Needed  apixaban 2.5 mg oral tablet: 1 tab(s) orally 2 times a day  aspirin 81 mg oral delayed release tablet: 1 tab(s) orally once a day  atorvastatin 20 mg oral tablet: 1 tab(s) orally once a day (at bedtime)  Caltrate 600 + D oral tablet: 1 tab(s) orally once a day  cyanocobalamin 1000 mcg oral tablet: 1 tab(s) orally once a day  dorzolamide 2% ophthalmic solution: 1 drop(s) to each affected eye 3 times a day  ferrous sulfate 325 mg (65 mg elemental iron) oral tablet: 1 tab(s) orally 2 times a day  fidaxomicin 200 mg oral tablet: 1 tab(s) orally every 12 hours  finasteride 5 mg oral tablet: 1 tab(s) orally once a day  hydroCHLOROthiazide 25 mg oral tablet: 1 tab(s) orally once a day  Imdur 30 mg oral tablet, extended release: 1 tab(s) orally once a day  Lupron Depot: 11.5 milligram(s) intramuscular every 3 months  metoprolol succinate 50 mg oral tablet, extended release: 1 tab(s) orally once a day  pantoprazole 40 mg oral delayed release tablet: 1 tab(s) orally once a day  potassium chloride 10 mEq oral capsule, extended release: 1 cap(s) orally once a day

## 2022-11-08 NOTE — DISCHARGE NOTE PROVIDER - NSDCFUSCHEDAPPT_GEN_ALL_CORE_FT
Charli Sánchez  [facility] Physician Pending sale to [facility]  HEARTVASC [address]  Scheduled Appointment: 11/18/2022    Sandra Gill  [facility] Physician Pending sale to [facility]  INTMED [address]  Scheduled Appointment: 11/29/2022    Abbie Roa  [facility]ician Pending sale to [facility]  UROLOGY [address]  Scheduled Appointment: 01/06/2023

## 2022-11-08 NOTE — DISCHARGE NOTE PROVIDER - NSDCCPCAREPLAN_GEN_ALL_CORE_FT
PRINCIPAL DISCHARGE DIAGNOSIS  Diagnosis: Urinary tract infection  Assessment and Plan of Treatment: Urinary tract infections are more common in women. They usually occur in the bladder or urethra, but more serious infections involve the kidney.  A bladder infection may cause pelvic pain, increased urge to urinate, pain with urination, and blood in the urine. A kidney infection may cause back pain, nausea, vomiting, and fever. Common treatment is with antibiotics. Your urine cultures grew bacteria so we gave you antibiotics. Please follow up with your Primary Care Physician within 2 weeks or if your symptoms come back.      SECONDARY DISCHARGE DIAGNOSES  Diagnosis: JERI (acute kidney injury)  Assessment and Plan of Treatment: Acute kidney injury is when the kidney function worsens. Normally, the kidneys filter the blood and remove waste and excess salt and water. The word "acute" means sudden. Another term for acute kidney injury is "acute kidney failure." Acute kidney injury can have different causes. It can happen when there is less blood flow than usual to the kidneys, when the kidneys get damaged (from infections, cancer, certain medications, or autoimmune conditions), or when the path for urine to leave the body is blocked (a common example is prostate enlargement in men). Some people do not have any symptoms at first. People who are in the hospital might learn that they have acute kidney injury after they have blood tests for another reason. When people do have symptoms, the symptoms can include: urinating less or not at all, blood in the urine, swelling in the legs, vomiting, feeling weak, or acting confused. You had an acute kidney injury most likely due to urinary tract infection. Your kidney function returned back to baseline during this hospital admission.    Diagnosis: COVID-19  Assessment and Plan of Treatment:      PRINCIPAL DISCHARGE DIAGNOSIS  Diagnosis: Clostridium difficile infection  Assessment and Plan of Treatment: You were found to have C. Diff diarrhea. Inflammation of the colon caused by the bacteria Clostridium difficile. Clostridium difficile colitis results from disruption of normal healthy bacteria in the colon, often from  C. difficile can also be transmitted from person to person by spores. It can cause severe damage to the colon and even be fatal. Symptoms include diarrhea, belly pain, and fever.Treatment includes  Even when treated with antibiotics, the infection may come back. You are going to take antibiotics at home. If your diarrhea worsens, or returns after treatment, seek care with your primary care provider. If you develop acutely worsening symptoms such as shortness of breath, fever, dizziness, call your doctor or come back to the emergency room.  ----------------------------------------------------------  You are going home on a medication called:  FIDAXOMICIN      SECONDARY DISCHARGE DIAGNOSES  Diagnosis: JERI (acute kidney injury)  Assessment and Plan of Treatment: Acute kidney injury is when the kidney function worsens. Normally, the kidneys filter the blood and remove waste and excess salt and water. The word "acute" means sudden. Another term for acute kidney injury is "acute kidney failure." Acute kidney injury can have different causes. It can happen when there is less blood flow than usual to the kidneys, when the kidneys get damaged (from infections, cancer, certain medications, or autoimmune conditions), or when the path for urine to leave the body is blocked (a common example is prostate enlargement in men). Some people do not have any symptoms at first. People who are in the hospital might learn that they have acute kidney injury after they have blood tests for another reason. When people do have symptoms, the symptoms can include: urinating less or not at all, blood in the urine, swelling in the legs, vomiting, feeling weak, or acting confused. You had an acute kidney injury most likely due to infection and dehydration. Your kidney function returned back to baseline during this hospital admission.    Diagnosis: COVID-19  Assessment and Plan of Treatment:     Diagnosis: Clostridium difficile infection  Assessment and Plan of Treatment: You were found to have C. Diff diarrhea. Inflammation of the colon caused by the bacteria Clostridium difficile. Clostridium difficile colitis results from disruption of normal healthy bacteria in the colon, often from  C. difficile can also be transmitted from person to person by spores. It can cause severe damage to the colon and even be fatal. Symptoms include diarrhea, belly pain, and fever.Treatment includes  Even when treated with antibiotics, the infection may come back. You are going to take antibiotics at home. If your diarrhea worsens, or returns after treatment, seek care with your primary care provider. If you develop acutely worsening symptoms such as shortness of breath, fever, dizziness, call your doctor or come back to the emergency room.  ----------------------------------------------------------  You are going home on a medication called:  FIDAXOMICIN     PRINCIPAL DISCHARGE DIAGNOSIS  Diagnosis: Clostridium difficile infection  Assessment and Plan of Treatment: You were found to have C. Diff diarrhea. Inflammation of the colon caused by the bacteria Clostridium difficile. Clostridium difficile colitis results from disruption of normal healthy bacteria in the colon, often from  C. difficile can also be transmitted from person to person by spores. It can cause severe damage to the colon and even be fatal. Symptoms include diarrhea, belly pain, and fever.Treatment includes  Even when treated with antibiotics, the infection may come back. You are going to take antibiotics at home. If your diarrhea worsens, or returns after treatment, seek care with your primary care provider. If you develop acutely worsening symptoms such as shortness of breath, fever, dizziness, call your doctor or come back to the emergency room.  ----------------------------------------------------------  You are going home on a medication called:  FIDAXOMICIN      SECONDARY DISCHARGE DIAGNOSES  Diagnosis: JERI (acute kidney injury)  Assessment and Plan of Treatment: Acute kidney injury is when the kidney function worsens. Normally, the kidneys filter the blood and remove waste and excess salt and water. The word "acute" means sudden. Another term for acute kidney injury is "acute kidney failure." Acute kidney injury can have different causes. It can happen when there is less blood flow than usual to the kidneys, when the kidneys get damaged (from infections, cancer, certain medications, or autoimmune conditions), or when the path for urine to leave the body is blocked (a common example is prostate enlargement in men). Some people do not have any symptoms at first. People who are in the hospital might learn that they have acute kidney injury after they have blood tests for another reason. When people do have symptoms, the symptoms can include: urinating less or not at all, blood in the urine, swelling in the legs, vomiting, feeling weak, or acting confused. You had an acute kidney injury most likely due to infection and dehydration. Your kidney function returned back to baseline during this hospital admission.     PRINCIPAL DISCHARGE DIAGNOSIS  Diagnosis: Clostridium difficile infection  Assessment and Plan of Treatment: You were found to have C. Diff diarrhea. Inflammation of the colon caused by the bacteria Clostridium difficile. Clostridium difficile colitis results from disruption of normal healthy bacteria in the colon, often from  C. difficile can also be transmitted from person to person by spores. It can cause severe damage to the colon and even be fatal. Symptoms include diarrhea, belly pain, and fever. Treatment includes  Even when treated with antibiotics, the infection may come back. You are going to take antibiotics at home. If your diarrhea worsens, or returns after treatment, seek care with your primary care provider. If you develop acutely worsening symptoms such as shortness of breath, fever, dizziness, call your doctor or come back to the emergency room.  ----------------------------------------------------------  You are going home on a medication called:  FIDAXOMICIN      SECONDARY DISCHARGE DIAGNOSES  Diagnosis: JERI (acute kidney injury)  Assessment and Plan of Treatment: Acute kidney injury is when the kidney function worsens. Normally, the kidneys filter the blood and remove waste and excess salt and water. The word "acute" means sudden. Another term for acute kidney injury is "acute kidney failure." Acute kidney injury can have different causes. It can happen when there is less blood flow than usual to the kidneys, when the kidneys get damaged (from infections, cancer, certain medications, or autoimmune conditions), or when the path for urine to leave the body is blocked (a common example is prostate enlargement in men). Some people do not have any symptoms at first. People who are in the hospital might learn that they have acute kidney injury after they have blood tests for another reason. When people do have symptoms, the symptoms can include: urinating less or not at all, blood in the urine, swelling in the legs, vomiting, feeling weak, or acting confused. You had an acute kidney injury most likely due to infection and dehydration. Your kidney function returned back to baseline during this hospital admission.

## 2022-11-08 NOTE — DISCHARGE NOTE PROVIDER - NSDCFUADDAPPT_GEN_ALL_CORE_FT
Please attend the scheduled follow-up appointments with   Dr. Sánchez (Cardiology): 11/18/2022  Dr. Gill (Internal Medicine): 11/29/2022  Dr. Roa (Urology): 01/06/2023   Please attend the scheduled follow-up appointments with:  Dr. Sánchez (Cardiology): 11/18/2022  Dr. Gill (Internal Medicine): 11/29/2022  Dr. Roa (Urology): 01/06/2023

## 2022-11-08 NOTE — PROGRESS NOTE ADULT - PROBLEM SELECTOR PLAN 1
Patient presenting with several day history of weakness, with associated cough and diarrhea. No N/V. Found to be COVID positive with UTI and JERI. History of C. diff infection 1 year ago, completed course of fidaxomicin. Weakness likely due to dehydration from diarrhea and UTI. 0/4 SIRS criteria, no signs of sepsis. Ambulates with a cane at baseline.  - C.Diff negative  - F/u GI PCR  - Blood cx NGTD  -urine cx growing klebsiella  - c/w CTX 1g x3 days  - IV maintenance fluids  - PT/OT consulted, OT recommends home with OT Pt presented with generalized weakness. Has hx of chronic urinary incontinence and frequency related to h/o prostate cancer and prostatectomy. Follows with urology. No fever, dysuria on admission. + UA, Culture + for >100k CFU Klebsiella    - Received CTX 1g IV x 2, last Pt has hx of C.Diff infection 8/2021. Pt has hx of C.Diff infection 8/2021. At presentation, was experiencing watery, voluminous, dark-colored diarrhea ~4x/daily. C.Diff toxin neg on admission, potentially false negative as pt had diarrhea prior to admission. Pt still experiencing diarrhea, repeat C.Diff +    - start Vancomycin 125mg po q6h  - consider potential need for fidoximicin given reoccurrence   - isolation/contact precautions

## 2022-11-08 NOTE — PROGRESS NOTE ADULT - PROBLEM SELECTOR PLAN 8
Admission in 2021 to [facility] for dyspnea on exertion, found to have DVT and PE. High risk for blood clots given cancer history.  - C/w eliquis 2.5 BID Normotensive on admission. Home med: HCTZ 25 and toprol 50. Allergic to ACEI, developed angioedema.  - C/w home HCTZ and BB Admission in 2021 to [facility] for dyspnea on exertion, found to have DVT and PE. High risk for blood clots given cancer history.  - C/w eliquis 2.5 BID

## 2022-11-08 NOTE — PROGRESS NOTE ADULT - PROBLEM SELECTOR PLAN 9
CAD s/p PCI w/ IRASEMA in 2021. Follows with cardiology Dr. Sánchez.  - C/w home atorva 20, ASA 81, imdur 30, and toprol 50 Admission in 2021 to [facility] for dyspnea on exertion, found to have DVT and PE. High risk for blood clots given cancer history.  - C/w eliquis 2.5 BID

## 2022-11-08 NOTE — PROGRESS NOTE ADULT - SUBJECTIVE AND OBJECTIVE BOX
OVERNIGHT EVENTS: No acute events overnight.     SUBJECTIVE: Pt examined at bedside. He states he is still experiencing diarrhea x 2 overnight, x 2 in AM described as watery and dark. Pt has been experiencing this diarrhea since prior to admission. Denies associated abdominal pain, N/V. Pt also c/o non-radiating L sided flank pain since last night. Denies HA, F/C, diaphoresis, chest pain, dysuria, gross hematuria, LE edema.     VITAL SIGNS:  Vital Signs Last 24 Hrs  T(C): 36.6 (08 Nov 2022 11:30), Max: 36.8 (08 Nov 2022 05:09)  T(F): 97.9 (08 Nov 2022 11:30), Max: 98.2 (08 Nov 2022 05:09)  HR: 68 (08 Nov 2022 11:30) (68 - 77)  BP: 125/80 (08 Nov 2022 11:30) (124/69 - 138/68)  BP(mean): --  RR: 18 (08 Nov 2022 11:30) (17 - 18)  SpO2: 97% (08 Nov 2022 11:30) (95% - 99%)    Parameters below as of 08 Nov 2022 11:30  Patient On (Oxygen Delivery Method): room air      PHYSICAL EXAM:  General: NAD; speaking in full sentences  HEENT: NC/AT; PERRL; EOMI; MMM  Neck: supple; no JVD  Cardiac: RRR; +S1/S2  Pulm: CTA B/L; no W/R/R  GI: soft, NT/ND, +BS  : +CVA tenderness on left  Extremities: WWP; no edema, clubbing or cyanosis  Vasc: 2+ radial, DP pulses B/L  Neuro: AAOx3; no focal deficits    MEDICATIONS:  MEDICATIONS  (STANDING):  apixaban 2.5 milliGRAM(s) Oral every 12 hours  aspirin enteric coated 81 milliGRAM(s) Oral daily  atorvastatin 20 milliGRAM(s) Oral at bedtime  cefTRIAXone   IVPB 1000 milliGRAM(s) IV Intermittent every 24 hours  cyanocobalamin 1000 MICROGram(s) Oral daily  dorzolamide 2% Ophthalmic Solution 1 Drop(s) Both EYES three times a day  finasteride 5 milliGRAM(s) Oral daily  hydrochlorothiazide 25 milliGRAM(s) Oral daily  isosorbide   mononitrate ER Tablet (IMDUR) 30 milliGRAM(s) Oral daily  metoprolol succinate ER 50 milliGRAM(s) Oral daily  pantoprazole    Tablet 40 milliGRAM(s) Oral before breakfast  potassium chloride    Tablet ER 20 milliEquivalent(s) Oral every 2 hours    MEDICATIONS  (PRN):  acetaminophen     Tablet .. 650 milliGRAM(s) Oral every 6 hours PRN Temp greater or equal to 38C (100.4F), Mild Pain (1 - 3)  aluminum hydroxide/magnesium hydroxide/simethicone Suspension 30 milliLiter(s) Oral every 4 hours PRN Dyspepsia  loperamide 2 milliGRAM(s) Oral every 4 hours PRN Diarrhea  melatonin 3 milliGRAM(s) Oral at bedtime PRN Insomnia  ondansetron Injectable 4 milliGRAM(s) IV Push every 8 hours PRN Nausea and/or Vomiting      ALLERGIES:  Allergies    ACE inhibitors (Angioedema)  x-geva (Unknown)    Intolerances        LABS:                        9.7    11.22 )-----------( 354      ( 08 Nov 2022 05:30 )             32.2     11-08    134<L>  |  104  |  17  ----------------------------<  85  3.3<L>   |  19<L>  |  1.49<H>    Ca    9.5      08 Nov 2022 05:30  Phos  3.7     11-08  Mg     1.6     11-08          RADIOLOGY & ADDITIONAL TESTS: Reviewed.

## 2022-11-09 DIAGNOSIS — N13.30 UNSPECIFIED HYDRONEPHROSIS: ICD-10-CM

## 2022-11-09 LAB
ANION GAP SERPL CALC-SCNC: 13 MMOL/L — SIGNIFICANT CHANGE UP (ref 5–17)
BASOPHILS # BLD AUTO: 0.07 K/UL — SIGNIFICANT CHANGE UP (ref 0–0.2)
BASOPHILS NFR BLD AUTO: 0.6 % — SIGNIFICANT CHANGE UP (ref 0–2)
BUN SERPL-MCNC: 17 MG/DL — SIGNIFICANT CHANGE UP (ref 7–23)
CALCIUM SERPL-MCNC: 9.2 MG/DL — SIGNIFICANT CHANGE UP (ref 8.4–10.5)
CHLORIDE SERPL-SCNC: 105 MMOL/L — SIGNIFICANT CHANGE UP (ref 96–108)
CO2 SERPL-SCNC: 17 MMOL/L — LOW (ref 22–31)
CREAT SERPL-MCNC: 1.48 MG/DL — HIGH (ref 0.5–1.3)
CULTURE RESULTS: SIGNIFICANT CHANGE UP
CULTURE RESULTS: SIGNIFICANT CHANGE UP
EGFR: 47 ML/MIN/1.73M2 — LOW
EOSINOPHIL # BLD AUTO: 0.25 K/UL — SIGNIFICANT CHANGE UP (ref 0–0.5)
EOSINOPHIL NFR BLD AUTO: 2.1 % — SIGNIFICANT CHANGE UP (ref 0–6)
GLUCOSE SERPL-MCNC: 87 MG/DL — SIGNIFICANT CHANGE UP (ref 70–99)
HCT VFR BLD CALC: 31.9 % — LOW (ref 39–50)
HGB BLD-MCNC: 9.6 G/DL — LOW (ref 13–17)
IMM GRANULOCYTES NFR BLD AUTO: 0.9 % — SIGNIFICANT CHANGE UP (ref 0–0.9)
LYMPHOCYTES # BLD AUTO: 1.13 K/UL — SIGNIFICANT CHANGE UP (ref 1–3.3)
LYMPHOCYTES # BLD AUTO: 9.7 % — LOW (ref 13–44)
MAGNESIUM SERPL-MCNC: 2 MG/DL — SIGNIFICANT CHANGE UP (ref 1.6–2.6)
MCHC RBC-ENTMCNC: 25.7 PG — LOW (ref 27–34)
MCHC RBC-ENTMCNC: 30.1 GM/DL — LOW (ref 32–36)
MCV RBC AUTO: 85.3 FL — SIGNIFICANT CHANGE UP (ref 80–100)
MONOCYTES # BLD AUTO: 0.75 K/UL — SIGNIFICANT CHANGE UP (ref 0–0.9)
MONOCYTES NFR BLD AUTO: 6.4 % — SIGNIFICANT CHANGE UP (ref 2–14)
NEUTROPHILS # BLD AUTO: 9.38 K/UL — HIGH (ref 1.8–7.4)
NEUTROPHILS NFR BLD AUTO: 80.3 % — HIGH (ref 43–77)
NRBC # BLD: 0 /100 WBCS — SIGNIFICANT CHANGE UP (ref 0–0)
PHOSPHATE SERPL-MCNC: 3.5 MG/DL — SIGNIFICANT CHANGE UP (ref 2.5–4.5)
PLATELET # BLD AUTO: 365 K/UL — SIGNIFICANT CHANGE UP (ref 150–400)
POTASSIUM SERPL-MCNC: 4.2 MMOL/L — SIGNIFICANT CHANGE UP (ref 3.5–5.3)
POTASSIUM SERPL-SCNC: 4.2 MMOL/L — SIGNIFICANT CHANGE UP (ref 3.5–5.3)
RBC # BLD: 3.74 M/UL — LOW (ref 4.2–5.8)
RBC # FLD: 20.2 % — HIGH (ref 10.3–14.5)
SODIUM SERPL-SCNC: 135 MMOL/L — SIGNIFICANT CHANGE UP (ref 135–145)
SPECIMEN SOURCE: SIGNIFICANT CHANGE UP
SPECIMEN SOURCE: SIGNIFICANT CHANGE UP
WBC # BLD: 11.68 K/UL — HIGH (ref 3.8–10.5)
WBC # FLD AUTO: 11.68 K/UL — HIGH (ref 3.8–10.5)

## 2022-11-09 PROCEDURE — 74176 CT ABD & PELVIS W/O CONTRAST: CPT | Mod: 26

## 2022-11-09 PROCEDURE — 99233 SBSQ HOSP IP/OBS HIGH 50: CPT | Mod: GC

## 2022-11-09 RX ORDER — CHLORHEXIDINE GLUCONATE 213 G/1000ML
1 SOLUTION TOPICAL
Refills: 0 | Status: DISCONTINUED | OUTPATIENT
Start: 2022-11-09 | End: 2022-11-16

## 2022-11-09 RX ADMIN — PREGABALIN 1000 MICROGRAM(S): 225 CAPSULE ORAL at 11:40

## 2022-11-09 RX ADMIN — ATORVASTATIN CALCIUM 20 MILLIGRAM(S): 80 TABLET, FILM COATED ORAL at 22:00

## 2022-11-09 RX ADMIN — CHLORHEXIDINE GLUCONATE 1 APPLICATION(S): 213 SOLUTION TOPICAL at 17:52

## 2022-11-09 RX ADMIN — DORZOLAMIDE HYDROCHLORIDE 1 DROP(S): 20 SOLUTION/ DROPS OPHTHALMIC at 22:01

## 2022-11-09 RX ADMIN — DORZOLAMIDE HYDROCHLORIDE 1 DROP(S): 20 SOLUTION/ DROPS OPHTHALMIC at 13:41

## 2022-11-09 RX ADMIN — APIXABAN 2.5 MILLIGRAM(S): 2.5 TABLET, FILM COATED ORAL at 05:59

## 2022-11-09 RX ADMIN — DORZOLAMIDE HYDROCHLORIDE 1 DROP(S): 20 SOLUTION/ DROPS OPHTHALMIC at 06:05

## 2022-11-09 RX ADMIN — Medication 125 MILLIGRAM(S): at 02:16

## 2022-11-09 RX ADMIN — PANTOPRAZOLE SODIUM 40 MILLIGRAM(S): 20 TABLET, DELAYED RELEASE ORAL at 06:02

## 2022-11-09 RX ADMIN — Medication 125 MILLIGRAM(S): at 07:08

## 2022-11-09 RX ADMIN — FINASTERIDE 5 MILLIGRAM(S): 5 TABLET, FILM COATED ORAL at 11:41

## 2022-11-09 RX ADMIN — Medication 125 MILLIGRAM(S): at 19:31

## 2022-11-09 RX ADMIN — APIXABAN 2.5 MILLIGRAM(S): 2.5 TABLET, FILM COATED ORAL at 18:10

## 2022-11-09 RX ADMIN — Medication 50 MILLIGRAM(S): at 05:59

## 2022-11-09 RX ADMIN — CEFTRIAXONE 100 MILLIGRAM(S): 500 INJECTION, POWDER, FOR SOLUTION INTRAMUSCULAR; INTRAVENOUS at 11:40

## 2022-11-09 RX ADMIN — ISOSORBIDE MONONITRATE 30 MILLIGRAM(S): 60 TABLET, EXTENDED RELEASE ORAL at 11:41

## 2022-11-09 RX ADMIN — Medication 25 MILLIGRAM(S): at 05:59

## 2022-11-09 RX ADMIN — Medication 125 MILLIGRAM(S): at 14:38

## 2022-11-09 RX ADMIN — Medication 81 MILLIGRAM(S): at 11:40

## 2022-11-09 NOTE — PROGRESS NOTE ADULT - PROBLEM SELECTOR PLAN 9
Admission in 2021 to [facility] for dyspnea on exertion, found to have DVT and PE. High risk for blood clots given cancer history.  - C/w eliquis 2.5 BID

## 2022-11-09 NOTE — PROGRESS NOTE ADULT - PROBLEM SELECTOR PLAN 6
H/o pAFib. Follows with cardiologist at [facility].   - C/w eliquis 2.5 BID  - Toprol 50 daily

## 2022-11-09 NOTE — CHART NOTE - NSCHARTNOTEFT_GEN_A_CORE
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  Glaucoma  Glaucoma  Type 2 diabetes mellitus  Diabetes  Essential hypertension  Hypertension  Chemotherapy session for neoplasm  Prostate Cancer  Pulmonary embolism  1/2020  DVT (deep venous thrombosis)  1/2020 on Eliquis  Atrial fibrillation  on Eliquis  Other postprocedural status  S/P prostatectomy    Current Nutrition Order:   Diet, Pureed (11-09-22 @ 08:57)    PO Intake: Good (%) [   ]  Fair (50-75%) [ x ] Poor (<25%) [   ]    GI Issues:   no n/v  last +BM today, diarrhea  no abd distention noted     Pain: denies     Skin Integrity:  Partha 17  no edema   no pressure injuries     Labs:   11-09    135  |  105  |  17  ----------------------------<  87  4.2   |  17<L>  |  1.48<H>    Ca    9.2      09 Nov 2022 06:30  Phos  3.5     11-09  Mg     2.0     11-09    Medications:  MEDICATIONS  (STANDING):  apixaban 2.5 milliGRAM(s) Oral every 12 hours  aspirin enteric coated 81 milliGRAM(s) Oral daily  atorvastatin 20 milliGRAM(s) Oral at bedtime  cefTRIAXone   IVPB 1000 milliGRAM(s) IV Intermittent every 24 hours  chlorhexidine 2% Cloths 1 Application(s) Topical <User Schedule>  cyanocobalamin 1000 MICROGram(s) Oral daily  dorzolamide 2% Ophthalmic Solution 1 Drop(s) Both EYES three times a day  finasteride 5 milliGRAM(s) Oral daily  hydrochlorothiazide 25 milliGRAM(s) Oral daily  isosorbide   mononitrate ER Tablet (IMDUR) 30 milliGRAM(s) Oral daily  metoprolol succinate ER 50 milliGRAM(s) Oral daily  pantoprazole    Tablet 40 milliGRAM(s) Oral before breakfast  vancomycin    Solution 125 milliGRAM(s) Oral every 6 hours    MEDICATIONS  (PRN):  acetaminophen     Tablet .. 650 milliGRAM(s) Oral every 6 hours PRN Temp greater or equal to 38C (100.4F), Mild Pain (1 - 3)  aluminum hydroxide/magnesium hydroxide/simethicone Suspension 30 milliLiter(s) Oral every 4 hours PRN Dyspepsia  melatonin 3 milliGRAM(s) Oral at bedtime PRN Insomnia  ondansetron Injectable 4 milliGRAM(s) IV Push every 8 hours PRN Nausea and/or Vomiting    Admitted Anthropometrics:   Ht: 5'8  Wt: 138lbs   BMI: 20.9  IBW: 154lbs   %IBW: 90    Weight Change: No new wts since admission, please continue to trend wts weekly to assess for changes.     Nutrition Focused Physical Exam: Completed [ x ]  Not Pertinent [   ] Please see RD note from 11/4 for full assessment and malnutrition note.     Estimated energy needs:   kcal: 1562-1875kcal/day (Based on 25-30kcal/kg)   pro: 75-94g/day (based on 1.2-1.5g/kg)    lbs. %IBW 89%. ABW used to calculate energy needs due to pt's current body weight within % IBW. Needs adjusted for age and wt, PCM, COVID+, UTI. Fluids per team    Subjective: 81M w/ CAD s/p PCI, Afib on eliquis, HTN, HLD, h/o PE/DVT, stage IV prostate CA s/p prostatectomy and on Lupron, and anemia (baseline Hgb 9) presents with a 3 day history of cough, diarrhea, and weakness, found to have COVID, UTI, C.Diff and JERI.    Pt seen at bedside for followup. Denies nausea/vomiting at this time. Reports last bowel movement today, 11/9, pt w/ diarrhea. Discussed current diet order; reinforced diet education regarding adequate PO intake to prevent further wasting; amenable to education. Pt reports fair PO intake, able to complete 50% of meals. Pt was agreeable to ONS. Made aware RD remains available. RD to follow up. See nutrition recommendations below     Previous Nutrition Diagnosis: Malnutrition RT inadequate energy intake 2/2 poor appetite, reported hx of CDIFF AEB PO<75%EER, NFPE findings severe muscle wasting, wt loss >20% in >1 year    Active [ x ]  Resolved [   ]    Goal: Pt to consistently meet >75% of EER during hospital stay and show no further signs/symptoms of malnutrition     Recommendations:   1. Continue with regular diet, rec include Ensure Plus High Protein 1xday (350 kcals, 20 g protein, 180 mls H2O)  2. BM and pain regimen per team  3. Monitor BMP, BG, POCT, renal indices, LFTs, lytes, replete prn  4. Rec include MVI  5. Diet edu prn    Education: reinforced importance of PO intake, pt agreeable.     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  Glaucoma  Glaucoma  Type 2 diabetes mellitus  Diabetes  Essential hypertension  Hypertension  Chemotherapy session for neoplasm  Prostate Cancer  Pulmonary embolism  1/2020  DVT (deep venous thrombosis)  1/2020 on Eliquis  Atrial fibrillation  on Eliquis  Other postprocedural status  S/P prostatectomy    Current Nutrition Order:   Diet, Pureed (11-09-22 @ 08:57)    PO Intake: Good (%) [   ]  Fair (50-75%) [ x ] Poor (<25%) [   ]    GI Issues:   no n/v  last +BM today, diarrhea  no abd distention noted     Pain: denies     Skin Integrity:  Partha 17  no edema   no pressure injuries     Labs:   11-09    135  |  105  |  17  ----------------------------<  87  4.2   |  17<L>  |  1.48<H>    Ca    9.2      09 Nov 2022 06:30  Phos  3.5     11-09  Mg     2.0     11-09    Medications:  MEDICATIONS  (STANDING):  apixaban 2.5 milliGRAM(s) Oral every 12 hours  aspirin enteric coated 81 milliGRAM(s) Oral daily  atorvastatin 20 milliGRAM(s) Oral at bedtime  cefTRIAXone   IVPB 1000 milliGRAM(s) IV Intermittent every 24 hours  chlorhexidine 2% Cloths 1 Application(s) Topical <User Schedule>  cyanocobalamin 1000 MICROGram(s) Oral daily  dorzolamide 2% Ophthalmic Solution 1 Drop(s) Both EYES three times a day  finasteride 5 milliGRAM(s) Oral daily  hydrochlorothiazide 25 milliGRAM(s) Oral daily  isosorbide   mononitrate ER Tablet (IMDUR) 30 milliGRAM(s) Oral daily  metoprolol succinate ER 50 milliGRAM(s) Oral daily  pantoprazole    Tablet 40 milliGRAM(s) Oral before breakfast  vancomycin    Solution 125 milliGRAM(s) Oral every 6 hours    MEDICATIONS  (PRN):  acetaminophen     Tablet .. 650 milliGRAM(s) Oral every 6 hours PRN Temp greater or equal to 38C (100.4F), Mild Pain (1 - 3)  aluminum hydroxide/magnesium hydroxide/simethicone Suspension 30 milliLiter(s) Oral every 4 hours PRN Dyspepsia  melatonin 3 milliGRAM(s) Oral at bedtime PRN Insomnia  ondansetron Injectable 4 milliGRAM(s) IV Push every 8 hours PRN Nausea and/or Vomiting    Admitted Anthropometrics:   Ht: 5'8  Wt: 138lbs   BMI: 20.9  IBW: 154lbs   %IBW: 90    Weight Change: No new wts since admission, please continue to trend wts weekly to assess for changes.     Nutrition Focused Physical Exam: Completed [ x ]  Not Pertinent [   ] Please see RD note from 11/4 for full assessment and malnutrition note.     Estimated energy needs:   kcal: 1562-1875kcal/day (Based on 25-30kcal/kg)   pro: 75-94g/day (based on 1.2-1.5g/kg)    lbs. %IBW 89%. ABW used to calculate energy needs due to pt's current body weight within % IBW. Needs adjusted for age and wt, PCM, COVID+, UTI. Fluids per team    Subjective: 81M w/ CAD s/p PCI, Afib on eliquis, HTN, HLD, h/o PE/DVT, stage IV prostate CA s/p prostatectomy and on Lupron, and anemia (baseline Hgb 9) presents with a 3 day history of cough, diarrhea, and weakness, found to have COVID, UTI, C.Diff and JERI.    Pt seen at bedside for followup. Denies nausea/vomiting at this time. Reports last bowel movement today, 11/9, pt w/ diarrhea. Discussed current diet order; reinforced diet education regarding adequate PO intake to prevent further wasting; amenable to education. Pt reports fair PO intake, able to complete 50% of meals. Pt was agreeable to ONS. Made aware RD remains available. RD to follow up. See nutrition recommendations below     Previous Nutrition Diagnosis: Malnutrition RT inadequate energy intake 2/2 poor appetite, reported hx of CDIFF AEB PO<75%EER, NFPE findings severe muscle wasting, wt loss >20% in >1 year    Active [ x ]  Resolved [   ]    Goal: Pt to consistently meet >75% of EER during hospital stay and show no further signs/symptoms of malnutrition     Recommendations:   1. Continue with regular diet, rec include Ensure Plus High Protein BID  (350 kcals, 20 g protein, 180 mls H2O each)   2. BM and pain regimen per team  3. Monitor BMP, BG, POCT, renal indices, LFTs, lytes, replete prn  4. Rec include MVI  5. Diet edu prn    Education: reinforced importance of PO intake, pt agreeable.     Risk Level: High [ x ] Moderate [   ] Low [   ]

## 2022-11-09 NOTE — PROGRESS NOTE ADULT - PROBLEM SELECTOR PLAN 12
C. diff positive on admission for diarrhea in August 2021. Treated with course of fidaxomicin.  - C. diff negative, likely false negative as pt experiencing voluminous, dark, watery diarrhea prior to admission  - Repeat C. diff +

## 2022-11-09 NOTE — PROGRESS NOTE ADULT - PROBLEM SELECTOR PLAN 5
C/o cough and weakness on admission. No dyspnea, hypoxia. COVID vaccinated. Never had COVID before.  Pt reports improved cough.   - given MAB tx  - no continued intervention

## 2022-11-09 NOTE — PROGRESS NOTE ADULT - SUBJECTIVE AND OBJECTIVE BOX
INTERVAL HPI/OVERNIGHT EVENTS:  Interim reviewed;  Patient with rectal tube and appears to be less diarrhea  Sono noted;  Hydro noted and will get CT of Abdomen and Pelvis non contrast      MEDICATIONS  (STANDING):  apixaban 2.5 milliGRAM(s) Oral every 12 hours  aspirin enteric coated 81 milliGRAM(s) Oral daily  atorvastatin 20 milliGRAM(s) Oral at bedtime  cefTRIAXone   IVPB 1000 milliGRAM(s) IV Intermittent every 24 hours  chlorhexidine 2% Cloths 1 Application(s) Topical <User Schedule>  cyanocobalamin 1000 MICROGram(s) Oral daily  dorzolamide 2% Ophthalmic Solution 1 Drop(s) Both EYES three times a day  finasteride 5 milliGRAM(s) Oral daily  hydrochlorothiazide 25 milliGRAM(s) Oral daily  isosorbide   mononitrate ER Tablet (IMDUR) 30 milliGRAM(s) Oral daily  metoprolol succinate ER 50 milliGRAM(s) Oral daily  pantoprazole    Tablet 40 milliGRAM(s) Oral before breakfast  vancomycin    Solution 125 milliGRAM(s) Oral every 6 hours    MEDICATIONS  (PRN):  acetaminophen     Tablet .. 650 milliGRAM(s) Oral every 6 hours PRN Temp greater or equal to 38C (100.4F), Mild Pain (1 - 3)  aluminum hydroxide/magnesium hydroxide/simethicone Suspension 30 milliLiter(s) Oral every 4 hours PRN Dyspepsia  melatonin 3 milliGRAM(s) Oral at bedtime PRN Insomnia  ondansetron Injectable 4 milliGRAM(s) IV Push every 8 hours PRN Nausea and/or Vomiting      Allergies    ACE inhibitors (Angioedema)  x-geva (Unknown)    Intolerances        Vital Signs Last 24 Hrs  T(C): 36.7 (09 Nov 2022 05:49), Max: 36.7 (09 Nov 2022 05:49)  T(F): 98.1 (09 Nov 2022 05:49), Max: 98.1 (09 Nov 2022 05:49)  HR: 73 (09 Nov 2022 05:49) (68 - 73)  BP: 128/67 (09 Nov 2022 05:49) (125/80 - 128/67)  BP(mean): --  RR: 17 (09 Nov 2022 05:49) (17 - 18)  SpO2: 97% (09 Nov 2022 05:49) (97% - 97%)    Parameters below as of 09 Nov 2022 05:49  Patient On (Oxygen Delivery Method): room air              Constitutional: Awake     Eyes: SELENE    ENMT: Negative    Neck: Supple    Back:  no tenderness     Respiratory:  clear     Cardiovascular: S1 S2    Gastrointestinal:  soft  Some left sided flank pain but decreased    Genitourinary:    Extremities: no edema     Vascular:    Neurological:    Skin:    Lymph Nodes:            LABS:                        9.6    11.68 )-----------( 365      ( 09 Nov 2022 06:30 )             31.9     11-09    135  |  105  |  17  ----------------------------<  87  4.2   |  17<L>  |  1.48<H>    Ca    9.2      09 Nov 2022 06:30  Phos  3.5     11-09  Mg     2.0     11-09            RADIOLOGY & ADDITIONAL TESTS:

## 2022-11-09 NOTE — PROGRESS NOTE ADULT - PROBLEM SELECTOR PLAN 3
Pt with UTI developed L CVA tenderness. Renal U/S showed 7.5cm simple renal cyst on the L, moderate hydronephrosis on the R  - f/u CTAP to assess for ureterolithiasis

## 2022-11-09 NOTE — PROGRESS NOTE ADULT - PROBLEM SELECTOR PLAN 8
Normotensive on admission. Home med: HCTZ 25mg qd and metoprolol 50mg qd. Allergic to ACEI, developed angioedema.  - C/w home HCTZ and BB

## 2022-11-09 NOTE — PROGRESS NOTE ADULT - SUBJECTIVE AND OBJECTIVE BOX
Physical Medicine and Rehabilitation Progress Note :       Patient is a 81y old  Male who presents with a chief complaint of UTI, COVID (2022 12:31)      HPI:  81M w/ CAD s/p PCI, Afib on eliquis, HTN, HLD, h/o PE/DVT, stage IV prostate CA s/p prostatectomy and on Lupron, and anemia (baseline Hgb 9) presents with a 3 day history of cough, diarrhea, and weakness. Denies N/V, abdominal pain. Patient complains of chronic urinary incontinence but no dysuria. Patient reports having C. diff one year prior. Patient accompanied by his daughter who also was not feeling well. COVID vaccinated with booster. Has never had COVID before. Denies headaches, fevers, chills, chest pain, or shortness of breath.     In the ED:  Initial vital signs: T: 98.3 F, HR: 92, BP: 129/70, R: 18, SpO2: 97% on RA  Labs: significant for Hgb 9.0 (at baseline), Na 133, Cr 1.94 (baseline 1.0), BUN 28, COVID positive, UA large LE/positive nitrites, many WBCs and bacteria  Imaging:  CXR: clear  EKG: NSR  Medications: CTX 1g, NS 1L   Consults: none  (2022 23:59)                            9.6    11.68 )-----------( 365      ( 2022 06:30 )             31.9       11-    135  |  105  |  17  ----------------------------<  87  4.2   |  17<L>  |  1.48<H>    Ca    9.2      2022 06:30  Phos  3.5     11-  Mg     2.0     11-      Vital Signs Last 24 Hrs  T(C): 36.6 (2022 13:22), Max: 36.7 (2022 05:49)  T(F): 97.9 (2022 13:22), Max: 98.1 (2022 05:49)  HR: 71 (2022 13:22) (70 - 73)  BP: 121/72 (2022 13:22) (121/72 - 128/67)  BP(mean): --  RR: 18 (2022 13:22) (17 - 18)  SpO2: 96% (2022 13:22) (96% - 97%)    Parameters below as of 2022 13:22  Patient On (Oxygen Delivery Method): room air        MEDICATIONS  (STANDING):  apixaban 2.5 milliGRAM(s) Oral every 12 hours  aspirin enteric coated 81 milliGRAM(s) Oral daily  atorvastatin 20 milliGRAM(s) Oral at bedtime  cefTRIAXone   IVPB 1000 milliGRAM(s) IV Intermittent every 24 hours  chlorhexidine 2% Cloths 1 Application(s) Topical <User Schedule>  cyanocobalamin 1000 MICROGram(s) Oral daily  dorzolamide 2% Ophthalmic Solution 1 Drop(s) Both EYES three times a day  finasteride 5 milliGRAM(s) Oral daily  hydrochlorothiazide 25 milliGRAM(s) Oral daily  isosorbide   mononitrate ER Tablet (IMDUR) 30 milliGRAM(s) Oral daily  metoprolol succinate ER 50 milliGRAM(s) Oral daily  pantoprazole    Tablet 40 milliGRAM(s) Oral before breakfast  vancomycin    Solution 125 milliGRAM(s) Oral every 6 hours    MEDICATIONS  (PRN):  acetaminophen     Tablet .. 650 milliGRAM(s) Oral every 6 hours PRN Temp greater or equal to 38C (100.4F), Mild Pain (1 - 3)  aluminum hydroxide/magnesium hydroxide/simethicone Suspension 30 milliLiter(s) Oral every 4 hours PRN Dyspepsia  melatonin 3 milliGRAM(s) Oral at bedtime PRN Insomnia  ondansetron Injectable 4 milliGRAM(s) IV Push every 8 hours PRN Nausea and/or Vomiting       Physical / Occupational Therapy Functional Status Assessment :   2022       Cognitive/Neuro/Behavioral  Cognitive/Neuro/Behavioral [WDL Definition: Alert; opens eyes spontaneously; arouses to voice or touch; oriented x 4; follows commands; speech spontaneous, logical; purposeful motor response; behavior appropriate to situation]: WDL    Language Assistance  Preferred Language to Address Healthcare Preferred Language to Address Healthcare: English    Therapeutic Interventions      Bed Mobility  Bed Mobility Training Scootin person assist;  contact guard  Bed Mobility Training Sit-to-Supine: minimum assist (75% patient effort);  1 person assist;  verbal cues  Bed Mobility Training Supine-to-Sit: minimum assist (75% patient effort);  1 person assist;  verbal cues  Bed Mobility Training Limitations: decreased ability to use arms for pushing/pulling;  decreased ability to use legs for bridging/pushing;  impaired ability to control trunk for mobility;  decreased strength;  impaired balance;  impaired postural control    Sit-Stand Transfer Training  Transfer Training Sit-to-Stand Transfer: minimum assist (75% patient effort);  stand-by assist;  1 person assist;  straight cane  Transfer Training Stand-to-Sit Transfer: minimum assist (75% patient effort);  1 person assist;  verbal cues;  straight cane  Sit-to-Stand Transfer Training Transfer Safety Analysis: decreased balance;  decreased strength;  impaired balance;  impaired postural control;  straight cane    Gait Training  Gait Training: minimum assist (75% patient effort);  1 person assist;  verbal cues;  straight cane;  15 feet x 3 trials   Gait Analysis: swing-to gait   decreased layo;  increased time in double stance;  decreased hip/knee flexion;  decreased step length;  slightly unsteady gait, no lose of balance, decreased heel strike;  impaired balance;  decreased strength;  impaired postural control;  straight cane  Duration of Rest Duration of Rest: unable to ambulate further distance due to decreased endurance       Therapeutic Exercise  Therapeutic Exercise Detail: functional mobility training with pt able to ambulate in room withMin Ax1 using SC, demo unsteadiness throughout requiring physical assist  to maintain balance.     Lower Body Dressing Training  Lower Body Dressing Training Assistance: minimum assist (75% patient effort);  1 person assist;  verbal cues;  doff/don socks sitting EOB;  impaired balance;  decreased strength;  decreased flexibility;  impaired postural control          PM&R Impression : as above    Current Disposition Plan Recommendations :    subacute rehab placement

## 2022-11-09 NOTE — PROGRESS NOTE ADULT - PROBLEM SELECTOR PLAN 2
Pt presented with generalized weakness. Has hx of chronic urinary incontinence and frequency related to h/o prostate cancer and prostatectomy. Follows with urology. No fever, dysuria on admission.   + UA, Culture + for >100k CFU Klebsiella. Received CTX 1g IV x 2, last 11/5. Restarted CTX 1g IV on 11/8.     - C/w CTX 1g IV q24h  - Last UA 11/6 +leuk esterase, - nitrites  - bladder scans q6h to evaluate for retention

## 2022-11-09 NOTE — PROGRESS NOTE ADULT - SUBJECTIVE AND OBJECTIVE BOX
Internal Medicine Progress Note  Mahi Oviedo, PGY-1    ******INCOMPLETE******    OVERNIGHT EVENTS/INTERVAL HPI:    OBJECTIVE:  Vital Signs Last 24 Hrs  T(C): 36.7 (09 Nov 2022 05:49), Max: 36.7 (09 Nov 2022 05:49)  T(F): 98.1 (09 Nov 2022 05:49), Max: 98.1 (09 Nov 2022 05:49)  HR: 73 (09 Nov 2022 05:49) (68 - 73)  BP: 128/67 (09 Nov 2022 05:49) (125/80 - 128/67)  BP(mean): --  RR: 17 (09 Nov 2022 05:49) (17 - 18)  SpO2: 97% (09 Nov 2022 05:49) (97% - 97%)    Parameters below as of 09 Nov 2022 05:49  Patient On (Oxygen Delivery Method): room air      I&O's Detail    Physical Exam:  GENERAL: Awake, alert and interactive, no acute distress, appears comfortable  NEURO: A&Ox4, no focal deficits, 5/5 strength in all ext, reflexes 2+ throughout, CN 2-12 intact  HEENT: Normocephalic, atraumatic, no conjunctivitis or scleral icterus, oral mucosa moist, no oral lesions noted  NECK: Supple, no LAD, no JVD, thyroid not palpable  CARDIAC: Regular rate and rhythm, +S1/S2, no murmurs/rubs/gallops  PULM: Breathing comfortably on RA, clear to auscultation bilaterally, no wheezes/rales/rhonchi  ABDOMEN: Soft, nontender, nondistended, +bs, no hepatosplenomegaly, no rebound tenderness or fluid wave, no CVA tenderness  : Deferred  MSK: Range of motion grossly intact, no back tenderness  SKIN: Warm and dry, no rashes, lesions  VASC: Cap refil < 2 sec, 2+ peripheral pulses, no edema, no LE tenderness  Psych: Appropriate affect    Medications:  MEDICATIONS  (STANDING):  apixaban 2.5 milliGRAM(s) Oral every 12 hours  aspirin enteric coated 81 milliGRAM(s) Oral daily  atorvastatin 20 milliGRAM(s) Oral at bedtime  cefTRIAXone   IVPB 1000 milliGRAM(s) IV Intermittent every 24 hours  chlorhexidine 2% Cloths 1 Application(s) Topical <User Schedule>  cyanocobalamin 1000 MICROGram(s) Oral daily  dorzolamide 2% Ophthalmic Solution 1 Drop(s) Both EYES three times a day  finasteride 5 milliGRAM(s) Oral daily  hydrochlorothiazide 25 milliGRAM(s) Oral daily  isosorbide   mononitrate ER Tablet (IMDUR) 30 milliGRAM(s) Oral daily  metoprolol succinate ER 50 milliGRAM(s) Oral daily  pantoprazole    Tablet 40 milliGRAM(s) Oral before breakfast  vancomycin    Solution 125 milliGRAM(s) Oral every 6 hours    MEDICATIONS  (PRN):  acetaminophen     Tablet .. 650 milliGRAM(s) Oral every 6 hours PRN Temp greater or equal to 38C (100.4F), Mild Pain (1 - 3)  aluminum hydroxide/magnesium hydroxide/simethicone Suspension 30 milliLiter(s) Oral every 4 hours PRN Dyspepsia  melatonin 3 milliGRAM(s) Oral at bedtime PRN Insomnia  ondansetron Injectable 4 milliGRAM(s) IV Push every 8 hours PRN Nausea and/or Vomiting      Labs:                        9.6    11.68 )-----------( 365      ( 09 Nov 2022 06:30 )             31.9     11-09    135  |  105  |  17  ----------------------------<  87  4.2   |  17<L>  |  1.48<H>    Ca    9.2      09 Nov 2022 06:30  Phos  3.5     11-09  Mg     2.0     11-09                Radiology: Reviewed Internal Medicine Progress Note  Mahi Oviedo, PGY-1    OVERNIGHT EVENTS/INTERVAL HPI: Pt had severe diarrhea, rectal tube placed. Pt examined at bedside this AM, stated he was feeling improved from yesterday, still experiencing diarrhea via rectal tube but no abdominal pain, nausea. Still endorses L sided flank pain, unchanged from yesterday. Denies HA, F/C, chest pain.     OBJECTIVE:  Vital Signs Last 24 Hrs  T(C): 36.7 (09 Nov 2022 05:49), Max: 36.7 (09 Nov 2022 05:49)  T(F): 98.1 (09 Nov 2022 05:49), Max: 98.1 (09 Nov 2022 05:49)  HR: 73 (09 Nov 2022 05:49) (68 - 73)  BP: 128/67 (09 Nov 2022 05:49) (125/80 - 128/67)  BP(mean): --  RR: 17 (09 Nov 2022 05:49) (17 - 18)  SpO2: 97% (09 Nov 2022 05:49) (97% - 97%)    Parameters below as of 09 Nov 2022 05:49  Patient On (Oxygen Delivery Method): room air      I&O's Detail    Physical Exam:  GENERAL: Awake, alert and interactive, no acute distress, appears comfortable  NEURO: A&Ox4, no focal deficits  HEENT: Normocephalic, atraumatic, no conjunctivitis or scleral icterus, oral mucosa moist  CARDIAC: Regular rate and rhythm, +S1/S2, no murmurs/rubs/gallops  PULM: Breathing comfortably on RA, clear to auscultation bilaterally, no wheezes/rales/rhonchi  ABDOMEN: Soft, nontender, nondistended, +bs, + CVA tenderness on L  SKIN: Warm and dry, no rashes, lesions  Psych: Appropriate affect    Medications:  MEDICATIONS  (STANDING):  apixaban 2.5 milliGRAM(s) Oral every 12 hours  aspirin enteric coated 81 milliGRAM(s) Oral daily  atorvastatin 20 milliGRAM(s) Oral at bedtime  cefTRIAXone   IVPB 1000 milliGRAM(s) IV Intermittent every 24 hours  chlorhexidine 2% Cloths 1 Application(s) Topical <User Schedule>  cyanocobalamin 1000 MICROGram(s) Oral daily  dorzolamide 2% Ophthalmic Solution 1 Drop(s) Both EYES three times a day  finasteride 5 milliGRAM(s) Oral daily  hydrochlorothiazide 25 milliGRAM(s) Oral daily  isosorbide   mononitrate ER Tablet (IMDUR) 30 milliGRAM(s) Oral daily  metoprolol succinate ER 50 milliGRAM(s) Oral daily  pantoprazole    Tablet 40 milliGRAM(s) Oral before breakfast  vancomycin    Solution 125 milliGRAM(s) Oral every 6 hours    MEDICATIONS  (PRN):  acetaminophen     Tablet .. 650 milliGRAM(s) Oral every 6 hours PRN Temp greater or equal to 38C (100.4F), Mild Pain (1 - 3)  aluminum hydroxide/magnesium hydroxide/simethicone Suspension 30 milliLiter(s) Oral every 4 hours PRN Dyspepsia  melatonin 3 milliGRAM(s) Oral at bedtime PRN Insomnia  ondansetron Injectable 4 milliGRAM(s) IV Push every 8 hours PRN Nausea and/or Vomiting      Labs:                        9.6    11.68 )-----------( 365      ( 09 Nov 2022 06:30 )             31.9     11-09    135  |  105  |  17  ----------------------------<  87  4.2   |  17<L>  |  1.48<H>    Ca    9.2      09 Nov 2022 06:30  Phos  3.5     11-09  Mg     2.0     11-09                Radiology: Reviewed

## 2022-11-09 NOTE — PROGRESS NOTE ADULT - PROBLEM SELECTOR PLAN 4
Cr 1.98 on admission. Downtrended after IVF. Baseline Cr 1.0. Likely initially prerenal.     - Cr initially downtrended after IVF, now inc to 1.5, Possible ureterolithiasis on the R causing obstructive JERI   - repeat urine lytes with AM labs  - trend Cr

## 2022-11-09 NOTE — PROGRESS NOTE ADULT - PROBLEM SELECTOR PLAN 1
Pt has hx of C.Diff infection 8/2021. At presentation, was experiencing watery, voluminous, dark-colored diarrhea ~4x/daily. C.Diff toxin neg on admission, potentially false negative as pt had diarrhea prior to admission. Pt still experiencing diarrhea, repeat C.Diff +    - c/w Vancomycin 125mg po q6h  - consider potential need for fidoximicin given reoccurrence   - isolation/contact precautions

## 2022-11-10 DIAGNOSIS — N13.30 UNSPECIFIED HYDRONEPHROSIS: ICD-10-CM

## 2022-11-10 LAB
ANION GAP SERPL CALC-SCNC: 13 MMOL/L — SIGNIFICANT CHANGE UP (ref 5–17)
BASOPHILS # BLD AUTO: 0.06 K/UL — SIGNIFICANT CHANGE UP (ref 0–0.2)
BASOPHILS NFR BLD AUTO: 0.7 % — SIGNIFICANT CHANGE UP (ref 0–2)
BUN SERPL-MCNC: 19 MG/DL — SIGNIFICANT CHANGE UP (ref 7–23)
CALCIUM SERPL-MCNC: 9.7 MG/DL — SIGNIFICANT CHANGE UP (ref 8.4–10.5)
CHLORIDE SERPL-SCNC: 105 MMOL/L — SIGNIFICANT CHANGE UP (ref 96–108)
CO2 SERPL-SCNC: 15 MMOL/L — LOW (ref 22–31)
CREAT SERPL-MCNC: 1.5 MG/DL — HIGH (ref 0.5–1.3)
EGFR: 46 ML/MIN/1.73M2 — LOW
EOSINOPHIL # BLD AUTO: 0.3 K/UL — SIGNIFICANT CHANGE UP (ref 0–0.5)
EOSINOPHIL NFR BLD AUTO: 3.4 % — SIGNIFICANT CHANGE UP (ref 0–6)
GLUCOSE SERPL-MCNC: 92 MG/DL — SIGNIFICANT CHANGE UP (ref 70–99)
HCT VFR BLD CALC: 34.2 % — LOW (ref 39–50)
HGB BLD-MCNC: 10.1 G/DL — LOW (ref 13–17)
IMM GRANULOCYTES NFR BLD AUTO: 1 % — HIGH (ref 0–0.9)
LYMPHOCYTES # BLD AUTO: 1.11 K/UL — SIGNIFICANT CHANGE UP (ref 1–3.3)
LYMPHOCYTES # BLD AUTO: 12.7 % — LOW (ref 13–44)
MAGNESIUM SERPL-MCNC: 1.8 MG/DL — SIGNIFICANT CHANGE UP (ref 1.6–2.6)
MCHC RBC-ENTMCNC: 25.4 PG — LOW (ref 27–34)
MCHC RBC-ENTMCNC: 29.5 GM/DL — LOW (ref 32–36)
MCV RBC AUTO: 86.1 FL — SIGNIFICANT CHANGE UP (ref 80–100)
MONOCYTES # BLD AUTO: 0.79 K/UL — SIGNIFICANT CHANGE UP (ref 0–0.9)
MONOCYTES NFR BLD AUTO: 9 % — SIGNIFICANT CHANGE UP (ref 2–14)
NEUTROPHILS # BLD AUTO: 6.38 K/UL — SIGNIFICANT CHANGE UP (ref 1.8–7.4)
NEUTROPHILS NFR BLD AUTO: 73.2 % — SIGNIFICANT CHANGE UP (ref 43–77)
NRBC # BLD: 0 /100 WBCS — SIGNIFICANT CHANGE UP (ref 0–0)
PHOSPHATE SERPL-MCNC: 3.3 MG/DL — SIGNIFICANT CHANGE UP (ref 2.5–4.5)
PLATELET # BLD AUTO: 379 K/UL — SIGNIFICANT CHANGE UP (ref 150–400)
POTASSIUM SERPL-MCNC: 3.9 MMOL/L — SIGNIFICANT CHANGE UP (ref 3.5–5.3)
POTASSIUM SERPL-SCNC: 3.9 MMOL/L — SIGNIFICANT CHANGE UP (ref 3.5–5.3)
RBC # BLD: 3.97 M/UL — LOW (ref 4.2–5.8)
RBC # FLD: 20 % — HIGH (ref 10.3–14.5)
SODIUM SERPL-SCNC: 133 MMOL/L — LOW (ref 135–145)
WBC # BLD: 8.73 K/UL — SIGNIFICANT CHANGE UP (ref 3.8–10.5)
WBC # FLD AUTO: 8.73 K/UL — SIGNIFICANT CHANGE UP (ref 3.8–10.5)

## 2022-11-10 PROCEDURE — 99233 SBSQ HOSP IP/OBS HIGH 50: CPT | Mod: GC

## 2022-11-10 RX ORDER — METRONIDAZOLE 500 MG
500 TABLET ORAL EVERY 8 HOURS
Refills: 0 | Status: DISCONTINUED | OUTPATIENT
Start: 2022-11-10 | End: 2022-11-14

## 2022-11-10 RX ORDER — VANCOMYCIN HCL 1 G
500 VIAL (EA) INTRAVENOUS EVERY 6 HOURS
Refills: 0 | Status: DISCONTINUED | OUTPATIENT
Start: 2022-11-10 | End: 2022-11-14

## 2022-11-10 RX ADMIN — APIXABAN 2.5 MILLIGRAM(S): 2.5 TABLET, FILM COATED ORAL at 06:14

## 2022-11-10 RX ADMIN — ISOSORBIDE MONONITRATE 30 MILLIGRAM(S): 60 TABLET, EXTENDED RELEASE ORAL at 11:34

## 2022-11-10 RX ADMIN — CEFTRIAXONE 100 MILLIGRAM(S): 500 INJECTION, POWDER, FOR SOLUTION INTRAMUSCULAR; INTRAVENOUS at 11:34

## 2022-11-10 RX ADMIN — Medication 500 MILLIGRAM(S): at 14:16

## 2022-11-10 RX ADMIN — Medication 81 MILLIGRAM(S): at 11:34

## 2022-11-10 RX ADMIN — Medication 125 MILLIGRAM(S): at 02:07

## 2022-11-10 RX ADMIN — PREGABALIN 1000 MICROGRAM(S): 225 CAPSULE ORAL at 11:37

## 2022-11-10 RX ADMIN — APIXABAN 2.5 MILLIGRAM(S): 2.5 TABLET, FILM COATED ORAL at 19:17

## 2022-11-10 RX ADMIN — Medication 500 MILLIGRAM(S): at 19:17

## 2022-11-10 RX ADMIN — ATORVASTATIN CALCIUM 20 MILLIGRAM(S): 80 TABLET, FILM COATED ORAL at 22:53

## 2022-11-10 RX ADMIN — DORZOLAMIDE HYDROCHLORIDE 1 DROP(S): 20 SOLUTION/ DROPS OPHTHALMIC at 06:15

## 2022-11-10 RX ADMIN — Medication 100 MILLIGRAM(S): at 14:15

## 2022-11-10 RX ADMIN — Medication 100 MILLIGRAM(S): at 21:22

## 2022-11-10 RX ADMIN — FINASTERIDE 5 MILLIGRAM(S): 5 TABLET, FILM COATED ORAL at 11:34

## 2022-11-10 RX ADMIN — CHLORHEXIDINE GLUCONATE 1 APPLICATION(S): 213 SOLUTION TOPICAL at 06:14

## 2022-11-10 RX ADMIN — Medication 50 MILLIGRAM(S): at 06:15

## 2022-11-10 RX ADMIN — Medication 25 MILLIGRAM(S): at 06:14

## 2022-11-10 RX ADMIN — PANTOPRAZOLE SODIUM 40 MILLIGRAM(S): 20 TABLET, DELAYED RELEASE ORAL at 06:15

## 2022-11-10 RX ADMIN — Medication 125 MILLIGRAM(S): at 08:01

## 2022-11-10 RX ADMIN — DORZOLAMIDE HYDROCHLORIDE 1 DROP(S): 20 SOLUTION/ DROPS OPHTHALMIC at 14:39

## 2022-11-10 RX ADMIN — DORZOLAMIDE HYDROCHLORIDE 1 DROP(S): 20 SOLUTION/ DROPS OPHTHALMIC at 22:52

## 2022-11-10 NOTE — PROGRESS NOTE ADULT - PROBLEM SELECTOR PLAN 6
H/o pAFib. Follows with cardiologist at [facility].   - C/w eliquis 2.5 BID  - Toprol 50 daily

## 2022-11-10 NOTE — PROGRESS NOTE ADULT - SUBJECTIVE AND OBJECTIVE BOX
INTERVAL HPI/OVERNIGHT EVENTS:    interim reviewed;  Diarrhea noted; ID to comment on use of  Dificid   Also CT noted; New hydro on right ;Dr Galindo to see patient ; He knows him from past;   Has a very aggressive tumor     MEDICATIONS  (STANDING):  apixaban 2.5 milliGRAM(s) Oral every 12 hours  aspirin enteric coated 81 milliGRAM(s) Oral daily  atorvastatin 20 milliGRAM(s) Oral at bedtime  cefTRIAXone   IVPB 1000 milliGRAM(s) IV Intermittent every 24 hours  chlorhexidine 2% Cloths 1 Application(s) Topical <User Schedule>  cyanocobalamin 1000 MICROGram(s) Oral daily  dorzolamide 2% Ophthalmic Solution 1 Drop(s) Both EYES three times a day  finasteride 5 milliGRAM(s) Oral daily  hydrochlorothiazide 25 milliGRAM(s) Oral daily  isosorbide   mononitrate ER Tablet (IMDUR) 30 milliGRAM(s) Oral daily  metoprolol succinate ER 50 milliGRAM(s) Oral daily  pantoprazole    Tablet 40 milliGRAM(s) Oral before breakfast  vancomycin    Solution 125 milliGRAM(s) Oral every 6 hours    MEDICATIONS  (PRN):  acetaminophen     Tablet .. 650 milliGRAM(s) Oral every 6 hours PRN Temp greater or equal to 38C (100.4F), Mild Pain (1 - 3)  aluminum hydroxide/magnesium hydroxide/simethicone Suspension 30 milliLiter(s) Oral every 4 hours PRN Dyspepsia  melatonin 3 milliGRAM(s) Oral at bedtime PRN Insomnia  ondansetron Injectable 4 milliGRAM(s) IV Push every 8 hours PRN Nausea and/or Vomiting      Allergies    ACE inhibitors (Angioedema)  x-geva (Unknown)    Intolerances        Vital Signs Last 24 Hrs  T(C): 36.7 (10 Nov 2022 06:23), Max: 36.7 (10 Nov 2022 06:23)  T(F): 98 (10 Nov 2022 06:23), Max: 98 (10 Nov 2022 06:23)  HR: 75 (10 Nov 2022 06:23) (71 - 75)  BP: 121/67 (10 Nov 2022 06:23) (121/67 - 137/73)  BP(mean): --  RR: 19 (10 Nov 2022 06:23) (18 - 19)  SpO2: 99% (10 Nov 2022 06:23) (96% - 99%)    Parameters below as of 10 Nov 2022 06:23  Patient On (Oxygen Delivery Method): room air              Constitutional:  Awake and alert    Eyes: SELENE    ENMT: Negative    Neck: Supple    Back:  no tenderness     Respiratory:  clear    Cardiovascular: S1 S2    Gastrointestinal:  soft    Genitourinary:    Extremities:  no edema     Vascular:    Neurological:    Skin:    Lymph Nodes:            11-09 @ 07:01  -  11-10 @ 07:00  --------------------------------------------------------  IN: 0 mL / OUT: 270 mL / NET: -270 mL      LABS:                        10.1   8.73  )-----------( 379      ( 10 Nov 2022 06:44 )             34.2     11-10    133<L>  |  105  |  19  ----------------------------<  92  3.9   |  15<L>  |  1.50<H>    Ca    9.7      10 Nov 2022 06:44  Phos  3.3     11-10  Mg     1.8     11-10            RADIOLOGY & ADDITIONAL TESTS:

## 2022-11-10 NOTE — PROGRESS NOTE ADULT - PROBLEM SELECTOR PLAN 7
Pt has active Stage IV prostate CA s/p prostatectomy and on chemotherapy with Lupron. Follows with urology and heme-onc Dr. Mcmillan.  - F/u outpatient heme-onc  - C/w home finasteride 5

## 2022-11-10 NOTE — PROGRESS NOTE ADULT - SUBJECTIVE AND OBJECTIVE BOX
** INCOMPLETE **    OVERNIGHT EVENTS:     SUBJECTIVE:    VITAL SIGNS:  Vital Signs Last 24 Hrs  T(C): 36.7 (10 Nov 2022 06:23), Max: 36.7 (10 Nov 2022 06:23)  T(F): 98 (10 Nov 2022 06:23), Max: 98 (10 Nov 2022 06:23)  HR: 75 (10 Nov 2022 06:23) (71 - 75)  BP: 121/67 (10 Nov 2022 06:23) (121/67 - 137/73)  BP(mean): --  RR: 19 (10 Nov 2022 06:23) (18 - 19)  SpO2: 99% (10 Nov 2022 06:23) (96% - 99%)    Parameters below as of 10 Nov 2022 06:23  Patient On (Oxygen Delivery Method): room air        PHYSICAL EXAM:  General: NAD; speaking in full sentences  HEENT: NC/AT; PERRL; EOMI; MMM  Neck: supple; no JVD  Cardiac: RRR; +S1/S2  Pulm: CTA B/L; no W/R/R  GI: soft, NT/ND, +BS  Extremities: WWP; no edema, clubbing or cyanosis  Vasc: 2+ radial, DP pulses B/L  Neuro: AAOx3; no focal deficits    MEDICATIONS:  MEDICATIONS  (STANDING):  apixaban 2.5 milliGRAM(s) Oral every 12 hours  aspirin enteric coated 81 milliGRAM(s) Oral daily  atorvastatin 20 milliGRAM(s) Oral at bedtime  cefTRIAXone   IVPB 1000 milliGRAM(s) IV Intermittent every 24 hours  chlorhexidine 2% Cloths 1 Application(s) Topical <User Schedule>  cyanocobalamin 1000 MICROGram(s) Oral daily  dorzolamide 2% Ophthalmic Solution 1 Drop(s) Both EYES three times a day  finasteride 5 milliGRAM(s) Oral daily  hydrochlorothiazide 25 milliGRAM(s) Oral daily  isosorbide   mononitrate ER Tablet (IMDUR) 30 milliGRAM(s) Oral daily  metoprolol succinate ER 50 milliGRAM(s) Oral daily  pantoprazole    Tablet 40 milliGRAM(s) Oral before breakfast  vancomycin    Solution 125 milliGRAM(s) Oral every 6 hours    MEDICATIONS  (PRN):  acetaminophen     Tablet .. 650 milliGRAM(s) Oral every 6 hours PRN Temp greater or equal to 38C (100.4F), Mild Pain (1 - 3)  aluminum hydroxide/magnesium hydroxide/simethicone Suspension 30 milliLiter(s) Oral every 4 hours PRN Dyspepsia  melatonin 3 milliGRAM(s) Oral at bedtime PRN Insomnia  ondansetron Injectable 4 milliGRAM(s) IV Push every 8 hours PRN Nausea and/or Vomiting      ALLERGIES:  Allergies    ACE inhibitors (Angioedema)  x-geva (Unknown)    Intolerances        LABS:                        10.1   8.73  )-----------( 379      ( 10 Nov 2022 06:44 )             34.2     11-10    133<L>  |  105  |  19  ----------------------------<  92  3.9   |  15<L>  |  1.50<H>    Ca    9.7      10 Nov 2022 06:44  Phos  3.3     11-10  Mg     1.8     11-10          RADIOLOGY & ADDITIONAL TESTS: Reviewed. OVERNIGHT EVENTS: No acute overnight events.     SUBJECTIVE: Pt was seen and examined at bedside where he was resting comfortably. He states he still has frequent urges to defecate via rectal tube and feels weak when working with PT. Reports improved cough without sputum production. Denies F/C, N/V, dyspnea, chest pain, abdominal pain.     VITAL SIGNS:  Vital Signs Last 24 Hrs  T(C): 36.7 (10 Nov 2022 06:23), Max: 36.7 (10 Nov 2022 06:23)  T(F): 98 (10 Nov 2022 06:23), Max: 98 (10 Nov 2022 06:23)  HR: 75 (10 Nov 2022 06:23) (71 - 75)  BP: 121/67 (10 Nov 2022 06:23) (121/67 - 137/73)  BP(mean): --  RR: 19 (10 Nov 2022 06:23) (18 - 19)  SpO2: 99% (10 Nov 2022 06:23) (96% - 99%)    Parameters below as of 10 Nov 2022 06:23  Patient On (Oxygen Delivery Method): room air        PHYSICAL EXAM:  General: NAD; speaking in full sentences, thin  HEENT: NC/AT; PERRL; EOMI; MMM  Neck: supple; no JVD  Cardiac: RRR; +S1/S2  Pulm: CTA B/L; no W/R/R  GI: soft, NT/ND, +BS  Extremities: WWP; no edema, clubbing or cyanosis  Vasc: 2+ radial, DP pulses B/L  Neuro: AAOx3; no focal deficits    MEDICATIONS:  MEDICATIONS  (STANDING):  apixaban 2.5 milliGRAM(s) Oral every 12 hours  aspirin enteric coated 81 milliGRAM(s) Oral daily  atorvastatin 20 milliGRAM(s) Oral at bedtime  cefTRIAXone   IVPB 1000 milliGRAM(s) IV Intermittent every 24 hours  chlorhexidine 2% Cloths 1 Application(s) Topical <User Schedule>  cyanocobalamin 1000 MICROGram(s) Oral daily  dorzolamide 2% Ophthalmic Solution 1 Drop(s) Both EYES three times a day  finasteride 5 milliGRAM(s) Oral daily  hydrochlorothiazide 25 milliGRAM(s) Oral daily  isosorbide   mononitrate ER Tablet (IMDUR) 30 milliGRAM(s) Oral daily  metoprolol succinate ER 50 milliGRAM(s) Oral daily  pantoprazole    Tablet 40 milliGRAM(s) Oral before breakfast  vancomycin    Solution 125 milliGRAM(s) Oral every 6 hours    MEDICATIONS  (PRN):  acetaminophen     Tablet .. 650 milliGRAM(s) Oral every 6 hours PRN Temp greater or equal to 38C (100.4F), Mild Pain (1 - 3)  aluminum hydroxide/magnesium hydroxide/simethicone Suspension 30 milliLiter(s) Oral every 4 hours PRN Dyspepsia  melatonin 3 milliGRAM(s) Oral at bedtime PRN Insomnia  ondansetron Injectable 4 milliGRAM(s) IV Push every 8 hours PRN Nausea and/or Vomiting      ALLERGIES:  Allergies    ACE inhibitors (Angioedema)  x-geva (Unknown)    Intolerances        LABS:                        10.1   8.73  )-----------( 379      ( 10 Nov 2022 06:44 )             34.2     11-10    133<L>  |  105  |  19  ----------------------------<  92  3.9   |  15<L>  |  1.50<H>    Ca    9.7      10 Nov 2022 06:44  Phos  3.3     11-10  Mg     1.8     11-10          RADIOLOGY & ADDITIONAL TESTS: Reviewed.

## 2022-11-10 NOTE — PROGRESS NOTE ADULT - PROBLEM SELECTOR PLAN 4
Cr 1.98 on admission. Downtrended after IVF. Baseline Cr 1.0. Likely initially prerenal.     - Cr initially downtrended after IVF, inc to 1.5 and stable   - CT shows R hydronephrosis and hydroureter 2/2 adenopathy (likely from prostate ca mets). Radiology reccs possible CT urogram to evaluate obstruction further  - Urology consulted   - trend Cr

## 2022-11-10 NOTE — PROGRESS NOTE ADULT - PROBLEM SELECTOR PLAN 1
Pt has hx of C.Diff infection 8/2021. At presentation, was experiencing watery, voluminous, dark-colored diarrhea ~4x/daily. C.Diff toxin neg on admission, potentially false negative as pt had diarrhea prior to admission. Pt still experiencing diarrhea, repeat C.Diff +    - per ID reccs, increase Vancomycin to 500mg po q6h  - per ID reccs, start Flagyl 500mg IV q8h  - isolation/contact precautions

## 2022-11-10 NOTE — PROGRESS NOTE ADULT - PROBLEM SELECTOR PLAN 9
Admission in 2021 to [facility] for dyspnea on exertion, found to have DVT and PE. High risk for blood clots given cancer history.  - C/w eliquis 2.5 BID

## 2022-11-11 LAB
ANION GAP SERPL CALC-SCNC: 11 MMOL/L — SIGNIFICANT CHANGE UP (ref 5–17)
BASOPHILS # BLD AUTO: 0.06 K/UL — SIGNIFICANT CHANGE UP (ref 0–0.2)
BASOPHILS NFR BLD AUTO: 0.7 % — SIGNIFICANT CHANGE UP (ref 0–2)
BUN SERPL-MCNC: 19 MG/DL — SIGNIFICANT CHANGE UP (ref 7–23)
CALCIUM SERPL-MCNC: 9.3 MG/DL — SIGNIFICANT CHANGE UP (ref 8.4–10.5)
CHLORIDE SERPL-SCNC: 104 MMOL/L — SIGNIFICANT CHANGE UP (ref 96–108)
CO2 SERPL-SCNC: 18 MMOL/L — LOW (ref 22–31)
CREAT SERPL-MCNC: 1.68 MG/DL — HIGH (ref 0.5–1.3)
EGFR: 40 ML/MIN/1.73M2 — LOW
EOSINOPHIL # BLD AUTO: 0.22 K/UL — SIGNIFICANT CHANGE UP (ref 0–0.5)
EOSINOPHIL NFR BLD AUTO: 2.7 % — SIGNIFICANT CHANGE UP (ref 0–6)
GLUCOSE SERPL-MCNC: 87 MG/DL — SIGNIFICANT CHANGE UP (ref 70–99)
HCT VFR BLD CALC: 29.9 % — LOW (ref 39–50)
HGB BLD-MCNC: 9.2 G/DL — LOW (ref 13–17)
IMM GRANULOCYTES NFR BLD AUTO: 1 % — HIGH (ref 0–0.9)
LYMPHOCYTES # BLD AUTO: 1.43 K/UL — SIGNIFICANT CHANGE UP (ref 1–3.3)
LYMPHOCYTES # BLD AUTO: 17.7 % — SIGNIFICANT CHANGE UP (ref 13–44)
MAGNESIUM SERPL-MCNC: 1.6 MG/DL — SIGNIFICANT CHANGE UP (ref 1.6–2.6)
MCHC RBC-ENTMCNC: 25.3 PG — LOW (ref 27–34)
MCHC RBC-ENTMCNC: 30.8 GM/DL — LOW (ref 32–36)
MCV RBC AUTO: 82.1 FL — SIGNIFICANT CHANGE UP (ref 80–100)
MONOCYTES # BLD AUTO: 0.85 K/UL — SIGNIFICANT CHANGE UP (ref 0–0.9)
MONOCYTES NFR BLD AUTO: 10.5 % — SIGNIFICANT CHANGE UP (ref 2–14)
NEUTROPHILS # BLD AUTO: 5.43 K/UL — SIGNIFICANT CHANGE UP (ref 1.8–7.4)
NEUTROPHILS NFR BLD AUTO: 67.4 % — SIGNIFICANT CHANGE UP (ref 43–77)
NRBC # BLD: 0 /100 WBCS — SIGNIFICANT CHANGE UP (ref 0–0)
PHOSPHATE SERPL-MCNC: 3.8 MG/DL — SIGNIFICANT CHANGE UP (ref 2.5–4.5)
PLATELET # BLD AUTO: 401 K/UL — HIGH (ref 150–400)
POTASSIUM SERPL-MCNC: 3.3 MMOL/L — LOW (ref 3.5–5.3)
POTASSIUM SERPL-SCNC: 3.3 MMOL/L — LOW (ref 3.5–5.3)
RBC # BLD: 3.64 M/UL — LOW (ref 4.2–5.8)
RBC # FLD: 19.5 % — HIGH (ref 10.3–14.5)
SODIUM SERPL-SCNC: 133 MMOL/L — LOW (ref 135–145)
WBC # BLD: 8.07 K/UL — SIGNIFICANT CHANGE UP (ref 3.8–10.5)
WBC # FLD AUTO: 8.07 K/UL — SIGNIFICANT CHANGE UP (ref 3.8–10.5)

## 2022-11-11 PROCEDURE — 99233 SBSQ HOSP IP/OBS HIGH 50: CPT | Mod: GC

## 2022-11-11 RX ORDER — POTASSIUM CHLORIDE 20 MEQ
20 PACKET (EA) ORAL
Refills: 0 | Status: COMPLETED | OUTPATIENT
Start: 2022-11-11 | End: 2022-11-11

## 2022-11-11 RX ORDER — MAGNESIUM SULFATE 500 MG/ML
2 VIAL (ML) INJECTION ONCE
Refills: 0 | Status: COMPLETED | OUTPATIENT
Start: 2022-11-11 | End: 2022-11-11

## 2022-11-11 RX ADMIN — ISOSORBIDE MONONITRATE 30 MILLIGRAM(S): 60 TABLET, EXTENDED RELEASE ORAL at 11:28

## 2022-11-11 RX ADMIN — Medication 20 MILLIEQUIVALENT(S): at 11:28

## 2022-11-11 RX ADMIN — Medication 20 MILLIEQUIVALENT(S): at 07:33

## 2022-11-11 RX ADMIN — Medication 100 MILLIGRAM(S): at 14:58

## 2022-11-11 RX ADMIN — CHLORHEXIDINE GLUCONATE 1 APPLICATION(S): 213 SOLUTION TOPICAL at 07:19

## 2022-11-11 RX ADMIN — Medication 25 MILLIGRAM(S): at 07:08

## 2022-11-11 RX ADMIN — APIXABAN 2.5 MILLIGRAM(S): 2.5 TABLET, FILM COATED ORAL at 19:36

## 2022-11-11 RX ADMIN — ATORVASTATIN CALCIUM 20 MILLIGRAM(S): 80 TABLET, FILM COATED ORAL at 22:39

## 2022-11-11 RX ADMIN — Medication 25 GRAM(S): at 07:33

## 2022-11-11 RX ADMIN — Medication 50 MILLIGRAM(S): at 07:08

## 2022-11-11 RX ADMIN — Medication 81 MILLIGRAM(S): at 11:28

## 2022-11-11 RX ADMIN — DORZOLAMIDE HYDROCHLORIDE 1 DROP(S): 20 SOLUTION/ DROPS OPHTHALMIC at 14:58

## 2022-11-11 RX ADMIN — FINASTERIDE 5 MILLIGRAM(S): 5 TABLET, FILM COATED ORAL at 11:28

## 2022-11-11 RX ADMIN — PANTOPRAZOLE SODIUM 40 MILLIGRAM(S): 20 TABLET, DELAYED RELEASE ORAL at 07:08

## 2022-11-11 RX ADMIN — Medication 20 MILLIEQUIVALENT(S): at 09:21

## 2022-11-11 RX ADMIN — Medication 500 MILLIGRAM(S): at 02:23

## 2022-11-11 RX ADMIN — APIXABAN 2.5 MILLIGRAM(S): 2.5 TABLET, FILM COATED ORAL at 07:08

## 2022-11-11 RX ADMIN — PREGABALIN 1000 MICROGRAM(S): 225 CAPSULE ORAL at 11:28

## 2022-11-11 RX ADMIN — DORZOLAMIDE HYDROCHLORIDE 1 DROP(S): 20 SOLUTION/ DROPS OPHTHALMIC at 07:08

## 2022-11-11 RX ADMIN — Medication 500 MILLIGRAM(S): at 19:36

## 2022-11-11 RX ADMIN — Medication 500 MILLIGRAM(S): at 09:21

## 2022-11-11 RX ADMIN — Medication 100 MILLIGRAM(S): at 21:31

## 2022-11-11 RX ADMIN — Medication 100 MILLIGRAM(S): at 05:40

## 2022-11-11 RX ADMIN — Medication 500 MILLIGRAM(S): at 14:58

## 2022-11-11 RX ADMIN — DORZOLAMIDE HYDROCHLORIDE 1 DROP(S): 20 SOLUTION/ DROPS OPHTHALMIC at 22:39

## 2022-11-11 NOTE — PROGRESS NOTE ADULT - PROBLEM SELECTOR PLAN 7
H/o pAFib. Follows with cardiologist at [facility].   - C/w eliquis 2.5 BID  - Toprol 50 daily

## 2022-11-11 NOTE — PROGRESS NOTE ADULT - PROBLEM SELECTOR PLAN 1
Pt has hx of C.Diff infection 8/2021. At presentation, was experiencing watery, voluminous, dark-colored diarrhea ~4x/daily. C.Diff toxin neg on admission, potentially false negative as pt had diarrhea prior to admission. Pt still experiencing diarrhea, repeat C.Diff +    - c/w Vancomycin to 500mg po q6h  - c/w Flagyl 500mg IV q8h  - isolation/contact precautions

## 2022-11-11 NOTE — PROGRESS NOTE ADULT - SUBJECTIVE AND OBJECTIVE BOX
** INCOMPLETE **    OVERNIGHT EVENTS:     SUBJECTIVE:    VITAL SIGNS:  Vital Signs Last 24 Hrs  T(C): 37.1 (11 Nov 2022 06:30), Max: 37.1 (10 Nov 2022 21:10)  T(F): 98.7 (11 Nov 2022 06:30), Max: 98.8 (10 Nov 2022 21:10)  HR: 71 (11 Nov 2022 06:30) (71 - 74)  BP: 118/65 (11 Nov 2022 06:30) (118/65 - 121/73)  BP(mean): --  RR: 18 (11 Nov 2022 06:30) (17 - 18)  SpO2: 98% (11 Nov 2022 06:30) (96% - 98%)    Parameters below as of 11 Nov 2022 06:30  Patient On (Oxygen Delivery Method): room air        PHYSICAL EXAM:  General: NAD; speaking in full sentences  HEENT: NC/AT; PERRL; EOMI; MMM  Neck: supple; no JVD  Cardiac: RRR; +S1/S2  Pulm: CTA B/L; no W/R/R  GI: soft, NT/ND, +BS  Extremities: WWP; no edema, clubbing or cyanosis  Vasc: 2+ radial, DP pulses B/L  Neuro: AAOx3; no focal deficits    MEDICATIONS:  MEDICATIONS  (STANDING):  apixaban 2.5 milliGRAM(s) Oral every 12 hours  aspirin enteric coated 81 milliGRAM(s) Oral daily  atorvastatin 20 milliGRAM(s) Oral at bedtime  chlorhexidine 2% Cloths 1 Application(s) Topical <User Schedule>  cyanocobalamin 1000 MICROGram(s) Oral daily  dorzolamide 2% Ophthalmic Solution 1 Drop(s) Both EYES three times a day  finasteride 5 milliGRAM(s) Oral daily  hydrochlorothiazide 25 milliGRAM(s) Oral daily  isosorbide   mononitrate ER Tablet (IMDUR) 30 milliGRAM(s) Oral daily  metoprolol succinate ER 50 milliGRAM(s) Oral daily  metroNIDAZOLE  IVPB 500 milliGRAM(s) IV Intermittent every 8 hours  pantoprazole    Tablet 40 milliGRAM(s) Oral before breakfast  potassium chloride    Tablet ER 20 milliEquivalent(s) Oral every 2 hours  vancomycin    Solution 500 milliGRAM(s) Oral every 6 hours    MEDICATIONS  (PRN):  acetaminophen     Tablet .. 650 milliGRAM(s) Oral every 6 hours PRN Temp greater or equal to 38C (100.4F), Mild Pain (1 - 3)  aluminum hydroxide/magnesium hydroxide/simethicone Suspension 30 milliLiter(s) Oral every 4 hours PRN Dyspepsia  melatonin 3 milliGRAM(s) Oral at bedtime PRN Insomnia  ondansetron Injectable 4 milliGRAM(s) IV Push every 8 hours PRN Nausea and/or Vomiting      ALLERGIES:  Allergies    ACE inhibitors (Angioedema)  x-geva (Unknown)    Intolerances        LABS:                        9.2    8.07  )-----------( 401      ( 11 Nov 2022 05:30 )             29.9     11-11    133<L>  |  104  |  19  ----------------------------<  87  3.3<L>   |  18<L>  |  1.68<H>    Ca    9.3      11 Nov 2022 05:30  Phos  3.8     11-11  Mg     1.6     11-11          RADIOLOGY & ADDITIONAL TESTS: Reviewed. OVERNIGHT EVENTS: No acute events overnight.     SUBJECTIVE: Pt was examined at bedside. He states his diarrhea is slightly improved. He endorses mild L flank pain, but no R flank pain. Endorses improving dry cough. Denies F/C, N/V, abdominal pain.     VITAL SIGNS:  Vital Signs Last 24 Hrs  T(C): 37.1 (11 Nov 2022 06:30), Max: 37.1 (10 Nov 2022 21:10)  T(F): 98.7 (11 Nov 2022 06:30), Max: 98.8 (10 Nov 2022 21:10)  HR: 71 (11 Nov 2022 06:30) (71 - 74)  BP: 118/65 (11 Nov 2022 06:30) (118/65 - 121/73)  BP(mean): --  RR: 18 (11 Nov 2022 06:30) (17 - 18)  SpO2: 98% (11 Nov 2022 06:30) (96% - 98%)    Parameters below as of 11 Nov 2022 06:30  Patient On (Oxygen Delivery Method): room air    PHYSICAL EXAM:  General: NAD; speaking in full sentences, thin  HEENT: NC/AT; PERRL; EOMI; MMM  Neck: supple; no JVD  Cardiac: RRR; +S1/S2  Pulm: CTA B/L; no W/R/R  GI: soft, NT/ND, +BS  Extremities: WWP; no edema, clubbing or cyanosis  Vasc: 2+ radial, DP pulses B/L  Neuro: AAOx3; no focal deficits    MEDICATIONS:  MEDICATIONS  (STANDING):  apixaban 2.5 milliGRAM(s) Oral every 12 hours  aspirin enteric coated 81 milliGRAM(s) Oral daily  atorvastatin 20 milliGRAM(s) Oral at bedtime  chlorhexidine 2% Cloths 1 Application(s) Topical <User Schedule>  cyanocobalamin 1000 MICROGram(s) Oral daily  dorzolamide 2% Ophthalmic Solution 1 Drop(s) Both EYES three times a day  finasteride 5 milliGRAM(s) Oral daily  hydrochlorothiazide 25 milliGRAM(s) Oral daily  isosorbide   mononitrate ER Tablet (IMDUR) 30 milliGRAM(s) Oral daily  metoprolol succinate ER 50 milliGRAM(s) Oral daily  metroNIDAZOLE  IVPB 500 milliGRAM(s) IV Intermittent every 8 hours  pantoprazole    Tablet 40 milliGRAM(s) Oral before breakfast  potassium chloride    Tablet ER 20 milliEquivalent(s) Oral every 2 hours  vancomycin    Solution 500 milliGRAM(s) Oral every 6 hours    MEDICATIONS  (PRN):  acetaminophen     Tablet .. 650 milliGRAM(s) Oral every 6 hours PRN Temp greater or equal to 38C (100.4F), Mild Pain (1 - 3)  aluminum hydroxide/magnesium hydroxide/simethicone Suspension 30 milliLiter(s) Oral every 4 hours PRN Dyspepsia  melatonin 3 milliGRAM(s) Oral at bedtime PRN Insomnia  ondansetron Injectable 4 milliGRAM(s) IV Push every 8 hours PRN Nausea and/or Vomiting      ALLERGIES:  Allergies    ACE inhibitors (Angioedema)  x-geva (Unknown)    Intolerances        LABS:                        9.2    8.07  )-----------( 401      ( 11 Nov 2022 05:30 )             29.9     11-11    133<L>  |  104  |  19  ----------------------------<  87  3.3<L>   |  18<L>  |  1.68<H>    Ca    9.3      11 Nov 2022 05:30  Phos  3.8     11-11  Mg     1.6     11-11          RADIOLOGY & ADDITIONAL TESTS: Reviewed.

## 2022-11-11 NOTE — CONSULT NOTE ADULT - SUBJECTIVE AND OBJECTIVE BOX
HPI:  81M w/ CAD s/p PCI, Afib on eliquis, HTN, HLD, h/o PE/DVT, stage IV prostate CA s/p prostatectomy and on Lupron, and anemia (baseline Hgb 9) presents with a 3 day history of cough, diarrhea, and weakness. Denies N/V, abdominal pain. Patient complains of chronic urinary incontinence but no dysuria. Patient reports having C. diff one year prior. Patient accompanied by his daughter who also was not feeling well. COVID vaccinated with booster. Has never had COVID before. Denies headaches, fevers, chills, chest pain, or shortness of breath.     In the ED:  Initial vital signs: T: 98.3 F, HR: 92, BP: 129/70, R: 18, SpO2: 97% on RA  Labs: significant for Hgb 9.0 (at baseline), Na 133, Cr 1.94 (baseline 1.0), BUN 28, COVID positive, UA large LE/positive nitrites, many WBCs and bacteria  Imaging:  CXR: clear  EKG: NSR  Medications: CTX 1g, NS 1L   Consults: none  (03 Nov 2022 23:59)       ADDENDUM:  Patient seen at bedside, reports no nausea/vomiting, headache/dizziness. Has some residual cough and SOB improved from before. Still complains of left hip/lower flank pain. No issues urinating. Rectal tube recently removed. Ambulating with PT. Recent u/s demonstrated right hydro, CT confirmed new right hydro. Urology consulted for new onset right hydro. Patient has no right flank/back pain.      PAST MEDICAL & SURGICAL HISTORY:  Hyperlipidemia  Hyperlipidemia      Malignant neoplasm of prostate  s/p total prostatectomy in 1994 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      Glaucoma  Glaucoma      Type 2 diabetes mellitus  Diabetes      Essential hypertension  Hypertension      Chemotherapy session for neoplasm  Prostate Cancer      Pulmonary embolism  1/2020      DVT (deep venous thrombosis)  1/2020 on Eliquis      Atrial fibrillation  on Eliquis      Other postprocedural status  S/P prostatectomy          MEDICATIONS  (STANDING):  apixaban 2.5 milliGRAM(s) Oral every 12 hours  aspirin enteric coated 81 milliGRAM(s) Oral daily  atorvastatin 20 milliGRAM(s) Oral at bedtime  chlorhexidine 2% Cloths 1 Application(s) Topical <User Schedule>  cyanocobalamin 1000 MICROGram(s) Oral daily  dorzolamide 2% Ophthalmic Solution 1 Drop(s) Both EYES three times a day  finasteride 5 milliGRAM(s) Oral daily  hydrochlorothiazide 25 milliGRAM(s) Oral daily  isosorbide   mononitrate ER Tablet (IMDUR) 30 milliGRAM(s) Oral daily  metoprolol succinate ER 50 milliGRAM(s) Oral daily  metroNIDAZOLE  IVPB 500 milliGRAM(s) IV Intermittent every 8 hours  pantoprazole    Tablet 40 milliGRAM(s) Oral before breakfast  vancomycin    Solution 500 milliGRAM(s) Oral every 6 hours    MEDICATIONS  (PRN):  acetaminophen     Tablet .. 650 milliGRAM(s) Oral every 6 hours PRN Temp greater or equal to 38C (100.4F), Mild Pain (1 - 3)  aluminum hydroxide/magnesium hydroxide/simethicone Suspension 30 milliLiter(s) Oral every 4 hours PRN Dyspepsia  melatonin 3 milliGRAM(s) Oral at bedtime PRN Insomnia  ondansetron Injectable 4 milliGRAM(s) IV Push every 8 hours PRN Nausea and/or Vomiting      Allergies    ACE inhibitors (Angioedema)  x-geva (Unknown)    Intolerances        SOCIAL HISTORY:    FAMILY HISTORY:  Family history of malignant neoplasm of prostate (Father, Sibling)  Father        Vital Signs Last 24 Hrs  T(C): 36.7 (10 Nov 2022 14:26), Max: 36.7 (10 Nov 2022 06:23)  T(F): 98 (10 Nov 2022 14:26), Max: 98 (10 Nov 2022 06:23)  HR: 74 (10 Nov 2022 14:26) (74 - 75)  BP: 121/73 (10 Nov 2022 14:26) (121/67 - 121/73)  BP(mean): --  RR: 18 (10 Nov 2022 14:26) (18 - 19)  SpO2: 98% (10 Nov 2022 14:26) (98% - 99%)    Parameters below as of 10 Nov 2022 14:26  Patient On (Oxygen Delivery Method): room air        PHYSICAL EXAM  General: NAD, resting comfortably in bed  C/V: NSR  Pulm: Nonlabored breathing, no respiratory distress on room air  Abd: soft, ND/NT, no rebound tenderness, no guarding, no CVAT b/l, left hip/lower flank TTP  : bedside urinal yellow clear  Extrem: WWP, no edema, SCDs in place      LABS:                        10.1   8.73  )-----------( 379      ( 10 Nov 2022 06:44 )             34.2     11-10    133<L>  |  105  |  19  ----------------------------<  92  3.9   |  15<L>  |  1.50<H>    Ca    9.7      10 Nov 2022 06:44  Phos  3.3     11-10  Mg     1.8     11-10            RADIOLOGY & ADDITIONAL STUDIES:  < from: US Retroperitoneal Complete (11.08.22 @ 16:49) >  ACC: [id number] EXAM:  US RETROPERITONEAL COMPLETE                          PROCEDURE DATE:  11/08/2022          INTERPRETATION:  RENAL and PELVIC ULTRASOUND dated 11/8/2022 4:49 PM    Indication: UTI. Rule out hydronephrosis.    Prior studies: CT abdomen/pelvis 8/20/2021, renal ultrasound 1/16/2020,   and multiple prior imaging studies dating back to 10/26/2004    Findings:    RIGHT KIDNEY:  Length: 9.1 cm  Lesions: None  Hydronephrosis: There is moderate hydroureteronephrosis the exact point   of obstruction is not demonstrated in this examination.  Stones: None  Echogenicity: Mildly increased echogenicity.    LEFT KIDNEY:  Length: 10 cm  Lesions: Simple exophytic cyst emanating from the lateral cortex of the   upper pole left kidney measuring up to 7.5 cm.  Hydronephrosis: None  Stones: None  Echogenicity: Mildly increased echogenicity.    URINARY BLADDER:  Ureteral jets: Not visualized.  Filling defects: None  Bladder volume: Pre-void: 86.4 ml;    PROSTATE:  Nonvisualized.      IMPRESSION:    1. Moderate right-sided hydroureteronephrosis. A point of obstruction as   well as ureterolithiasis is not demonstrated in this examination. Further   imaging may be of value to include a noncontrast CT the abdomen and   pelvis for better assessment of urolithiasis.  2. Simple 7.5 cm exophytic cyst emanating from the upper pole the left   kidney.    --- End of Report ---          BONITA BULLARD MD; Resident Radiologist  This document has been electronically signed.  CINDY ROBERTS MD; Attending Radiologist  This document has been electronically signed. Nov 8 2022  5:32PM    < end of copied text >  < from: CT Abdomen and Pelvis No Cont (11.09.22 @ 15:50) >  ACC: [id number] EXAM:  CT ABDOMEN AND PELVIS                          PROCEDURE DATE:  11/09/2022          INTERPRETATION:  CLINICAL INFORMATION: Question nephrolithiasis    COMPARISON: Intravenously enhanced CT from 8/20/2021    CONTRAST/COMPLICATIONS:  IV Contrast: Not administered    PROCEDURE:  Unenhanced CT of the abdomen and pelvis    FINDINGS:  LOWER CHEST: Trace new left pleural effusion and left basilar focal   atelectasis.    LIVER: No hepatomegaly. No gross hepatic lesions.  BILE DUCTS: Normal caliber.  GALLBLADDER: No gallbladder wall thickening or pericholecystic fluid.  SPLEEN: Within normal limits.  PANCREAS: No pancreatic ductal dilatation or peripancreatic fluid.  ADRENALS: Normal right adrenal gland. Left adrenal gland not clearly   delineated due to adjacent extensive adenopathy.  KIDNEYS/URETERS: New right hydronephrosis and hydroureter to the level of   the patient's mid pelvis. Distally, the right ureter is not clearly   delineated. No right renal or ureteral calculi. Increased size of left   upper pole simple cyst measuring 7.8 cm. No left hydronephrosis.    BLADDER: Within normal limits.  REPRODUCTIVE ORGANS: Post prostatectomy. Stable post surgical bed    BOWEL: Resolution of previously noted colonic wall thickening. Focal   pericolonic fat stranding is seen adjacent to the mid to distal   descending colon (series 3, images 69 through 76) No small bowel   obstruction or areas of abnormal small bowel wall thickening.  PERITONEUM: No ascites.  VESSELS: Within normal limits.  RETROPERITONEUM/LYMPH NODES: Increased, extensive para-aortic,   interaortocaval/retroperitoneal and bilateral common iliac adenopathy.  ABDOMINAL WALL: Stable anterior left pelvic wall hernia containing fat.  BONES: Stable wedge compression deformity of L2.    IMPRESSION:  1.  New right hydronephrosis and hydroureter to the patient's mid to   distal pelvis; possibly secondary to adenopathy; no  tract   calcifications bilaterally. If clinically indicated recommend a CT   urogram for further evaluation to identify the point of obstruction.  2.  Increased periaortic/retroperitoneal and peritoneal iliac   lymphadenopathy; cannot exclude metastasis versus lymphoma.    --- End of Report ---            MONTY RUSSELL MD; Attending Radiologist  This document has been electronically signed. Nov 9 2022  4:59PM    < end of copied text >  
    Patient is a 81y old  Male who presents with a chief complaint of weakness and diarrhea (2022 07:10)        HPI:  81M w/ CAD s/p PCI, Afib on eliquis, HTN, HLD, h/o PE/DVT, stage IV prostate CA s/p prostatectomy and on Lupron, and anemia (baseline Hgb 9) presents with a 3 day history of cough, diarrhea, and weakness. Denies N/V, abdominal pain. Patient complains of chronic urinary incontinence but no dysuria. Patient reports having C. diff one year prior. Patient accompanied by his daughter who also was not feeling well. COVID vaccinated with booster. Has never had COVID before. Denies headaches, fevers, chills, chest pain, or shortness of breath.     In the ED:  Initial vital signs: T: 98.3 F, HR: 92, BP: 129/70, R: 18, SpO2: 97% on RA  Labs: significant for Hgb 9.0 (at baseline), Na 133, Cr 1.94 (baseline 1.0), BUN 28, COVID positive, UA large LE/positive nitrites, many WBCs and bacteria  Imaging:  CXR: clear  EKG: NSR  Medications: CTX 1g, NS 1L   Consults: none  (2022 23:59)      PAST MEDICAL & SURGICAL HISTORY:  Hyperlipidemia  Hyperlipidemia      Malignant neoplasm of prostate  s/p total prostatectomy in  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      Glaucoma  Glaucoma      Type 2 diabetes mellitus  Diabetes      Essential hypertension  Hypertension      Chemotherapy session for neoplasm  Prostate Cancer      Pulmonary embolism  2020      DVT (deep venous thrombosis)  2020 on Eliquis      Atrial fibrillation  on Eliquis      Other postprocedural status  S/P prostatectomy          MEDICATIONS  (STANDING):  apixaban 2.5 milliGRAM(s) Oral every 12 hours  aspirin enteric coated 81 milliGRAM(s) Oral daily  atorvastatin 20 milliGRAM(s) Oral at bedtime  cefTRIAXone   IVPB 1000 milliGRAM(s) IV Intermittent every 24 hours  cyanocobalamin 1000 MICROGram(s) Oral daily  dorzolamide 2% Ophthalmic Solution 1 Drop(s) Both EYES three times a day  finasteride 5 milliGRAM(s) Oral daily  hydrochlorothiazide 25 milliGRAM(s) Oral daily  isosorbide   mononitrate ER Tablet (IMDUR) 30 milliGRAM(s) Oral daily  metoprolol succinate ER 50 milliGRAM(s) Oral daily  pantoprazole    Tablet 40 milliGRAM(s) Oral before breakfast  sodium chloride 0.9%. 1000 milliLiter(s) (80 mL/Hr) IV Continuous <Continuous>    MEDICATIONS  (PRN):  acetaminophen     Tablet .. 650 milliGRAM(s) Oral every 6 hours PRN Temp greater or equal to 38C (100.4F), Mild Pain (1 - 3)  aluminum hydroxide/magnesium hydroxide/simethicone Suspension 30 milliLiter(s) Oral every 4 hours PRN Dyspepsia  loperamide 2 milliGRAM(s) Oral every 4 hours PRN Diarrhea  melatonin 3 milliGRAM(s) Oral at bedtime PRN Insomnia  ondansetron Injectable 4 milliGRAM(s) IV Push every 8 hours PRN Nausea and/or Vomiting           FAMILY HISTORY:  Family history of malignant neoplasm of prostate (Father, Sibling)  Father      CBC Full  -  ( 2022 05:30 )  WBC Count : 6.07 K/uL  RBC Count : 3.32 M/uL  Hemoglobin : 8.6 g/dL  Hematocrit : 28.6 %  Platelet Count - Automated : 263 K/uL  Mean Cell Volume : 86.1 fl  Mean Cell Hemoglobin : 25.9 pg  Mean Cell Hemoglobin Concentration : 30.1 gm/dL  Auto Neutrophil # : 3.81 K/uL  Auto Lymphocyte # : 1.41 K/uL  Auto Monocyte # : 0.53 K/uL  Auto Eosinophil # : 0.25 K/uL  Auto Basophil # : 0.04 K/uL  Auto Neutrophil % : 62.8 %  Auto Lymphocyte % : 23.2 %  Auto Monocyte % : 8.7 %  Auto Eosinophil % : 4.1 %  Auto Basophil % : 0.7 %          135  |  109<H>  |  21  ----------------------------<  89  3.9   |  16<L>  |  1.50<H>    Ca    9.0      2022 05:30  Phos  2.7       Mg     1.7         TPro  6.9  /  Alb  2.9<L>  /  TBili  0.2  /  DBili  x   /  AST  32  /  ALT  8<L>  /  AlkPhos  63        Urinalysis Basic - ( 2022 17:36 )    Color: Yellow / Appearance: SL Cloudy / S.015 / pH: x  Gluc: x / Ketone: NEGATIVE  / Bili: Negative / Urobili: 0.2 E.U./dL   Blood: x / Protein: Trace mg/dL / Nitrite: POSITIVE   Leuk Esterase: Large / RBC: < 5 /HPF / WBC Many /HPF   Sq Epi: x / Non Sq Epi: 0-5 /HPF / Bacteria: Many /HPF          Radiology :     < from: Xray Chest 1 View- PORTABLE-Urgent (Xray Chest 1 View- PORTABLE-Urgent .) (22 @ 17:55) >  ACC: [id number] EXAM:  XR CHEST PORTABLE URGENT 1V                          PROCEDURE DATE:  2022          INTERPRETATION:  TECHNIQUE: Single portable view of the chest.    COMPARISON:  2021 And PET/CT dated 2022    CLINICAL HISTORY: weakness, r/o pneumonia    FINDINGS:    Single frontal view of the chest demonstrates the lungs to be clear. The   cardiomediastinal silhouette is normal. No acute osseous abnormalities.    IMPRESSION: No acute cardiopulmonary disease process.      < from: Xray Lumbar Spine AP + Lateral (22 @ 17:55) >    ACC: [id number] EXAM:  XR LS SPINE AP LAT 2-3 VIEWS                          PROCEDURE DATE:  2022          INTERPRETATION:  Clinical history/reason for exam:Prostate cancer, back   pain    Comparison: PET/CT 2022, CT abdomen pelvis 2021.    Procedure: 3 views of the lumbar spine    Findings:  There are stable moderate L2 compression fracture. Stable mild T10   compression fracture. The remainder the vertebral body heights are   maintained. There is grade grade 1 anterolisthesis of L5 on S1. There are   no new destructive osseous lesions. There is levoscoliosis of the   thoracolumbar junction. There are bridging anterior osteophytes with   relative preservation of the disc space compatible with diffuse   idiopathic skeletal hyperostosis    Vascular calcifications are noted.    Surgical clips are noted within the pelvis bilaterally.    Impression:  No acute fracture. Stable moderate L2 compression fracture and mild T10   compression fracture. No definite new destructive osseous lesion.            Vital Signs Last 24 Hrs  T(C): 36.5 (2022 13:03), Max: 36.8 (2022 17:24)  T(F): 97.7 (2022 13:03), Max: 98.3 (2022 17:24)  HR: 67 (2022 13:03) (65 - 73)  BP: 128/69 (2022 13:03) (109/60 - 130/69)  BP(mean): --  RR: 18 (2022 13:03) (17 - 18)  SpO2: 100% (2022 13:03) (98% - 100%)    Parameters below as of 2022 13:03  Patient On (Oxygen Delivery Method): room air            REVIEW OF SYSTEMS:   per HPI         Physical Exam:  on COVID isolation , in accordance with current standards limiting patient contact , please refer to exam performed on  2022      • Constitutional:	thin, elderly male, NAD  • Eyes:	EOMI; PERRL; no drainage or redness  • ENMT:	No oral lesions; no gross abnormalities  • Neck	no thyromegaly or nodules  • Breasts:	not examined  • Back:	No deformity or limitation of movement  • Respiratory:	Breath Sounds equal & clear to auscultation, no accessory muscle use  • Cardiovascular:	Regular rate & rhythm, normal S1, S2; no murmurs, gallops or rubs; no S3, S4  • Gastrointestinal:	Soft, non-tender, no hepatosplenomegaly, normal bowel sounds  • Genitourinary:	not examined  • Rectal: not examined  • Extremities:	No cyanosis, clubbing or edema  • Vascular:	Equal and normal pulses (dorsalis pedis)  • Neurologica:l	not examined  • Skin:	No lesions; no rash  • Lymph Nodes:	No lymphadedenopathy  • Musculoskeletal:	No joint pain, swelling or deformity; no limitation of movement        PM&R Impression :     1) deconditioned    2) no focal weakness      Recommendations / Plan :     1) Physical / Occupational therapy focusing on therapeutic exercises , equipment evaluation , bed mobility/transfer out of bed evaluation , progressive ambulation with assistive devices prn .    2) Disposition Plan / Recs  :   d/c home, home physical therapy                   
INTERVAL HPI/OVERNIGHT EVENTS:  Feels well other than chronic fatigue and lethargy.  Denies other symptoms of anemia including JAIME.      MEDICATIONS  (STANDING):  apixaban 2.5 milliGRAM(s) Oral every 12 hours  aspirin enteric coated 81 milliGRAM(s) Oral daily  atorvastatin 20 milliGRAM(s) Oral at bedtime  chlorhexidine 2% Cloths 1 Application(s) Topical <User Schedule>  cyanocobalamin 1000 MICROGram(s) Oral daily  dorzolamide 2% Ophthalmic Solution 1 Drop(s) Both EYES three times a day  finasteride 5 milliGRAM(s) Oral daily  hydrochlorothiazide 25 milliGRAM(s) Oral daily  isosorbide   mononitrate ER Tablet (IMDUR) 30 milliGRAM(s) Oral daily  metoprolol succinate ER 50 milliGRAM(s) Oral daily  metroNIDAZOLE  IVPB 500 milliGRAM(s) IV Intermittent every 8 hours  pantoprazole    Tablet 40 milliGRAM(s) Oral before breakfast  potassium chloride    Tablet ER 20 milliEquivalent(s) Oral every 2 hours  vancomycin    Solution 500 milliGRAM(s) Oral every 6 hours    MEDICATIONS  (PRN):  acetaminophen     Tablet .. 650 milliGRAM(s) Oral every 6 hours PRN Temp greater or equal to 38C (100.4F), Mild Pain (1 - 3)  aluminum hydroxide/magnesium hydroxide/simethicone Suspension 30 milliLiter(s) Oral every 4 hours PRN Dyspepsia  melatonin 3 milliGRAM(s) Oral at bedtime PRN Insomnia  ondansetron Injectable 4 milliGRAM(s) IV Push every 8 hours PRN Nausea and/or Vomiting      Allergies    ACE inhibitors (Angioedema)  x-geva (Unknown)    Intolerances        Vital Signs Last 24 Hrs  T(C): 37.1 (11 Nov 2022 06:30), Max: 37.1 (10 Nov 2022 21:10)  T(F): 98.7 (11 Nov 2022 06:30), Max: 98.8 (10 Nov 2022 21:10)  HR: 71 (11 Nov 2022 06:30) (71 - 74)  BP: 118/65 (11 Nov 2022 06:30) (118/65 - 121/73)  BP(mean): --  RR: 18 (11 Nov 2022 06:30) (17 - 18)  SpO2: 98% (11 Nov 2022 06:30) (96% - 98%)    Parameters below as of 11 Nov 2022 06:30  Patient On (Oxygen Delivery Method): room air              Constitutional: Awake and alert    Eyes: SELENE    ENMT: Negative    Neck: Supple    Back:  no tenderness     Respiratory:  clear    Cardiovascular: S1 S2    Gastrointestinal:  soft    Genitourinary:    Extremities:  no edema    Vascular:    Neurological:    Skin:    Lymph Nodes:            11-10 @ 07:01  -  11-11 @ 07:00  --------------------------------------------------------  IN: 0 mL / OUT: 350 mL / NET: -350 mL      LABS:                        9.2    8.07  )-----------( 401      ( 11 Nov 2022 05:30 )             29.9     11-11    133<L>  |  104  |  19  ----------------------------<  87  3.3<L>   |  18<L>  |  1.68<H>    Ca    9.3      11 Nov 2022 05:30  Phos  3.8     11-11  Mg     1.6     11-11

## 2022-11-11 NOTE — PROGRESS NOTE ADULT - PROBLEM SELECTOR PLAN 9
Admission in 2021 to [facility] for dyspnea on exertion, found to have DVT and PE. High risk for blood clots given cancer history.  - C/w eliquis 2.5 BID

## 2022-11-11 NOTE — PROGRESS NOTE ADULT - PROBLEM SELECTOR PLAN 4
Pt presented with generalized weakness. Has hx of chronic urinary incontinence and frequency related to h/o prostate cancer and prostatectomy. Follows with urology. No fever, dysuria on admission.   + UA, Culture + for >100k CFU Klebsiella. Received CTX 1g IV x 2, last 11/5. Restarted CTX 1g IV on 11/8.     - Discontinue CTX  - Last UA 11/6 +leuk esterase, - nitrites  - bladder scans q6h to evaluate for retention

## 2022-11-11 NOTE — CONSULT NOTE ADULT - ASSESSMENT
per Internal Medicine    81 y o M w/ CAD s/p PCI, Afib on eliquis, HTN, HLD, h/o PE/DVT, stage IV prostate CA s/p prostatectomy and on Lupron, and anemia (baseline Hgb 9) presents with a 3 day history of cough, diarrhea, and weakness, found to have COVID, UTI, and JERI.      Problem/Plan - 1:  ·  Problem: Weakness.   ·  Plan: Patient presenting with several day history of weakness, with associated cough and diarrhea. No N/V. Found to be COVID positive with UTI and JERI. History of C. diff infection 1 year ago, completed course of fidaxomicin. Weakness likely due to dehydration from diarrhea and UTI. 0/4 SIRS criteria, no signs of sepsis. Ambulates with a cane at baseline.  - C.Diff negative  - F/u GI PCR  - F/u BCx and UCx with sensitivities  - c/w CTX 1g x3 days  - IV maintenance fluids  - PT/OT consult.    Problem/Plan - 2:  ·  Problem: UTI (urinary tract infection).   ·  Plan: Positive UA on admission. Patient has chronic urinary incontinence and frequency related to h/o prostate cancer and prostatectomy. Follows with urology. No fever, dysuria.  Culture + for >100k CFU Klebsiella  - C/w CTX 1g x3 days.    Problem/Plan - 3:  ·  Problem: JERI (acute kidney injury).   ·  Plan: Cr 1.98 on admission. Downtrending after 1L NS in ED. Baseline Cr 1.0. Likely prerenal. UA Spec Grav 1.015  - C/w IV maintenance fluids  - F/u urine lytes.    Problem/Plan - 4:  ·  Problem: 2019 novel coronavirus disease (COVID-19).   ·  Plan: Complaining of some cough and weakness the past few days. No shortness of breath. Not hypoxic. COVID vaccinated. Never had COVID before.  - Start monoclonal Ab tx.    Problem/Plan - 5:  ·  Problem: Prostate cancer.   ·  Plan: H/o Stage IV prostate CA s/p prostatectomy and on chemotherapy with Lupron. Follows with urology and heme-onc Dr. Mcmillan.  - F/u outpatient heme-onc  - C/w home finasteride 5.    Problem/Plan - 6:  ·  Problem: Paroxysmal atrial fibrillation.   ·  Plan: H/o pAFib. Follows with cardiologist at [facility].   - C/w eliquis 2.5 BID  - Toprol 50 daily.    Problem/Plan - 7:  ·  Problem: HTN (hypertension).   ·  Plan: Normotensive on admission. Home med: HCTZ 25 and toprol 50. Allergic to ACEI, developed angioedema.  - C/w home HCTZ and BB.    Problem/Plan - 8:  ·  Problem: History of pulmonary embolism.   ·  Plan: Admission in 2021 to [facility] for dyspnea on exertion, found to have DVT and PE. High risk for blood clots given cancer history.  - C/w eliquis 2.5 BID.    Problem/Plan - 9:  ·  Problem: CAD (coronary artery disease).   ·  Plan: CAD s/p PCI w/ IRASEMA in 2021. Follows with cardiology Dr. Sánchez.  - C/w home atorva 20, ASA 81, imdur 30, and toprol 50.    Problem/Plan - 10:  ·  Problem: Anemia.   ·  Plan; Baseline Hgb 8-9 on past admissions. Normocytic. 2/2 AoCD and DOMITILA. History of blood transfusions outpatient. Hx dark stools potentially 2/2 Fe supplementation.   - C/w home Fe sulfate 325 daily.    Problem/Plan - 11:  ·  Problem: History of Clostridium difficile infection.   ·  Plan: C. diff positive on admission for diarrhea in August 2021. Treated with course of fidaxomicin.  - C. diff negative.    Problem/Plan - 12:  ·  Problem: Nutrition, metabolism, and development symptoms.   ·  Plan: F: NS 100cc/hr  E: Replete for K<4, Mag<2  N: Regular Diet  A: Eliquis  Code status: Full  Dispo: [facility].  
81M PMH of CAD s/p PCI, Afib on eliquis, HTN, HLD, PE/DVT s/p treatment, stage 4 prostate Ca s/p prostatectomy now with metastatic prostate cancer on Lupron, and anemia, admitted to medicine for UTI Kleb, found to be covid+ and Cdiff. Bladder scans in  over last 24 hours. No urinary complaints. New onset right hydro may be 2/2 increased periaortic, retroperitoneal, and peritoneal iliac lymphadenopathy, no calcification seen    Recs:  - please keep trending PVRs and ensure patient is not in retention  - would recommend a goals of care discussion given patient has metastatic prostate cancer  - Cr downtrending from 1.82 11/4 to ~1.5, previous Cr baseline 1-1.2  - currently asymptomatic from hydro  - if Cr increases or develops right flank pain, please page urology, may need intervention based on GOC  - f/u with PSA  - care per primary team  - discussed with attending  - no acute  interventions at this time    B Ulises PILLAI (PGY-2)  Consult Urology Resident  Please feel free to reach out on Teams Chat  
Anemia Chronic Disease/Malignancy    Chronic stable anemia due to malignancy and/or renal disease.  Being treated with weekly PINEDA as outpatient for goal Hgb >10.  -initiate on Procrit 40,000u SQ weekly until goal Hgb >10.0.  -monitor BP closely while on therapy.  -will follow closely with you.    Prostate Cancer     Stage IV metastatic disease, currently not on therapy.  Awaiting evaluation and potential treatment by Nuclear medicine.  -consult Nuclear Med next week to see patient for evaluation of therapy.

## 2022-11-11 NOTE — PROGRESS NOTE ADULT - SUBJECTIVE AND OBJECTIVE BOX
INTERVAL HPI/OVERNIGHT EVENTS:  Interim reviewed; Diarrhea improved today  Urology follow up noted;  Spoke with patient and daughter; They continue to want full support;  Patient continues to make urine; Creatinine is stable      MEDICATIONS  (STANDING):  apixaban 2.5 milliGRAM(s) Oral every 12 hours  aspirin enteric coated 81 milliGRAM(s) Oral daily  atorvastatin 20 milliGRAM(s) Oral at bedtime  chlorhexidine 2% Cloths 1 Application(s) Topical <User Schedule>  cyanocobalamin 1000 MICROGram(s) Oral daily  dorzolamide 2% Ophthalmic Solution 1 Drop(s) Both EYES three times a day  finasteride 5 milliGRAM(s) Oral daily  hydrochlorothiazide 25 milliGRAM(s) Oral daily  isosorbide   mononitrate ER Tablet (IMDUR) 30 milliGRAM(s) Oral daily  metoprolol succinate ER 50 milliGRAM(s) Oral daily  metroNIDAZOLE  IVPB 500 milliGRAM(s) IV Intermittent every 8 hours  pantoprazole    Tablet 40 milliGRAM(s) Oral before breakfast  potassium chloride    Tablet ER 20 milliEquivalent(s) Oral every 2 hours  vancomycin    Solution 500 milliGRAM(s) Oral every 6 hours    MEDICATIONS  (PRN):  acetaminophen     Tablet .. 650 milliGRAM(s) Oral every 6 hours PRN Temp greater or equal to 38C (100.4F), Mild Pain (1 - 3)  aluminum hydroxide/magnesium hydroxide/simethicone Suspension 30 milliLiter(s) Oral every 4 hours PRN Dyspepsia  melatonin 3 milliGRAM(s) Oral at bedtime PRN Insomnia  ondansetron Injectable 4 milliGRAM(s) IV Push every 8 hours PRN Nausea and/or Vomiting      Allergies    ACE inhibitors (Angioedema)  x-geva (Unknown)    Intolerances        Vital Signs Last 24 Hrs  T(C): 37.1 (11 Nov 2022 06:30), Max: 37.1 (10 Nov 2022 21:10)  T(F): 98.7 (11 Nov 2022 06:30), Max: 98.8 (10 Nov 2022 21:10)  HR: 71 (11 Nov 2022 06:30) (71 - 74)  BP: 118/65 (11 Nov 2022 06:30) (118/65 - 121/73)  BP(mean): --  RR: 18 (11 Nov 2022 06:30) (17 - 18)  SpO2: 98% (11 Nov 2022 06:30) (96% - 98%)    Parameters below as of 11 Nov 2022 06:30  Patient On (Oxygen Delivery Method): room air              Constitutional: Awake and alert    Eyes: SELENE    ENMT: Negative    Neck: Supple    Back:  no tenderness     Respiratory:  clear    Cardiovascular: S1 S2    Gastrointestinal:  soft    Genitourinary:    Extremities:  no edema    Vascular:    Neurological:    Skin:    Lymph Nodes:            11-10 @ 07:01  -  11-11 @ 07:00  --------------------------------------------------------  IN: 0 mL / OUT: 350 mL / NET: -350 mL      LABS:                        9.2    8.07  )-----------( 401      ( 11 Nov 2022 05:30 )             29.9     11-11    133<L>  |  104  |  19  ----------------------------<  87  3.3<L>   |  18<L>  |  1.68<H>    Ca    9.3      11 Nov 2022 05:30  Phos  3.8     11-11  Mg     1.6     11-11            RADIOLOGY & ADDITIONAL TESTS:

## 2022-11-11 NOTE — PROGRESS NOTE ADULT - PROBLEM SELECTOR PLAN 5
Cr 1.98 on admission. Downtrended after IVF. Per urology, baseline Cr 1.28-1.84 outpatient.     - Cr initially downtrended after IVF, inc to 1.5 and stable   - CT shows R hydronephrosis and hydroureter 2/2 adenopathy (likely from prostate ca mets)  - F/u urology reccs- no acute intervention at the time  - f/u PSA  - trend Cr

## 2022-11-11 NOTE — PROGRESS NOTE ADULT - PROBLEM SELECTOR PLAN 2
Pt with UTI developed L CVA tenderness. Renal U/S showed 7.5cm simple renal cyst on the L, moderate hydronephrosis on the R. Pt currently asymptomatic, no R flank pain.     - CTAP: New right hydronephrosis and hydroureter to the patient's mid to distal pelvis; possibly secondary to adenopathy; no  tract calcifications bilaterally. If clinically indicated recommend a CT urogram for further evaluation to identify the point of obstruction.

## 2022-11-11 NOTE — PROGRESS NOTE ADULT - PROBLEM SELECTOR PLAN 3
Pt has active Stage IV prostate CA s/p prostatectomy and on chemotherapy with Lupron. Follows with urology and heme-onc Dr. Mcmillan.  - F/u outpatient heme-onc  - C/w home finasteride 5mg

## 2022-11-11 NOTE — PROGRESS NOTE ADULT - SUBJECTIVE AND OBJECTIVE BOX
UROLOGY PROGRESS NOTE    SUBJECTIVE: Patient seen and examined bedside. Patient denies fevers/chills, HA/dizziness, nausea/vomiting, cough/sob is improving. No diarrhea reported.     apixaban 2.5 milliGRAM(s) Oral every 12 hours  aspirin enteric coated 81 milliGRAM(s) Oral daily  hydrochlorothiazide 25 milliGRAM(s) Oral daily  isosorbide   mononitrate ER Tablet (IMDUR) 30 milliGRAM(s) Oral daily  metoprolol succinate ER 50 milliGRAM(s) Oral daily  metroNIDAZOLE  IVPB 500 milliGRAM(s) IV Intermittent every 8 hours  vancomycin    Solution 500 milliGRAM(s) Oral every 6 hours      Vital Signs Last 24 Hrs  T(C): 37.1 (11 Nov 2022 06:30), Max: 37.1 (10 Nov 2022 21:10)  T(F): 98.7 (11 Nov 2022 06:30), Max: 98.8 (10 Nov 2022 21:10)  HR: 71 (11 Nov 2022 06:30) (71 - 74)  BP: 118/65 (11 Nov 2022 06:30) (118/65 - 121/73)  BP(mean): --  RR: 18 (11 Nov 2022 06:30) (17 - 18)  SpO2: 98% (11 Nov 2022 06:30) (96% - 98%)    Parameters below as of 11 Nov 2022 06:30  Patient On (Oxygen Delivery Method): room air      I&O's Detail    10 Nov 2022 07:01  -  11 Nov 2022 07:00  --------------------------------------------------------  IN:  Total IN: 0 mL    OUT:    Voided (mL): 350 mL  Total OUT: 350 mL    Total NET: -350 mL          PHYSICAL EXAM    General: NAD, resting comfortably in bed  C/V: NSR  Pulm: Nonlabored breathing, no respiratory distress on room air  Abd: soft, ND/NT, no rebound tenderness, no guarding, no flank TTP  : voiding  Extrem: [facility]        LABS:                        9.2    8.07  )-----------( 401      ( 11 Nov 2022 05:30 )             29.9     11-11    133<L>  |  104  |  19  ----------------------------<  87  3.3<L>   |  18<L>  |  1.68<H>    Ca    9.3      11 Nov 2022 05:30  Phos  3.8     11-11  Mg     1.6     11-11            CULTURES:          RADIOLOGY & ADDITIONAL STUDIES:

## 2022-11-12 LAB
ANION GAP SERPL CALC-SCNC: 10 MMOL/L — SIGNIFICANT CHANGE UP (ref 5–17)
BASOPHILS # BLD AUTO: 0.07 K/UL — SIGNIFICANT CHANGE UP (ref 0–0.2)
BASOPHILS NFR BLD AUTO: 0.8 % — SIGNIFICANT CHANGE UP (ref 0–2)
BUN SERPL-MCNC: 18 MG/DL — SIGNIFICANT CHANGE UP (ref 7–23)
CALCIUM SERPL-MCNC: 9.9 MG/DL — SIGNIFICANT CHANGE UP (ref 8.4–10.5)
CHLORIDE SERPL-SCNC: 105 MMOL/L — SIGNIFICANT CHANGE UP (ref 96–108)
CO2 SERPL-SCNC: 19 MMOL/L — LOW (ref 22–31)
CREAT SERPL-MCNC: 1.73 MG/DL — HIGH (ref 0.5–1.3)
EGFR: 39 ML/MIN/1.73M2 — LOW
EOSINOPHIL # BLD AUTO: 0.28 K/UL — SIGNIFICANT CHANGE UP (ref 0–0.5)
EOSINOPHIL NFR BLD AUTO: 3.3 % — SIGNIFICANT CHANGE UP (ref 0–6)
GLUCOSE SERPL-MCNC: 95 MG/DL — SIGNIFICANT CHANGE UP (ref 70–99)
HCT VFR BLD CALC: 29.9 % — LOW (ref 39–50)
HGB BLD-MCNC: 9.1 G/DL — LOW (ref 13–17)
IMM GRANULOCYTES NFR BLD AUTO: 1.2 % — HIGH (ref 0–0.9)
LYMPHOCYTES # BLD AUTO: 1.33 K/UL — SIGNIFICANT CHANGE UP (ref 1–3.3)
LYMPHOCYTES # BLD AUTO: 15.6 % — SIGNIFICANT CHANGE UP (ref 13–44)
MAGNESIUM SERPL-MCNC: 2 MG/DL — SIGNIFICANT CHANGE UP (ref 1.6–2.6)
MCHC RBC-ENTMCNC: 25.6 PG — LOW (ref 27–34)
MCHC RBC-ENTMCNC: 30.4 GM/DL — LOW (ref 32–36)
MCV RBC AUTO: 84.2 FL — SIGNIFICANT CHANGE UP (ref 80–100)
MONOCYTES # BLD AUTO: 0.86 K/UL — SIGNIFICANT CHANGE UP (ref 0–0.9)
MONOCYTES NFR BLD AUTO: 10.1 % — SIGNIFICANT CHANGE UP (ref 2–14)
NEUTROPHILS # BLD AUTO: 5.87 K/UL — SIGNIFICANT CHANGE UP (ref 1.8–7.4)
NEUTROPHILS NFR BLD AUTO: 69 % — SIGNIFICANT CHANGE UP (ref 43–77)
NRBC # BLD: 0 /100 WBCS — SIGNIFICANT CHANGE UP (ref 0–0)
PHOSPHATE SERPL-MCNC: 3.6 MG/DL — SIGNIFICANT CHANGE UP (ref 2.5–4.5)
PLATELET # BLD AUTO: 392 K/UL — SIGNIFICANT CHANGE UP (ref 150–400)
POTASSIUM SERPL-MCNC: 4.5 MMOL/L — SIGNIFICANT CHANGE UP (ref 3.5–5.3)
POTASSIUM SERPL-SCNC: 4.5 MMOL/L — SIGNIFICANT CHANGE UP (ref 3.5–5.3)
PSA FLD-MCNC: 860 NG/ML — HIGH (ref 0–4)
RBC # BLD: 3.55 M/UL — LOW (ref 4.2–5.8)
RBC # FLD: 19.2 % — HIGH (ref 10.3–14.5)
SODIUM SERPL-SCNC: 134 MMOL/L — LOW (ref 135–145)
WBC # BLD: 8.51 K/UL — SIGNIFICANT CHANGE UP (ref 3.8–10.5)
WBC # FLD AUTO: 8.51 K/UL — SIGNIFICANT CHANGE UP (ref 3.8–10.5)

## 2022-11-12 PROCEDURE — 99232 SBSQ HOSP IP/OBS MODERATE 35: CPT

## 2022-11-12 RX ADMIN — Medication 50 MILLIGRAM(S): at 06:56

## 2022-11-12 RX ADMIN — PREGABALIN 1000 MICROGRAM(S): 225 CAPSULE ORAL at 12:46

## 2022-11-12 RX ADMIN — Medication 81 MILLIGRAM(S): at 12:46

## 2022-11-12 RX ADMIN — FINASTERIDE 5 MILLIGRAM(S): 5 TABLET, FILM COATED ORAL at 12:47

## 2022-11-12 RX ADMIN — Medication 500 MILLIGRAM(S): at 02:31

## 2022-11-12 RX ADMIN — APIXABAN 2.5 MILLIGRAM(S): 2.5 TABLET, FILM COATED ORAL at 06:56

## 2022-11-12 RX ADMIN — DORZOLAMIDE HYDROCHLORIDE 1 DROP(S): 20 SOLUTION/ DROPS OPHTHALMIC at 14:13

## 2022-11-12 RX ADMIN — DORZOLAMIDE HYDROCHLORIDE 1 DROP(S): 20 SOLUTION/ DROPS OPHTHALMIC at 06:57

## 2022-11-12 RX ADMIN — DORZOLAMIDE HYDROCHLORIDE 1 DROP(S): 20 SOLUTION/ DROPS OPHTHALMIC at 22:03

## 2022-11-12 RX ADMIN — Medication 25 MILLIGRAM(S): at 06:56

## 2022-11-12 RX ADMIN — ISOSORBIDE MONONITRATE 30 MILLIGRAM(S): 60 TABLET, EXTENDED RELEASE ORAL at 12:47

## 2022-11-12 RX ADMIN — Medication 500 MILLIGRAM(S): at 19:39

## 2022-11-12 RX ADMIN — CHLORHEXIDINE GLUCONATE 1 APPLICATION(S): 213 SOLUTION TOPICAL at 06:57

## 2022-11-12 RX ADMIN — Medication 100 MILLIGRAM(S): at 12:45

## 2022-11-12 RX ADMIN — Medication 500 MILLIGRAM(S): at 14:04

## 2022-11-12 RX ADMIN — Medication 100 MILLIGRAM(S): at 22:01

## 2022-11-12 RX ADMIN — Medication 100 MILLIGRAM(S): at 05:03

## 2022-11-12 RX ADMIN — APIXABAN 2.5 MILLIGRAM(S): 2.5 TABLET, FILM COATED ORAL at 17:46

## 2022-11-12 RX ADMIN — Medication 500 MILLIGRAM(S): at 08:16

## 2022-11-12 RX ADMIN — PANTOPRAZOLE SODIUM 40 MILLIGRAM(S): 20 TABLET, DELAYED RELEASE ORAL at 06:57

## 2022-11-12 RX ADMIN — ATORVASTATIN CALCIUM 20 MILLIGRAM(S): 80 TABLET, FILM COATED ORAL at 22:02

## 2022-11-12 NOTE — PROGRESS NOTE ADULT - SUBJECTIVE AND OBJECTIVE BOX
Physical Medicine and Rehabilitation Progress Note :       Patient is a 81y old  Male who presents with a chief complaint of weakness and diarrhea (12 Nov 2022 07:01)      HPI:  81M w/ CAD s/p PCI, Afib on eliquis, HTN, HLD, h/o PE/DVT, stage IV prostate CA s/p prostatectomy and on Lupron, and anemia (baseline Hgb 9) presents with a 3 day history of cough, diarrhea, and weakness. Denies N/V, abdominal pain. Patient complains of chronic urinary incontinence but no dysuria. Patient reports having C. diff one year prior. Patient accompanied by his daughter who also was not feeling well. COVID vaccinated with booster. Has never had COVID before. Denies headaches, fevers, chills, chest pain, or shortness of breath.     In the ED:  Initial vital signs: T: 98.3 F, HR: 92, BP: 129/70, R: 18, SpO2: 97% on RA  Labs: significant for Hgb 9.0 (at baseline), Na 133, Cr 1.94 (baseline 1.0), BUN 28, COVID positive, UA large LE/positive nitrites, many WBCs and bacteria  Imaging:  CXR: clear  EKG: NSR  Medications: CTX 1g, NS 1L   Consults: none  (03 Nov 2022 23:59)                            9.1    8.51  )-----------( 392      ( 12 Nov 2022 08:16 )             29.9       11-12    134<L>  |  105  |  18  ----------------------------<  95  4.5   |  19<L>  |  1.73<H>    Ca    9.9      12 Nov 2022 08:16  Phos  3.6     11-12  Mg     2.0     11-12      Vital Signs Last 24 Hrs  T(C): 36.7 (12 Nov 2022 13:49), Max: 36.7 (12 Nov 2022 13:49)  T(F): 98.1 (12 Nov 2022 13:49), Max: 98.1 (12 Nov 2022 13:49)  HR: 65 (12 Nov 2022 13:49) (64 - 67)  BP: 124/67 (12 Nov 2022 13:49) (114/64 - 124/67)  BP(mean): --  RR: 18 (12 Nov 2022 13:49) (17 - 18)  SpO2: 97% (12 Nov 2022 13:49) (97% - 98%)    Parameters below as of 12 Nov 2022 13:49  Patient On (Oxygen Delivery Method): room air        MEDICATIONS  (STANDING):  apixaban 2.5 milliGRAM(s) Oral every 12 hours  aspirin enteric coated 81 milliGRAM(s) Oral daily  atorvastatin 20 milliGRAM(s) Oral at bedtime  chlorhexidine 2% Cloths 1 Application(s) Topical <User Schedule>  cyanocobalamin 1000 MICROGram(s) Oral daily  dorzolamide 2% Ophthalmic Solution 1 Drop(s) Both EYES three times a day  finasteride 5 milliGRAM(s) Oral daily  hydrochlorothiazide 25 milliGRAM(s) Oral daily  isosorbide   mononitrate ER Tablet (IMDUR) 30 milliGRAM(s) Oral daily  metoprolol succinate ER 50 milliGRAM(s) Oral daily  metroNIDAZOLE  IVPB 500 milliGRAM(s) IV Intermittent every 8 hours  pantoprazole    Tablet 40 milliGRAM(s) Oral before breakfast  vancomycin    Solution 500 milliGRAM(s) Oral every 6 hours    MEDICATIONS  (PRN):  acetaminophen     Tablet .. 650 milliGRAM(s) Oral every 6 hours PRN Temp greater or equal to 38C (100.4F), Mild Pain (1 - 3)  aluminum hydroxide/magnesium hydroxide/simethicone Suspension 30 milliLiter(s) Oral every 4 hours PRN Dyspepsia  melatonin 3 milliGRAM(s) Oral at bedtime PRN Insomnia  ondansetron Injectable 4 milliGRAM(s) IV Push every 8 hours PRN Nausea and/or Vomiting       Physical / Occupational Therapy Functional Status Assessment :   11/11/2022     Cognitive/Neuro/Behavioral  Cognitive/Neuro/Behavioral [WDL Definition: Alert; opens eyes spontaneously; arouses to voice or touch; oriented x 4; follows commands; speech spontaneous, logical; purposeful motor response; behavior appropriate to situation]: WDL    Language Assistance  Preferred Language to Address Healthcare Preferred Language to Address Healthcare: English    Therapeutic Interventions      Bed Mobility  Bed Mobility Training Scooting: contact guard;  1 person assist;  verbal cues  Bed Mobility Training Sit-to-Supine: contact guard;  1 person assist;  verbal cues  Bed Mobility Training Supine-to-Sit: contact guard;  1 person assist;  verbal cues  Bed Mobility Training Limitations: decreased ability to use arms for pushing/pulling;  decreased ability to use legs for bridging/pushing;  impaired ability to control trunk for mobility;  decreased strength;  impaired coordination;  impaired postural control    Sit-Stand Transfer Training  Transfer Training Sit-to-Stand Transfer: contact guard;  1 person assist;  verbal cues;  straight cane  Transfer Training Stand-to-Sit Transfer: minimum assist (75% patient effort);  1 person assist;  verbal cues;  straight cane  Sit-to-Stand Transfer Training Transfer Safety Analysis: decreased balance;  decreased strength;  impaired balance;  impaired postural control;  poor eccentric control during stand to sit;  straight cane    Gait Training  Gait Training: min A/CG x 1 ;  1 person assist;  straight cane;  40 feet x 2   Gait Analysis: swing-to gait   decreased layo;  increased time in double stance;  decreased hip/knee flexion;  decreased step length;  slightly unsteady gait, no lose of balance, decreased heel strike and hip flexion bilaterally. ;  decreased strength;  impaired balance;  impaired postural control;  straight cane  Type of Rest Type of Rest: sitting  Duration of Rest Duration of Rest: 1 sitting break(1 min) secondary pt complains mild SOB, SpO2:95% on room air, heart rate 75 bpm.     Therapeutic Exercise  Therapeutic Exercise Detail: Seated ther ex: shoulder flexion/extensionx  10 bilaterlally.         Therapeutic Exercise  Therapeutic Exercise Detail: Fx mobility training performed with pt able to ambulate ~40'x2 with straight cane and min-CGA, no LOB noted, pt required 1 seated rest break to recover from SOB (O2 sat >90% throughout). Seated BUE rows with min resistance x10 reps to increase UB strength for ADL performance.     Lower Body Dressing Training  Lower Body Dressing Training Assistance: contact guard;  minimum assist (75% patient effort);  1 person assist;  nonverbal cues (demo/gestures);  verbal cues;  CGA to doff as pt with posterior lean when performing hip flexion at EOB, min A to don R sock, CGA to don L sock;  decreased flexibility;  impaired coordination;  impaired balance              PM&R Impression : as above    Current Disposition Plan Recommendations :    subacute rehab placement

## 2022-11-12 NOTE — PROGRESS NOTE ADULT - PROBLEM SELECTOR PLAN 7
H/o pAFib. Follows with cardiologist at [facility].   - C/w eliquis 2.5 BID  - Toprol 50 daily

## 2022-11-12 NOTE — PROGRESS NOTE ADULT - SUBJECTIVE AND OBJECTIVE BOX
Interval Events: Reviewed  Patient seen and examined at bedside.    Patient is a 81y old  Male who presents with a chief complaint of Admitted with Covid (11 Nov 2022 17:17)  no acute event overnight, RA 98%, no diarrhea, voided 300ml overnight    PAST MEDICAL & SURGICAL HISTORY:  Hyperlipidemia  Hyperlipidemia      Malignant neoplasm of prostate  s/p total prostatectomy in 1994 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      Glaucoma  Glaucoma      Type 2 diabetes mellitus  Diabetes      Essential hypertension  Hypertension      Chemotherapy session for neoplasm  Prostate Cancer      Pulmonary embolism  1/2020      DVT (deep venous thrombosis)  1/2020 on Eliquis      Atrial fibrillation  on Eliquis      Other postprocedural status  S/P prostatectomy          MEDICATIONS:  Pulmonary:    Antimicrobials:  metroNIDAZOLE  IVPB 500 milliGRAM(s) IV Intermittent every 8 hours  vancomycin    Solution 500 milliGRAM(s) Oral every 6 hours    Anticoagulants:  apixaban 2.5 milliGRAM(s) Oral every 12 hours  aspirin enteric coated 81 milliGRAM(s) Oral daily    Cardiac:  hydrochlorothiazide 25 milliGRAM(s) Oral daily  isosorbide   mononitrate ER Tablet (IMDUR) 30 milliGRAM(s) Oral daily  metoprolol succinate ER 50 milliGRAM(s) Oral daily      Allergies    ACE inhibitors (Angioedema)  x-geva (Unknown)    Intolerances        Vital Signs Last 24 Hrs  T(C): 36.6 (12 Nov 2022 05:39), Max: 36.7 (11 Nov 2022 14:55)  T(F): 97.8 (12 Nov 2022 05:39), Max: 98 (11 Nov 2022 14:55)  HR: 64 (12 Nov 2022 05:39) (64 - 69)  BP: 114/64 (12 Nov 2022 05:39) (114/64 - 133/72)  BP(mean): --  RR: 18 (12 Nov 2022 05:39) (17 - 18)  SpO2: 98% (12 Nov 2022 05:39) (98% - 98%)    Parameters below as of 12 Nov 2022 05:39  Patient On (Oxygen Delivery Method): room air        11-11 @ 07:01  -  11-12 @ 07:00  --------------------------------------------------------  IN: 0 mL / OUT: 500 mL / NET: -500 mL          Review of Systems:   •	General: negative  •	Skin/Breast: negative  •	Ophthalmologic: negative  •	ENMT: negative  •	Respiratory and Thorax: negative  •	Cardiovascular: negative  •	Gastrointestinal: negative  •	Genitourinary: negative  •	Musculoskeletal: negative  •	Neurological: negative  •	Psychiatric: negative  •	Hematology/Lymphatics: negative  •	Endocrine: negative  •	Allergic/Immunologic: negative    Physical Exam:   • Constitutional:	thin, elderly male, NAD  • Eyes:	EOMI; PERRL; no drainage or redness  • ENMT:	No oral lesions; no gross abnormalities  • Neck	no thyromegaly or nodules  • Breasts:	not examined  • Back:	No deformity or limitation of movement  • Respiratory:	Breath Sounds equal & clear to auscultation, no accessory muscle use  • Cardiovascular:	Regular rate & rhythm, normal S1, S2; no murmurs, gallops or rubs; no S3, S4  • Gastrointestinal:	Soft, non-tender, no hepatosplenomegaly, normal bowel sounds  • Genitourinary:	not examined  • Rectal: not examined  • Extremities:	No cyanosis, clubbing or edema  • Vascular:	Equal and normal pulses (dorsalis pedis)  • Neurologica:l	not examined  • Skin:	No lesions; no rash  • Lymph Nodes:	No lymphadedenopathy  • Musculoskeletal:	No joint pain, swelling or deformity; no limitation of movement        LABS:      CBC Full  -  ( 11 Nov 2022 05:30 )  WBC Count : 8.07 K/uL  RBC Count : 3.64 M/uL  Hemoglobin : 9.2 g/dL  Hematocrit : 29.9 %  Platelet Count - Automated : 401 K/uL  Mean Cell Volume : 82.1 fl  Mean Cell Hemoglobin : 25.3 pg  Mean Cell Hemoglobin Concentration : 30.8 gm/dL  Auto Neutrophil # : 5.43 K/uL  Auto Lymphocyte # : 1.43 K/uL  Auto Monocyte # : 0.85 K/uL  Auto Eosinophil # : 0.22 K/uL  Auto Basophil # : 0.06 K/uL  Auto Neutrophil % : 67.4 %  Auto Lymphocyte % : 17.7 %  Auto Monocyte % : 10.5 %  Auto Eosinophil % : 2.7 %  Auto Basophil % : 0.7 %    11-11    133<L>  |  104  |  19  ----------------------------<  87  3.3<L>   |  18<L>  |  1.68<H>    Ca    9.3      11 Nov 2022 05:30  Phos  3.8     11-11  Mg     1.6     11-11                          RADIOLOGY & ADDITIONAL STUDIES (The following images were personally reviewed):  Kim:                                     No  Urine output:                       adequate  DVT prophylaxis:                 Yes  Flattus:                                  Yes  Bowel movement:              No

## 2022-11-12 NOTE — PROGRESS NOTE ADULT - PROBLEM SELECTOR PLAN 12
Plan:  F: None  E: replete K<4, Mg<2  N: Pureed (per pt preference)  VTE Prophylaxis: On Eliquis 2.5mg BID  GI: None  C: Full Code  D: ADRIEN

## 2022-11-12 NOTE — PROGRESS NOTE ADULT - PROBLEM SELECTOR PLAN 9
Admission in 2021 to [facility] for dyspnea on exertion, found to have DVT and PE. High risk for blood clots given cancer history.  - C/w eliquis 2.5 BID

## 2022-11-13 LAB
ANION GAP SERPL CALC-SCNC: 14 MMOL/L — SIGNIFICANT CHANGE UP (ref 5–17)
BUN SERPL-MCNC: 17 MG/DL — SIGNIFICANT CHANGE UP (ref 7–23)
CALCIUM SERPL-MCNC: 9.5 MG/DL — SIGNIFICANT CHANGE UP (ref 8.4–10.5)
CHLORIDE SERPL-SCNC: 105 MMOL/L — SIGNIFICANT CHANGE UP (ref 96–108)
CO2 SERPL-SCNC: 16 MMOL/L — LOW (ref 22–31)
CREAT SERPL-MCNC: 1.62 MG/DL — HIGH (ref 0.5–1.3)
EGFR: 42 ML/MIN/1.73M2 — LOW
GLUCOSE SERPL-MCNC: 97 MG/DL — SIGNIFICANT CHANGE UP (ref 70–99)
HCT VFR BLD CALC: 32.4 % — LOW (ref 39–50)
HGB BLD-MCNC: 9.4 G/DL — LOW (ref 13–17)
MAGNESIUM SERPL-MCNC: 1.8 MG/DL — SIGNIFICANT CHANGE UP (ref 1.6–2.6)
MCHC RBC-ENTMCNC: 25.3 PG — LOW (ref 27–34)
MCHC RBC-ENTMCNC: 29 GM/DL — LOW (ref 32–36)
MCV RBC AUTO: 87.3 FL — SIGNIFICANT CHANGE UP (ref 80–100)
NRBC # BLD: 0 /100 WBCS — SIGNIFICANT CHANGE UP (ref 0–0)
PHOSPHATE SERPL-MCNC: 3.9 MG/DL — SIGNIFICANT CHANGE UP (ref 2.5–4.5)
PLATELET # BLD AUTO: 401 K/UL — HIGH (ref 150–400)
POTASSIUM SERPL-MCNC: 3.8 MMOL/L — SIGNIFICANT CHANGE UP (ref 3.5–5.3)
POTASSIUM SERPL-SCNC: 3.8 MMOL/L — SIGNIFICANT CHANGE UP (ref 3.5–5.3)
RBC # BLD: 3.71 M/UL — LOW (ref 4.2–5.8)
RBC # FLD: 19.3 % — HIGH (ref 10.3–14.5)
SODIUM SERPL-SCNC: 135 MMOL/L — SIGNIFICANT CHANGE UP (ref 135–145)
WBC # BLD: 9.01 K/UL — SIGNIFICANT CHANGE UP (ref 3.8–10.5)
WBC # FLD AUTO: 9.01 K/UL — SIGNIFICANT CHANGE UP (ref 3.8–10.5)

## 2022-11-13 PROCEDURE — 99232 SBSQ HOSP IP/OBS MODERATE 35: CPT

## 2022-11-13 RX ORDER — ERYTHROPOIETIN 10000 [IU]/ML
40000 INJECTION, SOLUTION INTRAVENOUS; SUBCUTANEOUS ONCE
Refills: 0 | Status: COMPLETED | OUTPATIENT
Start: 2022-11-13 | End: 2022-11-13

## 2022-11-13 RX ADMIN — Medication 100 MILLIGRAM(S): at 21:53

## 2022-11-13 RX ADMIN — ERYTHROPOIETIN 40000 UNIT(S): 10000 INJECTION, SOLUTION INTRAVENOUS; SUBCUTANEOUS at 13:10

## 2022-11-13 RX ADMIN — Medication 100 MILLIGRAM(S): at 13:14

## 2022-11-13 RX ADMIN — PREGABALIN 1000 MICROGRAM(S): 225 CAPSULE ORAL at 13:13

## 2022-11-13 RX ADMIN — FINASTERIDE 5 MILLIGRAM(S): 5 TABLET, FILM COATED ORAL at 13:13

## 2022-11-13 RX ADMIN — Medication 81 MILLIGRAM(S): at 13:13

## 2022-11-13 RX ADMIN — CHLORHEXIDINE GLUCONATE 1 APPLICATION(S): 213 SOLUTION TOPICAL at 07:13

## 2022-11-13 RX ADMIN — DORZOLAMIDE HYDROCHLORIDE 1 DROP(S): 20 SOLUTION/ DROPS OPHTHALMIC at 13:29

## 2022-11-13 RX ADMIN — ATORVASTATIN CALCIUM 20 MILLIGRAM(S): 80 TABLET, FILM COATED ORAL at 21:54

## 2022-11-13 RX ADMIN — PANTOPRAZOLE SODIUM 40 MILLIGRAM(S): 20 TABLET, DELAYED RELEASE ORAL at 05:31

## 2022-11-13 RX ADMIN — Medication 500 MILLIGRAM(S): at 07:13

## 2022-11-13 RX ADMIN — Medication 25 MILLIGRAM(S): at 05:29

## 2022-11-13 RX ADMIN — Medication 500 MILLIGRAM(S): at 19:43

## 2022-11-13 RX ADMIN — Medication 100 MILLIGRAM(S): at 05:28

## 2022-11-13 RX ADMIN — APIXABAN 2.5 MILLIGRAM(S): 2.5 TABLET, FILM COATED ORAL at 05:30

## 2022-11-13 RX ADMIN — DORZOLAMIDE HYDROCHLORIDE 1 DROP(S): 20 SOLUTION/ DROPS OPHTHALMIC at 21:54

## 2022-11-13 RX ADMIN — APIXABAN 2.5 MILLIGRAM(S): 2.5 TABLET, FILM COATED ORAL at 18:49

## 2022-11-13 RX ADMIN — Medication 500 MILLIGRAM(S): at 13:30

## 2022-11-13 RX ADMIN — DORZOLAMIDE HYDROCHLORIDE 1 DROP(S): 20 SOLUTION/ DROPS OPHTHALMIC at 05:31

## 2022-11-13 RX ADMIN — Medication 500 MILLIGRAM(S): at 02:09

## 2022-11-13 RX ADMIN — ISOSORBIDE MONONITRATE 30 MILLIGRAM(S): 60 TABLET, EXTENDED RELEASE ORAL at 13:13

## 2022-11-13 NOTE — PROGRESS NOTE ADULT - SUBJECTIVE AND OBJECTIVE BOX
Interval Events: Reviewed  Patient seen and examined at bedside.    Patient is a 81y old  Male who presents with a chief complaint of weakness and diarrhea (12 Nov 2022 07:01)  no acute event overnight, one loose stool overnight, no n/v/ fever      PAST MEDICAL & SURGICAL HISTORY:  Hyperlipidemia  Hyperlipidemia      Malignant neoplasm of prostate  s/p total prostatectomy in 1994 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      Glaucoma  Glaucoma      Type 2 diabetes mellitus  Diabetes      Essential hypertension  Hypertension      Chemotherapy session for neoplasm  Prostate Cancer      Pulmonary embolism  1/2020      DVT (deep venous thrombosis)  1/2020 on Eliquis      Atrial fibrillation  on Eliquis      Other postprocedural status  S/P prostatectomy          MEDICATIONS:  Pulmonary:    Antimicrobials:  metroNIDAZOLE  IVPB 500 milliGRAM(s) IV Intermittent every 8 hours  vancomycin    Solution 500 milliGRAM(s) Oral every 6 hours    Anticoagulants:  apixaban 2.5 milliGRAM(s) Oral every 12 hours  aspirin enteric coated 81 milliGRAM(s) Oral daily    Cardiac:  hydrochlorothiazide 25 milliGRAM(s) Oral daily  isosorbide   mononitrate ER Tablet (IMDUR) 30 milliGRAM(s) Oral daily  metoprolol succinate ER 50 milliGRAM(s) Oral daily      Allergies    ACE inhibitors (Angioedema)  x-geva (Unknown)    Intolerances        Vital Signs Last 24 Hrs  T(C): 36.5 (12 Nov 2022 21:46), Max: 36.7 (12 Nov 2022 13:49)  T(F): 97.7 (12 Nov 2022 21:46), Max: 98.1 (12 Nov 2022 13:49)  HR: 59 (12 Nov 2022 21:46) (59 - 65)  BP: 121/59 (12 Nov 2022 21:46) (121/59 - 124/67)  BP(mean): --  RR: 17 (12 Nov 2022 21:46) (17 - 18)  SpO2: 97% (12 Nov 2022 21:46) (97% - 97%)    Parameters below as of 12 Nov 2022 21:46  Patient On (Oxygen Delivery Method): room air            Review of Systems:   •	General: negative  •	Skin/Breast: negative  •	Ophthalmologic: negative  •	ENMT: negative  •	Respiratory and Thorax: negative  •	Cardiovascular: negative  •	Gastrointestinal: negative  •	Genitourinary: negative  •	Musculoskeletal: negative  •	Neurological: negative  •	Psychiatric: negative  •	Hematology/Lymphatics: negative  •	Endocrine: negative  •	Allergic/Immunologic: negative    Physical Exam:   • Constitutional:	frail, thin elderly male, NAD  • Eyes:	EOMI; PERRL; no drainage or redness  • ENMT:	No oral lesions; no gross abnormalities  • Neck	no thyromegaly or nodules  • Breasts:	not examined  • Back:	No deformity or limitation of movement  • Respiratory:	Breath Sounds equal & clear to auscultation, no accessory muscle use  • Cardiovascular:	Regular rate & rhythm, normal S1, S2; no murmurs, gallops or rubs; no S3, S4  • Gastrointestinal:	Soft, non-tender, no hepatosplenomegaly, normal bowel sounds  • Genitourinary:	not examined  • Rectal: not examined  • Extremities:	No cyanosis, clubbing or edema  • Vascular:	Equal and normal pulses (dorsalis pedis)  • Neurologica:l	not examined  • Skin:	No lesions; no rash  • Lymph Nodes:	No lymphadedenopathy  • Musculoskeletal:	No joint pain, swelling or deformity; no limitation of movement        LABS:      CBC Full  -  ( 12 Nov 2022 08:16 )  WBC Count : 8.51 K/uL  RBC Count : 3.55 M/uL  Hemoglobin : 9.1 g/dL  Hematocrit : 29.9 %  Platelet Count - Automated : 392 K/uL  Mean Cell Volume : 84.2 fl  Mean Cell Hemoglobin : 25.6 pg  Mean Cell Hemoglobin Concentration : 30.4 gm/dL  Auto Neutrophil # : 5.87 K/uL  Auto Lymphocyte # : 1.33 K/uL  Auto Monocyte # : 0.86 K/uL  Auto Eosinophil # : 0.28 K/uL  Auto Basophil # : 0.07 K/uL  Auto Neutrophil % : 69.0 %  Auto Lymphocyte % : 15.6 %  Auto Monocyte % : 10.1 %  Auto Eosinophil % : 3.3 %  Auto Basophil % : 0.8 %    11-12    134<L>  |  105  |  18  ----------------------------<  95  4.5   |  19<L>  |  1.73<H>    Ca    9.9      12 Nov 2022 08:16  Phos  3.6     11-12  Mg     2.0     11-12                          RADIOLOGY & ADDITIONAL STUDIES (The following images were personally reviewed):  Kim:                                     No  Urine output:                       adequate  DVT prophylaxis:                 Yes  Flattus:                                  Yes  Bowel movement:              No

## 2022-11-13 NOTE — PROGRESS NOTE ADULT - PROBLEM SELECTOR PLAN 9
Admission in 2021 to [facility] for dyspnea on exertion, found to have DVT and PE. High risk for blood clots given cancer history.  - C/w eliquis 2.5 BID

## 2022-11-13 NOTE — PROGRESS NOTE ADULT - PROBLEM SELECTOR PLAN 7
H/o pAFib. Follows with cardiologist at [facility].   - C/w eliquis 2.5 BID  - Toprol 50 daily

## 2022-11-14 LAB
ANION GAP SERPL CALC-SCNC: 13 MMOL/L — SIGNIFICANT CHANGE UP (ref 5–17)
BASOPHILS # BLD AUTO: 0.09 K/UL — SIGNIFICANT CHANGE UP (ref 0–0.2)
BASOPHILS NFR BLD AUTO: 1 % — SIGNIFICANT CHANGE UP (ref 0–2)
BUN SERPL-MCNC: 15 MG/DL — SIGNIFICANT CHANGE UP (ref 7–23)
CALCIUM SERPL-MCNC: 9.1 MG/DL — SIGNIFICANT CHANGE UP (ref 8.4–10.5)
CHLORIDE SERPL-SCNC: 108 MMOL/L — SIGNIFICANT CHANGE UP (ref 96–108)
CO2 SERPL-SCNC: 16 MMOL/L — LOW (ref 22–31)
CREAT SERPL-MCNC: 1.52 MG/DL — HIGH (ref 0.5–1.3)
EGFR: 45 ML/MIN/1.73M2 — LOW
EOSINOPHIL # BLD AUTO: 0.25 K/UL — SIGNIFICANT CHANGE UP (ref 0–0.5)
EOSINOPHIL NFR BLD AUTO: 2.7 % — SIGNIFICANT CHANGE UP (ref 0–6)
GLUCOSE SERPL-MCNC: 86 MG/DL — SIGNIFICANT CHANGE UP (ref 70–99)
HCT VFR BLD CALC: 32 % — LOW (ref 39–50)
HGB BLD-MCNC: 9.4 G/DL — LOW (ref 13–17)
IMM GRANULOCYTES NFR BLD AUTO: 1 % — HIGH (ref 0–0.9)
LYMPHOCYTES # BLD AUTO: 1.29 K/UL — SIGNIFICANT CHANGE UP (ref 1–3.3)
LYMPHOCYTES # BLD AUTO: 13.8 % — SIGNIFICANT CHANGE UP (ref 13–44)
MAGNESIUM SERPL-MCNC: 1.6 MG/DL — SIGNIFICANT CHANGE UP (ref 1.6–2.6)
MCHC RBC-ENTMCNC: 25.3 PG — LOW (ref 27–34)
MCHC RBC-ENTMCNC: 29.4 GM/DL — LOW (ref 32–36)
MCV RBC AUTO: 86.3 FL — SIGNIFICANT CHANGE UP (ref 80–100)
MONOCYTES # BLD AUTO: 0.96 K/UL — HIGH (ref 0–0.9)
MONOCYTES NFR BLD AUTO: 10.2 % — SIGNIFICANT CHANGE UP (ref 2–14)
NEUTROPHILS # BLD AUTO: 6.7 K/UL — SIGNIFICANT CHANGE UP (ref 1.8–7.4)
NEUTROPHILS NFR BLD AUTO: 71.3 % — SIGNIFICANT CHANGE UP (ref 43–77)
NRBC # BLD: 0 /100 WBCS — SIGNIFICANT CHANGE UP (ref 0–0)
PHOSPHATE SERPL-MCNC: 4.2 MG/DL — SIGNIFICANT CHANGE UP (ref 2.5–4.5)
PLATELET # BLD AUTO: 413 K/UL — HIGH (ref 150–400)
POTASSIUM SERPL-MCNC: 3.3 MMOL/L — LOW (ref 3.5–5.3)
POTASSIUM SERPL-SCNC: 3.3 MMOL/L — LOW (ref 3.5–5.3)
RBC # BLD: 3.71 M/UL — LOW (ref 4.2–5.8)
RBC # FLD: 19.5 % — HIGH (ref 10.3–14.5)
SODIUM SERPL-SCNC: 137 MMOL/L — SIGNIFICANT CHANGE UP (ref 135–145)
WBC # BLD: 9.38 K/UL — SIGNIFICANT CHANGE UP (ref 3.8–10.5)
WBC # FLD AUTO: 9.38 K/UL — SIGNIFICANT CHANGE UP (ref 3.8–10.5)

## 2022-11-14 PROCEDURE — 99232 SBSQ HOSP IP/OBS MODERATE 35: CPT | Mod: GC

## 2022-11-14 RX ORDER — FIDAXOMICIN 200 MG/5ML
200 GRANULE, FOR SUSPENSION ORAL EVERY 12 HOURS
Refills: 0 | Status: DISCONTINUED | OUTPATIENT
Start: 2022-11-14 | End: 2022-11-16

## 2022-11-14 RX ORDER — POTASSIUM CHLORIDE 20 MEQ
40 PACKET (EA) ORAL EVERY 4 HOURS
Refills: 0 | Status: COMPLETED | OUTPATIENT
Start: 2022-11-14 | End: 2022-11-14

## 2022-11-14 RX ADMIN — Medication 25 MILLIGRAM(S): at 06:07

## 2022-11-14 RX ADMIN — FINASTERIDE 5 MILLIGRAM(S): 5 TABLET, FILM COATED ORAL at 12:29

## 2022-11-14 RX ADMIN — FIDAXOMICIN 200 MILLIGRAM(S): 200 GRANULE, FOR SUSPENSION ORAL at 12:29

## 2022-11-14 RX ADMIN — Medication 40 MILLIEQUIVALENT(S): at 09:51

## 2022-11-14 RX ADMIN — Medication 40 MILLIEQUIVALENT(S): at 12:29

## 2022-11-14 RX ADMIN — PREGABALIN 1000 MICROGRAM(S): 225 CAPSULE ORAL at 12:29

## 2022-11-14 RX ADMIN — Medication 500 MILLIGRAM(S): at 07:42

## 2022-11-14 RX ADMIN — APIXABAN 2.5 MILLIGRAM(S): 2.5 TABLET, FILM COATED ORAL at 17:49

## 2022-11-14 RX ADMIN — CHLORHEXIDINE GLUCONATE 1 APPLICATION(S): 213 SOLUTION TOPICAL at 06:02

## 2022-11-14 RX ADMIN — APIXABAN 2.5 MILLIGRAM(S): 2.5 TABLET, FILM COATED ORAL at 06:01

## 2022-11-14 RX ADMIN — DORZOLAMIDE HYDROCHLORIDE 1 DROP(S): 20 SOLUTION/ DROPS OPHTHALMIC at 06:01

## 2022-11-14 RX ADMIN — Medication 100 MILLIGRAM(S): at 05:56

## 2022-11-14 RX ADMIN — DORZOLAMIDE HYDROCHLORIDE 1 DROP(S): 20 SOLUTION/ DROPS OPHTHALMIC at 17:49

## 2022-11-14 RX ADMIN — Medication 500 MILLIGRAM(S): at 02:18

## 2022-11-14 RX ADMIN — PANTOPRAZOLE SODIUM 40 MILLIGRAM(S): 20 TABLET, DELAYED RELEASE ORAL at 06:01

## 2022-11-14 RX ADMIN — Medication 50 MILLIGRAM(S): at 06:07

## 2022-11-14 RX ADMIN — ISOSORBIDE MONONITRATE 30 MILLIGRAM(S): 60 TABLET, EXTENDED RELEASE ORAL at 12:29

## 2022-11-14 RX ADMIN — ATORVASTATIN CALCIUM 20 MILLIGRAM(S): 80 TABLET, FILM COATED ORAL at 22:26

## 2022-11-14 RX ADMIN — Medication 81 MILLIGRAM(S): at 12:28

## 2022-11-14 RX ADMIN — DORZOLAMIDE HYDROCHLORIDE 1 DROP(S): 20 SOLUTION/ DROPS OPHTHALMIC at 22:26

## 2022-11-14 NOTE — PROGRESS NOTE ADULT - PROBLEM SELECTOR PLAN 3
Pt with UTI developed L CVA tenderness. Renal U/S showed 7.5cm simple renal cyst on the L, moderate hydronephrosis on the R. Pt currently asymptomatic, no R flank pain.     Asymptomatic. No acute intervention at this time.     - CTAP: New right hydronephrosis and hydroureter to the patient's mid to distal pelvis; possibly secondary to adenopathy; no  tract calcifications bilaterally. If clinically indicated recommend a CT urogram for further evaluation to identify the point of obstruction.

## 2022-11-14 NOTE — PROGRESS NOTE ADULT - SUBJECTIVE AND OBJECTIVE BOX
OVERNIGHT EVENTS: No acute events overnight.     SUBJECTIVE: Pt feels well but reports diarrhea which had initially improved has returned, 3x this AM. Denies HA, dyspnea, F/C, N/V, abdominal pain.     VITAL SIGNS:  Vital Signs Last 24 Hrs  T(C): 36.5 (14 Nov 2022 06:05), Max: 36.8 (13 Nov 2022 13:15)  T(F): 97.7 (14 Nov 2022 06:05), Max: 98.3 (13 Nov 2022 13:15)  HR: 70 (14 Nov 2022 06:05) (61 - 70)  BP: 116/69 (14 Nov 2022 06:05) (116/69 - 127/63)  BP(mean): --  RR: 18 (14 Nov 2022 06:05) (18 - 18)  SpO2: 96% (14 Nov 2022 06:05) (96% - 96%)    Parameters below as of 14 Nov 2022 06:05  Patient On (Oxygen Delivery Method): room air    PHYSICAL EXAM:  General: NAD; speaking in full sentences, thin  HEENT: NC/AT; PERRL; EOMI; MMM  Neck: supple; no JVD  Cardiac: RRR; +S1/S2  Pulm: CTA B/L; no W/R/R  GI: soft, NT/ND, +BS  Extremities: WWP; no edema, clubbing or cyanosis  Vasc: 2+ radial, DP pulses B/L  Neuro: AAOx3; no focal deficits    MEDICATIONS:  MEDICATIONS  (STANDING):  apixaban 2.5 milliGRAM(s) Oral every 12 hours  aspirin enteric coated 81 milliGRAM(s) Oral daily  atorvastatin 20 milliGRAM(s) Oral at bedtime  chlorhexidine 2% Cloths 1 Application(s) Topical <User Schedule>  cyanocobalamin 1000 MICROGram(s) Oral daily  dorzolamide 2% Ophthalmic Solution 1 Drop(s) Both EYES three times a day  fidaxomicin 200 milliGRAM(s) Oral every 12 hours  finasteride 5 milliGRAM(s) Oral daily  hydrochlorothiazide 25 milliGRAM(s) Oral daily  isosorbide   mononitrate ER Tablet (IMDUR) 30 milliGRAM(s) Oral daily  metoprolol succinate ER 50 milliGRAM(s) Oral daily  pantoprazole    Tablet 40 milliGRAM(s) Oral before breakfast  potassium chloride    Tablet ER 40 milliEquivalent(s) Oral every 4 hours    MEDICATIONS  (PRN):  acetaminophen     Tablet .. 650 milliGRAM(s) Oral every 6 hours PRN Temp greater or equal to 38C (100.4F), Mild Pain (1 - 3)  aluminum hydroxide/magnesium hydroxide/simethicone Suspension 30 milliLiter(s) Oral every 4 hours PRN Dyspepsia  melatonin 3 milliGRAM(s) Oral at bedtime PRN Insomnia  ondansetron Injectable 4 milliGRAM(s) IV Push every 8 hours PRN Nausea and/or Vomiting      ALLERGIES:  Allergies    ACE inhibitors (Angioedema)  x-geva (Unknown)    Intolerances        LABS:                        9.4    9.38  )-----------( 413      ( 14 Nov 2022 05:53 )             32.0     11-14    137  |  108  |  15  ----------------------------<  86  3.3<L>   |  16<L>  |  1.52<H>    Ca    9.1      14 Nov 2022 05:53  Phos  4.2     11-14  Mg     1.6     11-14          RADIOLOGY & ADDITIONAL TESTS: Reviewed.

## 2022-11-14 NOTE — PROGRESS NOTE ADULT - PROBLEM SELECTOR PLAN 9
Admission in 2021 to [facility] for dyspnea on exertion, found to have DVT and PE. High risk for blood clots given cancer history.  - C/w eliquis 2.5 BID

## 2022-11-14 NOTE — PROGRESS NOTE ADULT - SUBJECTIVE AND OBJECTIVE BOX
INTERVAL HPI/OVERNIGHT EVENTS:  Patient multiple episodes of diarrhea again;  Will need ID to follow up regarding the use of Fidaxomicin  Otherwise stable        MEDICATIONS  (STANDING):  apixaban 2.5 milliGRAM(s) Oral every 12 hours  aspirin enteric coated 81 milliGRAM(s) Oral daily  atorvastatin 20 milliGRAM(s) Oral at bedtime  chlorhexidine 2% Cloths 1 Application(s) Topical <User Schedule>  cyanocobalamin 1000 MICROGram(s) Oral daily  dorzolamide 2% Ophthalmic Solution 1 Drop(s) Both EYES three times a day  finasteride 5 milliGRAM(s) Oral daily  hydrochlorothiazide 25 milliGRAM(s) Oral daily  isosorbide   mononitrate ER Tablet (IMDUR) 30 milliGRAM(s) Oral daily  metoprolol succinate ER 50 milliGRAM(s) Oral daily  metroNIDAZOLE  IVPB 500 milliGRAM(s) IV Intermittent every 8 hours  pantoprazole    Tablet 40 milliGRAM(s) Oral before breakfast  potassium chloride    Tablet ER 40 milliEquivalent(s) Oral every 4 hours  vancomycin    Solution 500 milliGRAM(s) Oral every 6 hours    MEDICATIONS  (PRN):  acetaminophen     Tablet .. 650 milliGRAM(s) Oral every 6 hours PRN Temp greater or equal to 38C (100.4F), Mild Pain (1 - 3)  aluminum hydroxide/magnesium hydroxide/simethicone Suspension 30 milliLiter(s) Oral every 4 hours PRN Dyspepsia  melatonin 3 milliGRAM(s) Oral at bedtime PRN Insomnia  ondansetron Injectable 4 milliGRAM(s) IV Push every 8 hours PRN Nausea and/or Vomiting      Allergies    ACE inhibitors (Angioedema)  x-geva (Unknown)    Intolerances        Vital Signs Last 24 Hrs  T(C): 36.5 (14 Nov 2022 06:05), Max: 36.8 (13 Nov 2022 13:15)  T(F): 97.7 (14 Nov 2022 06:05), Max: 98.3 (13 Nov 2022 13:15)  HR: 70 (14 Nov 2022 06:05) (61 - 70)  BP: 116/69 (14 Nov 2022 06:05) (116/69 - 127/63)  BP(mean): --  RR: 18 (14 Nov 2022 06:05) (18 - 18)  SpO2: 96% (14 Nov 2022 06:05) (96% - 96%)    Parameters below as of 14 Nov 2022 06:05  Patient On (Oxygen Delivery Method): room air              Constitutional:  Awake     Eyes: SELENE    ENMT: Negative    Neck: Supple    Back:  no tenderness     Respiratory:  clear    Cardiovascular: S1 S2    Gastrointestinal:  soft    Genitourinary:    Extremities:  no edema    Vascular:    Neurological:    Skin:    Lymph Nodes:            11-13 @ 07:01  -  11-14 @ 07:00  --------------------------------------------------------  IN: 0 mL / OUT: 400 mL / NET: -400 mL      LABS:                        9.4    9.38  )-----------( 413      ( 14 Nov 2022 05:53 )             32.0     11-14    137  |  108  |  15  ----------------------------<  86  3.3<L>   |  16<L>  |  1.52<H>    Ca    9.1      14 Nov 2022 05:53  Phos  4.2     11-14  Mg     1.6     11-14            RADIOLOGY & ADDITIONAL TESTS:

## 2022-11-14 NOTE — PROGRESS NOTE ADULT - PROBLEM SELECTOR PLAN 7
H/o pAFib. Follows with cardiologist at [facility].   - C/w eliquis 2.5 BID  - Toprol 50 daily

## 2022-11-14 NOTE — PROGRESS NOTE ADULT - PROBLEM SELECTOR PLAN 1
Pt has hx of C.Diff infection 8/2021 tx with fidaxomicin. At presentation, was experiencing watery, voluminous, dark-colored diarrhea ~4x/daily. C.Diff toxin neg on admission, potentially false negative as pt had diarrhea prior to admission. Pt still experiencing diarrhea, repeat C.Diff +    Diarrhea initially improved, now with worsening diarrhea.   - d/c Vancomycin to 500mg po q6h  - d/c Flagyl 500mg IV q8h  - start fidaxomicin 200mg po BID x 10d per ID  - isolation/contact precautions

## 2022-11-14 NOTE — ED PROVIDER NOTE - TIMING
Condition:: Rash Please Describe Your Condition:: Rash on the right flank, not itchy. Pt has not tried treating the rash. She is unsure if it is related to her eczema or not. sudden onset/intermittent

## 2022-11-14 NOTE — PROGRESS NOTE ADULT - PROBLEM SELECTOR PLAN 4
Pt has active Stage IV prostate CA s/p prostatectomy and on chemotherapy with Lupron. Follows with urology and heme-onc Dr. Mcmillan.  - F/u outpatient heme-onc  - C/w home finasteride 5mg  - Last

## 2022-11-14 NOTE — CHART NOTE - NSCHARTNOTEFT_GEN_A_CORE
Infectious Diseases Anti-infective Approval Note    Medication:  Fidaxomicin   Dose:  200 mg  Route:  PO   Frequency:  q12  Duration**:  10 days    **Duration refers to duration of approval, not recommended duration of treatment     Dose may be adjusted as needed for alterations in renal function.    *THIS IS NOT AN INFECTIOUS DISEASES CONSULTATION*

## 2022-11-14 NOTE — PROGRESS NOTE ADULT - PROBLEM SELECTOR PLAN 2
Cr 1.98 on admission. Downtrended after IVF. Per urology, baseline Cr 1.28-1.84 outpatient.     - Cr initially downtrended after IVF, inc to 1.5 and stable   - CT shows R hydronephrosis and hydroureter 2/2 adenopathy (likely from prostate ca mets)  - F/u urology reccs- no acute intervention at the time  - trend Cr

## 2022-11-14 NOTE — PROGRESS NOTE ADULT - PROBLEM SELECTOR PLAN 6
Pt presented with generalized weakness. Has hx of chronic urinary incontinence and frequency related to h/o prostate cancer and prostatectomy. Follows with urology. No fever, dysuria on admission.   + UA, Culture + for >100k CFU Klebsiella. Received CTX 1g IV x 2, last 11/5. Restarted CTX 1g IV on 11/8.     - Discontinue CTX  - Last UA 11/6 +leuk esterase, - nitrites

## 2022-11-14 NOTE — CHART NOTE - NSCHARTNOTEFT_GEN_A_CORE
Admitting Diagnosis:   Patient is a 81y old  Male who presents with a chief complaint of C. 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      Glaucoma  Glaucoma      Type 2 diabetes mellitus  Diabetes      Essential hypertension  Hypertension      Chemotherapy session for neoplasm  Prostate Cancer      Pulmonary embolism  1/2020      DVT (deep venous thrombosis)  1/2020 on Eliquis      Atrial fibrillation  on Eliquis      Other postprocedural status  S/P prostatectomy          Current Nutrition Order:  Diet, Pureed (11-09-22 @ 08:57)      PO Intake: Good (%) [   ]  Fair (50-75%) [   ] Poor (<25%) [   ]    GI Issues:     Pain:    Skin Integrity:    Labs:   11-14    137  |  108  |  15  ----------------------------<  86  3.3<L>   |  16<L>  |  1.52<H>    Ca    9.1      14 Nov 2022 05:53  Phos  4.2     11-14  Mg     1.6     11-14      CAPILLARY BLOOD GLUCOSE          Medications:  MEDICATIONS  (STANDING):  apixaban 2.5 milliGRAM(s) Oral every 12 hours  aspirin enteric coated 81 milliGRAM(s) Oral daily  atorvastatin 20 milliGRAM(s) Oral at bedtime  chlorhexidine 2% Cloths 1 Application(s) Topical <User Schedule>  cyanocobalamin 1000 MICROGram(s) Oral daily  dorzolamide 2% Ophthalmic Solution 1 Drop(s) Both EYES three times a day  fidaxomicin 200 milliGRAM(s) Oral every 12 hours  finasteride 5 milliGRAM(s) Oral daily  hydrochlorothiazide 25 milliGRAM(s) Oral daily  isosorbide   mononitrate ER Tablet (IMDUR) 30 milliGRAM(s) Oral daily  metoprolol succinate ER 50 milliGRAM(s) Oral daily  pantoprazole    Tablet 40 milliGRAM(s) Oral before breakfast  potassium chloride    Tablet ER 40 milliEquivalent(s) Oral every 4 hours    MEDICATIONS  (PRN):  acetaminophen     Tablet .. 650 milliGRAM(s) Oral every 6 hours PRN Temp greater or equal to 38C (100.4F), Mild Pain (1 - 3)  aluminum hydroxide/magnesium hydroxide/simethicone Suspension 30 milliLiter(s) Oral every 4 hours PRN Dyspepsia  melatonin 3 milliGRAM(s) Oral at bedtime PRN Insomnia  ondansetron Injectable 4 milliGRAM(s) IV Push every 8 hours PRN Nausea and/or Vomiting      Admitted Anthropometrics:   Ht: 5'8  Wt: 138lbs   BMI: 20.9  IBW: 154lbs   %IBW: 90      Weight Change:   11/3: 139 lbs  No new wts since admission, please continue to trend wts weekly to assess for changes.     Nutrition Focused Physical Exam: Completed [ X  ] Refer to initial RD assessment 11/4.      Estimated energy needs: ABW used to calculate energy needs due to pt's current body weight within % IBW. Needs adjusted for age and wt, PCM, COVID+, UTI. Fluids per team  Calories: 1562-1875kcal/day (Based on 25-30kcal/kg)   Protein: 75-94g/day (based on 1.2-1.5g/kg)  Fluid: per team    Subjective:   81M w/ CAD s/p PCI, Afib on eliquis, HTN, HLD, h/o PE/DVT, stage IV prostate CA s/p prostatectomy and on Lupron, and anemia (baseline Hgb 9) presents with a 3 day history of cough, diarrhea, and weakness, found to have COVID, UTI, C.Diff and AK . On 11/9 pt diet downgraded to pureed diet per pt request due to no lower dentures in. Diarrhea returned    Previous Nutrition Diagnosis:    Active [   ]  Resolved [   ]    If resolved, new PES:     Goal:    Recommendations:    Education:     Risk Level: High [   ] Moderate [   ] Low [   ] Admitting Diagnosis:   Patient is a 81y old  Male who presents with a chief complaint of C. 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      Glaucoma  Glaucoma      Type 2 diabetes mellitus  Diabetes      Essential hypertension  Hypertension      Chemotherapy session for neoplasm  Prostate Cancer      Pulmonary embolism  1/2020      DVT (deep venous thrombosis)  1/2020 on Eliquis      Atrial fibrillation  on Eliquis      Other postprocedural status  S/P prostatectomy          Current Nutrition Order:  Diet, Pureed (11-09-22 @ 08:57)      PO Intake: Good (%) [   ]  Fair (50-75%) [ x  ]     GI Issues:   +diarrhea  +C diff  LBM 11/12-loos  no reports BM today (11/14)    Pain: denies pain/ discomfort    Skin Integrity:   Partha 17  no edema   no pressure injuries       Labs:   11-14    137  |  108  |  15  ----------------------------<  86  3.3<L>   |  16<L>  |  1.52<H>    Ca    9.1      14 Nov 2022 05:53  Phos  4.2     11-14  Mg     1.6     11-14      CAPILLARY BLOOD GLUCOSE          Medications:  MEDICATIONS  (STANDING):  apixaban 2.5 milliGRAM(s) Oral every 12 hours  aspirin enteric coated 81 milliGRAM(s) Oral daily  atorvastatin 20 milliGRAM(s) Oral at bedtime  chlorhexidine 2% Cloths 1 Application(s) Topical <User Schedule>  cyanocobalamin 1000 MICROGram(s) Oral daily  dorzolamide 2% Ophthalmic Solution 1 Drop(s) Both EYES three times a day  fidaxomicin 200 milliGRAM(s) Oral every 12 hours  finasteride 5 milliGRAM(s) Oral daily  hydrochlorothiazide 25 milliGRAM(s) Oral daily  isosorbide   mononitrate ER Tablet (IMDUR) 30 milliGRAM(s) Oral daily  metoprolol succinate ER 50 milliGRAM(s) Oral daily  pantoprazole    Tablet 40 milliGRAM(s) Oral before breakfast  potassium chloride    Tablet ER 40 milliEquivalent(s) Oral every 4 hours    MEDICATIONS  (PRN):  acetaminophen     Tablet .. 650 milliGRAM(s) Oral every 6 hours PRN Temp greater or equal to 38C (100.4F), Mild Pain (1 - 3)  aluminum hydroxide/magnesium hydroxide/simethicone Suspension 30 milliLiter(s) Oral every 4 hours PRN Dyspepsia  melatonin 3 milliGRAM(s) Oral at bedtime PRN Insomnia  ondansetron Injectable 4 milliGRAM(s) IV Push every 8 hours PRN Nausea and/or Vomiting      Admitted Anthropometrics:   Ht: 5'8  Wt: 138lbs   BMI: 20.9  IBW: 154lbs   %IBW: 90      Weight Change:   11/3: 139 lbs  No new wts since admission, please continue to trend wts weekly to assess for changes.     Nutrition Focused Physical Exam: Completed [ X  ] Refer to initial RD assessment 11/4.      Estimated energy needs: ABW used to calculate energy needs due to pt's current body weight within % IBW. Needs adjusted for age and wt, PCM, COVID+, UTI. Fluids per team  Calories: 1562-1875kcal/day (Based on 25-30kcal/kg)   Protein: 75-94g/day (based on 1.2-1.5g/kg)  Fluid: per team    Subjective:   81M w/ CAD s/p PCI, Afib on eliquis, HTN, HLD, h/o PE/DVT, stage IV prostate CA s/p prostatectomy and on Lupron, and anemia (baseline Hgb 9) presents with a 3 day history of cough, diarrhea, and weakness, found to have COVID, UTI, C.Diff and AK . On 11/9 pt diet downgraded to pureed diet per pt request due to no lower dentures in. Diarrhea returned 11/14. Note K level trending down. Would recommend Banatrol QD to promote GI. Team paged, Pending verification. Staff reports fair-good PO intake. RD continue to encourage PO intake as tolerated. See additional nutrition recs below.     PES:  Malnutrition RT inadequate energy intake 2/2 poor appetite, reported hx of CDIFF AEB PO<75%EER, NFPE findings severe muscle wasting, wt loss >20% in >1 year    Active [ x ]  Resolved [   ]    Goal: Pt to consistently meet >75% of EER during hospital stay and show no further signs/symptoms of malnutrition     Recommendations:  1. Continue pureed diet. Monitor need for ONS. Monito PO intake  2. Recommend adding Banatrol QD to promoto GI distress  -Monitor BM, GI, pain  3. Monitor BMP, BG, POCT, renal indices, LFTs, lytes, replete prn  4. Nutrition education PRN.  5. Diet edu prn    Education: Encourage PO intake as tolerated.    Risk Level: High [ X  ] Moderate [   ] Low [   ]

## 2022-11-15 LAB
ANION GAP SERPL CALC-SCNC: 12 MMOL/L — SIGNIFICANT CHANGE UP (ref 5–17)
BASOPHILS # BLD AUTO: 0.08 K/UL — SIGNIFICANT CHANGE UP (ref 0–0.2)
BASOPHILS NFR BLD AUTO: 1 % — SIGNIFICANT CHANGE UP (ref 0–2)
BUN SERPL-MCNC: 14 MG/DL — SIGNIFICANT CHANGE UP (ref 7–23)
CALCIUM SERPL-MCNC: 9.4 MG/DL — SIGNIFICANT CHANGE UP (ref 8.4–10.5)
CHLORIDE SERPL-SCNC: 108 MMOL/L — SIGNIFICANT CHANGE UP (ref 96–108)
CO2 SERPL-SCNC: 17 MMOL/L — LOW (ref 22–31)
CREAT SERPL-MCNC: 1.56 MG/DL — HIGH (ref 0.5–1.3)
EGFR: 44 ML/MIN/1.73M2 — LOW
EOSINOPHIL # BLD AUTO: 0.31 K/UL — SIGNIFICANT CHANGE UP (ref 0–0.5)
EOSINOPHIL NFR BLD AUTO: 3.8 % — SIGNIFICANT CHANGE UP (ref 0–6)
GLUCOSE SERPL-MCNC: 74 MG/DL — SIGNIFICANT CHANGE UP (ref 70–99)
HCT VFR BLD CALC: 31.8 % — LOW (ref 39–50)
HGB BLD-MCNC: 9.4 G/DL — LOW (ref 13–17)
IMM GRANULOCYTES NFR BLD AUTO: 1.1 % — HIGH (ref 0–0.9)
LYMPHOCYTES # BLD AUTO: 1.48 K/UL — SIGNIFICANT CHANGE UP (ref 1–3.3)
LYMPHOCYTES # BLD AUTO: 18.4 % — SIGNIFICANT CHANGE UP (ref 13–44)
MAGNESIUM SERPL-MCNC: 1.6 MG/DL — SIGNIFICANT CHANGE UP (ref 1.6–2.6)
MCHC RBC-ENTMCNC: 25.2 PG — LOW (ref 27–34)
MCHC RBC-ENTMCNC: 29.6 GM/DL — LOW (ref 32–36)
MCV RBC AUTO: 85.3 FL — SIGNIFICANT CHANGE UP (ref 80–100)
MONOCYTES # BLD AUTO: 0.79 K/UL — SIGNIFICANT CHANGE UP (ref 0–0.9)
MONOCYTES NFR BLD AUTO: 9.8 % — SIGNIFICANT CHANGE UP (ref 2–14)
NEUTROPHILS # BLD AUTO: 5.31 K/UL — SIGNIFICANT CHANGE UP (ref 1.8–7.4)
NEUTROPHILS NFR BLD AUTO: 65.9 % — SIGNIFICANT CHANGE UP (ref 43–77)
NRBC # BLD: 0 /100 WBCS — SIGNIFICANT CHANGE UP (ref 0–0)
PHOSPHATE SERPL-MCNC: 2.8 MG/DL — SIGNIFICANT CHANGE UP (ref 2.5–4.5)
PLATELET # BLD AUTO: 380 K/UL — SIGNIFICANT CHANGE UP (ref 150–400)
POTASSIUM SERPL-MCNC: 4.3 MMOL/L — SIGNIFICANT CHANGE UP (ref 3.5–5.3)
POTASSIUM SERPL-SCNC: 4.3 MMOL/L — SIGNIFICANT CHANGE UP (ref 3.5–5.3)
RBC # BLD: 3.73 M/UL — LOW (ref 4.2–5.8)
RBC # FLD: 19.6 % — HIGH (ref 10.3–14.5)
SODIUM SERPL-SCNC: 137 MMOL/L — SIGNIFICANT CHANGE UP (ref 135–145)
WBC # BLD: 8.06 K/UL — SIGNIFICANT CHANGE UP (ref 3.8–10.5)
WBC # FLD AUTO: 8.06 K/UL — SIGNIFICANT CHANGE UP (ref 3.8–10.5)

## 2022-11-15 PROCEDURE — 99233 SBSQ HOSP IP/OBS HIGH 50: CPT | Mod: GC

## 2022-11-15 RX ORDER — MAGNESIUM SULFATE 500 MG/ML
2 VIAL (ML) INJECTION ONCE
Refills: 0 | Status: COMPLETED | OUTPATIENT
Start: 2022-11-15 | End: 2022-11-15

## 2022-11-15 RX ADMIN — CHLORHEXIDINE GLUCONATE 1 APPLICATION(S): 213 SOLUTION TOPICAL at 06:26

## 2022-11-15 RX ADMIN — ATORVASTATIN CALCIUM 20 MILLIGRAM(S): 80 TABLET, FILM COATED ORAL at 22:35

## 2022-11-15 RX ADMIN — APIXABAN 2.5 MILLIGRAM(S): 2.5 TABLET, FILM COATED ORAL at 17:44

## 2022-11-15 RX ADMIN — Medication 50 MILLIGRAM(S): at 06:26

## 2022-11-15 RX ADMIN — DORZOLAMIDE HYDROCHLORIDE 1 DROP(S): 20 SOLUTION/ DROPS OPHTHALMIC at 06:27

## 2022-11-15 RX ADMIN — APIXABAN 2.5 MILLIGRAM(S): 2.5 TABLET, FILM COATED ORAL at 06:26

## 2022-11-15 RX ADMIN — Medication 62.5 MILLIMOLE(S): at 16:54

## 2022-11-15 RX ADMIN — FIDAXOMICIN 200 MILLIGRAM(S): 200 GRANULE, FOR SUSPENSION ORAL at 17:44

## 2022-11-15 RX ADMIN — FINASTERIDE 5 MILLIGRAM(S): 5 TABLET, FILM COATED ORAL at 12:02

## 2022-11-15 RX ADMIN — DORZOLAMIDE HYDROCHLORIDE 1 DROP(S): 20 SOLUTION/ DROPS OPHTHALMIC at 22:35

## 2022-11-15 RX ADMIN — Medication 25 MILLIGRAM(S): at 06:25

## 2022-11-15 RX ADMIN — Medication 25 GRAM(S): at 09:14

## 2022-11-15 RX ADMIN — ISOSORBIDE MONONITRATE 30 MILLIGRAM(S): 60 TABLET, EXTENDED RELEASE ORAL at 12:03

## 2022-11-15 RX ADMIN — DORZOLAMIDE HYDROCHLORIDE 1 DROP(S): 20 SOLUTION/ DROPS OPHTHALMIC at 13:49

## 2022-11-15 RX ADMIN — PANTOPRAZOLE SODIUM 40 MILLIGRAM(S): 20 TABLET, DELAYED RELEASE ORAL at 06:25

## 2022-11-15 RX ADMIN — PREGABALIN 1000 MICROGRAM(S): 225 CAPSULE ORAL at 12:03

## 2022-11-15 RX ADMIN — FIDAXOMICIN 200 MILLIGRAM(S): 200 GRANULE, FOR SUSPENSION ORAL at 00:29

## 2022-11-15 RX ADMIN — Medication 81 MILLIGRAM(S): at 12:02

## 2022-11-15 NOTE — PROGRESS NOTE ADULT - PROBLEM SELECTOR PLAN 7
H/o pAFib. Follows with cardiologist at [facility].   - C/w eliquis 2.5 BID  - Toprol 50 daily

## 2022-11-15 NOTE — PROGRESS NOTE ADULT - PROBLEM/PLAN-10
DISPLAY PLAN FREE TEXT
Oriented - self; Oriented - place; Oriented - time
DISPLAY PLAN FREE TEXT

## 2022-11-15 NOTE — PROGRESS NOTE ADULT - SUBJECTIVE AND OBJECTIVE BOX
OVERNIGHT EVENTS: MICKEY.    SUBJECTIVE: Pt reports he feels well, overall improved. Denies diarrhea. Last episodes Sunday. Denies F/C, HA, N/V, abdominal pain.     VITAL SIGNS:  Vital Signs Last 24 Hrs  T(C): 36.6 (15 Nov 2022 05:18), Max: 37.2 (14 Nov 2022 23:33)  T(F): 97.9 (15 Nov 2022 05:18), Max: 99 (14 Nov 2022 23:33)  HR: 67 (15 Nov 2022 05:18) (63 - 67)  BP: 109/66 (15 Nov 2022 05:18) (109/66 - 125/80)  BP(mean): --  RR: 18 (15 Nov 2022 05:18) (17 - 18)  SpO2: 98% (15 Nov 2022 05:18) (98% - 99%)    Parameters below as of 15 Nov 2022 05:18  Patient On (Oxygen Delivery Method): room air        PHYSICAL EXAM:  General: NAD; speaking in full sentences  HEENT: NC/AT; PERRL; EOMI; MMM  Neck: supple; no JVD  Cardiac: RRR; +S1/S2  Pulm: CTA B/L; no W/R/R  GI: soft, NT/ND, +BS  Extremities: WWP; no edema, clubbing or cyanosis  Vasc: 2+ radial, DP pulses B/L  Neuro: AAOx3; no focal deficits    MEDICATIONS:  MEDICATIONS  (STANDING):  apixaban 2.5 milliGRAM(s) Oral every 12 hours  aspirin enteric coated 81 milliGRAM(s) Oral daily  atorvastatin 20 milliGRAM(s) Oral at bedtime  chlorhexidine 2% Cloths 1 Application(s) Topical <User Schedule>  cyanocobalamin 1000 MICROGram(s) Oral daily  dorzolamide 2% Ophthalmic Solution 1 Drop(s) Both EYES three times a day  fidaxomicin 200 milliGRAM(s) Oral every 12 hours  finasteride 5 milliGRAM(s) Oral daily  hydrochlorothiazide 25 milliGRAM(s) Oral daily  isosorbide   mononitrate ER Tablet (IMDUR) 30 milliGRAM(s) Oral daily  metoprolol succinate ER 50 milliGRAM(s) Oral daily  pantoprazole    Tablet 40 milliGRAM(s) Oral before breakfast    MEDICATIONS  (PRN):  acetaminophen     Tablet .. 650 milliGRAM(s) Oral every 6 hours PRN Temp greater or equal to 38C (100.4F), Mild Pain (1 - 3)  aluminum hydroxide/magnesium hydroxide/simethicone Suspension 30 milliLiter(s) Oral every 4 hours PRN Dyspepsia  melatonin 3 milliGRAM(s) Oral at bedtime PRN Insomnia  ondansetron Injectable 4 milliGRAM(s) IV Push every 8 hours PRN Nausea and/or Vomiting      ALLERGIES:  Allergies    ACE inhibitors (Angioedema)  x-geva (Unknown)    Intolerances        LABS:                        9.4    8.06  )-----------( 380      ( 15 Nov 2022 05:30 )             31.8     11-15    137  |  108  |  14  ----------------------------<  74  4.3   |  17<L>  |  1.56<H>    Ca    9.4      15 Nov 2022 05:30  Phos  2.8     11-15  Mg     1.6     11-15          RADIOLOGY & ADDITIONAL TESTS: Reviewed.

## 2022-11-15 NOTE — PROGRESS NOTE ADULT - PROBLEM SELECTOR PLAN 9
Admission in 2021 to [facility] for dyspnea on exertion, found to have DVT and PE. High risk for blood clots given cancer history.  - C/w eliquis 2.5 BID

## 2022-11-15 NOTE — PROGRESS NOTE ADULT - SUBJECTIVE AND OBJECTIVE BOX
INTERVAL HPI/OVERNIGHT EVENTS:  Interim reviewed;  Diarrhea maybe improved;  Otherwise no complaint      MEDICATIONS  (STANDING):  apixaban 2.5 milliGRAM(s) Oral every 12 hours  aspirin enteric coated 81 milliGRAM(s) Oral daily  atorvastatin 20 milliGRAM(s) Oral at bedtime  chlorhexidine 2% Cloths 1 Application(s) Topical <User Schedule>  cyanocobalamin 1000 MICROGram(s) Oral daily  dorzolamide 2% Ophthalmic Solution 1 Drop(s) Both EYES three times a day  fidaxomicin 200 milliGRAM(s) Oral every 12 hours  finasteride 5 milliGRAM(s) Oral daily  hydrochlorothiazide 25 milliGRAM(s) Oral daily  isosorbide   mononitrate ER Tablet (IMDUR) 30 milliGRAM(s) Oral daily  metoprolol succinate ER 50 milliGRAM(s) Oral daily  pantoprazole    Tablet 40 milliGRAM(s) Oral before breakfast  sodium phosphate 15 milliMole(s)/250 mL IVPB 15 milliMole(s) IV Intermittent once    MEDICATIONS  (PRN):  acetaminophen     Tablet .. 650 milliGRAM(s) Oral every 6 hours PRN Temp greater or equal to 38C (100.4F), Mild Pain (1 - 3)  aluminum hydroxide/magnesium hydroxide/simethicone Suspension 30 milliLiter(s) Oral every 4 hours PRN Dyspepsia  melatonin 3 milliGRAM(s) Oral at bedtime PRN Insomnia  ondansetron Injectable 4 milliGRAM(s) IV Push every 8 hours PRN Nausea and/or Vomiting      Allergies    ACE inhibitors (Angioedema)  x-geva (Unknown)    Intolerances        Vital Signs Last 24 Hrs  T(C): 36.6 (15 Nov 2022 05:18), Max: 37.2 (14 Nov 2022 23:33)  T(F): 97.9 (15 Nov 2022 05:18), Max: 99 (14 Nov 2022 23:33)  HR: 67 (15 Nov 2022 05:18) (63 - 67)  BP: 109/66 (15 Nov 2022 05:18) (109/66 - 127/61)  BP(mean): --  RR: 18 (15 Nov 2022 05:18) (17 - 18)  SpO2: 98% (15 Nov 2022 05:18) (98% - 100%)    Parameters below as of 15 Nov 2022 05:18  Patient On (Oxygen Delivery Method): room air              Constitutional:  Awake  and alert    Eyes: SELENE    ENMT: Negative    Neck: Supple    Back:  no tenderness     Respiratory:  clear    Cardiovascular: S1 S2    Gastrointestinal:  soft    Genitourinary:    Extremities: no edema    Vascular:    Neurological:    Skin:    Lymph Nodes:            11-14 @ 07:01  -  11-15 @ 07:00  --------------------------------------------------------  IN: 0 mL / OUT: 725 mL / NET: -725 mL      LABS:                        9.4    8.06  )-----------( 380      ( 15 Nov 2022 05:30 )             31.8     11-15    137  |  108  |  14  ----------------------------<  74  4.3   |  17<L>  |  1.56<H>    Ca    9.4      15 Nov 2022 05:30  Phos  2.8     11-15  Mg     1.6     11-15            RADIOLOGY & ADDITIONAL TESTS:

## 2022-11-15 NOTE — PROGRESS NOTE ADULT - REASON FOR ADMISSION
weakness and diarrhea
UTI, COVID
C. Diff, COVID, UTI
Generalized weakness 2/2 COVID, UTI, C.Diff
Cough, Weakness
UTI, COVID-19, C.Diff
weakness and diarrhea
COVID, UTI, C.Diff
C. Diff, COVID, UTI

## 2022-11-15 NOTE — PROGRESS NOTE ADULT - PROBLEM SELECTOR PLAN 1
Pt has hx of C.Diff infection 8/2021 tx with fidaxomicin. At presentation, was experiencing watery, voluminous, dark-colored diarrhea ~4x/daily. C.Diff toxin neg on admission, potentially false negative as pt had diarrhea prior to admission. Pt still experiencing diarrhea, repeat C.Diff +    Diarrhea initially improved, now with worsening diarrhea.   - d/c Vancomycin to 500mg po q6h  - d/c Flagyl 500mg IV q8h  - c/w 200mg po BID x 10d per ID for 10d total course  - isolation/contact precautions

## 2022-11-16 ENCOUNTER — TRANSCRIPTION ENCOUNTER (OUTPATIENT)
Age: 82
End: 2022-11-16

## 2022-11-16 VITALS
HEART RATE: 70 BPM | OXYGEN SATURATION: 98 % | RESPIRATION RATE: 17 BRPM | SYSTOLIC BLOOD PRESSURE: 122 MMHG | TEMPERATURE: 98 F | DIASTOLIC BLOOD PRESSURE: 79 MMHG

## 2022-11-16 LAB
ANION GAP SERPL CALC-SCNC: 12 MMOL/L — SIGNIFICANT CHANGE UP (ref 5–17)
BASOPHILS # BLD AUTO: 0.07 K/UL — SIGNIFICANT CHANGE UP (ref 0–0.2)
BASOPHILS NFR BLD AUTO: 0.9 % — SIGNIFICANT CHANGE UP (ref 0–2)
BUN SERPL-MCNC: 15 MG/DL — SIGNIFICANT CHANGE UP (ref 7–23)
CALCIUM SERPL-MCNC: 9.5 MG/DL — SIGNIFICANT CHANGE UP (ref 8.4–10.5)
CHLORIDE SERPL-SCNC: 106 MMOL/L — SIGNIFICANT CHANGE UP (ref 96–108)
CO2 SERPL-SCNC: 16 MMOL/L — LOW (ref 22–31)
CREAT SERPL-MCNC: 1.63 MG/DL — HIGH (ref 0.5–1.3)
EGFR: 42 ML/MIN/1.73M2 — LOW
EOSINOPHIL # BLD AUTO: 0.24 K/UL — SIGNIFICANT CHANGE UP (ref 0–0.5)
EOSINOPHIL NFR BLD AUTO: 3 % — SIGNIFICANT CHANGE UP (ref 0–6)
GLUCOSE SERPL-MCNC: 78 MG/DL — SIGNIFICANT CHANGE UP (ref 70–99)
HCT VFR BLD CALC: 31.7 % — LOW (ref 39–50)
HGB BLD-MCNC: 9.5 G/DL — LOW (ref 13–17)
IMM GRANULOCYTES NFR BLD AUTO: 0.9 % — SIGNIFICANT CHANGE UP (ref 0–0.9)
LYMPHOCYTES # BLD AUTO: 1.57 K/UL — SIGNIFICANT CHANGE UP (ref 1–3.3)
LYMPHOCYTES # BLD AUTO: 19.7 % — SIGNIFICANT CHANGE UP (ref 13–44)
MAGNESIUM SERPL-MCNC: 1.5 MG/DL — LOW (ref 1.6–2.6)
MCHC RBC-ENTMCNC: 25.7 PG — LOW (ref 27–34)
MCHC RBC-ENTMCNC: 30 GM/DL — LOW (ref 32–36)
MCV RBC AUTO: 85.9 FL — SIGNIFICANT CHANGE UP (ref 80–100)
MONOCYTES # BLD AUTO: 0.8 K/UL — SIGNIFICANT CHANGE UP (ref 0–0.9)
MONOCYTES NFR BLD AUTO: 10 % — SIGNIFICANT CHANGE UP (ref 2–14)
NEUTROPHILS # BLD AUTO: 5.23 K/UL — SIGNIFICANT CHANGE UP (ref 1.8–7.4)
NEUTROPHILS NFR BLD AUTO: 65.5 % — SIGNIFICANT CHANGE UP (ref 43–77)
NRBC # BLD: 0 /100 WBCS — SIGNIFICANT CHANGE UP (ref 0–0)
PHOSPHATE SERPL-MCNC: 3.5 MG/DL — SIGNIFICANT CHANGE UP (ref 2.5–4.5)
PLATELET # BLD AUTO: 415 K/UL — HIGH (ref 150–400)
POTASSIUM SERPL-MCNC: 3.9 MMOL/L — SIGNIFICANT CHANGE UP (ref 3.5–5.3)
POTASSIUM SERPL-SCNC: 3.9 MMOL/L — SIGNIFICANT CHANGE UP (ref 3.5–5.3)
RBC # BLD: 3.69 M/UL — LOW (ref 4.2–5.8)
RBC # FLD: 19.8 % — HIGH (ref 10.3–14.5)
SODIUM SERPL-SCNC: 134 MMOL/L — LOW (ref 135–145)
WBC # BLD: 7.98 K/UL — SIGNIFICANT CHANGE UP (ref 3.8–10.5)
WBC # FLD AUTO: 7.98 K/UL — SIGNIFICANT CHANGE UP (ref 3.8–10.5)

## 2022-11-16 PROCEDURE — 81001 URINALYSIS AUTO W/SCOPE: CPT

## 2022-11-16 PROCEDURE — 99285 EMERGENCY DEPT VISIT HI MDM: CPT | Mod: 25

## 2022-11-16 PROCEDURE — 83540 ASSAY OF IRON: CPT

## 2022-11-16 PROCEDURE — 36415 COLL VENOUS BLD VENIPUNCTURE: CPT

## 2022-11-16 PROCEDURE — 85027 COMPLETE CBC AUTOMATED: CPT

## 2022-11-16 PROCEDURE — 87449 NOS EACH ORGANISM AG IA: CPT

## 2022-11-16 PROCEDURE — 87324 CLOSTRIDIUM AG IA: CPT

## 2022-11-16 PROCEDURE — 74176 CT ABD & PELVIS W/O CONTRAST: CPT

## 2022-11-16 PROCEDURE — 97110 THERAPEUTIC EXERCISES: CPT

## 2022-11-16 PROCEDURE — 83550 IRON BINDING TEST: CPT

## 2022-11-16 PROCEDURE — 84100 ASSAY OF PHOSPHORUS: CPT

## 2022-11-16 PROCEDURE — 85025 COMPLETE CBC W/AUTO DIFF WBC: CPT

## 2022-11-16 PROCEDURE — 87086 URINE CULTURE/COLONY COUNT: CPT

## 2022-11-16 PROCEDURE — 72100 X-RAY EXAM L-S SPINE 2/3 VWS: CPT

## 2022-11-16 PROCEDURE — 96365 THER/PROPH/DIAG IV INF INIT: CPT

## 2022-11-16 PROCEDURE — 87186 SC STD MICRODIL/AGAR DIL: CPT

## 2022-11-16 PROCEDURE — 85045 AUTOMATED RETICULOCYTE COUNT: CPT

## 2022-11-16 PROCEDURE — 97116 GAIT TRAINING THERAPY: CPT

## 2022-11-16 PROCEDURE — 80048 BASIC METABOLIC PNL TOTAL CA: CPT

## 2022-11-16 PROCEDURE — 83735 ASSAY OF MAGNESIUM: CPT

## 2022-11-16 PROCEDURE — 97161 PT EVAL LOW COMPLEX 20 MIN: CPT

## 2022-11-16 PROCEDURE — 87637 SARSCOV2&INF A&B&RSV AMP PRB: CPT

## 2022-11-16 PROCEDURE — 97530 THERAPEUTIC ACTIVITIES: CPT

## 2022-11-16 PROCEDURE — 99232 SBSQ HOSP IP/OBS MODERATE 35: CPT | Mod: GC

## 2022-11-16 PROCEDURE — 84300 ASSAY OF URINE SODIUM: CPT

## 2022-11-16 PROCEDURE — 83605 ASSAY OF LACTIC ACID: CPT

## 2022-11-16 PROCEDURE — 82728 ASSAY OF FERRITIN: CPT

## 2022-11-16 PROCEDURE — 84484 ASSAY OF TROPONIN QUANT: CPT

## 2022-11-16 PROCEDURE — 87040 BLOOD CULTURE FOR BACTERIA: CPT

## 2022-11-16 PROCEDURE — 97535 SELF CARE MNGMENT TRAINING: CPT

## 2022-11-16 PROCEDURE — 83935 ASSAY OF URINE OSMOLALITY: CPT

## 2022-11-16 PROCEDURE — 71045 X-RAY EXAM CHEST 1 VIEW: CPT

## 2022-11-16 PROCEDURE — 76770 US EXAM ABDO BACK WALL COMP: CPT

## 2022-11-16 PROCEDURE — 96366 THER/PROPH/DIAG IV INF ADDON: CPT

## 2022-11-16 PROCEDURE — 82570 ASSAY OF URINE CREATININE: CPT

## 2022-11-16 PROCEDURE — G0103: CPT

## 2022-11-16 PROCEDURE — 80053 COMPREHEN METABOLIC PANEL: CPT

## 2022-11-16 RX ADMIN — CHLORHEXIDINE GLUCONATE 1 APPLICATION(S): 213 SOLUTION TOPICAL at 05:37

## 2022-11-16 RX ADMIN — FINASTERIDE 5 MILLIGRAM(S): 5 TABLET, FILM COATED ORAL at 12:09

## 2022-11-16 RX ADMIN — APIXABAN 2.5 MILLIGRAM(S): 2.5 TABLET, FILM COATED ORAL at 05:40

## 2022-11-16 RX ADMIN — ISOSORBIDE MONONITRATE 30 MILLIGRAM(S): 60 TABLET, EXTENDED RELEASE ORAL at 12:08

## 2022-11-16 RX ADMIN — DORZOLAMIDE HYDROCHLORIDE 1 DROP(S): 20 SOLUTION/ DROPS OPHTHALMIC at 13:31

## 2022-11-16 RX ADMIN — DORZOLAMIDE HYDROCHLORIDE 1 DROP(S): 20 SOLUTION/ DROPS OPHTHALMIC at 05:40

## 2022-11-16 RX ADMIN — FIDAXOMICIN 200 MILLIGRAM(S): 200 GRANULE, FOR SUSPENSION ORAL at 05:38

## 2022-11-16 RX ADMIN — Medication 81 MILLIGRAM(S): at 12:08

## 2022-11-16 RX ADMIN — PANTOPRAZOLE SODIUM 40 MILLIGRAM(S): 20 TABLET, DELAYED RELEASE ORAL at 05:39

## 2022-11-16 RX ADMIN — Medication 25 MILLIGRAM(S): at 05:39

## 2022-11-16 RX ADMIN — PREGABALIN 1000 MICROGRAM(S): 225 CAPSULE ORAL at 12:08

## 2022-11-16 RX ADMIN — Medication 50 MILLIGRAM(S): at 05:38

## 2022-11-16 NOTE — DISCHARGE NOTE NURSING/CASE MANAGEMENT/SOCIAL WORK - PATIENT PORTAL LINK FT
You can access the FollowMyHealth Patient Portal offered by [facility] by registering at the following website: [email or web address]. By joining [employer]’s FollowMyHealth portal, you will also be able to view your health information using other applications (apps) compatible with our system.

## 2022-11-16 NOTE — DISCHARGE NOTE NURSING/CASE MANAGEMENT/SOCIAL WORK - NSDCFUADDAPPT_GEN_ALL_CORE_FT
Please attend the scheduled follow-up appointments with:  Dr. Sánchez (Cardiology): 11/18/2022  Dr. Gill (Internal Medicine): 11/29/2022  Dr. Roa (Urology): 01/06/2023

## 2022-11-16 NOTE — PROGRESS NOTE ADULT - PROBLEM SELECTOR PROBLEM 3
2019 novel coronavirus disease (COVID-19)
JERI (acute kidney injury)
2019 novel coronavirus disease (COVID-19)
2019 novel coronavirus disease (COVID-19)
Hydronephrosis, right
JERI (acute kidney injury)
2019 novel coronavirus disease (COVID-19)
Hydronephrosis, right
Hydronephrosis, right
JERI (acute kidney injury)
JERI (acute kidney injury)
Prostate cancer
Hydronephrosis, right
JERI (acute kidney injury)

## 2022-11-16 NOTE — PROGRESS NOTE ADULT - NUTRITIONAL ASSESSMENT
This patient has been assessed with a concern for Malnutrition and has been determined to have a diagnosis/diagnoses of Severe protein-calorie malnutrition.    This patient is being managed with:   Diet Pureed-  Supplement Feeding Modality:  Oral  Ensure Enlive Cans or Servings Per Day:  1       Frequency:  Two Times a day  Entered: Nov 9 2022  4:22PM    Diet Pureed-  Entered: Nov 9 2022  8:56AM    The following pending diet order is being considered for treatment of Severe protein-calorie malnutrition:null
This patient has been assessed with a concern for Malnutrition and has been determined to have a diagnosis/diagnoses of Severe protein-calorie malnutrition.    This patient is being managed with:   Diet Regular-  Entered: Nov  3 2022 11:51PM    
This patient has been assessed with a concern for Malnutrition and has been determined to have a diagnosis/diagnoses of Severe protein-calorie malnutrition.    This patient is being managed with:   Diet Pureed-  Entered: Nov 9 2022  8:56AM    
This patient has been assessed with a concern for Malnutrition and has been determined to have a diagnosis/diagnoses of Severe protein-calorie malnutrition.    This patient is being managed with:   Diet Regular-  Entered: Nov  3 2022 11:51PM    
This patient has been assessed with a concern for Malnutrition and has been determined to have a diagnosis/diagnoses of Severe protein-calorie malnutrition.    This patient is being managed with:   Diet Pureed-  Supplement Feeding Modality:  Oral  Ensure Enlive Cans or Servings Per Day:  1       Frequency:  Two Times a day  Entered: Nov 9 2022  4:22PM    Diet Pureed-  Entered: Nov 9 2022  8:56AM    The following pending diet order is being considered for treatment of Severe protein-calorie malnutrition:null
This patient has been assessed with a concern for Malnutrition and has been determined to have a diagnosis/diagnoses of Severe protein-calorie malnutrition.    This patient is being managed with:   Diet Pureed-  Supplement Feeding Modality:  Oral  Ensure Enlive Cans or Servings Per Day:  1       Frequency:  Two Times a day  Entered: Nov 9 2022  4:22PM    Diet Pureed-  Entered: Nov 9 2022  8:56AM    The following pending diet order is being considered for treatment of Severe protein-calorie malnutrition:null
This patient has been assessed with a concern for Malnutrition and has been determined to have a diagnosis/diagnoses of Severe protein-calorie malnutrition.    This patient is being managed with:   Diet Regular-  Entered: Nov  3 2022 11:51PM    
This patient has been assessed with a concern for Malnutrition and has been determined to have a diagnosis/diagnoses of Severe protein-calorie malnutrition.    This patient is being managed with:   Diet Pureed-  Supplement Feeding Modality:  Oral  Ensure Enlive Cans or Servings Per Day:  1       Frequency:  Two Times a day  Entered: Nov 9 2022  4:22PM    Diet Pureed-  Entered: Nov 9 2022  8:56AM    The following pending diet order is being considered for treatment of Severe protein-calorie malnutrition:null
This patient has been assessed with a concern for Malnutrition and has been determined to have a diagnosis/diagnoses of Severe protein-calorie malnutrition.    This patient is being managed with:   Diet Regular-  Entered: Nov  3 2022 11:51PM    
This patient has been assessed with a concern for Malnutrition and has been determined to have a diagnosis/diagnoses of Severe protein-calorie malnutrition.    This patient is being managed with:   Diet Pureed-  Supplement Feeding Modality:  Oral  Ensure Enlive Cans or Servings Per Day:  1       Frequency:  Two Times a day  Entered: Nov 9 2022  4:22PM    Diet Pureed-  Entered: Nov 9 2022  8:56AM    The following pending diet order is being considered for treatment of Severe protein-calorie malnutrition:null
This patient has been assessed with a concern for Malnutrition and has been determined to have a diagnosis/diagnoses of Severe protein-calorie malnutrition.    This patient is being managed with:   Diet Regular-  Entered: Nov  3 2022 11:51PM    

## 2022-11-16 NOTE — PROGRESS NOTE ADULT - SUBJECTIVE AND OBJECTIVE BOX
INTERVAL HPI/OVERNIGHT EVENTS:  No further diarrhea;   Okay for discharge today;  Daughter will pick patient up this afternoon      MEDICATIONS  (STANDING):  apixaban 2.5 milliGRAM(s) Oral every 12 hours  aspirin enteric coated 81 milliGRAM(s) Oral daily  atorvastatin 20 milliGRAM(s) Oral at bedtime  chlorhexidine 2% Cloths 1 Application(s) Topical <User Schedule>  cyanocobalamin 1000 MICROGram(s) Oral daily  dorzolamide 2% Ophthalmic Solution 1 Drop(s) Both EYES three times a day  fidaxomicin 200 milliGRAM(s) Oral every 12 hours  finasteride 5 milliGRAM(s) Oral daily  hydrochlorothiazide 25 milliGRAM(s) Oral daily  isosorbide   mononitrate ER Tablet (IMDUR) 30 milliGRAM(s) Oral daily  metoprolol succinate ER 50 milliGRAM(s) Oral daily  pantoprazole    Tablet 40 milliGRAM(s) Oral before breakfast    MEDICATIONS  (PRN):  acetaminophen     Tablet .. 650 milliGRAM(s) Oral every 6 hours PRN Temp greater or equal to 38C (100.4F), Mild Pain (1 - 3)  aluminum hydroxide/magnesium hydroxide/simethicone Suspension 30 milliLiter(s) Oral every 4 hours PRN Dyspepsia  melatonin 3 milliGRAM(s) Oral at bedtime PRN Insomnia  ondansetron Injectable 4 milliGRAM(s) IV Push every 8 hours PRN Nausea and/or Vomiting      Allergies    ACE inhibitors (Angioedema)  x-geva (Unknown)    Intolerances        Vital Signs Last 24 Hrs  T(C): 36.3 (16 Nov 2022 06:22), Max: 36.8 (15 Nov 2022 21:14)  T(F): 97.3 (16 Nov 2022 06:22), Max: 98.2 (15 Nov 2022 21:14)  HR: 61 (16 Nov 2022 06:22) (61 - 72)  BP: 119/70 (16 Nov 2022 06:22) (119/70 - 124/69)  BP(mean): --  RR: 16 (16 Nov 2022 06:22) (16 - 18)  SpO2: 99% (16 Nov 2022 06:22) (98% - 100%)    Parameters below as of 16 Nov 2022 06:22  Patient On (Oxygen Delivery Method): room air              Constitutional:  Awake     Eyes: SELENE    ENMT: Negative    Neck: Supple    Back:  no tenderness     Respiratory:  clear    Cardiovascular: S1 S2    Gastrointestinal:  soft    Genitourinary:    Extremities:  no edema    Vascular:    Neurological:    Skin:    Lymph Nodes:            11-15 @ 07:01  -  11-16 @ 07:00  --------------------------------------------------------  IN: 0 mL / OUT: 200 mL / NET: -200 mL      LABS:                        9.5    7.98  )-----------( 415      ( 16 Nov 2022 05:30 )             31.7     11-16    134<L>  |  106  |  15  ----------------------------<  78  3.9   |  16<L>  |  1.63<H>    Ca    9.5      16 Nov 2022 05:30  Phos  3.5     11-16  Mg     1.5     11-16            RADIOLOGY & ADDITIONAL TESTS:

## 2022-11-16 NOTE — PROGRESS NOTE ADULT - TIME BILLING
Patient seen and examined;  Discussed with patient and his daughter  Will try to get Monoclonal Antibody for patient with Covid positive and immunocompromised  Also continue present antibiotics  IV fluids;   Encourage PO
Patient seen and examined;  Sonogram of flank; Rule out hydro  Resend C Diff  Restart ceftriaxone
Patient seen and examined;  Have spoken to Dr Roa about the hydro;  This seems to be new;  He will see patient;  ID to see regarding the C Diff  Heme regarding the Procrit
Patient seen and examined  Continue to discharge maybe tomorrow  OOB; Physical therapy
Patient seen and examined;  CT ordered;  Continue Rocephin and PO Vancomycin    Will discuss with patients daughter
Patient seen and examined;  Home tomorrow  No change in current regimen
Patient seen and examined  ID follow up regarding the diarrhea  Family wants full support  No discharge today
Patient seen and examined;  Okay to discharge today  One more week of Fidaxomicin with discharge
Patient seen and examined;  Looks much better;  Repeat Urine culture
Patient seen and examined;  As stated above patient and daughter continue to want full support  For now nothing is necessary regarding the hydronephrosis  C diff treatment noted   OOB and Physical therapy

## 2022-11-16 NOTE — PROGRESS NOTE ADULT - PROVIDER SPECIALTY LIST ADULT
Internal Medicine
Rehab Medicine
Urology
Internal Medicine
Rehab Medicine
Internal Medicine
Rehab Medicine
Internal Medicine

## 2022-11-16 NOTE — PROGRESS NOTE ADULT - SUBJECTIVE AND OBJECTIVE BOX
Physical Medicine and Rehabilitation Progress Note :       Patient is a 81y old  Male who presents with a chief complaint of C. Diff, COVID, UTI (15 Nov 2022 17:15)      HPI:  81M w/ CAD s/p PCI, Afib on eliquis, HTN, HLD, h/o PE/DVT, stage IV prostate CA s/p prostatectomy and on Lupron, and anemia (baseline Hgb 9) presents with a 3 day history of cough, diarrhea, and weakness. Denies N/V, abdominal pain. Patient complains of chronic urinary incontinence but no dysuria. Patient reports having C. diff one year prior. Patient accompanied by his daughter who also was not feeling well. COVID vaccinated with booster. Has never had COVID before. Denies headaches, fevers, chills, chest pain, or shortness of breath.     In the ED:  Initial vital signs: T: 98.3 F, HR: 92, BP: 129/70, R: 18, SpO2: 97% on RA  Labs: significant for Hgb 9.0 (at baseline), Na 133, Cr 1.94 (baseline 1.0), BUN 28, COVID positive, UA large LE/positive nitrites, many WBCs and bacteria  Imaging:  CXR: clear  EKG: NSR  Medications: CTX 1g, NS 1L   Consults: none  (03 Nov 2022 23:59)                            9.5    7.98  )-----------( 415      ( 16 Nov 2022 05:30 )             31.7       11-16    134<L>  |  106  |  15  ----------------------------<  78  3.9   |  16<L>  |  1.63<H>    Ca    9.5      16 Nov 2022 05:30  Phos  3.5     11-16  Mg     1.5     11-16      Vital Signs Last 24 Hrs  T(C): 36.8 (16 Nov 2022 12:27), Max: 36.8 (15 Nov 2022 21:14)  T(F): 98.3 (16 Nov 2022 12:27), Max: 98.3 (16 Nov 2022 12:27)  HR: 70 (16 Nov 2022 12:27) (61 - 72)  BP: 122/79 (16 Nov 2022 12:27) (119/70 - 124/69)  BP(mean): --  RR: 17 (16 Nov 2022 12:27) (16 - 18)  SpO2: 98% (16 Nov 2022 12:27) (98% - 100%)    Parameters below as of 16 Nov 2022 12:27  Patient On (Oxygen Delivery Method): room air        MEDICATIONS  (STANDING):  apixaban 2.5 milliGRAM(s) Oral every 12 hours  aspirin enteric coated 81 milliGRAM(s) Oral daily  atorvastatin 20 milliGRAM(s) Oral at bedtime  chlorhexidine 2% Cloths 1 Application(s) Topical <User Schedule>  cyanocobalamin 1000 MICROGram(s) Oral daily  dorzolamide 2% Ophthalmic Solution 1 Drop(s) Both EYES three times a day  fidaxomicin 200 milliGRAM(s) Oral every 12 hours  finasteride 5 milliGRAM(s) Oral daily  hydrochlorothiazide 25 milliGRAM(s) Oral daily  isosorbide   mononitrate ER Tablet (IMDUR) 30 milliGRAM(s) Oral daily  metoprolol succinate ER 50 milliGRAM(s) Oral daily  pantoprazole    Tablet 40 milliGRAM(s) Oral before breakfast    MEDICATIONS  (PRN):  acetaminophen     Tablet .. 650 milliGRAM(s) Oral every 6 hours PRN Temp greater or equal to 38C (100.4F), Mild Pain (1 - 3)  aluminum hydroxide/magnesium hydroxide/simethicone Suspension 30 milliLiter(s) Oral every 4 hours PRN Dyspepsia  melatonin 3 milliGRAM(s) Oral at bedtime PRN Insomnia  ondansetron Injectable 4 milliGRAM(s) IV Push every 8 hours PRN Nausea and/or Vomiting       Physical / Occupational Therapy Functional Status Assessment :   11/15/2022     Cognitive/Neuro/Behavioral  Cognitive/Neuro/Behavioral [WDL Definition: Alert; opens eyes spontaneously; arouses to voice or touch; oriented x 4; follows commands; speech spontaneous, logical; purposeful motor response; behavior appropriate to situation]: WDL    Language Assistance  Preferred Language to Address Healthcare Preferred Language to Address Healthcare: English    Therapeutic Interventions      Bed Mobility  Bed Mobility Training Scooting: supervsion;  supervision  Bed Mobility Training Sit-to-Supine: supervsion;  supervision  Bed Mobility Training Supine-to-Sit: supervsion;  supervision  Bed Mobility Training Limitations: decreased ability to use arms for pushing/pulling;  decreased ability to use legs for bridging/pushing;  impaired ability to control trunk for mobility;  decreased strength;  impaired balance;  impaired postural control    Sit-Stand Transfer Training  Transfer Training Sit-to-Stand Transfer: contact guard;  verbal cues;  1 person assist;  rolling walker;  2 trials   Transfer Training Stand-to-Sit Transfer: contact guard;  1 person assist;  verbal cues;  rolling walker;  2 trials   Sit-to-Stand Transfer Training Transfer Safety Analysis: decreased balance;  decreased strength;  impaired balance;  impaired postural control;  rolling walker    Gait Training  Gait Training: contact guard;  1 person assist;  verbal cues;  rolling walker;  40 feet x 2   Gait Analysis: swing-to gait   decreased layo;  increased time in double stance;  decreased hip/knee flexion;  decreased step length;  slightly unsteady gait, no lose of balance, decreased heel strike and hip flexion bilaterally. ;  decreased strength;  impaired balance;  impaired postural control;  rolling walker  Type of Rest Type of Rest: sitting  Duration of Rest Duration of Rest: 1 sitting break secondary pt complains feeling tired.     Therapeutic Exercise  Therapeutic Exercise Detail: Seated ther ex: LAQ, hip flexion, ankle pump x 10 bilaterally, shoulder flexion/extension, elbow flexion/extension x 10 bilaterally RROM(resistance applied by PT).         Therapeutic Exercise  Therapeutic Exercise Detail: Pt performed BUE AROM as follows: BUE bicep flexion/extension x 10 reps, BUE forward punches x 10 reps. Pt also performed BLE AROM knee flexion/extension x 10 reps. Pt performed functional mobility with RW and CGA to/from bathroom x 2 to improve balance and functional endurance.     Upper Body Dressing Training  Upper Body Dressing Training Assistance: contact guard;  verbal cues;  1 person assist;  nonverbal cues (demo/gestures);  sasha/doff gown around back seated EOB;  decreased strength;  impaired balance          PM&R Impression : as above    Current Disposition Plan Recommendations :    subacute rehab placement

## 2022-11-16 NOTE — PROGRESS NOTE ADULT - ASSESSMENT
per Internal Medicine    81 y o M w/ CAD s/p PCI, Afib on Eliquis, HTN, HLD, h/o PE/DVT, stage IV prostate CA s/p prostatectomy on Lupron, and anemia (baseline Hgb 9) presents with a 3 day history of cough, diarrhea, and weakness, found to have COVID, UTI, and JERI.      Problem/Plan - 1:  ·  Problem: Clostridium difficile infection.   ·  Plan: Pt has hx of C.Diff infection 8/2021. At presentation, was experiencing watery, voluminous, dark-colored diarrhea ~4x/daily. C.Diff toxin neg on admission, potentially false negative as pt had diarrhea prior to admission. Pt still experiencing diarrhea, repeat C.Diff +    - c/w Vancomycin to 500mg po q6h  - c/w Flagyl 500mg IV q8h  - isolation/contact precautions.    Problem/Plan - 2:  ·  Problem: Hydronephrosis, right.   ·  Plan: Pt with UTI developed L CVA tenderness. Renal U/S showed 7.5cm simple renal cyst on the L, moderate hydronephrosis on the R. Pt currently asymptomatic, no R flank pain.     - CTAP: New right hydronephrosis and hydroureter to the patient's mid to distal pelvis; possibly secondary to adenopathy; no  tract calcifications bilaterally. If clinically indicated recommend a CT urogram for further evaluation to identify the point of obstruction.    Problem/Plan - 3:  ·  Problem: Prostate cancer.   ·  Plan: Pt has active Stage IV prostate CA s/p prostatectomy and on chemotherapy with Lupron. Follows with urology and heme-onc Dr. Mcmillan.  - F/u outpatient heme-onc  - C/w home finasteride 5mg.    Problem/Plan - 4:  ·  Problem: UTI (urinary tract infection).   ·  Plan: Pt presented with generalized weakness. Has hx of chronic urinary incontinence and frequency related to h/o prostate cancer and prostatectomy. Follows with urology. No fever, dysuria on admission.   + UA, Culture + for >100k CFU Klebsiella. Received CTX 1g IV x 2, last 11/5. Restarted CTX 1g IV on 11/8.     - Discontinue CTX  - Last UA 11/6 +leuk esterase, - nitrites  - bladder scans q6h to evaluate for retention.    Problem/Plan - 5:  ·  Problem: JERI (acute kidney injury).   ·  Plan: Cr 1.98 on admission. Downtrended after IVF. Per urology, baseline Cr 1.28-1.84 outpatient.     - Cr initially downtrended after IVF, inc to 1.5 and stable   - CT shows R hydronephrosis and hydroureter 2/2 adenopathy (likely from prostate ca mets)  - F/u urology reccs- no acute intervention at the time  - f/u PSA  - trend Cr.    Problem/Plan - 6:  ·  Problem: 2019 novel coronavirus disease (COVID-19).   ·  Plan: C/o cough and weakness on admission. No dyspnea, hypoxia. COVID vaccinated. Never had COVID before.  Pt reports improved cough.   - given MAB tx  - no continued intervention.    Problem/Plan - 7:  ·  Problem: Paroxysmal atrial fibrillation.   ·  Plan: H/o pAFib. Follows with cardiologist at [facility].   - C/w eliquis 2.5 BID  - Toprol 50 daily.    Problem/Plan - 8:  ·  Problem: HTN (hypertension).   ·  Plan: Normotensive on admission. Home med: HCTZ 25mg qd and metoprolol 50mg qd. Allergic to ACEI, developed angioedema.  - C/w home HCTZ and BB.    Problem/Plan - 9:  ·  Problem: History of pulmonary embolism.   ·  Plan: Admission in 2021 to [facility] for dyspnea on exertion, found to have DVT and PE. High risk for blood clots given cancer history.  - C/w eliquis 2.5 BID.    Problem/Plan - 10:  ·  Problem: CAD (coronary artery disease).   ·  Plan; CAD s/p PCI w/ IRASEMA in 2021. Follows with cardiology Dr. Sánchez.  - C/w home atorva 20, ASA 81, imdur 30, and toprol 50.    Problem/Plan - 11:  ·  Problem: Anemia.   ·  Plan: Baseline Hgb 8-9 on past admissions. Normocytic. 2/2 AoCD and DOMITILA. History of blood transfusions outpatient. Hx dark stools potentially 2/2 Fe supplementation.   - C/w home Fe sulfate 325 daily.    Problem/Plan - 12:  ·  Problem: Prophylactic measure.   ·  Plan: Plan:  F: None  E: replete K<4, Mg<2  N: Pureed (per pt preference)  VTE Prophylaxis: On Eliquis 2.5mg BID  GI: None  C: Full Code  D: RMF.  
81M w/ CAD s/p PCI, Afib on Eliquis, HTN, HLD, h/o PE/DVT, stage IV prostate CA s/p prostatectomy on Lupron, and anemia (baseline Hgb 9) presents with a 3 day history of cough, diarrhea, and weakness, found to have COVID, UTI, and JERI.
per Internal Medicine    81 y o M w/ CAD s/p PCI, Afib on Eliquis, HTN, HLD, h/o PE/DVT, stage IV prostate CA s/p prostatectomy on Lupron, and anemia (baseline Hgb 9) presents with a 3 day history of cough, diarrhea, and weakness, found to have C. Diff, COVID, UTI, and JERI.    Problem/Plan - 1:  ·  Problem: Clostridium difficile infection.   ·  Plan: Pt has hx of C.Diff infection 8/2021 tx with fidaxomicin. At presentation, was experiencing watery, voluminous, dark-colored diarrhea ~4x/daily. C.Diff toxin neg on admission, potentially false negative as pt had diarrhea prior to admission. Pt still experiencing diarrhea, repeat C.Diff +    Diarrhea initially improved, now with worsening diarrhea.   - d/c Vancomycin to 500mg po q6h  - d/c Flagyl 500mg IV q8h  - c/w 200mg po BID x 10d per ID for 10d total course  - isolation/contact precautions.    Problem/Plan - 2:  ·  Problem: JERI (acute kidney injury).   ·  Plan: Cr 1.98 on admission. Downtrended after IVF. Per urology, baseline Cr 1.28-1.84 outpatient.     - Cr initially downtrended after IVF, inc to 1.5 and stable   - CT shows R hydronephrosis and hydroureter 2/2 adenopathy (likely from prostate ca mets)  - F/u urology reccs- no acute intervention at the time  - trend Cr.    Problem/Plan - 3:  ·  Problem: Hydronephrosis, right.   ·  Plan: Pt with UTI developed L CVA tenderness. Renal U/S showed 7.5cm simple renal cyst on the L, moderate hydronephrosis on the R. Pt currently asymptomatic, no R flank pain.     Asymptomatic. No acute intervention at this time.     - CTAP: New right hydronephrosis and hydroureter to the patient's mid to distal pelvis; possibly secondary to adenopathy; no  tract calcifications bilaterally. If clinically indicated recommend a CT urogram for further evaluation to identify the point of obstruction.    Problem/Plan - 4:  ·  Problem: Prostate cancer.   ·  Plan: Pt has active Stage IV prostate CA s/p prostatectomy and on chemotherapy with Lupron. Follows with urology and heme-onc Dr. Mcmillan.  - F/u outpatient heme-onc  - C/w home finasteride 5mg  - Last .    Problem/Plan - 5:  ·  Problem: 2019 novel coronavirus disease (COVID-19).   ·  Plan: C/o cough and weakness on admission. No dyspnea, hypoxia. COVID vaccinated. Never had COVID before.  Pt reports improved cough.   - given MAB tx  - no continued intervention.    Problem/Plan - 6:  ·  Problem: UTI (urinary tract infection).   ·  Plan: Pt presented with generalized weakness. Has hx of chronic urinary incontinence and frequency related to h/o prostate cancer and prostatectomy. Follows with urology. No fever, dysuria on admission.   + UA, Culture + for >100k CFU Klebsiella. Received CTX 1g IV x 2, last 11/5. Restarted CTX 1g IV on 11/8.     - Discontinue CTX  - Last UA 11/6 +leuk esterase, - nitrites.    Problem/Plan - 7:  ·  Problem: Paroxysmal atrial fibrillation.   ·  Plan: H/o pAFib. Follows with cardiologist at [facility].   - C/w eliquis 2.5 BID  - Toprol 50 daily.    Problem/Plan - 8:  ·  Problem: HTN (hypertension).   ·  Plan: Normotensive on admission. Home med: HCTZ 25mg qd and metoprolol 50mg qd. Allergic to ACEI, developed angioedema.  - C/w home HCTZ and BB.    Problem/Plan - 9:  ·  Problem: History of pulmonary embolism.   ·  Plan: Admission in 2021 to [facility] for dyspnea on exertion, found to have DVT and PE. High risk for blood clots given cancer history.  - C/w eliquis 2.5 BID.    Problem/Plan - 10:  ·  Problem: CAD (coronary artery disease).   ·  Plan; CAD s/p PCI w/ IRASEMA in 2021. Follows with cardiology Dr. Sánchez.  - C/w home atorva 20, ASA 81, imdur 30, and toprol 50.    Problem/Plan - 11:  ·  Problem: Anemia.   ·  Plan: Baseline Hgb 8-9 on past admissions. Normocytic. 2/2 AoCD and DOMITILA. History of blood transfusions outpatient. Hx dark stools potentially 2/2 Fe supplementation.   - C/w home Fe sulfate 325 daily.    Problem/Plan - 12:  ·  Problem: Prophylactic measure.   ·  Plan: Plan:  F: None  E: replete K<4, Mg<2  N: Pureed (per pt preference)  VTE Prophylaxis: On Eliquis 2.5mg BID  GI: None  C: Full Code  D: LIZ.
  per Internal Medicine    Problem/Plan - 1:  ·  Problem: Prostate cancer.     Problem/Plan - 2:  ·  Problem: HTN (hypertension).     Problem/Plan - 3:  ·  Problem: 2019 novel coronavirus disease (COVID-19).     Problem/Plan - 4:  ·  Problem: Paroxysmal atrial fibrillation.     Problem/Plan - 5:  ·  Problem: History of pulmonary embolism.     Problem/Plan - 6:  ·  Problem: CAD (coronary artery disease).     Problem/Plan - 7:  ·  Problem: Anemia.     Problem/Plan - 8:  ·  Problem: History of Clostridium difficile infection. 
81M PMH of CAD s/p PCI, Afib on eliquis, HTN, HLD, PE/DVT s/p treatment, stage 4 prostate Ca s/p prostatectomy now with metastatic prostate cancer on Lupron, and anemia, admitted to medicine for UTI Kleb, found to be covid+ and Cdiff. Bladder scans in  over last 24 hours. No urinary complaints. New onset right hydro may be 2/2 increased periaortic, retroperitoneal, and peritoneal iliac lymphadenopathy, no calcification seen. Spoke to daughter and reviewed outpatient documents    Recs:  - please keep trending PVRs and ensure patient is not in retention  - patient and daughter want full treatment  - Cr outpatient 1.28-1.84, current Cr is at baseline  - PSA trending up from outpatient 155.3 (6/3/22), 227.93 (7/1), 276.2 (8/12), 365.3 (9/16), 482 (10/14/22)  - currently asymptomatic from hydro  - has asymptomatic bacteruria - do not treat if not symptomatic given history of Cdiff  - care per primary team  - discussed with attending  - no acute  interventions at this time  - f/u with Dr. Roa, has appointment with him    KIM Montgomery MD (PGY-2)  Consult Urology Resident  Please feel free to reach out on Teams Chat  
81M w/ CAD s/p PCI, Afib on eliquis, HTN, HLD, h/o PE/DVT, stage IV prostate CA s/p prostatectomy and on Lupron, and anemia (baseline Hgb 9) presents with a 3 day history of cough, diarrhea, and weakness, found to have COVID, UTI, and JERI.
81M w/ CAD s/p PCI, Afib on eliquis, HTN, HLD, h/o PE/DVT, stage IV prostate CA s/p prostatectomy and on Lupron, and anemia (baseline Hgb 9) presents with a 3 day history of cough, diarrhea, and weakness, found to have COVID, UTI, and JERI.
81M w/ CAD s/p PCI, Afib on Eliquis, HTN, HLD, h/o PE/DVT, stage IV prostate CA s/p prostatectomy on Lupron, and anemia (baseline Hgb 9) presents with a 3 day history of cough, diarrhea, and weakness, found to have COVID, UTI, and JERI.
81M w/ CAD s/p PCI, Afib on Eliquis, HTN, HLD, h/o PE/DVT, stage IV prostate CA s/p prostatectomy on Lupron, and anemia (baseline Hgb 9) presents with a 3 day history of cough, diarrhea, and weakness, found to have COVID, UTI, and JERI.
81M w/ CAD s/p PCI, Afib on eliquis, HTN, HLD, h/o PE/DVT, stage IV prostate CA s/p prostatectomy and on Lupron, and anemia (baseline Hgb 9) presents with a 3 day history of cough, diarrhea, and weakness, found to have COVID, UTI, and JERI.
81M w/ CAD s/p PCI, Afib on Eliquis, HTN, HLD, h/o PE/DVT, stage IV prostate CA s/p prostatectomy on Lupron, and anemia (baseline Hgb 9) presents with a 3 day history of cough, diarrhea, and weakness, found to have COVID, UTI, and JERI.
81M w/ CAD s/p PCI, Afib on Eliquis, HTN, HLD, h/o PE/DVT, stage IV prostate CA s/p prostatectomy on Lupron, and anemia (baseline Hgb 9) presents with a 3 day history of cough, diarrhea, and weakness, found to have C. Diff, COVID, UTI, and JERI.
81M w/ CAD s/p PCI, Afib on eliquis, HTN, HLD, h/o PE/DVT, stage IV prostate CA s/p prostatectomy and on Lupron, and anemia (baseline Hgb 9) presents with a 3 day history of cough, diarrhea, and weakness, found to have COVID, UTI, and JERI.
81M w/ CAD s/p PCI, Afib on Eliquis, HTN, HLD, h/o PE/DVT, stage IV prostate CA s/p prostatectomy on Lupron, and anemia (baseline Hgb 9) presents with a 3 day history of cough, diarrhea, and weakness, found to have C. Diff, COVID, UTI, and JERI.

## 2022-11-16 NOTE — DISCHARGE NOTE NURSING/CASE MANAGEMENT/SOCIAL WORK - NSDCPEFALRISK_GEN_ALL_CORE
For information on Fall & Injury Prevention, visit: [email or web address] OR  [email or web address] OR  https://www.cdc.gov/steadi/patient.html

## 2022-11-17 RX ORDER — FIDAXOMICIN 200 MG/5ML
1 GRANULE, FOR SUSPENSION ORAL
Qty: 16 | Refills: 0
Start: 2022-11-17 | End: 2022-11-24

## 2022-11-18 ENCOUNTER — APPOINTMENT (OUTPATIENT)
Dept: HEART AND VASCULAR | Facility: CLINIC | Age: 82
End: 2022-11-18
Payer: MEDICARE

## 2022-11-18 ENCOUNTER — NON-APPOINTMENT (OUTPATIENT)
Age: 82
End: 2022-11-18

## 2022-11-18 VITALS
DIASTOLIC BLOOD PRESSURE: 78 MMHG | WEIGHT: 120 LBS | HEIGHT: 69 IN | OXYGEN SATURATION: 97 % | HEART RATE: 79 BPM | TEMPERATURE: 96.8 F | BODY MASS INDEX: 17.77 KG/M2 | SYSTOLIC BLOOD PRESSURE: 130 MMHG

## 2022-11-18 PROCEDURE — 93000 ELECTROCARDIOGRAM COMPLETE: CPT

## 2022-11-18 PROCEDURE — 99214 OFFICE O/P EST MOD 30 MIN: CPT | Mod: 25

## 2022-11-22 ENCOUNTER — RX RENEWAL (OUTPATIENT)
Age: 82
End: 2022-11-22

## 2022-11-22 ENCOUNTER — APPOINTMENT (OUTPATIENT)
Dept: INTERNAL MEDICINE | Facility: CLINIC | Age: 82
End: 2022-11-22

## 2022-11-22 VITALS
HEIGHT: 60 IN | WEIGHT: 120 LBS | HEART RATE: 100 BPM | OXYGEN SATURATION: 95 % | BODY MASS INDEX: 23.56 KG/M2 | SYSTOLIC BLOOD PRESSURE: 119 MMHG | TEMPERATURE: 97.2 F | DIASTOLIC BLOOD PRESSURE: 73 MMHG

## 2022-11-22 PROCEDURE — 99213 OFFICE O/P EST LOW 20 MIN: CPT

## 2022-11-22 RX ORDER — LIDOCAINE 5% 700 MG/1
5 PATCH TOPICAL
Qty: 90 | Refills: 0 | Status: ACTIVE | COMMUNITY
Start: 2022-11-22 | End: 1900-01-01

## 2022-11-22 RX ORDER — VANCOMYCIN HYDROCHLORIDE 125 MG/1
125 CAPSULE ORAL
Qty: 40 | Refills: 1 | Status: ACTIVE | COMMUNITY
Start: 2022-11-22 | End: 1900-01-01

## 2022-11-22 NOTE — HISTORY OF PRESENT ILLNESS
[FreeTextEntry1] : Recent hospitalization for UTI\par Also  Cdiff; Was  getting Dificid \par Vancomycin and Flagyl was not very effective;  [de-identified] : In addition lost teeth in hospital; Has them agin';\par Appetite good\par Urination okay \par

## 2022-11-22 NOTE — ASSESSMENT
[FreeTextEntry1] : Will try Vancomycin again;\par Lidocaine patch \par Will try labs again next visit ;\par Also will inquire about Dificid

## 2022-11-23 DIAGNOSIS — Z79.899 OTHER LONG TERM (CURRENT) DRUG THERAPY: ICD-10-CM

## 2022-11-23 DIAGNOSIS — R53.1 WEAKNESS: ICD-10-CM

## 2022-11-23 DIAGNOSIS — Z98.61 CORONARY ANGIOPLASTY STATUS: ICD-10-CM

## 2022-11-23 DIAGNOSIS — N13.4 HYDROURETER: ICD-10-CM

## 2022-11-23 DIAGNOSIS — I10 ESSENTIAL (PRIMARY) HYPERTENSION: ICD-10-CM

## 2022-11-23 DIAGNOSIS — I25.10 ATHEROSCLEROTIC HEART DISEASE OF NATIVE CORONARY ARTERY WITHOUT ANGINA PECTORIS: ICD-10-CM

## 2022-11-23 DIAGNOSIS — N13.30 UNSPECIFIED HYDRONEPHROSIS: ICD-10-CM

## 2022-11-23 DIAGNOSIS — H40.9 UNSPECIFIED GLAUCOMA: ICD-10-CM

## 2022-11-23 DIAGNOSIS — N17.9 ACUTE KIDNEY FAILURE, UNSPECIFIED: ICD-10-CM

## 2022-11-23 DIAGNOSIS — Z86.718 PERSONAL HISTORY OF OTHER VENOUS THROMBOSIS AND EMBOLISM: ICD-10-CM

## 2022-11-23 DIAGNOSIS — I48.0 PAROXYSMAL ATRIAL FIBRILLATION: ICD-10-CM

## 2022-11-23 DIAGNOSIS — Z85.46 PERSONAL HISTORY OF MALIGNANT NEOPLASM OF PROSTATE: ICD-10-CM

## 2022-11-23 DIAGNOSIS — A04.72 ENTEROCOLITIS DUE TO CLOSTRIDIUM DIFFICILE, NOT SPECIFIED AS RECURRENT: ICD-10-CM

## 2022-11-23 DIAGNOSIS — N39.0 URINARY TRACT INFECTION, SITE NOT SPECIFIED: ICD-10-CM

## 2022-11-23 DIAGNOSIS — D63.8 ANEMIA IN OTHER CHRONIC DISEASES CLASSIFIED ELSEWHERE: ICD-10-CM

## 2022-11-23 DIAGNOSIS — D50.9 IRON DEFICIENCY ANEMIA, UNSPECIFIED: ICD-10-CM

## 2022-11-23 DIAGNOSIS — E11.9 TYPE 2 DIABETES MELLITUS WITHOUT COMPLICATIONS: ICD-10-CM

## 2022-11-23 DIAGNOSIS — E43 UNSPECIFIED SEVERE PROTEIN-CALORIE MALNUTRITION: ICD-10-CM

## 2022-11-23 DIAGNOSIS — Z86.711 PERSONAL HISTORY OF PULMONARY EMBOLISM: ICD-10-CM

## 2022-11-23 DIAGNOSIS — Z79.82 LONG TERM (CURRENT) USE OF ASPIRIN: ICD-10-CM

## 2022-11-23 DIAGNOSIS — C77.9 SECONDARY AND UNSPECIFIED MALIGNANT NEOPLASM OF LYMPH NODE, UNSPECIFIED: ICD-10-CM

## 2022-11-23 DIAGNOSIS — R59.9 ENLARGED LYMPH NODES, UNSPECIFIED: ICD-10-CM

## 2022-11-23 DIAGNOSIS — U07.1 COVID-19: ICD-10-CM

## 2022-11-23 DIAGNOSIS — E78.5 HYPERLIPIDEMIA, UNSPECIFIED: ICD-10-CM

## 2022-11-23 LAB
ALBUMIN SERPL ELPH-MCNC: 3.6 G/DL
ALP BLD-CCNC: 65 U/L
ALT SERPL-CCNC: 6 U/L
ANION GAP SERPL CALC-SCNC: 16 MMOL/L
AST SERPL-CCNC: 45 U/L
BILIRUB SERPL-MCNC: 0.4 MG/DL
BUN SERPL-MCNC: 23 MG/DL
CALCIUM SERPL-MCNC: 9.6 MG/DL
CHLORIDE SERPL-SCNC: 103 MMOL/L
CO2 SERPL-SCNC: 17 MMOL/L
CREAT SERPL-MCNC: 1.78 MG/DL
EGFR: 38 ML/MIN/1.73M2
GLUCOSE SERPL-MCNC: 88 MG/DL
POTASSIUM SERPL-SCNC: 4 MMOL/L
PROT SERPL-MCNC: 6.9 G/DL
SODIUM SERPL-SCNC: 136 MMOL/L

## 2022-11-29 ENCOUNTER — OUTPATIENT (OUTPATIENT)
Dept: OUTPATIENT SERVICES | Facility: HOSPITAL | Age: 82
LOS: 1 days | End: 2022-11-29
Payer: MEDICARE

## 2022-11-29 ENCOUNTER — RESULT REVIEW (OUTPATIENT)
Age: 82
End: 2022-11-29

## 2022-11-29 ENCOUNTER — APPOINTMENT (OUTPATIENT)
Dept: INTERNAL MEDICINE | Facility: CLINIC | Age: 82
End: 2022-11-29

## 2022-11-29 DIAGNOSIS — I25.10 ATHEROSCLEROTIC HEART DISEASE OF NATIVE CORONARY ARTERY WITHOUT ANGINA PECTORIS: ICD-10-CM

## 2022-11-29 PROCEDURE — A9500: CPT

## 2022-11-29 PROCEDURE — 93016 CV STRESS TEST SUPVJ ONLY: CPT

## 2022-11-29 PROCEDURE — 93017 CV STRESS TEST TRACING ONLY: CPT

## 2022-11-29 PROCEDURE — 93018 CV STRESS TEST I&R ONLY: CPT

## 2022-11-29 PROCEDURE — 78452 HT MUSCLE IMAGE SPECT MULT: CPT

## 2022-11-29 PROCEDURE — 78452 HT MUSCLE IMAGE SPECT MULT: CPT | Mod: 26

## 2022-11-29 RX ORDER — FIDAXOMICIN 200 MG/1
200 TABLET, FILM COATED ORAL TWICE DAILY
Qty: 20 | Refills: 0 | Status: ACTIVE | COMMUNITY
Start: 2021-10-01 | End: 1900-01-01

## 2022-11-30 ENCOUNTER — APPOINTMENT (OUTPATIENT)
Dept: HEART AND VASCULAR | Facility: CLINIC | Age: 82
End: 2022-11-30

## 2022-11-30 ENCOUNTER — APPOINTMENT (OUTPATIENT)
Dept: INTERNAL MEDICINE | Facility: CLINIC | Age: 82
End: 2022-11-30

## 2022-11-30 VITALS
OXYGEN SATURATION: 98 % | HEIGHT: 67 IN | TEMPERATURE: 97.7 F | SYSTOLIC BLOOD PRESSURE: 110 MMHG | BODY MASS INDEX: 19.3 KG/M2 | DIASTOLIC BLOOD PRESSURE: 67 MMHG | HEART RATE: 71 BPM | WEIGHT: 123 LBS

## 2022-11-30 VITALS
WEIGHT: 123.5 LBS | TEMPERATURE: 97.7 F | BODY MASS INDEX: 19.38 KG/M2 | HEART RATE: 66 BPM | OXYGEN SATURATION: 97 % | DIASTOLIC BLOOD PRESSURE: 54 MMHG | SYSTOLIC BLOOD PRESSURE: 100 MMHG | HEIGHT: 67 IN

## 2022-11-30 DIAGNOSIS — R19.7 DIARRHEA, UNSPECIFIED: ICD-10-CM

## 2022-11-30 DIAGNOSIS — D64.9 ANEMIA, UNSPECIFIED: ICD-10-CM

## 2022-11-30 DIAGNOSIS — A04.72 ENTEROCOLITIS DUE TO CLOSTRIDIUM DIFFICILE, NOT SPECIFIED AS RECURRENT: ICD-10-CM

## 2022-11-30 PROCEDURE — 99213 OFFICE O/P EST LOW 20 MIN: CPT

## 2022-11-30 PROCEDURE — 93306 TTE W/DOPPLER COMPLETE: CPT

## 2022-11-30 NOTE — HISTORY OF PRESENT ILLNESS
[FreeTextEntry1] : Diarrhea may be improved\par Stress test done [de-identified] : Cardiology follow up noted

## 2022-11-30 NOTE — ASSESSMENT
[FreeTextEntry1] : Recurrent C Diff\par May need fecal transplant\par Another course of Dificid\par

## 2022-12-02 RX ORDER — MAGNESIUM OXIDE 241.3 MG/1000MG
400 TABLET ORAL DAILY
Qty: 90 | Refills: 3 | Status: ACTIVE | COMMUNITY
Start: 2021-06-07 | End: 1900-01-01

## 2022-12-02 NOTE — DISCHARGE NOTE PROVIDER - DISCHARGE DATE
CSE Block  Performed by: Jimmy Boggs MD  Authorized by: Jimmy Boggs MD     Patient Location:  OB  Indication: procedure for pain    Pre anesthesia checklist: Patient Identified (2 criteria), Consent Verified, Necessary Block Equipment Present, Monitors applied, IV Bolus, Allergies confirmed, Block Plan Confirmed, Drugs/Solutions Labeled, IV Access functioning, Timeout Performed, Coagulation Status, Block site marked (if applicable), Resuscitation equipment available, Sedation given (if needed) and Aseptic technique  CSE:   Patient Position:  Sitting  Prep:  Chlorhexidine gluconate (CHG)  Max Sterile Barrier Technique:  Hand washing, cap/mask, sterile gloves, sterile drape and sterile dressing applied  Monitoring:  Heart rate, continuous pulse oximetry and non-invasive blood pressure  Approach:  Midline  Injection Technique:  LINNETTE air  Epidural Needle:     Needle Type:  Tuohy    Needle Gauge:  17 G    Needle Length:  3.5 in    LINNETTE Depth:  7 (cm)    Location:  L3-4  Spinal Needle:     Needle Type:  Pencil-tip    Needle Gauge:  25 G  Epidural Catheter:     CSF Obtained: Yes      Catheter Type:  Side hole    Catheter at Skin Depth:  13 cm   Securement Method: clear occlusive dressing and surgical tape    Test Dose Result:  Negative  Medications Administered   lidocaine (PF) 1 % - Subcutaneous   3 mL - 12/2/2022 4:12:00 PM  bupivacaine (MARCAINE) 0.25 % - Intrathecal   1.2 mL - 12/2/2022 4:21:00 PM  Assessment:    Block Outcome: pain improved  Number of Attempts: 2   Procedure Assessment: patient tolerated procedure well with no complications   Sensory Level:  T10      Performed By:  Anesthesiologist    Anesthesiologist:  Jimmy Boggs MD    Start Time: 12/2/2022 4:13 PM    Stop Time: 12/2/2022 4:23 PM     25-Aug-2021

## 2022-12-19 NOTE — PROGRESS NOTE ADULT - PROBLEM SELECTOR PLAN 12
Bone marrow NGS studies unremarkable   Plan:  F: None  E: replete K<4, Mg<2  N: Pureed (per pt preference)  VTE Prophylaxis: On Eliquis 2.5mg BID  GI: None  C: Full Code  D: ADRIEN

## 2022-12-25 ENCOUNTER — EMERGENCY (EMERGENCY)
Facility: HOSPITAL | Age: 82
LOS: 1 days | Discharge: HOME | End: 2022-12-25
Attending: STUDENT IN AN ORGANIZED HEALTH CARE EDUCATION/TRAINING PROGRAM | Admitting: STUDENT IN AN ORGANIZED HEALTH CARE EDUCATION/TRAINING PROGRAM
Payer: MEDICARE

## 2022-12-25 VITALS
WEIGHT: 119.93 LBS | TEMPERATURE: 98 F | OXYGEN SATURATION: 100 % | HEART RATE: 85 BPM | HEIGHT: 68 IN | DIASTOLIC BLOOD PRESSURE: 76 MMHG | SYSTOLIC BLOOD PRESSURE: 147 MMHG | RESPIRATION RATE: 19 BRPM

## 2022-12-25 LAB
HCT VFR BLD CALC: 32.8 % — LOW (ref 39–50)
HGB BLD-MCNC: 9.7 G/DL — LOW (ref 13–17)
MCHC RBC-ENTMCNC: 25.5 PG — LOW (ref 27–34)
MCHC RBC-ENTMCNC: 29.6 GM/DL — LOW (ref 32–36)
MCV RBC AUTO: 86.3 FL — SIGNIFICANT CHANGE UP (ref 80–100)
PLATELET # BLD AUTO: 332 K/UL — SIGNIFICANT CHANGE UP (ref 150–400)
RBC # BLD: 3.8 M/UL — LOW (ref 4.2–5.8)
RBC # FLD: 22.4 % — HIGH (ref 10.3–14.5)
WBC # BLD: 8.86 K/UL — SIGNIFICANT CHANGE UP (ref 3.8–10.5)
WBC # FLD AUTO: 8.86 K/UL — SIGNIFICANT CHANGE UP (ref 3.8–10.5)

## 2022-12-25 PROCEDURE — 93010 ELECTROCARDIOGRAM REPORT: CPT

## 2022-12-25 PROCEDURE — 71045 X-RAY EXAM CHEST 1 VIEW: CPT | Mod: 26

## 2022-12-25 PROCEDURE — 99285 EMERGENCY DEPT VISIT HI MDM: CPT | Mod: 25

## 2022-12-25 RX ORDER — KETOROLAC TROMETHAMINE 30 MG/ML
30 SYRINGE (ML) INJECTION ONCE
Refills: 0 | Status: DISCONTINUED | OUTPATIENT
Start: 2022-12-25 | End: 2022-12-25

## 2022-12-25 RX ADMIN — Medication 30 MILLIGRAM(S): at 23:48

## 2022-12-25 NOTE — ED ADULT NURSE NOTE - OBJECTIVE STATEMENT
Patient a+o x4 c/o chest discomfort with deep inspiration since last night. Hx prostate ca, bone mets, not on any treatment at this time. Hx of recurrent c.diff. Ambulating with steady gait with cane. Daughter Verna is primary caregiver, provided MHx. Maintaining patent airway, denies sob/cp/dizziness/n/v/fever/chills. In patient gown, safety measures initiated, on cm.

## 2022-12-25 NOTE — ED ADULT NURSE NOTE - NSIMPLEMENTINTERV_GEN_ALL_ED
Implemented All Fall with Harm Risk Interventions:  [place] to call system. Call bell, personal items and telephone within reach. Instruct patient to call for assistance. Room bathroom lighting operational. Non-slip footwear when patient is off stretcher. Physically safe environment: no spills, clutter or unnecessary equipment. Stretcher in lowest position, wheels locked, appropriate side rails in place. Provide visual cue, wrist band, yellow gown, etc. Monitor gait and stability. Monitor for mental status changes and reorient to person, place, and time. Review medications for side effects contributing to fall risk. Reinforce activity limits and safety measures with patient and family. Provide visual clues: red socks.

## 2022-12-25 NOTE — ED ADULT TRIAGE NOTE - CHIEF COMPLAINT QUOTE
pt c/o chest discomfort, worse with deep breaths, beginning last night. denies fall/trauma. hx of prostate ca, bone mets, not currently on chemo.

## 2022-12-25 NOTE — ED PROVIDER NOTE - CLINICAL SUMMARY MEDICAL DECISION MAKING FREE TEXT BOX
The patient's risk factors for ACS were reviewed as well as the EKG.  will obtain CXR to evaluate for Pneumonia, Pneumothorax, Esophageal Tears.  no clinical signs of DVT, considering presence of pleuritic pain and dvt/pe history will obtain cta for pe evaluation.  There are no signs of Pericarditis, Endocarditis, or Myocarditis based on risk factor analysis.  There is no fever.  There does not appear to be an Aortic Dissection either based on history, physical exam, and signs. ekg unchanged from prior, will obtain troponin x1 as pain has been present for >1 day. consider musculoskeletal such as chondritis will administer toradol. d/w patients cardiologist dr. olson, as recent normal stress and presence of factors indicative of likely atypical cause of pain if ed workup is normal patient canfollow with her in clinic on friday.

## 2022-12-25 NOTE — ED PROVIDER NOTE - OBJECTIVE STATEMENT
81 yo M with past medical history of CAD s/p PCI, Afib (on Eliquis), HTN, HLD, h/o PE/DVT, stage IV prostate CA s/p prostatectomy (on Lupron), and anemia (baseline Hgb 9) presenting with chest pain since yesterday. diffusely across chest, constant pain, worsened by chest wall movement. has pain with taking a breath, but otherwise no sob. no fever or cough. no leg swelling or pain. no exertional component to symptoms. no radiation of pain. taking his medications as prescribed. otherwise in usual state of health. was admitted in november for covid-19 and c.diff, symptoms of those have improved since discharge. had recent stress test on 11/29 showing-     "Myocardial perfusion imaging is normal. Overall left ventricular systolic   function is hyperdynamic without regional wall motion abnormalities. The   EKG stress test is normal"

## 2022-12-25 NOTE — ED PROVIDER NOTE - PROGRESS NOTE DETAILS
cta negative for PE, patient with nown metastatic prostate cancer. will dc with tylenol and voltaren.

## 2022-12-25 NOTE — ED PROVIDER NOTE - PHYSICAL EXAMINATION
General: Awake, alert and oriented. No acute distress. Well developed, hydrated and nourished. Appears stated age.  Skin: Skin in warm, dry and intact without rashes or lesions. Appropriate color for ethnicity  HENMT: head normocephalic and atraumatic; bilateral external ears without swelling. no nasal discharge. moist oral mucosa. supple neck, trachea midline  EYES: Conjunctiva clear. nonicteric sclera. EOM intact, Eyelids are normal in appearance without swelling or lesions.  Cardiac: well perfused, s1, s2, rrr  Respiratory: breathing comfortably on room air. no audible wheezing or stridor, lungs ctab, bilateral anterior chest wall ttp, no deformities or skin changes.   Abdominal: nondistended  MSK: Neck and back are without deformity, visible external skin changes, or signs of trauma. Curvature of the cervical, thoracic, and lumbar spine are within normal limits. no external signs of trauma. no apparent deficits in ROM of any extremity. no leg swelling or tenderness  Neurological: The patient is awake, alert and oriented to person, place, and time with normal speech. CN 2-12 grossly intact. no apparent deficits. Memory is normal and thought process is intact.  Psychiatric: Appropriate mood and affect. Good judgement and insight.

## 2022-12-25 NOTE — ED PROVIDER NOTE - CARE PROVIDER_API CALL
Charli Sánchez)  Cardiology  [address] [address]  Phone: [phone]  Fax: [phone]  Follow Up Time:

## 2022-12-25 NOTE — ED PROVIDER NOTE - PATIENT PORTAL LINK FT
You can access the FollowMyHealth Patient Portal offered by [facility] by registering at the following website: [email or web address]. By joining [facility]’s FollowMyHealth portal, you will also be able to view your health information using other applications (apps) compatible with our system.

## 2022-12-26 VITALS
OXYGEN SATURATION: 98 % | RESPIRATION RATE: 16 BRPM | DIASTOLIC BLOOD PRESSURE: 65 MMHG | HEART RATE: 90 BPM | TEMPERATURE: 98 F | SYSTOLIC BLOOD PRESSURE: 120 MMHG

## 2022-12-26 LAB
ACANTHOCYTES BLD QL SMEAR: SLIGHT — SIGNIFICANT CHANGE UP
ALBUMIN SERPL ELPH-MCNC: 3.8 G/DL — SIGNIFICANT CHANGE UP (ref 3.3–5)
ALP SERPL-CCNC: 85 U/L — SIGNIFICANT CHANGE UP (ref 40–120)
ALT FLD-CCNC: 7 U/L — LOW (ref 10–45)
ANION GAP SERPL CALC-SCNC: 14 MMOL/L — SIGNIFICANT CHANGE UP (ref 5–17)
ANISOCYTOSIS BLD QL: SLIGHT — SIGNIFICANT CHANGE UP
AST SERPL-CCNC: 32 U/L — SIGNIFICANT CHANGE UP (ref 10–40)
BASOPHILS # BLD AUTO: 0.32 K/UL — HIGH (ref 0–0.2)
BASOPHILS NFR BLD AUTO: 3.6 % — HIGH (ref 0–2)
BILIRUB SERPL-MCNC: 0.2 MG/DL — SIGNIFICANT CHANGE UP (ref 0.2–1.2)
BUN SERPL-MCNC: 22 MG/DL — SIGNIFICANT CHANGE UP (ref 7–23)
BURR CELLS BLD QL SMEAR: PRESENT — SIGNIFICANT CHANGE UP
CALCIUM SERPL-MCNC: 9.5 MG/DL — SIGNIFICANT CHANGE UP (ref 8.4–10.5)
CHLORIDE SERPL-SCNC: 106 MMOL/L — SIGNIFICANT CHANGE UP (ref 96–108)
CK MB CFR SERPL CALC: <1 NG/ML — SIGNIFICANT CHANGE UP (ref 0–6.7)
CK SERPL-CCNC: 39 U/L — SIGNIFICANT CHANGE UP (ref 30–200)
CO2 SERPL-SCNC: 19 MMOL/L — LOW (ref 22–31)
CREAT SERPL-MCNC: 1.5 MG/DL — HIGH (ref 0.5–1.3)
DACRYOCYTES BLD QL SMEAR: SIGNIFICANT CHANGE UP
EGFR: 46 ML/MIN/1.73M2 — LOW
ELLIPTOCYTES BLD QL SMEAR: SLIGHT — SIGNIFICANT CHANGE UP
EOSINOPHIL # BLD AUTO: 0.24 K/UL — SIGNIFICANT CHANGE UP (ref 0–0.5)
EOSINOPHIL NFR BLD AUTO: 2.7 % — SIGNIFICANT CHANGE UP (ref 0–6)
GIANT PLATELETS BLD QL SMEAR: PRESENT — SIGNIFICANT CHANGE UP
GLUCOSE SERPL-MCNC: 144 MG/DL — HIGH (ref 70–99)
HYPOCHROMIA BLD QL: SLIGHT — SIGNIFICANT CHANGE UP
LYMPHOCYTES # BLD AUTO: 0.48 K/UL — LOW (ref 1–3.3)
LYMPHOCYTES # BLD AUTO: 5.4 % — LOW (ref 13–44)
MACROCYTES BLD QL: SLIGHT — SIGNIFICANT CHANGE UP
MANUAL SMEAR VERIFICATION: SIGNIFICANT CHANGE UP
MICROCYTES BLD QL: SLIGHT — SIGNIFICANT CHANGE UP
MONOCYTES # BLD AUTO: 0.64 K/UL — SIGNIFICANT CHANGE UP (ref 0–0.9)
MONOCYTES NFR BLD AUTO: 7.2 % — SIGNIFICANT CHANGE UP (ref 2–14)
MYELOCYTES NFR BLD: 0.9 % — HIGH (ref 0–0)
NEUTROPHILS # BLD AUTO: 6.79 K/UL — SIGNIFICANT CHANGE UP (ref 1.8–7.4)
NEUTROPHILS NFR BLD AUTO: 74.8 % — SIGNIFICANT CHANGE UP (ref 43–77)
NEUTS BAND # BLD: 1.8 % — SIGNIFICANT CHANGE UP (ref 0–8)
NT-PROBNP SERPL-SCNC: 1209 PG/ML — HIGH (ref 0–300)
OVALOCYTES BLD QL SMEAR: SLIGHT — SIGNIFICANT CHANGE UP
PLAT MORPH BLD: NORMAL — SIGNIFICANT CHANGE UP
POIKILOCYTOSIS BLD QL AUTO: SIGNIFICANT CHANGE UP
POLYCHROMASIA BLD QL SMEAR: SLIGHT — SIGNIFICANT CHANGE UP
POTASSIUM SERPL-MCNC: 3.8 MMOL/L — SIGNIFICANT CHANGE UP (ref 3.5–5.3)
POTASSIUM SERPL-SCNC: 3.8 MMOL/L — SIGNIFICANT CHANGE UP (ref 3.5–5.3)
PROT SERPL-MCNC: 7.5 G/DL — SIGNIFICANT CHANGE UP (ref 6–8.3)
RBC BLD AUTO: ABNORMAL
SARS-COV-2 RNA SPEC QL NAA+PROBE: NEGATIVE — SIGNIFICANT CHANGE UP
SCHISTOCYTES BLD QL AUTO: SLIGHT — SIGNIFICANT CHANGE UP
SODIUM SERPL-SCNC: 139 MMOL/L — SIGNIFICANT CHANGE UP (ref 135–145)
SPHEROCYTES BLD QL SMEAR: SLIGHT — SIGNIFICANT CHANGE UP
TARGETS BLD QL SMEAR: SLIGHT — SIGNIFICANT CHANGE UP
TROPONIN T SERPL-MCNC: 0.01 NG/ML — SIGNIFICANT CHANGE UP (ref 0–0.01)
VARIANT LYMPHS # BLD: 3.6 % — SIGNIFICANT CHANGE UP (ref 0–6)

## 2022-12-26 PROCEDURE — 87635 SARS-COV-2 COVID-19 AMP PRB: CPT

## 2022-12-26 PROCEDURE — 99285 EMERGENCY DEPT VISIT HI MDM: CPT | Mod: 25

## 2022-12-26 PROCEDURE — 83880 ASSAY OF NATRIURETIC PEPTIDE: CPT

## 2022-12-26 PROCEDURE — 71045 X-RAY EXAM CHEST 1 VIEW: CPT | Mod: 26

## 2022-12-26 PROCEDURE — 96374 THER/PROPH/DIAG INJ IV PUSH: CPT | Mod: XU

## 2022-12-26 PROCEDURE — 36415 COLL VENOUS BLD VENIPUNCTURE: CPT

## 2022-12-26 PROCEDURE — 85025 COMPLETE CBC W/AUTO DIFF WBC: CPT

## 2022-12-26 PROCEDURE — 71275 CT ANGIOGRAPHY CHEST: CPT | Mod: MA

## 2022-12-26 PROCEDURE — 80053 COMPREHEN METABOLIC PANEL: CPT

## 2022-12-26 PROCEDURE — 93005 ELECTROCARDIOGRAM TRACING: CPT

## 2022-12-26 PROCEDURE — 82550 ASSAY OF CK (CPK): CPT

## 2022-12-26 PROCEDURE — 84484 ASSAY OF TROPONIN QUANT: CPT

## 2022-12-26 PROCEDURE — 71045 X-RAY EXAM CHEST 1 VIEW: CPT

## 2022-12-26 PROCEDURE — 71275 CT ANGIOGRAPHY CHEST: CPT | Mod: 26,MA

## 2022-12-26 PROCEDURE — 82553 CREATINE MB FRACTION: CPT

## 2022-12-26 RX ORDER — ACETAMINOPHEN 500 MG
1 TABLET ORAL
Qty: 28 | Refills: 0
Start: 2022-12-26 | End: 2023-01-01

## 2022-12-26 RX ORDER — DICLOFENAC SODIUM 30 MG/G
4 GEL TOPICAL
Qty: 100 | Refills: 0
Start: 2022-12-26

## 2022-12-26 NOTE — ED ADULT NURSE REASSESSMENT NOTE - NS ED NURSE REASSESS COMMENT FT1
Received report from [facility] [place]. Received patient in stretcher. AOX4. Vital signs as noted in flowsheet.  Patient denies chest pain, pain, discomfort, shortness of breath, difficulty breathing and any form of distress not noted post Toradol given by previous rn as ordered. Patient oriented to ED area. Plan of care discussed and verbalized understanding. All needs attended. Pt on cardiac monitor. Fall risk precautions maintained. Purposeful proactive hourly rounding in progress.

## 2022-12-27 DIAGNOSIS — R07.89 OTHER CHEST PAIN: ICD-10-CM

## 2022-12-27 DIAGNOSIS — Z91.048 OTHER NONMEDICINAL SUBSTANCE ALLERGY STATUS: ICD-10-CM

## 2022-12-27 DIAGNOSIS — Z20.822 CONTACT WITH AND (SUSPECTED) EXPOSURE TO COVID-19: ICD-10-CM

## 2022-12-27 DIAGNOSIS — Z88.8 ALLERGY STATUS TO OTHER DRUGS, MEDICAMENTS AND BIOLOGICAL SUBSTANCES STATUS: ICD-10-CM

## 2022-12-27 DIAGNOSIS — I25.10 ATHEROSCLEROTIC HEART DISEASE OF NATIVE CORONARY ARTERY WITHOUT ANGINA PECTORIS: ICD-10-CM

## 2022-12-27 DIAGNOSIS — E78.5 HYPERLIPIDEMIA, UNSPECIFIED: ICD-10-CM

## 2022-12-27 DIAGNOSIS — Z86.718 PERSONAL HISTORY OF OTHER VENOUS THROMBOSIS AND EMBOLISM: ICD-10-CM

## 2022-12-27 DIAGNOSIS — E11.9 TYPE 2 DIABETES MELLITUS WITHOUT COMPLICATIONS: ICD-10-CM

## 2022-12-27 DIAGNOSIS — I48.91 UNSPECIFIED ATRIAL FIBRILLATION: ICD-10-CM

## 2022-12-27 DIAGNOSIS — Z79.01 LONG TERM (CURRENT) USE OF ANTICOAGULANTS: ICD-10-CM

## 2022-12-27 DIAGNOSIS — Z85.46 PERSONAL HISTORY OF MALIGNANT NEOPLASM OF PROSTATE: ICD-10-CM

## 2022-12-27 DIAGNOSIS — Z86.2 PERSONAL HISTORY OF DISEASES OF THE BLOOD AND BLOOD-FORMING ORGANS AND CERTAIN DISORDERS INVOLVING THE IMMUNE MECHANISM: ICD-10-CM

## 2022-12-27 DIAGNOSIS — I10 ESSENTIAL (PRIMARY) HYPERTENSION: ICD-10-CM

## 2022-12-27 DIAGNOSIS — Z86.711 PERSONAL HISTORY OF PULMONARY EMBOLISM: ICD-10-CM

## 2022-12-27 DIAGNOSIS — Z79.82 LONG TERM (CURRENT) USE OF ASPIRIN: ICD-10-CM

## 2022-12-27 DIAGNOSIS — Z90.79 ACQUIRED ABSENCE OF OTHER GENITAL ORGAN(S): ICD-10-CM

## 2022-12-27 DIAGNOSIS — H40.9 UNSPECIFIED GLAUCOMA: ICD-10-CM

## 2022-12-30 ENCOUNTER — APPOINTMENT (OUTPATIENT)
Dept: HEART AND VASCULAR | Facility: CLINIC | Age: 82
End: 2022-12-30
Payer: MEDICARE

## 2022-12-30 ENCOUNTER — NON-APPOINTMENT (OUTPATIENT)
Age: 82
End: 2022-12-30

## 2022-12-30 VITALS
HEIGHT: 67 IN | TEMPERATURE: 97.6 F | SYSTOLIC BLOOD PRESSURE: 130 MMHG | BODY MASS INDEX: 18.99 KG/M2 | RESPIRATION RATE: 14 BRPM | DIASTOLIC BLOOD PRESSURE: 68 MMHG | WEIGHT: 121 LBS

## 2022-12-30 DIAGNOSIS — Z98.61 ATHEROSCLEROTIC HEART DISEASE OF NATIVE CORONARY ARTERY W/OUT ANGINA PECTORIS: ICD-10-CM

## 2022-12-30 DIAGNOSIS — R07.9 CHEST PAIN, UNSPECIFIED: ICD-10-CM

## 2022-12-30 DIAGNOSIS — I25.10 ATHEROSCLEROTIC HEART DISEASE OF NATIVE CORONARY ARTERY W/OUT ANGINA PECTORIS: ICD-10-CM

## 2022-12-30 PROCEDURE — 93306 TTE W/DOPPLER COMPLETE: CPT

## 2022-12-30 PROCEDURE — 93000 ELECTROCARDIOGRAM COMPLETE: CPT

## 2022-12-30 PROCEDURE — 99214 OFFICE O/P EST MOD 30 MIN: CPT | Mod: 25

## 2022-12-30 RX ORDER — NITROGLYCERIN 0.4 MG/1
0.4 TABLET SUBLINGUAL
Qty: 30 | Refills: 3 | Status: ACTIVE | COMMUNITY
Start: 2022-12-30 | End: 1900-01-01

## 2023-01-03 VITALS
HEIGHT: 69 IN | OXYGEN SATURATION: 100 % | SYSTOLIC BLOOD PRESSURE: 141 MMHG | TEMPERATURE: 97 F | RESPIRATION RATE: 16 BRPM | HEART RATE: 72 BPM | WEIGHT: 119.93 LBS | DIASTOLIC BLOOD PRESSURE: 64 MMHG

## 2023-01-03 NOTE — H&P ADULT - NEUROLOGICAL
normal/cranial nerves II-XII intact/sensation intact normal/sensation intact/responds to pain/responds to verbal commands/no spontaneous movement

## 2023-01-03 NOTE — H&P ADULT - ASSESSMENT
81 yo M, former smoker with PMHx of CAD (s/p IRASEMA x 1 to OM1 3/30/21), pAfib (on Eliquis 2.5mg BID, last dose 1/3 AM), DVT/PE (1/2020), Stage IV prostate CA s/p prostatectomy/chemo/radiation (on Lupron), AOCD (baseline Hgb 9) w/ hx of transfusions in the past, and recent admission to the hospital in 11/2022  for COVID-19/UTI c/b C.Diff, who presented to his cardiologist, Dr. Sánchez , with complaints of dyspnea on exertion and chest pain. NST 11/29/22: Normal. Overall left ventricular systolic function is hyperdynamic without regional wall abnormalities. TTE 11/30/22: LV EF normal with an EF of 64%. G2DD. Mild to moderate mitral valve regurgitation. Mild tricuspid regurgitation. Mild pulmonary hypertension ( PASP 35mmHg). In light of patient's risk factors, abnormal echo results, and CCS class 3 anginal/anginal-equivalent symptoms, patient is referred to [facility] for cardiac catheterization w/ possible intervention if clinically indicated.    -H/H = ____. Pt denies BRBPR, hematuria, hematochezia, melena. Pt compliant with home ASA 81mg QD however did not take today. As d/w Dr. Holland, loaded with 81mg ASA x1 and Plavix 600mg x1  -IStat Cr = 1.9. EF normal. Euvolemic on exam. IVF with 250cc bolus x1 followed by NS @ 100cc/hr started pre procedure in light of Cr  -Type of sedation: moderate  -Candidate for sedation: yes     Risks & benefits of procedure and alternative therapy have been explained to the patient including but not limited to: allergic reaction, bleeding w/possible need for blood transfusion, infection, renal and vascular compromise, limb damage, arrhythmia, stroke, vessel dissection/perforation, Myocardial infarction, emergent CABG. Informed consent obtained and in chart.    81 yo M, former smoker with PMHx of CAD (s/p IRASEMA x 1 to OM1 3/30/21), pAfib (on Eliquis 2.5mg BID, last dose 1/3 AM), DVT/PE (1/2020), Stage IV prostate CA s/p prostatectomy/chemo/radiation (on Lupron), AOCD (baseline Hgb 9) w/ hx of transfusions in the past, CKD (baseline appears 1.5-1.9) and recent admission to the hospital in 11/2022  for COVID-19/UTI c/b C.Diff, who presented to his cardiologist, Dr. Sánchez , with complaints of dyspnea on exertion and chest pain. NST 11/29/22: Normal. Overall left ventricular systolic function is hyperdynamic without regional wall abnormalities. TTE 11/30/22: LV EF normal with an EF of 64%. G2DD. Mild to moderate mitral valve regurgitation. Mild tricuspid regurgitation. Mild pulmonary hypertension ( PASP 35mmHg). In light of patient's risk factors, abnormal echo results, and CCS class 3 anginal/anginal-equivalent symptoms, patient is referred to [facility] for cardiac catheterization w/ possible intervention if clinically indicated.    -H/H = 9.1/30.5. Pt denies BRBPR, hematuria, hematochezia, melena. Pt compliant with home ASA 81mg QD however did not take today. As d/w Dr. Holland, loaded with 81mg ASA x1 and Plavix 600mg x1  -IStat Cr = 1.9. EF normal. Euvolemic on exam. Baseline appears 1.5-1.9 based on admission 11/2022 and outpatient labs. IVF with 250cc bolus x1 followed by NS @ 100cc/hr started pre procedure in light of Cr  -Type of sedation: moderate  -Candidate for sedation: yes     Risks & benefits of procedure and alternative therapy have been explained to the patient including but not limited to: allergic reaction, bleeding w/possible need for blood transfusion, infection, renal and vascular compromise, limb damage, arrhythmia, stroke, vessel dissection/perforation, Myocardial infarction, emergent CABG. Informed consent obtained and in chart.

## 2023-01-03 NOTE — H&P ADULT - BIRTH SEX
Male Dorsal Nasal Flap Text: The defect edges were debeveled with a #15c scalpel blade.  Given the location of the defect and the proximity to free margins a dorsal nasal flap was deemed most appropriate.  Using a sterile surgical marker, an appropriate dorsal nasal flap was drawn around the defect.    The area thus outlined was incised deep to adipose tissue with a #15 scalpel blade.  The skin margins were undermined to an appropriate distance in all directions utilizing iris scissors.

## 2023-01-03 NOTE — H&P ADULT - HISTORY OF PRESENT ILLNESS
COVID-19 PCR:  Cardiologist:  Pharmacy:  Escort:    **confirm meds and last dose of eliquis**    83 yo male with PMHx of CAD (s/p IRASEMA x 1 to OM1 3/30/21), pAfib (on Eliquis 2.5mg BID), DVT/PE (1/2020), Stage IV prostate CA s/p prostatectomy (on Lupron), Anemia (baseline Hgb 9), and recent admission to the hospital in 11/2022  for COVID-19 and C.Diff, who presented to his cardiologist, Dr. Sánchez , with complaints of dyspnea on exertion. NST 11/29/22: Normal. Overall left ventricular systolic function is hyperdynamic without regional wall abnormalities. TTE 11/30/22: LV EF normal with an EF of 64%. G2DD. Mild to moderate mitral valve regurgitation. Mild tricuspid regurgitation. Mild pulmonary hypertension ( PASP 35mmHg). Denies....    In light of patient's risk factors, abnormal ***** results, and CCS class ***anginal/anginal-equivalent symptoms, patient is referred to [facility] for cardiac catheterization w/ possible intervention if clinically indicated. COVID-19 PCR:  Cardiologist:  Pharmacy:  Escort:    **confirm meds and last dose of eliquis**    83 yo male with PMHx of CAD (s/p IRASEMA x 1 to OM1 3/30/21), pAfib (on Eliquis 2.5mg BID, last dose 1/3 PM), DVT/PE (1/2020), Stage IV prostate CA s/p prostatectomy (on Lupron), Anemia (baseline Hgb 9), and recent admission to the hospital in 11/2022  for COVID-19 and C.Diff, who presented to his cardiologist, Dr. Sánchez , with complaints of dyspnea on exertion. NST 11/29/22: Normal. Overall left ventricular systolic function is hyperdynamic without regional wall abnormalities. TTE 11/30/22: LV EF normal with an EF of 64%. G2DD. Mild to moderate mitral valve regurgitation. Mild tricuspid regurgitation. Mild pulmonary hypertension ( PASP 35mmHg). Denies....    In light of patient's risk factors, abnormal ***** results, and CCS class ***anginal/anginal-equivalent symptoms, patient is referred to [facility] for cardiac catheterization w/ possible intervention if clinically indicated. COVID-19 PCR:  Cardiologist: Dr. Sánchez  Pharmacy: [facility]  Escort: Daughter     81 yo male with PMHx of CAD (s/p IRASEMA x 1 to OM1 3/30/21), pAfib (on Eliquis 2.5mg BID, last dose 1/3 PM), DVT/PE (1/2020), Stage IV prostate CA s/p prostatectomy (on Lupron), Anemia (baseline Hgb 9), and recent admission to the hospital in 11/2022  for COVID-19 and C.Diff, who presented to his cardiologist, Dr. Sánchez , with complaints of dyspnea on exertion and chest pain. He describes an achy chest discomfort that increases with exertion associated with shortness of breath. NST 11/29/22: Normal. Overall left ventricular systolic function is hyperdynamic without regional wall abnormalities. TTE 11/30/22: LV EF normal with an EF of 64%. G2DD. Mild to moderate mitral valve regurgitation. Mild tricuspid regurgitation. Mild pulmonary hypertension ( PASP 35mmHg). Denies palpitations, dizziness, syncope, orthopnea, paroxysmal nocturnal dyspnea, cough, fever, abdominal pain, leg swelling    In light of patient's risk factors, abnormal echo results, and CCS class 3 anginal/anginal-equivalent symptoms, patient is referred to [facility] for cardiac catheterization w/ possible intervention if clinically indicated. COVID-19 PCR: Negative 1/3 @ [facility] (in chart)  Cardiologist: Dr. Sánchez  Pharmacy: [facility] verified w/ patient list at bedside   Escort: Daughter     83 yo M, former smoker with PMHx of CAD (s/p IRASEMA x 1 to OM1 3/30/21), pAfib (on Eliquis 2.5mg BID, last dose 1/3 AM), DVT/PE (1/2020), Stage IV prostate CA s/p prostatectomy/chemo/radiation (on Lupron), AOCD (baseline Hgb 9) w/ hx of transfusions in the past, and recent admission to the hospital in 11/2022  for COVID-19/UTI c/b C.Diff, who presented to his cardiologist, Dr. Sánchez , with complaints of dyspnea on exertion and chest pain. He describes an achy chest discomfort that increases with exertion associated with shortness of breath, relieved w/ rest. NST 11/29/22: Normal. Overall left ventricular systolic function is hyperdynamic without regional wall abnormalities. TTE 11/30/22: LV EF normal with an EF of 64%. G2DD. Mild to moderate mitral valve regurgitation. Mild tricuspid regurgitation. Mild pulmonary hypertension ( PASP 35mmHg). Denies palpitations, dizziness, syncope, orthopnea, paroxysmal nocturnal dyspnea, cough, fever, abdominal pain, leg swelling. In light of patient's risk factors, abnormal echo results, and CCS class 3 anginal/anginal-equivalent symptoms, patient is referred to [facility] for cardiac catheterization w/ possible intervention if clinically indicated. COVID-19 PCR: Negative 1/3 @ [facility] (in chart)  Cardiologist: Dr. Sánchez  Pharmacy: [facility] verified w/ patient list at bedside   Escort: Daughter     83 yo M, former smoker with PMHx of CAD (s/p IRASEMA x 1 to OM1 3/30/21), pAfib (on Eliquis 2.5mg BID, last dose 1/3 AM), DVT/PE (1/2020), Stage IV prostate CA s/p prostatectomy/chemo/radiation (on Lupron), AOCD (baseline Hgb 9) w/ hx of transfusions in the past, CKD (baseline appears 1.5-1.9) and recent admission to the hospital in 11/2022  for COVID-19/UTI c/b C.Diff, who presented to his cardiologist, Dr. Sánchez , with complaints of dyspnea on exertion and chest pain. He describes an achy chest discomfort that increases with exertion associated with shortness of breath, relieved w/ rest. NST 11/29/22: Normal. Overall left ventricular systolic function is hyperdynamic without regional wall abnormalities. TTE 11/30/22: LV EF normal with an EF of 64%. G2DD. Mild to moderate mitral valve regurgitation. Mild tricuspid regurgitation. Mild pulmonary hypertension ( PASP 35mmHg). Denies palpitations, dizziness, syncope, orthopnea, paroxysmal nocturnal dyspnea, cough, fever, abdominal pain, leg swelling. In light of patient's risk factors, abnormal echo results, and CCS class 3 anginal/anginal-equivalent symptoms, patient is referred to [facility] for cardiac catheterization w/ possible intervention if clinically indicated.

## 2023-01-03 NOTE — H&P ADULT - NSICDXPASTMEDICALHX_GEN_ALL_CORE_FT
PAST MEDICAL HISTORY:  Atrial fibrillation on Eliquis    Chemotherapy session for neoplasm Prostate Cancer    DVT (deep venous thrombosis) 1/2020 on Eliquis    Essential hypertension Hypertension    Glaucoma Glaucoma    Hyperlipidemia Hyperlipidemia    Malignant neoplasm of prostate s/p total prostatectomy in 1994 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    Pulmonary embolism 1/2020    Type 2 diabetes mellitus Diabetes     PAST MEDICAL HISTORY:  Anemia of chronic disease     Atrial fibrillation on Eliquis    CAD (coronary artery disease)     Chemotherapy session for neoplasm Prostate Cancer    DVT (deep venous thrombosis) 1/2020 on Eliquis    Essential hypertension Hypertension    Glaucoma Glaucoma    Hyperlipidemia Hyperlipidemia    Malignant neoplasm of prostate s/p total prostatectomy in 1994 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    Pulmonary embolism 1/2020    Type 2 diabetes mellitus Diabetes

## 2023-01-03 NOTE — H&P ADULT - NSICDXPASTSURGICALHX_GEN_ALL_CORE_FT
PAST SURGICAL HISTORY:  Other postprocedural status S/P prostatectomy     PAST SURGICAL HISTORY:  Other postprocedural status S/P prostatectomy    S/P prostatectomy

## 2023-01-06 ENCOUNTER — APPOINTMENT (OUTPATIENT)
Dept: UROLOGY | Facility: CLINIC | Age: 83
End: 2023-01-06

## 2023-01-06 ENCOUNTER — OUTPATIENT (OUTPATIENT)
Dept: OUTPATIENT SERVICES | Facility: HOSPITAL | Age: 83
LOS: 1 days | Discharge: HOME | End: 2023-01-06
Payer: MEDICARE

## 2023-01-06 DIAGNOSIS — Z90.79 ACQUIRED ABSENCE OF OTHER GENITAL ORGAN(S): Chronic | ICD-10-CM

## 2023-01-06 LAB
ANION GAP SERPL CALC-SCNC: 14 MMOL/L — SIGNIFICANT CHANGE UP (ref 5–17)
ANION GAP SERPL CALC-SCNC: 15 MMOL/L — SIGNIFICANT CHANGE UP (ref 5–17)
APTT BLD: 38.8 SEC — HIGH (ref 27.5–35.5)
BASE EXCESS BLDV CALC-SCNC: -9.6 MMOL/L — LOW (ref -2–3)
BASOPHILS # BLD AUTO: 0.05 K/UL — SIGNIFICANT CHANGE UP (ref 0–0.2)
BASOPHILS NFR BLD AUTO: 0.6 % — SIGNIFICANT CHANGE UP (ref 0–2)
BUN SERPL-MCNC: 21 MG/DL — SIGNIFICANT CHANGE UP (ref 7–23)
BUN SERPL-MCNC: 22 MG/DL — SIGNIFICANT CHANGE UP (ref 7–23)
CA-I SERPL-SCNC: 1.3 MMOL/L — SIGNIFICANT CHANGE UP (ref 1.15–1.33)
CALCIUM SERPL-MCNC: 9.1 MG/DL — SIGNIFICANT CHANGE UP (ref 8.4–10.5)
CALCIUM SERPL-MCNC: 9.4 MG/DL — SIGNIFICANT CHANGE UP (ref 8.4–10.5)
CHLORIDE SERPL-SCNC: 106 MMOL/L — SIGNIFICANT CHANGE UP (ref 96–108)
CHLORIDE SERPL-SCNC: 108 MMOL/L — SIGNIFICANT CHANGE UP (ref 96–108)
CK MB CFR SERPL CALC: 1.3 NG/ML — SIGNIFICANT CHANGE UP (ref 0–6.7)
CK SERPL-CCNC: 34 U/L — SIGNIFICANT CHANGE UP (ref 30–200)
CO2 BLDV-SCNC: 16.6 MMOL/L — LOW (ref 22–26)
CO2 SERPL-SCNC: 12 MMOL/L — LOW (ref 22–31)
CO2 SERPL-SCNC: 15 MMOL/L — LOW (ref 22–31)
COHGB MFR BLDV: 0 % — SIGNIFICANT CHANGE UP
CREAT SERPL-MCNC: 1.7 MG/DL — HIGH (ref 0.5–1.3)
CREAT SERPL-MCNC: 1.82 MG/DL — HIGH (ref 0.5–1.3)
EGFR: 36 ML/MIN/1.73M2 — LOW
EGFR: 40 ML/MIN/1.73M2 — LOW
EOSINOPHIL # BLD AUTO: 0.5 K/UL — SIGNIFICANT CHANGE UP (ref 0–0.5)
EOSINOPHIL NFR BLD AUTO: 6.1 % — HIGH (ref 0–6)
GAS PNL BLDV: 135 MMOL/L — LOW (ref 136–145)
GLUCOSE BLDC GLUCOMTR-MCNC: 73 MG/DL — SIGNIFICANT CHANGE UP (ref 70–99)
GLUCOSE BLDC GLUCOMTR-MCNC: 88 MG/DL — SIGNIFICANT CHANGE UP (ref 70–99)
GLUCOSE BLDV-MCNC: 88 MG/DL — SIGNIFICANT CHANGE UP (ref 70–99)
GLUCOSE SERPL-MCNC: 88 MG/DL — SIGNIFICANT CHANGE UP (ref 70–99)
GLUCOSE SERPL-MCNC: 94 MG/DL — SIGNIFICANT CHANGE UP (ref 70–99)
HCO3 BLDV-SCNC: 16 MMOL/L — LOW (ref 22–29)
HCT VFR BLD CALC: 30.5 % — LOW (ref 39–50)
HGB BLD CALC-MCNC: 9.5 G/DL — LOW (ref 12.6–17.4)
HGB BLD-MCNC: 9.1 G/DL — LOW (ref 13–17)
IMM GRANULOCYTES NFR BLD AUTO: 0.6 % — SIGNIFICANT CHANGE UP (ref 0–0.9)
INR BLD: 1.62 — HIGH (ref 0.88–1.16)
LYMPHOCYTES # BLD AUTO: 1.17 K/UL — SIGNIFICANT CHANGE UP (ref 1–3.3)
LYMPHOCYTES # BLD AUTO: 14.3 % — SIGNIFICANT CHANGE UP (ref 13–44)
MCHC RBC-ENTMCNC: 24.9 PG — LOW (ref 27–34)
MCHC RBC-ENTMCNC: 29.8 GM/DL — LOW (ref 32–36)
MCV RBC AUTO: 83.6 FL — SIGNIFICANT CHANGE UP (ref 80–100)
METHGB MFR BLDV: 0 % — SIGNIFICANT CHANGE UP
MONOCYTES # BLD AUTO: 0.71 K/UL — SIGNIFICANT CHANGE UP (ref 0–0.9)
MONOCYTES NFR BLD AUTO: 8.6 % — SIGNIFICANT CHANGE UP (ref 2–14)
NEUTROPHILS # BLD AUTO: 5.73 K/UL — SIGNIFICANT CHANGE UP (ref 1.8–7.4)
NEUTROPHILS NFR BLD AUTO: 69.8 % — SIGNIFICANT CHANGE UP (ref 43–77)
NRBC # BLD: 0 /100 WBCS — SIGNIFICANT CHANGE UP (ref 0–0)
PCO2 BLDV: 31 MMHG — LOW (ref 42–55)
PH BLDV: 7.31 — LOW (ref 7.32–7.43)
PLATELET # BLD AUTO: 366 K/UL — SIGNIFICANT CHANGE UP (ref 150–400)
PO2 BLDV: <33 MMHG — LOW (ref 25–45)
POTASSIUM BLDV-SCNC: 3.5 MMOL/L — SIGNIFICANT CHANGE UP (ref 3.5–5.1)
POTASSIUM SERPL-MCNC: 3.6 MMOL/L — SIGNIFICANT CHANGE UP (ref 3.5–5.3)
POTASSIUM SERPL-MCNC: 3.7 MMOL/L — SIGNIFICANT CHANGE UP (ref 3.5–5.3)
POTASSIUM SERPL-SCNC: 3.6 MMOL/L — SIGNIFICANT CHANGE UP (ref 3.5–5.3)
POTASSIUM SERPL-SCNC: 3.7 MMOL/L — SIGNIFICANT CHANGE UP (ref 3.5–5.3)
PROTHROM AB SERPL-ACNC: 19.4 SEC — HIGH (ref 10.5–13.4)
RBC # BLD: 3.65 M/UL — LOW (ref 4.2–5.8)
RBC # FLD: 21.2 % — HIGH (ref 10.3–14.5)
SAO2 % BLDV: 18 % — LOW (ref 67–88)
SODIUM SERPL-SCNC: 135 MMOL/L — SIGNIFICANT CHANGE UP (ref 135–145)
SODIUM SERPL-SCNC: 135 MMOL/L — SIGNIFICANT CHANGE UP (ref 135–145)
WBC # BLD: 8.21 K/UL — SIGNIFICANT CHANGE UP (ref 3.8–10.5)
WBC # FLD AUTO: 8.21 K/UL — SIGNIFICANT CHANGE UP (ref 3.8–10.5)

## 2023-01-06 PROCEDURE — C1769: CPT

## 2023-01-06 PROCEDURE — C1894: CPT

## 2023-01-06 PROCEDURE — C1887: CPT

## 2023-01-06 PROCEDURE — 85025 COMPLETE CBC W/AUTO DIFF WBC: CPT

## 2023-01-06 PROCEDURE — 99153 MOD SED SAME PHYS/QHP EA: CPT

## 2023-01-06 PROCEDURE — 82962 GLUCOSE BLOOD TEST: CPT

## 2023-01-06 PROCEDURE — 99152 MOD SED SAME PHYS/QHP 5/>YRS: CPT

## 2023-01-06 PROCEDURE — 85730 THROMBOPLASTIN TIME PARTIAL: CPT

## 2023-01-06 PROCEDURE — 80048 BASIC METABOLIC PNL TOTAL CA: CPT

## 2023-01-06 PROCEDURE — 82550 ASSAY OF CK (CPK): CPT

## 2023-01-06 PROCEDURE — 93458 L HRT ARTERY/VENTRICLE ANGIO: CPT | Mod: 26

## 2023-01-06 PROCEDURE — C1760: CPT

## 2023-01-06 PROCEDURE — 36415 COLL VENOUS BLD VENIPUNCTURE: CPT

## 2023-01-06 PROCEDURE — 82565 ASSAY OF CREATININE: CPT

## 2023-01-06 PROCEDURE — 85610 PROTHROMBIN TIME: CPT

## 2023-01-06 PROCEDURE — 82553 CREATINE MB FRACTION: CPT

## 2023-01-06 PROCEDURE — 93458 L HRT ARTERY/VENTRICLE ANGIO: CPT

## 2023-01-06 PROCEDURE — 82803 BLOOD GASES ANY COMBINATION: CPT

## 2023-01-06 RX ORDER — ASPIRIN/CALCIUM CARB/MAGNESIUM 324 MG
81 TABLET ORAL ONCE
Refills: 0 | Status: COMPLETED | OUTPATIENT
Start: 2023-01-06 | End: 2023-01-06

## 2023-01-06 RX ORDER — BICALUTAMIDE 50 MG/1
1 TABLET, FILM COATED ORAL
Qty: 0 | Refills: 0 | DISCHARGE

## 2023-01-06 RX ORDER — APIXABAN 2.5 MG/1
1 TABLET, FILM COATED ORAL
Qty: 0 | Refills: 0 | DISCHARGE

## 2023-01-06 RX ORDER — SODIUM CHLORIDE 9 MG/ML
500 INJECTION INTRAMUSCULAR; INTRAVENOUS; SUBCUTANEOUS
Refills: 0 | Status: DISCONTINUED | OUTPATIENT
Start: 2023-01-06 | End: 2023-01-20

## 2023-01-06 RX ORDER — HYDROMORPHONE HYDROCHLORIDE 2 MG/ML
1 INJECTION INTRAMUSCULAR; INTRAVENOUS; SUBCUTANEOUS
Qty: 0 | Refills: 0 | DISCHARGE

## 2023-01-06 RX ORDER — SODIUM CHLORIDE 9 MG/ML
250 INJECTION INTRAMUSCULAR; INTRAVENOUS; SUBCUTANEOUS ONCE
Refills: 0 | Status: COMPLETED | OUTPATIENT
Start: 2023-01-06 | End: 2023-01-06

## 2023-01-06 RX ORDER — PREGABALIN 225 MG/1
1 CAPSULE ORAL
Qty: 0 | Refills: 0 | DISCHARGE

## 2023-01-06 RX ORDER — PANTOPRAZOLE SODIUM 20 MG/1
1 TABLET, DELAYED RELEASE ORAL
Qty: 0 | Refills: 0 | DISCHARGE

## 2023-01-06 RX ORDER — POTASSIUM CHLORIDE 20 MEQ
1 PACKET (EA) ORAL
Qty: 0 | Refills: 0 | DISCHARGE

## 2023-01-06 RX ORDER — SODIUM CHLORIDE 9 MG/ML
500 INJECTION INTRAMUSCULAR; INTRAVENOUS; SUBCUTANEOUS
Refills: 0 | Status: DISCONTINUED | OUTPATIENT
Start: 2023-01-06 | End: 2023-01-06

## 2023-01-06 RX ORDER — FINASTERIDE 5 MG/1
1 TABLET, FILM COATED ORAL
Qty: 0 | Refills: 0 | DISCHARGE

## 2023-01-06 RX ORDER — DORZOLAMIDE HYDROCHLORIDE 20 MG/ML
1 SOLUTION/ DROPS OPHTHALMIC
Qty: 0 | Refills: 0 | DISCHARGE

## 2023-01-06 RX ORDER — CHLORHEXIDINE GLUCONATE 213 G/1000ML
1 SOLUTION TOPICAL ONCE
Refills: 0 | Status: DISCONTINUED | OUTPATIENT
Start: 2023-01-06 | End: 2023-01-06

## 2023-01-06 RX ORDER — CLOPIDOGREL BISULFATE 75 MG/1
600 TABLET, FILM COATED ORAL ONCE
Refills: 0 | Status: COMPLETED | OUTPATIENT
Start: 2023-01-06 | End: 2023-01-06

## 2023-01-06 RX ORDER — ISOSORBIDE MONONITRATE 60 MG/1
1 TABLET, EXTENDED RELEASE ORAL
Qty: 0 | Refills: 0 | DISCHARGE

## 2023-01-06 RX ORDER — ALBUTEROL 90 UG/1
2 AEROSOL, METERED ORAL
Qty: 0 | Refills: 0 | DISCHARGE

## 2023-01-06 RX ORDER — HYDROCHLOROTHIAZIDE 25 MG
1 TABLET ORAL
Qty: 0 | Refills: 0 | DISCHARGE

## 2023-01-06 RX ORDER — MIRTAZAPINE 45 MG/1
1 TABLET, ORALLY DISINTEGRATING ORAL
Qty: 0 | Refills: 0 | DISCHARGE

## 2023-01-06 RX ADMIN — Medication 81 MILLIGRAM(S): at 08:50

## 2023-01-06 RX ADMIN — CLOPIDOGREL BISULFATE 600 MILLIGRAM(S): 75 TABLET, FILM COATED ORAL at 08:50

## 2023-01-06 RX ADMIN — SODIUM CHLORIDE 500 MILLILITER(S): 9 INJECTION INTRAMUSCULAR; INTRAVENOUS; SUBCUTANEOUS at 08:51

## 2023-01-06 RX ADMIN — SODIUM CHLORIDE 100 MILLILITER(S): 9 INJECTION INTRAMUSCULAR; INTRAVENOUS; SUBCUTANEOUS at 08:51

## 2023-01-06 RX ADMIN — SODIUM CHLORIDE 250 MILLILITER(S): 9 INJECTION INTRAMUSCULAR; INTRAVENOUS; SUBCUTANEOUS at 10:35

## 2023-01-06 NOTE — PROGRESS NOTE ADULT - SUBJECTIVE AND OBJECTIVE BOX
Interventional Cardiology PA SDA Discharge Note    Patient without complaints. Ambulated and voided without difficulties    Afebrile, VSS    Ext:    		Right            Groin:      no hematoma,    no bruit, dressing; C/D/I      Pulses:    intact DP/PT to baseline     A/P:    83 yo M, former smoker with PMHx of CAD (s/p IRASEMA x 1 to OM1 3/30/21), pAfib (on Eliquis 2.5mg BID, last dose 1/3 AM), DVT/PE (1/2020), Stage IV prostate CA s/p prostatectomy/chemo/radiation (on Lupron), AOCD (baseline Hgb 9) w/ hx of transfusions in the past, CKD (baseline appears 1.5-1.9) and recent admission to the hospital in 11/2022  for COVID-19/UTI c/b C.Diff, who presented to his cardiologist, Dr. Sánchez , with complaints of dyspnea on exertion and chest pain. He describes an achy chest discomfort that increases with exertion associated with shortness of breath, relieved w/ rest. NST 11/29/22: Normal. Overall left ventricular systolic function is hyperdynamic without regional wall abnormalities. TTE 11/30/22: LV EF normal with an EF of 64%. G2DD. Mild to moderate mitral valve regurgitation. Mild tricuspid regurgitation. Mild pulmonary hypertension ( PASP 35mmHg). Denies palpitations, dizziness, syncope, orthopnea, paroxysmal nocturnal dyspnea, cough, fever, abdominal pain, leg swelling. In light of patient's risk factors, abnormal echo results, and CCS class 3 anginal/anginal-equivalent symptoms, patient is referred to [facility] for cardiac catheterization w/ possible intervention if clinically indicated.    Cardiac cath 1/6/23 w/ Dr. Samano: (Diagnostic only) Prior OM1 stent patent, otherwise mild luminal irregularities; LVEDP 1mmHG.  -Access: R CFA s/p Angioseal    Pt given 500cc NS intraprocedure and 500cc post-cath.    1.	Stable for discharge as per attending Dr. Samano after bed rest, pt voids, groin/wrist stable and 30 minutes of ambulation.  2.	Follow-up with PMD/Cardiologist Dr. Sánchez in 1-2 weeks  3.	Discharged forms signed and copies in chart

## 2023-01-10 DIAGNOSIS — R94.39 ABNORMAL RESULT OF OTHER CARDIOVASCULAR FUNCTION STUDY: ICD-10-CM

## 2023-01-10 DIAGNOSIS — I25.110 ATHEROSCLEROTIC HEART DISEASE OF NATIVE CORONARY ARTERY WITH UNSTABLE ANGINA PECTORIS: ICD-10-CM

## 2023-01-13 PROBLEM — I25.10 ATHEROSCLEROTIC HEART DISEASE OF NATIVE CORONARY ARTERY WITHOUT ANGINA PECTORIS: Chronic | Status: ACTIVE | Noted: 2023-01-06

## 2023-01-13 PROBLEM — D63.8 ANEMIA IN OTHER CHRONIC DISEASES CLASSIFIED ELSEWHERE: Chronic | Status: ACTIVE | Noted: 2023-01-06

## 2023-01-13 LAB
ISTAT INR: 2.3 — HIGH (ref 0.88–1.16)
ISTAT PT: 26.4 SEC — HIGH (ref 10–12.9)
POCT ISTAT CREATININE: 1.9 MG/DL — HIGH (ref 0.5–1.3)

## 2023-01-19 NOTE — ED ADULT TRIAGE NOTE - ADDITIONAL SAFETY/BANDS...
Additional Safety/Bands: 26-year-old female with no significant past medical history presents with about a week posterior head pain after she was on a cruise and slipped on water hitting the back of her head on the deck.  No LOC, not on anticoagulation.  Patient reports that she has been having mild throbbing pain to the back of her head, mild, nonradiating, at times associated with mild nausea.  Patient reports she works for [employer] so does steroid to screen for long periods of times and notices her symptoms are worse when she does this.  Has not taken anything for symptoms.  Recalls all events. No fever, chills, v, cp,  pleuritic cp, sob, palpitations, diaphoresis, cough, chest wall/rib pain, clavicular pain, ankle pain, knee pain, shoulder pain, wrist pain, no back pain, no hip pain, no ha/lh/dizziness, numbness/tingling, neck pain/ stiffness, abd pain,  melena/brbpr, urinary symptoms, edema, calf pain/swelling/erythema, sick contacts, recent travel . GCS 15.    On Exam:  Vital Signs: I have reviewed the initial vital signs.  Constitutional: Non toxic appearing pt sitting on stretcher in nad.   HEAD: No signs of basilar skull fracture.  Integumentary: No rash. No laceration, abrasions, ecchymoses or swelling.   EYES: No periorbital swelling/ecchymoses. PERRL, EOM intact. No nystagmus. No subconjunctival hemorrhage.   ENT: MMM. No rhinorrhea/otorrhea. No epistaxis,  No septal hematoma. No mastoid ecchymoses. No intraoral bleeding, No loose or craked teeth, no active bleeding. No malocclusion. No TMJ pain.  NECK: Supple, non-tender, No palpable shelves or step-offs.  BACK: No spinous tenderness. No palpable shelves or step-offs.  Cardiovascular: RRR, radial pulses 2/4 b/l. dp and pt pulses 2/4/ b/l. No pain to palpation to chest wall.  Respiratory: BS present b/l, ctabl, no wheezing or crackles, no stridor. No pain to palpation to ribs b/l. No crepitus. No subq emphysema.   Gastrointestinal: BS present throughout all 4 quadrants, soft, nd, nt. no r/g.  Musculoskeletal: FROM of all extremities. No bony tenderness. No pain to palpation to clavicles, no obvious bony deformities. No hip pain. No short leg. No internal or external rotation of LE  Neurologic: GCS 15. CN II-XII intact, no facial droop or slurring of speech. Motor 5/5 and sensation intact throughout upper and lower extremities. (-) Pronator. (-) Romberg. NIHSS 0.    pt with no signs of head injury, gcs 15, aware of signs and symptoms associated with concussion, symptomatic treatment.  Appropriate medications for patient's presenting complaints were ordered and effects were reassessed.  Patient's records (outpt records) were reviewed.  Escalation to admission/observation was considered.  However patient feels much better and is comfortable with discharge.  Appropriate follow-up was arranged.

## 2023-01-25 PROBLEM — I26.99 PULMONARY EMBOLI: Status: ACTIVE | Noted: 2020-01-24

## 2023-01-25 PROBLEM — R06.00 DOE (DYSPNEA ON EXERTION): Status: ACTIVE | Noted: 2021-05-14

## 2023-01-27 ENCOUNTER — APPOINTMENT (OUTPATIENT)
Dept: HEART AND VASCULAR | Facility: CLINIC | Age: 83
End: 2023-01-27
Payer: MEDICARE

## 2023-01-27 ENCOUNTER — APPOINTMENT (OUTPATIENT)
Dept: UROLOGY | Facility: CLINIC | Age: 83
End: 2023-01-27
Payer: MEDICARE

## 2023-01-27 ENCOUNTER — NON-APPOINTMENT (OUTPATIENT)
Age: 83
End: 2023-01-27

## 2023-01-27 ENCOUNTER — APPOINTMENT (OUTPATIENT)
Dept: NUCLEAR MEDICINE | Facility: HOSPITAL | Age: 83
End: 2023-01-27
Payer: MEDICARE

## 2023-01-27 ENCOUNTER — OUTPATIENT (OUTPATIENT)
Dept: OUTPATIENT SERVICES | Facility: HOSPITAL | Age: 83
LOS: 1 days | End: 2023-01-27
Payer: MEDICARE

## 2023-01-27 VITALS
SYSTOLIC BLOOD PRESSURE: 121 MMHG | WEIGHT: 121 LBS | DIASTOLIC BLOOD PRESSURE: 67 MMHG | TEMPERATURE: 95.5 F | HEART RATE: 73 BPM | HEIGHT: 67 IN | BODY MASS INDEX: 18.99 KG/M2 | OXYGEN SATURATION: 99 %

## 2023-01-27 DIAGNOSIS — C61 MALIGNANT NEOPLASM OF PROSTATE: ICD-10-CM

## 2023-01-27 DIAGNOSIS — I48.0 PAROXYSMAL ATRIAL FIBRILLATION: ICD-10-CM

## 2023-01-27 DIAGNOSIS — E78.5 HYPERLIPIDEMIA, UNSPECIFIED: ICD-10-CM

## 2023-01-27 DIAGNOSIS — I10 ESSENTIAL (PRIMARY) HYPERTENSION: ICD-10-CM

## 2023-01-27 DIAGNOSIS — Z90.79 ACQUIRED ABSENCE OF OTHER GENITAL ORGAN(S): Chronic | ICD-10-CM

## 2023-01-27 LAB
BASOPHILS # BLD AUTO: 0 K/UL — SIGNIFICANT CHANGE UP (ref 0–0.2)
BASOPHILS NFR BLD AUTO: 0 % — SIGNIFICANT CHANGE UP (ref 0–2)
EOSINOPHIL # BLD AUTO: 0.18 K/UL — SIGNIFICANT CHANGE UP (ref 0–0.5)
EOSINOPHIL NFR BLD AUTO: 3 % — SIGNIFICANT CHANGE UP (ref 0–6)
HCT VFR BLD CALC: 30.9 % — LOW (ref 39–50)
HGB BLD-MCNC: 9.2 G/DL — LOW (ref 13–17)
HYPOCHROMIA BLD QL: SLIGHT — SIGNIFICANT CHANGE UP
LYMPHOCYTES # BLD AUTO: 1.16 K/UL — SIGNIFICANT CHANGE UP (ref 1–3.3)
LYMPHOCYTES # BLD AUTO: 19 % — SIGNIFICANT CHANGE UP (ref 13–44)
MANUAL SMEAR VERIFICATION: SIGNIFICANT CHANGE UP
MCHC RBC-ENTMCNC: 25.5 PG — LOW (ref 27–34)
MCHC RBC-ENTMCNC: 29.8 GM/DL — LOW (ref 32–36)
MCV RBC AUTO: 85.6 FL — SIGNIFICANT CHANGE UP (ref 80–100)
MONOCYTES # BLD AUTO: 0.18 K/UL — SIGNIFICANT CHANGE UP (ref 0–0.9)
MONOCYTES NFR BLD AUTO: 3 % — SIGNIFICANT CHANGE UP (ref 2–14)
NEUTROPHILS # BLD AUTO: 4.6 K/UL — SIGNIFICANT CHANGE UP (ref 1.8–7.4)
NEUTROPHILS NFR BLD AUTO: 74 % — SIGNIFICANT CHANGE UP (ref 43–77)
NEUTS BAND # BLD: 1 % — SIGNIFICANT CHANGE UP (ref 0–8)
NRBC # BLD: 0 /100 — SIGNIFICANT CHANGE UP (ref 0–0)
NRBC # BLD: SIGNIFICANT CHANGE UP /100 WBCS (ref 0–0)
OVALOCYTES BLD QL SMEAR: SLIGHT — SIGNIFICANT CHANGE UP
PLAT MORPH BLD: NORMAL — SIGNIFICANT CHANGE UP
PLATELET # BLD AUTO: 306 K/UL — SIGNIFICANT CHANGE UP (ref 150–400)
POIKILOCYTOSIS BLD QL AUTO: SLIGHT — SIGNIFICANT CHANGE UP
RBC # BLD: 3.61 M/UL — LOW (ref 4.2–5.8)
RBC # FLD: 25 % — HIGH (ref 10.3–14.5)
RBC BLD AUTO: ABNORMAL
WBC # BLD: 6.13 K/UL — SIGNIFICANT CHANGE UP (ref 3.8–10.5)
WBC # FLD AUTO: 6.13 K/UL — SIGNIFICANT CHANGE UP (ref 3.8–10.5)

## 2023-01-27 PROCEDURE — 99214 OFFICE O/P EST MOD 30 MIN: CPT | Mod: 25

## 2023-01-27 PROCEDURE — 78306 BONE IMAGING WHOLE BODY: CPT | Mod: 26

## 2023-01-27 PROCEDURE — 93000 ELECTROCARDIOGRAM COMPLETE: CPT

## 2023-01-27 PROCEDURE — 85025 COMPLETE CBC W/AUTO DIFF WBC: CPT

## 2023-01-27 PROCEDURE — 99215 OFFICE O/P EST HI 40 MIN: CPT | Mod: 25

## 2023-01-27 PROCEDURE — A9503: CPT

## 2023-01-27 PROCEDURE — 96402 CHEMO HORMON ANTINEOPL SQ/IM: CPT

## 2023-01-27 PROCEDURE — 36415 COLL VENOUS BLD VENIPUNCTURE: CPT

## 2023-01-27 PROCEDURE — 78306 BONE IMAGING WHOLE BODY: CPT

## 2023-01-27 RX ORDER — ISOSORBIDE MONONITRATE 60 MG/1
60 TABLET, EXTENDED RELEASE ORAL DAILY
Qty: 90 | Refills: 1 | Status: ACTIVE | COMMUNITY
Start: 2022-01-21 | End: 1900-01-01

## 2023-01-27 RX ORDER — LEUPROLIDE ACETATE 45 MG
45 KIT INTRAMUSCULAR
Qty: 1 | Refills: 0 | Status: COMPLETED | OUTPATIENT
Start: 2023-01-27

## 2023-01-27 RX ORDER — TAMSULOSIN HYDROCHLORIDE 0.4 MG/1
0.4 CAPSULE ORAL
Qty: 90 | Refills: 3 | Status: ACTIVE | COMMUNITY
Start: 2023-01-27 | End: 1900-01-01

## 2023-01-27 RX ORDER — METOPROLOL SUCCINATE 50 MG/1
50 TABLET, EXTENDED RELEASE ORAL
Qty: 90 | Refills: 3 | Status: ACTIVE | COMMUNITY
Start: 2020-01-27 | End: 1900-01-01

## 2023-01-27 RX ORDER — AMLODIPINE BESYLATE 5 MG/1
5 TABLET ORAL
Qty: 90 | Refills: 1 | Status: ACTIVE | COMMUNITY
Start: 2022-07-08 | End: 1900-01-01

## 2023-01-27 RX ORDER — ASPIRIN ENTERIC COATED TABLETS 81 MG 81 MG/1
81 TABLET, DELAYED RELEASE ORAL
Qty: 90 | Refills: 3 | Status: ACTIVE | COMMUNITY
Start: 2021-06-07 | End: 1900-01-01

## 2023-01-27 RX ADMIN — LEUPROLIDE ACETATE 0 MG: KIT at 00:00

## 2023-01-27 NOTE — HISTORY OF PRESENT ILLNESS
[FreeTextEntry1] : returns for followup\par metastatic castrate resistant prostate cancer\par signficant weight loss\par following with [facility]\par increased urinary frequencioy\par PSA now 700\par due for lupron today\par

## 2023-01-27 NOTE — ASSESSMENT
[FreeTextEntry1] : prsotate cancer\par lupron 45mg\par trial flomax 0.4\par c diff now resolved\par UA UCX\par f/u 6months\par

## 2023-01-31 NOTE — HISTORY OF PRESENT ILLNESS
[FreeTextEntry1] : 81 year-old male w/ stage IV prostate CA (s/p prostatectomy, on chemotherapy), CAD, HTN, HLD, DVT, PE and paroxysmal afib.\par \par 1/2020: admitted to [facility] w/ progressive SOB and JAIME x 1 month associated w/ generalized fatigue in the setting of decreased PO intake, found to have segmental PE of RLL on CTA Chest and LLE DVT on US Dopplers likely 2/2 malignancy. Cardiology consulted for new onset atrial fibrillation during hospitalization, started on BB and instructed to continue NOAC. \par \par 3/2021: admitted to [facility] w/ angina and JERI s/p [facility] w/ IRASEMA-OM1. Instructed to start triple tx for 1 week, then discontinue Plavix (hx of GIB). No c/o abnl bleeding. States anginal sx have completely resolved. Normal EF on TTE in hospital.\par \par 4/2021:admitted to [facility] for facial numbness (CVA ruled out) which was subsequently relieved by removing his dentures. Transfused 1U PRBCs for low H&H. Cleared to resume NOAC and ASA by GI, follows Dr. Fuller (Heme/Onc) for serial H/Hs.\par \par 5/2021: [facility] ED for angioedema 2/2 to lisinopril. Preop cardiac evaluation prior to EGD/COL - TTE and MPI WNL.\par \par 11/2021: 3 month f/u and results of TTE from 11/5/21. States he has had persistent C.Diff infections for which he is being f/b GI closely. Denies increased JAIME or diminished ET.\par \par 1/2022: 6 week f/u, suboptimal BP control.\par \par 3/18/2022: 6 week f/u of BP, reports he was transfused 2U PRBCs 4 days ago. Brought in his home BP cuffs. \par \par 7/8/2022: 3 month f/u, transfused 2U PRBCs 2 days ago.\par \par 11/18/2022: 4 month f/u, TTE ordered\par \par 12/30/2022: pt now c/o chest pressure, will schedule for cath, NTG rx given\par \par 1/27/2023: cath on 1/9/23, no intervention needed. Right fem cath site stable. Refills sent to pharm\par \par \par \par 1/27/23 ECG: NSR at 63 bpm, LAFB, NSTWC, LV\par 12/30/22 ECG: NSR at 77 bpm, LAFB, NSTWC, LV\par 11/18/22 ECG: NSR at 79 bpm, LAFB, NSTWC\par 7/8/22 ECG: SB at 59 bpm, LAFB, QV2\par 3/18/22 ECG: SB at 55 bpm, LAFB, poor RWP\par 1/21/22 ECG: NSR at 62 bpm, LAD, PAC, NS STTWC\par 11/19/21 ECG: NSR at 85 bpm, LAFB, QV1V2\par 8/20/21 ECG: NSR at 80 bpm, LAFB, NS STTWC\par 12/11/20 ECG: NSR at 86 bpm, LAFB, NS STTWC (unchanged from previous)\par 1/24/20 ECG: NSR at 86 bpm, LAD, LAFB, PVCs, non-specific STTWC

## 2023-01-31 NOTE — REVIEW OF SYSTEMS
[Chest Discomfort] : chest discomfort [Negative] : Heme/Lymph [Dyspnea on exertion] : not dyspnea during exertion

## 2023-01-31 NOTE — ASSESSMENT
[FreeTextEntry1] : 1. CAD s/p IRASEMA-OM1 3/2021\par - ed done re heart healthy lifestyle modifications and diet \par - continue low dose Eliquis 2.5 BID\par - pt will have his H/H followed diligently by Dr. Fuller\par - cont ASA/NOAC/BB\par - 11/5/21 TTE reviewed, nl LV/RV fxn, mld to mod LVH,mild to mod MR, mild AI/TR\par - 11/30/22 TTE reviewed with patient in detail. EF 76%, grade 2-3 LVH\par - s/p cath on 1/9/23 for chest discomfort, no intervention necessary\par - right fem cath site stable, no ecchymosis, hematoma or erythema\par \par 2. PAF\par - now in NSR\par - CHADsVasc score 5\par - cont NOAC as above\par - cont Toprol 50 qd for rate control\par \par 3. HTN\par - BP in office today 121/67\par - continue amlodipine 5 mg\par - ACE DC'd due to angioedema\par - cont BB \par - cont Imdur 60 qd\par - continue rest of current meds for now\par \par 4. PE/DVT\par - cont to f/u w/ Dr. Fuller\par - cont NOAC\par \par 5. HLD\par - cont statin\par - ed done\par \par \par RTC 4 months\par

## 2023-02-05 NOTE — HISTORY OF PRESENT ILLNESS
[FreeTextEntry1] : 81 year-old male w/ stage IV prostate CA (s/p prostatectomy, on chemotherapy), CAD, HTN, HLD, DVT, PE and paroxysmal afib.\par \par 1/2020: admitted to [facility] w/ progressive SOB and JAIME x 1 month associated w/ generalized fatigue in the setting of decreased PO intake, found to have segmental PE of RLL on CTA Chest and LLE DVT on US Dopplers likely 2/2 malignancy. Cardiology consulted for new onset atrial fibrillation during hospitalization, started on BB and instructed to continue NOAC. \par \par 3/2021: admitted to [facility] w/ angina and JERI s/p [facility] w/ IRASEMA-OM1. Instructed to start triple tx for 1 week, then discontinue Plavix (hx of GIB). No c/o abnl bleeding. States anginal sx have completely resolved. Normal EF on TTE in hospital.\par \par 4/2021:admitted to [facility] for facial numbness (CVA ruled out) which was subsequently relieved by removing his dentures. Transfused 1U PRBCs for low H&H. Cleared to resume NOAC and ASA by GI, follows Dr. Fuller (Heme/Onc) for serial H/Hs.\par \par 5/2021: [facility] ED for angioedema 2/2 to lisinopril. Preop cardiac evaluation prior to EGD/COL - TTE and MPI WNL.\par \par 11/2021: 3 month f/u and results of TTE from 11/5/21. States he has had persistent C.Diff infections for which he is being f/b GI closely. Denies increased JAIME or diminished ET.\par \par 1/2022: 6 week f/u, suboptimal BP control.\par \par 3/18/2022: 6 week f/u of BP, reports he was transfused 2U PRBCs 4 days ago. Brought in his home BP cuffs. \par \par 7/8/2022: 3 month f/u, transfused 2U PRBCs 2 days ago.\par \par 11/18/2022: 4 month f/u\par \par 12/30/2022: - pt now c/o chest pressure, echo done today reviewed, possible apical wall HK, will schedule for cath, NTG rx given\par \par \par 12/30/22 ECG: NSR at 77 bpm, LAFB, NSTWC\par 11/18/22 ECG: NSR at 79 bpm, LAFB, NSTWC\par 7/8/22 ECG: SB at 59 bpm, LAFB, QV2\par 3/18/22 ECG: SB at 55 bpm, LAFB, poor RWP\par 1/21/22 ECG: NSR at 62 bpm, LAD, PAC, NS STTWC\par 11/19/21 ECG: NSR at 85 bpm, LAFB, QV1V2\par 8/20/21 ECG: NSR at 80 bpm, LAFB, NS STTWC\par 12/11/20 ECG: NSR at 86 bpm, LAFB, NS STTWC (unchanged from previous)\par 1/24/20 ECG: NSR at 86 bpm, LAD, LAFB, PVCs, non-specific STTWC

## 2023-02-05 NOTE — HISTORY OF PRESENT ILLNESS
[FreeTextEntry1] : 81 year-old male w/ stage IV prostate CA (s/p prostatectomy, on chemotherapy), CAD, HTN, HLD, DVT, PE and paroxysmal afib.\par \par 1/2020: admitted to [facility] w/ progressive SOB and JAIME x 1 month associated w/ generalized fatigue in the setting of decreased PO intake, found to have segmental PE of RLL on CTA Chest and LLE DVT on US Dopplers likely 2/2 malignancy. Cardiology consulted for new onset atrial fibrillation during hospitalization, started on BB and instructed to continue NOAC. \par \par 3/2021: admitted to [facility] w/ angina and JERI s/p [facility] w/ IRASEMA-OM1. Instructed to start triple tx for 1 week, then discontinue Plavix (hx of GIB). No c/o abnl bleeding. States anginal sx have completely resolved. Normal EF on TTE in hospital.\par \par 4/2021:admitted to [facility] for facial numbness (CVA ruled out) which was subsequently relieved by removing his dentures. Transfused 1U PRBCs for low H&H. Cleared to resume NOAC and ASA by GI, follows Dr. Fuller (Heme/Onc) for serial H/Hs.\par \par 5/2021: [facility] ED for angioedema 2/2 to lisinopril. Preop cardiac evaluation prior to EGD/COL - TTE and MPI WNL.\par \par 11/2021: 3 month f/u and results of TTE from 11/5/21. States he has had persistent C.Diff infections for which he is being f/b GI closely. Denies increased JAIME or diminished ET.\par \par 1/2022: 6 week f/u, suboptimal BP control.\par \par 3/18/2022: 6 week f/u of BP, reports he was transfused 2U PRBCs 4 days ago. Brought in his home BP cuffs. \par \par 7/8/2022: 3 month f/u, transfused 2U PRBCs 2 days ago. Feels well. \par \par \par 7/8/22 ECG: sinus bradycardia at 59 bpm, LAFB, QV2\par 3/18/22 ECG: sinus bradycardia at 55 bpm, LAFB, poor RWP\par 1/21/22 ECG: NSR at 62 bpm, LAD, PAC, NS STTWC\par 11/19/21 ECG: NSR at 85 bpm, LAFB, QV1V2\par 8/20/21 ECG: NSR at 80 bpm, LAFB, NS STTWC\par 12/11/20 ECG: NSR at 86 bpm, LAFB, NS STTWC (unchanged from previous)\par 1/24/20 ECG: NSR at 86 bpm, LAD, LAFB, PVCs, non-specific STTWC

## 2023-02-05 NOTE — ASSESSMENT
[FreeTextEntry1] : 1. CAD s/p IRASEMA-OM1 3/2021\par - ed done re heart healthy lifestyle modifications and diet \par - continue low dose Eliquis 2.5 bid\par - pt will have his H/H followed diligently by Dr. Fuller\par - cont ASA/NOAC/BB\par - 11/5/21 TTE reviewed, nl LV/RV fxn, mld to mod LVH,mild to mod MR, mild AI/TR\par - repeat TTE ordered\par \par 2. PAF\par - now in NSR\par - CHADsVasc score 5\par - cont NOAC as above\par - cont Toprol 50 qd for rate control\par \par 3. HTN\par - BP in office today 153/96\par - add amlodipine 5 mg\par - ACE DC'd due to angioedema\par - cont BB \par - cont Imdur 60 qd\par - continue rest of current meds for now\par \par 4. PE/DVT\par - cont to f/u w/ Dr. Fullre\par - cont NOAC\par \par 5. HLD\par - cont statin\par - ed done\par \par RTC 4 months\par  Complex Repair And Bilobe Flap Text: The defect edges were debeveled with a #15 scalpel blade.  The primary defect was closed partially with a complex linear closure.  Given the location of the remaining defect, shape of the defect and the proximity to free margins a bilobe flap was deemed most appropriate for complete closure of the defect.  Using a sterile surgical marker, an appropriate advancement flap was drawn incorporating the defect and placing the expected incisions within the relaxed skin tension lines where possible.    The area thus outlined was incised deep to adipose tissue with a #15 scalpel blade.  The skin margins were undermined to an appropriate distance in all directions utilizing iris scissors.

## 2023-02-05 NOTE — ASSESSMENT
[FreeTextEntry1] : 1. CAD s/p IRASEMA-OM1 3/2021\par - ed done re heart healthy lifestyle modifications and diet \par - continue low dose Eliquis 2.5 bid\par - pt will have his H/H followed diligently by Dr. Fuller\par - cont ASA/NOAC/BB\par - 11/5/21 TTE reviewed, nl LV/RV fxn, mld to mod LVH,mild to mod MR, mild AI/TR\par - TTE ordered\par \par 2. PAF\par - now in NSR\par - CHADsVasc score 5\par - cont NOAC as above\par - cont Toprol 50 qd for rate control\par \par 3. HTN\par - BP in office today 130/78\par - continue amlodipine 5 mg\par - ACE DC'd due to angioedema\par - cont BB \par - cont Imdur 60 qd\par - continue rest of current meds for now\par \par 4. PE/DVT\par - cont to f/u w/ Dr. Fuller\par - cont NOAC\par \par 5. HLD\par - cont statin\par - ed done\par \par RTC 3 months\par

## 2023-02-05 NOTE — REVIEW OF SYSTEMS
[Negative] : Heme/Lymph [Chest Discomfort] : chest discomfort [Dyspnea on exertion] : not dyspnea during exertion

## 2023-02-05 NOTE — ASSESSMENT
[FreeTextEntry1] : 1. CAD s/p IRASEMA-OM1 3/2021\par - ed done re heart healthy lifestyle modifications and diet \par - continue low dose Eliquis 2.5 BID\par - pt will have his H/H followed diligently by Dr. Fuller\par - cont ASA/NOAC/BB\par - 11/5/21 TTE reviewed, nl LV/RV fxn, mld to mod LVH,mild to mod MR, mild AI/TR\par - 11/30/22 TTE reviewed with patient in detail. EF 76%, grade 2-3 LVH\par - pt now c/o chest pressure, will schedule for cath, NTG rx given\par \par 2. PAF\par - now in NSR\par - CHADsVasc score 5\par - cont NOAC as above\par - cont Toprol 50 qd for rate control\par \par 3. HTN\par - BP in office today 130/68\par - continue amlodipine 5 mg\par - ACE DC'd due to angioedema\par - cont BB \par - cont Imdur 60 qd\par - continue rest of current meds for now\par \par 4. PE/DVT\par - cont to f/u w/ Dr. Fuller\par - cont NOAC\par \par 5. HLD\par - cont statin\par - ed done\par \par \par RTC after

## 2023-02-05 NOTE — HISTORY OF PRESENT ILLNESS
[FreeTextEntry1] : 81 year-old male w/ stage IV prostate CA (s/p prostatectomy, on chemotherapy), CAD, HTN, HLD, DVT, PE and paroxysmal afib.\par \par 1/2020: admitted to [facility] w/ progressive SOB and JAIME x 1 month associated w/ generalized fatigue in the setting of decreased PO intake, found to have segmental PE of RLL on CTA Chest and LLE DVT on US Dopplers likely 2/2 malignancy. Cardiology consulted for new onset atrial fibrillation during hospitalization, started on BB and instructed to continue NOAC. \par \par 3/2021: admitted to [facility] w/ angina and JERI s/p [facility] w/ IRASEMA-OM1. Instructed to start triple tx for 1 week, then discontinue Plavix (hx of GIB). No c/o abnl bleeding. States anginal sx have completely resolved. Normal EF on TTE in hospital.\par \par 4/2021:admitted to [facility] for facial numbness (CVA ruled out) which was subsequently relieved by removing his dentures. Transfused 1U PRBCs for low H&H. Cleared to resume NOAC and ASA by GI, follows Dr. Fuller (Heme/Onc) for serial H/Hs.\par \par 5/2021: [facility] ED for angioedema 2/2 to lisinopril. Preop cardiac evaluation prior to EGD/COL - TTE and MPI WNL.\par \par 11/2021: 3 month f/u and results of TTE from 11/5/21. States he has had persistent C.Diff infections for which he is being f/b GI closely. Denies increased JAIME or diminished ET.\par \par 1/2022: 6 week f/u, suboptimal BP control.\par \par 3/18/2022: 6 week f/u of BP, reports he was transfused 2U PRBCs 4 days ago. Brought in his home BP cuffs. \par \par 7/8/2022: 3 month f/u, transfused 2U PRBCs 2 days ago.\par \par 11/18/2022: 4 month f/u, TTE ordered, no new complaints\par \par \par 11/18/22 ECG: NSR at 79 bpm, LAFB, NSTWC\par 7/8/22 ECG: SB at 59 bpm, LAFB, QV2\par 3/18/22 ECG: SB at 55 bpm, LAFB, poor RWP\par 1/21/22 ECG: NSR at 62 bpm, LAD, PAC, NS STTWC\par 11/19/21 ECG: NSR at 85 bpm, LAFB, QV1V2\par 8/20/21 ECG: NSR at 80 bpm, LAFB, NS STTWC\par 12/11/20 ECG: NSR at 86 bpm, LAFB, NS STTWC (unchanged from previous)\par 1/24/20 ECG: NSR at 86 bpm, LAD, LAFB, PVCs, non-specific STTWC

## 2023-02-24 ENCOUNTER — APPOINTMENT (OUTPATIENT)
Dept: HEART AND VASCULAR | Facility: CLINIC | Age: 83
End: 2023-02-24

## 2023-02-24 ENCOUNTER — EMERGENCY (EMERGENCY)
Facility: HOSPITAL | Age: 83
LOS: 1 days | Discharge: HOME | End: 2023-02-24
Attending: EMERGENCY MEDICINE | Admitting: EMERGENCY MEDICINE
Payer: MEDICARE

## 2023-02-24 VITALS
TEMPERATURE: 98 F | HEART RATE: 99 BPM | DIASTOLIC BLOOD PRESSURE: 74 MMHG | OXYGEN SATURATION: 97 % | SYSTOLIC BLOOD PRESSURE: 127 MMHG | RESPIRATION RATE: 17 BRPM

## 2023-02-24 VITALS
SYSTOLIC BLOOD PRESSURE: 112 MMHG | TEMPERATURE: 98 F | DIASTOLIC BLOOD PRESSURE: 65 MMHG | HEART RATE: 81 BPM | RESPIRATION RATE: 16 BRPM | HEIGHT: 69 IN | OXYGEN SATURATION: 95 % | WEIGHT: 115.08 LBS

## 2023-02-24 DIAGNOSIS — Z90.79 ACQUIRED ABSENCE OF OTHER GENITAL ORGAN(S): Chronic | ICD-10-CM

## 2023-02-24 DIAGNOSIS — Z79.82 LONG TERM (CURRENT) USE OF ASPIRIN: ICD-10-CM

## 2023-02-24 DIAGNOSIS — D64.9 ANEMIA, UNSPECIFIED: ICD-10-CM

## 2023-02-24 DIAGNOSIS — Z86.718 PERSONAL HISTORY OF OTHER VENOUS THROMBOSIS AND EMBOLISM: ICD-10-CM

## 2023-02-24 DIAGNOSIS — I25.10 ATHEROSCLEROTIC HEART DISEASE OF NATIVE CORONARY ARTERY WITHOUT ANGINA PECTORIS: ICD-10-CM

## 2023-02-24 DIAGNOSIS — Z79.818 LONG TERM (CURRENT) USE OF OTHER AGENTS AFFECTING ESTROGEN RECEPTORS AND ESTROGEN LEVELS: ICD-10-CM

## 2023-02-24 DIAGNOSIS — Z88.8 ALLERGY STATUS TO OTHER DRUGS, MEDICAMENTS AND BIOLOGICAL SUBSTANCES: ICD-10-CM

## 2023-02-24 DIAGNOSIS — Z86.711 PERSONAL HISTORY OF PULMONARY EMBOLISM: ICD-10-CM

## 2023-02-24 DIAGNOSIS — Z79.01 LONG TERM (CURRENT) USE OF ANTICOAGULANTS: ICD-10-CM

## 2023-02-24 DIAGNOSIS — E78.5 HYPERLIPIDEMIA, UNSPECIFIED: ICD-10-CM

## 2023-02-24 DIAGNOSIS — Z20.822 CONTACT WITH AND (SUSPECTED) EXPOSURE TO COVID-19: ICD-10-CM

## 2023-02-24 DIAGNOSIS — R05.1 ACUTE COUGH: ICD-10-CM

## 2023-02-24 DIAGNOSIS — Z85.46 PERSONAL HISTORY OF MALIGNANT NEOPLASM OF PROSTATE: ICD-10-CM

## 2023-02-24 DIAGNOSIS — I10 ESSENTIAL (PRIMARY) HYPERTENSION: ICD-10-CM

## 2023-02-24 DIAGNOSIS — Z90.79 ACQUIRED ABSENCE OF OTHER GENITAL ORGAN(S): ICD-10-CM

## 2023-02-24 DIAGNOSIS — I48.91 UNSPECIFIED ATRIAL FIBRILLATION: ICD-10-CM

## 2023-02-24 DIAGNOSIS — R06.02 SHORTNESS OF BREATH: ICD-10-CM

## 2023-02-24 LAB
ALBUMIN SERPL ELPH-MCNC: 2.5 G/DL — LOW (ref 3.3–5)
ALP SERPL-CCNC: 96 U/L — SIGNIFICANT CHANGE UP (ref 40–120)
ALT FLD-CCNC: 7 U/L — LOW (ref 10–45)
ANION GAP SERPL CALC-SCNC: 12 MMOL/L — SIGNIFICANT CHANGE UP (ref 5–17)
APPEARANCE UR: CLEAR — SIGNIFICANT CHANGE UP
AST SERPL-CCNC: 35 U/L — SIGNIFICANT CHANGE UP (ref 10–40)
BACTERIA # UR AUTO: ABNORMAL /HPF
BASE EXCESS BLDV CALC-SCNC: -5.8 MMOL/L — LOW (ref -2–3)
BASOPHILS # BLD AUTO: 0.05 K/UL — SIGNIFICANT CHANGE UP (ref 0–0.2)
BASOPHILS NFR BLD AUTO: 0.6 % — SIGNIFICANT CHANGE UP (ref 0–2)
BILIRUB SERPL-MCNC: 0.3 MG/DL — SIGNIFICANT CHANGE UP (ref 0.2–1.2)
BILIRUB UR-MCNC: NEGATIVE — SIGNIFICANT CHANGE UP
BLD GP AB SCN SERPL QL: NEGATIVE — SIGNIFICANT CHANGE UP
BUN SERPL-MCNC: 22 MG/DL — SIGNIFICANT CHANGE UP (ref 7–23)
CA-I SERPL-SCNC: 1.32 MMOL/L — SIGNIFICANT CHANGE UP (ref 1.15–1.33)
CALCIUM SERPL-MCNC: 9.6 MG/DL — SIGNIFICANT CHANGE UP (ref 8.4–10.5)
CHLORIDE SERPL-SCNC: 105 MMOL/L — SIGNIFICANT CHANGE UP (ref 96–108)
CO2 BLDV-SCNC: 21.3 MMOL/L — LOW (ref 22–26)
CO2 SERPL-SCNC: 19 MMOL/L — LOW (ref 22–31)
COLOR SPEC: YELLOW — SIGNIFICANT CHANGE UP
CREAT SERPL-MCNC: 1.5 MG/DL — HIGH (ref 0.5–1.3)
DIFF PNL FLD: ABNORMAL
EGFR: 46 ML/MIN/1.73M2 — LOW
EOSINOPHIL # BLD AUTO: 0.05 K/UL — SIGNIFICANT CHANGE UP (ref 0–0.5)
EOSINOPHIL NFR BLD AUTO: 0.6 % — SIGNIFICANT CHANGE UP (ref 0–6)
EPI CELLS # UR: SIGNIFICANT CHANGE UP /HPF (ref 0–5)
GAS PNL BLDV: 134 MMOL/L — LOW (ref 136–145)
GAS PNL BLDV: SIGNIFICANT CHANGE UP
GLUCOSE SERPL-MCNC: 99 MG/DL — SIGNIFICANT CHANGE UP (ref 70–99)
GLUCOSE UR QL: NEGATIVE — SIGNIFICANT CHANGE UP
HCO3 BLDV-SCNC: 20 MMOL/L — LOW (ref 22–29)
HCT VFR BLD CALC: 26.1 % — LOW (ref 39–50)
HGB BLD-MCNC: 7.6 G/DL — LOW (ref 13–17)
IMM GRANULOCYTES NFR BLD AUTO: 0.5 % — SIGNIFICANT CHANGE UP (ref 0–0.9)
KETONES UR-MCNC: NEGATIVE — SIGNIFICANT CHANGE UP
LACTATE SERPL-SCNC: 1.3 MMOL/L — SIGNIFICANT CHANGE UP (ref 0.5–2)
LEUKOCYTE ESTERASE UR-ACNC: ABNORMAL
LYMPHOCYTES # BLD AUTO: 1.05 K/UL — SIGNIFICANT CHANGE UP (ref 1–3.3)
LYMPHOCYTES # BLD AUTO: 12.4 % — LOW (ref 13–44)
MCHC RBC-ENTMCNC: 25.2 PG — LOW (ref 27–34)
MCHC RBC-ENTMCNC: 29.1 GM/DL — LOW (ref 32–36)
MCV RBC AUTO: 86.7 FL — SIGNIFICANT CHANGE UP (ref 80–100)
MONOCYTES # BLD AUTO: 0.86 K/UL — SIGNIFICANT CHANGE UP (ref 0–0.9)
MONOCYTES NFR BLD AUTO: 10.2 % — SIGNIFICANT CHANGE UP (ref 2–14)
NEUTROPHILS # BLD AUTO: 6.39 K/UL — SIGNIFICANT CHANGE UP (ref 1.8–7.4)
NEUTROPHILS NFR BLD AUTO: 75.7 % — SIGNIFICANT CHANGE UP (ref 43–77)
NITRITE UR-MCNC: NEGATIVE — SIGNIFICANT CHANGE UP
NRBC # BLD: 0 /100 WBCS — SIGNIFICANT CHANGE UP (ref 0–0)
NT-PROBNP SERPL-SCNC: 1468 PG/ML — HIGH (ref 0–300)
OB PNL STL: POSITIVE
PCO2 BLDV: 40 MMHG — LOW (ref 42–55)
PH BLDV: 7.31 — LOW (ref 7.32–7.43)
PH UR: 6 — SIGNIFICANT CHANGE UP (ref 5–8)
PLATELET # BLD AUTO: 296 K/UL — SIGNIFICANT CHANGE UP (ref 150–400)
PO2 BLDV: 48 MMHG — HIGH (ref 25–45)
POTASSIUM BLDV-SCNC: 4 MMOL/L — SIGNIFICANT CHANGE UP (ref 3.5–5.1)
POTASSIUM SERPL-MCNC: 4.2 MMOL/L — SIGNIFICANT CHANGE UP (ref 3.5–5.3)
POTASSIUM SERPL-SCNC: 4.2 MMOL/L — SIGNIFICANT CHANGE UP (ref 3.5–5.3)
PROT SERPL-MCNC: 6.2 G/DL — SIGNIFICANT CHANGE UP (ref 6–8.3)
PROT UR-MCNC: 30 MG/DL
RAPID RVP RESULT: SIGNIFICANT CHANGE UP
RBC # BLD: 3.01 M/UL — LOW (ref 4.2–5.8)
RBC # FLD: 23.2 % — HIGH (ref 10.3–14.5)
RBC CASTS # UR COMP ASSIST: < 5 /HPF — SIGNIFICANT CHANGE UP
RH IG SCN BLD-IMP: POSITIVE — SIGNIFICANT CHANGE UP
RH IG SCN BLD-IMP: POSITIVE — SIGNIFICANT CHANGE UP
SAO2 % BLDV: 72.2 % — SIGNIFICANT CHANGE UP (ref 67–88)
SARS-COV-2 RNA SPEC QL NAA+PROBE: SIGNIFICANT CHANGE UP
SODIUM SERPL-SCNC: 136 MMOL/L — SIGNIFICANT CHANGE UP (ref 135–145)
SP GR SPEC: 1.02 — SIGNIFICANT CHANGE UP (ref 1–1.03)
TROPONIN T SERPL-MCNC: 0.01 NG/ML — SIGNIFICANT CHANGE UP (ref 0–0.01)
UROBILINOGEN FLD QL: 0.2 E.U./DL — SIGNIFICANT CHANGE UP
WBC # BLD: 8.44 K/UL — SIGNIFICANT CHANGE UP (ref 3.8–10.5)
WBC # FLD AUTO: 8.44 K/UL — SIGNIFICANT CHANGE UP (ref 3.8–10.5)
WBC UR QL: ABNORMAL /HPF

## 2023-02-24 PROCEDURE — 0225U NFCT DS DNA&RNA 21 SARSCOV2: CPT

## 2023-02-24 PROCEDURE — 87186 SC STD MICRODIL/AGAR DIL: CPT

## 2023-02-24 PROCEDURE — 71045 X-RAY EXAM CHEST 1 VIEW: CPT

## 2023-02-24 PROCEDURE — 84484 ASSAY OF TROPONIN QUANT: CPT

## 2023-02-24 PROCEDURE — 83880 ASSAY OF NATRIURETIC PEPTIDE: CPT

## 2023-02-24 PROCEDURE — P9040: CPT

## 2023-02-24 PROCEDURE — 71045 X-RAY EXAM CHEST 1 VIEW: CPT | Mod: 26

## 2023-02-24 PROCEDURE — 83605 ASSAY OF LACTIC ACID: CPT

## 2023-02-24 PROCEDURE — 81001 URINALYSIS AUTO W/SCOPE: CPT

## 2023-02-24 PROCEDURE — 86901 BLOOD TYPING SEROLOGIC RH(D): CPT

## 2023-02-24 PROCEDURE — 86923 COMPATIBILITY TEST ELECTRIC: CPT

## 2023-02-24 PROCEDURE — 99285 EMERGENCY DEPT VISIT HI MDM: CPT | Mod: 25

## 2023-02-24 PROCEDURE — 36415 COLL VENOUS BLD VENIPUNCTURE: CPT

## 2023-02-24 PROCEDURE — 82803 BLOOD GASES ANY COMBINATION: CPT

## 2023-02-24 PROCEDURE — 99284 EMERGENCY DEPT VISIT MOD MDM: CPT

## 2023-02-24 PROCEDURE — 82330 ASSAY OF CALCIUM: CPT

## 2023-02-24 PROCEDURE — 82272 OCCULT BLD FECES 1-3 TESTS: CPT

## 2023-02-24 PROCEDURE — 86900 BLOOD TYPING SEROLOGIC ABO: CPT

## 2023-02-24 PROCEDURE — 36430 TRANSFUSION BLD/BLD COMPNT: CPT

## 2023-02-24 PROCEDURE — 87086 URINE CULTURE/COLONY COUNT: CPT

## 2023-02-24 PROCEDURE — 93005 ELECTROCARDIOGRAM TRACING: CPT

## 2023-02-24 PROCEDURE — 84132 ASSAY OF SERUM POTASSIUM: CPT

## 2023-02-24 PROCEDURE — 80053 COMPREHEN METABOLIC PANEL: CPT

## 2023-02-24 PROCEDURE — 86850 RBC ANTIBODY SCREEN: CPT

## 2023-02-24 PROCEDURE — 87040 BLOOD CULTURE FOR BACTERIA: CPT

## 2023-02-24 PROCEDURE — 84295 ASSAY OF SERUM SODIUM: CPT

## 2023-02-24 PROCEDURE — 85025 COMPLETE CBC W/AUTO DIFF WBC: CPT

## 2023-02-24 RX ORDER — SODIUM CHLORIDE 9 MG/ML
500 INJECTION INTRAMUSCULAR; INTRAVENOUS; SUBCUTANEOUS ONCE
Refills: 0 | Status: COMPLETED | OUTPATIENT
Start: 2023-02-24 | End: 2023-02-24

## 2023-02-24 RX ADMIN — SODIUM CHLORIDE 1000 MILLILITER(S): 9 INJECTION INTRAMUSCULAR; INTRAVENOUS; SUBCUTANEOUS at 14:16

## 2023-02-24 NOTE — ED PROVIDER NOTE - CLINICAL SUMMARY MEDICAL DECISION MAKING FREE TEXT BOX
Pt w sob/cough/fatigue for 1-2 days  Ddx URI/pna, anemia, PE? (although already on eliquis), no hypoxia, HF/ACS  -labs, EKG, cxr, rvp, reassess

## 2023-02-24 NOTE — ED ADULT TRIAGE NOTE - CHIEF COMPLAINT QUOTE
Pt presents to ED C/O 1 week cough, fatigue, CP, SOB. Reports Hemoglobin level outpatient 7.7. Hx metastatic prostate CA. Family at bedside states, " We are worried he cough have pneumonia". Pt protecting own airway. Denies fevers.

## 2023-02-24 NOTE — ED ADULT NURSE NOTE - OBJECTIVE STATEMENT
patient presents to ED with SOB, cough for a week, visited pcp and Hgb checked 7.7 (baseline 9). denies cp, dizziness, headache, n/v/abdominal pain, urinary sxs, fever. A&OX4. stable at bed. placed on monitor.

## 2023-02-24 NOTE — ED PROVIDER NOTE - PHYSICAL EXAMINATION

## 2023-02-24 NOTE — ED PROVIDER NOTE - CARE PROVIDER_API CALL
Sandra Gill)  Critical Care Medicine; Internal Medicine  [address], [address]  [place], [place] [address]  Phone: [phone]  Fax: [phone]  Follow Up Time:

## 2023-02-24 NOTE — ED PROVIDER NOTE - PROGRESS NOTE DETAILS
given one unit prBC, daughter states gets Cdiff with abx use, prefers not to, pt hds, non toxic  disc with Dr. Gill pmd, appears well, ok to fu as outpt, prefers not to aggressively pursyue fecal occult +, last EGD/colo normal per daughter. Hb 7.6,  given one unit prBC as baseline 8-9 and possibly contributing to weakness, +UA but daughter states gets Cdiff with abx use, prefers not to take abx, pt hds, non toxic, afebrile, no signs of sepsis  disc with Dr. Gill pmd, appears well, ok to fu as outpt, prefers not to aggressively pursue fecal occult +,  stool brown, last EGD/colo normal per daughter. strict return prec given.

## 2023-02-24 NOTE — ED PROVIDER NOTE - OBJECTIVE STATEMENT
81 yo M with past medical history of CAD s/p PCI, Afib (on Eliquis), HTN, HLD, h/o PE/DVT, stage IV prostate CA s/p prostatectomy (on Lupron), and anemia (baseline Hgb 9) w sob/cough for two days. Denies leg swelling. Daughter states most concerned about weakness/fatigue. No GI bleed sx. 81 yo M with past medical history of CAD s/p PCI, Afib (on Eliquis), HTN, HLD, h/o PE/DVT, stage IV prostate CA s/p prostatectomy (on Lupron), and anemia (baseline Hgb 9) w sob/cough for two days. Denies leg swelling. Daughter states most concerned about ongoing weakness/fatigue, feels that the pt is anemic. No GI bleed sx, chest pain, leg swelling, headache, focal weakness/numbness, speech changes.

## 2023-02-26 LAB
-  AMPICILLIN/SULBACTAM: SIGNIFICANT CHANGE UP
-  AMPICILLIN: SIGNIFICANT CHANGE UP
-  CEFAZOLIN: SIGNIFICANT CHANGE UP
-  CEFTRIAXONE: SIGNIFICANT CHANGE UP
-  CIPROFLOXACIN: SIGNIFICANT CHANGE UP
-  ERTAPENEM: SIGNIFICANT CHANGE UP
-  GENTAMICIN: SIGNIFICANT CHANGE UP
-  NITROFURANTOIN: SIGNIFICANT CHANGE UP
-  PIPERACILLIN/TAZOBACTAM: SIGNIFICANT CHANGE UP
-  TOBRAMYCIN: SIGNIFICANT CHANGE UP
-  TRIMETHOPRIM/SULFAMETHOXAZOLE: SIGNIFICANT CHANGE UP
CULTURE RESULTS: SIGNIFICANT CHANGE UP
METHOD TYPE: SIGNIFICANT CHANGE UP
ORGANISM # SPEC MICROSCOPIC CNT: SIGNIFICANT CHANGE UP
ORGANISM # SPEC MICROSCOPIC CNT: SIGNIFICANT CHANGE UP
SPECIMEN SOURCE: SIGNIFICANT CHANGE UP

## 2023-02-27 ENCOUNTER — NON-APPOINTMENT (OUTPATIENT)
Age: 83
End: 2023-02-27

## 2023-02-27 RX ORDER — CEFPODOXIME PROXETIL 100 MG
1 TABLET ORAL
Qty: 20 | Refills: 0
Start: 2023-02-27 | End: 2023-03-08

## 2023-02-27 NOTE — ED POST DISCHARGE NOTE - DETAILS
spoke with daughter, states ever time pt is on abx he gets cdiff. pts urologist Dr. Roa does not usually treat him. I left a message for Dr. Roa spoke with daughter, states ever time pt is on abx he gets cdiff. pts urologist Dr. Selby does not usually treat him. I left a message for Dr. Baez nurse. Will send abx and daughter will wait for dr. selby and will call his pcp

## 2023-02-28 ENCOUNTER — NON-APPOINTMENT (OUTPATIENT)
Age: 83
End: 2023-02-28

## 2023-03-07 PROBLEM — I25.10 CAD S/P PERCUTANEOUS CORONARY ANGIOPLASTY: Status: ACTIVE | Noted: 2021-04-27

## 2023-03-07 PROBLEM — R07.9 CHEST PAIN: Status: ACTIVE | Noted: 2022-12-29

## 2023-04-11 ENCOUNTER — RX RENEWAL (OUTPATIENT)
Age: 83
End: 2023-04-11

## 2023-04-11 RX ORDER — APIXABAN 2.5 MG/1
2.5 TABLET, FILM COATED ORAL
Qty: 180 | Refills: 0 | Status: ACTIVE | COMMUNITY
Start: 2020-01-24 | End: 1900-01-01

## 2023-04-11 RX ORDER — ATORVASTATIN CALCIUM 20 MG/1
20 TABLET, FILM COATED ORAL DAILY
Qty: 90 | Refills: 0 | Status: ACTIVE | COMMUNITY
Start: 2021-06-07 | End: 1900-01-01

## 2023-04-13 RX ORDER — POTASSIUM CHLORIDE 750 MG/1
10 CAPSULE, EXTENDED RELEASE ORAL DAILY
Qty: 90 | Refills: 0 | Status: ACTIVE | COMMUNITY
Start: 2023-04-13 | End: 1900-01-01

## 2023-04-14 NOTE — ED PROVIDER NOTE - PATIENT PORTAL LINK FT
Repair Type: Repair performed by another provider You can access the FollowMyHealth Patient Portal offered by [facility] by registering at the following website: [email or web address]. By joining [employer]’s FollowMyHealth portal, you will also be able to view your health information using other applications (apps) compatible with our system.

## 2023-04-21 ENCOUNTER — EMERGENCY (EMERGENCY)
Facility: HOSPITAL | Age: 83
LOS: 1 days | End: 2023-04-21
Attending: EMERGENCY MEDICINE | Admitting: EMERGENCY MEDICINE
Payer: MEDICARE

## 2023-04-21 VITALS — HEIGHT: 69 IN

## 2023-04-21 DIAGNOSIS — Z90.79 ACQUIRED ABSENCE OF OTHER GENITAL ORGAN(S): Chronic | ICD-10-CM

## 2023-04-21 PROCEDURE — 99285 EMERGENCY DEPT VISIT HI MDM: CPT

## 2023-04-21 PROCEDURE — 96374 THER/PROPH/DIAG INJ IV PUSH: CPT | Mod: XU

## 2023-04-21 PROCEDURE — 82962 GLUCOSE BLOOD TEST: CPT

## 2023-04-21 PROCEDURE — 99285 EMERGENCY DEPT VISIT HI MDM: CPT | Mod: 25

## 2023-04-21 PROCEDURE — 92950 HEART/LUNG RESUSCITATION CPR: CPT

## 2023-04-21 RX ORDER — EPINEPHRINE 0.3 MG/.3ML
1 INJECTION INTRAMUSCULAR; SUBCUTANEOUS ONCE
Refills: 0 | Status: COMPLETED | OUTPATIENT
Start: 2023-04-21 | End: 2023-04-21

## 2023-04-21 RX ORDER — DEXTROSE 50 % IN WATER 50 %
50 SYRINGE (ML) INTRAVENOUS ONCE
Refills: 0 | Status: COMPLETED | OUTPATIENT
Start: 2023-04-21 | End: 2023-04-21

## 2023-04-21 RX ADMIN — EPINEPHRINE 1 MILLIGRAM(S): 0.3 INJECTION INTRAMUSCULAR; SUBCUTANEOUS at 08:22

## 2023-04-21 RX ADMIN — Medication 50 MILLILITER(S): at 08:21

## 2023-04-21 RX ADMIN — EPINEPHRINE 1 MILLIGRAM(S): 0.3 INJECTION INTRAMUSCULAR; SUBCUTANEOUS at 08:24

## 2023-04-21 NOTE — ED ADULT NURSE NOTE - NSICDXPASTMEDICALHX_GEN_ALL_CORE_FT
PAST MEDICAL HISTORY:  Anemia of chronic disease     Atrial fibrillation on Eliquis    CAD (coronary artery disease)     Chemotherapy session for neoplasm Prostate Cancer    DVT (deep venous thrombosis) 1/2020 on Eliquis    Essential hypertension Hypertension    Glaucoma Glaucoma    Hyperlipidemia Hyperlipidemia    Malignant neoplasm of prostate s/p total prostatectomy in 1994 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    Pulmonary embolism 1/2020    Type 2 diabetes mellitus Diabetes

## 2023-04-21 NOTE — ED PROVIDER NOTE - OBJECTIVE STATEMENT
83 yo M with PMHx CAD s/p PCI, Afib (on Eliquis), HTN, HLD, h/o PE/DVT, stage IV prostate CA s/p prostatectomy (on Lupron), and anemia (baseline Hgb 9) presents with acute altered mental status starting just pta. Daughter and health care proxy reports she and sister were supposed to take pt to medical appointment on [address]. She got him up and dress this morning as usual. They were in the car with daughter sitting next to pt in back while her sister drove when he started to become confused and progressively more unresponsive so they diverted to the ED. By the time pt was in triage he was completely unresponsive and without a pulse. CPR and ACLS were immediately started and goals of care were discussed with family. They did want to start CPR but only for a short time. If there was not immediate revival, they preferred to let pt pass peacefully.

## 2023-04-21 NOTE — ED ADULT NURSE NOTE - OBJECTIVE STATEMENT
82 y.o. Male came into ED unresponsive. Per daughter at bedside were on their way to a doctor's appointment when pt became unresponsive and was "gurgling". MD Cuellar at bedside. CPR initiated. PMHx CAD, Anemia, Afib, Prostate cancer, Type II DM, HTN. CPR in progress.

## 2023-04-21 NOTE — ED PROVIDER NOTE - PHYSICAL EXAMINATION
CONSTITUTIONAL: Elderly male, frail appearing; Unresponsive   HEAD: Normocephalic; atraumatic.   CARDIOVASCULAR: no cardiac activity on bedside US  RESPIRATORY: bagged with no spontaneous breath  GI: Non-distended  EXT: No cyanosis or edema;  SKIN: Normal for age and race; warm; dry; good turgor; no apparent lesions or rash.   NEURO: unresponsive CONSTITUTIONAL: Elderly male, frail appearing; Unresponsive   HEAD: Normocephalic; atraumatic.   CARDIOVASCULAR: no cardiac activity on bedside US  RESPIRATORY: bagged with no spontaneous breath  GI: Non-distended, soft  EXT: No cyanosis or edema;  SKIN: Normal for age and race; dry; no apparent lesions or rash.   NEURO: unresponsive

## 2023-04-21 NOTE — ED ADULT NURSE REASSESSMENT NOTE - NS ED NURSE REASSESS COMMENT FT1
Post mortem care initiated. Pts family taking part per family's request. Supervised by MD Technician.
Pt lui Sainz with security and ED tech.
Pt passed PO trial. No nausea/vomiting. MD Cuellar made aware. Pt to finish IV bolus order per MD Order then D/C.
yes/in RR

## 2023-04-21 NOTE — ED PROVIDER NOTE - CONTEXT
Physical Therapy Daily Treatment Note    Date: 2020  Patient Name: Leigha Corcoran  : 1974   MRN: [id number]  DOInjury: 2020   DOSx: 2020  Referring Provider:  Ana Warren DO  [id number] [facility]    Medical Diagnosis:      Diagnosis Orders   1. Trimalleolar fracture of left ankle, closed, initial encounter         Outcome Measure:  Lower Extremity Functional Scale (LEFS) 79% impairment    S: Pt reports continued swelling  And soreness. O: entered clinic with SC. Per  start FWB out of the boot   Time 0705-6720  am     Visit 5 / Repeat outcome measure at mid point and end. Pain 4/10 ex's    ROM Markedly limited     Modalities      Ice , compression, elevation  X 15 min  Post ex's  MO   Manual            Stretch                  Exercise      Nustep L5 x   10 min  te        SLR     SAQ     LAQ           Baps L1  30x sitting  Left foot All directions te   Pro stretch  X 2 min     te   marching 30x  te   CR 30x  te   squats 30x  te   Hip abd 30x  te        Ankle pumps  te   Inversion/eversion  te   Ankle circles  te   Hamstring Curl       TG squats L13 2 x 20  te   TG calf raises      Step-ups - FWD      Step-ups - LAT      Step-ups - BWD        NMR To improve balance for safe community and home ambulation    Resisted walk      FWD      BKWD      lat      March      Side stepping      Retro walk      Gait training 3 x 135 ft SC    A:  Tolerated well. . VC's for correct tech with SC and amb for heel/toe pattern  P: Continue with rehab plan. Continue to work on strengthening.    Claudia Sloan PTA    Treatment Charges: Mins Units   Initial Evaluation     Re-Evaluation     Ther Exercise         TE 30 2   Manual Therapy     MT     Ther Activities        TA     Gait Training          GT     Neuro Re-education NR     Modalities  VD X 15  1   Non-Billable Service Time     Other     Total Time/Units 45 3
unknown

## 2023-04-21 NOTE — ED PROVIDER NOTE - CLINICAL SUMMARY MEDICAL DECISION MAKING FREE TEXT BOX
83 yo M with PMHx of CAD s/p PCI, Afib (on Eliquis), HTN, HLD, h/o PE/DVT, stage IV prostate CA s/p prostatectomy (on Lupron), and anemia (baseline Hgb 9) presents with cardiac arrest. LOC was witness by pt daughter. Immediate ACLS was started and beside US showed no meaningful cardiac activity with PEA. FS glucose was mildly low at 68. Pt given dextrose and EPI with no improvement. Goals of care discussed with family and decision was made to call time of death at 8:25 am. There is no indication for medical exam at this time and family declined an autopsy. 83 yo M with PMHx of CAD s/p PCI, Afib (on Eliquis), HTN, HLD, h/o PE/DVT, stage IV prostate CA s/p prostatectomy (on Lupron), and anemia (baseline Hgb 9) presents with cardiac arrest. LOC was witness by pt daughter. Immediate ACLS was started and beside US showed no meaningful cardiac activity with PEA on monitor. FS glucose was mildly low at 68. Pt given dextrose and EPI with no improvement. Goals of care discussed with family and decision was made to call time of death at 8:25 am. There is no indication for medical examiner at this time and family declined an autopsy.

## 2023-04-24 DIAGNOSIS — Z79.01 LONG TERM (CURRENT) USE OF ANTICOAGULANTS: ICD-10-CM

## 2023-04-24 DIAGNOSIS — I25.10 ATHEROSCLEROTIC HEART DISEASE OF NATIVE CORONARY ARTERY WITHOUT ANGINA PECTORIS: ICD-10-CM

## 2023-04-24 DIAGNOSIS — Z86.718 PERSONAL HISTORY OF OTHER VENOUS THROMBOSIS AND EMBOLISM: ICD-10-CM

## 2023-04-24 DIAGNOSIS — Z86.711 PERSONAL HISTORY OF PULMONARY EMBOLISM: ICD-10-CM

## 2023-04-24 DIAGNOSIS — Z85.46 PERSONAL HISTORY OF MALIGNANT NEOPLASM OF PROSTATE: ICD-10-CM

## 2023-04-24 DIAGNOSIS — Z91.048 OTHER NONMEDICINAL SUBSTANCE ALLERGY STATUS: ICD-10-CM

## 2023-04-24 DIAGNOSIS — I46.9 CARDIAC ARREST, CAUSE UNSPECIFIED: ICD-10-CM

## 2023-04-24 DIAGNOSIS — I10 ESSENTIAL (PRIMARY) HYPERTENSION: ICD-10-CM

## 2023-04-24 DIAGNOSIS — E78.5 HYPERLIPIDEMIA, UNSPECIFIED: ICD-10-CM

## 2023-04-24 DIAGNOSIS — D64.9 ANEMIA, UNSPECIFIED: ICD-10-CM

## 2023-04-24 DIAGNOSIS — E11.9 TYPE 2 DIABETES MELLITUS WITHOUT COMPLICATIONS: ICD-10-CM

## 2023-04-24 DIAGNOSIS — R41.82 ALTERED MENTAL STATUS, UNSPECIFIED: ICD-10-CM

## 2023-04-24 DIAGNOSIS — Z95.5 PRESENCE OF CORONARY ANGIOPLASTY IMPLANT AND GRAFT: ICD-10-CM

## 2023-04-24 DIAGNOSIS — Z88.8 ALLERGY STATUS TO OTHER DRUGS, MEDICAMENTS AND BIOLOGICAL SUBSTANCES: ICD-10-CM

## 2023-04-24 DIAGNOSIS — Z79.82 LONG TERM (CURRENT) USE OF ASPIRIN: ICD-10-CM

## 2023-04-24 DIAGNOSIS — I48.91 UNSPECIFIED ATRIAL FIBRILLATION: ICD-10-CM

## 2023-04-24 DIAGNOSIS — Z90.79 ACQUIRED ABSENCE OF OTHER GENITAL ORGAN(S): ICD-10-CM

## 2023-05-19 ENCOUNTER — APPOINTMENT (OUTPATIENT)
Dept: HEART AND VASCULAR | Facility: CLINIC | Age: 83
End: 2023-05-19

## 2023-06-16 NOTE — PROGRESS NOTE ADULT - PROBLEM SELECTOR PLAN 3
[No] : No 2 episodes of coffee ground emesis. EGD 1/13/20: Esophagitis, 2cm hiatal hernia, Gastritis, duodenitis, 2 small clean based ulcer in D2  -Continue PPI 40 mg BID PO for 8w (End 3/9) and evaluate for role of continue PPI while on A/C  -f/u serum H pylori Ab-> Triple therapy pending biopsy or Ab results  -reconsult GI if reoccurs  -active T&S 1/13  - no contraindication to a/c per GI

## 2023-07-14 ENCOUNTER — APPOINTMENT (OUTPATIENT)
Dept: UROLOGY | Facility: CLINIC | Age: 83
End: 2023-07-14

## 2023-08-21 NOTE — ED ADULT NURSE NOTE - ISOLATION TYPE:
Contact precautions... Otezla Pregnancy And Lactation Text: This medication is Pregnancy Category C and it isn't known if it is safe during pregnancy. It is unknown if it is excreted in breast milk.

## 2023-08-31 NOTE — PHYSICAL THERAPY INITIAL EVALUATION ADULT - FUNCTIONAL LIMITATIONS, PT EVAL
community/leisure/home management/work/self-care Hydroxyzine Pregnancy And Lactation Text: This medication is not safe during pregnancy and should not be taken. It is also excreted in breast milk and breast feeding isn't recommended.

## 2023-09-12 NOTE — ED ADULT TRIAGE NOTE - CHIEF COMPLAINT QUOTE
Abdomen , soft, nontender, nondistended , no guarding or rigidity , no masses palpable , normal bowel sounds , Liver and Spleen , no hepatomegaly present , no hepatosplenomegaly , liver nontender , spleen not palpable "I can't get out of bed. I'm feeling weak."

## 2023-10-01 NOTE — PROVIDER CONTACT NOTE (CRITICAL VALUE NOTIFICATION) - NS PROVIDER READ BACK
Patient reports week and a half of left leg swelling.  Denies any acute injury.  He had an US that did not show a blood clot.   
yes

## 2023-12-05 NOTE — PHYSICAL THERAPY INITIAL EVALUATION ADULT - LEVEL OF INDEPENDENCE, REHAB EVAL
Quality 226: Preventive Care And Screening: Tobacco Use: Screening And Cessation Intervention: Patient screened for tobacco use and is an ex/non-smoker Quality 110: Preventive Care And Screening: Influenza Immunization: Influenza Immunization previously received during influenza season Quality 431: Preventive Care And Screening: Unhealthy Alcohol Use - Screening: Patient screened for unhealthy alcohol use using a single question and scores less than 2 times per year Detail Level: Detailed Quality 130: Documentation Of Current Medications In The Medical Record: Current Medications Documented supervision

## 2024-03-06 NOTE — ED ADULT TRIAGE NOTE - CHIEF COMPLAINT QUOTE
Subjective:      Patient ID: Radha Lamas is a 75 y.o. female.    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 Arce related to patient compliant of shortness of breath with exertion, mainly with walking around track in morning with daughter.   There is also sensation of flutter of epigastric region then sensation of sob if lifting container while filling with water at home. She has to put container down and sensation will resolve.  Patient feels shortness of breath links to treatment with Atezolizumab. Since occurred with past dosing. She is not opposed to continue Atezolizumab treatment since no desats of oxygen when feeling shortness of breath.   Patient cardiologist is non [facility], she can not name right off at this visit.   She states her heart checks out okay.   COPD remains stable on Anoro and Albuterol inhaler. There is no cough, no wheezing, no sputum production, no chest pain.     Evaluation today for shortness of breath feno and 6mwd are unrevealing.  3/6/2024 6MWD No desaturations requiring supplemental oxygen at rest or exertion. Normal exercise capacity.   3/6/2024 FeNO 9, no inflammation of lungs suggested.  No indication to add ICS controller to COPD inhaler regimen. Continue ANORO daily and Albuterol inhaler or nebs before exercise.   Advised possible post prandial  shortness of breath and for a trial of avoiding eating large meal just before exercise. Pt reports she does eat a good breakfast right before going to walk with her daughter, not hungry just eats because her  eats breakfast just before her scheduled walk with daughter. (Recommended 1/2 banana instead of big breakfast before exercise).       11/08/2024 Gage CHACKO  Radha Lamas is a 75 y.o. female presents with her  to pulmonary clinic for follow up requested by Oncology related to complaint of shortness of breath with exertion with review Spirometry/6MWD    She is followed in pulmonary related to diagnosis of COPD review chest xray/CPFT.     Patient presents stating shortness of breath with exertion has resolved.  No cough, no mucous, no hemoptysis, no wheezing. No long shortness of breath.    Patient reports shortness of breath and fatigue with exertion began after starting atezolizumab with dozing 8/14/2023, 9/5/2023, 9/26/2023.   SOB with exertion has resolved over past 5 weeks, patient reports since off Atezolizumab shortness of breath resolved about 2 weeks after stopping Atezolizumab. Over past 3 weeks she has resumed her walking program, no shortness of breath.   10/24/2023 CT chest no pneumonia or acute findings.  11/8/2023 spirometry stable moderate COPD   11/8/2023 6MWD No desaturations requiring supplemental oxygen at rest or exertion.  Echo is scheduled for 11/10/2023 evaluate for IO myocarditis ordered by Dr. Arce, oncologist.   Proceed with Overnight oximetry on room air evaluate for nocturnal hypoxemia, if abnormal patient is agreeable to obstructive sleep apnea evaluation with PSG, if Overnight oximetry is normal she is not willing for other sleep testing.      Current treatment regimen:  ANORO. Albuterol inhaler. Use of albuterol before house work or yard work.     Smoking history former cigarette smoker quit in 2009 with 68 pack year smoking history. Smoked 1.5 packs x 45 years.      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     2023 PET-CT No concerning FDG avid lesion or detrimental change.  Resolution/improvement of previously noted chest findings.     2023 PET-CT Postoperative changes left lung with a very small loculated hydropneumothorax in the upper left hemithorax (mainly fluid with minimal air). Small approximate 1 cm hypermetabolic node at the left hilum adjacent to staple line.  Subcentimeter minimally hypermetabolic nearby AP window lymph node, increased from prior.  These could be inflammatory in nature though close follow-up is advised particularly for the left hilar hypermetabolic focus.  The examination elsewhere is unremarkable with no additional foci of abnormal uptake.    Patient has family history of cancer in sister lung cancer diagnosis age 55 years with metastasis brain,  2018.   Father with lung cancer at 67 yrs,  age 68 years. Sister and father both smokers.   Brother 69 year old with new diagnosis of rectal cancer, finished chemo and radiation.    COPD Questionnaire  How often do you cough?: Almost never  How often do you have phlegm (mucus) in your chest?: Almost never  How often does your chest feel tight?: Never  When you walk up a hill or one flight of stairs, how often are you breathless?: All of the time  How often are you limited doing any activities at home?: Some of the time  How often are you confident leaving the house despite your lung condition?: All of the time  How often do you sleep soundly?: All of the time  How often do you have energy?: Most of the time  Total score:  11     Previous Report Reviewed: historical medical records, office notes and radiology reports      Past Medical History: The following portions of the patient's history were reviewed and updated as appropriate:   She  has a past surgical history that includes Knee cartilage surgery (Right, 01/2011); Thoracoscopic wedge resection of lung (Left, 04/28/2021); Tubal ligation (1976); Wedge resection, lung, robot-assisted, using VATS, using da Kaci Xi (Left, 03/20/2023); Laparoscopic lysis of adhesions (Left, 03/20/2023); Robot-assisted laparoscopic lymphadenectomy using da Kaci Xi (Left, 03/20/2023); Injection of anesthetic agent around multiple intercostal nerves (Left, 03/20/2023); Fluoroscopy (N/A, 04/27/2023); and Breast mass excision.  Her family history includes Cancer in her brother, father, and sister; Heart failure in her mother; Hypertension in her father and mother; Macular degeneration in her mother; Retinal detachment in her mother.  She  reports that she quit smoking about 15 years ago. Her smoking use included cigarettes. She started smoking about 60 years ago. She has a 67.5 pack-year smoking history. She has never been exposed to tobacco smoke. She has never used smokeless tobacco. She reports that she does not drink alcohol and does not use drugs.  She has a current medication list which includes the following prescription(s): albuterol, calcium carbonate, dexamethasone, diphenhydrAMINE-aluminum-magnesium hydroxide-simethicone-LIDOcaine HCl 2%, dupixent pen, folic acid, gabapentin, levothyroxine, loratadine, magnesium oxide, olanzapine, omeprazole, ondansetron, oxycodone, tolterodine, and anoro ellipta, and the following Facility-Administered Medications: prochlorperazine.  She has No Known Allergies.    Review of Systems   Constitutional:  Negative for fever, chills, weight loss, weight gain, activity change, appetite change, fatigue and night sweats.   HENT:  Negative for postnasal drip,  "rhinorrhea, sinus pressure, voice change and congestion.    Eyes:  Negative for redness and itching.   Respiratory:  Negative for snoring, cough, sputum production, chest tightness, shortness of breath, wheezing, orthopnea, asthma nighttime symptoms, dyspnea on extertion, use of rescue inhaler and somnolence.    Cardiovascular: Negative.  Negative for chest pain, palpitations and leg swelling.   Genitourinary:  Negative for difficulty urinating and hematuria.   Endocrine:  Negative for cold intolerance and heat intolerance.    Musculoskeletal:  Negative for arthralgias, gait problem, joint swelling and myalgias.   Skin: Negative.    Gastrointestinal:  Negative for nausea, vomiting, abdominal pain and acid reflux.   Neurological:  Negative for dizziness, weakness, light-headedness and headaches.   Hematological:  Negative for adenopathy. No excessive bruising.   All other systems reviewed and are negative.     Objective:   /67   Pulse 89   Resp 16   Ht 5' 5" (1.651 m)   Wt 73.8 kg (162 lb 11.2 oz)   SpO2 99%   BMI 27.07 kg/m²   Physical Exam  Vitals reviewed.   Constitutional:       General: She is not in acute distress.     Appearance: She is well-developed. She is not ill-appearing or toxic-appearing.   HENT:      Head: Normocephalic.      Right Ear: External ear normal.      Left Ear: External ear normal.      Nose: Nose normal.      Mouth/Throat:      Pharynx: No oropharyngeal exudate.   Eyes:      Conjunctiva/sclera: Conjunctivae normal.   Cardiovascular:      Rate and Rhythm: Normal rate and regular rhythm.      Heart sounds: Normal heart sounds.   Pulmonary:      Effort: Pulmonary effort is normal.      Breath sounds: Normal breath sounds. No stridor.   Abdominal:      Palpations: Abdomen is soft.   Musculoskeletal:         General: Normal range of motion.      Cervical back: Normal range of motion and neck supple.   Lymphadenopathy:      Cervical: No cervical adenopathy.   Skin:     General: Skin " is warm and dry.   Neurological:      Mental Status: She is alert and oriented to person, place, and time.   Psychiatric:         Behavior: Behavior normal. Behavior is cooperative.         Thought Content: Thought content normal.         Judgment: Judgment normal.       Personal Diagnostic Review    3/6/2024 FeNO 9   Clinical Guide to Interpretation or FeNO Levels :     FeNO  (ppb) LOW INTERMEDIATE HIGH   ADULT VALUES < 25 25-50          > 50   Th2-driven Inflammation Unlikely Likely Significant      Patients FeNO level at this visit : __9__ (ppb)     Interpretation of FeNO measurement in adults:  [x] FENO is less than 25 ppb implies non eosinophilic airway inflammation or the absence of airway inflammation.               Comment: Low FENO (<25 ppb) in adult asthmatics with persistent symptoms suggests other etiologies for these symptoms and a lower likelihood of benefit from adding or increasing inhaled glucocorticoids.    3/6/2024 6MWD No desaturations requiring supplemental oxygen at rest or exertion. Exercise capacity is normal.  Phase Oxygen Assessment Supplemental O2 Heart   Rate Blood Pressure Edmundo Dyspnea Scale Rating   Resting 99 % Room Air 78 bpm 132/60 0   Exercise             Minute             1 95 % Room Air 98 bpm       2 94 % Room Air 110 bpm       3 93 % Room Air 111 bpm       4 95 % Room Air 111 bpm       5 93 % Room Air 111 bpm       6  94 % Room Air 109 bpm 136/62 3   Recovery             Minute             1 98 % Room Air 84 bpm       2 98 % Room Air 86 bpm       3 100 % Room Air 89 bpm       4 99 % Room Air 89 bpm 136/67 0      Six Minute Walk Summary  6MWT Status: completed without stopping  Patient Reported: Dyspnea         Interpretation:  Did the patient stop or pause?: No  Total Time Walked (Calculated): 360 seconds  Final Partial Lap Distance (feet): 100 feet  Total Distance Meters (Calculated): 396.24 meters  Predicted Distance Meters (Calculated): 407.08 meters  Percentage of Predicted  (Calculated): 97.34  Peak VO2 (Calculated): 15.87  Mets: 4.53  Has The Patient Had a Previous Six Minute Walk Test?: Yes     Previous 6MWT Results  Has The Patient Had a Previous Six Minute Walk Test?: Yes  Date of Previous Test: 11/08/23  Total Time Walked: 360 seconds  Total Distance (meters): 419.1  Predicted Distance (meters): 413.09 meters  Percentage of Predicted: 101.45  Percent Change (Calculated): 0.05    CT Chest Without Contrast  Narrative: EXAMINATION:  CT CHEST WITHOUT CONTRAST    CLINICAL HISTORY:  Rule out pneumonitis; Dyspnea, unspecified    TECHNIQUE:  Low dose axial images, sagittal and coronal reformations were obtained from the thoracic inlet to the lung bases. Contrast was not administered.    COMPARISON:  02/05/2024    FINDINGS:  There are stable scarring/postoperative changes seen associated with the left lung.  There is a subpleural noncalcified pulmonary nodule seen within the left lung base that measures approximately 4 mm that is unchanged in appearance.  There are also a few subpleural areas of probable scarring within the right upper lung zone including series 4, image 84, series 4, image 91 and series 4, image 93 that are also stable.    Mild vascular calcification seen involving the aorta.  There is no pericardial effusion.  No enlarged mediastinal, hilar or axillary lymph nodes are identified.  A right-sided MediPort catheter is noted with the tip seen within the distal SVC.    Stable appearance of a few hypodense lesions within the liver most consistent with cysts.    No suspicious appearing osseus abnormalities are identified.  Impression: 1. No acute pathology.  2. Remaining findings unchanged when compared to the prior study.    Electronically signed by: Lonnie Robin DO  Date:    03/01/2024  Time:    08:41    11/8/2023 Normal overnight oximetry.  Patient does not meet criteria for supplemental oxygen administration    11/8/2023 Spirometry shows moderate obstruction. Spirometry  Pt w PMH cancer brought from home by daughter in wheelchair, per daughter report pt has been unresponsive. Pt in wheelchair, nonresponsive to verbal stimuli, drooling, very cold extremities. Faint carotid pulses. Pt was rushed to the resus room. No pulses felt in resus, CPR started. FS 68. unable to obtain VS. remains unimproved following bronchodilator.    11/8/2023 6MWD No desaturations requiring supplemental oxygen at rest or exertion.     Phase Oxygen Assessment Supplemental O2 Heart   Rate Blood Pressure Edmundo Dyspnea Scale Rating   Resting 97 % Room Air 82 bpm 127/77 1   Exercise             Minute             1 93 % Room Air 99 bpm       2 92 % Room Air 104 bpm       3 92 % Room Air 106 bpm       4 94 % Room Air 107 bpm       5 94 % Room Air 107 bpm       6  94 % Room Air 98 bpm 152/70 3   Recovery             Minute             1 96 % Room Air 90 bpm       2 97 % Room Air 92 bpm       3 98 % Room Air 82 bpm       4 98 % Room Air 78 bpm 158/72 1      Six Minute Walk Summary  6MWT Status: completed without stopping  Patient Reported: No complaints             Interpretation:  Did the patient stop or pause?: No  Total Time Walked (Calculated): 360 seconds  Final Partial Lap Distance (feet): 175 feet  Total Distance Meters (Calculated): 419.1 meters  Predicted Distance Meters (Calculated): 413.09 meters  Percentage of Predicted (Calculated): 101.45  Peak VO2 (Calculated): 16.55  Mets: 4.73  Has The Patient Had a Previous Six Minute Walk Test?: Yes     Previous 6MWT Results  Has The Patient Had a Previous Six Minute Walk Test?: Yes  Date of Previous Test: 04/28/22  Total Time Walked: 360 seconds  Total Distance (meters): 365  Predicted Distance (meters): 422 meters  Percentage of Predicted: 86  Percent Change (Calculated): -0.15    5/11/2023 CPFT spirometry reveals moderate obstruction.  Lung volume determination is normal.  Diffusing capacity is moderately decreased.    4/28/2022  CPFT Spirometry shows moderate obstruction. Lung volume determination is normal. Airway mechanics are abnormal showing increased airway resistance and decreased conductance. DLCO is mildly decreased. MVV is moderately decreased.     3/10/2021 1 year follow up CT chest low dose screening revealed 4B - Suspicious - Left lower lobe nodule has  increased in size from 4 mm to 11 mm when compared to the prior study dated 03/10/2020.  - possible next steps  Chest CT, tissue sampling and-or PET/CT.      2/11/2019 CPFT   Acceptable and reproducible spirometry data.     FEV1/ FVC decreased to 62% indicating obstruction.     FEV1 reduced to 69% indicating moderate obstruction.     FVC normal at 86%.     No significant bronchodilation noted after bronchodilator administration.     FEF 25-75% decreased to 34% indicating small airways flow obstruction.     Flow volume curve shows obstructive pattern.     Spirometry showing moderate obstruction.     Lung volume shows increased TLC to 120% indicating hyper inflation, increased RV and RV/ TLC indicating the presence of air trapping.     DLCO mildly reduced to 70%.     Moderate obstruction, air trapping, mild DLCO reduction.  Clinical correlation recommended.     6/24/2013 CPFT  Mild obstruction. Airflow is not clearly improved following bronchodilation. Clinical improvement following bronchodilator  therapy may occur in the absence of spirometric improvement.    4/28/2022 6MWD No desaturations requiring supplemental oxygen at rest or exertion.  Oxygen Assessment Supplemental O2 Heart   Rate Blood Pressure Edmundo Dyspnea Scale Rating    Resting 95 % Room Air 75 bpm 115/61 0   Exercise             Minute             1 96 % Room Air 105 bpm       2 95 % Room Air 105 bpm       3 95 % Room Air 104 bpm       4 96 % Room Air 104 bpm       5 96 % Room Air 102 bpm       6  96 % Room Air 105 bpm 131/61 2   Recovery             Minute             1 98 % Room Air 78 bpm       2 99 % Room Air 74 bpm       3 98 % Room Air 77 bpm       4 98 % Room Air 77 bpm 121/74 0      Six Minute Walk Summary  6MWT Status: completed without stopping  Patient Reported: Dyspnea         Interpretation:  Did the patient stop or pause?: No  Total Time Walked (Calculated): 360 seconds  Final Partial Lap Distance (feet): 0 feet  Total Distance Meters  (Calculated): 365.76 meters  Predicted Distance Meters (Calculated): 422.53 meters  Percentage of Predicted (Calculated): 86.56  Peak VO2 (Calculated): 14.95  Mets: 4.27  Has The Patient Had a Previous Six Minute Walk Test?: Yes     Previous 6MWT Results  Has The Patient Had a Previous Six Minute Walk Test?: Yes  Date of Previous Test: 04/15/21  Total Time Walked: 360 seconds  Total Distance (meters): 441.96  Predicted Distance (meters): 416.28 meters  Percentage of Predicted: 106.17  Percent Change (Calculated): 0.17    Assessment:     1. COPD, moderate    2. Overweight (BMI 25.0-29.9)    3. 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 Former heavy cigarette smoker (20-39 per day)    5. Aortic atherosclerosis          No orders of the defined types were placed in this encounter.    Plan:   Discussed diagnosis, its evaluation, treatment and usual course. All questions answered.  Problem List Items Addressed This Visit       Overweight (BMI 25.0-29.9)     Continue to encourage exercise and dietary modification to obtain normal weight.   Has walking program daily  Wt Readings from Last 9 Encounters:   03/06/24 73.8 kg (162 lb 11.2 oz)   03/06/24 73.8 kg (162 lb 11.2 oz)   03/05/24 73.2 kg (161 lb 6 oz)   03/05/24 73.2 kg (161 lb 6 oz)   02/27/24 73.9 kg (162 lb 14.7 oz)   02/09/24 73 kg (161 lb)   02/06/24 73.1 kg (161 lb 2.5 oz)   02/06/24 73.1 kg (161 lb 2.5 oz)   01/16/24 73.5 kg (162 lb 0.6 oz)   Body mass index is 27.07 kg/m².           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   Continued management with oncology         Former heavy cigarette smoker (20-39 per day)     67.5 pk/year smoker quit in 2009  CT follow up with oncology   ONCOLOGIC DIAGNOSIS: Stage IIB, pT1a, pN1a cMx lung adenocarcinoma left lower lung, Dr. Medley. History of stage I squamous cell carcinoma moderately differentiated left lower lung status post wedge resection 04/28/2021           COPD, moderate - Primary     Evaluation today for shortness of breath feno and 6mwd are unrevealing for COPD flare as cause of shortness of breath with exertion.   3/6/2024 6MWD No desaturations requiring supplemental oxygen at rest or exertion. Normal exercise capacity.   3/6/2024 FeNO 9, no inflammation of lungs suggested.    No indication to add ICS controller to COPD inhaler regimen. Continue ANORO daily and Albuterol inhaler or nebs before exercise.     Advised possible post prandial shortness of breath and for a trial of avoiding eating large meal just before exercise. Pt reports she does eat a good breakfast right before going to walk with her daughter, not hungry just eats because her  eats breakfast just before her scheduled walk. (Recommended 1/2 banana instead of big breakfast before exercise).   Also follow up with cardiologist, consideration for holter monitor determine cardiac status when feeling sensations of shortness of breath.   Will see her back in May as planned. If not improved with stopping breakfast before walking and taking neb treatment  or albuterol before walking then I will plan to consider cpx testing to further evaluate. Thank you             Aortic atherosclerosis     Follow heart healthy low fat diet. regular exercise.             I spent a total of 48 minutes on the day of the visit.  This includes face to face time and non-face to face time preparing to see the patient (eg, review of tests), obtaining  and/or reviewing separately obtained history, documenting clinical information in the electronic or other health record, independently interpreting results and communicating results to the patient/family/caregiver, or care coordinator.    Follow up in about 2 months (around 5/9/2024) for  COPD CPFT as scheduled .     Cc: Dr. Orion Arce, oncologist

## 2024-03-14 NOTE — CONSULT NOTE ADULT - ATTENDING COMMENTS
Spoke to the office of Dr. Pili Coleman (cardiologist) per the request of Dr. Foss. Dr. Foss requested patient be seen sooner than their already scheduled appointment in July with her cardiologist due to suspicion of acrocyanosis. Office of Dr. Pili Coleman was able to move appointment to April 9th @ 10am. Update sent to Dr. Foss.  
no proximal DVT. hold lovenox until source of possible UGIB has been assessed by EGD.
Patient seen by the bedside on 1.14.2020.  Daughter Verna at the bedside as well    Given underlying malignancy, AFIB, PE would resume anticoagulation and monitor for bleeding.    Follow up Cardiology Recommendations.  Chemotherapy with Docetaxel on hold until patient is seen as an outpatient.    Hgb range as outpatient between 10-11.    Please have Urology evaluate patient for gross hematuria
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

## 2024-03-26 NOTE — ED PROVIDER NOTE - NS ED MD DISPO ADMIT [facility] PALLIATIVE CARE
Take naproxen twice a day with food. Rest right ankle, alternate warm/cold compresses. Call sports medicine for appointment for reevaluation. No track nor gym class until cleared by sports med/orthopedics.   NONE

## 2024-06-12 NOTE — ED ADULT NURSE NOTE - IS THE PATIENT ABLE TO BE SCREENED?
"Regular Visit          Encounter provider Bernadine Forman      Recent Visits  No visits were found meeting these conditions.  Showing recent visits within past 7 days and meeting all other requirements  Today's Visits  Date Type Provider Dept   06/12/24 Telemedicine Bernadine [facility] Therapist [address]   Yes

## 2024-06-18 NOTE — ED PROVIDER NOTE - INPATIENT RESIDENT/ACP NOTIFIED DATE
Chronic recurring condition.  The patient reported flareup of gout recently affecting the right foot since last couple weeks.  He denies fever or chills.  He has been taking over-the-counter medication without improvement.  Patient denied recent trauma or injury.   12-Jan-2020 16:03

## 2024-08-21 NOTE — PHYSICAL THERAPY INITIAL EVALUATION ADULT - BED MOBILITY LIMITATIONS, REHAB EVAL
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 Given symptoms and examination will obtain MRI pan-spine to r/o osteomyelitis. Pain management consulted. Dx: Chronic back pain, (thoracic osteomyelitis), Methadone maintenance therapy patient, Polysubstance abuse.
decreased ability to use arms for pushing/pulling/impaired ability to control trunk for mobility

## 2024-08-27 NOTE — ED PROVIDER NOTE - PATIENT PORTAL LINK FT
No You can access the FollowMyHealth Patient Portal offered by [facility] by registering at the following website: [email or web address]. By joining [employer]’s FollowMyHealth portal, you will also be able to view your health information using other applications (apps) compatible with our system.

## 2024-09-13 NOTE — SWALLOW BEDSIDE ASSESSMENT ADULT - SLP PATIENT PROFILE REVIEW
Chief Complaint   Patient presents with    Diabetic Eye Exam     Accompanied by  Dano    Chief Complaint(s) and History of Present Illness(es)       Diabetic Eye Exam              Diabetes Type: Type 2, controlled with diet and taking oral medications    Duration: 1 year    Blood Sugars: is controlled (Checks infrequently)                   Lab Results   Component Value Date    A1C 7.1 07/05/2024    A1C 7.1 02/15/2024    A1C 7.1 11/08/2023    A1C 7.7 08/22/2023    A1C 7.7 05/05/2023    A1C 7.0 02/18/2021    A1C 6.8 12/11/2020    A1C 6.6 08/19/2020    A1C 6.2 02/12/2019    A1C 5.8 01/08/2018       Last Eye Exam: 9/11/2023  Dilated Previously: Yes, side effects of dilation explained today    What are you currently using to see?  +1.50 OTC readers - uses for everything up close    Distance Vision Acuity: Satisfied with vision    Near Vision Acuity: Satisfied with vision while reading and using computer with readers    Eye Comfort: good  Do you use eye drops? : No  Occupation or Hobbies: Danny Parada  Optometry Assistant     Medical, surgical and family histories reviewed and updated 9/13/2024.       OBJECTIVE: See Ophthalmology exam    ASSESSMENT:    ICD-10-CM    1. Encounter for examination of eyes and vision with abnormal findings  Z01.01       2. Type 2 diabetes mellitus without retinopathy (H)  E11.9       3. Presbyopia  H52.4           PLAN:    Rhonda Cordero aware  eye exam results will be sent to Vasquez Denson  Patient Instructions   Patient educated on importance of good blood sugar control.  Letter sent to primary care provider with diabetic eye exam report.     Continue use of OTC reading glasses as needed.     Return in 1 year for a comprehensive eye exam, or sooner if needed.      The effects of the dilating drops last for 4- 6 hours.  You will be more sensitive to light and vision will be blurry up close.  Mydriatic sunglasses were given if needed.     Major Beasley,  CHRISTIE FERNANDEZ [facility]  [address] [place]  [id number]    [phone]          yes

## 2024-11-07 NOTE — ED ADULT NURSE NOTE - PRIMARY CARE PROVIDER
NON-AFFILIATED Patient's VSS, XR non-diagnostic. This patient presents with dyspnea, most likely secondary to bronchospasm. Presentation not consistent with acute cardiac etiologies to include ACS, CHF, pericardial effusion / tamponade. Presentation not consistent with acute respiratory etiologies to include acute PE, pneumothorax ,  allergic etiologies, or infectious etiologies such as PNA. Presentation also not consistent with non-cardiopulmonary causes to include toxidromes, metabolic etiologies such as acidemia or electrolyte derangements, sepsis, neurologic causes (i.e. demyelinating diseases).    The patient was given detailed return precautions and advised to return to the emergency department if any new symptoms developed, symptoms worsened or for any concerns. The patient was offered the opportunity to ask questions and verbalized that they understand the diagnosis and discharge instructions.

## 2024-11-17 NOTE — PROGRESS NOTE ADULT - PROBLEM SELECTOR PLAN 1
11/17/24 1528   Discharge Planning   Living Arrangements Spouse/significant other   Support Systems Spouse/significant other   Assistance Needed A&Ox3, independent with ADLs, no DME, room air at baseline, drives, PCP Dr. Tia Crowley   Type of Residence Private residence   Number of Stairs to Enter Residence 4   Number of Stairs Within Residence 0   Do you have animals or pets at home? Yes   Type of Animals or Pets 1 beagle mix   Home or Post Acute Services None   Expected Discharge Disposition Home   Financial Resource Strain   How hard is it for you to pay for the very basics like food, housing, medical care, and heating? Not hard   Housing Stability   In the last 12 months, was there a time when you were not able to pay the mortgage or rent on time? N   At any time in the past 12 months, were you homeless or living in a shelter (including now)? N   Transportation Needs   In the past 12 months, has lack of transportation kept you from medical appointments or from getting medications? no   In the past 12 months, has lack of transportation kept you from meetings, work, or from getting things needed for daily living? No        Saturating 100% on RA. PE on CTA chest, likely due to DVT 2/2 malignancy (LLE DVT on US Dopplers).  -low suspicion for R heart strain as troponin neg, BNP neg, EKG at the time w/o sinus tach, no RBBB  -f/u ECHO to r/o R heart strain  - hep ggt held due to hematuria, no contraindication for a/c per GI  - consider restarting heparin gtt    #DVT  P/w SOB and JAIME, found to have PE on CTA chest. LE dopplers w/ DVT in LLE intramuscular calf vein. Etiology likely 2/2 malignancy.  -Rx as per above

## 2025-01-10 NOTE — ED PROVIDER NOTE - NEURO NEGATIVE STATEMENT, MLM
Reason for Disposition  • Cough (lower respiratory infection) with no complications    Protocols used: Cough-P-OH     no loss of consciousness, no gait abnormality, no headache, no sensory deficits, and no weakness.

## 2025-02-14 NOTE — H&P ADULT - PROBLEM SELECTOR PROBLEM 6
Patient Education   Preventive Care Advice   This is general advice given by our system to help you stay healthy. However, your care team may have specific advice just for you. Please talk to your care team about your preventive care needs.  Nutrition  Eat 5 or more servings of fruits and vegetables each day.  Try wheat bread, brown rice and whole grain pasta (instead of white bread, rice, and pasta).  Get enough calcium and vitamin D. Check the label on foods and aim for 100% of the RDA (recommended daily allowance).  Lifestyle  Exercise at least 150 minutes each week  (30 minutes a day, 5 days a week).  Do muscle strengthening activities 2 days a week. These help control your weight and prevent disease.  No smoking.  Wear sunscreen to prevent skin cancer.  Have a dental exam and cleaning every 6 months.  Yearly exams  See your health care team every year to talk about:  Any changes in your health.  Any medicines your care team has prescribed.  Preventive care, family planning, and ways to prevent chronic diseases.  Shots (vaccines)   HPV shots (up to age 26), if you've never had them before.  Hepatitis B shots (up to age 59), if you've never had them before.  COVID-19 shot: Get this shot when it's due.  Flu shot: Get a flu shot every year.  Tetanus shot: Get a tetanus shot every 10 years.  Pneumococcal, hepatitis A, and RSV shots: Ask your care team if you need these based on your risk.  Shingles shot (for age 50 and up)  General health tests  Diabetes screening:  Starting at age 35, Get screened for diabetes at least every 3 years.  If you are younger than age 35, ask your care team if you should be screened for diabetes.  Cholesterol test: At age 39, start having a cholesterol test every 5 years, or more often if advised.  Bone density scan (DEXA): At age 50, ask your care team if you should have this scan for osteoporosis (brittle bones).  Hepatitis C: Get tested at least once in your life.  STIs (sexually  transmitted infections)  Before age 24: Ask your care team if you should be screened for STIs.  After age 24: Get screened for STIs if you're at risk. You are at risk for STIs (including HIV) if:  You are sexually active with more than one person.  You don't use condoms every time.  You or a partner was diagnosed with a sexually transmitted infection.  If you are at risk for HIV, ask about PrEP medicine to prevent HIV.  Get tested for HIV at least once in your life, whether you are at risk for HIV or not.  Cancer screening tests  Cervical cancer screening: If you have a cervix, begin getting regular cervical cancer screening tests starting at age 21.  Breast cancer scan (mammogram): If you've ever had breasts, begin having regular mammograms starting at age 40. This is a scan to check for breast cancer.  Colon cancer screening: It is important to start screening for colon cancer at age 45.  Have a colonoscopy test every 10 years (or more often if you're at risk) Or, ask your provider about stool tests like a FIT test every year or Cologuard test every 3 years.  To learn more about your testing options, visit:   .  For help making a decision, visit:   https://bit.ly/tq92500.  Prostate cancer screening test: If you have a prostate, ask your care team if a prostate cancer screening test (PSA) at age 55 is right for you.  Lung cancer screening: If you are a current or former smoker ages 50 to 80, ask your care team if ongoing lung cancer screenings are right for you.  For informational purposes only. Not to replace the advice of your health care provider. Copyright   2023 [facility] [employer]. All rights reserved. Clinically reviewed by the [facility] Transitions Program. [employer] 938509 - REV 01/24.  Hearing Loss: Care Instructions  Overview     Hearing loss is a sudden or slow decrease in how well you hear. It can range from slight to profound. Permanent hearing loss can occur with aging. It also can  happen when you are exposed long-term to loud noise. Examples include listening to loud music, riding motorcycles, or being around other loud machines.  Hearing loss can affect your work and home life. It can make you feel lonely or depressed. You may feel that you have lost your independence. But hearing aids and other devices can help you hear better and feel connected to others.  Follow-up care is a key part of your treatment and safety. Be sure to make and go to all appointments, and call your doctor if you are having problems. It's also a good idea to know your test results and keep a list of the medicines you take.  How can you care for yourself at home?  Avoid loud noises whenever possible. This helps keep your hearing from getting worse.  Always wear hearing protection around loud noises.  Wear a hearing aid as directed.  A professional can help you pick a hearing aid that will work best for you.  You can also get hearing aids over the counter for mild to moderate hearing loss.  Have hearing tests as your doctor suggests. They can show whether your hearing has changed. Your hearing aid may need to be adjusted.  Use other devices as needed. These may include:  Telephone amplifiers and hearing aids that can connect to a television, stereo, radio, or microphone.  Devices that use lights or vibrations. These alert you to the doorbell, a ringing telephone, or a baby monitor.  Television closed-captioning. This shows the words at the bottom of the screen. Most new TVs can do this.  TTY (text telephone). This lets you type messages back and forth on the telephone instead of talking or listening. These devices are also called TDD. When messages are typed on the keyboard, they are sent over the phone line to a receiving TTY. The message is shown on a monitor.  Use text messaging, social media, and email if it is hard for you to communicate by telephone.  Try to learn a listening technique called speechreading. It is  "not lipreading. You pay attention to people's gestures, expressions, posture, and tone of voice. These clues can help you understand what a person is saying. Face the person you are talking to, and have them face you. Make sure the lighting is good. You need to see the other person's face clearly.  Think about counseling if you need help to adjust to your hearing loss.  When should you call for help?  Watch closely for changes in your health, and be sure to contact your doctor if:    You think your hearing is getting worse.     You have new symptoms, such as dizziness or nausea.   Where can you learn more?  Go to https://www.[employer].net/patiented  Enter R798 in the search box to learn more about \"Hearing Loss: Care Instructions.\"  Current as of: September 27, 2023  Content Version: 14.3    2024 [employer].   Care instructions adapted under license by your healthcare professional. If you have questions about a medical condition or this instruction, always ask your healthcare professional. [employer] disclaims any warranty or liability for your use of this information.       " Hyperlipidemia

## 2025-04-29 NOTE — ED PROVIDER NOTE - INTERNATIONAL TRAVEL
Please follow up with your primary care provider in 2-3 days to be reevaluated and continued management of your symptoms.  You have been provided  referral today.     For pain relief, you may use heating packs to your back for 20 minutes at a time taking 1 hour breaks in between. Avoid any heavy lifting (>15lbs) or strenuous activity for at least one week.You may do light exercises such as walking and gentle stretching as long as you are not experiencing severe pain. Bed rest or prolonged sitting is not recommended as this may impair your recovery.     You may take over-the-counter 400 mg ibuprofen (every 6-8 hours) or 650 mg acetaminophen (every 4-6 hours) for pain. You may also try over the counter lidocaine patches.     Return to the ED if you develop severe pain that is not controlled by pain medications or if you are unable to walk because of pain or weakness, any new or worsening symptoms including fevers, numbness or tingling of your genital or anal area, loss of bowel or bladder control, inability to urinate, numbness/weakness of your arms or legs, chest pain or shortness of breath.       ADDITIONAL FOLLOW-UP INSTRUCTIONS:   Your evaluation today is reassuring and your emergency care team has decided it is safe for you to be discharged. However, many potentially serious conditions cannot be detected with standard emergency testing. It is very important that you follow up with your doctor as directed for further examination and possible additional testing or treatment. Follow up care is nearly always required after a visit to the Emergency Department. It is your responsibility to call your doctor or call the number(s) provided for a follow up appointment. Your diagnosis today is a provisional one based on information available to the emergency provider today. The diagnosis may change as more information becomes available to your personal physician or other healthcare providers. If you develop any new,  worsening, or concerning symptoms, or you feel you are not improving, please return to the Emergency Department for re-evaluation. It is our honor to take care of you and your family.    Did you have imaging or blood work done today?  Any imaging and lab work taken during your visit have been reviewed by the emergency provider today. You should follow up with your primary care provider for the final results to be sure no additional treatment is needed.    Do you need a personal doctor or primary care clinic?  If you do not have a primary care doctor please inform your ER Doctor and ask for a referral. You may also search for a doctor or clinic online, or call your insurance provider to ask for a provider referral. You can also call [phone] to make an appointment with an [facility] provider.     No results found for this visit on 04/29/25.  Imaging Results              XR THORACIC SPINE 3 VIEWS (Final result)  Result time 04/29/25 18:25:38      Final result                   Impression:    1.   Thoracic spine no acute fracture or traumatic malalignment.    Electronically Signed by: EDD BERNAL D.O.   Signed on: 4/29/2025 6:25 PM   Workstation ID: [id number]               Narrative:    EXAM: XR THORACIC SPINE 3 VIEWS      DATE: 4/29/2025 5:48 PM    CLINICAL INDICATION: Midline back pain after dropping weight onto neck  doing squats.    COMPARISON: None.     TECHNIQUE: Thoracic spine 3 views    FINDINGS: 12 rib-bearing thoracic vertebral bodies. Slight curvature convex  right mid thoracic spine.    No acute fractures identified. No traumatic malalignment.    Further evaluation or follow-up imaging as clinically indicated or if  symptoms persist.                                        No

## 2025-05-31 NOTE — END OF VISIT
[>50% of Time Spent on Counseling and Coordination of Care for  ___] : Greater than 50% of the encounter time was spent on counseling and coordination of care for [unfilled] [Time Spent: ___ minutes] : I have spent [unfilled] minutes of face to face time with the patient fair minus

## 2025-06-26 NOTE — H&P ADULT - NSICDXPASTSURGICALHX_GEN_ALL_CORE_FT
Intubated for acute hypoxemic respiratory failure 2/2 suspected massive PE on 6/23    - Initial ABG w/ hypercapnia and hypoxemic, improved on repeat   - Wean vent as tolerated for goal SpO2>90% -> currently on minimal vent settings, not a candidate for further vent wean 2/2 mental status   - Goal eucarbia  - S/p tPA for PE, argatroban gtt on hold for concern for active bleeding  - Daily CXR    PAST SURGICAL HISTORY:  Other postprocedural status S/P prostatectomy

## 2025-07-01 NOTE — ED ADULT NURSE NOTE - NS ED NOTE  TALK SOMEONE YN
Goal Outcome Evaluation:    Time: 5038-2371   Vitals: /77   Pulse (!) 134   Temp 99.5  F (37.5  C) (Oral)   Resp (S) 30   Wt 53.2 kg (117 lb 4.6 oz)   SpO2 93%   BMI 19.73 kg/m    Neuro: Patient refusing to ambulate at beginning of shift and not able to move legs w/out assistance due to pain. Purposeful movement of upper extremities, but pt c/o weakness and pain w/ movement. Flat affect at times. Increased anxiety/agitation. Pt had change in level of consciousness around 2200-arousable to vigorous stimulation and sleepy, but oriented x4 and following commands. Dr. Zaragoza to bedside to assess pt's LOC. BGL checked-108. Full neuro check ordered for midnight.   Cardiac: Intermittent tachycardia 130's-140's at times-Dr. Zaragoza notified. Warm and well perfused. BP's w/in parameters.   Respiratory: Clr LS RA. RR 30's intermittently-Dr. Zaragoza notified.  GI: Poor PO intake. Not drinking much and hasn't eaten all day. No nausea or vomiting. Zofran switched to PRN and scope patch removed.   : Voiding. Bolus x2.   SKIN: Old abd surgical scars. No new areas of skin breakdown noted. Nicotine patch removed and replaced to L) upper back per MAR.   PIV/CVC/PICC/PORT: PIV placed and infusing w/out redness, swelling, or drainage. Dressing clean, dry, and intact.   PRN'S: Hydroxyzine x1 and ativan x1 for agitation/anxiety. Benadryl x1 pre-med before dupixent.   Incisions/Drains: None.  Education: Ed pt on shift plan of care.       Hourly rounding complete. Continue POC.                            no